# Patient Record
Sex: FEMALE | Race: WHITE | NOT HISPANIC OR LATINO | Employment: OTHER | URBAN - METROPOLITAN AREA
[De-identification: names, ages, dates, MRNs, and addresses within clinical notes are randomized per-mention and may not be internally consistent; named-entity substitution may affect disease eponyms.]

---

## 2017-02-17 RX ORDER — LOSARTAN POTASSIUM 100 MG/1
TABLET ORAL
Qty: 21 TABLET | Refills: 0 | Status: SHIPPED | OUTPATIENT
Start: 2017-02-17 | End: 2017-03-08 | Stop reason: SDUPTHER

## 2017-03-08 RX ORDER — LOSARTAN POTASSIUM 100 MG/1
TABLET ORAL
Qty: 30 TABLET | Refills: 2 | Status: SHIPPED | OUTPATIENT
Start: 2017-03-08 | End: 2017-06-10 | Stop reason: SDUPTHER

## 2017-03-15 RX ORDER — FUROSEMIDE 20 MG/1
TABLET ORAL
Qty: 30 TABLET | Refills: 5 | Status: SHIPPED | OUTPATIENT
Start: 2017-03-15 | End: 2017-09-08 | Stop reason: SDUPTHER

## 2017-04-14 RX ORDER — CARVEDILOL 6.25 MG/1
TABLET ORAL
Qty: 60 TABLET | Refills: 3 | Status: SHIPPED | OUTPATIENT
Start: 2017-04-14 | End: 2017-07-07 | Stop reason: SDUPTHER

## 2017-05-16 RX ORDER — PRAVASTATIN SODIUM 40 MG
TABLET ORAL
Qty: 30 TABLET | Refills: 3 | Status: SHIPPED | OUTPATIENT
Start: 2017-05-16 | End: 2017-09-08 | Stop reason: SDUPTHER

## 2017-06-10 RX ORDER — LOSARTAN POTASSIUM 100 MG/1
TABLET ORAL
Qty: 30 TABLET | Refills: 2 | Status: SHIPPED | OUTPATIENT
Start: 2017-06-10 | End: 2017-07-07 | Stop reason: SDUPTHER

## 2017-06-13 RX ORDER — LOSARTAN POTASSIUM 100 MG/1
TABLET ORAL
Qty: 30 TABLET | Refills: 2 | Status: SHIPPED | OUTPATIENT
Start: 2017-06-13 | End: 2017-07-07 | Stop reason: SDUPTHER

## 2017-07-07 ENCOUNTER — OFFICE VISIT (OUTPATIENT)
Dept: FAMILY MEDICINE CLINIC | Facility: CLINIC | Age: 77
End: 2017-07-07

## 2017-07-07 VITALS
HEART RATE: 70 BPM | HEIGHT: 62 IN | TEMPERATURE: 98.4 F | DIASTOLIC BLOOD PRESSURE: 88 MMHG | OXYGEN SATURATION: 98 % | SYSTOLIC BLOOD PRESSURE: 130 MMHG | BODY MASS INDEX: 37.91 KG/M2 | WEIGHT: 206 LBS | RESPIRATION RATE: 16 BRPM

## 2017-07-07 DIAGNOSIS — I10 ESSENTIAL HYPERTENSION: Primary | ICD-10-CM

## 2017-07-07 DIAGNOSIS — E55.9 VITAMIN D DEFICIENCY: ICD-10-CM

## 2017-07-07 DIAGNOSIS — E78.2 MIXED HYPERLIPIDEMIA: ICD-10-CM

## 2017-07-07 LAB
25(OH)D3 SERPL-MCNC: 41.5 NG/ML (ref 30–100)
ALBUMIN SERPL-MCNC: 4.3 G/DL (ref 3.5–5.2)
ALBUMIN/GLOB SERPL: 1.3 G/DL
ALP SERPL-CCNC: 61 U/L (ref 39–117)
ALT SERPL W P-5'-P-CCNC: 21 U/L (ref 1–33)
ANION GAP SERPL CALCULATED.3IONS-SCNC: 13.6 MMOL/L
AST SERPL-CCNC: 26 U/L (ref 1–32)
BILIRUB SERPL-MCNC: 0.6 MG/DL (ref 0.1–1.2)
BUN BLD-MCNC: 16 MG/DL (ref 8–23)
BUN/CREAT SERPL: 20 (ref 7–25)
CALCIUM SPEC-SCNC: 10.8 MG/DL (ref 8.6–10.5)
CHLORIDE SERPL-SCNC: 100 MMOL/L (ref 98–107)
CHOLEST SERPL-MCNC: 207 MG/DL (ref 0–200)
CO2 SERPL-SCNC: 27.4 MMOL/L (ref 22–29)
CREAT BLD-MCNC: 0.8 MG/DL (ref 0.57–1)
ERYTHROCYTE [DISTWIDTH] IN BLOOD BY AUTOMATED COUNT: 13.6 % (ref 4.5–15)
GFR SERPL CREATININE-BSD FRML MDRD: 70 ML/MIN/1.73
GLOBULIN UR ELPH-MCNC: 3.2 GM/DL
GLUCOSE BLD-MCNC: 92 MG/DL (ref 65–99)
HCT VFR BLD AUTO: 42.3 % (ref 31–42)
HDLC SERPL-MCNC: 67 MG/DL (ref 40–60)
HGB BLD-MCNC: 13.7 G/DL (ref 12–18)
LDLC SERPL CALC-MCNC: 112 MG/DL (ref 0–100)
LDLC/HDLC SERPL: 1.67 {RATIO}
LYMPHOCYTES # BLD AUTO: 1.5 10*3/MM3 (ref 1.2–3.4)
LYMPHOCYTES NFR BLD AUTO: 26.6 % (ref 21–51)
MCH RBC QN AUTO: 28.3 PG (ref 26.1–33.1)
MCHC RBC AUTO-ENTMCNC: 32.3 G/DL (ref 33–37)
MCV RBC AUTO: 87.7 FL (ref 80–99)
MONOCYTES # BLD AUTO: 0.2 10*3/MM3 (ref 0.1–0.6)
MONOCYTES NFR BLD AUTO: 3.5 % (ref 2–9)
NEUTROPHILS # BLD AUTO: 4 10*3/MM3 (ref 1.4–6.5)
NEUTROPHILS NFR BLD AUTO: 69.9 % (ref 42–75)
PLATELET # BLD AUTO: 216 10*3/MM3 (ref 150–450)
PMV BLD AUTO: 7.1 FL (ref 7.1–10.5)
POTASSIUM BLD-SCNC: 4.5 MMOL/L (ref 3.5–5.2)
PROT SERPL-MCNC: 7.5 G/DL (ref 6–8.5)
RBC # BLD AUTO: 4.82 10*6/MM3 (ref 4–6)
SODIUM BLD-SCNC: 141 MMOL/L (ref 136–145)
TRIGL SERPL-MCNC: 142 MG/DL (ref 0–150)
VLDLC SERPL-MCNC: 28.4 MG/DL (ref 5–40)
WBC NRBC COR # BLD: 5.7 10*3/MM3 (ref 4.5–10)

## 2017-07-07 PROCEDURE — 99213 OFFICE O/P EST LOW 20 MIN: CPT | Performed by: INTERNAL MEDICINE

## 2017-07-07 PROCEDURE — 80053 COMPREHEN METABOLIC PANEL: CPT | Performed by: INTERNAL MEDICINE

## 2017-07-07 PROCEDURE — 80061 LIPID PANEL: CPT | Performed by: INTERNAL MEDICINE

## 2017-07-07 PROCEDURE — 90715 TDAP VACCINE 7 YRS/> IM: CPT | Performed by: INTERNAL MEDICINE

## 2017-07-07 PROCEDURE — 82306 VITAMIN D 25 HYDROXY: CPT | Performed by: INTERNAL MEDICINE

## 2017-07-07 PROCEDURE — 85025 COMPLETE CBC W/AUTO DIFF WBC: CPT | Performed by: INTERNAL MEDICINE

## 2017-07-07 PROCEDURE — 90471 IMMUNIZATION ADMIN: CPT | Performed by: INTERNAL MEDICINE

## 2017-07-07 RX ORDER — FEXOFENADINE HYDROCHLORIDE 60 MG/1
60 TABLET, FILM COATED ORAL DAILY
Qty: 30 TABLET | Refills: 3 | Status: SHIPPED | OUTPATIENT
Start: 2017-07-07 | End: 2022-04-07 | Stop reason: CLARIF

## 2017-07-07 RX ORDER — LOSARTAN POTASSIUM 100 MG/1
100 TABLET ORAL DAILY
Qty: 30 TABLET | Refills: 3 | Status: SHIPPED | OUTPATIENT
Start: 2017-07-07 | End: 2018-01-10 | Stop reason: SDUPTHER

## 2017-07-07 RX ORDER — CARVEDILOL 6.25 MG/1
6.25 TABLET ORAL 2 TIMES DAILY WITH MEALS
Qty: 60 TABLET | Refills: 3 | Status: SHIPPED | OUTPATIENT
Start: 2017-07-07 | End: 2017-11-07 | Stop reason: SDUPTHER

## 2017-07-07 RX ORDER — FEXOFENADINE HYDROCHLORIDE 60 MG/1
60 TABLET, FILM COATED ORAL DAILY
COMMUNITY
End: 2017-07-07 | Stop reason: SDUPTHER

## 2017-07-07 NOTE — PROGRESS NOTES
Subjective   Michelle Car is a 76 y.o. female.     History of Present Illness   Patient was seen today for hypertension.  Blood pressure is running 130s over 80s or lipids a been well-controlled with medication.  Her vitamin D level is being  determined by lab work.    Much of this encounter note is an electronic transcription/translation of spoken language to printed text.  The electronic translation of spoken language may permit erroneous, or at times, nonsensical words or phrases to be inadvertently transcribed.  Although I have reviewed the note for such errors, some may still exist.  The following portions of the patient's history were reviewed and updated as appropriate: allergies, current medications, past family history, past medical history, past social history, past surgical history and problem list.    Review of Systems   Constitutional: Negative for fatigue and fever.   HENT: Positive for congestion. Negative for trouble swallowing.    Eyes: Negative for discharge and visual disturbance.   Respiratory: Negative for choking and shortness of breath.    Cardiovascular: Negative for chest pain and palpitations.   Gastrointestinal: Negative for abdominal pain and blood in stool.   Endocrine: Negative.    Genitourinary: Negative for genital sores and hematuria.   Musculoskeletal: Negative for gait problem and joint swelling.   Skin: Negative for color change, pallor, rash and wound.   Allergic/Immunologic: Positive for environmental allergies. Negative for immunocompromised state.   Neurological: Negative for facial asymmetry and speech difficulty.   Psychiatric/Behavioral: Negative for hallucinations and suicidal ideas.       Objective   Physical Exam   Constitutional: She is oriented to person, place, and time. She appears well-developed and well-nourished.   HENT:   Head: Normocephalic.   Eyes: Conjunctivae are normal. Pupils are equal, round, and reactive to light.   Neck: Normal range of motion.  Neck supple.   Cardiovascular: Normal rate, regular rhythm and normal heart sounds.    Pulmonary/Chest: Effort normal and breath sounds normal.   Abdominal: Soft. Bowel sounds are normal.   Musculoskeletal: Normal range of motion.   Neurological: She is alert and oriented to person, place, and time.   Skin: Skin is warm and dry.   Psychiatric: She has a normal mood and affect. Her behavior is normal. Judgment and thought content normal.   Nursing note and vitals reviewed.      Assessment/Plan   Problems Addressed this Visit        Cardiovascular and Mediastinum    Hypertension - Primary    Relevant Medications    losartan (COZAAR) 100 MG tablet    carvedilol (COREG) 6.25 MG tablet    Other Relevant Orders    CBC Auto Differential (Completed)    Hyperlipidemia    Relevant Orders    CBC Auto Differential (Completed)      Other Visit Diagnoses     Vitamin D deficiency         Relevant Orders    Vitamin D 25 Hydroxy

## 2017-09-08 RX ORDER — FUROSEMIDE 20 MG/1
TABLET ORAL
Qty: 30 TABLET | Refills: 5 | Status: SHIPPED | OUTPATIENT
Start: 2017-09-08 | End: 2018-05-10 | Stop reason: SDUPTHER

## 2017-09-08 RX ORDER — PRAVASTATIN SODIUM 40 MG
TABLET ORAL
Qty: 30 TABLET | Refills: 3 | Status: SHIPPED | OUTPATIENT
Start: 2017-09-08 | End: 2018-01-08 | Stop reason: SDUPTHER

## 2017-11-07 RX ORDER — CARVEDILOL 6.25 MG/1
TABLET ORAL
Qty: 60 TABLET | Refills: 3 | Status: SHIPPED | OUTPATIENT
Start: 2017-11-07 | End: 2018-02-09 | Stop reason: SDUPTHER

## 2018-01-08 RX ORDER — PRAVASTATIN SODIUM 40 MG
40 TABLET ORAL NIGHTLY
Qty: 30 TABLET | Refills: 0 | Status: SHIPPED | OUTPATIENT
Start: 2018-01-08 | End: 2018-02-05 | Stop reason: SDUPTHER

## 2018-01-08 RX ORDER — POTASSIUM CHLORIDE 750 MG/1
10 TABLET, EXTENDED RELEASE ORAL DAILY
Qty: 30 TABLET | Refills: 0 | Status: SHIPPED | OUTPATIENT
Start: 2018-01-08 | End: 2018-02-05 | Stop reason: SDUPTHER

## 2018-01-10 RX ORDER — LOSARTAN POTASSIUM 100 MG/1
TABLET ORAL
Qty: 30 TABLET | Refills: 3 | Status: SHIPPED | OUTPATIENT
Start: 2018-01-10 | End: 2018-05-10 | Stop reason: SDUPTHER

## 2018-02-05 RX ORDER — PRAVASTATIN SODIUM 40 MG
TABLET ORAL
Qty: 30 TABLET | Refills: 0 | Status: SHIPPED | OUTPATIENT
Start: 2018-02-05 | End: 2018-03-07 | Stop reason: SDUPTHER

## 2018-02-05 RX ORDER — POTASSIUM CHLORIDE 750 MG/1
TABLET, EXTENDED RELEASE ORAL
Qty: 30 TABLET | Refills: 0 | Status: SHIPPED | OUTPATIENT
Start: 2018-02-05 | End: 2018-03-07 | Stop reason: SDUPTHER

## 2018-02-07 RX ORDER — MELOXICAM 7.5 MG/1
TABLET ORAL
Qty: 60 TABLET | Refills: 3 | Status: SHIPPED | OUTPATIENT
Start: 2018-02-07 | End: 2018-12-27 | Stop reason: SDUPTHER

## 2018-02-09 RX ORDER — CARVEDILOL 6.25 MG/1
TABLET ORAL
Qty: 60 TABLET | Refills: 3 | Status: SHIPPED | OUTPATIENT
Start: 2018-02-09 | End: 2018-06-09 | Stop reason: SDUPTHER

## 2018-03-07 RX ORDER — POTASSIUM CHLORIDE 750 MG/1
TABLET, EXTENDED RELEASE ORAL
Qty: 30 TABLET | Refills: 0 | Status: SHIPPED | OUTPATIENT
Start: 2018-03-07 | End: 2018-04-06 | Stop reason: SDUPTHER

## 2018-03-07 RX ORDER — PRAVASTATIN SODIUM 40 MG
TABLET ORAL
Qty: 30 TABLET | Refills: 0 | Status: SHIPPED | OUTPATIENT
Start: 2018-03-07 | End: 2018-04-06 | Stop reason: SDUPTHER

## 2018-03-15 RX ORDER — FUROSEMIDE 20 MG/1
TABLET ORAL
Qty: 30 TABLET | Refills: 5 | OUTPATIENT
Start: 2018-03-15

## 2018-04-06 RX ORDER — POTASSIUM CHLORIDE 750 MG/1
TABLET, EXTENDED RELEASE ORAL
Qty: 30 TABLET | Refills: 0 | Status: SHIPPED | OUTPATIENT
Start: 2018-04-06 | End: 2018-05-06 | Stop reason: SDUPTHER

## 2018-04-06 RX ORDER — PRAVASTATIN SODIUM 40 MG
TABLET ORAL
Qty: 30 TABLET | Refills: 0 | Status: SHIPPED | OUTPATIENT
Start: 2018-04-06 | End: 2018-05-06 | Stop reason: SDUPTHER

## 2018-04-06 RX ORDER — FUROSEMIDE 20 MG/1
TABLET ORAL
Qty: 30 TABLET | Refills: 5 | OUTPATIENT
Start: 2018-04-06

## 2018-05-07 RX ORDER — POTASSIUM CHLORIDE 750 MG/1
TABLET, EXTENDED RELEASE ORAL
Qty: 30 TABLET | Refills: 0 | Status: SHIPPED | OUTPATIENT
Start: 2018-05-07 | End: 2018-06-09 | Stop reason: SDUPTHER

## 2018-05-07 RX ORDER — FUROSEMIDE 20 MG/1
TABLET ORAL
Qty: 30 TABLET | Refills: 5 | OUTPATIENT
Start: 2018-05-07

## 2018-05-07 RX ORDER — PRAVASTATIN SODIUM 40 MG
TABLET ORAL
Qty: 30 TABLET | Refills: 0 | Status: SHIPPED | OUTPATIENT
Start: 2018-05-07 | End: 2018-06-05 | Stop reason: SDUPTHER

## 2018-05-10 RX ORDER — FUROSEMIDE 20 MG/1
TABLET ORAL
Qty: 30 TABLET | Refills: 5 | Status: SHIPPED | OUTPATIENT
Start: 2018-05-10 | End: 2018-09-25 | Stop reason: SDUPTHER

## 2018-05-10 RX ORDER — LOSARTAN POTASSIUM 100 MG/1
TABLET ORAL
Qty: 30 TABLET | Refills: 3 | Status: SHIPPED | OUTPATIENT
Start: 2018-05-10 | End: 2018-06-08 | Stop reason: SDUPTHER

## 2018-06-05 RX ORDER — PRAVASTATIN SODIUM 40 MG
TABLET ORAL
Qty: 30 TABLET | Refills: 0 | Status: SHIPPED | OUTPATIENT
Start: 2018-06-05 | End: 2018-07-05 | Stop reason: SDUPTHER

## 2018-06-08 RX ORDER — LOSARTAN POTASSIUM 100 MG/1
TABLET ORAL
Qty: 30 TABLET | Refills: 3 | Status: SHIPPED | OUTPATIENT
Start: 2018-06-08 | End: 2018-10-26 | Stop reason: SDUPTHER

## 2018-06-11 RX ORDER — POTASSIUM CHLORIDE 750 MG/1
TABLET, EXTENDED RELEASE ORAL
Qty: 30 TABLET | Refills: 0 | Status: SHIPPED | OUTPATIENT
Start: 2018-06-11 | End: 2018-08-01 | Stop reason: SDUPTHER

## 2018-06-11 RX ORDER — CARVEDILOL 6.25 MG/1
TABLET ORAL
Qty: 60 TABLET | Refills: 3 | Status: SHIPPED | OUTPATIENT
Start: 2018-06-11 | End: 2018-10-26 | Stop reason: SDUPTHER

## 2018-07-05 RX ORDER — PRAVASTATIN SODIUM 40 MG
TABLET ORAL
Qty: 30 TABLET | Refills: 0 | Status: SHIPPED | OUTPATIENT
Start: 2018-07-05 | End: 2018-08-01 | Stop reason: SDUPTHER

## 2018-08-01 RX ORDER — POTASSIUM CHLORIDE 750 MG/1
TABLET, EXTENDED RELEASE ORAL
Qty: 30 TABLET | Refills: 0 | Status: SHIPPED | OUTPATIENT
Start: 2018-08-01 | End: 2018-09-05 | Stop reason: SDUPTHER

## 2018-08-01 RX ORDER — PRAVASTATIN SODIUM 40 MG
TABLET ORAL
Qty: 30 TABLET | Refills: 0 | Status: SHIPPED | OUTPATIENT
Start: 2018-08-01 | End: 2018-09-04 | Stop reason: SDUPTHER

## 2018-09-04 RX ORDER — PRAVASTATIN SODIUM 40 MG
TABLET ORAL
Qty: 14 TABLET | Refills: 0 | Status: SHIPPED | OUTPATIENT
Start: 2018-09-04 | End: 2018-09-25 | Stop reason: SDUPTHER

## 2018-09-06 RX ORDER — POTASSIUM CHLORIDE 750 MG/1
10 TABLET, EXTENDED RELEASE ORAL DAILY
Qty: 14 TABLET | Refills: 0 | Status: SHIPPED | OUTPATIENT
Start: 2018-09-06 | End: 2018-09-25

## 2018-09-25 ENCOUNTER — OFFICE VISIT (OUTPATIENT)
Dept: FAMILY MEDICINE CLINIC | Facility: CLINIC | Age: 78
End: 2018-09-25

## 2018-09-25 VITALS
HEART RATE: 82 BPM | WEIGHT: 202.4 LBS | BODY MASS INDEX: 37.25 KG/M2 | SYSTOLIC BLOOD PRESSURE: 142 MMHG | OXYGEN SATURATION: 98 % | DIASTOLIC BLOOD PRESSURE: 78 MMHG | HEIGHT: 62 IN | TEMPERATURE: 98.8 F

## 2018-09-25 DIAGNOSIS — J06.9 ACUTE URI: ICD-10-CM

## 2018-09-25 DIAGNOSIS — I10 ESSENTIAL HYPERTENSION: Primary | ICD-10-CM

## 2018-09-25 DIAGNOSIS — E78.2 MIXED HYPERLIPIDEMIA: ICD-10-CM

## 2018-09-25 DIAGNOSIS — E55.9 VITAMIN D DEFICIENCY: ICD-10-CM

## 2018-09-25 DIAGNOSIS — R82.90 ABNORMAL FINDING IN URINE: ICD-10-CM

## 2018-09-25 LAB
25(OH)D3 SERPL-MCNC: 58.9 NG/ML (ref 30–100)
ALBUMIN SERPL-MCNC: 4.6 G/DL (ref 3.5–5.2)
ALBUMIN/GLOB SERPL: 1.5 G/DL
ALP SERPL-CCNC: 55 U/L (ref 39–117)
ALT SERPL W P-5'-P-CCNC: 15 U/L (ref 1–33)
ANION GAP SERPL CALCULATED.3IONS-SCNC: 11.3 MMOL/L
AST SERPL-CCNC: 19 U/L (ref 1–32)
BACTERIA UR QL AUTO: ABNORMAL /HPF
BILIRUB SERPL-MCNC: 0.6 MG/DL (ref 0.1–1.2)
BILIRUB UR QL STRIP: NEGATIVE
BUN BLD-MCNC: 14 MG/DL (ref 8–23)
BUN/CREAT SERPL: 14.7 (ref 7–25)
CALCIUM SPEC-SCNC: 9.5 MG/DL (ref 8.6–10.5)
CHLORIDE SERPL-SCNC: 99 MMOL/L (ref 98–107)
CHOLEST SERPL-MCNC: 198 MG/DL (ref 0–200)
CLARITY UR: CLEAR
CO2 SERPL-SCNC: 26.7 MMOL/L (ref 22–29)
COLOR UR: YELLOW
CREAT BLD-MCNC: 0.95 MG/DL (ref 0.57–1)
ERYTHROCYTE [DISTWIDTH] IN BLOOD BY AUTOMATED COUNT: 13.7 % (ref 4.5–15)
GFR SERPL CREATININE-BSD FRML MDRD: 57 ML/MIN/1.73
GLOBULIN UR ELPH-MCNC: 3 GM/DL
GLUCOSE BLD-MCNC: 97 MG/DL (ref 65–99)
GLUCOSE UR STRIP-MCNC: NEGATIVE MG/DL
HCT VFR BLD AUTO: 40.5 % (ref 31–42)
HDLC SERPL-MCNC: 72 MG/DL (ref 40–60)
HGB BLD-MCNC: 13.5 G/DL (ref 12–18)
HGB UR QL STRIP.AUTO: NEGATIVE
KETONES UR QL STRIP: NEGATIVE
LDLC SERPL CALC-MCNC: 108 MG/DL (ref 0–100)
LDLC/HDLC SERPL: 1.5 {RATIO}
LEUKOCYTE ESTERASE UR QL STRIP.AUTO: ABNORMAL
LYMPHOCYTES # BLD AUTO: 1.6 10*3/MM3 (ref 1.2–3.4)
LYMPHOCYTES NFR BLD AUTO: 33.7 % (ref 21–51)
MCH RBC QN AUTO: 30.2 PG (ref 26.1–33.1)
MCHC RBC AUTO-ENTMCNC: 33.3 G/DL (ref 33–37)
MCV RBC AUTO: 90.6 FL (ref 80–99)
MONOCYTES # BLD AUTO: 0.3 10*3/MM3 (ref 0.1–0.6)
MONOCYTES NFR BLD AUTO: 6.9 % (ref 2–9)
NEUTROPHILS # BLD AUTO: 2.7 10*3/MM3 (ref 1.4–6.5)
NEUTROPHILS NFR BLD AUTO: 59.4 % (ref 42–75)
NITRITE UR QL STRIP: NEGATIVE
PH UR STRIP.AUTO: 6 [PH] (ref 4.6–8)
PLATELET # BLD AUTO: 217 10*3/MM3 (ref 150–450)
PMV BLD AUTO: 6.7 FL (ref 7.1–10.5)
POTASSIUM BLD-SCNC: 4.3 MMOL/L (ref 3.5–5.2)
PROT SERPL-MCNC: 7.6 G/DL (ref 6–8.5)
PROT UR QL STRIP: NEGATIVE
RBC # BLD AUTO: 4.47 10*6/MM3 (ref 4–6)
RBC # UR: ABNORMAL /HPF
REF LAB TEST METHOD: ABNORMAL
SODIUM BLD-SCNC: 137 MMOL/L (ref 136–145)
SP GR UR STRIP: 1.01 (ref 1–1.03)
SQUAMOUS #/AREA URNS HPF: ABNORMAL /HPF
TRIGL SERPL-MCNC: 89 MG/DL (ref 0–150)
UROBILINOGEN UR QL STRIP: ABNORMAL
VLDLC SERPL-MCNC: 17.8 MG/DL (ref 5–40)
WBC NRBC COR # BLD: 4.6 10*3/MM3 (ref 4.5–10)
WBC UR QL AUTO: ABNORMAL /HPF

## 2018-09-25 PROCEDURE — 81001 URINALYSIS AUTO W/SCOPE: CPT | Performed by: INTERNAL MEDICINE

## 2018-09-25 PROCEDURE — 87086 URINE CULTURE/COLONY COUNT: CPT | Performed by: INTERNAL MEDICINE

## 2018-09-25 PROCEDURE — 80053 COMPREHEN METABOLIC PANEL: CPT | Performed by: INTERNAL MEDICINE

## 2018-09-25 PROCEDURE — 82306 VITAMIN D 25 HYDROXY: CPT | Performed by: INTERNAL MEDICINE

## 2018-09-25 PROCEDURE — 99214 OFFICE O/P EST MOD 30 MIN: CPT | Performed by: INTERNAL MEDICINE

## 2018-09-25 PROCEDURE — 80061 LIPID PANEL: CPT | Performed by: INTERNAL MEDICINE

## 2018-09-25 PROCEDURE — 36415 COLL VENOUS BLD VENIPUNCTURE: CPT | Performed by: INTERNAL MEDICINE

## 2018-09-25 PROCEDURE — 85025 COMPLETE CBC W/AUTO DIFF WBC: CPT | Performed by: INTERNAL MEDICINE

## 2018-09-25 RX ORDER — SULFAMETHOXAZOLE AND TRIMETHOPRIM 800; 160 MG/1; MG/1
1 TABLET ORAL 2 TIMES DAILY
Qty: 20 TABLET | Refills: 0 | Status: SHIPPED | OUTPATIENT
Start: 2018-09-25 | End: 2018-10-03

## 2018-09-25 RX ORDER — PRAVASTATIN SODIUM 40 MG
40 TABLET ORAL
Qty: 30 TABLET | Refills: 4 | Status: SHIPPED | OUTPATIENT
Start: 2018-09-25 | End: 2019-02-22 | Stop reason: SDUPTHER

## 2018-09-25 RX ORDER — INFLUENZA A VIRUS A/MICHIGAN/45/2015 X-275 (H1N1) ANTIGEN (FORMALDEHYDE INACTIVATED), INFLUENZA A VIRUS A/SINGAPORE/INFIMH-16-0019/2016 IVR-186 (H3N2) ANTIGEN (FORMALDEHYDE INACTIVATED), AND INFLUENZA B VIRUS B/MARYLAND/15/2016 BX-69A (A B/COLORADO/6/2017-LIKE VIRUS) ANTIGEN (FORMALDEHYDE INACTIVATED) 60; 60; 60 UG/.5ML; UG/.5ML; UG/.5ML
INJECTION, SUSPENSION INTRAMUSCULAR
Refills: 0 | COMMUNITY
Start: 2018-09-05 | End: 2020-02-21

## 2018-09-25 RX ORDER — FUROSEMIDE 20 MG/1
20 TABLET ORAL DAILY
Qty: 30 TABLET | Refills: 5
Start: 2018-09-25 | End: 2018-09-25

## 2018-09-25 NOTE — PROGRESS NOTES
Subjective   Michelle Car is a 77 y.o. female.     History of Present Illness   Patient was seen for hypertension.  Her blood pressures been running 140s over 80s.  She will monitor for the next 30 days return to our clinic in 1 month.  Her lipids a been controlled with diet and exercise.  Patient was advised to take her Lasix only if necessary and needed take potassium she took the Lasix.  She did have dysuria and had a UTI on a urinalysis.  She also had symptoms of an upper respiratory tract infection was given Bactrim.  She did have labs today will follow-up in one month.    Dictated utilizing Dragon dictation. If there are questions or for further clarification, please contact me.   The following portions of the patient's history were reviewed and updated as appropriate: allergies, current medications, past family history, past medical history, past social history, past surgical history and problem list.    Review of Systems   Constitutional: Negative for fatigue and fever.   HENT: Positive for congestion and sinus pain. Negative for trouble swallowing.    Eyes: Negative for discharge and visual disturbance.   Respiratory: Negative for choking and shortness of breath.    Cardiovascular: Negative for chest pain and palpitations.   Gastrointestinal: Negative for abdominal pain and blood in stool.   Endocrine: Negative.    Genitourinary: Positive for dysuria. Negative for genital sores and hematuria.   Musculoskeletal: Negative for gait problem and joint swelling.   Skin: Negative for color change, pallor, rash and wound.   Allergic/Immunologic: Positive for environmental allergies. Negative for immunocompromised state.   Neurological: Negative for facial asymmetry and speech difficulty.   Psychiatric/Behavioral: Negative for hallucinations and suicidal ideas.       Objective   Physical Exam   Constitutional: She is oriented to person, place, and time. She appears well-developed and well-nourished.   HENT:    Head: Normocephalic.   Bilateral maxillary tenderness   Eyes: Pupils are equal, round, and reactive to light. Conjunctivae are normal.   Neck: Normal range of motion. Neck supple.   Cardiovascular: Normal rate, regular rhythm and normal heart sounds.    Pulmonary/Chest: Effort normal and breath sounds normal. No respiratory distress. She has no wheezes. She has no rales. She exhibits no tenderness.   Abdominal: Soft. Bowel sounds are normal.   Musculoskeletal: Normal range of motion.   Neurological: She is alert and oriented to person, place, and time.   Skin: Skin is warm and dry.   Psychiatric: She has a normal mood and affect. Her behavior is normal. Judgment and thought content normal.   Nursing note and vitals reviewed.      Assessment/Plan   Problems Addressed this Visit        Cardiovascular and Mediastinum    Hypertension - Primary    Relevant Orders    CBC & Differential (Completed)    Comprehensive Metabolic Panel    Vitamin D 25 Hydroxy    Lipid Panel    Hyperlipidemia    Relevant Medications    pravastatin (PRAVACHOL) 40 MG tablet    Other Relevant Orders    CBC & Differential (Completed)    Comprehensive Metabolic Panel    Vitamin D 25 Hydroxy    Lipid Panel      Other Visit Diagnoses     Acute URI        Relevant Orders    Urinalysis With Microscopic - Urine, Clean Catch (Completed)    Urine Culture - Urine, Urine, Clean Catch    Urinalysis without microscopic (no culture) - Urine, Clean Catch (Completed)    Urinalysis, Microscopic Only - Urine, Clean Catch (Completed)    Abnormal finding in urine         Relevant Orders    Urine Culture - Urine, Urine, Clean Catch    Vitamin D deficiency         Relevant Orders    Vitamin D 25 Hydroxy

## 2018-09-27 LAB — BACTERIA SPEC AEROBE CULT: NO GROWTH

## 2018-10-03 ENCOUNTER — TELEPHONE (OUTPATIENT)
Dept: FAMILY MEDICINE CLINIC | Facility: CLINIC | Age: 78
End: 2018-10-03

## 2018-10-03 RX ORDER — NITROFURANTOIN 25; 75 MG/1; MG/1
100 CAPSULE ORAL 2 TIMES DAILY
Qty: 14 CAPSULE | Refills: 0 | Status: SHIPPED | OUTPATIENT
Start: 2018-10-03 | End: 2018-10-04

## 2018-10-04 ENCOUNTER — DOCUMENTATION (OUTPATIENT)
Dept: FAMILY MEDICINE CLINIC | Facility: CLINIC | Age: 78
End: 2018-10-04

## 2018-10-04 ENCOUNTER — TELEPHONE (OUTPATIENT)
Dept: FAMILY MEDICINE CLINIC | Facility: CLINIC | Age: 78
End: 2018-10-04

## 2018-10-04 RX ORDER — DOXYCYCLINE HYCLATE 100 MG
100 TABLET ORAL 2 TIMES DAILY
Qty: 14 TABLET | Refills: 0 | Status: SHIPPED | OUTPATIENT
Start: 2018-10-04 | End: 2020-02-21

## 2018-10-04 NOTE — PROGRESS NOTES
NP on call 10/4/18, patient saw RLS for UTI, culture no show growth, she was given bactrim, still having symptoms, RLS sent macrobid but no covered and expensive with insurance, quinolone allergy, called in doxycycline 100mg bid for 1 week, f/u in office next week for recheck. Clarified with pharmacy

## 2018-10-26 RX ORDER — CARVEDILOL 6.25 MG/1
TABLET ORAL
Qty: 60 TABLET | Refills: 0 | Status: SHIPPED | OUTPATIENT
Start: 2018-10-26 | End: 2018-11-26 | Stop reason: SDUPTHER

## 2018-10-26 RX ORDER — POTASSIUM CHLORIDE 750 MG/1
TABLET, EXTENDED RELEASE ORAL
Qty: 14 TABLET | Refills: 0 | Status: SHIPPED | OUTPATIENT
Start: 2018-10-26 | End: 2018-11-26 | Stop reason: SDUPTHER

## 2018-10-26 RX ORDER — LOSARTAN POTASSIUM 100 MG/1
TABLET ORAL
Qty: 30 TABLET | Refills: 0 | Status: SHIPPED | OUTPATIENT
Start: 2018-10-26 | End: 2018-11-26 | Stop reason: SDUPTHER

## 2018-11-26 RX ORDER — CARVEDILOL 6.25 MG/1
TABLET ORAL
Qty: 60 TABLET | Refills: 0 | Status: SHIPPED | OUTPATIENT
Start: 2018-11-26 | End: 2018-12-27 | Stop reason: SDUPTHER

## 2018-11-26 RX ORDER — LOSARTAN POTASSIUM 100 MG/1
TABLET ORAL
Qty: 30 TABLET | Refills: 0 | Status: SHIPPED | OUTPATIENT
Start: 2018-11-26 | End: 2018-12-27 | Stop reason: SDUPTHER

## 2018-11-26 RX ORDER — POTASSIUM CHLORIDE 750 MG/1
TABLET, EXTENDED RELEASE ORAL
Qty: 14 TABLET | Refills: 0 | Status: SHIPPED | OUTPATIENT
Start: 2018-11-26 | End: 2018-12-27 | Stop reason: SDUPTHER

## 2018-12-27 RX ORDER — LOSARTAN POTASSIUM 100 MG/1
TABLET ORAL
Qty: 30 TABLET | Refills: 3 | Status: SHIPPED | OUTPATIENT
Start: 2018-12-27 | End: 2019-03-25 | Stop reason: SDUPTHER

## 2018-12-27 RX ORDER — MELOXICAM 7.5 MG/1
TABLET ORAL
Qty: 60 TABLET | Refills: 3 | Status: SHIPPED | OUTPATIENT
Start: 2018-12-27 | End: 2019-03-25 | Stop reason: SDUPTHER

## 2018-12-27 RX ORDER — POTASSIUM CHLORIDE 750 MG/1
TABLET, EXTENDED RELEASE ORAL
Qty: 30 TABLET | Refills: 3 | Status: SHIPPED | OUTPATIENT
Start: 2018-12-27 | End: 2019-03-25 | Stop reason: SDUPTHER

## 2018-12-27 RX ORDER — CARVEDILOL 6.25 MG/1
TABLET ORAL
Qty: 60 TABLET | Refills: 3 | Status: SHIPPED | OUTPATIENT
Start: 2018-12-27 | End: 2019-03-25 | Stop reason: SDUPTHER

## 2018-12-27 RX ORDER — FUROSEMIDE 20 MG/1
TABLET ORAL
Qty: 30 TABLET | Refills: 3 | Status: SHIPPED | OUTPATIENT
Start: 2018-12-27 | End: 2019-03-25 | Stop reason: SDUPTHER

## 2019-02-22 RX ORDER — PRAVASTATIN SODIUM 40 MG
40 TABLET ORAL
Qty: 30 TABLET | Refills: 0 | Status: SHIPPED | OUTPATIENT
Start: 2019-02-22 | End: 2019-03-25 | Stop reason: SDUPTHER

## 2019-03-25 RX ORDER — MELOXICAM 7.5 MG/1
TABLET ORAL
Qty: 180 TABLET | Refills: 3 | Status: SHIPPED | OUTPATIENT
Start: 2019-03-25 | End: 2020-02-21 | Stop reason: SDUPTHER

## 2019-03-25 RX ORDER — LOSARTAN POTASSIUM 100 MG/1
TABLET ORAL
Qty: 90 TABLET | Refills: 3 | Status: SHIPPED | OUTPATIENT
Start: 2019-03-25 | End: 2020-02-21 | Stop reason: SDUPTHER

## 2019-03-25 RX ORDER — FUROSEMIDE 20 MG/1
TABLET ORAL
Qty: 90 TABLET | Refills: 3 | Status: SHIPPED | OUTPATIENT
Start: 2019-03-25 | End: 2020-02-21 | Stop reason: SDUPTHER

## 2019-03-25 RX ORDER — POTASSIUM CHLORIDE 750 MG/1
TABLET, EXTENDED RELEASE ORAL
Qty: 90 TABLET | Refills: 2 | Status: SHIPPED | OUTPATIENT
Start: 2019-03-25 | End: 2020-02-21 | Stop reason: SDUPTHER

## 2019-03-25 RX ORDER — PRAVASTATIN SODIUM 40 MG
40 TABLET ORAL
Qty: 90 TABLET | Refills: 3 | Status: SHIPPED | OUTPATIENT
Start: 2019-03-25 | End: 2020-02-21 | Stop reason: SDUPTHER

## 2019-03-25 RX ORDER — CARVEDILOL 6.25 MG/1
TABLET ORAL
Qty: 180 TABLET | Refills: 3 | Status: SHIPPED | OUTPATIENT
Start: 2019-03-25 | End: 2020-02-21 | Stop reason: SDUPTHER

## 2020-02-21 ENCOUNTER — OFFICE VISIT (OUTPATIENT)
Dept: FAMILY MEDICINE CLINIC | Facility: CLINIC | Age: 80
End: 2020-02-21

## 2020-02-21 VITALS
SYSTOLIC BLOOD PRESSURE: 146 MMHG | RESPIRATION RATE: 16 BRPM | HEART RATE: 69 BPM | OXYGEN SATURATION: 99 % | DIASTOLIC BLOOD PRESSURE: 80 MMHG | WEIGHT: 202 LBS | TEMPERATURE: 98.4 F | HEIGHT: 62 IN | BODY MASS INDEX: 37.17 KG/M2

## 2020-02-21 DIAGNOSIS — Z00.00 MEDICARE ANNUAL WELLNESS VISIT, SUBSEQUENT: Primary | ICD-10-CM

## 2020-02-21 DIAGNOSIS — I10 ESSENTIAL HYPERTENSION: ICD-10-CM

## 2020-02-21 DIAGNOSIS — E78.2 MIXED HYPERLIPIDEMIA: ICD-10-CM

## 2020-02-21 DIAGNOSIS — E55.9 VITAMIN D DEFICIENCY, UNSPECIFIED: ICD-10-CM

## 2020-02-21 PROCEDURE — 99214 OFFICE O/P EST MOD 30 MIN: CPT | Performed by: INTERNAL MEDICINE

## 2020-02-21 PROCEDURE — G0439 PPPS, SUBSEQ VISIT: HCPCS | Performed by: INTERNAL MEDICINE

## 2020-02-21 RX ORDER — AZITHROMYCIN 250 MG/1
TABLET, FILM COATED ORAL
Qty: 6 TABLET | Refills: 0 | Status: SHIPPED | OUTPATIENT
Start: 2020-02-21 | End: 2021-04-06

## 2020-02-21 RX ORDER — LOSARTAN POTASSIUM 100 MG/1
100 TABLET ORAL DAILY
Qty: 90 TABLET | Refills: 3 | Status: SHIPPED | OUTPATIENT
Start: 2020-02-21 | End: 2021-03-09

## 2020-02-21 RX ORDER — CARVEDILOL 6.25 MG/1
6.25 TABLET ORAL 2 TIMES DAILY WITH MEALS
Qty: 180 TABLET | Refills: 3 | Status: SHIPPED | OUTPATIENT
Start: 2020-02-21 | End: 2021-03-09

## 2020-02-21 RX ORDER — FUROSEMIDE 20 MG/1
20 TABLET ORAL DAILY
Qty: 90 TABLET | Refills: 3 | Status: SHIPPED | OUTPATIENT
Start: 2020-02-21 | End: 2021-03-11

## 2020-02-21 RX ORDER — MELOXICAM 7.5 MG/1
TABLET ORAL
Qty: 180 TABLET | Refills: 3 | Status: SHIPPED | OUTPATIENT
Start: 2020-02-21 | End: 2021-04-06 | Stop reason: SDUPTHER

## 2020-02-21 RX ORDER — PRAVASTATIN SODIUM 40 MG
40 TABLET ORAL
Qty: 90 TABLET | Refills: 3 | Status: SHIPPED | OUTPATIENT
Start: 2020-02-21 | End: 2020-12-15

## 2020-02-21 RX ORDER — POTASSIUM CHLORIDE 750 MG/1
10 TABLET, EXTENDED RELEASE ORAL DAILY
Qty: 90 TABLET | Refills: 2 | Status: SHIPPED | OUTPATIENT
Start: 2020-02-21 | End: 2021-03-11

## 2020-02-21 NOTE — PROGRESS NOTES
Subsequent Medicare Wellness Visit   The ABC's of the Annual Wellness Visit    Chief Complaint   Patient presents with   • Hypertension     yearly checkup   • Ear Fullness     left       HPI:  Michelle Car, -1940, is a 79 y.o. female who presents for a Subsequent Medicare Wellness Visit.  Patient was seen for Medicare wellness exam.  Patient blood pressure was running 140s over 80s.  Patient will check her blood pressure daily and return to clinic in 1 month with readings.  Patient's lipids are being treated with diet and exercise with a statin.  Triglycerides 89, HDL 72, .  Patient has low vitamin D level is being supplemented with over-the-counter D3.    Dictated utilizing Dragon dictation. If there are questions or for further clarification, please contact me.  Recent Hospitalizations:  No hospitalization(s) within the last year..    Current Medical Providers:  Patient Care Team:  Olayinka Pimentel MD as PCP - General    Health Habits and Functional and Cognitive Screening and Depression Screening:  Functional & Cognitive Status 2020   Do you have difficulty preparing food and eating? No   Do you have difficulty bathing yourself, getting dressed or grooming yourself? No   Do you have difficulty using the toilet? No   Do you have difficulty moving around from place to place? No   Do you have trouble with steps or getting out of a bed or a chair? No   Current Diet Well Balanced Diet   Dental Exam Up to date   Eye Exam Up to date   Exercise (times per week) 7 times per week   Current Exercise Activities Include Aerobics   Do you need help using the phone?  No   Are you deaf or do you have serious difficulty hearing?  No   Do you need help with transportation? No   Do you need help shopping? No   Do you need help preparing meals?  No   Do you need help with housework?  No   Do you need help with laundry? No   Do you need help taking your medications? No   Do you need help managing money? No    Do you ever drive or ride in a car without wearing a seat belt? No   Have you felt unusual stress, anger or loneliness in the last month? No   Who do you live with? Alone   If you need help, do you have trouble finding someone available to you? No   Have you been bothered in the last four weeks by sexual problems? No   Do you have difficulty concentrating, remembering or making decisions? No       Compared to one year ago, the patient feels her physical health is the same and her mental health is the same.    Depression Screen:  PHQ-2/PHQ-9 Depression Screening 2/21/2020   Little interest or pleasure in doing things 0   Feeling down, depressed, or hopeless 0   Trouble falling or staying asleep, or sleeping too much 0   Feeling tired or having little energy 0   Poor appetite or overeating 0   Feeling bad about yourself - or that you are a failure or have let yourself or your family down 0   Trouble concentrating on things, such as reading the newspaper or watching television 0   Moving or speaking so slowly that other people could have noticed. Or the opposite - being so fidgety or restless that you have been moving around a lot more than usual 0   Thoughts that you would be better off dead, or of hurting yourself in some way 0   Total Score 0   If you checked off any problems, how difficult have these problems made it for you to do your work, take care of things at home, or get along with other people? Not difficult at all         Past Medical/Family/Social History:  The following portions of the patient's history were reviewed and updated as appropriate: allergies, current medications, past family history, past medical history, past social history, past surgical history and problem list.    Allergies   Allergen Reactions   • Factive [Gemifloxacin] Hives         Current Outpatient Medications:   •  aspirin  MG EC tablet, Take 325 mg by mouth daily., Disp: , Rfl:   •  Calcium Carbonate (CALTRATE 600 PO), Take   by mouth., Disp: , Rfl:   •  carvedilol (COREG) 6.25 MG tablet, Take 1 tablet by mouth 2 (Two) Times a Day With Meals., Disp: 180 tablet, Rfl: 3  •  fexofenadine (ALLEGRA) 60 MG tablet, Take 1 tablet by mouth Daily., Disp: 30 tablet, Rfl: 3  •  furosemide (LASIX) 20 MG tablet, Take 1 tablet by mouth Daily., Disp: 90 tablet, Rfl: 3  •  losartan (COZAAR) 100 MG tablet, Take 1 tablet by mouth Daily., Disp: 90 tablet, Rfl: 3  •  meloxicam (MOBIC) 7.5 MG tablet, 1 po bid prn pain, Disp: 180 tablet, Rfl: 3  •  Multiple Vitamins-Minerals (CENTRUM SILVER) tablet, Take  by mouth daily., Disp: , Rfl:   •  potassium chloride (K-DUR,KLOR-CON) 10 MEQ CR tablet, Take 1 tablet by mouth Daily., Disp: 90 tablet, Rfl: 2  •  pravastatin (PRAVACHOL) 40 MG tablet, Take 1 tablet by mouth every night at bedtime., Disp: 90 tablet, Rfl: 3  •  azithromycin (ZITHROMAX) 250 MG tablet, Take 2 tablets the first day, then 1 tablet daily for 4 days., Disp: 6 tablet, Rfl: 0    Aspirin use counseling: Does not need ASA (and currently is not on it)    Current medication list contains no high risk medications.  No harmful drug interactions have been identified.     History reviewed. No pertinent family history.    Social History     Tobacco Use   • Smoking status: Never Smoker   Substance Use Topics   • Alcohol use: No       History reviewed. No pertinent surgical history.    Patient Active Problem List   Diagnosis   • Venous insufficiency   • Hypertension   • Hyperlipidemia   • Abdominal aortic atherosclerosis (CMS/HCC)   • Generalized edema   • Fatigue due to excessive exertion   • Seasonal allergic rhinitis due to pollen       Review of Systems   Constitutional: Negative for fatigue and fever.   HENT: Positive for congestion. Negative for trouble swallowing.    Eyes: Negative for discharge and visual disturbance.   Respiratory: Negative for choking and shortness of breath.    Cardiovascular: Negative for chest pain and palpitations.  "  Gastrointestinal: Negative for abdominal pain and blood in stool.   Endocrine: Negative.    Genitourinary: Negative for genital sores and hematuria.   Musculoskeletal: Negative for gait problem and joint swelling.   Skin: Negative for color change, pallor, rash and wound.   Allergic/Immunologic: Positive for environmental allergies. Negative for immunocompromised state.   Neurological: Negative for facial asymmetry and speech difficulty.   Psychiatric/Behavioral: Negative for hallucinations and suicidal ideas.       Objective     Vitals:    02/21/20 1401   BP: 146/80   BP Location: Left arm   Patient Position: Sitting   Cuff Size: Large Adult   Pulse: 69   Resp: 16   Temp: 98.4 °F (36.9 °C)   TempSrc: Oral   SpO2: 99%   Weight: 91.6 kg (202 lb)   Height: 157.5 cm (62\")   PainSc: 0-No pain       Patient's Body mass index is 36.95 kg/m². BMI is within normal parameters. No follow-up required..      No exam data present    The patient has no evidence of cognitve impairment.     Physical Exam   Constitutional: She is oriented to person, place, and time. She appears well-developed and well-nourished.   HENT:   Head: Normocephalic and atraumatic.   Right Ear: External ear normal.   Left Ear: External ear normal.   Nose: Nose normal.   Mouth/Throat: Oropharynx is clear and moist.   Eyes: Pupils are equal, round, and reactive to light. Conjunctivae and EOM are normal.   Neck: Normal range of motion. Neck supple.   Cardiovascular: Normal rate, regular rhythm, normal heart sounds and intact distal pulses.   Pulmonary/Chest: Effort normal and breath sounds normal.   Abdominal: Soft. Bowel sounds are normal.   Musculoskeletal: Normal range of motion.   Neurological: She is alert and oriented to person, place, and time.   Skin: Skin is warm and dry.   Psychiatric: She has a normal mood and affect. Her behavior is normal. Judgment and thought content normal.       Recent Lab Results:     Lab Results   Component Value Date    " CHOL 198 09/25/2018    TRIG 89 09/25/2018    HDL 72 (H) 09/25/2018    VLDL 17.8 09/25/2018    LDLHDL 1.50 09/25/2018       Assessment/Plan   Age-appropriate Screening Schedule:  Refer to the list below for future screening recommendations based on patient's age, sex and/or medical conditions.      Health Maintenance   Topic Date Due   • ZOSTER VACCINE (2 of 2) 02/08/2017   • MAMMOGRAM  12/14/2018   • LIPID PANEL  09/25/2019   • COLONOSCOPY  12/14/2026   • TDAP/TD VACCINES (2 - Td) 07/07/2027   • INFLUENZA VACCINE  Addressed       Medicare Risks and Personalized Health Plan:  Advance Directive Discussion      CMS-Preventive Services Quick Reference  Medicare Preventive Services Addressed:  NA    Advance Care Planning: #1 monitor blood pressure return to clinic in 1 month #2 continue present treatment for lipids #3 continue vitamin D  ACP discussion was held with the patient during this visit.    Diagnoses and all orders for this visit:    1. Medicare annual wellness visit, subsequent (Primary)    2. Essential hypertension  -     CBC (No Diff); Future  -     Comprehensive Metabolic Panel; Future  -     Lipid Panel; Future  -     Vitamin D 25 Hydroxy; Future    3. Mixed hyperlipidemia  -     CBC (No Diff); Future  -     Comprehensive Metabolic Panel; Future  -     Lipid Panel; Future  -     Vitamin D 25 Hydroxy; Future    4. Vitamin D deficiency, unspecified   -     Vitamin D 25 Hydroxy; Future    Other orders  -     pravastatin (PRAVACHOL) 40 MG tablet; Take 1 tablet by mouth every night at bedtime.  Dispense: 90 tablet; Refill: 3  -     potassium chloride (K-DUR,KLOR-CON) 10 MEQ CR tablet; Take 1 tablet by mouth Daily.  Dispense: 90 tablet; Refill: 2  -     meloxicam (MOBIC) 7.5 MG tablet; 1 po bid prn pain  Dispense: 180 tablet; Refill: 3  -     losartan (COZAAR) 100 MG tablet; Take 1 tablet by mouth Daily.  Dispense: 90 tablet; Refill: 3  -     carvedilol (COREG) 6.25 MG tablet; Take 1 tablet by mouth 2 (Two) Times  a Day With Meals.  Dispense: 180 tablet; Refill: 3  -     azithromycin (ZITHROMAX) 250 MG tablet; Take 2 tablets the first day, then 1 tablet daily for 4 days.  Dispense: 6 tablet; Refill: 0  -     furosemide (LASIX) 20 MG tablet; Take 1 tablet by mouth Daily.  Dispense: 90 tablet; Refill: 3        An After Visit Summary and PPPS with all of these plans were given to the patient.      Follow Up:  Return in about 6 months (around 8/21/2020), or if symptoms worsen or fail to improve, for Recheck.

## 2020-02-21 NOTE — PATIENT INSTRUCTIONS
Medicare Wellness  Personal Prevention Plan of Service     Date of Office Visit:  2020  Encounter Provider:  Olayinka Pimentel MD  Place of Service:  [facility]  Patient Name: Michelle Car  :  1940    As part of the Medicare Wellness portion of your visit today, we are providing you with this personalized preventive plan of services (PPPS). This plan is based upon recommendations of the [place] Preventive Services Task Force (USPSTF) and the Advisory Committee on Immunization Practices (ACIP).    This lists the preventive care services that should be considered, and provides dates of when you are due. Items listed as completed are up-to-date and do not require any further intervention.    Health Maintenance   Topic Date Due   • Pneumococcal Vaccine Once at 65 Years Old  10/06/2006   • ZOSTER VACCINE (2 of 2) 2017   • MAMMOGRAM  2018   • LIPID PANEL  2019   • MEDICARE ANNUAL WELLNESS  2021   • COLONOSCOPY  2026   • TDAP/TD VACCINES (2 - Td) 2027   • INFLUENZA VACCINE  Addressed       Orders Placed This Encounter   Procedures   • CBC (No Diff)     Standing Status:   Future     Standing Expiration Date:   2021   • Comprehensive Metabolic Panel     Standing Status:   Future     Standing Expiration Date:   2021   • Lipid Panel     Standing Status:   Future     Standing Expiration Date:   2021   • Vitamin D 25 Hydroxy     Standing Status:   Future     Standing Expiration Date:   2021       No follow-ups on file.

## 2020-12-15 RX ORDER — PRAVASTATIN SODIUM 40 MG
40 TABLET ORAL
Qty: 30 TABLET | Refills: 1 | Status: SHIPPED | OUTPATIENT
Start: 2020-12-15 | End: 2021-03-09

## 2021-03-09 RX ORDER — LOSARTAN POTASSIUM 100 MG/1
100 TABLET ORAL DAILY
Qty: 30 TABLET | Refills: 0 | Status: SHIPPED | OUTPATIENT
Start: 2021-03-09 | End: 2021-04-06 | Stop reason: SDUPTHER

## 2021-03-09 RX ORDER — PRAVASTATIN SODIUM 40 MG
40 TABLET ORAL
Qty: 30 TABLET | Refills: 0 | Status: SHIPPED | OUTPATIENT
Start: 2021-03-09 | End: 2021-04-06 | Stop reason: SDUPTHER

## 2021-03-09 RX ORDER — CARVEDILOL 6.25 MG/1
TABLET ORAL
Qty: 180 TABLET | Refills: 3 | Status: SHIPPED | OUTPATIENT
Start: 2021-03-09 | End: 2021-04-06 | Stop reason: SDUPTHER

## 2021-03-11 RX ORDER — POTASSIUM CHLORIDE 750 MG/1
10 TABLET, EXTENDED RELEASE ORAL DAILY
Qty: 90 TABLET | Refills: 2 | Status: SHIPPED | OUTPATIENT
Start: 2021-03-11 | End: 2021-04-06 | Stop reason: SDUPTHER

## 2021-03-11 RX ORDER — FUROSEMIDE 20 MG/1
20 TABLET ORAL DAILY
Qty: 90 TABLET | Refills: 3 | Status: SHIPPED | OUTPATIENT
Start: 2021-03-11 | End: 2021-04-06 | Stop reason: SDUPTHER

## 2021-04-06 ENCOUNTER — OFFICE VISIT (OUTPATIENT)
Dept: FAMILY MEDICINE CLINIC | Facility: CLINIC | Age: 81
End: 2021-04-06

## 2021-04-06 VITALS
HEIGHT: 63 IN | TEMPERATURE: 97.3 F | SYSTOLIC BLOOD PRESSURE: 140 MMHG | OXYGEN SATURATION: 97 % | BODY MASS INDEX: 35.72 KG/M2 | WEIGHT: 201.6 LBS | DIASTOLIC BLOOD PRESSURE: 80 MMHG | HEART RATE: 79 BPM

## 2021-04-06 DIAGNOSIS — I10 ESSENTIAL HYPERTENSION: ICD-10-CM

## 2021-04-06 DIAGNOSIS — J30.1 SEASONAL ALLERGIC RHINITIS DUE TO POLLEN: ICD-10-CM

## 2021-04-06 DIAGNOSIS — Z00.00 MEDICARE ANNUAL WELLNESS VISIT, SUBSEQUENT: Primary | ICD-10-CM

## 2021-04-06 DIAGNOSIS — E78.2 MIXED HYPERLIPIDEMIA: ICD-10-CM

## 2021-04-06 DIAGNOSIS — E55.9 VITAMIN D DEFICIENCY, UNSPECIFIED: ICD-10-CM

## 2021-04-06 LAB
25(OH)D3 SERPL-MCNC: 43 NG/ML (ref 30–100)
ALBUMIN SERPL-MCNC: 4.3 G/DL (ref 3.5–5.2)
ALBUMIN/GLOB SERPL: 1.6 G/DL
ALP SERPL-CCNC: 78 U/L (ref 39–117)
ALT SERPL W P-5'-P-CCNC: 18 U/L (ref 1–33)
ANION GAP SERPL CALCULATED.3IONS-SCNC: 11.9 MMOL/L (ref 5–15)
AST SERPL-CCNC: 23 U/L (ref 1–32)
BILIRUB SERPL-MCNC: 0.5 MG/DL (ref 0–1.2)
BUN SERPL-MCNC: 16 MG/DL (ref 8–23)
BUN/CREAT SERPL: 20.5 (ref 7–25)
CALCIUM SPEC-SCNC: 9.6 MG/DL (ref 8.6–10.5)
CHLORIDE SERPL-SCNC: 98 MMOL/L (ref 98–107)
CHOLEST SERPL-MCNC: 184 MG/DL (ref 0–200)
CO2 SERPL-SCNC: 28.1 MMOL/L (ref 22–29)
CREAT SERPL-MCNC: 0.78 MG/DL (ref 0.57–1)
DEPRECATED RDW RBC AUTO: 42.2 FL (ref 37–54)
ERYTHROCYTE [DISTWIDTH] IN BLOOD BY AUTOMATED COUNT: 13.1 % (ref 12.3–15.4)
GFR SERPL CREATININE-BSD FRML MDRD: 71 ML/MIN/1.73
GLOBULIN UR ELPH-MCNC: 2.7 GM/DL
GLUCOSE SERPL-MCNC: 91 MG/DL (ref 65–99)
HCT VFR BLD AUTO: 38.5 % (ref 34–46.6)
HDLC SERPL-MCNC: 68 MG/DL (ref 40–60)
HGB BLD-MCNC: 13.1 G/DL (ref 12–15.9)
LDLC SERPL CALC-MCNC: 101 MG/DL (ref 0–100)
LDLC/HDLC SERPL: 1.46 {RATIO}
MCH RBC QN AUTO: 30 PG (ref 26.6–33)
MCHC RBC AUTO-ENTMCNC: 34 G/DL (ref 31.5–35.7)
MCV RBC AUTO: 88.3 FL (ref 79–97)
PLATELET # BLD AUTO: 240 10*3/MM3 (ref 140–450)
PMV BLD AUTO: 9.6 FL (ref 6–12)
POTASSIUM SERPL-SCNC: 3.9 MMOL/L (ref 3.5–5.2)
PROT SERPL-MCNC: 7 G/DL (ref 6–8.5)
RBC # BLD AUTO: 4.36 10*6/MM3 (ref 3.77–5.28)
SODIUM SERPL-SCNC: 138 MMOL/L (ref 136–145)
TRIGL SERPL-MCNC: 84 MG/DL (ref 0–150)
VLDLC SERPL-MCNC: 15 MG/DL (ref 5–40)
WBC # BLD AUTO: 6.14 10*3/MM3 (ref 3.4–10.8)

## 2021-04-06 PROCEDURE — 85027 COMPLETE CBC AUTOMATED: CPT | Performed by: INTERNAL MEDICINE

## 2021-04-06 PROCEDURE — 82306 VITAMIN D 25 HYDROXY: CPT | Performed by: INTERNAL MEDICINE

## 2021-04-06 PROCEDURE — G0439 PPPS, SUBSEQ VISIT: HCPCS | Performed by: INTERNAL MEDICINE

## 2021-04-06 PROCEDURE — 1170F FXNL STATUS ASSESSED: CPT | Performed by: INTERNAL MEDICINE

## 2021-04-06 PROCEDURE — 80053 COMPREHEN METABOLIC PANEL: CPT | Performed by: INTERNAL MEDICINE

## 2021-04-06 PROCEDURE — 80061 LIPID PANEL: CPT | Performed by: INTERNAL MEDICINE

## 2021-04-06 PROCEDURE — 99214 OFFICE O/P EST MOD 30 MIN: CPT | Performed by: INTERNAL MEDICINE

## 2021-04-06 PROCEDURE — 1160F RVW MEDS BY RX/DR IN RCRD: CPT | Performed by: INTERNAL MEDICINE

## 2021-04-06 RX ORDER — CARVEDILOL 6.25 MG/1
6.25 TABLET ORAL 2 TIMES DAILY WITH MEALS
Qty: 180 TABLET | Refills: 3 | Status: SHIPPED | OUTPATIENT
Start: 2021-04-06 | End: 2022-02-23 | Stop reason: HOSPADM

## 2021-04-06 RX ORDER — PRAVASTATIN SODIUM 40 MG
40 TABLET ORAL
Qty: 90 TABLET | Refills: 1 | Status: SHIPPED | OUTPATIENT
Start: 2021-04-06 | End: 2021-04-07

## 2021-04-06 RX ORDER — MELOXICAM 7.5 MG/1
TABLET ORAL
Qty: 180 TABLET | Refills: 3 | Status: SHIPPED | OUTPATIENT
Start: 2021-04-06 | End: 2022-02-23 | Stop reason: HOSPADM

## 2021-04-06 RX ORDER — LOSARTAN POTASSIUM 100 MG/1
100 TABLET ORAL DAILY
Qty: 30 TABLET | Refills: 0 | Status: SHIPPED | OUTPATIENT
Start: 2021-04-06 | End: 2021-04-07

## 2021-04-06 RX ORDER — CLOTRIMAZOLE AND BETAMETHASONE DIPROPIONATE 10; .64 MG/G; MG/G
CREAM TOPICAL 2 TIMES DAILY
Qty: 15 G | Refills: 1 | Status: SHIPPED | OUTPATIENT
Start: 2021-04-06 | End: 2022-04-07

## 2021-04-06 RX ORDER — MELOXICAM 7.5 MG/1
TABLET ORAL
Qty: 180 TABLET | Refills: 3 | Status: SHIPPED | OUTPATIENT
Start: 2021-04-06 | End: 2021-04-06 | Stop reason: SDUPTHER

## 2021-04-06 RX ORDER — FUROSEMIDE 20 MG/1
20 TABLET ORAL DAILY PRN
Qty: 30 TABLET | Refills: 3 | Status: ON HOLD | OUTPATIENT
Start: 2021-04-06 | End: 2022-02-17 | Stop reason: SDDI

## 2021-04-06 RX ORDER — FUROSEMIDE 20 MG/1
20 TABLET ORAL DAILY
Qty: 90 TABLET | Refills: 3 | Status: SHIPPED | OUTPATIENT
Start: 2021-04-06 | End: 2021-04-06

## 2021-04-06 RX ORDER — POTASSIUM CHLORIDE 750 MG/1
10 TABLET, EXTENDED RELEASE ORAL DAILY
Qty: 90 TABLET | Refills: 2 | Status: SHIPPED | OUTPATIENT
Start: 2021-04-06 | End: 2021-04-06

## 2021-04-06 RX ORDER — POTASSIUM CHLORIDE 750 MG/1
10 TABLET, EXTENDED RELEASE ORAL DAILY PRN
Qty: 90 TABLET | Refills: 2 | Status: ON HOLD | OUTPATIENT
Start: 2021-04-06 | End: 2022-02-17 | Stop reason: SDDI

## 2021-04-06 NOTE — PROGRESS NOTES
Initial Medicare Wellness Visit   The ABC's of the Annual Wellness Visit    Chief Complaint   Patient presents with   • Follow-up   • Med Refill   • red spots on left leg       HPI:  Michelle Car, -1940, is a 80 y.o. female who presents for an Initial Medicare Wellness Visit.  Patient was seen for a Medicare wellness exam.  Patient was seen for hypertension.  Blood pressures been running 140s over 80s.  Patient will continue carvedilol 6.25 mg twice daily, losartan 100 mg daily.  Patient is taking furosemide only as needed for lower extremity swelling.  Patient is using potassium supplements with it.  Patient's lipids is being treated with Pravachol 40 mg daily.  Triglycerides 84, HDL 68, .  Vitamin D3 supplements with over-the-counter D3.  Patient does have seasonal allergies been treated with over-the-counter medication.    Dictated utilizing Dragon dictation. If there are questions or for further clarification, please contact me.  Recent Hospitalizations:  No hospitalization(s) within the last year..    Current Medical Providers:  Patient Care Team:  Olayinka Pimentel MD as PCP - General    Health Habits and Functional and Cognitive Screening and Depression Screening:  Functional & Cognitive Status 2021   Do you have difficulty preparing food and eating? No   Do you have difficulty bathing yourself, getting dressed or grooming yourself? No   Do you have difficulty using the toilet? No   Do you have difficulty moving around from place to place? No   Do you have trouble with steps or getting out of a bed or a chair? No   Current Diet Well Balanced Diet   Dental Exam Up to date   Eye Exam Up to date   Exercise (times per week) 7 times per week   Current Exercises Include Aerobics   Current Exercise Activities Include -   Do you need help using the phone?  No   Are you deaf or do you have serious difficulty hearing?  No   Do you need help with transportation? Yes   Do you need help shopping?  No   Do you need help preparing meals?  No   Do you need help with housework?  No   Do you need help with laundry? No   Do you need help taking your medications? No   Do you need help managing money? No   Do you ever drive or ride in a car without wearing a seat belt? No   Have you felt unusual stress, anger or loneliness in the last month? No   Who do you live with? Alone   If you need help, do you have trouble finding someone available to you? No   Have you been bothered in the last four weeks by sexual problems? No   Do you have difficulty concentrating, remembering or making decisions? No       Compared to one year ago, the patient feels her physical health is better and her mental health is better.    Depression Screen:  PHQ-2/PHQ-9 Depression Screening 4/6/2021   Little interest or pleasure in doing things 0   Feeling down, depressed, or hopeless 0   Trouble falling or staying asleep, or sleeping too much 0   Feeling tired or having little energy 0   Poor appetite or overeating 0   Feeling bad about yourself - or that you are a failure or have let yourself or your family down 0   Trouble concentrating on things, such as reading the newspaper or watching television 0   Moving or speaking so slowly that other people could have noticed. Or the opposite - being so fidgety or restless that you have been moving around a lot more than usual 0   Thoughts that you would be better off dead, or of hurting yourself in some way 0   Total Score 0   If you checked off any problems, how difficult have these problems made it for you to do your work, take care of things at home, or get along with other people? Not difficult at all         Past Medical/Family/Social History:  The following portions of the patient's history were reviewed and updated as appropriate: allergies, current medications, past family history, past medical history, past social history, past surgical history and problem list.    Allergies   Allergen  Reactions   • Factive [Gemifloxacin] Hives         Current Outpatient Medications:   •  aspirin  MG EC tablet, Take 325 mg by mouth daily., Disp: , Rfl:   •  Calcium Carbonate (CALTRATE 600 PO), Take  by mouth., Disp: , Rfl:   •  carvedilol (COREG) 6.25 MG tablet, Take 1 tablet by mouth 2 (Two) Times a Day With Meals., Disp: 180 tablet, Rfl: 3  •  fexofenadine (ALLEGRA) 60 MG tablet, Take 1 tablet by mouth Daily., Disp: 30 tablet, Rfl: 3  •  losartan (COZAAR) 100 MG tablet, Take 1 tablet by mouth Daily., Disp: 30 tablet, Rfl: 0  •  Multiple Vitamins-Minerals (CENTRUM SILVER) tablet, Take  by mouth daily., Disp: , Rfl:   •  pravastatin (PRAVACHOL) 40 MG tablet, Take 1 tablet by mouth every night at bedtime., Disp: 90 tablet, Rfl: 1  •  clotrimazole-betamethasone (Lotrisone) 1-0.05 % cream, Apply  topically to the appropriate area as directed 2 (Two) Times a Day., Disp: 15 g, Rfl: 1  •  furosemide (LASIX) 20 MG tablet, Take 1 tablet by mouth Daily As Needed (swelling)., Disp: 30 tablet, Rfl: 3  •  meloxicam (MOBIC) 7.5 MG tablet, 1 po bid prn pain, Disp: 180 tablet, Rfl: 3  •  potassium chloride (K-DUR,KLOR-CON) 10 MEQ CR tablet, Take 1 tablet by mouth Daily As Needed (only when taking furosemide)., Disp: 90 tablet, Rfl: 2    Aspirin use counseling: Taking ASA appropriately as indicated    Current medication list contains no high risk medications.  No harmful drug interactions have been identified.     History reviewed. No pertinent family history.    Social History     Tobacco Use   • Smoking status: Never Smoker   • Smokeless tobacco: Never Used   Substance Use Topics   • Alcohol use: No       History reviewed. No pertinent surgical history.    Patient Active Problem List   Diagnosis   • Venous insufficiency   • Hypertension   • Hyperlipidemia   • Abdominal aortic atherosclerosis (CMS/HCC)   • Generalized edema   • Fatigue due to excessive exertion   • Seasonal allergic rhinitis due to pollen       Review of  "Systems   Constitutional: Negative for fatigue and fever.   HENT: Positive for congestion. Negative for trouble swallowing.    Eyes: Negative for discharge and visual disturbance.   Respiratory: Negative for choking and shortness of breath.    Cardiovascular: Negative for chest pain and palpitations.   Gastrointestinal: Negative for abdominal pain and blood in stool.   Endocrine: Negative.    Genitourinary: Negative for genital sores and hematuria.   Musculoskeletal: Negative for gait problem and joint swelling.   Skin: Negative for color change, pallor, rash and wound.   Allergic/Immunologic: Positive for environmental allergies. Negative for immunocompromised state.   Neurological: Negative for facial asymmetry and speech difficulty.   Psychiatric/Behavioral: Negative for hallucinations and suicidal ideas.       Objective     Vitals:    04/06/21 1432   BP: 140/80   BP Location: Left arm   Patient Position: Sitting   Cuff Size: Large Adult   Pulse: 79   Temp: 97.3 °F (36.3 °C)   TempSrc: Temporal   SpO2: 97%   Weight: 91.4 kg (201 lb 9.6 oz)   Height: 160 cm (63\")   PainSc: 0-No pain       Patient's Body mass index is 35.71 kg/m². BMI is above normal parameters. Recommendations include: educational material and exercise counseling.      No exam data present    The patient has no evidence of cognitve impairment.     Physical Exam  Vitals and nursing note reviewed.   Constitutional:       Appearance: Normal appearance. She is well-developed.   HENT:      Head: Normocephalic and atraumatic.      Nose: Nose normal.      Mouth/Throat:      Mouth: Mucous membranes are moist.      Pharynx: Oropharynx is clear.   Eyes:      Extraocular Movements: Extraocular movements intact.      Conjunctiva/sclera: Conjunctivae normal.      Pupils: Pupils are equal, round, and reactive to light.   Cardiovascular:      Rate and Rhythm: Normal rate and regular rhythm.      Heart sounds: Normal heart sounds. No murmur heard.   No friction " rub. No gallop.    Pulmonary:      Effort: Pulmonary effort is normal. No respiratory distress.      Breath sounds: Normal breath sounds. No stridor. No wheezing, rhonchi or rales.   Chest:      Chest wall: No tenderness.   Abdominal:      General: Bowel sounds are normal.      Palpations: Abdomen is soft.   Musculoskeletal:         General: Normal range of motion.      Cervical back: Normal range of motion and neck supple.   Skin:     General: Skin is warm and dry.   Neurological:      General: No focal deficit present.      Mental Status: She is alert and oriented to person, place, and time. Mental status is at baseline.   Psychiatric:         Mood and Affect: Mood normal.         Behavior: Behavior normal.         Thought Content: Thought content normal.         Judgment: Judgment normal.         Recent Lab Results:     Lab Results   Component Value Date    CHOL 184 04/06/2021    TRIG 84 04/06/2021    HDL 68 (H) 04/06/2021    VLDL 15 04/06/2021    LDLHDL 1.46 04/06/2021         Assessment/Plan #1 continue present treatment for blood pressure #2 continue present diet and activity levels #3 labs  Age-appropriate Screening Schedule:  Refer to the list below for future screening recommendations based on patient's age, sex and/or medical conditions.      Health Maintenance   Topic Date Due   • DXA SCAN  Never done   • ZOSTER VACCINE (2 of 2) 02/08/2017   • MAMMOGRAM  12/14/2018   • INFLUENZA VACCINE  08/01/2021   • LIPID PANEL  04/06/2022   • COLONOSCOPY  12/14/2026   • TDAP/TD VACCINES (2 - Td) 07/07/2027       Medicare Risks and Personalized Health Plan:  Advance Directive Discussion      CMS-Preventive Services Quick Reference  Medicare Preventive Services Addressed:  NA    Advance Care Planning:  ACP discussion was held with the patient during this visit. Patient has an advance directive in EMR which is still valid.     Diagnoses and all orders for this visit:    1. Medicare annual wellness visit, subsequent  (Primary)    2. Mixed hyperlipidemia  -     CBC (No Diff); Future  -     Comprehensive Metabolic Panel  -     Lipid Panel  -     Vitamin D 25 Hydroxy  -     CBC (No Diff)    3. Essential hypertension  -     CBC (No Diff); Future  -     Comprehensive Metabolic Panel  -     Lipid Panel  -     Vitamin D 25 Hydroxy  -     CBC (No Diff)    4. Seasonal allergic rhinitis due to pollen  -     CBC (No Diff); Future  -     Comprehensive Metabolic Panel  -     Lipid Panel  -     Vitamin D 25 Hydroxy  -     CBC (No Diff)    5. Vitamin D deficiency, unspecified   -     Vitamin D 25 Hydroxy    Other orders  -     carvedilol (COREG) 6.25 MG tablet; Take 1 tablet by mouth 2 (Two) Times a Day With Meals.  Dispense: 180 tablet; Refill: 3  -     losartan (COZAAR) 100 MG tablet; Take 1 tablet by mouth Daily.  Dispense: 30 tablet; Refill: 0  -     pravastatin (PRAVACHOL) 40 MG tablet; Take 1 tablet by mouth every night at bedtime.  Dispense: 90 tablet; Refill: 1  -     Discontinue: meloxicam (MOBIC) 7.5 MG tablet; 1 po bid prn pain  Dispense: 180 tablet; Refill: 3  -     Discontinue: furosemide (LASIX) 20 MG tablet; Take 1 tablet by mouth Daily.  Dispense: 90 tablet; Refill: 3  -     Discontinue: potassium chloride (K-DUR,KLOR-CON) 10 MEQ CR tablet; Take 1 tablet by mouth Daily.  Dispense: 90 tablet; Refill: 2  -     clotrimazole-betamethasone (Lotrisone) 1-0.05 % cream; Apply  topically to the appropriate area as directed 2 (Two) Times a Day.  Dispense: 15 g; Refill: 1  -     furosemide (LASIX) 20 MG tablet; Take 1 tablet by mouth Daily As Needed (swelling).  Dispense: 30 tablet; Refill: 3  -     meloxicam (MOBIC) 7.5 MG tablet; 1 po bid prn pain  Dispense: 180 tablet; Refill: 3  -     potassium chloride (K-DUR,KLOR-CON) 10 MEQ CR tablet; Take 1 tablet by mouth Daily As Needed (only when taking furosemide).  Dispense: 90 tablet; Refill: 2        An After Visit Summary and PPPS with all of these plans were given to the patient.       Follow Up:  Return in about 6 months (around 10/6/2021), or if symptoms worsen or fail to improve, for Recheck.

## 2021-04-07 RX ORDER — LOSARTAN POTASSIUM 100 MG/1
100 TABLET ORAL DAILY
Qty: 30 TABLET | Refills: 0 | Status: SHIPPED | OUTPATIENT
Start: 2021-04-07 | End: 2021-05-06

## 2021-04-07 RX ORDER — PRAVASTATIN SODIUM 40 MG
40 TABLET ORAL
Qty: 90 TABLET | Refills: 1 | Status: SHIPPED | OUTPATIENT
Start: 2021-04-07 | End: 2021-09-10

## 2021-05-06 RX ORDER — LOSARTAN POTASSIUM 100 MG/1
100 TABLET ORAL DAILY
Qty: 90 TABLET | Refills: 1 | Status: SHIPPED | OUTPATIENT
Start: 2021-05-06 | End: 2021-12-13 | Stop reason: SDUPTHER

## 2021-09-10 RX ORDER — PRAVASTATIN SODIUM 40 MG
40 TABLET ORAL
Qty: 90 TABLET | Refills: 1 | Status: SHIPPED | OUTPATIENT
Start: 2021-09-10 | End: 2022-04-18

## 2021-10-07 ENCOUNTER — OFFICE VISIT (OUTPATIENT)
Dept: FAMILY MEDICINE CLINIC | Facility: CLINIC | Age: 81
End: 2021-10-07

## 2021-10-07 VITALS
DIASTOLIC BLOOD PRESSURE: 86 MMHG | HEART RATE: 83 BPM | TEMPERATURE: 97.8 F | WEIGHT: 191 LBS | OXYGEN SATURATION: 98 % | BODY MASS INDEX: 33.84 KG/M2 | HEIGHT: 63 IN | SYSTOLIC BLOOD PRESSURE: 132 MMHG

## 2021-10-07 DIAGNOSIS — R21 RASH: ICD-10-CM

## 2021-10-07 DIAGNOSIS — J30.1 SEASONAL ALLERGIC RHINITIS DUE TO POLLEN: ICD-10-CM

## 2021-10-07 DIAGNOSIS — I10 ESSENTIAL HYPERTENSION: Primary | ICD-10-CM

## 2021-10-07 DIAGNOSIS — E55.9 VITAMIN D DEFICIENCY, UNSPECIFIED: ICD-10-CM

## 2021-10-07 DIAGNOSIS — E78.2 MIXED HYPERLIPIDEMIA: ICD-10-CM

## 2021-10-07 LAB
25(OH)D3 SERPL-MCNC: 55.6 NG/ML
ALBUMIN SERPL-MCNC: 4.2 G/DL (ref 3.5–5.2)
ALBUMIN/GLOB SERPL: 1.3 G/DL
ALP SERPL-CCNC: 80 U/L (ref 39–117)
ALT SERPL W P-5'-P-CCNC: 14 U/L (ref 1–33)
ANION GAP SERPL CALCULATED.3IONS-SCNC: 11.8 MMOL/L (ref 5–15)
AST SERPL-CCNC: 19 U/L (ref 1–32)
BILIRUB SERPL-MCNC: 0.5 MG/DL (ref 0–1.2)
BUN SERPL-MCNC: 13 MG/DL (ref 8–23)
BUN/CREAT SERPL: 17.3 (ref 7–25)
CALCIUM SPEC-SCNC: 9.8 MG/DL (ref 8.6–10.5)
CHLORIDE SERPL-SCNC: 97 MMOL/L (ref 98–107)
CHOLEST SERPL-MCNC: 194 MG/DL (ref 0–200)
CO2 SERPL-SCNC: 27.2 MMOL/L (ref 22–29)
CREAT SERPL-MCNC: 0.75 MG/DL (ref 0.57–1)
DEPRECATED RDW RBC AUTO: 42.7 FL (ref 37–54)
ERYTHROCYTE [DISTWIDTH] IN BLOOD BY AUTOMATED COUNT: 12.9 % (ref 12.3–15.4)
GFR SERPL CREATININE-BSD FRML MDRD: 74 ML/MIN/1.73
GLOBULIN UR ELPH-MCNC: 3.3 GM/DL
GLUCOSE SERPL-MCNC: 85 MG/DL (ref 65–99)
HCT VFR BLD AUTO: 37.7 % (ref 34–46.6)
HDLC SERPL-MCNC: 59 MG/DL (ref 40–60)
HGB BLD-MCNC: 12.7 G/DL (ref 12–15.9)
LDLC SERPL CALC-MCNC: 120 MG/DL (ref 0–100)
LDLC/HDLC SERPL: 2.01 {RATIO}
MCH RBC QN AUTO: 30.4 PG (ref 26.6–33)
MCHC RBC AUTO-ENTMCNC: 33.7 G/DL (ref 31.5–35.7)
MCV RBC AUTO: 90.2 FL (ref 79–97)
PLATELET # BLD AUTO: 251 10*3/MM3 (ref 140–450)
PMV BLD AUTO: 9.8 FL (ref 6–12)
POTASSIUM SERPL-SCNC: 4.3 MMOL/L (ref 3.5–5.2)
PROT SERPL-MCNC: 7.5 G/DL (ref 6–8.5)
RBC # BLD AUTO: 4.18 10*6/MM3 (ref 3.77–5.28)
SODIUM SERPL-SCNC: 136 MMOL/L (ref 136–145)
TRIGL SERPL-MCNC: 81 MG/DL (ref 0–150)
VLDLC SERPL-MCNC: 15 MG/DL (ref 5–40)
WBC # BLD AUTO: 5.27 10*3/MM3 (ref 3.4–10.8)

## 2021-10-07 PROCEDURE — 85027 COMPLETE CBC AUTOMATED: CPT | Performed by: INTERNAL MEDICINE

## 2021-10-07 PROCEDURE — 99214 OFFICE O/P EST MOD 30 MIN: CPT | Performed by: INTERNAL MEDICINE

## 2021-10-07 PROCEDURE — 80061 LIPID PANEL: CPT | Performed by: INTERNAL MEDICINE

## 2021-10-07 PROCEDURE — 82306 VITAMIN D 25 HYDROXY: CPT | Performed by: INTERNAL MEDICINE

## 2021-10-07 PROCEDURE — 36415 COLL VENOUS BLD VENIPUNCTURE: CPT | Performed by: INTERNAL MEDICINE

## 2021-10-07 PROCEDURE — 80053 COMPREHEN METABOLIC PANEL: CPT | Performed by: INTERNAL MEDICINE

## 2021-10-07 RX ORDER — SULFAMETHOXAZOLE AND TRIMETHOPRIM 800; 160 MG/1; MG/1
1 TABLET ORAL 2 TIMES DAILY
Qty: 20 TABLET | Refills: 0 | Status: ON HOLD | OUTPATIENT
Start: 2021-10-07 | End: 2022-02-17

## 2021-10-07 NOTE — PROGRESS NOTES
Subjective   Michelle Car is a 81 y.o. female.     Vitals:    10/07/21 1234   BP: 132/86   Pulse: 83   Temp: 97.8 °F (36.6 °C)   SpO2: 98%      Body mass index is 33.83 kg/m².     History of Present Illness   Patient was seen for hypertension.  Blood pressures been running 130s over 80s.  Patient will continue losartan 100 mg daily, carvedilol 6.25 mg twice daily.  Patient will continue monitoring blood pressure at home.  Patient's lipids treated with diet exercise and Pravachol 40 mg daily.  Triglycerides 84, HDL 68, .  These labs were drawn 6 months ago.  Labs were drawn today is pending at the time of dictation.  Patient will continue present treatment.  Patient has vitamin D3 deficiency has been supplemented with over-the-counter D3.  Patient had erythematous rash on her left ankle.  Lotrisone cream was not effective.  Patient was placed on Bactrim DS and was advised to see a dermatologist.  Patient will consider.    Dictated utilizing Dragon dictation. If there are questions or for further clarification, please contact me.  The following portions of the patient's history were reviewed and updated as appropriate: allergies, current medications, past family history, past medical history, past social history, past surgical history and problem list.    Review of Systems   Constitutional: Negative for fatigue and fever.   HENT: Positive for congestion. Negative for trouble swallowing.    Eyes: Negative for discharge and visual disturbance.   Respiratory: Negative for choking and shortness of breath.    Cardiovascular: Negative for chest pain and palpitations.   Gastrointestinal: Negative for abdominal pain and blood in stool.   Endocrine: Negative.    Genitourinary: Negative for genital sores and hematuria.   Musculoskeletal: Negative for gait problem and joint swelling.   Skin: Positive for rash. Negative for color change, pallor and wound.   Allergic/Immunologic: Positive for environmental allergies.  Negative for immunocompromised state.   Neurological: Negative for facial asymmetry and speech difficulty.   Psychiatric/Behavioral: Negative for hallucinations and suicidal ideas.       Objective   Physical Exam  Vitals and nursing note reviewed.   Constitutional:       Appearance: Normal appearance. She is well-developed.   HENT:      Head: Normocephalic and atraumatic.      Nose: Nose normal.      Mouth/Throat:      Mouth: Mucous membranes are moist.      Pharynx: Oropharynx is clear.   Eyes:      Extraocular Movements: Extraocular movements intact.      Conjunctiva/sclera: Conjunctivae normal.      Pupils: Pupils are equal, round, and reactive to light.   Cardiovascular:      Rate and Rhythm: Normal rate and regular rhythm.      Heart sounds: Normal heart sounds. No murmur heard.   No friction rub. No gallop.    Pulmonary:      Effort: Pulmonary effort is normal. No respiratory distress.      Breath sounds: Normal breath sounds. No stridor. No wheezing, rhonchi or rales.   Chest:      Chest wall: No tenderness.   Abdominal:      General: Bowel sounds are normal.      Palpations: Abdomen is soft.   Musculoskeletal:         General: Normal range of motion.      Cervical back: Normal range of motion and neck supple.   Skin:     General: Skin is warm and dry.      Findings: Rash present.   Neurological:      General: No focal deficit present.      Mental Status: She is alert and oriented to person, place, and time. Mental status is at baseline.   Psychiatric:         Mood and Affect: Mood normal.         Behavior: Behavior normal.         Thought Content: Thought content normal.         Judgment: Judgment normal.         Assessment/Plan #1 continue to monitor blood pressure #2 continue present diet and activity levels #3 labs #4 Bactrim DS 1 p.o. twice daily, recommend referral to dermatology  Problems Addressed this Visit     Allergies and Adverse Reactions            Seasonal allergic rhinitis due to pollen     Relevant Orders    CBC (No Diff)    Comprehensive Metabolic Panel    Lipid Panel    Vitamin D 25 Hydroxy          Cardiac and Vasculature            Hyperlipidemia    Relevant Orders    CBC (No Diff)    Comprehensive Metabolic Panel    Lipid Panel    Vitamin D 25 Hydroxy            Other Visit Diagnoses     Essential hypertension    -  Primary    Relevant Orders    CBC (No Diff)    Comprehensive Metabolic Panel    Lipid Panel    Vitamin D 25 Hydroxy    Vitamin D deficiency, unspecified         Relevant Orders    Vitamin D 25 Hydroxy    Rash          Diagnoses     Diagnosis Codes Comments    Essential hypertension    -  Primary ICD-10-CM: I10  ICD-9-CM: 401.9     Mixed hyperlipidemia     ICD-10-CM: E78.2  ICD-9-CM: 272.2     Seasonal allergic rhinitis due to pollen     ICD-10-CM: J30.1  ICD-9-CM: 477.0     Vitamin D deficiency, unspecified      ICD-10-CM: E55.9  ICD-9-CM: 268.9     Rash     ICD-10-CM: R21  ICD-9-CM: 782.1

## 2021-12-13 RX ORDER — LOSARTAN POTASSIUM 100 MG/1
100 TABLET ORAL DAILY
Qty: 90 TABLET | Refills: 1 | Status: SHIPPED | OUTPATIENT
Start: 2021-12-13 | End: 2022-05-04 | Stop reason: HOSPADM

## 2022-02-17 ENCOUNTER — HOSPITAL ENCOUNTER (INPATIENT)
Facility: HOSPITAL | Age: 82
LOS: 6 days | Discharge: HOME HEALTH | End: 2022-02-23
Attending: EMERGENCY MEDICINE | Admitting: INTERNAL MEDICINE

## 2022-02-17 ENCOUNTER — APPOINTMENT (OUTPATIENT)
Dept: CT IMAGING | Facility: HOSPITAL | Age: 82
End: 2022-02-17

## 2022-02-17 ENCOUNTER — OFFICE VISIT (OUTPATIENT)
Dept: FAMILY MEDICINE CLINIC | Facility: CLINIC | Age: 82
End: 2022-02-17

## 2022-02-17 ENCOUNTER — APPOINTMENT (OUTPATIENT)
Dept: GENERAL RADIOLOGY | Facility: HOSPITAL | Age: 82
End: 2022-02-17

## 2022-02-17 VITALS
TEMPERATURE: 98.2 F | OXYGEN SATURATION: 98 % | HEART RATE: 86 BPM | HEIGHT: 63 IN | WEIGHT: 174 LBS | DIASTOLIC BLOOD PRESSURE: 78 MMHG | BODY MASS INDEX: 30.83 KG/M2 | SYSTOLIC BLOOD PRESSURE: 120 MMHG

## 2022-02-17 DIAGNOSIS — J90 BILATERAL PLEURAL EFFUSION: ICD-10-CM

## 2022-02-17 DIAGNOSIS — R06.00 DYSPNEA, UNSPECIFIED TYPE: ICD-10-CM

## 2022-02-17 DIAGNOSIS — R63.0 DECREASED APPETITE: ICD-10-CM

## 2022-02-17 DIAGNOSIS — I50.9 CONGESTIVE HEART FAILURE, UNSPECIFIED HF CHRONICITY, UNSPECIFIED HEART FAILURE TYPE: Primary | ICD-10-CM

## 2022-02-17 DIAGNOSIS — R93.89 ABNORMAL CXR: ICD-10-CM

## 2022-02-17 DIAGNOSIS — M79.89 LEG SWELLING: ICD-10-CM

## 2022-02-17 DIAGNOSIS — R53.83 FATIGUE, UNSPECIFIED TYPE: Primary | ICD-10-CM

## 2022-02-17 DIAGNOSIS — R06.02 SHORT OF BREATH ON EXERTION: ICD-10-CM

## 2022-02-17 PROBLEM — N39.0 UTI (URINARY TRACT INFECTION): Status: ACTIVE | Noted: 2022-02-17

## 2022-02-17 LAB
ALBUMIN SERPL-MCNC: 3.8 G/DL (ref 3.5–5.2)
ALBUMIN SERPL-MCNC: 3.9 G/DL (ref 3.5–5.2)
ALBUMIN/GLOB SERPL: 1 G/DL
ALBUMIN/GLOB SERPL: 1.2 G/DL
ALP SERPL-CCNC: 102 U/L (ref 39–117)
ALP SERPL-CCNC: 110 U/L (ref 39–117)
ALT SERPL W P-5'-P-CCNC: 11 U/L (ref 1–33)
ALT SERPL W P-5'-P-CCNC: 12 U/L (ref 1–33)
ANION GAP SERPL CALCULATED.3IONS-SCNC: 12.5 MMOL/L (ref 5–15)
ANION GAP SERPL CALCULATED.3IONS-SCNC: 13 MMOL/L (ref 5–15)
AST SERPL-CCNC: 20 U/L (ref 1–32)
AST SERPL-CCNC: 22 U/L (ref 1–32)
BACTERIA UR QL AUTO: ABNORMAL /HPF
BACTERIA UR QL AUTO: ABNORMAL /HPF
BASOPHILS # BLD AUTO: 0.09 10*3/MM3 (ref 0–0.2)
BASOPHILS NFR BLD AUTO: 1.2 % (ref 0–1.5)
BILIRUB SERPL-MCNC: 0.4 MG/DL (ref 0–1.2)
BILIRUB SERPL-MCNC: 0.4 MG/DL (ref 0–1.2)
BILIRUB UR QL STRIP: ABNORMAL
BILIRUB UR QL STRIP: NEGATIVE
BUN SERPL-MCNC: 7 MG/DL (ref 8–23)
BUN SERPL-MCNC: 8 MG/DL (ref 8–23)
BUN/CREAT SERPL: 12.1 (ref 7–25)
BUN/CREAT SERPL: 13.2 (ref 7–25)
CALCIUM SPEC-SCNC: 9.4 MG/DL (ref 8.6–10.5)
CALCIUM SPEC-SCNC: 9.9 MG/DL (ref 8.6–10.5)
CHLORIDE SERPL-SCNC: 90 MMOL/L (ref 98–107)
CHLORIDE SERPL-SCNC: 91 MMOL/L (ref 98–107)
CLARITY UR: CLEAR
CLARITY UR: CLEAR
CO2 SERPL-SCNC: 27 MMOL/L (ref 22–29)
CO2 SERPL-SCNC: 27.5 MMOL/L (ref 22–29)
COLOR UR: ABNORMAL
COLOR UR: YELLOW
CREAT SERPL-MCNC: 0.53 MG/DL (ref 0.57–1)
CREAT SERPL-MCNC: 0.66 MG/DL (ref 0.57–1)
D DIMER PPP FEU-MCNC: 3.23 MCGFEU/ML (ref 0–0.49)
DEPRECATED RDW RBC AUTO: 40.6 FL (ref 37–54)
EOSINOPHIL # BLD AUTO: 0.1 10*3/MM3 (ref 0–0.4)
EOSINOPHIL NFR BLD AUTO: 1.3 % (ref 0.3–6.2)
ERYTHROCYTE [DISTWIDTH] IN BLOOD BY AUTOMATED COUNT: 13.1 % (ref 12.3–15.4)
ERYTHROCYTE [DISTWIDTH] IN BLOOD BY AUTOMATED COUNT: 13.1 % (ref 12.3–15.4)
GFR SERPL CREATININE-BSD FRML MDRD: 111 ML/MIN/1.73
GFR SERPL CREATININE-BSD FRML MDRD: 86 ML/MIN/1.73
GLOBULIN UR ELPH-MCNC: 3.3 GM/DL
GLOBULIN UR ELPH-MCNC: 3.8 GM/DL
GLUCOSE SERPL-MCNC: 110 MG/DL (ref 65–99)
GLUCOSE SERPL-MCNC: 112 MG/DL (ref 65–99)
GLUCOSE UR STRIP-MCNC: NEGATIVE MG/DL
GLUCOSE UR STRIP-MCNC: NEGATIVE MG/DL
HCT VFR BLD AUTO: 36.8 % (ref 34–46.6)
HCT VFR BLD AUTO: 39.6 % (ref 34–46.6)
HETEROPH AB SER QL LA: NEGATIVE
HGB BLD-MCNC: 12.2 G/DL (ref 12–15.9)
HGB BLD-MCNC: 13.3 G/DL (ref 12–15.9)
HGB UR QL STRIP.AUTO: ABNORMAL
HGB UR QL STRIP.AUTO: NEGATIVE
HYALINE CASTS UR QL AUTO: ABNORMAL /LPF
IMM GRANULOCYTES # BLD AUTO: 0.03 10*3/MM3 (ref 0–0.05)
IMM GRANULOCYTES NFR BLD AUTO: 0.4 % (ref 0–0.5)
INR PPP: 1.03 (ref 0.9–1.1)
KETONES UR QL STRIP: ABNORMAL
KETONES UR QL STRIP: ABNORMAL
LEUKOCYTE ESTERASE UR QL STRIP.AUTO: ABNORMAL
LEUKOCYTE ESTERASE UR QL STRIP.AUTO: ABNORMAL
LYMPHOCYTES # BLD AUTO: 1.12 10*3/MM3 (ref 0.7–3.1)
LYMPHOCYTES # BLD AUTO: 1.4 10*3/MM3 (ref 0.7–3.1)
LYMPHOCYTES NFR BLD AUTO: 14.5 % (ref 19.6–45.3)
LYMPHOCYTES NFR BLD AUTO: 18.5 % (ref 19.6–45.3)
MAGNESIUM SERPL-MCNC: 1.9 MG/DL (ref 1.6–2.4)
MCH RBC QN AUTO: 27.9 PG (ref 26.6–33)
MCH RBC QN AUTO: 28.7 PG (ref 26.6–33)
MCHC RBC AUTO-ENTMCNC: 33.2 G/DL (ref 31.5–35.7)
MCHC RBC AUTO-ENTMCNC: 33.6 G/DL (ref 31.5–35.7)
MCV RBC AUTO: 84.2 FL (ref 79–97)
MCV RBC AUTO: 85.5 FL (ref 79–97)
MONOCYTES # BLD AUTO: 0.64 10*3/MM3 (ref 0.1–0.9)
MONOCYTES # BLD AUTO: 0.7 10*3/MM3 (ref 0.1–0.9)
MONOCYTES NFR BLD AUTO: 8.3 % (ref 5–12)
MONOCYTES NFR BLD AUTO: 8.7 % (ref 5–12)
MUCOUS THREADS URNS QL MICRO: ABNORMAL /HPF
NEUTROPHILS NFR BLD AUTO: 5.6 10*3/MM3 (ref 1.7–7)
NEUTROPHILS NFR BLD AUTO: 5.73 10*3/MM3 (ref 1.7–7)
NEUTROPHILS NFR BLD AUTO: 72.8 % (ref 42.7–76)
NEUTROPHILS NFR BLD AUTO: 74.3 % (ref 42.7–76)
NITRITE UR QL STRIP: NEGATIVE
NITRITE UR QL STRIP: NEGATIVE
NRBC BLD AUTO-RTO: 0 /100 WBC (ref 0–0.2)
NT-PROBNP SERPL-MCNC: 867 PG/ML (ref 0–1800)
NT-PROBNP SERPL-MCNC: 888 PG/ML (ref 0–1800)
PH UR STRIP.AUTO: 6 [PH] (ref 4.6–8)
PH UR STRIP.AUTO: 6.5 [PH] (ref 5–8)
PLATELET # BLD AUTO: 422 10*3/MM3 (ref 140–450)
PLATELET # BLD AUTO: 477 10*3/MM3 (ref 140–450)
PMV BLD AUTO: 6 FL (ref 6–12)
PMV BLD AUTO: 8.6 FL (ref 6–12)
POTASSIUM SERPL-SCNC: 4 MMOL/L (ref 3.5–5.2)
POTASSIUM SERPL-SCNC: 4.1 MMOL/L (ref 3.5–5.2)
PROT SERPL-MCNC: 7.2 G/DL (ref 6–8.5)
PROT SERPL-MCNC: 7.6 G/DL (ref 6–8.5)
PROT UR QL STRIP: ABNORMAL
PROT UR QL STRIP: ABNORMAL
PROTHROMBIN TIME: 13.4 SECONDS (ref 11.7–14.2)
QT INTERVAL: 388 MS
RBC # BLD AUTO: 4.37 10*6/MM3 (ref 3.77–5.28)
RBC # BLD AUTO: 4.63 10*6/MM3 (ref 3.77–5.28)
RBC # UR STRIP: ABNORMAL /HPF
RBC # UR STRIP: ABNORMAL /HPF
REF LAB TEST METHOD: ABNORMAL
REF LAB TEST METHOD: ABNORMAL
SARS-COV-2 RNA PNL SPEC NAA+PROBE: NOT DETECTED
SODIUM SERPL-SCNC: 130 MMOL/L (ref 136–145)
SODIUM SERPL-SCNC: 131 MMOL/L (ref 136–145)
SP GR UR STRIP: 1.02 (ref 1–1.03)
SP GR UR STRIP: >=1.03 (ref 1–1.03)
SQUAMOUS #/AREA URNS HPF: ABNORMAL /HPF
SQUAMOUS #/AREA URNS HPF: ABNORMAL /HPF
T4 FREE SERPL-MCNC: 1.59 NG/DL (ref 0.93–1.7)
TROPONIN T SERPL-MCNC: <0.01 NG/ML (ref 0–0.03)
TSH SERPL DL<=0.05 MIU/L-ACNC: 2.12 UIU/ML (ref 0.27–4.2)
UROBILINOGEN UR QL STRIP: ABNORMAL
UROBILINOGEN UR QL STRIP: ABNORMAL
WBC # UR STRIP: ABNORMAL /HPF
WBC # UR STRIP: ABNORMAL /HPF
WBC NRBC COR # BLD: 7.7 10*3/MM3 (ref 3.4–10.8)
WBC NRBC COR # BLD: 7.71 10*3/MM3 (ref 3.4–10.8)

## 2022-02-17 PROCEDURE — 83880 ASSAY OF NATRIURETIC PEPTIDE: CPT | Performed by: NURSE PRACTITIONER

## 2022-02-17 PROCEDURE — 83880 ASSAY OF NATRIURETIC PEPTIDE: CPT | Performed by: EMERGENCY MEDICINE

## 2022-02-17 PROCEDURE — 80053 COMPREHEN METABOLIC PANEL: CPT | Performed by: EMERGENCY MEDICINE

## 2022-02-17 PROCEDURE — 93005 ELECTROCARDIOGRAM TRACING: CPT | Performed by: EMERGENCY MEDICINE

## 2022-02-17 PROCEDURE — 25010000002 CEFTRIAXONE PER 250 MG: Performed by: INTERNAL MEDICINE

## 2022-02-17 PROCEDURE — 84439 ASSAY OF FREE THYROXINE: CPT | Performed by: EMERGENCY MEDICINE

## 2022-02-17 PROCEDURE — 81001 URINALYSIS AUTO W/SCOPE: CPT | Performed by: EMERGENCY MEDICINE

## 2022-02-17 PROCEDURE — 87086 URINE CULTURE/COLONY COUNT: CPT | Performed by: INTERNAL MEDICINE

## 2022-02-17 PROCEDURE — 84484 ASSAY OF TROPONIN QUANT: CPT | Performed by: EMERGENCY MEDICINE

## 2022-02-17 PROCEDURE — 85025 COMPLETE CBC W/AUTO DIFF WBC: CPT | Performed by: NURSE PRACTITIONER

## 2022-02-17 PROCEDURE — 85610 PROTHROMBIN TIME: CPT | Performed by: EMERGENCY MEDICINE

## 2022-02-17 PROCEDURE — 0 IOPAMIDOL PER 1 ML: Performed by: EMERGENCY MEDICINE

## 2022-02-17 PROCEDURE — 93010 ELECTROCARDIOGRAM REPORT: CPT | Performed by: INTERNAL MEDICINE

## 2022-02-17 PROCEDURE — 84443 ASSAY THYROID STIM HORMONE: CPT | Performed by: EMERGENCY MEDICINE

## 2022-02-17 PROCEDURE — 25010000002 FUROSEMIDE PER 20 MG: Performed by: EMERGENCY MEDICINE

## 2022-02-17 PROCEDURE — 80053 COMPREHEN METABOLIC PANEL: CPT | Performed by: NURSE PRACTITIONER

## 2022-02-17 PROCEDURE — 85025 COMPLETE CBC W/AUTO DIFF WBC: CPT | Performed by: EMERGENCY MEDICINE

## 2022-02-17 PROCEDURE — 71046 X-RAY EXAM CHEST 2 VIEWS: CPT | Performed by: NURSE PRACTITIONER

## 2022-02-17 PROCEDURE — 87635 SARS-COV-2 COVID-19 AMP PRB: CPT | Performed by: EMERGENCY MEDICINE

## 2022-02-17 PROCEDURE — 36415 COLL VENOUS BLD VENIPUNCTURE: CPT | Performed by: NURSE PRACTITIONER

## 2022-02-17 PROCEDURE — 71045 X-RAY EXAM CHEST 1 VIEW: CPT

## 2022-02-17 PROCEDURE — 99285 EMERGENCY DEPT VISIT HI MDM: CPT

## 2022-02-17 PROCEDURE — 83735 ASSAY OF MAGNESIUM: CPT | Performed by: EMERGENCY MEDICINE

## 2022-02-17 PROCEDURE — 86308 HETEROPHILE ANTIBODY SCREEN: CPT | Performed by: NURSE PRACTITIONER

## 2022-02-17 PROCEDURE — 81001 URINALYSIS AUTO W/SCOPE: CPT | Performed by: NURSE PRACTITIONER

## 2022-02-17 PROCEDURE — 85379 FIBRIN DEGRADATION QUANT: CPT | Performed by: EMERGENCY MEDICINE

## 2022-02-17 PROCEDURE — 99214 OFFICE O/P EST MOD 30 MIN: CPT | Performed by: NURSE PRACTITIONER

## 2022-02-17 PROCEDURE — 25010000002 ENOXAPARIN PER 10 MG: Performed by: INTERNAL MEDICINE

## 2022-02-17 PROCEDURE — 71275 CT ANGIOGRAPHY CHEST: CPT

## 2022-02-17 RX ORDER — SODIUM CHLORIDE 0.9 % (FLUSH) 0.9 %
10 SYRINGE (ML) INJECTION EVERY 12 HOURS SCHEDULED
Status: DISCONTINUED | OUTPATIENT
Start: 2022-02-17 | End: 2022-02-23 | Stop reason: HOSPADM

## 2022-02-17 RX ORDER — CETIRIZINE HYDROCHLORIDE 10 MG/1
5 TABLET ORAL DAILY
Status: DISCONTINUED | OUTPATIENT
Start: 2022-02-18 | End: 2022-02-23 | Stop reason: HOSPADM

## 2022-02-17 RX ORDER — FUROSEMIDE 10 MG/ML
80 INJECTION INTRAMUSCULAR; INTRAVENOUS ONCE
Status: COMPLETED | OUTPATIENT
Start: 2022-02-17 | End: 2022-02-17

## 2022-02-17 RX ORDER — ACETAMINOPHEN 325 MG/1
650 TABLET ORAL EVERY 4 HOURS PRN
Status: DISCONTINUED | OUTPATIENT
Start: 2022-02-17 | End: 2022-02-23 | Stop reason: HOSPADM

## 2022-02-17 RX ORDER — CALCIUM CARBONATE 500(1250)
500 TABLET ORAL DAILY
Status: DISCONTINUED | OUTPATIENT
Start: 2022-02-18 | End: 2022-02-23 | Stop reason: HOSPADM

## 2022-02-17 RX ORDER — CARVEDILOL 6.25 MG/1
6.25 TABLET ORAL 2 TIMES DAILY WITH MEALS
Status: DISCONTINUED | OUTPATIENT
Start: 2022-02-17 | End: 2022-02-20

## 2022-02-17 RX ORDER — ACETAMINOPHEN 160 MG/5ML
650 SOLUTION ORAL EVERY 4 HOURS PRN
Status: DISCONTINUED | OUTPATIENT
Start: 2022-02-17 | End: 2022-02-23 | Stop reason: HOSPADM

## 2022-02-17 RX ORDER — ASPIRIN 325 MG
325 TABLET, DELAYED RELEASE (ENTERIC COATED) ORAL DAILY
Status: DISCONTINUED | OUTPATIENT
Start: 2022-02-18 | End: 2022-02-19

## 2022-02-17 RX ORDER — ONDANSETRON 2 MG/ML
4 INJECTION INTRAMUSCULAR; INTRAVENOUS EVERY 6 HOURS PRN
Status: DISCONTINUED | OUTPATIENT
Start: 2022-02-17 | End: 2022-02-23 | Stop reason: HOSPADM

## 2022-02-17 RX ORDER — PRAVASTATIN SODIUM 40 MG
40 TABLET ORAL NIGHTLY
Status: DISCONTINUED | OUTPATIENT
Start: 2022-02-17 | End: 2022-02-23 | Stop reason: HOSPADM

## 2022-02-17 RX ORDER — LOSARTAN POTASSIUM 100 MG/1
100 TABLET ORAL DAILY
Status: DISCONTINUED | OUTPATIENT
Start: 2022-02-18 | End: 2022-02-23 | Stop reason: HOSPADM

## 2022-02-17 RX ORDER — SODIUM CHLORIDE 0.9 % (FLUSH) 0.9 %
10 SYRINGE (ML) INJECTION AS NEEDED
Status: DISCONTINUED | OUTPATIENT
Start: 2022-02-17 | End: 2022-02-23 | Stop reason: HOSPADM

## 2022-02-17 RX ORDER — NITROGLYCERIN 0.4 MG/1
0.4 TABLET SUBLINGUAL
Status: DISCONTINUED | OUTPATIENT
Start: 2022-02-17 | End: 2022-02-19

## 2022-02-17 RX ORDER — POTASSIUM CHLORIDE 750 MG/1
10 TABLET, FILM COATED, EXTENDED RELEASE ORAL DAILY PRN
Status: DISCONTINUED | OUTPATIENT
Start: 2022-02-17 | End: 2022-02-23 | Stop reason: HOSPADM

## 2022-02-17 RX ORDER — FUROSEMIDE 10 MG/ML
40 INJECTION INTRAMUSCULAR; INTRAVENOUS
Status: DISCONTINUED | OUTPATIENT
Start: 2022-02-18 | End: 2022-02-20

## 2022-02-17 RX ORDER — MULTIPLE VITAMINS W/ MINERALS TAB 9MG-400MCG
1 TAB ORAL DAILY
Status: DISCONTINUED | OUTPATIENT
Start: 2022-02-18 | End: 2022-02-23 | Stop reason: HOSPADM

## 2022-02-17 RX ORDER — ACETAMINOPHEN 650 MG/1
650 SUPPOSITORY RECTAL EVERY 4 HOURS PRN
Status: DISCONTINUED | OUTPATIENT
Start: 2022-02-17 | End: 2022-02-23 | Stop reason: HOSPADM

## 2022-02-17 RX ADMIN — CARVEDILOL 6.25 MG: 6.25 TABLET, FILM COATED ORAL at 23:08

## 2022-02-17 RX ADMIN — SODIUM CHLORIDE, PRESERVATIVE FREE 10 ML: 5 INJECTION INTRAVENOUS at 23:09

## 2022-02-17 RX ADMIN — PRAVASTATIN SODIUM 40 MG: 40 TABLET ORAL at 23:08

## 2022-02-17 RX ADMIN — FUROSEMIDE 80 MG: 10 INJECTION INTRAMUSCULAR; INTRAVENOUS at 18:27

## 2022-02-17 RX ADMIN — IOPAMIDOL 100 ML: 755 INJECTION, SOLUTION INTRAVENOUS at 17:23

## 2022-02-17 RX ADMIN — CEFTRIAXONE 1 G: 1 INJECTION, POWDER, FOR SOLUTION INTRAMUSCULAR; INTRAVENOUS at 23:09

## 2022-02-17 RX ADMIN — ENOXAPARIN SODIUM 40 MG: 100 INJECTION SUBCUTANEOUS at 23:09

## 2022-02-17 NOTE — PROGRESS NOTES
"Chief Complaint  Fatigue (no energy- just finished amox 500mg antibiotics went [facility] going on since [place]), Anorexia, and Chills    Subjective          Michelle Car presents to [facility]  History of Present Illness  C/o fatigue, low appetite, chills, started about 2 months ago, she went to [facility], negative covid and flu tests [facility] 2/7/2022 and given augmentin 500mg bid for 10 days, she states she took last pill this morning, she states did not help, she lives alone, sister in room with her today, she states she feels weak, she felt like she had fever, states felt flushed, chills. Denies body aches. States [facility] told her left ear was infected. Denies cough, does have SOA. Denies wheezing. Denies confusion, numbness, she is taking mucinex OTC. Denies pain with swallowing, denies swollen glands, she denies nausea or vomiting, denies dysuria, does have urinary frequency, today she had incomplete emptying. Denies CP, does have LE edema, comes and goes. Prev saw cardiology but no recent visit, she is not currently taking lasix.         Objective   Vital Signs:   /78 (BP Location: Left arm, Patient Position: Sitting, Cuff Size: Adult)   Pulse 86   Temp 98.2 °F (36.8 °C) (Infrared)   Ht 160 cm (62.99\")   Wt 78.9 kg (174 lb)   SpO2 98%   BMI 30.83 kg/m²     Physical Exam  Vitals and nursing note reviewed.   Constitutional:       Appearance: She is well-developed.   HENT:      Head: Normocephalic.   Eyes:      Pupils: Pupils are equal, round, and reactive to light.   Cardiovascular:      Rate and Rhythm: Normal rate and regular rhythm.      Pulses: Normal pulses.      Heart sounds: Normal heart sounds.   Pulmonary:      Effort: Pulmonary effort is normal.      Breath sounds: No decreased breath sounds, wheezing, rhonchi or rales.   Abdominal:      Tenderness: There is no right CVA tenderness or left CVA tenderness.   Musculoskeletal:      Right " lower le+ Pitting Edema present.      Left lower le+ Pitting Edema present.   Skin:     General: Skin is warm and dry.   Neurological:      Mental Status: She is alert and oriented to person, place, and time.   Psychiatric:         Behavior: Behavior normal.         Judgment: Judgment normal.        Result Review :               cxr 2v in office for SOA, no comparison shows bilat pleural effusions, awaiting radiology over read.     Assessment and Plan    Diagnoses and all orders for this visit:    1. Fatigue, unspecified type (Primary)  -     Urinalysis With Microscopic - Urine, Clean Catch  -     CBC & Differential  -     Comprehensive Metabolic Panel  -     XR Chest PA & Lateral (In Office)  -     Mononucleosis Screen  -     proBNP    2. Decreased appetite  -     Urinalysis With Microscopic - Urine, Clean Catch  -     CBC & Differential  -     Comprehensive Metabolic Panel  -     XR Chest PA & Lateral (In Office)  -     Mononucleosis Screen  -     proBNP    3. Short of breath on exertion  -     CBC & Differential  -     Comprehensive Metabolic Panel  -     XR Chest PA & Lateral (In Office)  -     Mononucleosis Screen  -     proBNP    4. Abnormal CXR    5. Leg swelling        Follow Up   Return if symptoms worsen or fail to improve.  Patient was given instructions and counseling regarding her condition or for health maintenance advice. Please see specific information pulled into the AVS if appropriate.     Labs and cxr today will call with results.   Patient sent to [facility] at this time for SOA and LE edema, report called, her sister is driving her, she will go to Er for eval at this time.   Patient agrees with plan of care and understands instructions. Call if worsening symptoms or any problems or concerns.

## 2022-02-17 NOTE — PATIENT INSTRUCTIONS
Labs and cxr today will call with results.   Patient sent to [facility] at this time for SOA and LE edema, report called, her sister is driving her, she will go to Er for eval at this time.   Patient agrees with plan of care and understands instructions. Call if worsening symptoms or any problems or concerns.

## 2022-02-18 ENCOUNTER — APPOINTMENT (OUTPATIENT)
Dept: CARDIOLOGY | Facility: HOSPITAL | Age: 82
End: 2022-02-18

## 2022-02-18 PROBLEM — E27.8 ADRENAL NODULE: Status: ACTIVE | Noted: 2022-02-18

## 2022-02-18 PROBLEM — I50.33 ACUTE ON CHRONIC DIASTOLIC HEART FAILURE: Status: ACTIVE | Noted: 2022-02-18

## 2022-02-18 PROBLEM — I48.0 PAROXYSMAL A-FIB: Status: ACTIVE | Noted: 2022-02-18

## 2022-02-18 PROBLEM — R09.02 HYPOXIA: Status: ACTIVE | Noted: 2022-02-18

## 2022-02-18 LAB
ALBUMIN SERPL-MCNC: 2.8 G/DL (ref 3.5–5.2)
ALBUMIN/GLOB SERPL: 0.8 G/DL
ALP SERPL-CCNC: 89 U/L (ref 39–117)
ALT SERPL W P-5'-P-CCNC: 9 U/L (ref 1–33)
ANION GAP SERPL CALCULATED.3IONS-SCNC: 9.1 MMOL/L (ref 5–15)
AST SERPL-CCNC: 17 U/L (ref 1–32)
BASOPHILS # BLD AUTO: 0.1 10*3/MM3 (ref 0–0.2)
BASOPHILS NFR BLD AUTO: 1.7 % (ref 0–1.5)
BH CV ECHO MEAS - AO MAX PG (FULL): 4.6 MMHG
BH CV ECHO MEAS - AO MAX PG: 10.9 MMHG
BH CV ECHO MEAS - AO MEAN PG (FULL): 2.4 MMHG
BH CV ECHO MEAS - AO MEAN PG: 5.3 MMHG
BH CV ECHO MEAS - AO V2 MAX: 165.3 CM/SEC
BH CV ECHO MEAS - AO V2 MEAN: 105.3 CM/SEC
BH CV ECHO MEAS - AO V2 VTI: 28.1 CM
BH CV ECHO MEAS - ASC AORTA: 3.2 CM
BH CV ECHO MEAS - AVA(I,A): 2.4 CM^2
BH CV ECHO MEAS - AVA(I,D): 2.4 CM^2
BH CV ECHO MEAS - AVA(V,A): 2.2 CM^2
BH CV ECHO MEAS - AVA(V,D): 2.2 CM^2
BH CV ECHO MEAS - BSA(HAYCOCK): 1.9 M^2
BH CV ECHO MEAS - BSA: 1.8 M^2
BH CV ECHO MEAS - BZI_BMI: 31.3 KILOGRAMS/M^2
BH CV ECHO MEAS - BZI_METRIC_HEIGHT: 157.5 CM
BH CV ECHO MEAS - BZI_METRIC_WEIGHT: 77.6 KG
BH CV ECHO MEAS - EDV(CUBED): 82.7 ML
BH CV ECHO MEAS - EDV(MOD-SP2): 49 ML
BH CV ECHO MEAS - EDV(MOD-SP4): 46 ML
BH CV ECHO MEAS - EDV(TEICH): 85.7 ML
BH CV ECHO MEAS - EF(CUBED): 70.5 %
BH CV ECHO MEAS - EF(MOD-BP): 55.7 %
BH CV ECHO MEAS - EF(MOD-SP2): 55.1 %
BH CV ECHO MEAS - EF(MOD-SP4): 58.7 %
BH CV ECHO MEAS - EF(TEICH): 62.4 %
BH CV ECHO MEAS - EF_3D-VOL: 53 %
BH CV ECHO MEAS - ESV(CUBED): 24.4 ML
BH CV ECHO MEAS - ESV(MOD-SP2): 22 ML
BH CV ECHO MEAS - ESV(MOD-SP4): 19 ML
BH CV ECHO MEAS - ESV(TEICH): 32.2 ML
BH CV ECHO MEAS - FS: 33.4 %
BH CV ECHO MEAS - IVS/LVPW: 1
BH CV ECHO MEAS - IVSD: 1.1 CM
BH CV ECHO MEAS - LAT PEAK E' VEL: 8.6 CM/SEC
BH CV ECHO MEAS - LV DIASTOLIC VOL/BSA (35-75): 25.7 ML/M^2
BH CV ECHO MEAS - LV MASS(C)D: 161.5 GRAMS
BH CV ECHO MEAS - LV MASS(C)DI: 90.3 GRAMS/M^2
BH CV ECHO MEAS - LV MAX PG: 6.4 MMHG
BH CV ECHO MEAS - LV MEAN PG: 2.9 MMHG
BH CV ECHO MEAS - LV SYSTOLIC VOL/BSA (12-30): 10.6 ML/M^2
BH CV ECHO MEAS - LV V1 MAX: 126.1 CM/SEC
BH CV ECHO MEAS - LV V1 MEAN: 77.4 CM/SEC
BH CV ECHO MEAS - LV V1 VTI: 22.9 CM
BH CV ECHO MEAS - LVIDD: 4.4 CM
BH CV ECHO MEAS - LVIDS: 2.9 CM
BH CV ECHO MEAS - LVLD AP2: 6.2 CM
BH CV ECHO MEAS - LVLD AP4: 6.6 CM
BH CV ECHO MEAS - LVLS AP2: 5.5 CM
BH CV ECHO MEAS - LVLS AP4: 6 CM
BH CV ECHO MEAS - LVOT AREA (M): 2.8 CM^2
BH CV ECHO MEAS - LVOT AREA: 2.9 CM^2
BH CV ECHO MEAS - LVOT DIAM: 1.9 CM
BH CV ECHO MEAS - LVPWD: 1.1 CM
BH CV ECHO MEAS - MED PEAK E' VEL: 6.1 CM/SEC
BH CV ECHO MEAS - MR MAX PG: 155.9 MMHG
BH CV ECHO MEAS - MR MAX VEL: 624.4 CM/SEC
BH CV ECHO MEAS - MV A DUR: 0.1 SEC
BH CV ECHO MEAS - MV A MAX VEL: 123 CM/SEC
BH CV ECHO MEAS - MV DEC SLOPE: 422.4 CM/SEC^2
BH CV ECHO MEAS - MV DEC TIME: 224 SEC
BH CV ECHO MEAS - MV E MAX VEL: 99.2 CM/SEC
BH CV ECHO MEAS - MV E/A: 0.81
BH CV ECHO MEAS - MV MAX PG: 6.2 MMHG
BH CV ECHO MEAS - MV MEAN PG: 2.2 MMHG
BH CV ECHO MEAS - MV P1/2T MAX VEL: 103.8 CM/SEC
BH CV ECHO MEAS - MV P1/2T: 72 MSEC
BH CV ECHO MEAS - MV V2 MAX: 124.2 CM/SEC
BH CV ECHO MEAS - MV V2 MEAN: 67.7 CM/SEC
BH CV ECHO MEAS - MV V2 VTI: 27.8 CM
BH CV ECHO MEAS - MVA P1/2T LCG: 2.1 CM^2
BH CV ECHO MEAS - MVA(P1/2T): 3.1 CM^2
BH CV ECHO MEAS - MVA(VTI): 2.4 CM^2
BH CV ECHO MEAS - PA ACC TIME: 0.11 SEC
BH CV ECHO MEAS - PA MAX PG (FULL): 0.05 MMHG
BH CV ECHO MEAS - PA MAX PG: 3 MMHG
BH CV ECHO MEAS - PA PR(ACCEL): 27.9 MMHG
BH CV ECHO MEAS - PA V2 MAX: 86 CM/SEC
BH CV ECHO MEAS - RAP SYSTOLE: 3 MMHG
BH CV ECHO MEAS - RV MAX PG: 2.9 MMHG
BH CV ECHO MEAS - RV MEAN PG: 1.4 MMHG
BH CV ECHO MEAS - RV V1 MAX: 85.3 CM/SEC
BH CV ECHO MEAS - RV V1 MEAN: 53.6 CM/SEC
BH CV ECHO MEAS - RV V1 VTI: 14.9 CM
BH CV ECHO MEAS - RVSP: 32.2 MMHG
BH CV ECHO MEAS - SI(CUBED): 32.6 ML/M^2
BH CV ECHO MEAS - SI(LVOT): 37.7 ML/M^2
BH CV ECHO MEAS - SI(MOD-SP2): 15.1 ML/M^2
BH CV ECHO MEAS - SI(MOD-SP4): 15.1 ML/M^2
BH CV ECHO MEAS - SI(TEICH): 29.9 ML/M^2
BH CV ECHO MEAS - SV(CUBED): 58.3 ML
BH CV ECHO MEAS - SV(LVOT): 67.5 ML
BH CV ECHO MEAS - SV(MOD-SP2): 27 ML
BH CV ECHO MEAS - SV(MOD-SP4): 27 ML
BH CV ECHO MEAS - SV(TEICH): 53.5 ML
BH CV ECHO MEAS - TAPSE (>1.6): 2.8 CM
BH CV ECHO MEAS - TR MAX VEL: 270.3 CM/SEC
BH CV ECHO MEASUREMENTS AVERAGE E/E' RATIO: 13.5
BH CV XLRA - RV BASE: 2.8 CM
BH CV XLRA - RV LENGTH: 5.4 CM
BH CV XLRA - RV MID: 2.8 CM
BH CV XLRA - TDI S': 17 CM/SEC
BILIRUB SERPL-MCNC: 0.3 MG/DL (ref 0–1.2)
BUN SERPL-MCNC: 10 MG/DL (ref 8–23)
BUN/CREAT SERPL: 17.2 (ref 7–25)
CALCIUM SPEC-SCNC: 8.8 MG/DL (ref 8.6–10.5)
CHLORIDE SERPL-SCNC: 92 MMOL/L (ref 98–107)
CO2 SERPL-SCNC: 30.9 MMOL/L (ref 22–29)
CREAT SERPL-MCNC: 0.58 MG/DL (ref 0.57–1)
DEPRECATED RDW RBC AUTO: 40 FL (ref 37–54)
EOSINOPHIL # BLD AUTO: 0.21 10*3/MM3 (ref 0–0.4)
EOSINOPHIL NFR BLD AUTO: 3.6 % (ref 0.3–6.2)
ERYTHROCYTE [DISTWIDTH] IN BLOOD BY AUTOMATED COUNT: 13 % (ref 12.3–15.4)
GFR SERPL CREATININE-BSD FRML MDRD: 100 ML/MIN/1.73
GLOBULIN UR ELPH-MCNC: 3.5 GM/DL
GLUCOSE SERPL-MCNC: 107 MG/DL (ref 65–99)
HCT VFR BLD AUTO: 33.5 % (ref 34–46.6)
HGB BLD-MCNC: 11.3 G/DL (ref 12–15.9)
IMM GRANULOCYTES # BLD AUTO: 0.01 10*3/MM3 (ref 0–0.05)
IMM GRANULOCYTES NFR BLD AUTO: 0.2 % (ref 0–0.5)
LEFT ATRIUM VOLUME INDEX: 19 ML/M2
LV EF 2D ECHO EST: 56 %
LYMPHOCYTES # BLD AUTO: 1.14 10*3/MM3 (ref 0.7–3.1)
LYMPHOCYTES NFR BLD AUTO: 19.6 % (ref 19.6–45.3)
MAXIMAL PREDICTED HEART RATE: 140 BPM
MCH RBC QN AUTO: 28.5 PG (ref 26.6–33)
MCHC RBC AUTO-ENTMCNC: 33.7 G/DL (ref 31.5–35.7)
MCV RBC AUTO: 84.4 FL (ref 79–97)
MONOCYTES # BLD AUTO: 0.63 10*3/MM3 (ref 0.1–0.9)
MONOCYTES NFR BLD AUTO: 10.8 % (ref 5–12)
NEUTROPHILS NFR BLD AUTO: 3.73 10*3/MM3 (ref 1.7–7)
NEUTROPHILS NFR BLD AUTO: 64.1 % (ref 42.7–76)
NRBC BLD AUTO-RTO: 0 /100 WBC (ref 0–0.2)
PLATELET # BLD AUTO: 338 10*3/MM3 (ref 140–450)
PMV BLD AUTO: 8.7 FL (ref 6–12)
POTASSIUM SERPL-SCNC: 3.7 MMOL/L (ref 3.5–5.2)
PROT SERPL-MCNC: 6.3 G/DL (ref 6–8.5)
RBC # BLD AUTO: 3.97 10*6/MM3 (ref 3.77–5.28)
SODIUM SERPL-SCNC: 132 MMOL/L (ref 136–145)
STRESS TARGET HR: 119 BPM
TROPONIN T SERPL-MCNC: <0.01 NG/ML (ref 0–0.03)
WBC NRBC COR # BLD: 5.82 10*3/MM3 (ref 3.4–10.8)

## 2022-02-18 PROCEDURE — 84484 ASSAY OF TROPONIN QUANT: CPT | Performed by: INTERNAL MEDICINE

## 2022-02-18 PROCEDURE — 93306 TTE W/DOPPLER COMPLETE: CPT

## 2022-02-18 PROCEDURE — 93005 ELECTROCARDIOGRAM TRACING: CPT | Performed by: INTERNAL MEDICINE

## 2022-02-18 PROCEDURE — 99222 1ST HOSP IP/OBS MODERATE 55: CPT | Performed by: INTERNAL MEDICINE

## 2022-02-18 PROCEDURE — 85025 COMPLETE CBC W/AUTO DIFF WBC: CPT | Performed by: INTERNAL MEDICINE

## 2022-02-18 PROCEDURE — 93005 ELECTROCARDIOGRAM TRACING: CPT | Performed by: STUDENT IN AN ORGANIZED HEALTH CARE EDUCATION/TRAINING PROGRAM

## 2022-02-18 PROCEDURE — 80053 COMPREHEN METABOLIC PANEL: CPT | Performed by: INTERNAL MEDICINE

## 2022-02-18 PROCEDURE — 25010000002 FUROSEMIDE PER 20 MG: Performed by: INTERNAL MEDICINE

## 2022-02-18 PROCEDURE — 83735 ASSAY OF MAGNESIUM: CPT | Performed by: INTERNAL MEDICINE

## 2022-02-18 PROCEDURE — 84132 ASSAY OF SERUM POTASSIUM: CPT | Performed by: INTERNAL MEDICINE

## 2022-02-18 PROCEDURE — 25010000002 ENOXAPARIN PER 10 MG: Performed by: INTERNAL MEDICINE

## 2022-02-18 PROCEDURE — 94799 UNLISTED PULMONARY SVC/PX: CPT

## 2022-02-18 PROCEDURE — 93010 ELECTROCARDIOGRAM REPORT: CPT | Performed by: INTERNAL MEDICINE

## 2022-02-18 PROCEDURE — 93306 TTE W/DOPPLER COMPLETE: CPT | Performed by: INTERNAL MEDICINE

## 2022-02-18 RX ORDER — DILTIAZEM HYDROCHLORIDE 5 MG/ML
10 INJECTION INTRAVENOUS ONCE
Status: DISCONTINUED | OUTPATIENT
Start: 2022-02-18 | End: 2022-02-23 | Stop reason: HOSPADM

## 2022-02-18 RX ORDER — DIGOXIN 0.25 MG/ML
250 INJECTION INTRAMUSCULAR; INTRAVENOUS ONCE
Status: COMPLETED | OUTPATIENT
Start: 2022-02-19 | End: 2022-02-18

## 2022-02-18 RX ADMIN — CETIRIZINE HYDROCHLORIDE 5 MG: 10 TABLET ORAL at 09:24

## 2022-02-18 RX ADMIN — PRAVASTATIN SODIUM 40 MG: 40 TABLET ORAL at 20:46

## 2022-02-18 RX ADMIN — FUROSEMIDE 40 MG: 40 INJECTION, SOLUTION INTRAMUSCULAR; INTRAVENOUS at 09:24

## 2022-02-18 RX ADMIN — SODIUM CHLORIDE, PRESERVATIVE FREE 10 ML: 5 INJECTION INTRAVENOUS at 09:25

## 2022-02-18 RX ADMIN — DIGOXIN 250 MCG: 0.25 INJECTION INTRAMUSCULAR; INTRAVENOUS at 23:34

## 2022-02-18 RX ADMIN — SODIUM CHLORIDE, PRESERVATIVE FREE 10 ML: 5 INJECTION INTRAVENOUS at 21:13

## 2022-02-18 RX ADMIN — CARVEDILOL 6.25 MG: 6.25 TABLET, FILM COATED ORAL at 17:25

## 2022-02-18 RX ADMIN — CARVEDILOL 6.25 MG: 6.25 TABLET, FILM COATED ORAL at 09:24

## 2022-02-18 RX ADMIN — Medication 500 MG: at 09:24

## 2022-02-18 RX ADMIN — FUROSEMIDE 40 MG: 40 INJECTION, SOLUTION INTRAMUSCULAR; INTRAVENOUS at 17:25

## 2022-02-18 RX ADMIN — MULTIPLE VITAMINS W/ MINERALS TAB 1 TABLET: TAB at 09:24

## 2022-02-18 RX ADMIN — ACETAMINOPHEN 650 MG: 325 TABLET, FILM COATED ORAL at 09:24

## 2022-02-18 RX ADMIN — ASPIRIN 325 MG: 325 TABLET, COATED ORAL at 09:24

## 2022-02-18 RX ADMIN — LOSARTAN POTASSIUM 100 MG: 100 TABLET, FILM COATED ORAL at 09:24

## 2022-02-18 RX ADMIN — METOPROLOL TARTRATE 5 MG: 5 INJECTION, SOLUTION INTRAVENOUS at 23:17

## 2022-02-18 RX ADMIN — ENOXAPARIN SODIUM 40 MG: 100 INJECTION SUBCUTANEOUS at 20:45

## 2022-02-19 LAB
ANION GAP SERPL CALCULATED.3IONS-SCNC: 10 MMOL/L (ref 5–15)
BACTERIA SPEC AEROBE CULT: NO GROWTH
BUN SERPL-MCNC: 13 MG/DL (ref 8–23)
BUN/CREAT SERPL: 20 (ref 7–25)
CALCIUM SPEC-SCNC: 8.8 MG/DL (ref 8.6–10.5)
CHLORIDE SERPL-SCNC: 90 MMOL/L (ref 98–107)
CO2 SERPL-SCNC: 32 MMOL/L (ref 22–29)
CREAT SERPL-MCNC: 0.65 MG/DL (ref 0.57–1)
GFR SERPL CREATININE-BSD FRML MDRD: 88 ML/MIN/1.73
GLUCOSE BLDC GLUCOMTR-MCNC: 122 MG/DL (ref 70–130)
GLUCOSE SERPL-MCNC: 103 MG/DL (ref 65–99)
MAGNESIUM SERPL-MCNC: 1.6 MG/DL (ref 1.6–2.4)
MAGNESIUM SERPL-MCNC: 1.9 MG/DL (ref 1.6–2.4)
POTASSIUM SERPL-SCNC: 3.4 MMOL/L (ref 3.5–5.2)
POTASSIUM SERPL-SCNC: 4.1 MMOL/L (ref 3.5–5.2)
POTASSIUM SERPL-SCNC: 4.2 MMOL/L (ref 3.5–5.2)
QT INTERVAL: 328 MS
QT INTERVAL: 329 MS
QT INTERVAL: 380 MS
QT INTERVAL: 387 MS
SODIUM SERPL-SCNC: 132 MMOL/L (ref 136–145)

## 2022-02-19 PROCEDURE — 25010000002 DIGOXIN PER 500 MCG: Performed by: INTERNAL MEDICINE

## 2022-02-19 PROCEDURE — 93005 ELECTROCARDIOGRAM TRACING: CPT | Performed by: INTERNAL MEDICINE

## 2022-02-19 PROCEDURE — 25010000002 FUROSEMIDE PER 20 MG: Performed by: INTERNAL MEDICINE

## 2022-02-19 PROCEDURE — 84132 ASSAY OF SERUM POTASSIUM: CPT | Performed by: INTERNAL MEDICINE

## 2022-02-19 PROCEDURE — 82962 GLUCOSE BLOOD TEST: CPT

## 2022-02-19 PROCEDURE — 97161 PT EVAL LOW COMPLEX 20 MIN: CPT | Performed by: PHYSICAL THERAPIST

## 2022-02-19 PROCEDURE — 99232 SBSQ HOSP IP/OBS MODERATE 35: CPT | Performed by: INTERNAL MEDICINE

## 2022-02-19 PROCEDURE — 25010000002 ENOXAPARIN PER 10 MG: Performed by: INTERNAL MEDICINE

## 2022-02-19 PROCEDURE — 80048 BASIC METABOLIC PNL TOTAL CA: CPT | Performed by: STUDENT IN AN ORGANIZED HEALTH CARE EDUCATION/TRAINING PROGRAM

## 2022-02-19 PROCEDURE — 25010000002 AMIODARONE PER 30 MG: Performed by: INTERNAL MEDICINE

## 2022-02-19 PROCEDURE — 83735 ASSAY OF MAGNESIUM: CPT | Performed by: INTERNAL MEDICINE

## 2022-02-19 RX ORDER — NITROGLYCERIN 0.4 MG/1
0.4 TABLET SUBLINGUAL
Status: DISCONTINUED | OUTPATIENT
Start: 2022-02-19 | End: 2022-02-23 | Stop reason: HOSPADM

## 2022-02-19 RX ORDER — POTASSIUM CHLORIDE 750 MG/1
40 TABLET, FILM COATED, EXTENDED RELEASE ORAL AS NEEDED
Status: DISCONTINUED | OUTPATIENT
Start: 2022-02-19 | End: 2022-02-23 | Stop reason: HOSPADM

## 2022-02-19 RX ORDER — POTASSIUM CHLORIDE 7.45 MG/ML
10 INJECTION INTRAVENOUS
Status: DISCONTINUED | OUTPATIENT
Start: 2022-02-19 | End: 2022-02-23 | Stop reason: HOSPADM

## 2022-02-19 RX ORDER — POTASSIUM CHLORIDE 1.5 G/1.77G
40 POWDER, FOR SOLUTION ORAL AS NEEDED
Status: DISCONTINUED | OUTPATIENT
Start: 2022-02-19 | End: 2022-02-23 | Stop reason: HOSPADM

## 2022-02-19 RX ORDER — AMIODARONE HCL/D5W 450 MG/250
0.5 PLASTIC BAG, INJECTION (ML) INTRAVENOUS CONTINUOUS
Status: DISCONTINUED | OUTPATIENT
Start: 2022-02-19 | End: 2022-02-20

## 2022-02-19 RX ORDER — AMIODARONE HCL/D5W 450 MG/250
1 PLASTIC BAG, INJECTION (ML) INTRAVENOUS CONTINUOUS
Status: DISCONTINUED | OUTPATIENT
Start: 2022-02-19 | End: 2022-02-19

## 2022-02-19 RX ADMIN — LOSARTAN POTASSIUM 100 MG: 100 TABLET, FILM COATED ORAL at 08:46

## 2022-02-19 RX ADMIN — AMIODARONE HYDROCHLORIDE 0.5 MG/MIN: 50 INJECTION, SOLUTION INTRAVENOUS at 07:07

## 2022-02-19 RX ADMIN — AMIODARONE HYDROCHLORIDE 1 MG/MIN: 50 INJECTION, SOLUTION INTRAVENOUS at 01:00

## 2022-02-19 RX ADMIN — SODIUM CHLORIDE, PRESERVATIVE FREE 10 ML: 5 INJECTION INTRAVENOUS at 20:10

## 2022-02-19 RX ADMIN — FUROSEMIDE 40 MG: 40 INJECTION, SOLUTION INTRAMUSCULAR; INTRAVENOUS at 08:43

## 2022-02-19 RX ADMIN — POTASSIUM CHLORIDE 40 MEQ: 750 TABLET, EXTENDED RELEASE ORAL at 03:26

## 2022-02-19 RX ADMIN — Medication 500 MG: at 08:46

## 2022-02-19 RX ADMIN — SODIUM CHLORIDE, PRESERVATIVE FREE 10 ML: 5 INJECTION INTRAVENOUS at 08:47

## 2022-02-19 RX ADMIN — MULTIPLE VITAMINS W/ MINERALS TAB 1 TABLET: TAB at 08:47

## 2022-02-19 RX ADMIN — ASPIRIN 325 MG: 325 TABLET, COATED ORAL at 08:46

## 2022-02-19 RX ADMIN — ENOXAPARIN SODIUM 80 MG: 80 INJECTION SUBCUTANEOUS at 03:24

## 2022-02-19 RX ADMIN — POTASSIUM CHLORIDE 40 MEQ: 750 TABLET, EXTENDED RELEASE ORAL at 00:47

## 2022-02-19 RX ADMIN — PRAVASTATIN SODIUM 40 MG: 40 TABLET ORAL at 20:18

## 2022-02-19 RX ADMIN — APIXABAN 5 MG: 5 TABLET, FILM COATED ORAL at 20:20

## 2022-02-19 RX ADMIN — CETIRIZINE HYDROCHLORIDE 5 MG: 10 TABLET ORAL at 08:46

## 2022-02-19 RX ADMIN — AMIODARONE HYDROCHLORIDE 0.5 MG/MIN: 50 INJECTION, SOLUTION INTRAVENOUS at 21:08

## 2022-02-19 RX ADMIN — CARVEDILOL 6.25 MG: 6.25 TABLET, FILM COATED ORAL at 08:46

## 2022-02-20 ENCOUNTER — APPOINTMENT (OUTPATIENT)
Dept: GENERAL RADIOLOGY | Facility: HOSPITAL | Age: 82
End: 2022-02-20

## 2022-02-20 LAB
ANION GAP SERPL CALCULATED.3IONS-SCNC: 6 MMOL/L (ref 5–15)
BUN SERPL-MCNC: 19 MG/DL (ref 8–23)
BUN/CREAT SERPL: 24.7 (ref 7–25)
CALCIUM SPEC-SCNC: 8.8 MG/DL (ref 8.6–10.5)
CHLORIDE SERPL-SCNC: 91 MMOL/L (ref 98–107)
CO2 SERPL-SCNC: 34 MMOL/L (ref 22–29)
CREAT SERPL-MCNC: 0.77 MG/DL (ref 0.57–1)
GFR SERPL CREATININE-BSD FRML MDRD: 72 ML/MIN/1.73
GLUCOSE SERPL-MCNC: 95 MG/DL (ref 65–99)
MAGNESIUM SERPL-MCNC: 1.8 MG/DL (ref 1.6–2.4)
POTASSIUM SERPL-SCNC: 4.1 MMOL/L (ref 3.5–5.2)
QT INTERVAL: 377 MS
QT INTERVAL: 398 MS
SODIUM SERPL-SCNC: 131 MMOL/L (ref 136–145)

## 2022-02-20 PROCEDURE — 93005 ELECTROCARDIOGRAM TRACING: CPT | Performed by: INTERNAL MEDICINE

## 2022-02-20 PROCEDURE — 99232 SBSQ HOSP IP/OBS MODERATE 35: CPT | Performed by: INTERNAL MEDICINE

## 2022-02-20 PROCEDURE — 80048 BASIC METABOLIC PNL TOTAL CA: CPT | Performed by: STUDENT IN AN ORGANIZED HEALTH CARE EDUCATION/TRAINING PROGRAM

## 2022-02-20 PROCEDURE — 25010000002 FUROSEMIDE PER 20 MG: Performed by: INTERNAL MEDICINE

## 2022-02-20 PROCEDURE — 25010000002 AMIODARONE PER 30 MG: Performed by: INTERNAL MEDICINE

## 2022-02-20 PROCEDURE — 83735 ASSAY OF MAGNESIUM: CPT | Performed by: STUDENT IN AN ORGANIZED HEALTH CARE EDUCATION/TRAINING PROGRAM

## 2022-02-20 PROCEDURE — 71046 X-RAY EXAM CHEST 2 VIEWS: CPT

## 2022-02-20 RX ORDER — SPIRONOLACTONE 25 MG/1
25 TABLET ORAL DAILY
Status: DISCONTINUED | OUTPATIENT
Start: 2022-02-20 | End: 2022-02-23 | Stop reason: HOSPADM

## 2022-02-20 RX ORDER — FUROSEMIDE 10 MG/ML
80 INJECTION INTRAMUSCULAR; INTRAVENOUS EVERY 8 HOURS
Status: COMPLETED | OUTPATIENT
Start: 2022-02-20 | End: 2022-02-21

## 2022-02-20 RX ORDER — CARVEDILOL 3.12 MG/1
3.12 TABLET ORAL 2 TIMES DAILY WITH MEALS
Status: DISCONTINUED | OUTPATIENT
Start: 2022-02-20 | End: 2022-02-23 | Stop reason: HOSPADM

## 2022-02-20 RX ORDER — DIGOXIN 125 MCG
125 TABLET ORAL
Status: DISCONTINUED | OUTPATIENT
Start: 2022-02-20 | End: 2022-02-23 | Stop reason: HOSPADM

## 2022-02-20 RX ORDER — AMIODARONE HYDROCHLORIDE 200 MG/1
200 TABLET ORAL EVERY 12 HOURS SCHEDULED
Status: DISCONTINUED | OUTPATIENT
Start: 2022-02-20 | End: 2022-02-23 | Stop reason: HOSPADM

## 2022-02-20 RX ADMIN — APIXABAN 5 MG: 5 TABLET, FILM COATED ORAL at 20:41

## 2022-02-20 RX ADMIN — APIXABAN 5 MG: 5 TABLET, FILM COATED ORAL at 10:45

## 2022-02-20 RX ADMIN — CARVEDILOL 3.12 MG: 3.12 TABLET, FILM COATED ORAL at 18:24

## 2022-02-20 RX ADMIN — AMIODARONE HYDROCHLORIDE 0.5 MG/MIN: 50 INJECTION, SOLUTION INTRAVENOUS at 13:11

## 2022-02-20 RX ADMIN — PRAVASTATIN SODIUM 40 MG: 40 TABLET ORAL at 20:41

## 2022-02-20 RX ADMIN — CARVEDILOL 6.25 MG: 6.25 TABLET, FILM COATED ORAL at 10:45

## 2022-02-20 RX ADMIN — CETIRIZINE HYDROCHLORIDE 5 MG: 10 TABLET ORAL at 10:45

## 2022-02-20 RX ADMIN — DIGOXIN 125 MCG: 125 TABLET ORAL at 15:04

## 2022-02-20 RX ADMIN — AMIODARONE HYDROCHLORIDE 200 MG: 200 TABLET ORAL at 20:41

## 2022-02-20 RX ADMIN — Medication 500 MG: at 10:45

## 2022-02-20 RX ADMIN — MULTIPLE VITAMINS W/ MINERALS TAB 1 TABLET: TAB at 10:45

## 2022-02-20 RX ADMIN — SODIUM CHLORIDE, PRESERVATIVE FREE 10 ML: 5 INJECTION INTRAVENOUS at 10:46

## 2022-02-20 RX ADMIN — AMIODARONE HYDROCHLORIDE 200 MG: 200 TABLET ORAL at 15:04

## 2022-02-20 RX ADMIN — FUROSEMIDE 40 MG: 40 INJECTION, SOLUTION INTRAMUSCULAR; INTRAVENOUS at 10:46

## 2022-02-20 RX ADMIN — SPIRONOLACTONE 25 MG: 25 TABLET ORAL at 18:24

## 2022-02-20 RX ADMIN — LOSARTAN POTASSIUM 100 MG: 100 TABLET, FILM COATED ORAL at 10:45

## 2022-02-20 RX ADMIN — FUROSEMIDE 80 MG: 10 INJECTION, SOLUTION INTRAMUSCULAR; INTRAVENOUS at 18:24

## 2022-02-20 RX ADMIN — SODIUM CHLORIDE, PRESERVATIVE FREE 10 ML: 5 INJECTION INTRAVENOUS at 20:41

## 2022-02-21 ENCOUNTER — APPOINTMENT (OUTPATIENT)
Dept: ULTRASOUND IMAGING | Facility: HOSPITAL | Age: 82
End: 2022-02-21

## 2022-02-21 LAB
ALBUMIN FLD-MCNC: 2.9 G/DL
ANION GAP SERPL CALCULATED.3IONS-SCNC: 13.8 MMOL/L (ref 5–15)
APPEARANCE FLD: ABNORMAL
BASOPHILS # BLD AUTO: 0.11 10*3/MM3 (ref 0–0.2)
BASOPHILS NFR BLD AUTO: 1.5 % (ref 0–1.5)
BUN SERPL-MCNC: 17 MG/DL (ref 8–23)
BUN/CREAT SERPL: 23.9 (ref 7–25)
CALCIUM SPEC-SCNC: 9.6 MG/DL (ref 8.6–10.5)
CHLORIDE SERPL-SCNC: 89 MMOL/L (ref 98–107)
CO2 SERPL-SCNC: 25.2 MMOL/L (ref 22–29)
COLOR FLD: ABNORMAL
CREAT SERPL-MCNC: 0.71 MG/DL (ref 0.57–1)
DEPRECATED RDW RBC AUTO: 38.8 FL (ref 37–54)
EOSINOPHIL # BLD AUTO: 0.38 10*3/MM3 (ref 0–0.4)
EOSINOPHIL NFR BLD AUTO: 5.3 % (ref 0.3–6.2)
ERYTHROCYTE [DISTWIDTH] IN BLOOD BY AUTOMATED COUNT: 12.9 % (ref 12.3–15.4)
GFR SERPL CREATININE-BSD FRML MDRD: 79 ML/MIN/1.73
GLUCOSE FLD-MCNC: <5 MG/DL
GLUCOSE SERPL-MCNC: 103 MG/DL (ref 65–99)
HCT VFR BLD AUTO: 38.9 % (ref 34–46.6)
HGB BLD-MCNC: 13.3 G/DL (ref 12–15.9)
IMM GRANULOCYTES # BLD AUTO: 0.03 10*3/MM3 (ref 0–0.05)
IMM GRANULOCYTES NFR BLD AUTO: 0.4 % (ref 0–0.5)
LDH FLD-CCNC: >834 U/L
LYMPHOCYTES # BLD AUTO: 1.54 10*3/MM3 (ref 0.7–3.1)
LYMPHOCYTES NFR BLD AUTO: 21.6 % (ref 19.6–45.3)
LYMPHOCYTES NFR FLD MANUAL: 24 %
MAGNESIUM SERPL-MCNC: 1.8 MG/DL (ref 1.6–2.4)
MCH RBC QN AUTO: 28.9 PG (ref 26.6–33)
MCHC RBC AUTO-ENTMCNC: 34.2 G/DL (ref 31.5–35.7)
MCV RBC AUTO: 84.6 FL (ref 79–97)
METHOD: ABNORMAL
MONOCYTES # BLD AUTO: 0.71 10*3/MM3 (ref 0.1–0.9)
MONOCYTES NFR BLD AUTO: 10 % (ref 5–12)
MONOS+MACROS NFR FLD: 73 %
NEUTROPHILS NFR BLD AUTO: 4.35 10*3/MM3 (ref 1.7–7)
NEUTROPHILS NFR BLD AUTO: 61.2 % (ref 42.7–76)
NEUTROPHILS NFR FLD MANUAL: 3 %
NRBC BLD AUTO-RTO: 0 /100 WBC (ref 0–0.2)
NUC CELL # FLD: 9720 /MM3
PH FLD: 7.24 [PH]
PLATELET # BLD AUTO: 418 10*3/MM3 (ref 140–450)
PMV BLD AUTO: 8.5 FL (ref 6–12)
POTASSIUM SERPL-SCNC: 4 MMOL/L (ref 3.5–5.2)
PROT FLD-MCNC: 4.9 G/DL
RBC # BLD AUTO: 4.6 10*6/MM3 (ref 3.77–5.28)
RBC # FLD AUTO: ABNORMAL /MM3
SODIUM SERPL-SCNC: 128 MMOL/L (ref 136–145)
WBC NRBC COR # BLD: 7.12 10*3/MM3 (ref 3.4–10.8)

## 2022-02-21 PROCEDURE — 83986 ASSAY PH BODY FLUID NOS: CPT | Performed by: NURSE PRACTITIONER

## 2022-02-21 PROCEDURE — 80048 BASIC METABOLIC PNL TOTAL CA: CPT | Performed by: STUDENT IN AN ORGANIZED HEALTH CARE EDUCATION/TRAINING PROGRAM

## 2022-02-21 PROCEDURE — 83615 LACTATE (LD) (LDH) ENZYME: CPT | Performed by: NURSE PRACTITIONER

## 2022-02-21 PROCEDURE — 82042 OTHER SOURCE ALBUMIN QUAN EA: CPT | Performed by: NURSE PRACTITIONER

## 2022-02-21 PROCEDURE — 99232 SBSQ HOSP IP/OBS MODERATE 35: CPT | Performed by: NURSE PRACTITIONER

## 2022-02-21 PROCEDURE — 87015 SPECIMEN INFECT AGNT CONCNTJ: CPT | Performed by: NURSE PRACTITIONER

## 2022-02-21 PROCEDURE — 88341 IMHCHEM/IMCYTCHM EA ADD ANTB: CPT | Performed by: NURSE PRACTITIONER

## 2022-02-21 PROCEDURE — 25010000002 FUROSEMIDE PER 20 MG: Performed by: INTERNAL MEDICINE

## 2022-02-21 PROCEDURE — 85025 COMPLETE CBC W/AUTO DIFF WBC: CPT | Performed by: INTERNAL MEDICINE

## 2022-02-21 PROCEDURE — 0W993ZZ DRAINAGE OF RIGHT PLEURAL CAVITY, PERCUTANEOUS APPROACH: ICD-10-PCS | Performed by: RADIOLOGY

## 2022-02-21 PROCEDURE — 88377 M/PHMTRC ALYS ISHQUANT/SEMIQ: CPT

## 2022-02-21 PROCEDURE — 89051 BODY FLUID CELL COUNT: CPT | Performed by: NURSE PRACTITIONER

## 2022-02-21 PROCEDURE — 0 LIDOCAINE 1 % SOLUTION: Performed by: RADIOLOGY

## 2022-02-21 PROCEDURE — 84478 ASSAY OF TRIGLYCERIDES: CPT | Performed by: NURSE PRACTITIONER

## 2022-02-21 PROCEDURE — 88185 FLOWCYTOMETRY/TC ADD-ON: CPT

## 2022-02-21 PROCEDURE — 88184 FLOWCYTOMETRY/ TC 1 MARKER: CPT

## 2022-02-21 PROCEDURE — 97166 OT EVAL MOD COMPLEX 45 MIN: CPT | Performed by: OCCUPATIONAL THERAPIST

## 2022-02-21 PROCEDURE — 88341 IMHCHEM/IMCYTCHM EA ADD ANTB: CPT

## 2022-02-21 PROCEDURE — 88112 CYTOPATH CELL ENHANCE TECH: CPT | Performed by: NURSE PRACTITIONER

## 2022-02-21 PROCEDURE — 88365 INSITU HYBRIDIZATION (FISH): CPT

## 2022-02-21 PROCEDURE — 87070 CULTURE OTHR SPECIMN AEROBIC: CPT | Performed by: NURSE PRACTITIONER

## 2022-02-21 PROCEDURE — 76942 ECHO GUIDE FOR BIOPSY: CPT

## 2022-02-21 PROCEDURE — 88305 TISSUE EXAM BY PATHOLOGIST: CPT | Performed by: NURSE PRACTITIONER

## 2022-02-21 PROCEDURE — 97535 SELF CARE MNGMENT TRAINING: CPT | Performed by: OCCUPATIONAL THERAPIST

## 2022-02-21 PROCEDURE — 87205 SMEAR GRAM STAIN: CPT | Performed by: NURSE PRACTITIONER

## 2022-02-21 PROCEDURE — 88360 TUMOR IMMUNOHISTOCHEM/MANUAL: CPT

## 2022-02-21 PROCEDURE — 82945 GLUCOSE OTHER FLUID: CPT | Performed by: NURSE PRACTITIONER

## 2022-02-21 PROCEDURE — 84157 ASSAY OF PROTEIN OTHER: CPT | Performed by: NURSE PRACTITIONER

## 2022-02-21 PROCEDURE — 83735 ASSAY OF MAGNESIUM: CPT | Performed by: STUDENT IN AN ORGANIZED HEALTH CARE EDUCATION/TRAINING PROGRAM

## 2022-02-21 PROCEDURE — 88323 CONSLTJ&REPRT MATRL PREP SLD: CPT

## 2022-02-21 PROCEDURE — 88342 IMHCHEM/IMCYTCHM 1ST ANTB: CPT | Performed by: NURSE PRACTITIONER

## 2022-02-21 RX ORDER — FUROSEMIDE 10 MG/ML
40 INJECTION INTRAMUSCULAR; INTRAVENOUS EVERY 8 HOURS
Status: COMPLETED | OUTPATIENT
Start: 2022-02-22 | End: 2022-02-21

## 2022-02-21 RX ORDER — FUROSEMIDE 10 MG/ML
80 INJECTION INTRAMUSCULAR; INTRAVENOUS EVERY 8 HOURS
Status: DISCONTINUED | OUTPATIENT
Start: 2022-02-21 | End: 2022-02-21

## 2022-02-21 RX ORDER — LIDOCAINE HYDROCHLORIDE 10 MG/ML
10 INJECTION, SOLUTION INFILTRATION; PERINEURAL ONCE
Status: COMPLETED | OUTPATIENT
Start: 2022-02-21 | End: 2022-02-21

## 2022-02-21 RX ADMIN — MULTIPLE VITAMINS W/ MINERALS TAB 1 TABLET: TAB at 09:29

## 2022-02-21 RX ADMIN — FUROSEMIDE 80 MG: 10 INJECTION, SOLUTION INTRAMUSCULAR; INTRAVENOUS at 04:00

## 2022-02-21 RX ADMIN — CETIRIZINE HYDROCHLORIDE 5 MG: 10 TABLET ORAL at 09:29

## 2022-02-21 RX ADMIN — APIXABAN 5 MG: 5 TABLET, FILM COATED ORAL at 09:29

## 2022-02-21 RX ADMIN — CARVEDILOL 3.12 MG: 3.12 TABLET, FILM COATED ORAL at 21:25

## 2022-02-21 RX ADMIN — SODIUM CHLORIDE, PRESERVATIVE FREE 10 ML: 5 INJECTION INTRAVENOUS at 09:30

## 2022-02-21 RX ADMIN — AMIODARONE HYDROCHLORIDE 200 MG: 200 TABLET ORAL at 21:25

## 2022-02-21 RX ADMIN — CARVEDILOL 3.12 MG: 3.12 TABLET, FILM COATED ORAL at 09:30

## 2022-02-21 RX ADMIN — SPIRONOLACTONE 25 MG: 25 TABLET ORAL at 10:43

## 2022-02-21 RX ADMIN — PRAVASTATIN SODIUM 40 MG: 40 TABLET ORAL at 21:26

## 2022-02-21 RX ADMIN — LIDOCAINE HYDROCHLORIDE 9 ML: 10 INJECTION, SOLUTION INFILTRATION; PERINEURAL at 15:04

## 2022-02-21 RX ADMIN — DIGOXIN 125 MCG: 125 TABLET ORAL at 13:55

## 2022-02-21 RX ADMIN — AMIODARONE HYDROCHLORIDE 200 MG: 200 TABLET ORAL at 09:29

## 2022-02-21 RX ADMIN — SODIUM CHLORIDE, PRESERVATIVE FREE 10 ML: 5 INJECTION INTRAVENOUS at 21:26

## 2022-02-21 RX ADMIN — Medication 500 MG: at 09:29

## 2022-02-21 RX ADMIN — FUROSEMIDE 40 MG: 10 INJECTION, SOLUTION INTRAMUSCULAR; INTRAVENOUS at 18:42

## 2022-02-21 RX ADMIN — FUROSEMIDE 80 MG: 10 INJECTION, SOLUTION INTRAMUSCULAR; INTRAVENOUS at 10:42

## 2022-02-22 ENCOUNTER — APPOINTMENT (OUTPATIENT)
Dept: ULTRASOUND IMAGING | Facility: HOSPITAL | Age: 82
End: 2022-02-22

## 2022-02-22 ENCOUNTER — APPOINTMENT (OUTPATIENT)
Dept: GENERAL RADIOLOGY | Facility: HOSPITAL | Age: 82
End: 2022-02-22

## 2022-02-22 LAB
ANION GAP SERPL CALCULATED.3IONS-SCNC: 13.9 MMOL/L (ref 5–15)
BASOPHILS # BLD AUTO: 0.11 10*3/MM3 (ref 0–0.2)
BASOPHILS NFR BLD AUTO: 2.1 % (ref 0–1.5)
BUN SERPL-MCNC: 19 MG/DL (ref 8–23)
BUN/CREAT SERPL: 21.6 (ref 7–25)
CALCIUM SPEC-SCNC: 9.5 MG/DL (ref 8.6–10.5)
CHLORIDE SERPL-SCNC: 88 MMOL/L (ref 98–107)
CO2 SERPL-SCNC: 31.1 MMOL/L (ref 22–29)
CREAT SERPL-MCNC: 0.88 MG/DL (ref 0.57–1)
DEPRECATED RDW RBC AUTO: 39.9 FL (ref 37–54)
EOSINOPHIL # BLD AUTO: 0.28 10*3/MM3 (ref 0–0.4)
EOSINOPHIL NFR BLD AUTO: 5.2 % (ref 0.3–6.2)
ERYTHROCYTE [DISTWIDTH] IN BLOOD BY AUTOMATED COUNT: 13 % (ref 12.3–15.4)
GFR SERPL CREATININE-BSD FRML MDRD: 62 ML/MIN/1.73
GLUCOSE SERPL-MCNC: 103 MG/DL (ref 65–99)
HCT VFR BLD AUTO: 37.6 % (ref 34–46.6)
HGB BLD-MCNC: 12.6 G/DL (ref 12–15.9)
IMM GRANULOCYTES # BLD AUTO: 0.01 10*3/MM3 (ref 0–0.05)
IMM GRANULOCYTES NFR BLD AUTO: 0.2 % (ref 0–0.5)
LYMPHOCYTES # BLD AUTO: 1.46 10*3/MM3 (ref 0.7–3.1)
LYMPHOCYTES NFR BLD AUTO: 27.3 % (ref 19.6–45.3)
MAGNESIUM SERPL-MCNC: 1.9 MG/DL (ref 1.6–2.4)
MCH RBC QN AUTO: 28.6 PG (ref 26.6–33)
MCHC RBC AUTO-ENTMCNC: 33.5 G/DL (ref 31.5–35.7)
MCV RBC AUTO: 85.3 FL (ref 79–97)
MONOCYTES # BLD AUTO: 0.57 10*3/MM3 (ref 0.1–0.9)
MONOCYTES NFR BLD AUTO: 10.7 % (ref 5–12)
NEUTROPHILS NFR BLD AUTO: 2.91 10*3/MM3 (ref 1.7–7)
NEUTROPHILS NFR BLD AUTO: 54.5 % (ref 42.7–76)
NRBC BLD AUTO-RTO: 0 /100 WBC (ref 0–0.2)
PLATELET # BLD AUTO: 356 10*3/MM3 (ref 140–450)
PMV BLD AUTO: 8.6 FL (ref 6–12)
POTASSIUM SERPL-SCNC: 3.9 MMOL/L (ref 3.5–5.2)
RBC # BLD AUTO: 4.41 10*6/MM3 (ref 3.77–5.28)
SARS-COV-2 RNA PNL SPEC NAA+PROBE: NOT DETECTED
SODIUM SERPL-SCNC: 133 MMOL/L (ref 136–145)
WBC NRBC COR # BLD: 5.34 10*3/MM3 (ref 3.4–10.8)

## 2022-02-22 PROCEDURE — 0 LIDOCAINE 1 % SOLUTION: Performed by: RADIOLOGY

## 2022-02-22 PROCEDURE — 85025 COMPLETE CBC W/AUTO DIFF WBC: CPT | Performed by: INTERNAL MEDICINE

## 2022-02-22 PROCEDURE — 25010000002 FUROSEMIDE PER 20 MG: Performed by: NURSE PRACTITIONER

## 2022-02-22 PROCEDURE — 97530 THERAPEUTIC ACTIVITIES: CPT

## 2022-02-22 PROCEDURE — 99232 SBSQ HOSP IP/OBS MODERATE 35: CPT | Performed by: NURSE PRACTITIONER

## 2022-02-22 PROCEDURE — 83735 ASSAY OF MAGNESIUM: CPT | Performed by: STUDENT IN AN ORGANIZED HEALTH CARE EDUCATION/TRAINING PROGRAM

## 2022-02-22 PROCEDURE — 71046 X-RAY EXAM CHEST 2 VIEWS: CPT

## 2022-02-22 PROCEDURE — 76942 ECHO GUIDE FOR BIOPSY: CPT

## 2022-02-22 PROCEDURE — 80048 BASIC METABOLIC PNL TOTAL CA: CPT | Performed by: STUDENT IN AN ORGANIZED HEALTH CARE EDUCATION/TRAINING PROGRAM

## 2022-02-22 PROCEDURE — 87635 SARS-COV-2 COVID-19 AMP PRB: CPT | Performed by: INTERNAL MEDICINE

## 2022-02-22 PROCEDURE — 97164 PT RE-EVAL EST PLAN CARE: CPT

## 2022-02-22 RX ORDER — FUROSEMIDE 10 MG/ML
40 INJECTION INTRAMUSCULAR; INTRAVENOUS
Status: COMPLETED | OUTPATIENT
Start: 2022-02-22 | End: 2022-02-23

## 2022-02-22 RX ORDER — LIDOCAINE HYDROCHLORIDE 10 MG/ML
10 INJECTION, SOLUTION INFILTRATION; PERINEURAL ONCE
Status: COMPLETED | OUTPATIENT
Start: 2022-02-22 | End: 2022-02-22

## 2022-02-22 RX ADMIN — CETIRIZINE HYDROCHLORIDE 5 MG: 10 TABLET ORAL at 09:51

## 2022-02-22 RX ADMIN — Medication 500 MG: at 09:51

## 2022-02-22 RX ADMIN — SODIUM CHLORIDE, PRESERVATIVE FREE 10 ML: 5 INJECTION INTRAVENOUS at 21:17

## 2022-02-22 RX ADMIN — APIXABAN 5 MG: 5 TABLET, FILM COATED ORAL at 21:16

## 2022-02-22 RX ADMIN — DIGOXIN 125 MCG: 125 TABLET ORAL at 12:23

## 2022-02-22 RX ADMIN — AMIODARONE HYDROCHLORIDE 200 MG: 200 TABLET ORAL at 09:51

## 2022-02-22 RX ADMIN — LIDOCAINE HYDROCHLORIDE 5 ML: 10 INJECTION, SOLUTION INFILTRATION; PERINEURAL at 16:25

## 2022-02-22 RX ADMIN — SPIRONOLACTONE 25 MG: 25 TABLET ORAL at 09:52

## 2022-02-22 RX ADMIN — PRAVASTATIN SODIUM 40 MG: 40 TABLET ORAL at 21:17

## 2022-02-22 RX ADMIN — CARVEDILOL 3.12 MG: 3.12 TABLET, FILM COATED ORAL at 09:51

## 2022-02-22 RX ADMIN — FUROSEMIDE 40 MG: 10 INJECTION, SOLUTION INTRAMUSCULAR; INTRAVENOUS at 18:40

## 2022-02-22 RX ADMIN — SODIUM CHLORIDE, PRESERVATIVE FREE 10 ML: 5 INJECTION INTRAVENOUS at 09:52

## 2022-02-22 RX ADMIN — LOSARTAN POTASSIUM 100 MG: 100 TABLET, FILM COATED ORAL at 09:51

## 2022-02-22 RX ADMIN — CARVEDILOL 3.12 MG: 3.12 TABLET, FILM COATED ORAL at 18:40

## 2022-02-22 RX ADMIN — AMIODARONE HYDROCHLORIDE 200 MG: 200 TABLET ORAL at 21:16

## 2022-02-22 RX ADMIN — MULTIPLE VITAMINS W/ MINERALS TAB 1 TABLET: TAB at 09:51

## 2022-02-23 ENCOUNTER — HOME HEALTH ADMISSION (OUTPATIENT)
Dept: HOME HEALTH SERVICES | Facility: HOME HEALTHCARE | Age: 82
End: 2022-02-23

## 2022-02-23 ENCOUNTER — TELEPHONE (OUTPATIENT)
Dept: FAMILY MEDICINE CLINIC | Facility: CLINIC | Age: 82
End: 2022-02-23

## 2022-02-23 ENCOUNTER — READMISSION MANAGEMENT (OUTPATIENT)
Dept: CALL CENTER | Facility: HOSPITAL | Age: 82
End: 2022-02-23

## 2022-02-23 VITALS
RESPIRATION RATE: 18 BRPM | HEART RATE: 74 BPM | BODY MASS INDEX: 29.63 KG/M2 | OXYGEN SATURATION: 95 % | DIASTOLIC BLOOD PRESSURE: 64 MMHG | HEIGHT: 62 IN | TEMPERATURE: 97.4 F | WEIGHT: 161 LBS | SYSTOLIC BLOOD PRESSURE: 111 MMHG

## 2022-02-23 LAB
ANION GAP SERPL CALCULATED.3IONS-SCNC: 10 MMOL/L (ref 5–15)
BASOPHILS # BLD AUTO: 0.07 10*3/MM3 (ref 0–0.2)
BASOPHILS NFR BLD AUTO: 1.2 % (ref 0–1.5)
BUN SERPL-MCNC: 24 MG/DL (ref 8–23)
BUN/CREAT SERPL: 27.3 (ref 7–25)
CALCIUM SPEC-SCNC: 8.9 MG/DL (ref 8.6–10.5)
CHLORIDE SERPL-SCNC: 86 MMOL/L (ref 98–107)
CO2 SERPL-SCNC: 33 MMOL/L (ref 22–29)
CREAT SERPL-MCNC: 0.88 MG/DL (ref 0.57–1)
DEPRECATED RDW RBC AUTO: 38.7 FL (ref 37–54)
EOSINOPHIL # BLD AUTO: 0.22 10*3/MM3 (ref 0–0.4)
EOSINOPHIL NFR BLD AUTO: 3.9 % (ref 0.3–6.2)
ERYTHROCYTE [DISTWIDTH] IN BLOOD BY AUTOMATED COUNT: 12.8 % (ref 12.3–15.4)
GFR SERPL CREATININE-BSD FRML MDRD: 62 ML/MIN/1.73
GLUCOSE SERPL-MCNC: 89 MG/DL (ref 65–99)
HCT VFR BLD AUTO: 35.4 % (ref 34–46.6)
HGB BLD-MCNC: 12 G/DL (ref 12–15.9)
IMM GRANULOCYTES # BLD AUTO: 0.02 10*3/MM3 (ref 0–0.05)
IMM GRANULOCYTES NFR BLD AUTO: 0.4 % (ref 0–0.5)
LYMPHOCYTES # BLD AUTO: 1.16 10*3/MM3 (ref 0.7–3.1)
LYMPHOCYTES NFR BLD AUTO: 20.7 % (ref 19.6–45.3)
MAGNESIUM SERPL-MCNC: 1.7 MG/DL (ref 1.6–2.4)
MCH RBC QN AUTO: 28.2 PG (ref 26.6–33)
MCHC RBC AUTO-ENTMCNC: 33.9 G/DL (ref 31.5–35.7)
MCV RBC AUTO: 83.3 FL (ref 79–97)
MONOCYTES # BLD AUTO: 0.69 10*3/MM3 (ref 0.1–0.9)
MONOCYTES NFR BLD AUTO: 12.3 % (ref 5–12)
NEUTROPHILS NFR BLD AUTO: 3.45 10*3/MM3 (ref 1.7–7)
NEUTROPHILS NFR BLD AUTO: 61.5 % (ref 42.7–76)
NRBC BLD AUTO-RTO: 0 /100 WBC (ref 0–0.2)
PLATELET # BLD AUTO: 311 10*3/MM3 (ref 140–450)
PMV BLD AUTO: 8.9 FL (ref 6–12)
POTASSIUM SERPL-SCNC: 3.6 MMOL/L (ref 3.5–5.2)
RBC # BLD AUTO: 4.25 10*6/MM3 (ref 3.77–5.28)
REF LAB TEST METHOD: NORMAL
SODIUM SERPL-SCNC: 129 MMOL/L (ref 136–145)
WBC NRBC COR # BLD: 5.61 10*3/MM3 (ref 3.4–10.8)

## 2022-02-23 PROCEDURE — G0008 ADMIN INFLUENZA VIRUS VAC: HCPCS | Performed by: INTERNAL MEDICINE

## 2022-02-23 PROCEDURE — 85025 COMPLETE CBC W/AUTO DIFF WBC: CPT | Performed by: INTERNAL MEDICINE

## 2022-02-23 PROCEDURE — 25010000002 FUROSEMIDE PER 20 MG: Performed by: NURSE PRACTITIONER

## 2022-02-23 PROCEDURE — 97110 THERAPEUTIC EXERCISES: CPT | Performed by: OCCUPATIONAL THERAPIST

## 2022-02-23 PROCEDURE — 90686 IIV4 VACC NO PRSV 0.5 ML IM: CPT | Performed by: INTERNAL MEDICINE

## 2022-02-23 PROCEDURE — 25010000002 INFLUENZA VAC SPLIT QUAD 0.5 ML SUSPENSION PREFILLED SYRINGE: Performed by: INTERNAL MEDICINE

## 2022-02-23 PROCEDURE — 80048 BASIC METABOLIC PNL TOTAL CA: CPT | Performed by: STUDENT IN AN ORGANIZED HEALTH CARE EDUCATION/TRAINING PROGRAM

## 2022-02-23 PROCEDURE — 0W9B3ZZ DRAINAGE OF LEFT PLEURAL CAVITY, PERCUTANEOUS APPROACH: ICD-10-PCS | Performed by: RADIOLOGY

## 2022-02-23 PROCEDURE — 97535 SELF CARE MNGMENT TRAINING: CPT | Performed by: OCCUPATIONAL THERAPIST

## 2022-02-23 PROCEDURE — 97530 THERAPEUTIC ACTIVITIES: CPT

## 2022-02-23 PROCEDURE — 83735 ASSAY OF MAGNESIUM: CPT | Performed by: STUDENT IN AN ORGANIZED HEALTH CARE EDUCATION/TRAINING PROGRAM

## 2022-02-23 RX ORDER — AMIODARONE HYDROCHLORIDE 200 MG/1
200 TABLET ORAL 2 TIMES DAILY
Qty: 60 TABLET | Refills: 0 | Status: SHIPPED | OUTPATIENT
Start: 2022-02-23 | End: 2022-03-09

## 2022-02-23 RX ORDER — DIGOXIN 125 MCG
125 TABLET ORAL
Qty: 30 TABLET | Refills: 0 | Status: SHIPPED | OUTPATIENT
Start: 2022-02-23 | End: 2022-03-22 | Stop reason: SDUPTHER

## 2022-02-23 RX ORDER — POTASSIUM CHLORIDE 750 MG/1
10 TABLET, EXTENDED RELEASE ORAL DAILY PRN
Qty: 90 TABLET | Refills: 2 | Status: SHIPPED | OUTPATIENT
Start: 2022-02-23 | End: 2022-03-03

## 2022-02-23 RX ORDER — SPIRONOLACTONE 25 MG/1
25 TABLET ORAL DAILY
Qty: 30 TABLET | Refills: 0 | Status: SHIPPED | OUTPATIENT
Start: 2022-02-24 | End: 2022-03-22 | Stop reason: SDUPTHER

## 2022-02-23 RX ORDER — CARVEDILOL 3.12 MG/1
3.12 TABLET ORAL 2 TIMES DAILY WITH MEALS
Qty: 60 TABLET | Refills: 0 | Status: SHIPPED | OUTPATIENT
Start: 2022-02-23 | End: 2022-03-22 | Stop reason: SDUPTHER

## 2022-02-23 RX ADMIN — DIGOXIN 125 MCG: 125 TABLET ORAL at 11:48

## 2022-02-23 RX ADMIN — CETIRIZINE HYDROCHLORIDE 5 MG: 10 TABLET ORAL at 08:54

## 2022-02-23 RX ADMIN — INFLUENZA VIRUS VACCINE 0.5 ML: 15; 15; 15; 15 SUSPENSION INTRAMUSCULAR at 15:58

## 2022-02-23 RX ADMIN — CARVEDILOL 3.12 MG: 3.12 TABLET, FILM COATED ORAL at 08:54

## 2022-02-23 RX ADMIN — LOSARTAN POTASSIUM 100 MG: 100 TABLET, FILM COATED ORAL at 08:54

## 2022-02-23 RX ADMIN — APIXABAN 5 MG: 5 TABLET, FILM COATED ORAL at 08:54

## 2022-02-23 RX ADMIN — SODIUM CHLORIDE, PRESERVATIVE FREE 10 ML: 5 INJECTION INTRAVENOUS at 08:55

## 2022-02-23 RX ADMIN — AMIODARONE HYDROCHLORIDE 200 MG: 200 TABLET ORAL at 08:54

## 2022-02-23 RX ADMIN — MULTIPLE VITAMINS W/ MINERALS TAB 1 TABLET: TAB at 08:54

## 2022-02-23 RX ADMIN — SPIRONOLACTONE 25 MG: 25 TABLET ORAL at 08:54

## 2022-02-23 RX ADMIN — Medication 500 MG: at 08:54

## 2022-02-23 RX ADMIN — FUROSEMIDE 40 MG: 10 INJECTION, SOLUTION INTRAMUSCULAR; INTRAVENOUS at 08:54

## 2022-02-23 NOTE — OUTREACH NOTE
Prep Survey      Responses   [facility] patient discharged from? [place]   Is LACE score < 7 ? No   Emergency Room discharge w/ pulse ox? No   Eligibility [facility]   Date of Admission 02/17/22   Date of Discharge 02/23/22   Discharge Disposition Home or Self Care   Discharge diagnosis Acute on chronic diastolic heart failure  along with large bilateral pleural effusion with hypoxia,     New onset atrial fibrillation    Does the patient have one of the following disease processes/diagnoses(primary or secondary)? CHF   Does the patient have Home health ordered? Yes   What is the Home health agency?  [facility]   Is there a DME ordered? No   Prep survey completed? Yes          Ashley Hines RN

## 2022-02-24 ENCOUNTER — HOME CARE VISIT (OUTPATIENT)
Dept: HOME HEALTH SERVICES | Facility: HOME HEALTHCARE | Age: 82
End: 2022-02-24

## 2022-02-24 ENCOUNTER — TRANSITIONAL CARE MANAGEMENT TELEPHONE ENCOUNTER (OUTPATIENT)
Dept: CALL CENTER | Facility: HOSPITAL | Age: 82
End: 2022-02-24

## 2022-02-24 LAB
BACTERIA FLD CULT: NORMAL
GRAM STN SPEC: NORMAL
GRAM STN SPEC: NORMAL

## 2022-02-24 PROCEDURE — G0299 HHS/HOSPICE OF RN EA 15 MIN: HCPCS

## 2022-02-24 NOTE — OUTREACH NOTE
Call Center TCM Note      Responses   [facility] patient discharged from? [place]   Does the patient have one of the following disease processes/diagnoses(primary or secondary)? CHF   TCM attempt successful? Yes   Discharge diagnosis Acute on chronic diastolic heart failure  along with large bilateral pleural effusion with hypoxia,     New onset atrial fibrillation    Meds reviewed with patient/caregiver? Yes   Is the patient having any side effects they believe may be caused by any medication additions or changes? No   Does the patient have all medications ordered at discharge? Yes   Is the patient taking all medications as directed (includes completed medication regime)? Yes   Does the patient have a primary care provider?  Yes   Does the patient have an appointment with their PCP within 7 days of discharge? Yes   Comments regarding PCP TCM FWP with PCP ofc is 03/02/2022. Pt will be calling to sched fwp with Cardio MD as well.    Has the patient kept scheduled appointments due by today? N/A   What is the Home health agency?  [facility]   Home health comments [facility] starts today    Pulse Ox monitoring None   Psychosocial issues? No   Did the patient receive a copy of their discharge instructions? Yes   Nursing interventions Reviewed instructions with patient   What is the patient's perception of their health status since discharge? Improving   Nursing interventions Nurse provided patient education   Is the patient weighing daily? No   Does the patient have scales? Yes   Daily weight interventions Education provided on importance of daily weight   Is the patient able to teach back Heart Failure diet management? Yes   Is the patient able to teach back signs and symptoms of worsening condition? (i.e. weight gain, shortness of air, etc.) Yes   If the patient is a current smoker, are they able to teach back resources for cessation? Not a smoker   Is the patient/caregiver able to teach back the hierarchy  of who to call/visit for symptoms/problems? PCP, Specialist, Home health nurse, Urgent Care, ED, 911 Yes   TCM call completed? Yes   Wrap up additional comments Pt is weak, but slept well last night. SOB is better, all new meds in place. Pt son helped get meds organized for today. HH coming at 1pm. TCM FWP with PCP ofc is 03/02/2022. Pt will be calling to sched fwp with Cardio MD as well.            Hanane Hastings MA    2/24/2022, 12:29 EST

## 2022-02-25 ENCOUNTER — HOME CARE VISIT (OUTPATIENT)
Dept: HOME HEALTH SERVICES | Facility: HOME HEALTHCARE | Age: 82
End: 2022-02-25

## 2022-02-25 ENCOUNTER — TELEPHONE (OUTPATIENT)
Dept: FAMILY MEDICINE CLINIC | Facility: CLINIC | Age: 82
End: 2022-02-25

## 2022-02-25 VITALS
SYSTOLIC BLOOD PRESSURE: 124 MMHG | DIASTOLIC BLOOD PRESSURE: 72 MMHG | OXYGEN SATURATION: 96 % | TEMPERATURE: 97.5 F | HEART RATE: 70 BPM

## 2022-02-25 VITALS
WEIGHT: 160 LBS | HEIGHT: 63 IN | DIASTOLIC BLOOD PRESSURE: 72 MMHG | TEMPERATURE: 97.9 F | HEART RATE: 77 BPM | SYSTOLIC BLOOD PRESSURE: 122 MMHG | BODY MASS INDEX: 28.35 KG/M2 | OXYGEN SATURATION: 95 % | RESPIRATION RATE: 18 BRPM

## 2022-02-25 PROCEDURE — G0151 HHCP-SERV OF PT,EA 15 MIN: HCPCS

## 2022-02-25 NOTE — HOME HEALTH
Plan for next visit:  Patient is 80y/o female that is a&ox4. Lives by self with children nearby for assistance. Patient is up and walking without a assistive device. Reports being too advanced for SNF. SN and PT have been ordered.  Patient hospitalized for CHF and AFIB. Please see huddle note for further details.  Bring CHF booklett and begin teaching. Patient does not have a weight scale in home. SN may want to bring own if available.  Needs education on medications.  New and changed medications - New meds - amiodarone, digoxin, eliquis, spironolactone; changed - decrease in coreg; stopped - aspirin and mobic  Has follow up appt at PCP office already scheduled. F/U that she was able to schedule f/u with Dr. Del Cid.

## 2022-02-25 NOTE — HOME HEALTH
REASON FOR REFERRAL:  decreased ability to ambulate in and out of the home following recent hospital stay for acute on chronic diastolic heart failure, hypoxia, bilateral pleural effusion resulting in functional decline and LE weakness.    MEDICAL HISTORY: HTN, CHF, A-fib, Hyperlipidemia, Adrenal nodule.    SKILLED PHYSICAL THERAPY IS MEDICALLY NECESSARY FOR: Instruction/education in lower extremity strengthening HEP, bed mobility/transfers training, gait training, balance training, fall prevention, and activity tolerance training to enable patient to safely exit home for medical appointments.

## 2022-02-28 ENCOUNTER — HOME CARE VISIT (OUTPATIENT)
Dept: HOME HEALTH SERVICES | Facility: HOME HEALTHCARE | Age: 82
End: 2022-02-28

## 2022-02-28 VITALS
HEART RATE: 68 BPM | DIASTOLIC BLOOD PRESSURE: 64 MMHG | SYSTOLIC BLOOD PRESSURE: 110 MMHG | TEMPERATURE: 98.2 F | OXYGEN SATURATION: 97 %

## 2022-02-28 DIAGNOSIS — C85.90 LYMPHOMA, UNSPECIFIED BODY REGION, UNSPECIFIED LYMPHOMA TYPE: Primary | ICD-10-CM

## 2022-02-28 DIAGNOSIS — R89.6 ABNORMAL CYTOLOGY: ICD-10-CM

## 2022-02-28 DIAGNOSIS — J90 PLEURAL EFFUSION: ICD-10-CM

## 2022-02-28 PROCEDURE — G0300 HHS/HOSPICE OF LPN EA 15 MIN: HCPCS

## 2022-02-28 PROCEDURE — G0151 HHCP-SERV OF PT,EA 15 MIN: HCPCS

## 2022-02-28 NOTE — HOME HEALTH
Plan for next visit:  -transfer training  -gait training with rolling walker  -education on HEP with progressions as appropriate  -standing balance exercises

## 2022-02-28 NOTE — PROGRESS NOTES
Her pathology from her recent pleural effusions came back as having positive cytology c/w lymphoma!!!      I called and s/w her -- I put in a referral to hem/onc.  She has an appointment with Ms Witt, Dr Pimentel's APRN in two days.     For my office scheduling -- can you please call her to arrange one week f/u with Dr Del Cid or her APRN?    thx all  JDK

## 2022-03-01 VITALS — RESPIRATION RATE: 18 BRPM | SYSTOLIC BLOOD PRESSURE: 118 MMHG | DIASTOLIC BLOOD PRESSURE: 70 MMHG | TEMPERATURE: 97.1 F

## 2022-03-01 NOTE — HOME HEALTH
The patient said sheis drinking more water and when she does not take in enough calories she will drink a ensure. The patien's lower ext are plus 3 pitting edema, t/i patient to elevated her legs above the heart. During our visit the patient received a phone call from the cardio office about the samples taken from the thoracentesis.  The nurse told the patient that her test was abnormal and that she may have blood cancer, they want to do more testing to confirm the dx.

## 2022-03-02 ENCOUNTER — OFFICE VISIT (OUTPATIENT)
Dept: FAMILY MEDICINE CLINIC | Facility: CLINIC | Age: 82
End: 2022-03-02

## 2022-03-02 ENCOUNTER — READMISSION MANAGEMENT (OUTPATIENT)
Dept: CALL CENTER | Facility: HOSPITAL | Age: 82
End: 2022-03-02

## 2022-03-02 VITALS
BODY MASS INDEX: 30.44 KG/M2 | HEART RATE: 66 BPM | WEIGHT: 165.4 LBS | DIASTOLIC BLOOD PRESSURE: 68 MMHG | TEMPERATURE: 98 F | SYSTOLIC BLOOD PRESSURE: 138 MMHG | OXYGEN SATURATION: 96 % | HEIGHT: 62 IN

## 2022-03-02 DIAGNOSIS — I50.31 ACUTE DIASTOLIC CHF (CONGESTIVE HEART FAILURE): ICD-10-CM

## 2022-03-02 DIAGNOSIS — Z09 HOSPITAL DISCHARGE FOLLOW-UP: Primary | ICD-10-CM

## 2022-03-02 DIAGNOSIS — J98.9 RESPIRATORY DISORDER, UNSPECIFIED: ICD-10-CM

## 2022-03-02 DIAGNOSIS — E78.2 MIXED HYPERLIPIDEMIA: ICD-10-CM

## 2022-03-02 DIAGNOSIS — I10 PRIMARY HYPERTENSION: ICD-10-CM

## 2022-03-02 PROBLEM — R60.0 EDEMA OF LOWER EXTREMITY: Status: ACTIVE | Noted: 2022-03-02

## 2022-03-02 LAB
ANION GAP SERPL CALCULATED.3IONS-SCNC: 13 MMOL/L (ref 5–15)
BUN SERPL-MCNC: 13 MG/DL (ref 8–23)
BUN/CREAT SERPL: 13.4 (ref 7–25)
CALCIUM SPEC-SCNC: 10 MG/DL (ref 8.6–10.5)
CHLORIDE SERPL-SCNC: 87 MMOL/L (ref 98–107)
CO2 SERPL-SCNC: 29 MMOL/L (ref 22–29)
CREAT SERPL-MCNC: 0.97 MG/DL (ref 0.57–1)
EGFRCR SERPLBLD CKD-EPI 2021: 59.2 ML/MIN/1.73
ERYTHROCYTE [DISTWIDTH] IN BLOOD BY AUTOMATED COUNT: 13.7 % (ref 12.3–15.4)
GLUCOSE SERPL-MCNC: 100 MG/DL (ref 65–99)
HCT VFR BLD AUTO: 36.3 % (ref 34–46.6)
HGB BLD-MCNC: 11.9 G/DL (ref 12–15.9)
LYMPHOCYTES # BLD AUTO: 1.2 10*3/MM3 (ref 0.7–3.1)
LYMPHOCYTES NFR BLD AUTO: 17.8 % (ref 19.6–45.3)
MCH RBC QN AUTO: 28 PG (ref 26.6–33)
MCHC RBC AUTO-ENTMCNC: 32.9 G/DL (ref 31.5–35.7)
MCV RBC AUTO: 85.2 FL (ref 79–97)
MONOCYTES # BLD AUTO: 0.5 10*3/MM3 (ref 0.1–0.9)
MONOCYTES NFR BLD AUTO: 7.1 % (ref 5–12)
NEUTROPHILS NFR BLD AUTO: 5.1 10*3/MM3 (ref 1.7–7)
NEUTROPHILS NFR BLD AUTO: 75.1 % (ref 42.7–76)
PLATELET # BLD AUTO: 417 10*3/MM3 (ref 140–450)
PMV BLD AUTO: 7.4 FL (ref 6–12)
POTASSIUM SERPL-SCNC: 5.3 MMOL/L (ref 3.5–5.2)
RBC # BLD AUTO: 4.26 10*6/MM3 (ref 3.77–5.28)
SODIUM SERPL-SCNC: 129 MMOL/L (ref 136–145)
WBC NRBC COR # BLD: 6.8 10*3/MM3 (ref 3.4–10.8)

## 2022-03-02 PROCEDURE — 85025 COMPLETE CBC W/AUTO DIFF WBC: CPT | Performed by: NURSE PRACTITIONER

## 2022-03-02 PROCEDURE — 80048 BASIC METABOLIC PNL TOTAL CA: CPT | Performed by: NURSE PRACTITIONER

## 2022-03-02 PROCEDURE — 36415 COLL VENOUS BLD VENIPUNCTURE: CPT | Performed by: NURSE PRACTITIONER

## 2022-03-02 PROCEDURE — 1111F DSCHRG MED/CURRENT MED MERGE: CPT | Performed by: NURSE PRACTITIONER

## 2022-03-02 PROCEDURE — 99495 TRANSJ CARE MGMT MOD F2F 14D: CPT | Performed by: NURSE PRACTITIONER

## 2022-03-02 NOTE — OUTREACH NOTE
CHF Week 2 Survey      Responses   [facility] patient discharged from? [place]   Does the patient have one of the following disease processes/diagnoses(primary or secondary)? CHF   Week 2 attempt successful? No   Unsuccessful attempts Attempt 1          Cara Bowser RN

## 2022-03-02 NOTE — PROGRESS NOTES
Transitional Care Follow Up Visit  Subjective     Michelle Car is a 81 y.o. female who presents for a transitional care management visit.    Within 48 business hours after discharge our office contacted her via telephone to coordinate her care and needs.      I reviewed and discussed the details of that call along with the discharge summary, hospital problems, inpatient lab results, inpatient diagnostic studies, and consultation reports with Michelle.     Current outpatient and discharge medications have been reconciled for the patient.  Reviewed by: MAGGI Ruiz      Date of TCM Phone Call 2/23/2022   [facility]   Date of Admission 2/17/2022   Date of Discharge 2/23/2022   Discharge Disposition Home or Self Care     Risk for Readmission (LACE) Score: 10 (2/23/2022  6:01 AM)      History of Present Illness   Course During Hospital Stay:  81 yr old female, pt of Dr. Pimentel, new to me, presenting for hospital follow-up visit. She was admitted to [facility] from 2/17/22 to 2/23/22 for Acute on chronic diastolic heart failure, she had stopped taking Lasix greater than 6 months ago.  During hospital course her chest x-ray was positive for large bilateral pleural effusion and underwent thoracentesis, had elevated D-dimer, chest CT was negative for pulmonary emboli.  She was diagnosed with new onset A-fib, discharged home on amiodarone 200 mg, digoxin 125 MCG, Eliquis 5 mg and spironolactone 25 mg.  She was to have follow-up with cardiology for CHF but effusions were related to lymphoma, therefore Dr. Ramsey placed referral for oncology.  With hyperlipidemia, takes pravastatin 40 mg.  With hypertension, takes losartan 100 mg and Coreg 3.125 mg, denies SOA, CP, HA, dizziness or LE edema.  She continues with home health once weekly, patient educated on daily weighing of self along with signs and symptoms of fluid overload, patient verbalizes understanding.         The following  portions of the patient's history were reviewed and updated as appropriate: current medications, past family history, past medical history, past social history, past surgical history and problem list.    Review of Systems    Objective   Physical Exam  HENT:      Head: Normocephalic.      Nose: Nose normal.   Eyes:      Pupils: Pupils are equal, round, and reactive to light.   Cardiovascular:      Rate and Rhythm: Normal rate and regular rhythm.      Pulses: Normal pulses.      Heart sounds: Normal heart sounds.   Pulmonary:      Effort: Pulmonary effort is normal.      Breath sounds: Normal breath sounds.   Abdominal:      General: Bowel sounds are normal.   Skin:     General: Skin is warm.   Neurological:      General: No focal deficit present.      Mental Status: She is alert and oriented to person, place, and time.         Assessment/Plan   Diagnoses and all orders for this visit:    1. Hospital discharge follow-up (Primary)  -     Basic metabolic panel  -     CBC & Differential    2. Acute diastolic CHF (congestive heart failure) (HCC)  -     Basic metabolic panel    3. Respiratory disorder, unspecified   -     Basic metabolic panel    4. Primary hypertension  -     Basic metabolic panel    5. Mixed hyperlipidemia  -     Basic metabolic panel

## 2022-03-03 ENCOUNTER — HOME CARE VISIT (OUTPATIENT)
Dept: HOME HEALTH SERVICES | Facility: HOME HEALTHCARE | Age: 82
End: 2022-03-03

## 2022-03-03 VITALS
HEART RATE: 73 BPM | DIASTOLIC BLOOD PRESSURE: 68 MMHG | TEMPERATURE: 97.4 F | SYSTOLIC BLOOD PRESSURE: 120 MMHG | OXYGEN SATURATION: 98 %

## 2022-03-03 DIAGNOSIS — E87.1 HYPONATREMIA: ICD-10-CM

## 2022-03-03 DIAGNOSIS — E27.8 ADRENAL NODULE: Primary | ICD-10-CM

## 2022-03-03 DIAGNOSIS — E87.5 HYPERKALEMIA: ICD-10-CM

## 2022-03-03 LAB
CYTO UR: NORMAL
DX PRELIMINARY: NORMAL
LAB AP CASE REPORT: NORMAL
LAB AP DIAGNOSIS COMMENT: NORMAL
LAB AP SPECIAL STAINS: NORMAL
Lab: NORMAL
PATH REPORT.ADDENDUM SPEC: NORMAL
PATH REPORT.FINAL DX SPEC: NORMAL
PATH REPORT.GROSS SPEC: NORMAL

## 2022-03-03 PROCEDURE — G0151 HHCP-SERV OF PT,EA 15 MIN: HCPCS

## 2022-03-04 ENCOUNTER — READMISSION MANAGEMENT (OUTPATIENT)
Dept: CALL CENTER | Facility: HOSPITAL | Age: 82
End: 2022-03-04

## 2022-03-04 ENCOUNTER — HOME CARE VISIT (OUTPATIENT)
Dept: HOME HEALTH SERVICES | Facility: HOME HEALTHCARE | Age: 82
End: 2022-03-04

## 2022-03-04 PROCEDURE — G0300 HHS/HOSPICE OF LPN EA 15 MIN: HCPCS

## 2022-03-04 NOTE — OUTREACH NOTE
CHF Week 2 Survey      Responses   [facility] patient discharged from? [place]   Does the patient have one of the following disease processes/diagnoses(primary or secondary)? CHF   Week 2 attempt successful? Yes   Call start time 1216   Unsuccessful attempts Attempt 1   Call end time 1221   Discharge diagnosis Acute on chronic diastolic heart failure  along with large bilateral pleural effusion with hypoxia,     New onset atrial fibrillation    Meds reviewed with patient/caregiver? Yes   Is the patient having any side effects they believe may be caused by any medication additions or changes? No   Does the patient have all medications ordered at discharge? Yes   Is the patient taking all medications as directed (includes completed medication regime)? Yes   Does the patient have a primary care provider?  Yes   Does the patient have an appointment with their PCP within 7 days of discharge? Yes   Has the patient kept scheduled appointments due by today? Yes   What is the Home health agency?  [facility]   Has home health visited the patient within 72 hours of discharge? Yes   Pulse Ox monitoring None   Psychosocial issues? No   Did the patient receive a copy of their discharge instructions? Yes   Nursing interventions Reviewed instructions with patient   What is the patient's perception of their health status since discharge? Improving   Nursing interventions Nurse provided patient education   Is the patient weighing daily? Yes   Does the patient have scales? Yes   Daily weight interventions Education provided on importance of daily weight   Is the patient able to teach back Heart Failure diet management? Yes   Is the patient able to teach back Heart Failure Zones? Yes   Is the patient able to teach back signs and symptoms of worsening condition? (i.e. weight gain, shortness of air, etc.) Yes   If the patient is a current smoker, are they able to teach back resources for cessation? Not a smoker   Is the  patient/caregiver able to teach back the hierarchy of who to call/visit for symptoms/problems? PCP, Specialist, Home health nurse, Urgent Care, ED, 911 Yes   CHF Week 2 call completed? Yes   Wrap up additional comments Patient is tired but denies SOB or edema. No questions or concerns. She is weighing daily and keeping a log.           Mejia Torres RN

## 2022-03-07 ENCOUNTER — HOME CARE VISIT (OUTPATIENT)
Dept: HOME HEALTH SERVICES | Facility: HOME HEALTHCARE | Age: 82
End: 2022-03-07

## 2022-03-07 ENCOUNTER — APPOINTMENT (OUTPATIENT)
Dept: LAB | Facility: HOSPITAL | Age: 82
End: 2022-03-07

## 2022-03-07 ENCOUNTER — CONSULT (OUTPATIENT)
Dept: ONCOLOGY | Facility: CLINIC | Age: 82
End: 2022-03-07

## 2022-03-07 VITALS
TEMPERATURE: 97.1 F | BODY MASS INDEX: 32.12 KG/M2 | OXYGEN SATURATION: 97 % | HEIGHT: 60 IN | RESPIRATION RATE: 16 BRPM | HEART RATE: 68 BPM | WEIGHT: 163.6 LBS | SYSTOLIC BLOOD PRESSURE: 136 MMHG | DIASTOLIC BLOOD PRESSURE: 82 MMHG

## 2022-03-07 VITALS
BODY MASS INDEX: 67.1 KG/M2 | OXYGEN SATURATION: 95 % | DIASTOLIC BLOOD PRESSURE: 72 MMHG | TEMPERATURE: 97.1 F | WEIGHT: 293 LBS | HEART RATE: 73 BPM | SYSTOLIC BLOOD PRESSURE: 120 MMHG | RESPIRATION RATE: 18 BRPM

## 2022-03-07 DIAGNOSIS — E87.5 HYPERKALEMIA: ICD-10-CM

## 2022-03-07 DIAGNOSIS — C83.38 DIFFUSE LARGE B-CELL LYMPHOMA OF LYMPH NODES OF MULTIPLE REGIONS: Primary | ICD-10-CM

## 2022-03-07 DIAGNOSIS — J90 BILATERAL PLEURAL EFFUSION: ICD-10-CM

## 2022-03-07 LAB
ALBUMIN SERPL-MCNC: 4.1 G/DL (ref 3.5–5.2)
ALBUMIN/GLOB SERPL: 1.2 G/DL
ALP SERPL-CCNC: 100 U/L (ref 39–117)
ALT SERPL W P-5'-P-CCNC: 19 U/L (ref 1–33)
ANION GAP SERPL CALCULATED.3IONS-SCNC: 12.4 MMOL/L (ref 5–15)
AST SERPL-CCNC: 22 U/L (ref 1–32)
BASOPHILS # BLD AUTO: 0.06 10*3/MM3 (ref 0–0.2)
BASOPHILS NFR BLD AUTO: 1 % (ref 0–1.5)
BILIRUB SERPL-MCNC: 0.6 MG/DL (ref 0–1.2)
BUN SERPL-MCNC: 10 MG/DL (ref 8–23)
BUN/CREAT SERPL: 11.6 (ref 7–25)
CALCIUM SPEC-SCNC: 10 MG/DL (ref 8.6–10.5)
CHLORIDE SERPL-SCNC: 92 MMOL/L (ref 98–107)
CO2 SERPL-SCNC: 26.6 MMOL/L (ref 22–29)
CREAT SERPL-MCNC: 0.86 MG/DL (ref 0.57–1)
DEPRECATED RDW RBC AUTO: 45.1 FL (ref 37–54)
EGFRCR SERPLBLD CKD-EPI 2021: 68.4 ML/MIN/1.73
EOSINOPHIL # BLD AUTO: 0.03 10*3/MM3 (ref 0–0.4)
EOSINOPHIL NFR BLD AUTO: 0.5 % (ref 0.3–6.2)
ERYTHROCYTE [DISTWIDTH] IN BLOOD BY AUTOMATED COUNT: 14.2 % (ref 12.3–15.4)
GLOBULIN UR ELPH-MCNC: 3.4 GM/DL
GLUCOSE SERPL-MCNC: 106 MG/DL (ref 65–99)
HCT VFR BLD AUTO: 39.6 % (ref 34–46.6)
HGB BLD-MCNC: 12.7 G/DL (ref 12–15.9)
IMM GRANULOCYTES # BLD AUTO: 0.02 10*3/MM3 (ref 0–0.05)
IMM GRANULOCYTES NFR BLD AUTO: 0.3 % (ref 0–0.5)
LDH SERPL-CCNC: 239 U/L (ref 135–214)
LYMPHOCYTES # BLD AUTO: 1.27 10*3/MM3 (ref 0.7–3.1)
LYMPHOCYTES NFR BLD AUTO: 20.4 % (ref 19.6–45.3)
MCH RBC QN AUTO: 28.2 PG (ref 26.6–33)
MCHC RBC AUTO-ENTMCNC: 32.1 G/DL (ref 31.5–35.7)
MCV RBC AUTO: 87.8 FL (ref 79–97)
MONOCYTES # BLD AUTO: 0.47 10*3/MM3 (ref 0.1–0.9)
MONOCYTES NFR BLD AUTO: 7.6 % (ref 5–12)
NEUTROPHILS NFR BLD AUTO: 4.37 10*3/MM3 (ref 1.7–7)
NEUTROPHILS NFR BLD AUTO: 70.2 % (ref 42.7–76)
NRBC BLD AUTO-RTO: 0 /100 WBC (ref 0–0.2)
PLATELET # BLD AUTO: 343 10*3/MM3 (ref 140–450)
PMV BLD AUTO: 8 FL (ref 6–12)
POTASSIUM SERPL-SCNC: 4.3 MMOL/L (ref 3.5–5.2)
PROT SERPL-MCNC: 7.5 G/DL (ref 6–8.5)
RBC # BLD AUTO: 4.51 10*6/MM3 (ref 3.77–5.28)
SODIUM SERPL-SCNC: 131 MMOL/L (ref 136–145)
URATE SERPL-MCNC: 2.9 MG/DL (ref 2.4–5.7)
WBC NRBC COR # BLD: 6.22 10*3/MM3 (ref 3.4–10.8)

## 2022-03-07 PROCEDURE — 88182 CELL MARKER STUDY: CPT

## 2022-03-07 PROCEDURE — 83615 LACTATE (LD) (LDH) ENZYME: CPT | Performed by: INTERNAL MEDICINE

## 2022-03-07 PROCEDURE — 36415 COLL VENOUS BLD VENIPUNCTURE: CPT | Performed by: INTERNAL MEDICINE

## 2022-03-07 PROCEDURE — 88185 FLOWCYTOMETRY/TC ADD-ON: CPT

## 2022-03-07 PROCEDURE — 80053 COMPREHEN METABOLIC PANEL: CPT | Performed by: INTERNAL MEDICINE

## 2022-03-07 PROCEDURE — 84550 ASSAY OF BLOOD/URIC ACID: CPT | Performed by: INTERNAL MEDICINE

## 2022-03-07 PROCEDURE — 99205 OFFICE O/P NEW HI 60 MIN: CPT | Performed by: INTERNAL MEDICINE

## 2022-03-07 PROCEDURE — 85025 COMPLETE CBC W/AUTO DIFF WBC: CPT | Performed by: INTERNAL MEDICINE

## 2022-03-07 PROCEDURE — 88184 FLOWCYTOMETRY/ TC 1 MARKER: CPT

## 2022-03-07 NOTE — PROGRESS NOTES
Subjective     REASON FOR CONSULTATION:  Provide an opinion on any further workup or treatment on:    Newly diagnosed non-Hodgkin's lymphoma                       REQUESTING PHYSICIAN: Dave Ramsey MD    RECORDS OBTAINED: Records of the patients history including those obtained from the referring provider were reviewed and summarized in detail.    HISTORY OF PRESENT ILLNESS:    Michelle Car is a 81 y.o. patient who was referred for evaluation of newly diagnosed Non-Hodgkin's lymphoma. Patient is seen today accompanied by her sister. She has coronary artery disease and she follows with Cardiology. In late December, she started not feeling well. She had feeling of shortness of breath. She felt that her chest became tight and had a feeling of a tight band constricting her chest. She thought that she had COVID. She saw her PCP on 2/17/2022 and was directed to go to the ER. CT angiogram was obtained which revealed bilateral pleural effusions. There was no evidence of a pulmonary embolism.     Patient underwent thoracentesis bilaterally. Cytology and flow cytometry of the right pleural fluid revealed Non-hodgkin's lymphoma representing diffuse large B-cell lymphoma. She was diagnosed with atrial fibrillation and was placed on Eliquis. She reported feeling cold since she was placed on Eliquis. She denies having fever, chills or night sweats.     REVIEW OF SYSTEMS:  Review of Systems   Constitutional: Positive for fatigue. Negative for fever and unexpected weight change.   HENT: Negative for nosebleeds and voice change.    Eyes: Negative for visual disturbance.   Respiratory: Positive for shortness of breath. Negative for cough.    Cardiovascular: Positive for leg swelling. Negative for chest pain.   Gastrointestinal: Positive for constipation. Negative for abdominal pain, blood in stool, diarrhea, nausea and vomiting.   Endocrine: Positive for polyuria.   Genitourinary: Positive for frequency. Negative for  hematuria.   Musculoskeletal: Negative for back pain and joint swelling.   Skin: Negative for rash.   Neurological: Negative for dizziness and headaches.   Hematological: Negative for adenopathy. Does not bruise/bleed easily.   Psychiatric/Behavioral: Negative for dysphoric mood. The patient is not nervous/anxious.      Past Medical History:   Diagnosis Date   • Abdominal aortic atherosclerosis (HCC)    • Heart disease    • Hypercholesterolemia    • Hyperlipidemia    • Hypertension    • Venous insufficiency      Past Surgical History:   Procedure Laterality Date   • CHOLECYSTECTOMY       Social History     Socioeconomic History   • Marital status:    Tobacco Use   • Smoking status: Never Smoker   • Smokeless tobacco: Never Used   Vaping Use   • Vaping Use: Never used   Substance and Sexual Activity   • Alcohol use: No   • Drug use: No   • Sexual activity: Not Currently     History reviewed. No pertinent family history.     MEDICATIONS:    Current Outpatient Medications:   •  amiodarone (PACERONE) 200 MG tablet, Take 1 tablet by mouth 2 (Two) Times a Day for 30 days., Disp: 60 tablet, Rfl: 0  •  apixaban (ELIQUIS) 5 MG tablet tablet, Take 1 tablet by mouth Every 12 (Twelve) Hours for 30 days. Indications: Atrial Fibrillation, Disp: 60 tablet, Rfl: 0  •  Calcium Carbonate (CALTRATE 600 PO), Take 1 tablet by mouth Daily., Disp: , Rfl:   •  carvedilol (COREG) 3.125 MG tablet, Take 1 tablet by mouth 2 (Two) Times a Day With Meals for 30 days., Disp: 60 tablet, Rfl: 0  •  clotrimazole-betamethasone (Lotrisone) 1-0.05 % cream, Apply  topically to the appropriate area as directed 2 (Two) Times a Day., Disp: 15 g, Rfl: 1  •  digoxin (LANOXIN) 125 MCG tablet, Take 1 tablet by mouth Daily for 30 days., Disp: 30 tablet, Rfl: 0  •  fexofenadine (ALLEGRA) 60 MG tablet, Take 1 tablet by mouth Daily., Disp: 30 tablet, Rfl: 3  •  losartan (COZAAR) 100 MG tablet, Take 1 tablet by mouth Daily., Disp: 90 tablet, Rfl: 1  •   "Multiple Vitamins-Minerals (CENTRUM SILVER) tablet, Take 1 tablet by mouth Daily., Disp: , Rfl:   •  pravastatin (PRAVACHOL) 40 MG tablet, TAKE 1 TABLET BY MOUTH EVERY NIGHT AT BEDTIME, Disp: 90 tablet, Rfl: 1  •  spironolactone (ALDACTONE) 25 MG tablet, Take 1 tablet by mouth Daily for 30 days., Disp: 30 tablet, Rfl: 0     ALLERGIES:  Allergies   Allergen Reactions   • Factive [Gemifloxacin] Hives        Objective   VITAL SIGNS:  Vitals:    03/07/22 1401   BP: 136/82   Pulse: 68   Resp: 16   Temp: 97.1 °F (36.2 °C)   TempSrc: Temporal   SpO2: 97%   Weight: 74.2 kg (163 lb 9.6 oz)   Height: 152 cm (59.84\")  Comment: new ht   PainSc: 0-No pain       Wt Readings from Last 3 Encounters:   03/07/22 74.2 kg (163 lb 9.6 oz)   03/04/22 (!) 166 kg (366 lb 13.5 oz)   03/02/22 75 kg (165 lb 6.4 oz)       PHYSICAL EXAMINATION  GENERAL:  The patient appears in good general condition, not in acute distress.  SKIN: No skin rashes. No ecchymosis.  HEAD:  Normocephalic.  EYES:  No Jaundice. No Pallor. Pupils equal. EOMI.  NECK:  Supple with Good ROM. No Thyromegaly. No Masses.  LYMPHATICS:  No cervical or supraclavicular lymphadenopathy.  CHEST: Normal respiratory effort. Lungs clear to auscultation.   CARDIAC:  Normal S1 & S2. No murmur. No edema.  ABDOMEN:  Soft. No tenderness. No Hepatomegaly. No Splenomegaly. No masses.  EXTREMITIES:  No clubbing. No noted deformity. No Calf tenderness.  NEUROLOGICAL:  No Focal neurological deficits.     RESULT REVIEW:   Results from last 7 days   Lab Units 03/07/22  1340 03/02/22  1233   WBC 10*3/mm3 6.22 6.80   NEUTROS ABS 10*3/mm3 4.37 5.10   HEMOGLOBIN g/dL 12.7 11.9*   HEMATOCRIT % 39.6 36.3   PLATELETS 10*3/mm3 343 417     Results from last 7 days   Lab Units 03/07/22  1340 03/02/22  1233   SODIUM mmol/L 131* 129*   POTASSIUM mmol/L 4.3 5.3*   CHLORIDE mmol/L 92* 87*   CO2 mmol/L 26.6 29.0   BUN mg/dL 10 13   CREATININE mg/dL 0.86 0.97   CALCIUM mg/dL 10.0 10.0   ALBUMIN g/dL 4.10  --  "   BILIRUBIN mg/dL 0.6  --    ALK PHOS U/L 100  --    ALT (SGPT) U/L 19  --    AST (SGOT) U/L 22  --      Component      Latest Ref Rng & Units 3/7/2022   LDH      135 - 214 U/L 239 (H)   Uric Acid      2.4 - 5.7 mg/dL 2.9       CT chest on 2/17/2022:  1. The pulmonary arteries are well-opacified and there is no evidence of  pulmonary embolus. The thoracic aorta shows no evidence of aneurysm or  dissection.  2. There are large bilateral pleural effusions layering posteriorly with  adjacent dense compressive atelectasis and possibly a component of  developing pneumonia. The etiology of the effusions is unclear but may  relate to mild chronic CHF.  3. There is no pericardial effusion. There is no mediastinal or hilar or  axillary adenopathy. The CT images through the upper liver, spleen and  right adrenal gland were unremarkable. There is a left adrenal nodule  measuring 2 cm that appears to be new since an exam dating back to  08/23/2011. This most likely represents an adrenal adenoma but follow-up  surveillance imaging may be helpful.    Right ultrasound-guided thoracentesis on 2/21/2022-350 mL parenchymal fluid was drained.  Left ultrasound-guided thoracentesis on 2/22/2022-400 mL rib, the fluid drained.    Cytology exam from 2/21/2022:  INVOLVEMENT BY MALIGNANT LYMPHOMA, MOST CONSISTENT WITH DIFFUSE LARGE B CELL LYMPHOMA.    Flow cytometric analysis and special stains support a non-germinal center phenotype for this large B-cell lymphoma (CD10 negative, MUM1 positive, BCL 6+) findings do not support a double expresser phenotype (BCL-2 positives c-Myc negative).    EBV study is negative.  Ki-67 was positive in 70% of the neoplastic nuclei.    FISH analysis for BCL 1 rearrangement-negative  FISH analysis for BCL-2 rearrangement-negative  FISH analysis for BCL-6 rearrangement-positive  FISH analysis for MYC rearrangement-negative      Assessment/Plan   *Newly diagnosed diffuse large B-cell lymphoma.  Patient was found  to have bilateral pleural effusions.  She underwent right thoracentesis on 2/21/2022 and the left thoracentesis on 2/22/2022.  Analysis of the fluid revealed involvement with diffuse large B-cell lymphoma.  FISH analysis for BCL6 rearrangement was positive.  FISH analysis for BCL 1, BCL-2 and MYC rearrangement was negative.  CT chest on 2/17/2022 revealed no hilar or mediastinal lymphadenopathy.  Spleen was reported to be normal in size. Exam today revealed no lymphadenopathy or splenomegaly. No evidence of lymphocytosis.    I explained the diagnosis of lymphoma to the patient. I explained that it is a malignant disorder that usually involves the lymph nodes but may also involve tissues outside of the lymph nodes (pleura, lungs, etc). I explained that lymphomas are treatable. The treatment depends on the type and extent of involvement. I recommended further evaluation with a PET scan to determine if other areas are involved and I also recommended the PET scan to determine the degree of hypermetabolism on PET scan. I explained that treatment usually consists of immunotherapy for low grade lymphomas vs chemo immunotherapy for high grade lymphomas. The lymphoma was described as diffuse large B cell lymphoma but I recommended evaluation for the degree of hypermetabolism of the pleural surface on PET scan.     *Bilateral pleural effusions. She is s/p right thoracentesis on 2/21/2022 and the effusion was found to be malignant attributed to the lymphoma. Exam today revealed small amount of pleural effusion bilaterally. She is asymptomatic from the effusions at present.     *Hyperkalemia. This was attributed to K replacement which she stopped a few days ago (after stopping Lasix). K level is normal today.     PLAN:    1.  Obtain PET scan for initial staging.  2.  Obtain peripheral blood flow cytometry.  3.  Obtain SPEP MEGHANA FLC today.  4.  I will see her in follow up after the PET scan to review the results.       Discussed  with the sister who accompanied her to today's visit.     I spent 80 minutes caring for Michelle on this date of service. This time includes time spent by me in the following activities: preparing for the visit, reviewing tests, obtaining and/or reviewing a separately obtained history, performing a medically appropriate examination and/or evaluation, counseling and educating the patient/family/caregiver, ordering medications, tests, or procedures, documenting information in the medical record, independently interpreting results and communicating that information with the patient/family/caregiver and care coordination       Renate Galo MD  03/07/22

## 2022-03-08 ENCOUNTER — HOME CARE VISIT (OUTPATIENT)
Dept: HOME HEALTH SERVICES | Facility: HOME HEALTHCARE | Age: 82
End: 2022-03-08

## 2022-03-08 VITALS
SYSTOLIC BLOOD PRESSURE: 120 MMHG | HEART RATE: 63 BPM | OXYGEN SATURATION: 98 % | DIASTOLIC BLOOD PRESSURE: 68 MMHG | TEMPERATURE: 97.7 F

## 2022-03-08 LAB — REF LAB TEST METHOD: NORMAL

## 2022-03-08 PROCEDURE — G0151 HHCP-SERV OF PT,EA 15 MIN: HCPCS

## 2022-03-08 NOTE — HOME HEALTH
The patient will see the cbc group next week. This nurse dicuss with the patient that we has a pastoral care with our agency, if she would like to talk with someone about her new dx.

## 2022-03-09 ENCOUNTER — OFFICE VISIT (OUTPATIENT)
Dept: CARDIOLOGY | Facility: CLINIC | Age: 82
End: 2022-03-09

## 2022-03-09 VITALS
HEIGHT: 60 IN | BODY MASS INDEX: 32.2 KG/M2 | HEART RATE: 67 BPM | SYSTOLIC BLOOD PRESSURE: 122 MMHG | DIASTOLIC BLOOD PRESSURE: 82 MMHG | WEIGHT: 164 LBS

## 2022-03-09 DIAGNOSIS — E78.2 HYPERLIPEMIA, MIXED: ICD-10-CM

## 2022-03-09 DIAGNOSIS — I50.32 CHRONIC DIASTOLIC CONGESTIVE HEART FAILURE: ICD-10-CM

## 2022-03-09 DIAGNOSIS — I10 ESSENTIAL HYPERTENSION: ICD-10-CM

## 2022-03-09 DIAGNOSIS — I48.0 PAROXYSMAL ATRIAL FIBRILLATION: Primary | ICD-10-CM

## 2022-03-09 LAB
ALBUMIN SERPL ELPH-MCNC: 3.5 G/DL (ref 2.9–4.4)
ALBUMIN/GLOB SERPL: 1.1 {RATIO} (ref 0.7–1.7)
ALPHA1 GLOB SERPL ELPH-MCNC: 0.3 G/DL (ref 0–0.4)
ALPHA2 GLOB SERPL ELPH-MCNC: 0.8 G/DL (ref 0.4–1)
B-GLOBULIN SERPL ELPH-MCNC: 1 G/DL (ref 0.7–1.3)
GAMMA GLOB SERPL ELPH-MCNC: 1.4 G/DL (ref 0.4–1.8)
GLOBULIN SER-MCNC: 3.4 G/DL (ref 2.2–3.9)
IGA SERPL-MCNC: 266 MG/DL (ref 64–422)
IGG SERPL-MCNC: 1329 MG/DL (ref 586–1602)
IGM SERPL-MCNC: 107 MG/DL (ref 26–217)
INTERPRETATION SERPL IEP-IMP: ABNORMAL
KAPPA LC FREE SER-MCNC: 28.5 MG/L (ref 3.3–19.4)
KAPPA LC FREE/LAMBDA FREE SER: 0.97 {RATIO} (ref 0.26–1.65)
LABORATORY COMMENT REPORT: ABNORMAL
LAMBDA LC FREE SERPL-MCNC: 29.5 MG/L (ref 5.7–26.3)
M PROTEIN SERPL ELPH-MCNC: ABNORMAL G/DL
PROT SERPL-MCNC: 6.9 G/DL (ref 6–8.5)

## 2022-03-09 PROCEDURE — G0180 MD CERTIFICATION HHA PATIENT: HCPCS | Performed by: INTERNAL MEDICINE

## 2022-03-09 PROCEDURE — 99214 OFFICE O/P EST MOD 30 MIN: CPT | Performed by: NURSE PRACTITIONER

## 2022-03-09 PROCEDURE — 93000 ELECTROCARDIOGRAM COMPLETE: CPT | Performed by: NURSE PRACTITIONER

## 2022-03-09 RX ORDER — AMIODARONE HYDROCHLORIDE 200 MG/1
200 TABLET ORAL DAILY
Qty: 30 TABLET | Refills: 5 | Status: SHIPPED | OUTPATIENT
Start: 2022-03-09 | End: 2022-03-25 | Stop reason: SDUPTHER

## 2022-03-09 NOTE — PROGRESS NOTES
[facility] Cardiology   [address], [address]  [place], [place], [address]    [phone]  [email or web address]           OUTPATIENT CLINIC FOLLOW UP NOTE    Patient Care Team:  Patient Care Team:  Olayinka Pimentel MD as PCP - General  RamseyDave MD as Referring Physician (Cardiology)  Renate Galo MD as Consulting Physician (Hematology and Oncology)    Subjective:      Chief Complaint   Patient presents with   • Atrial Fibrillation       HPI:    Michelle Car is a 81 y.o. female.  Problem list:  1. Paroxysmal atrial fibrillation  a. Initially noted 2/2022.  Was initiated on amiodarone and Eliquis at that time.   b. TTE 2/2022: EF 56%, mild concentric hypertrophy, grade 1 diastolic dysfunction, mild to moderate MR, mild TR, RVSP less than 35 mmHg.  2. Chest pain  a. CT of the chest 2011: Moderate atheromatous abdominal aorta, but no pulmonary emboli.  b. TTE 2011: Normal systolic function with mild diastolic dysfunction and mild valve regurgitation.  c. Nuclear stress test 2011 was negative for ischemia.  d. TTE 2014: Normal LV size and function, grade 1 diastolic dysfunction, aortic valve thickening without stenosis, trivial valvular regurgitation.  3. Hypertension  4. Hyperlipidemia  5. Venous insufficiency  6. Non-Hodgkin's lymphoma  a. Cytology from pleural fluid noted to be remarkable for lymphoma.  Patient is being followed by Dr. Collins with plans for PET scan tomorrow.    The patient presents today for follow-up.  Her primary cardiologist is Dr. Nichole.    Since the patient was discharged from the hospital she reports that she has been doing well.  She denies chest pain, shortness of breath, palpitations, lightheadedness, or syncope.  Her lower extremity edema continues to improve as well as her fatigue.  Was seen by Dr. Collins after her pleural fluid cytology was suggestive of lymphoma.  He is having the patient undergo a PET scan tomorrow to further determine her  treatment plan.  EKG today is sinus rhythm.    Review of Systems:  Positive for lower extremity edema, fatigue  Negative for exertional chest pain, dyspnea with exertion, palpitations, syncope.     PFSH:  Patient Active Problem List   Diagnosis   • Hypertension   • Hyperlipidemia   • Abdominal aortic atherosclerosis (HCC)   • Generalized edema   • Fatigue due to excessive exertion   • Seasonal allergic rhinitis due to pollen   • Dyspnea   • Acute on chronic diastolic heart failure (HCC)   • Hypoxia   • Paroxysmal atrial fibrillation with rapid ventricular response (HCC)   • Adrenal nodule (HCC)   • Non Hodgkin's lymphoma (HCC)   • Edema of lower extremity         Current Outpatient Medications:   •  amiodarone (PACERONE) 200 MG tablet, Take 1 tablet by mouth Daily., Disp: 30 tablet, Rfl: 5  •  apixaban (ELIQUIS) 5 MG tablet tablet, Take 1 tablet by mouth Every 12 (Twelve) Hours for 30 days. Indications: Atrial Fibrillation, Disp: 60 tablet, Rfl: 0  •  Calcium Carbonate (CALTRATE 600 PO), Take 1 tablet by mouth Daily., Disp: , Rfl:   •  carvedilol (COREG) 3.125 MG tablet, Take 1 tablet by mouth 2 (Two) Times a Day With Meals for 30 days., Disp: 60 tablet, Rfl: 0  •  clotrimazole-betamethasone (Lotrisone) 1-0.05 % cream, Apply  topically to the appropriate area as directed 2 (Two) Times a Day., Disp: 15 g, Rfl: 1  •  digoxin (LANOXIN) 125 MCG tablet, Take 1 tablet by mouth Daily for 30 days., Disp: 30 tablet, Rfl: 0  •  fexofenadine (ALLEGRA) 60 MG tablet, Take 1 tablet by mouth Daily., Disp: 30 tablet, Rfl: 3  •  losartan (COZAAR) 100 MG tablet, Take 1 tablet by mouth Daily., Disp: 90 tablet, Rfl: 1  •  Multiple Vitamins-Minerals (CENTRUM SILVER) tablet, Take 1 tablet by mouth Daily., Disp: , Rfl:   •  pravastatin (PRAVACHOL) 40 MG tablet, TAKE 1 TABLET BY MOUTH EVERY NIGHT AT BEDTIME, Disp: 90 tablet, Rfl: 1  •  spironolactone (ALDACTONE) 25 MG tablet, Take 1 tablet by mouth Daily for 30 days., Disp: 30 tablet, Rfl:  "0    Allergies   Allergen Reactions   • Factive [Gemifloxacin] Hives        reports that she has never smoked. She has never used smokeless tobacco.      Objective:   Physical exam:  /82   Pulse 67   Ht 152 cm (59.84\")   Wt 74.4 kg (164 lb)   BMI 32.20 kg/m²   CONSTITUTIONAL: No acute distress  RESPIRATORY: Normal effort. Clear to auscultation bilaterally without wheezing or rales  CARDIOVASCULAR: Carotids with normal upstrokes without bruits.  Regular rate and rhythm with normal S1 and S2. Without murmur, gallop or rub. Normal radial pulse. There is mild lower extremity edema bilaterally.    Labs:    BUN   Date Value Ref Range Status   03/07/2022 10 8 - 23 mg/dL Final     Creatinine   Date Value Ref Range Status   03/07/2022 0.86 0.57 - 1.00 mg/dL Final     Potassium   Date Value Ref Range Status   03/07/2022 4.3 3.5 - 5.2 mmol/L Final     ALT (SGPT)   Date Value Ref Range Status   03/07/2022 19 1 - 33 U/L Final     AST (SGOT)   Date Value Ref Range Status   03/07/2022 22 1 - 32 U/L Final       Lab Results   Component Value Date    CHOL 194 10/07/2021     Lab Results   Component Value Date    TRIG 81 10/07/2021     Lab Results   Component Value Date    HDL 59 10/07/2021     Lab Results   Component Value Date     (H) 10/07/2021     No components found for: LDLDIRECTC    Diagnostic Data:      ECG 12 Lead    Date/Time: 3/9/2022 3:35 PM  Performed by: Shruthi Noel APRN  Authorized by: Shruthi Noel APRN   Comparison: compared with previous ECG from 2/20/2022  Similar to previous ECG  Rhythm: sinus rhythm  Rate: normal  BPM: 67  Comments:  ms,  ms            Results for orders placed during the hospital encounter of 02/17/22    Adult Transthoracic Echo Complete W/ Cont if Necessary Per Protocol    Interpretation Summary  · Calculated left ventricular 3D EF = 53% Estimated left ventricular EF = 56% Estimated left ventricular EF was in agreement with the calculated left ventricular " EF. Left ventricular systolic function is normal.  · Left ventricular wall thickness is consistent with mild concentric hypertrophy.  · Left ventricular diastolic function is consistent with (grade I) impaired relaxation.  · Mild to moderate mitral valve regurgitation is present  · Mild tricuspid valve regurgitation is present.  · Estimated right ventricular systolic pressure from tricuspid regurgitation is normal (<35 mmHg).      Assessment and Plan:   Diagnoses and all orders for this visit:    Paroxysmal atrial fibrillation (HCC) (Primary)  -Maintaining sinus rhythm today.  -We will decrease amiodarone to 200 mg p.o. daily.  -Continue Eliquis, digoxin, and carvedilol.    Chronic diastolic congestive heart failure (HCC)  -Appears euvolemic today.  -Continue beta-blocker, losartan, spironolactone    Essential hypertension  -At goal, continue carvedilol and losartan    Hyperlipemia, mixed  -LDL was 120 in 10/2021.  -Continue pravastatin.  -Management per PCP.    Cardio oncology  -Patient with new diagnosis of non-Hodgkin's lymphoma.  -Patient being followed by Dr. Collins.  He is planning for her to undergo a PET scan tomorrow to further determine treatment options.      - Return in about 3 months (around 6/9/2022) for Next scheduled follow up with Dr. Nichole.    Electronically signed by MAGGI Woods, 03/09/22, 3:30 PM [place]

## 2022-03-10 ENCOUNTER — HOSPITAL ENCOUNTER (OUTPATIENT)
Dept: PET IMAGING | Facility: HOSPITAL | Age: 82
Discharge: HOME | End: 2022-03-10

## 2022-03-10 DIAGNOSIS — C83.38 DIFFUSE LARGE B-CELL LYMPHOMA OF LYMPH NODES OF MULTIPLE REGIONS: ICD-10-CM

## 2022-03-10 PROBLEM — E87.1 HYPONATREMIA: Status: ACTIVE | Noted: 2022-02-17

## 2022-03-10 PROBLEM — C83.30 DIFFUSE LARGE B CELL LYMPHOMA: Status: ACTIVE | Noted: 2022-02-01

## 2022-03-10 PROBLEM — N18.2 CRI (CHRONIC RENAL INSUFFICIENCY), STAGE 2 (MILD): Status: ACTIVE | Noted: 2021-01-01

## 2022-03-10 LAB — GLUCOSE BLDC GLUCOMTR-MCNC: 100 MG/DL (ref 70–130)

## 2022-03-10 PROCEDURE — A9552 F18 FDG: HCPCS | Performed by: INTERNAL MEDICINE

## 2022-03-10 PROCEDURE — 0 FLUDEOXYGLUCOSE F18 SOLUTION: Performed by: INTERNAL MEDICINE

## 2022-03-10 PROCEDURE — 82962 GLUCOSE BLOOD TEST: CPT

## 2022-03-10 PROCEDURE — 78815 PET IMAGE W/CT SKULL-THIGH: CPT

## 2022-03-10 RX ADMIN — FLUDEOXYGLUCOSE F18 1 DOSE: 300 INJECTION INTRAVENOUS at 10:13

## 2022-03-11 ENCOUNTER — HOME CARE VISIT (OUTPATIENT)
Dept: HOME HEALTH SERVICES | Facility: HOME HEALTHCARE | Age: 82
End: 2022-03-11

## 2022-03-11 PROCEDURE — G0300 HHS/HOSPICE OF LPN EA 15 MIN: HCPCS

## 2022-03-14 ENCOUNTER — LAB (OUTPATIENT)
Dept: LAB | Facility: HOSPITAL | Age: 82
End: 2022-03-14

## 2022-03-14 ENCOUNTER — OFFICE VISIT (OUTPATIENT)
Dept: ONCOLOGY | Facility: CLINIC | Age: 82
End: 2022-03-14

## 2022-03-14 VITALS
RESPIRATION RATE: 16 BRPM | TEMPERATURE: 97.5 F | WEIGHT: 162.2 LBS | HEART RATE: 64 BPM | SYSTOLIC BLOOD PRESSURE: 136 MMHG | OXYGEN SATURATION: 98 % | BODY MASS INDEX: 31.84 KG/M2 | HEIGHT: 60 IN | DIASTOLIC BLOOD PRESSURE: 79 MMHG

## 2022-03-14 VITALS
SYSTOLIC BLOOD PRESSURE: 126 MMHG | OXYGEN SATURATION: 94 % | RESPIRATION RATE: 18 BRPM | BODY MASS INDEX: 70.82 KG/M2 | WEIGHT: 293 LBS | HEART RATE: 78 BPM | TEMPERATURE: 96.9 F | DIASTOLIC BLOOD PRESSURE: 70 MMHG

## 2022-03-14 DIAGNOSIS — C83.38 DIFFUSE LARGE B-CELL LYMPHOMA OF LYMPH NODES OF MULTIPLE REGIONS: ICD-10-CM

## 2022-03-14 DIAGNOSIS — Z11.59 NEED FOR HEPATITIS C SCREENING TEST: ICD-10-CM

## 2022-03-14 DIAGNOSIS — Z11.59 NEED FOR HEPATITIS B SCREENING TEST: ICD-10-CM

## 2022-03-14 DIAGNOSIS — J90 BILATERAL PLEURAL EFFUSION: ICD-10-CM

## 2022-03-14 DIAGNOSIS — Z79.899 HIGH RISK MEDICATION USE: ICD-10-CM

## 2022-03-14 DIAGNOSIS — Z45.2 ENCOUNTER FOR INFUSAPORT CENTRAL VENOUS CATHETER INSERTION: ICD-10-CM

## 2022-03-14 DIAGNOSIS — C83.38 DIFFUSE LARGE B-CELL LYMPHOMA OF LYMPH NODES OF MULTIPLE REGIONS: Primary | ICD-10-CM

## 2022-03-14 PROBLEM — C85.90 NON HODGKIN'S LYMPHOMA (HCC): Status: RESOLVED | Noted: 2022-02-28 | Resolved: 2022-03-14

## 2022-03-14 LAB
BASOPHILS # BLD AUTO: 0.06 10*3/MM3 (ref 0–0.2)
BASOPHILS NFR BLD AUTO: 0.9 % (ref 0–1.5)
DEPRECATED RDW RBC AUTO: 46.4 FL (ref 37–54)
EOSINOPHIL # BLD AUTO: 0.03 10*3/MM3 (ref 0–0.4)
EOSINOPHIL NFR BLD AUTO: 0.4 % (ref 0.3–6.2)
ERYTHROCYTE [DISTWIDTH] IN BLOOD BY AUTOMATED COUNT: 15.1 % (ref 12.3–15.4)
HBV SURFACE AB SER RIA-ACNC: NORMAL
HBV SURFACE AG SERPL QL IA: NORMAL
HCT VFR BLD AUTO: 37.4 % (ref 34–46.6)
HCV AB SER DONR QL: NORMAL
HGB BLD-MCNC: 12.7 G/DL (ref 12–15.9)
IMM GRANULOCYTES # BLD AUTO: 0.06 10*3/MM3 (ref 0–0.05)
IMM GRANULOCYTES NFR BLD AUTO: 0.9 % (ref 0–0.5)
LYMPHOCYTES # BLD AUTO: 1.16 10*3/MM3 (ref 0.7–3.1)
LYMPHOCYTES NFR BLD AUTO: 17.2 % (ref 19.6–45.3)
MCH RBC QN AUTO: 29.1 PG (ref 26.6–33)
MCHC RBC AUTO-ENTMCNC: 34 G/DL (ref 31.5–35.7)
MCV RBC AUTO: 85.6 FL (ref 79–97)
MONOCYTES # BLD AUTO: 0.42 10*3/MM3 (ref 0.1–0.9)
MONOCYTES NFR BLD AUTO: 6.2 % (ref 5–12)
NEUTROPHILS NFR BLD AUTO: 5 10*3/MM3 (ref 1.7–7)
NEUTROPHILS NFR BLD AUTO: 74.4 % (ref 42.7–76)
NRBC BLD AUTO-RTO: 0 /100 WBC (ref 0–0.2)
PLATELET # BLD AUTO: 271 10*3/MM3 (ref 140–450)
PMV BLD AUTO: 8.6 FL (ref 6–12)
RBC # BLD AUTO: 4.37 10*6/MM3 (ref 3.77–5.28)
WBC NRBC COR # BLD: 6.73 10*3/MM3 (ref 3.4–10.8)

## 2022-03-14 PROCEDURE — 36415 COLL VENOUS BLD VENIPUNCTURE: CPT

## 2022-03-14 PROCEDURE — 99215 OFFICE O/P EST HI 40 MIN: CPT | Performed by: INTERNAL MEDICINE

## 2022-03-14 PROCEDURE — 86803 HEPATITIS C AB TEST: CPT | Performed by: INTERNAL MEDICINE

## 2022-03-14 PROCEDURE — G2212 PROLONG OUTPT/OFFICE VIS: HCPCS | Performed by: INTERNAL MEDICINE

## 2022-03-14 PROCEDURE — 86706 HEP B SURFACE ANTIBODY: CPT | Performed by: INTERNAL MEDICINE

## 2022-03-14 PROCEDURE — 87340 HEPATITIS B SURFACE AG IA: CPT | Performed by: INTERNAL MEDICINE

## 2022-03-14 PROCEDURE — 85025 COMPLETE CBC W/AUTO DIFF WBC: CPT

## 2022-03-14 NOTE — PROGRESS NOTES
Subjective     CHIEF COMPLAINT:      Chief Complaint   Patient presents with   • Follow-up     Discuss PET Scan       HISTORY OF PRESENT ILLNESS:     Michelle Car is a 81 y.o. female patient who returns today for follow up on her lymphoma. She returns today for follow up accompanied by her son. She continues to feel tired. No chest pain or rib pain. Shortness of breath is stable. No recent worsening. No back pain. No fever or chills.     ROS:  Pertinent ROS is in the HPI.     Past medical, surgical, social and family history were reviewed.     MEDICATIONS:    Current Outpatient Medications:   •  amiodarone (PACERONE) 200 MG tablet, Take 1 tablet by mouth Daily., Disp: 30 tablet, Rfl: 5  •  apixaban (ELIQUIS) 5 MG tablet tablet, Take 1 tablet by mouth Every 12 (Twelve) Hours for 30 days. Indications: Atrial Fibrillation, Disp: 60 tablet, Rfl: 0  •  Calcium Carbonate (CALTRATE 600 PO), Take 1 tablet by mouth Daily., Disp: , Rfl:   •  carvedilol (COREG) 3.125 MG tablet, Take 1 tablet by mouth 2 (Two) Times a Day With Meals for 30 days., Disp: 60 tablet, Rfl: 0  •  clotrimazole-betamethasone (Lotrisone) 1-0.05 % cream, Apply  topically to the appropriate area as directed 2 (Two) Times a Day., Disp: 15 g, Rfl: 1  •  digoxin (LANOXIN) 125 MCG tablet, Take 1 tablet by mouth Daily for 30 days., Disp: 30 tablet, Rfl: 0  •  fexofenadine (ALLEGRA) 60 MG tablet, Take 1 tablet by mouth Daily., Disp: 30 tablet, Rfl: 3  •  losartan (COZAAR) 100 MG tablet, Take 1 tablet by mouth Daily., Disp: 90 tablet, Rfl: 1  •  Multiple Vitamins-Minerals (CENTRUM SILVER) tablet, Take 1 tablet by mouth Daily., Disp: , Rfl:   •  pravastatin (PRAVACHOL) 40 MG tablet, TAKE 1 TABLET BY MOUTH EVERY NIGHT AT BEDTIME, Disp: 90 tablet, Rfl: 1  •  spironolactone (ALDACTONE) 25 MG tablet, Take 1 tablet by mouth Daily for 30 days., Disp: 30 tablet, Rfl: 0    Objective   VITAL SIGNS:     Vitals:    03/14/22 1034   BP: 136/79   Pulse: 64   Resp: 16  "  Temp: 97.5 °F (36.4 °C)   TempSrc: Temporal   SpO2: 98%   Weight: 73.6 kg (162 lb 3.2 oz)   Height: 152 cm (59.84\")   PainSc: 0-No pain     Body mass index is 31.84 kg/m².     Wt Readings from Last 5 Encounters:   03/14/22 73.6 kg (162 lb 3.2 oz)   03/09/22 74.4 kg (164 lb)   03/07/22 74.2 kg (163 lb 9.6 oz)   03/04/22 (!) 166 kg (366 lb 13.5 oz)   03/02/22 75 kg (165 lb 6.4 oz)       PHYSICAL EXAMINATION:   GENERAL: The patient appears in fair general condition, not in acute distress.   SKIN: No ecchymosis.  EYES: No jaundice. No pallor.  LYMPHATICS: No cervical lymphadenopathy.   CHEST: Normal respiratory effort. Decreased breath sounds at the right lung base. No added sounds.   CVS: Normal S1 and S2. No murmurs.  ABDOMEN: Soft. No tenderness. No Hepatomegaly. No Splenomegaly. No masses.  EXTREMITIES: No edema.    DIAGNOSTIC DATA:     Results from last 7 days   Lab Units 03/14/22  1020 03/07/22  1340   WBC 10*3/mm3 6.73 6.22   NEUTROS ABS 10*3/mm3 5.00 4.37   HEMOGLOBIN g/dL 12.7 12.7   HEMATOCRIT % 37.4 39.6   PLATELETS 10*3/mm3 271 343     Results from last 7 days   Lab Units 03/07/22  1340   SODIUM mmol/L 131*   POTASSIUM mmol/L 4.3   CHLORIDE mmol/L 92*   CO2 mmol/L 26.6   BUN mg/dL 10   CREATININE mg/dL 0.86   CALCIUM mg/dL 10.0   ALBUMIN g/dL 4.10  3.5   BILIRUBIN mg/dL 0.6   ALK PHOS U/L 100   ALT (SGPT) U/L 19   AST (SGOT) U/L 22   GLUCOSE mg/dL 106*     PET scan on 3/10/2022:  There is significant pleural involvement, markedly more  prominent on the left. Extensive left adrenal involvement and to a  lesser degree right adrenal. There is also likely left perinephric  involvement with lymphoma. The low-level activity at shotty AP window  nodes is indeterminate but may represent lymphoma involvement as well.    Assessment/Plan   *Diffuse large B-cell lymphoma.     · Patient was found to have bilateral pleural effusions.    · She underwent right thoracentesis on 2/21/2022 and the left thoracentesis on " 2/22/2022.    · Analysis of the fluid revealed involvement with diffuse large B-cell lymphoma.    · FISH analysis for BCL6 rearrangement was positive.    · FISH analysis for BCL 1, BCL-2 and MYC rearrangement was negative.    · CT chest on 2/17/2022 revealed no hilar or mediastinal lymphadenopathy.    · Spleen was reported to be normal in size.  · Peripheral blood flow cytometry on 3/7/2022 showed no evidence of lymphoma in the peripheral blood.  · PET scan on 3/10/2022 revealed significant hypermetabolism in the left adrenal gland and the surrounding soft tissue with SUV up to 34.5. Hypermetabolism in the left pleural thickening with SUV of 7.1. there was less hypermetabolism in the right adrenal gland and in the right pleura.  · Based on the PET scan, she is considered to have stage III non-bulky disease.  · The degree of hypermetabolism is consistent with high grade lymphoma. This concurs with the pathology exam of the pleural fluid cytology that revealed diffuse large B-cell lymphoma.    I reviewed the PET scan with the patient and her son. I explained the diagnosis. She has R-IPI score of 3 associated with poor risk. This is associated with overall survival of 55%.     I recommended systemic chemotherapy with R-CHOP with Neulasta support. I explained the treatment and the potential side effects including hair loss, cardiotoxicity, nausea, vomiting, altered taste, myelosuppression with increased risk for infections, fatigue and decreased kidney function.    The patient used to be active prior to the diagnosis of the lymphoma. She was independent. She is now needing some help from her family members.     *Bilateral pleural effusions. She is s/p right thoracentesis on 2/21/2022 and the effusion was found to be malignant attributed to the lymphoma. There is evidence of pleural effusion, right more than left on today's exam. It does not need a repeat thoracentesis at this point but will need close monitoring.        PLAN:    1.  I will refer to General Surgery for placement of a port.  2.  I will refer to Cardio-Oncology to determine if she can receive Adriamycin as part of her chemotherapy regimen.  3.  We will schedule her to have chemotherapy education with the NP.   4.  Hepatitis B and C serology will be obtained today.  5.  I will see her in follow up in 2 weeks. We will schedule her to start chemotherapy on that day.   6.  Plan to repeat CT scans after cycle# 2 and #4. We will obtain a PET scan after cycle #6.     Discussed with her son who accompanied her to today's visit.     I spent 80 minutes caring for Michelle on this date of service. This time includes time spent by me in the following activities: preparing for the visit, reviewing tests, obtaining and/or reviewing a separately obtained history, performing a medically appropriate examination and/or evaluation, counseling and educating the patient/family/caregiver, ordering medications, tests, or procedures, referring and communicating with other health care professionals, documenting information in the medical record, independently interpreting results and communicating that information with the patient/family/caregiver and care coordination       Renate Galo MD  03/14/22

## 2022-03-14 NOTE — HOME HEALTH
the patient had a pet scan this week, Next week she will meet with the doctor to discuss the pet scan.   The patient has a strong support system.   A&Ox4, ambulates without marvel assitve devices, compliant with medication schedule, no adverse reactioin report, no c/o pain or soa.

## 2022-03-15 ENCOUNTER — READMISSION MANAGEMENT (OUTPATIENT)
Dept: CALL CENTER | Facility: HOSPITAL | Age: 82
End: 2022-03-15

## 2022-03-15 LAB — HBV CORE AB SERPL QL IA: NEGATIVE

## 2022-03-15 NOTE — OUTREACH NOTE
CHF Week 3 Survey    Flowsheet Row Responses   [facility] patient discharged from? [facility]   Does the patient have one of the following disease processes/diagnoses(primary or secondary)? CHF   Week 3 attempt successful? Yes   Call start time 1151   Discharge diagnosis Acute on chronic diastolic heart failure  along with large bilateral pleural effusion with hypoxia,     New onset atrial fibrillation    Is patient permission given to speak with other caregiver? Yes   List who call center can speak with son or dtr   Meds reviewed with patient/caregiver? Yes   Is the patient having any side effects they believe may be caused by any medication additions or changes? No   Is the patient taking all medications as directed (includes completed medication regime)? Yes   Comments regarding appointments Had Oncology appt 3-14, told her she needed chemo. Will be having port placed, seeing MD for that 3-21-22.   Has the patient kept scheduled appointments due by today? Yes   Has home health visited the patient within 72 hours of discharge? Yes   Home health comments skilled nursing coming to home but PT has stopped.   Pulse Ox monitoring None   Psychosocial issues? No   Comments Pt reports SOA/weakness improved.    What is the patient's perception of their health status since discharge? New symptoms unrelated to diagnosis   Is the patient weighing daily? Yes   Is the patient able to teach back Heart Failure Zones? Yes   Is the patient able to teach back signs and symptoms of worsening condition? (i.e. weight gain, shortness of air, etc.) Yes   CHF Week 3 call completed? Yes   Revoked No further contact(revokes)-requires comment   Is the patient interested in additional calls from an ambulatory ?  NOTE:  applies to high risk patients requiring additional follow-up. No   Graduated/Revoked comments merced [facility] - Registered Nurse

## 2022-03-16 ENCOUNTER — TELEPHONE (OUTPATIENT)
Dept: ONCOLOGY | Facility: CLINIC | Age: 82
End: 2022-03-16

## 2022-03-16 NOTE — TELEPHONE ENCOUNTER
Caller: Michelle Car    Relationship: Self    Best call back number: [phone]      Who are you requesting to speak with (clinical staff, provider,  specific staff member): JOE    What was the call regarding: PATIENT CALLED TO CHECK THE STATUS ON THE PORT. PATIENT WANTED TO KNOW IF WE HAD HEARD FROM THE CARDIO DR     Do you require a callback: YES

## 2022-03-17 ENCOUNTER — HOME CARE VISIT (OUTPATIENT)
Dept: HOME HEALTH SERVICES | Facility: HOME HEALTHCARE | Age: 82
End: 2022-03-17

## 2022-03-17 DIAGNOSIS — Z91.89 AT RISK FOR INJURY FROM CHEMOTHERAPY: Primary | ICD-10-CM

## 2022-03-17 DIAGNOSIS — I34.0 NONRHEUMATIC MITRAL VALVE REGURGITATION: ICD-10-CM

## 2022-03-17 PROCEDURE — G0300 HHS/HOSPICE OF LPN EA 15 MIN: HCPCS

## 2022-03-18 ENCOUNTER — TELEPHONE (OUTPATIENT)
Dept: CARDIOLOGY | Facility: CLINIC | Age: 82
End: 2022-03-18

## 2022-03-18 ENCOUNTER — OFFICE VISIT (OUTPATIENT)
Dept: ONCOLOGY | Facility: CLINIC | Age: 82
End: 2022-03-18

## 2022-03-18 ENCOUNTER — APPOINTMENT (OUTPATIENT)
Dept: LAB | Facility: HOSPITAL | Age: 82
End: 2022-03-18

## 2022-03-18 VITALS — WEIGHT: 160.8 LBS | BODY MASS INDEX: 31.57 KG/M2

## 2022-03-18 VITALS
OXYGEN SATURATION: 94 % | DIASTOLIC BLOOD PRESSURE: 80 MMHG | RESPIRATION RATE: 20 BRPM | SYSTOLIC BLOOD PRESSURE: 130 MMHG | HEART RATE: 76 BPM | WEIGHT: 293 LBS | TEMPERATURE: 97.1 F | BODY MASS INDEX: 69.77 KG/M2

## 2022-03-18 DIAGNOSIS — C83.38 DIFFUSE LARGE B-CELL LYMPHOMA OF LYMPH NODES OF MULTIPLE REGIONS: Primary | ICD-10-CM

## 2022-03-18 PROCEDURE — 99215 OFFICE O/P EST HI 40 MIN: CPT | Performed by: NURSE PRACTITIONER

## 2022-03-18 RX ORDER — PREDNISONE 50 MG/1
TABLET ORAL
Qty: 10 TABLET | Refills: 5 | Status: SHIPPED | OUTPATIENT
Start: 2022-03-18 | End: 2022-04-07

## 2022-03-18 RX ORDER — ONDANSETRON HYDROCHLORIDE 8 MG/1
8 TABLET, FILM COATED ORAL EVERY 8 HOURS PRN
Qty: 30 TABLET | Refills: 5 | Status: SHIPPED | OUTPATIENT
Start: 2022-03-18 | End: 2023-03-14

## 2022-03-18 NOTE — TELEPHONE ENCOUNTER
----- Message from Nasrin Nichole MD sent at 3/17/2022  7:39 PM EDT -----  I reviewed the images and they are not ideal for doing LV strain for chemotherapy.  Also needs better assessment of mitral valve regurgitation.  Please have the patient come in for a limited follow-up study for both.  I am placing an order.  Please also have her set up a follow-up with me in 4 weeks in office.  Though we will review chemo concerns and cardiotoxicity after this repeat echo has been done by phone as long as she is not having any more shortness of breath chest pain palpitations.  Please verify.  If none present have her set up a follow-up with me in 4 weeks but do the echo Monday or Tuesday.

## 2022-03-18 NOTE — PROGRESS NOTES
____________________PATIENT EDUCATION____________________    PATIENT EDUCATION:  Today I met with the patient to discuss the chemotherapy regimen recommended for treatment of his/her Diffuse large B-cell lymphoma of lymph nodes of multiple regions (HCC)  - predniSONE (DELTASONE) 50 MG tablet  - ondansetron (ZOFRAN) 8 MG tablet  .  The patient was given explanation of treatment premed side effects including office policy that prohibits patients to drive if sedating medications are administered, MD explanation given regarding benefits, side effects, toxicities and goals of treatment.  The patient received a Chemotherapy/Biotherapy Plan Summary including diagnosis and explanation of specific treatment plan.    SIDE EFFECTS:  Common side effects were discussed with the patient and/or significant other.  Discussion included where applicable hair loss/discoloration, anemia/fatigue, infection/chills/fever, appetite, bleeding risk/precautions, constipation, diarrhea, mouth sores, taste alteration, loss of appetite, nausea/vomiting, peripheral neuropathy, skin/nail changes, rash, muscle aches/weakness, photosensitivity, weight gain/loss, hearing loss, dizziness, menopausal symptoms, menstrual irregularity, sterility, high blood pressure, heart damage, liver damage, lung damage, kidney damage, DVT/PE risk, fluid retention, pleural/pericardial effusion, somnolence, electrolyte/LFT imbalance, vein exercises and/or the possible need for vascular access/port placement.  The patient was advised that although uncommon, leakage of an infused medication from the vein or venous access device may lead to skin breakdown and/or other tissue damage.  The patient was advised that he/she may have pain, bleeding, and/or bruising from the insertion of a needle in their vein or venous access device (port).  The patient was further advised that, in spite of proper technique, infection with redness and irritation may rarely occur at the site  where the needle was inserted.  The patient was advised that if complications occur, additional medical treatment is available.  Finally, where applicable we have reviewed rare but potential immune mediated side effects including shortness of breath, cough, chest pain (pneumonitis), abdominal pain, diarrhea (colitis), thyroiditis (hypothyroid or hyperthyroid), hepatitis and liver dysfunction, nephritis and renal dysfunction.    Discussion also included side effects specific to drugs in the treatment plan, specifically cyclophosphamide, doxorubicin, vincristine, prednisone, Rituxan, Neulasta    I spent 85 minutes caring for Michelle on this date of service. This time includes time spent by me in the following activities: preparing for the visit, reviewing tests, performing a medically appropriate examination and/or evaluation, counseling and educating the patient/family/caregiver, ordering medications, tests, or procedures, referring and communicating with other health care professionals, documenting information in the medical record, independently interpreting results and communicating that information with the patient/family/caregiver and care coordination.

## 2022-03-18 NOTE — PROGRESS NOTES
[facility] Hematology/Oncology Treatment Plan Summary    Name: Michelle Car  Acct# [id number]  MD: Dr. Galo    Diagnosis:     ICD-10-CM ICD-9-CM   1. Diffuse large B-cell lymphoma of lymph nodes of multiple regions (HCC)  C83.38 202.88       Goal of chemotherapy: curative intent    Treatment Medication(s):   1. Cyclophosphamide  2. Doxorubicin  3. Vincristine  4. Prednisone  5. Rituxan  6. Neulasta    Frequency: every 3 weeks    Number of cycles: 6    Starting on: 3/28/2022    Repeat after 2 cycles: CT Scan    Items for home use: Thermometer    Rx written for: [x] Nausea    [] Pre-Chemo   ondansetron 8 mg by mouth every 8 hours as needed for nausea and prednisone 50 mg 2 tabs by mouth on days 2-6    Notes:     Next Steps: PORT and MUGA/ECHO     Completing Provider: MAGGI Moreira           Date/time: 03/18/2022      Please note: You will be seen by a provider frequently with your treatment plan. This plan may change depending on many factors, if so, this will be discussed with you by your physician.  Last update 12/2020.

## 2022-03-18 NOTE — HOME HEALTH
the patient had a visited with the ocologist and booklet was given to her about non-hodgkin lymphoma. The pt will have. seven treatments and then pet scan and go from there.    pt will see NP tomorrow about treatments       on Monday Dr. Nevarez will put a port iin        pt has been drinking ensure high protein    3/28/22 first treatmt

## 2022-03-21 ENCOUNTER — LAB (OUTPATIENT)
Dept: LAB | Facility: HOSPITAL | Age: 82
End: 2022-03-21

## 2022-03-21 ENCOUNTER — OFFICE VISIT (OUTPATIENT)
Dept: SURGERY | Facility: CLINIC | Age: 82
End: 2022-03-21

## 2022-03-21 VITALS — BODY MASS INDEX: 31.61 KG/M2 | WEIGHT: 161 LBS | HEIGHT: 60 IN

## 2022-03-21 DIAGNOSIS — C83.38 DIFFUSE LARGE B-CELL LYMPHOMA OF LYMPH NODES OF MULTIPLE REGIONS: Primary | ICD-10-CM

## 2022-03-21 DIAGNOSIS — C83.38 DIFFUSE LARGE B-CELL LYMPHOMA OF LYMPH NODES OF MULTIPLE REGIONS: ICD-10-CM

## 2022-03-21 LAB
ANION GAP SERPL CALCULATED.3IONS-SCNC: 10 MMOL/L (ref 5–15)
BUN SERPL-MCNC: 11 MG/DL (ref 8–23)
BUN/CREAT SERPL: 12.9 (ref 7–25)
CALCIUM SPEC-SCNC: 9.6 MG/DL (ref 8.6–10.5)
CHLORIDE SERPL-SCNC: 94 MMOL/L (ref 98–107)
CO2 SERPL-SCNC: 29 MMOL/L (ref 22–29)
CREAT SERPL-MCNC: 0.85 MG/DL (ref 0.57–1)
EGFRCR SERPLBLD CKD-EPI 2021: 69.4 ML/MIN/1.73
GLUCOSE SERPL-MCNC: 97 MG/DL (ref 65–99)
POTASSIUM SERPL-SCNC: 4.4 MMOL/L (ref 3.5–5.2)
SARS-COV-2 ORF1AB RESP QL NAA+PROBE: NOT DETECTED
SODIUM SERPL-SCNC: 133 MMOL/L (ref 136–145)

## 2022-03-21 PROCEDURE — U0004 COV-19 TEST NON-CDC HGH THRU: HCPCS

## 2022-03-21 PROCEDURE — 36415 COLL VENOUS BLD VENIPUNCTURE: CPT

## 2022-03-21 PROCEDURE — 99203 OFFICE O/P NEW LOW 30 MIN: CPT | Performed by: SURGERY

## 2022-03-21 PROCEDURE — 80048 BASIC METABOLIC PNL TOTAL CA: CPT

## 2022-03-21 PROCEDURE — C9803 HOPD COVID-19 SPEC COLLECT: HCPCS

## 2022-03-21 RX ORDER — CEFAZOLIN SODIUM 2 G/100ML
2 INJECTION, SOLUTION INTRAVENOUS ONCE
Status: CANCELLED | OUTPATIENT
Start: 2022-03-23 | End: 2022-03-21

## 2022-03-21 NOTE — TELEPHONE ENCOUNTER
Pt was returning a call but she also stated that she is having a port placed on Wednesday and to start Chemo (possible Adriamycin) on Monday.  Do you or Shruthi want to see her sooner?  Can you clear her to proceed with port placement?

## 2022-03-21 NOTE — TELEPHONE ENCOUNTER
LMM re: call to discuss.  Ok for port but will need ECHO per Dr Nichole.      Dr Nichole: I placed a clearance note on your desk to sign tomorrow.      Kamila: Please reach out to patient also to schedule ECHO on this Cardio-Onco patient.

## 2022-03-21 NOTE — H&P (VIEW-ONLY)
Cc: B-cell lymphoma    History of presenting illness:   This is a nice 81-year-old female with B-cell lymphoma referred for port placement.  She had cytology on pleural fluid consistent with malignant lymphoma.  Patient has been admitted with shortness of breath.  Her work-up also identified atrial fibrillation for which she has been placed on Eliquis.  She is awaiting hearing from cardiology whether or not she is appropriate for chemotherapy.  She wishes to be sure that she would be a candidate before having the port placed, quite reasonably.    Past Medical History: B-cell lymphoma, congestive heart failure, atrial fibrillation, chronic renal insufficiency hypercholesterolemia, hypertension    Past Surgical History: Remote cholecystectomy, colonoscopy around 2000    Medications: Noted to include Eliquis, carvedilol, digoxin, Cozaar, pravastatin, spironolactone, prednisone    Allergies: Gemifloxacin    Social History: Non-smoker, lives with some independence    Family History: Geta for known colon or rectal cancer    Review of Systems:  Constitutional: Positive for weakness, creased appetite, mild weight loss  Neck: no swollen glands or dysphagia or odynophagia  Respiratory: negative for SOB, cough, hemoptysis or wheezing  Cardiovascular: negative for chest pain, palpitations or peripheral edema  Gastrointestinal: Negative for nausea, vomiting, abdominal pain      Physical Exam:  BMI: 31.4  General: alert and oriented, appropriate, no acute distress  Eyes: No scleral icterus, extraocular movements are intact  Neck: Supple without lymphadenopathy or thyromegaly, trachea is in the midline  Respiratory: There is good bilateral chest expansion, no use of accessory muscles is noted  Cardiovascular: No jugular venous distention or peripheral edema is seen  Gastrointestinal: Soft and benign, no mass, no hernia    Laboratory data: Pleural fluid consistent with malignant lymphoma    Imaging data: PET scan demonstrates  significant left-sided pleural involvement there is also left adrenal involvement.      Assessment and plan:   -Malignant B-cell lymphoma  -History of atrial fibrillation and congestive heart failure, on Eliquis  -I discussed the risks and benefits of port placement with the patient including, but not limited to, bleeding, infection, pneumothorax, deep venous thrombosis and device failure.  The patient is agreeable to proceed, so long as she is cleared by cardiology to proceed with her chemotherapy.  She will need to hold her Eliquis 48 hours preoperatively.      Alin Delgado MD, FACS  General, Minimally Invasive and Endoscopic Surgery  [facility]    [address], [address]  [place], [place], [address]  P: [phone]  F: [phone]

## 2022-03-22 ENCOUNTER — TELEPHONE (OUTPATIENT)
Dept: CARDIOLOGY | Facility: CLINIC | Age: 82
End: 2022-03-22

## 2022-03-22 NOTE — TELEPHONE ENCOUNTER
Next appointment with Dr Nichole: 4/14/2022  Last appointment with Shruthi: 3/9/2022    Last labs: 3/22/2022 ([facility])    Dr Nichole: When do you want this pt to have a Digoxin level checked? She said she will be over Friday if you want her to complete then.  All these are flagging with warnings and I want you to review.

## 2022-03-23 ENCOUNTER — HOSPITAL ENCOUNTER (OUTPATIENT)
Facility: HOSPITAL | Age: 82
Setting detail: HOSPITAL OUTPATIENT SURGERY
Discharge: HOME | End: 2022-03-23
Attending: SURGERY | Admitting: SURGERY

## 2022-03-23 ENCOUNTER — LAB (OUTPATIENT)
Dept: LAB | Facility: HOSPITAL | Age: 82
End: 2022-03-23

## 2022-03-23 ENCOUNTER — APPOINTMENT (OUTPATIENT)
Dept: GENERAL RADIOLOGY | Facility: HOSPITAL | Age: 82
End: 2022-03-23

## 2022-03-23 ENCOUNTER — ANESTHESIA (OUTPATIENT)
Dept: PERIOP | Facility: HOSPITAL | Age: 82
End: 2022-03-23

## 2022-03-23 ENCOUNTER — ANESTHESIA EVENT (OUTPATIENT)
Dept: PERIOP | Facility: HOSPITAL | Age: 82
End: 2022-03-23

## 2022-03-23 VITALS
OXYGEN SATURATION: 98 % | BODY MASS INDEX: 31.42 KG/M2 | DIASTOLIC BLOOD PRESSURE: 73 MMHG | WEIGHT: 160.05 LBS | HEART RATE: 63 BPM | RESPIRATION RATE: 18 BRPM | SYSTOLIC BLOOD PRESSURE: 125 MMHG | HEIGHT: 60 IN | TEMPERATURE: 97.5 F

## 2022-03-23 DIAGNOSIS — I48.0 PAROXYSMAL ATRIAL FIBRILLATION: Primary | ICD-10-CM

## 2022-03-23 DIAGNOSIS — C83.38 DIFFUSE LARGE B-CELL LYMPHOMA OF LYMPH NODES OF MULTIPLE REGIONS: ICD-10-CM

## 2022-03-23 DIAGNOSIS — I48.0 PAROXYSMAL ATRIAL FIBRILLATION: ICD-10-CM

## 2022-03-23 LAB
DIGOXIN SERPL-MCNC: 1.1 NG/ML (ref 0.6–1.2)
MAGNESIUM SERPL-MCNC: 1.9 MG/DL (ref 1.6–2.4)

## 2022-03-23 PROCEDURE — 36561 INSERT TUNNELED CV CATH: CPT | Performed by: SURGERY

## 2022-03-23 PROCEDURE — 77001 FLUOROGUIDE FOR VEIN DEVICE: CPT | Performed by: SURGERY

## 2022-03-23 PROCEDURE — 80162 ASSAY OF DIGOXIN TOTAL: CPT

## 2022-03-23 PROCEDURE — C1788 PORT, INDWELLING, IMP: HCPCS | Performed by: SURGERY

## 2022-03-23 PROCEDURE — 25010000002 CEFAZOLIN IN DEXTROSE 2-4 GM/100ML-% SOLUTION: Performed by: SURGERY

## 2022-03-23 PROCEDURE — 76000 FLUOROSCOPY <1 HR PHYS/QHP: CPT | Performed by: SURGERY

## 2022-03-23 PROCEDURE — 25010000002 PROPOFOL 10 MG/ML EMULSION: Performed by: NURSE ANESTHETIST, CERTIFIED REGISTERED

## 2022-03-23 PROCEDURE — 36415 COLL VENOUS BLD VENIPUNCTURE: CPT

## 2022-03-23 PROCEDURE — 83735 ASSAY OF MAGNESIUM: CPT

## 2022-03-23 PROCEDURE — 25010000002 HEPARIN (PORCINE) PER 1000 UNITS: Performed by: SURGERY

## 2022-03-23 DEVICE — IMPLANTABLE DEVICE: Type: IMPLANTABLE DEVICE | Site: CHEST | Status: FUNCTIONAL

## 2022-03-23 RX ORDER — CEFAZOLIN SODIUM 2 G/100ML
2 INJECTION, SOLUTION INTRAVENOUS ONCE
Status: COMPLETED | OUTPATIENT
Start: 2022-03-23 | End: 2022-03-23

## 2022-03-23 RX ORDER — HYDROCODONE BITARTRATE AND ACETAMINOPHEN 5; 325 MG/1; MG/1
1 TABLET ORAL ONCE AS NEEDED
Status: DISCONTINUED | OUTPATIENT
Start: 2022-03-23 | End: 2022-03-23 | Stop reason: HOSPADM

## 2022-03-23 RX ORDER — SODIUM CHLORIDE 0.9 % (FLUSH) 0.9 %
10 SYRINGE (ML) INJECTION AS NEEDED
Status: DISCONTINUED | OUTPATIENT
Start: 2022-03-23 | End: 2022-03-23 | Stop reason: HOSPADM

## 2022-03-23 RX ORDER — EPHEDRINE SULFATE 50 MG/ML
5 INJECTION, SOLUTION INTRAVENOUS ONCE AS NEEDED
Status: DISCONTINUED | OUTPATIENT
Start: 2022-03-23 | End: 2022-03-23 | Stop reason: HOSPADM

## 2022-03-23 RX ORDER — HYDROMORPHONE HYDROCHLORIDE 1 MG/ML
0.5 INJECTION, SOLUTION INTRAMUSCULAR; INTRAVENOUS; SUBCUTANEOUS
Status: DISCONTINUED | OUTPATIENT
Start: 2022-03-23 | End: 2022-03-23 | Stop reason: HOSPADM

## 2022-03-23 RX ORDER — FLUMAZENIL 0.1 MG/ML
0.2 INJECTION INTRAVENOUS AS NEEDED
Status: DISCONTINUED | OUTPATIENT
Start: 2022-03-23 | End: 2022-03-23 | Stop reason: HOSPADM

## 2022-03-23 RX ORDER — SODIUM CHLORIDE 0.9 % (FLUSH) 0.9 %
10 SYRINGE (ML) INJECTION EVERY 12 HOURS SCHEDULED
Status: DISCONTINUED | OUTPATIENT
Start: 2022-03-23 | End: 2022-03-23 | Stop reason: HOSPADM

## 2022-03-23 RX ORDER — BUPIVACAINE HYDROCHLORIDE 2.5 MG/ML
INJECTION, SOLUTION EPIDURAL; INFILTRATION; INTRACAUDAL AS NEEDED
Status: DISCONTINUED | OUTPATIENT
Start: 2022-03-23 | End: 2022-03-23 | Stop reason: HOSPADM

## 2022-03-23 RX ORDER — TRAMADOL HYDROCHLORIDE 50 MG/1
50 TABLET ORAL EVERY 6 HOURS PRN
Qty: 12 TABLET | Refills: 0 | Status: SHIPPED | OUTPATIENT
Start: 2022-03-23 | End: 2022-04-07

## 2022-03-23 RX ORDER — HYDROCODONE BITARTRATE AND ACETAMINOPHEN 7.5; 325 MG/1; MG/1
1 TABLET ORAL EVERY 4 HOURS PRN
Status: DISCONTINUED | OUTPATIENT
Start: 2022-03-23 | End: 2022-03-23 | Stop reason: HOSPADM

## 2022-03-23 RX ORDER — ONDANSETRON 2 MG/ML
4 INJECTION INTRAMUSCULAR; INTRAVENOUS ONCE AS NEEDED
Status: DISCONTINUED | OUTPATIENT
Start: 2022-03-23 | End: 2022-03-23 | Stop reason: HOSPADM

## 2022-03-23 RX ORDER — PROPOFOL 10 MG/ML
VIAL (ML) INTRAVENOUS AS NEEDED
Status: DISCONTINUED | OUTPATIENT
Start: 2022-03-23 | End: 2022-03-23 | Stop reason: SURG

## 2022-03-23 RX ORDER — MIDAZOLAM HYDROCHLORIDE 1 MG/ML
0.5 INJECTION INTRAMUSCULAR; INTRAVENOUS
Status: DISCONTINUED | OUTPATIENT
Start: 2022-03-23 | End: 2022-03-23 | Stop reason: HOSPADM

## 2022-03-23 RX ORDER — PROPOFOL 10 MG/ML
VIAL (ML) INTRAVENOUS CONTINUOUS PRN
Status: DISCONTINUED | OUTPATIENT
Start: 2022-03-23 | End: 2022-03-23 | Stop reason: SURG

## 2022-03-23 RX ORDER — LIDOCAINE HYDROCHLORIDE 10 MG/ML
0.5 INJECTION, SOLUTION EPIDURAL; INFILTRATION; INTRACAUDAL; PERINEURAL ONCE AS NEEDED
Status: DISCONTINUED | OUTPATIENT
Start: 2022-03-23 | End: 2022-03-23 | Stop reason: HOSPADM

## 2022-03-23 RX ORDER — SODIUM CHLORIDE 9 MG/ML
9 INJECTION, SOLUTION INTRAVENOUS CONTINUOUS PRN
Status: DISCONTINUED | OUTPATIENT
Start: 2022-03-23 | End: 2022-03-23 | Stop reason: HOSPADM

## 2022-03-23 RX ORDER — FENTANYL CITRATE 50 UG/ML
50 INJECTION, SOLUTION INTRAMUSCULAR; INTRAVENOUS
Status: DISCONTINUED | OUTPATIENT
Start: 2022-03-23 | End: 2022-03-23 | Stop reason: HOSPADM

## 2022-03-23 RX ADMIN — Medication 50 MG: at 14:41

## 2022-03-23 RX ADMIN — PROPOFOL 100 MCG/KG/MIN: 10 INJECTION, EMULSION INTRAVENOUS at 14:42

## 2022-03-23 RX ADMIN — SODIUM CHLORIDE 9 ML/HR: 9 INJECTION, SOLUTION INTRAVENOUS at 14:01

## 2022-03-23 RX ADMIN — CEFAZOLIN SODIUM 2 G: 2 INJECTION, SOLUTION INTRAVENOUS at 14:03

## 2022-03-23 NOTE — ANESTHESIA POSTPROCEDURE EVALUATION
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 (HCC) [C83.38])    Surgeons: Alin Delgado MD Provider: Jr Oliva MD    Anesthesia Type: MAC ASA Status: 3          Anesthesia Type: MAC    Vitals  Vitals Value Taken Time   /73 03/23/22 1600   Temp 36.4 °C (97.5 °F) 03/23/22 1527   Pulse 63 03/23/22 1600   Resp 18 03/23/22 1600   SpO2 98 % 03/23/22 1600           Post Anesthesia Care and Evaluation    Patient location during evaluation: bedside  Patient participation: complete - patient participated  Level of consciousness: awake  Pain management: adequate  Airway patency: patent  Anesthetic complications: No anesthetic complications    Cardiovascular status: acceptable  Respiratory status: acceptable  Hydration status: acceptable    Comments: */73 (BP Location: Left arm, Patient Position: Lying)   Pulse 63   Temp 36.4 °C (97.5 °F) (Oral)   Resp 18   Ht 152.4 cm (60\")   Wt 72.6 kg (160 lb 0.9 oz)   SpO2 98%   BMI 31.26 kg/m²         "

## 2022-03-23 NOTE — OP NOTE
Operative Note :   Alin Delgado MD    Michelle ESTRADA Josep  1940    Procedure Date: 03/23/22    Pre-op Diagnosis:  Diffuse large B-cell lymphoma of lymph nodes of multiple regions (HCC) [C83.38]    Post-Op Diagnosis:  Same    Procedure:   · Left subclavian vein approach Myglri-j-Odff placement    Surgeon: Alin Delgado MD    Assistant: None    Anesthesia:  General (general endotracheal tube)    EBL:   minimal    Specimens:   None    Indications:  · 81-year-old female with B-cell lymphoma presenting for port placement for chemotherapy    Findings:   · None    Recommendations:   · Routine port care    Description of procedure:    After obtaining informed consent, the patient was brought to the operating room and placed under monitored anesthesia care.  Chest and neck were bilaterally and sterilely prepped and draped.  The area beneath the left clavicle was anesthetized with a mixture of lidocaine and Marcaine.  The patient was positioned in Trendelenburg.  The left subclavian vein was easily cannulated and a wire was advanced through this under fluoroscopic guidance and directed into the superior vena cava.  The needle was withdrawn.  The wire was clamped into position.  A site for a pocket was marked below this and incision was made and subcutaneous space was then dissected with electrocautery.  Pocket was created bluntly and hemostasis was gained.  Stick site was enlarged with a #11 blade.  The vein dilator and sheath were passed over the wire and directed into the superior vena cava under fluoroscopic guidance.  The vein dilator and wire were then withdrawn and replaced with the 8 Bengali catheter which was advanced to the atriocaval junction.  The peel-away sheath was then removed and the catheter was tunneled down into the pocket.  The catheter was cut to the appropriate length, attached to the AngioDynamics vortex port and then the port was secured to the chest wall with 3-0 Prolene.  I was able to easily  withdraw blood and then flushed the port.  The catheter was unlocked with the concentrated heparin solution.  Subcutaneous tissues were reapproximated with 3-0 Vicryl.  The skin was closed with glue.    Alin Delgado MD  General and Endoscopic Surgery  [facility]    [address], [address]  [place], [place], [address]  P: [phone]  F: [phone]

## 2022-03-23 NOTE — ANESTHESIA PREPROCEDURE EVALUATION
Anesthesia Evaluation     NPO Solid Status: > 8 hours             Airway   Mallampati: II  TM distance: >3 FB  Neck ROM: full  no difficulty expected  Dental - normal exam     Pulmonary - normal exam   Cardiovascular - normal exam    (+) hypertension, CAD, dysrhythmias Atrial Fib,       Neuro/Psych  GI/Hepatic/Renal/Endo    (+)   renal disease CRI,     Musculoskeletal     Abdominal  - normal exam   Substance History      OB/GYN          Other      history of cancer    ROS/Med Hx Other: lymphoma                  Anesthesia Plan    ASA 3     MAC   (---------------------------               03/23/22                      1230         ---------------------------   BP:          134/75         Pulse:         68           Resp:          20           Temp:   36.6 °C (97.9 °F)   SpO2:          95%         ---------------------------)  intravenous induction     Anesthetic plan, all risks, benefits, and alternatives have been provided, discussed and informed consent has been obtained with: patient.        CODE STATUS:

## 2022-03-24 ENCOUNTER — TELEPHONE (OUTPATIENT)
Dept: CARDIOLOGY | Facility: CLINIC | Age: 82
End: 2022-03-24

## 2022-03-24 ENCOUNTER — TELEPHONE (OUTPATIENT)
Dept: FAMILY MEDICINE CLINIC | Facility: CLINIC | Age: 82
End: 2022-03-24

## 2022-03-24 ENCOUNTER — HOME CARE VISIT (OUTPATIENT)
Dept: HOME HEALTH SERVICES | Facility: HOME HEALTHCARE | Age: 82
End: 2022-03-24

## 2022-03-24 VITALS
TEMPERATURE: 97.1 F | HEART RATE: 72 BPM | RESPIRATION RATE: 20 BRPM | SYSTOLIC BLOOD PRESSURE: 106 MMHG | OXYGEN SATURATION: 99 % | DIASTOLIC BLOOD PRESSURE: 60 MMHG

## 2022-03-24 PROCEDURE — G0299 HHS/HOSPICE OF RN EA 15 MIN: HCPCS

## 2022-03-24 RX ORDER — SPIRONOLACTONE 25 MG/1
25 TABLET ORAL DAILY
Qty: 90 TABLET | Refills: 1 | Status: SHIPPED | OUTPATIENT
Start: 2022-03-24 | End: 2022-03-25 | Stop reason: SDUPTHER

## 2022-03-24 RX ORDER — CARVEDILOL 3.12 MG/1
3.12 TABLET ORAL 2 TIMES DAILY WITH MEALS
Qty: 180 TABLET | Refills: 1 | Status: SHIPPED | OUTPATIENT
Start: 2022-03-24 | End: 2022-03-25 | Stop reason: SDUPTHER

## 2022-03-24 RX ORDER — DIGOXIN 125 MCG
125 TABLET ORAL
Qty: 90 TABLET | Refills: 1 | Status: SHIPPED | OUTPATIENT
Start: 2022-03-24 | End: 2022-03-25 | Stop reason: SDUPTHER

## 2022-03-24 NOTE — TELEPHONE ENCOUNTER
----- Message from MAGGI Woods sent at 3/24/2022 12:57 PM EDT -----  Can you let the patient know that her magnesium and digoxin levels were within normal limits.

## 2022-03-24 NOTE — TELEPHONE ENCOUNTER
Reviewed results with Michelle Car and she verbalized understanding.    Patient is requesting refills on the following prescriptions as she is almost completely out:    Amiodarone 200mg, daily  Digoxin 125mcg, daily  Spironolactone 25mg, daily  Carvedilol 3.125mg, BID  Apixaban 5mg, BID    If possible, patient would like a 90 day supply of these medications.    Pharmacy: [id number], [place] ([id number] [place] [address] [address])    Please let me know if there is anything else you would like me to do for this patient.    Thank you,  Mallorie Rivera RN  [place]riage Nurse [facility]

## 2022-03-24 NOTE — TELEPHONE ENCOUNTER
Please let et patient know magnesium level is okay and dig level is okay but I do not want it to go any higher.  Please have recheck this in 6 months

## 2022-03-24 NOTE — TELEPHONE ENCOUNTER
ROSALINDA WITH [facility] CALLED TO REQUEST 3 MORE WEEKS OF SKILLED NURSING    VERBAL ORDER CAN BE PROVIDED BY CALLING ROSALINDA [phone]

## 2022-03-25 ENCOUNTER — HOSPITAL ENCOUNTER (OUTPATIENT)
Dept: CARDIOLOGY | Facility: HOSPITAL | Age: 82
Discharge: HOME | End: 2022-03-25
Admitting: INTERNAL MEDICINE

## 2022-03-25 VITALS
OXYGEN SATURATION: 98 % | WEIGHT: 160 LBS | BODY MASS INDEX: 31.41 KG/M2 | SYSTOLIC BLOOD PRESSURE: 120 MMHG | HEART RATE: 70 BPM | DIASTOLIC BLOOD PRESSURE: 80 MMHG | HEIGHT: 60 IN

## 2022-03-25 DIAGNOSIS — I34.0 NONRHEUMATIC MITRAL VALVE REGURGITATION: ICD-10-CM

## 2022-03-25 DIAGNOSIS — Z91.89 AT RISK FOR INJURY FROM CHEMOTHERAPY: ICD-10-CM

## 2022-03-25 PROCEDURE — 93325 DOPPLER ECHO COLOR FLOW MAPG: CPT | Performed by: INTERNAL MEDICINE

## 2022-03-25 PROCEDURE — 93321 DOPPLER ECHO F-UP/LMTD STD: CPT | Performed by: INTERNAL MEDICINE

## 2022-03-25 PROCEDURE — 93356 MYOCRD STRAIN IMG SPCKL TRCK: CPT | Performed by: INTERNAL MEDICINE

## 2022-03-25 PROCEDURE — 93308 TTE F-UP OR LMTD: CPT

## 2022-03-25 PROCEDURE — 93321 DOPPLER ECHO F-UP/LMTD STD: CPT

## 2022-03-25 PROCEDURE — 93356 MYOCRD STRAIN IMG SPCKL TRCK: CPT

## 2022-03-25 PROCEDURE — 93308 TTE F-UP OR LMTD: CPT | Performed by: INTERNAL MEDICINE

## 2022-03-25 PROCEDURE — 93325 DOPPLER ECHO COLOR FLOW MAPG: CPT

## 2022-03-25 PROCEDURE — 25010000002 PERFLUTREN (DEFINITY) 8.476 MG IN SODIUM CHLORIDE (PF) 0.9 % 10 ML INJECTION: Performed by: INTERNAL MEDICINE

## 2022-03-25 RX ORDER — DIGOXIN 125 MCG
125 TABLET ORAL
Qty: 90 TABLET | Refills: 1 | Status: SHIPPED | OUTPATIENT
Start: 2022-03-25 | End: 2022-05-04 | Stop reason: HOSPADM

## 2022-03-25 RX ORDER — AMIODARONE HYDROCHLORIDE 200 MG/1
200 TABLET ORAL DAILY
Qty: 90 TABLET | Refills: 1 | Status: SHIPPED | OUTPATIENT
Start: 2022-03-25 | End: 2022-04-16

## 2022-03-25 RX ORDER — SPIRONOLACTONE 25 MG/1
25 TABLET ORAL DAILY
Qty: 90 TABLET | Refills: 1 | Status: SHIPPED | OUTPATIENT
Start: 2022-03-25 | End: 2022-05-04 | Stop reason: HOSPADM

## 2022-03-25 RX ORDER — CARVEDILOL 3.12 MG/1
3.12 TABLET ORAL 2 TIMES DAILY WITH MEALS
Qty: 180 TABLET | Refills: 1 | Status: SHIPPED | OUTPATIENT
Start: 2022-03-25 | End: 2022-12-19 | Stop reason: SDUPTHER

## 2022-03-25 RX ADMIN — PERFLUTREN 1.5 ML: 6.52 INJECTION, SUSPENSION INTRAVENOUS at 13:07

## 2022-03-25 NOTE — HOME HEALTH
PT SEEN BY HH POST HOSPITAL STAY FOR  AFIB, CHF AND PLEURAL EFFUSION REQUIRING THORACENTESIS   PT DX WITH LYMPHOMA NEW L S/C PORT PLACED 3/23   PT REQUESTING HH FOLLOW HER FOR FEW MORE WEEKS SINCE STARTING TREATMENT FOR LYMPHOMA AND NEW PORT RN CALLED PCP FOR FURTHER ORDERS.

## 2022-03-25 NOTE — TELEPHONE ENCOUNTER
Notified Michelle Car that prescriptions have been sent to her pharmacy as requested.     Thank you,  Mallorie Rivera RN  Triage Nurse [facility]

## 2022-03-26 LAB
BH CV ECHO LEFT VENTRICLE GLOBAL LONGITUDINAL STRAIN: -25.4 %
BH CV ECHO MEAS - AO MAX PG: 12.5 MMHG
BH CV ECHO MEAS - AO V2 MAX: 176.5 CM/SEC
BH CV ECHO MEAS - EDV(CUBED): 90.2 ML
BH CV ECHO MEAS - EDV(MOD-SP2): 64 ML
BH CV ECHO MEAS - EDV(MOD-SP4): 54 ML
BH CV ECHO MEAS - EF(MOD-BP): 72.5 %
BH CV ECHO MEAS - EF(MOD-SP2): 71.9 %
BH CV ECHO MEAS - EF(MOD-SP4): 72.2 %
BH CV ECHO MEAS - ESV(CUBED): 26.3 ML
BH CV ECHO MEAS - ESV(MOD-SP2): 18 ML
BH CV ECHO MEAS - ESV(MOD-SP4): 15 ML
BH CV ECHO MEAS - FS: 33.7 %
BH CV ECHO MEAS - IVS/LVPW: 0.95 CM
BH CV ECHO MEAS - IVSD: 1.17 CM
BH CV ECHO MEAS - LAT PEAK E' VEL: 7.7 CM/SEC
BH CV ECHO MEAS - LV DIASTOLIC VOL/BSA (35-75): 31.7 CM2
BH CV ECHO MEAS - LV MASS(C)D: 198.6 GRAMS
BH CV ECHO MEAS - LV SYSTOLIC VOL/BSA (12-30): 8.8 CM2
BH CV ECHO MEAS - LVIDD: 4.5 CM
BH CV ECHO MEAS - LVIDS: 3 CM
BH CV ECHO MEAS - LVPWD: 1.24 CM
BH CV ECHO MEAS - MED PEAK E' VEL: 6.3 CM/SEC
BH CV ECHO MEAS - MR MAX PG: 111.4 MMHG
BH CV ECHO MEAS - MR MAX VEL: 527.7 CM/SEC
BH CV ECHO MEAS - MV A DUR: 0.1 SEC
BH CV ECHO MEAS - MV A MAX VEL: 118.1 CM/SEC
BH CV ECHO MEAS - MV DEC SLOPE: 379 CM/SEC2
BH CV ECHO MEAS - MV DEC TIME: 0.27 MSEC
BH CV ECHO MEAS - MV E MAX VEL: 94.1 CM/SEC
BH CV ECHO MEAS - MV E/A: 0.8
BH CV ECHO MEAS - MV MAX PG: 6.5 MMHG
BH CV ECHO MEAS - MV MEAN PG: 3.1 MMHG
BH CV ECHO MEAS - MV V2 VTI: 43.6 CM
BH CV ECHO MEAS - SI(MOD-SP2): 27 ML/M2
BH CV ECHO MEAS - SI(MOD-SP4): 22.9 ML/M2
BH CV ECHO MEAS - SV(MOD-SP2): 46 ML
BH CV ECHO MEAS - SV(MOD-SP4): 39 ML
BH CV ECHO MEAS - TR MAX PG: 21.8 MMHG
BH CV ECHO MEAS - TR MAX VEL: 233.5 CM/SEC
BH CV ECHO MEASUREMENTS AVERAGE E/E' RATIO: 13.44
BH CV XLRA - TDI S': 11.7 CM/SEC
LEFT ATRIUM VOLUME INDEX: 41.4 ML/M2
LV EF 2D ECHO EST: 73 %
MAXIMAL PREDICTED HEART RATE: 140 BPM
STRESS TARGET HR: 119 BPM

## 2022-03-28 ENCOUNTER — TELEPHONE (OUTPATIENT)
Dept: ENDOCRINOLOGY | Age: 82
End: 2022-03-28

## 2022-03-28 ENCOUNTER — INFUSION (OUTPATIENT)
Dept: ONCOLOGY | Facility: HOSPITAL | Age: 82
End: 2022-03-28

## 2022-03-28 ENCOUNTER — CLINICAL SUPPORT (OUTPATIENT)
Dept: OTHER | Facility: HOSPITAL | Age: 82
End: 2022-03-28

## 2022-03-28 ENCOUNTER — OFFICE VISIT (OUTPATIENT)
Dept: ONCOLOGY | Facility: CLINIC | Age: 82
End: 2022-03-28

## 2022-03-28 VITALS
TEMPERATURE: 98 F | OXYGEN SATURATION: 97 % | BODY MASS INDEX: 31.63 KG/M2 | DIASTOLIC BLOOD PRESSURE: 69 MMHG | SYSTOLIC BLOOD PRESSURE: 112 MMHG | HEIGHT: 60 IN | HEART RATE: 63 BPM | WEIGHT: 161.1 LBS | RESPIRATION RATE: 16 BRPM

## 2022-03-28 VITALS — BODY MASS INDEX: 31.61 KG/M2 | HEIGHT: 60 IN | WEIGHT: 161 LBS

## 2022-03-28 VITALS — SYSTOLIC BLOOD PRESSURE: 90 MMHG | DIASTOLIC BLOOD PRESSURE: 50 MMHG | HEART RATE: 67 BPM

## 2022-03-28 DIAGNOSIS — D64.9 NORMOCHROMIC NORMOCYTIC ANEMIA: ICD-10-CM

## 2022-03-28 DIAGNOSIS — C83.38 DIFFUSE LARGE B-CELL LYMPHOMA OF LYMPH NODES OF MULTIPLE REGIONS: Primary | ICD-10-CM

## 2022-03-28 DIAGNOSIS — J90 BILATERAL PLEURAL EFFUSION: ICD-10-CM

## 2022-03-28 DIAGNOSIS — I48.0 PAROXYSMAL ATRIAL FIBRILLATION WITH RAPID VENTRICULAR RESPONSE: ICD-10-CM

## 2022-03-28 DIAGNOSIS — C83.38 DIFFUSE LARGE B-CELL LYMPHOMA OF LYMPH NODES OF MULTIPLE REGIONS: ICD-10-CM

## 2022-03-28 LAB
ALBUMIN SERPL-MCNC: 3.7 G/DL (ref 3.5–5.2)
ALBUMIN/GLOB SERPL: 1.3 G/DL (ref 1.1–2.4)
ALP SERPL-CCNC: 70 U/L (ref 38–116)
ALT SERPL W P-5'-P-CCNC: 12 U/L (ref 0–33)
ANION GAP SERPL CALCULATED.3IONS-SCNC: 8.5 MMOL/L (ref 5–15)
AST SERPL-CCNC: 19 U/L (ref 0–32)
BASOPHILS # BLD AUTO: 0.06 10*3/MM3 (ref 0–0.2)
BASOPHILS NFR BLD AUTO: 1.3 % (ref 0–1.5)
BILIRUB SERPL-MCNC: 0.4 MG/DL (ref 0.2–1.2)
BUN SERPL-MCNC: 13 MG/DL (ref 6–20)
BUN/CREAT SERPL: 14.4 (ref 7.3–30)
CALCIUM SPEC-SCNC: 8.9 MG/DL (ref 8.5–10.2)
CHLORIDE SERPL-SCNC: 98 MMOL/L (ref 98–107)
CO2 SERPL-SCNC: 27.5 MMOL/L (ref 22–29)
CREAT SERPL-MCNC: 0.9 MG/DL (ref 0.6–1.1)
DEPRECATED RDW RBC AUTO: 52.1 FL (ref 37–54)
EGFRCR SERPLBLD CKD-EPI 2021: 64.8 ML/MIN/1.73
EOSINOPHIL # BLD AUTO: 0.07 10*3/MM3 (ref 0–0.4)
EOSINOPHIL NFR BLD AUTO: 1.5 % (ref 0.3–6.2)
ERYTHROCYTE [DISTWIDTH] IN BLOOD BY AUTOMATED COUNT: 16 % (ref 12.3–15.4)
GLOBULIN UR ELPH-MCNC: 2.9 GM/DL (ref 1.8–3.5)
GLUCOSE SERPL-MCNC: 101 MG/DL (ref 74–124)
HCT VFR BLD AUTO: 34.3 % (ref 34–46.6)
HGB BLD-MCNC: 11.1 G/DL (ref 12–15.9)
IMM GRANULOCYTES # BLD AUTO: 0.01 10*3/MM3 (ref 0–0.05)
IMM GRANULOCYTES NFR BLD AUTO: 0.2 % (ref 0–0.5)
LDH SERPL-CCNC: 244 U/L (ref 99–259)
LYMPHOCYTES # BLD AUTO: 0.91 10*3/MM3 (ref 0.7–3.1)
LYMPHOCYTES NFR BLD AUTO: 20 % (ref 19.6–45.3)
MCH RBC QN AUTO: 28.9 PG (ref 26.6–33)
MCHC RBC AUTO-ENTMCNC: 32.4 G/DL (ref 31.5–35.7)
MCV RBC AUTO: 89.3 FL (ref 79–97)
MONOCYTES # BLD AUTO: 0.45 10*3/MM3 (ref 0.1–0.9)
MONOCYTES NFR BLD AUTO: 9.9 % (ref 5–12)
NEUTROPHILS NFR BLD AUTO: 3.06 10*3/MM3 (ref 1.7–7)
NEUTROPHILS NFR BLD AUTO: 67.1 % (ref 42.7–76)
NRBC BLD AUTO-RTO: 0 /100 WBC (ref 0–0.2)
PLATELET # BLD AUTO: 225 10*3/MM3 (ref 140–450)
PMV BLD AUTO: 8.5 FL (ref 6–12)
POTASSIUM SERPL-SCNC: 4.4 MMOL/L (ref 3.5–4.7)
PROT SERPL-MCNC: 6.6 G/DL (ref 6.3–8)
RBC # BLD AUTO: 3.84 10*6/MM3 (ref 3.77–5.28)
SODIUM SERPL-SCNC: 134 MMOL/L (ref 134–145)
URATE SERPL-MCNC: 4 MG/DL (ref 2.8–7.4)
WBC NRBC COR # BLD: 4.56 10*3/MM3 (ref 3.4–10.8)

## 2022-03-28 PROCEDURE — 25010000002 VINCRISTINE PER 1 MG: Performed by: INTERNAL MEDICINE

## 2022-03-28 PROCEDURE — 96367 TX/PROPH/DG ADDL SEQ IV INF: CPT

## 2022-03-28 PROCEDURE — 25010000002 DOXORUBICIN PER 10 MG: Performed by: INTERNAL MEDICINE

## 2022-03-28 PROCEDURE — 84550 ASSAY OF BLOOD/URIC ACID: CPT

## 2022-03-28 PROCEDURE — 96375 TX/PRO/DX INJ NEW DRUG ADDON: CPT

## 2022-03-28 PROCEDURE — 96413 CHEMO IV INFUSION 1 HR: CPT

## 2022-03-28 PROCEDURE — 25010000002 PEGFILGRASTIM 6 MG/0.6ML PREFILLED SYRINGE KIT: Performed by: INTERNAL MEDICINE

## 2022-03-28 PROCEDURE — 85025 COMPLETE CBC W/AUTO DIFF WBC: CPT

## 2022-03-28 PROCEDURE — 25010000002 FOSAPREPITANT PER 1 MG: Performed by: INTERNAL MEDICINE

## 2022-03-28 PROCEDURE — 96411 CHEMO IV PUSH ADDL DRUG: CPT

## 2022-03-28 PROCEDURE — 96377 APPLICATON ON-BODY INJECTOR: CPT

## 2022-03-28 PROCEDURE — 25010000002 DEXAMETHASONE SODIUM PHOSPHATE 100 MG/10ML SOLUTION: Performed by: INTERNAL MEDICINE

## 2022-03-28 PROCEDURE — 25010000002 DIPHENHYDRAMINE PER 50 MG: Performed by: INTERNAL MEDICINE

## 2022-03-28 PROCEDURE — 83615 LACTATE (LD) (LDH) ENZYME: CPT

## 2022-03-28 PROCEDURE — 25010000002 CYCLOPHOSPHAMIDE 1 GM/5ML SOLUTION 5 ML VIAL: Performed by: INTERNAL MEDICINE

## 2022-03-28 PROCEDURE — 99215 OFFICE O/P EST HI 40 MIN: CPT | Performed by: INTERNAL MEDICINE

## 2022-03-28 PROCEDURE — 96415 CHEMO IV INFUSION ADDL HR: CPT

## 2022-03-28 PROCEDURE — 96417 CHEMO IV INFUS EACH ADDL SEQ: CPT

## 2022-03-28 PROCEDURE — 25010000002 RITUXIMAB 10 MG/ML SOLUTION 50 ML VIAL: Performed by: INTERNAL MEDICINE

## 2022-03-28 PROCEDURE — 25010000002 PALONOSETRON PER 25 MCG: Performed by: INTERNAL MEDICINE

## 2022-03-28 PROCEDURE — 25010000002 RITUXIMAB 10 MG/ML SOLUTION 10 ML VIAL: Performed by: INTERNAL MEDICINE

## 2022-03-28 PROCEDURE — 80053 COMPREHEN METABOLIC PANEL: CPT

## 2022-03-28 RX ORDER — FAMOTIDINE 10 MG/ML
20 INJECTION, SOLUTION INTRAVENOUS ONCE
Status: COMPLETED | OUTPATIENT
Start: 2022-03-28 | End: 2022-03-28

## 2022-03-28 RX ORDER — DOXORUBICIN HYDROCHLORIDE 2 MG/ML
50 INJECTION, SOLUTION INTRAVENOUS ONCE
Status: COMPLETED | OUTPATIENT
Start: 2022-03-28 | End: 2022-03-28

## 2022-03-28 RX ORDER — ACETAMINOPHEN 500 MG
500 TABLET ORAL EVERY 6 HOURS PRN
COMMUNITY

## 2022-03-28 RX ORDER — PALONOSETRON 0.05 MG/ML
0.25 INJECTION, SOLUTION INTRAVENOUS ONCE
Status: CANCELLED | OUTPATIENT
Start: 2022-03-28

## 2022-03-28 RX ORDER — DIPHENHYDRAMINE HYDROCHLORIDE 50 MG/ML
50 INJECTION INTRAMUSCULAR; INTRAVENOUS AS NEEDED
Status: CANCELLED | OUTPATIENT
Start: 2022-03-28

## 2022-03-28 RX ORDER — MEPERIDINE HYDROCHLORIDE 25 MG/ML
25 INJECTION INTRAMUSCULAR; INTRAVENOUS; SUBCUTANEOUS
Status: CANCELLED | OUTPATIENT
Start: 2022-03-28

## 2022-03-28 RX ORDER — ACETAMINOPHEN 325 MG/1
650 TABLET ORAL ONCE
Status: COMPLETED | OUTPATIENT
Start: 2022-03-28 | End: 2022-03-28

## 2022-03-28 RX ORDER — FAMOTIDINE 10 MG/ML
20 INJECTION, SOLUTION INTRAVENOUS AS NEEDED
Status: CANCELLED | OUTPATIENT
Start: 2022-03-28

## 2022-03-28 RX ORDER — SODIUM CHLORIDE 9 MG/ML
250 INJECTION, SOLUTION INTRAVENOUS ONCE
Status: CANCELLED | OUTPATIENT
Start: 2022-03-28

## 2022-03-28 RX ORDER — ACETAMINOPHEN 325 MG/1
650 TABLET ORAL ONCE
Status: CANCELLED | OUTPATIENT
Start: 2022-03-28

## 2022-03-28 RX ORDER — OLANZAPINE 5 MG/1
5 TABLET ORAL ONCE
Status: CANCELLED | OUTPATIENT
Start: 2022-03-28

## 2022-03-28 RX ORDER — PALONOSETRON 0.05 MG/ML
0.25 INJECTION, SOLUTION INTRAVENOUS ONCE
Status: COMPLETED | OUTPATIENT
Start: 2022-03-28 | End: 2022-03-28

## 2022-03-28 RX ORDER — SODIUM CHLORIDE 9 MG/ML
250 INJECTION, SOLUTION INTRAVENOUS ONCE
Status: COMPLETED | OUTPATIENT
Start: 2022-03-28 | End: 2022-03-28

## 2022-03-28 RX ORDER — DOXORUBICIN HYDROCHLORIDE 2 MG/ML
50 INJECTION, SOLUTION INTRAVENOUS ONCE
Status: CANCELLED | OUTPATIENT
Start: 2022-03-28

## 2022-03-28 RX ADMIN — SODIUM CHLORIDE 250 ML: 9 INJECTION, SOLUTION INTRAVENOUS at 08:49

## 2022-03-28 RX ADMIN — CYCLOPHOSPHAMIDE 1280 MG: 200 INJECTION, SOLUTION INTRAVENOUS at 15:18

## 2022-03-28 RX ADMIN — ACETAMINOPHEN 650 MG: 325 TABLET ORAL at 08:49

## 2022-03-28 RX ADMIN — DIPHENHYDRAMINE HYDROCHLORIDE 50 MG: 50 INJECTION INTRAMUSCULAR; INTRAVENOUS at 08:49

## 2022-03-28 RX ADMIN — DOXORUBICIN HYDROCHLORIDE 86 MG: 2 INJECTION, SOLUTION INTRAVENOUS at 14:48

## 2022-03-28 RX ADMIN — FAMOTIDINE 20 MG: 10 INJECTION INTRAVENOUS at 09:11

## 2022-03-28 RX ADMIN — VINCRISTINE SULFATE 2 MG: 1 INJECTION, SOLUTION INTRAVENOUS at 15:03

## 2022-03-28 RX ADMIN — RITUXIMAB 640 MG: 10 INJECTION, SOLUTION INTRAVENOUS at 09:42

## 2022-03-28 RX ADMIN — PALONOSETRON HYDROCHLORIDE 0.25 MG: 0.25 INJECTION, SOLUTION INTRAVENOUS at 13:26

## 2022-03-28 RX ADMIN — DEXAMETHASONE SODIUM PHOSPHATE 12 MG: 10 INJECTION, SOLUTION INTRAMUSCULAR; INTRAVENOUS at 13:26

## 2022-03-28 RX ADMIN — PEGFILGRASTIM 6 MG: KIT SUBCUTANEOUS at 15:41

## 2022-03-28 RX ADMIN — SODIUM CHLORIDE 150 ML: 900 INJECTION, SOLUTION INTRAVENOUS at 13:44

## 2022-03-28 NOTE — PROGRESS NOTES
Subjective     CHIEF COMPLAINT:      Chief Complaint   Patient presents with   • Follow-up     No concerns       HISTORY OF PRESENT ILLNESS:     Michelle Car is a 81 y.o. female patient who returns today for follow up on her lymphoma.  She returns today for follow-up accompanied by her family.  She tolerated the port placement.  No chest pain or shortness of breath today.  No pain at the port sites.    Patient denies having pain in the left flank area.      ROS:  Pertinent ROS is in the HPI.     Past medical, surgical, social and family history were reviewed.     MEDICATIONS:    Current Outpatient Medications:   •  acetaminophen (TYLENOL) 500 MG tablet, Take 500 mg by mouth Every 6 (Six) Hours As Needed for Mild Pain ., Disp: , Rfl:   •  amiodarone (PACERONE) 200 MG tablet, Take 1 tablet by mouth Daily., Disp: 90 tablet, Rfl: 1  •  apixaban (ELIQUIS) 5 MG tablet tablet, Take 1 tablet by mouth Every 12 (Twelve) Hours. Indications: Atrial Fibrillation, Disp: 180 tablet, Rfl: 1  •  Calcium Carbonate (CALTRATE 600 PO), Take 1 tablet by mouth Daily., Disp: , Rfl:   •  carvedilol (COREG) 3.125 MG tablet, Take 1 tablet by mouth 2 (Two) Times a Day With Meals., Disp: 180 tablet, Rfl: 1  •  clotrimazole-betamethasone (Lotrisone) 1-0.05 % cream, Apply  topically to the appropriate area as directed 2 (Two) Times a Day., Disp: 15 g, Rfl: 1  •  digoxin (LANOXIN) 125 MCG tablet, Take 1 tablet by mouth Daily., Disp: 90 tablet, Rfl: 1  •  fexofenadine (ALLEGRA) 60 MG tablet, Take 1 tablet by mouth Daily., Disp: 30 tablet, Rfl: 3  •  losartan (COZAAR) 100 MG tablet, Take 1 tablet by mouth Daily., Disp: 90 tablet, Rfl: 1  •  Multiple Vitamins-Minerals (CENTRUM SILVER) tablet, Take 1 tablet by mouth Daily., Disp: , Rfl:   •  ondansetron (ZOFRAN) 8 MG tablet, Take 1 tablet by mouth Every 8 (Eight) Hours As Needed for Nausea or Vomiting., Disp: 30 tablet, Rfl: 5  •  pravastatin (PRAVACHOL) 40 MG tablet, TAKE 1 TABLET BY MOUTH  "EVERY NIGHT AT BEDTIME, Disp: 90 tablet, Rfl: 1  •  predniSONE (DELTASONE) 50 MG tablet, Take 2 tablets in morning with food on days 2-6 after each cycle., Disp: 10 tablet, Rfl: 5  •  spironolactone (ALDACTONE) 25 MG tablet, Take 1 tablet by mouth Daily., Disp: 90 tablet, Rfl: 1  •  traMADol (ULTRAM) 50 MG tablet, Take 1 tablet by mouth Every 6 (Six) Hours As Needed for Moderate Pain ., Disp: 12 tablet, Rfl: 0    Objective   VITAL SIGNS:     Vitals:    03/28/22 0808   BP: 112/69   Pulse: 63   Resp: 16   Temp: 98 °F (36.7 °C)   TempSrc: Temporal   SpO2: 97%   Weight: 73.1 kg (161 lb 1.6 oz)   Height: 152 cm (59.84\")   PainSc: 0-No pain     Body mass index is 31.63 kg/m².     Wt Readings from Last 5 Encounters:   03/28/22 73.1 kg (161 lb 1.6 oz)   03/25/22 72.6 kg (160 lb)   03/23/22 72.6 kg (160 lb 0.9 oz)   03/21/22 73 kg (161 lb)   03/18/22 72.9 kg (160 lb 12.8 oz)     PHYSICAL EXAMINATION:   GENERAL: The patient appears in good general condition, not in acute distress.   SKIN: No skin rashes.  EYES: No jaundice. No pallor.  LYMPHATICS: No cervical lymphadenopathy.  CHEST: Normal respiratory effort.  Decreased breath sounds at the lung bases with dullness to percussion.  Port in the left upper chest with minimal swelling and ecchymosis.  No tenderness.  CVS: Normal S1-S2.  No murmurs.  ABDOMEN: Soft. No tenderness. No Hepatomegaly. No Splenomegaly. No masses.  EXTREMITIES: No edema or calf tenderness.     DIAGNOSTIC DATA:     Results from last 7 days   Lab Units 03/28/22  0750   WBC 10*3/mm3 4.56   NEUTROS ABS 10*3/mm3 3.06   HEMOGLOBIN g/dL 11.1*   HEMATOCRIT % 34.3   PLATELETS 10*3/mm3 225     Results from last 7 days   Lab Units 03/28/22  0750 03/23/22  1649   SODIUM mmol/L 134  --    POTASSIUM mmol/L 4.4  --    CHLORIDE mmol/L 98  --    CO2 mmol/L 27.5  --    BUN mg/dL 13  --    CREATININE mg/dL 0.90  --    CALCIUM mg/dL 8.9  --    ALBUMIN g/dL 3.70  --    BILIRUBIN mg/dL 0.4  --    ALK PHOS U/L 70  --    ALT " (SGPT) U/L 12  --    AST (SGOT) U/L 19  --    GLUCOSE mg/dL 101  --    MAGNESIUM mg/dL  --  1.9     Component      Latest Ref Rng & Units 3/28/2022   Uric Acid      2.8 - 7.4 mg/dL 4.0   LDH      99 - 259 U/L 244     Echocardiogram on 3/26/2022:  · Calculated left ventricular EF = 72.5% Estimated left ventricular EF = 73% Estimated left ventricular EF was in agreement with the calculated left ventricular EF. Left ventricular systolic function is hyperdynamic (EF > 70%). Normal global longitudinal LV strain (GLS) = -25.4%. Left ventricle strain data was reviewed by the physician. Normal left ventricular cavity size noted. Left ventricular wall thickness is consistent with mild concentric hypertrophy. All left ventricular wall segments contract normally. Left ventricular diastolic function is consistent with (grade II w/high LAP) pseudonormalization.  · Left atrial volume is severely increased. The right atrial cavity is moderately dilated  · No aortic valve regurgitation or stenosis is present. The aortic valve is abnormal in structure. There is moderate calcification of the aortic valve.  · Mild mitral annular calcification is present. Moderate mitral valve regurgitation is present. No significant mitral valve stenosis is present.    Chest x-ray on 3/23/2022:  Subclavian chest port with tip in the lower SVC.  No evidence of pneumothorax.    Assessment/Plan   *Diffuse large B-cell lymphoma.     · Patient was found to have bilateral pleural effusions.    · She underwent right thoracentesis on 2/21/2022 and the left thoracentesis on 2/22/2022.    · Analysis of the fluid revealed involvement with diffuse large B-cell lymphoma.    · FISH analysis for BCL6 rearrangement was positive.    · FISH analysis for BCL 1, BCL-2 and MYC rearrangement was negative.    · CT chest on 2/17/2022 revealed no hilar or mediastinal lymphadenopathy.    · Spleen was reported to be normal in size.  · Peripheral blood flow cytometry on 3/7/2022  was negative.  · PET scan on 3/10/2022 revealed significant hypermetabolism in the left adrenal gland and the surrounding soft tissue with SUV up to 34.5. Hypermetabolism in the left pleural thickening with SUV of 7.1. there was less hypermetabolism in the right adrenal gland and in the right pleura.  · She is considered to have stage III non-bulky disease.  · The degree of hypermetabolism is consistent with high grade lymphoma. This concurs with the pathology exam of the pleural fluid cytology that revealed diffuse large B-cell lymphoma.  · R-IPI score of 3 associated with poor risk. This is associated with overall survival of 55%.   · Due to the patient previously having good performance status, I recommended systemic chemotherapy with R-CHOP with Neulasta support.  I explained that the goal is cure.  With her being 81 years old, she will need close monitoring for any symptoms or signs of toxicity.  · Patient had port placed.  She had chemotherapy education.  · Echocardiogram revealed normal ejection fraction.  · She is ready to proceed with chemotherapy today.    *Bilateral pleural effusions.   · She is s/p right thoracentesis on 2/21/2022 and the effusion was found to be malignant attributed to the lymphoma.   · No evidence of worsening effusions on today's evaluation.    *Normochromic normocytic anemia.  · Hemoglobin is at 11.1 today.  · We will obtain iron studies today to determine if she has become iron deficient.    *Paroxysmal atrial fibrillation.  · She is on Eliquis 5 mg twice daily.  · She is on amiodarone.  · There is a possible drug interaction between Adriamycin and amiodarone.  · I discussed the case with her cardiologist, Dr. Nichole.  · Dr. Nichole indicated that some pharmacy literature reports interaction between amiodarone and Adriamycin resulting in increase in the Adriamycin level.  However, there is no consensus regarding the best approach (dose reduction of Adriamycin versus changing treatment from  amiodarone to a different antiarrhythmic with the latter being complicated by prolonged half-life of amiodarone).  · We will therefore proceed with Adriamycin at full dose and monitor for hematologic side effects.  · Patient will not be given Zyprexa due to the possible drug drug interaction.    PLAN:    1.  Start chemotherapy treatment today with R-CHOP.   2.  Patient will have on body Neulasta.   3.  Patient will take prednisone daily x5 on days 2-6.    4.  Return in 1 week for CBC and RN review.  She will see the nurse practitioner in 2 weeks for toxicity assessment.  I will see her in follow-up in 3 weeks.  She will be scheduled for cycle #2.   5.  Plan to repeat CT scans after cycles #2 and #4.  I recommended obtaining a follow-up PET scan after cycle #6.      I spent 50 minutes caring for Michelle on this date of service. This time includes time spent by me in the following activities: preparing for the visit, reviewing tests, obtaining and/or reviewing a separately obtained history, performing a medically appropriate examination and/or evaluation, counseling and educating the patient/family/caregiver, ordering medications, tests, or procedures, referring and communicating with other health care professionals, documenting information in the medical record, independently interpreting results and communicating that information with the patient/family/caregiver and care coordination       Renate Galo MD  03/28/22

## 2022-03-28 NOTE — PROGRESS NOTES
"Outpatient Oncology Nutrition  Assessment/PES    Patient Name:  Michelle Car  YOB: 1940  MRN: [id number]    Assessment Date:  3/28/2022    Comments:  Patient here for R-CHOP treatment. The patient was sleeping and did not disturb. Family member present but on the phone. Left nutrition booklet and contact info. Will follow as needed and be available for any questions.      General Info     Row Name 03/28/22 1617       Today's Session    Person(s) attending today's session Family       General Information    Oncology patient? yes  diffuse large B cell lymphoma       Oncology Specific Assessment    Type of treatment Chemotherapy    Frequency of treatment R-CHOP                 Anthropometrics     Row Name 03/28/22 1618          Anthropometrics    Height 152 cm (59.84\")     Weight 73 kg (161 lb)     Weight for Calculation 73 kg (161 lb)     Additional Documentation Ideal Body Weight (IBW) (Group)            Ideal Body Weight (IBW)    Retired Ideal Body Weight (IBW) (kg) 45.5     Retired % Ideal Body Weight 160.5            Usual Body Weight (UBW)    Usual Body Weight 72.6 kg (160 lb)     Retired % Usual Body Weight 100.63            Retired Body Mass Index (BMI)    Retired BMI (kg/m2) 31.67                    Estimated/Assessed Needs - Anthropometrics     Row Name 03/28/22 1618          Anthropometrics    Height 152 cm (59.84\")     Weight 73 kg (161 lb)     Weight for Calculation 73 kg (161 lb)     Additional Documentation Ideal Body Weight (IBW) (Group)            Usual Body Weight (UBW)    Usual Body Weight 72.6 kg (160 lb)            Estimated/Assessed Needs    Additional Documentation KCAL/KG (Group);Protein Requirements (Group);Fluid Requirements (Group)            KCAL/KG    KCAL/KG 25 Kcal/Kg (kcal)     25 Kcal/Kg (kcal) 1825.725            Protein Requirements    Weight Used For Protein Calculations 73 kg (161 lb)     Est Protein Requirement Amount (gms/kg) 1.2 gm protein     Estimated " Protein Requirements (gms/day) 87.63            Fluid Requirements    Fluid Requirements (mL/day) 1800     Estimated Fluid Requirement Method RDA Method     RDA Method (mL) 1800                  Labs/Tests/Procedures/Meds     Row Name 03/28/22 1619          Labs/Procedures/Meds    Lab Results Reviewed reviewed            Diagnostic Tests/Procedures    Diagnostic Test/Procedure Reviewed reviewed            Medications    Pertinent Medications Reviewed reviewed     Pertinent Medications Comments eliquis, caltrate, cozaar, centrum, zofran, deltasone                   Problem/Interventions:   Problem 1     Row Name 03/28/22 1620          Nutrition Diagnoses Problem 1    Problem 1 Nutrition Appropriate for Condition at this Time     Etiology (related to) Medical Diagnosis     Oncology Lymphoma     Signs/Symptoms (evidenced by) Potential Information Deficit                        Intervention Goal     Row Name 03/28/22 1620          Intervention Goal    General Provide information regarding MNT for treatment/condition     PO Tolerate PO;Meet estimated needs     Weight No significant weight loss                    Education/Evaluation     Row Name 03/28/22 1620          Education    Education Provided education regarding;Education topics     Education Topics --  nutrition during cancer treatment                 Electronically signed by:  Felicita Cruz RD, LD  03/28/22 16:21 EDT

## 2022-03-29 LAB
FERRITIN SERPL-MCNC: 204.4 NG/ML (ref 13–150)
IRON 24H UR-MRATE: 67 MCG/DL (ref 37–145)
IRON SATN MFR SERPL: 22 % (ref 14–48)
TIBC SERPL-MCNC: 305 MCG/DL (ref 249–505)
TRANSFERRIN SERPL-MCNC: 218 MG/DL (ref 200–360)

## 2022-03-29 PROCEDURE — 84466 ASSAY OF TRANSFERRIN: CPT | Performed by: INTERNAL MEDICINE

## 2022-03-29 PROCEDURE — 36415 COLL VENOUS BLD VENIPUNCTURE: CPT | Performed by: INTERNAL MEDICINE

## 2022-03-29 PROCEDURE — 83540 ASSAY OF IRON: CPT | Performed by: INTERNAL MEDICINE

## 2022-03-29 PROCEDURE — 82728 ASSAY OF FERRITIN: CPT | Performed by: INTERNAL MEDICINE

## 2022-04-01 ENCOUNTER — TELEPHONE (OUTPATIENT)
Dept: ONCOLOGY | Facility: CLINIC | Age: 82
End: 2022-04-01

## 2022-04-01 ENCOUNTER — TELEPHONE (OUTPATIENT)
Dept: CARDIOLOGY | Facility: CLINIC | Age: 82
End: 2022-04-01

## 2022-04-01 ENCOUNTER — HOME CARE VISIT (OUTPATIENT)
Dept: HOME HEALTH SERVICES | Facility: HOME HEALTHCARE | Age: 82
End: 2022-04-01

## 2022-04-01 PROCEDURE — G0493 RN CARE EA 15 MIN HH/HOSPICE: HCPCS

## 2022-04-01 NOTE — TELEPHONE ENCOUNTER
"----- Message from Renate Galo MD sent at 4/1/2022  4:13 PM EDT -----  Regarding: FW: ORAL THRUSH?  I recommend the combination mouth wash containing Nystatin - Q 6 hours PRN.     Thank you    ----- Message -----  From: Victorino Scott RN  Sent: 4/1/2022   3:30 PM EDT  To: Renate Galo MD  Subject: ORAL THRUSH?                                     PT W/ C/O \"SORE MOUTH\" TONGUE W/ WHITE COATING    THX    KARLA SCOTT RN    "

## 2022-04-01 NOTE — TELEPHONE ENCOUNTER
Attempted to contact patient to review echo results patient's  line was busy.  Will attempt to contact patient again later.

## 2022-04-01 NOTE — TELEPHONE ENCOUNTER
Patient is not interested in buying the mouthwash at this time d/t the price, she is not hurting anymore at this time. She would like to have someone look in her mouth on Monday. Patient was instructed to call our office if she notices the development of any sores or has any type of pain. She v/u.

## 2022-04-02 VITALS
TEMPERATURE: 97.7 F | WEIGHT: 165.13 LBS | RESPIRATION RATE: 16 BRPM | DIASTOLIC BLOOD PRESSURE: 70 MMHG | OXYGEN SATURATION: 98 % | HEART RATE: 69 BPM | SYSTOLIC BLOOD PRESSURE: 120 MMHG | BODY MASS INDEX: 32.42 KG/M2

## 2022-04-04 ENCOUNTER — LAB (OUTPATIENT)
Dept: LAB | Facility: HOSPITAL | Age: 82
End: 2022-04-04

## 2022-04-04 ENCOUNTER — CLINICAL SUPPORT (OUTPATIENT)
Dept: ONCOLOGY | Facility: HOSPITAL | Age: 82
End: 2022-04-04

## 2022-04-04 DIAGNOSIS — C83.38 DIFFUSE LARGE B-CELL LYMPHOMA OF LYMPH NODES OF MULTIPLE REGIONS: ICD-10-CM

## 2022-04-04 LAB
BASOPHILS # BLD AUTO: 0.01 10*3/MM3 (ref 0–0.2)
BASOPHILS NFR BLD AUTO: 2.6 % (ref 0–1.5)
DEPRECATED RDW RBC AUTO: 48 FL (ref 37–54)
EOSINOPHIL # BLD AUTO: 0.02 10*3/MM3 (ref 0–0.4)
EOSINOPHIL NFR BLD AUTO: 5.3 % (ref 0.3–6.2)
ERYTHROCYTE [DISTWIDTH] IN BLOOD BY AUTOMATED COUNT: 15.8 % (ref 12.3–15.4)
HCT VFR BLD AUTO: 32.8 % (ref 34–46.6)
HGB BLD-MCNC: 11.4 G/DL (ref 12–15.9)
IMM GRANULOCYTES # BLD AUTO: 0.01 10*3/MM3 (ref 0–0.05)
IMM GRANULOCYTES NFR BLD AUTO: 2.6 % (ref 0–0.5)
LYMPHOCYTES # BLD AUTO: 0.26 10*3/MM3 (ref 0.7–3.1)
LYMPHOCYTES NFR BLD AUTO: 68.4 % (ref 19.6–45.3)
MCH RBC QN AUTO: 29.5 PG (ref 26.6–33)
MCHC RBC AUTO-ENTMCNC: 34.8 G/DL (ref 31.5–35.7)
MCV RBC AUTO: 84.8 FL (ref 79–97)
MONOCYTES # BLD AUTO: 0.04 10*3/MM3 (ref 0.1–0.9)
MONOCYTES NFR BLD AUTO: 10.5 % (ref 5–12)
NEUTROPHILS NFR BLD AUTO: 0.04 10*3/MM3 (ref 1.7–7)
NEUTROPHILS NFR BLD AUTO: 10.6 % (ref 42.7–76)
NRBC BLD AUTO-RTO: 0 /100 WBC (ref 0–0.2)
PLATELET # BLD AUTO: 110 10*3/MM3 (ref 140–450)
PMV BLD AUTO: 9.3 FL (ref 6–12)
RBC # BLD AUTO: 3.87 10*6/MM3 (ref 3.77–5.28)
WBC NRBC COR # BLD: 0.38 10*3/MM3 (ref 3.4–10.8)

## 2022-04-04 PROCEDURE — 36415 COLL VENOUS BLD VENIPUNCTURE: CPT

## 2022-04-04 PROCEDURE — 85025 COMPLETE CBC W/AUTO DIFF WBC: CPT

## 2022-04-04 PROCEDURE — G0463 HOSPITAL OUTPT CLINIC VISIT: HCPCS

## 2022-04-04 RX ORDER — AMOXICILLIN AND CLAVULANATE POTASSIUM 875; 125 MG/1; MG/1
1 TABLET, FILM COATED ORAL 2 TIMES DAILY
Qty: 14 TABLET | Refills: 0 | Status: SHIPPED | OUTPATIENT
Start: 2022-04-04 | End: 2022-04-07

## 2022-04-04 RX ORDER — CLOTRIMAZOLE 10 MG/1
10 LOZENGE ORAL; TOPICAL
Qty: 35 TABLET | Refills: 1 | Status: SHIPPED | OUTPATIENT
Start: 2022-04-04 | End: 2022-04-14

## 2022-04-04 NOTE — NURSING NOTE
Pt here for CBC and review. Pt's daughter sat in on appointment. Pt had R-CHOP on 3/28. WBC dropped significantly to 0.38 and ANC is 0.04. Pt reports feeling weak, tired, wanting to sleep all day, chills without fever, and sore throat. Pt does have some white patches on tongue and redness at the back of the throat, pt reported nystatin being too expensive and has just been doing salt water rinses. Consulted with Alisson Foster NP and ordered the patient Augmentin x7 days and Mycelex x7 days. These were sent into her pharmacy. Copy of labs provided and encouraged pt to call office if having persistent fevers or any worsening symptoms. Also education on neutropenic precautions. Pt and daughter both voiced understanding.    Lab Results   Component Value Date    WBC 0.38 (C) 04/04/2022    HGB 11.4 (L) 04/04/2022    HCT 32.8 (L) 04/04/2022    MCV 84.8 04/04/2022     (L) 04/04/2022

## 2022-04-05 NOTE — TELEPHONE ENCOUNTER
Reviewed echo results with patient.  All questions were answered and patient verbalized understanding.

## 2022-04-07 ENCOUNTER — OFFICE VISIT (OUTPATIENT)
Dept: FAMILY MEDICINE CLINIC | Facility: CLINIC | Age: 82
End: 2022-04-07

## 2022-04-07 VITALS
BODY MASS INDEX: 31.29 KG/M2 | DIASTOLIC BLOOD PRESSURE: 98 MMHG | OXYGEN SATURATION: 99 % | WEIGHT: 159.4 LBS | TEMPERATURE: 97.8 F | HEIGHT: 60 IN | HEART RATE: 78 BPM | SYSTOLIC BLOOD PRESSURE: 148 MMHG

## 2022-04-07 DIAGNOSIS — C83.38 DIFFUSE LARGE B-CELL LYMPHOMA OF LYMPH NODES OF MULTIPLE REGIONS: Primary | ICD-10-CM

## 2022-04-07 DIAGNOSIS — J90 PLEURAL EFFUSION: ICD-10-CM

## 2022-04-07 DIAGNOSIS — I48.0 PAROXYSMAL ATRIAL FIBRILLATION: ICD-10-CM

## 2022-04-07 DIAGNOSIS — I10 PRIMARY HYPERTENSION: ICD-10-CM

## 2022-04-07 PROCEDURE — 71046 X-RAY EXAM CHEST 2 VIEWS: CPT | Performed by: INTERNAL MEDICINE

## 2022-04-07 PROCEDURE — 99214 OFFICE O/P EST MOD 30 MIN: CPT | Performed by: INTERNAL MEDICINE

## 2022-04-07 RX ORDER — LORATADINE 10 MG/1
10 TABLET ORAL DAILY
COMMUNITY
End: 2022-06-02 | Stop reason: ALTCHOICE

## 2022-04-07 NOTE — PROGRESS NOTES
Subjective   Michelle Car is a 81 y.o. female.     Vitals:    04/07/22 1310   BP: 148/98   Pulse: 78   Temp: 97.8 °F (36.6 °C)   SpO2: 99%      Body mass index is 31.66 kg/m².     History of Present Illness   Patient was seen for lymphoma.  Patient is undergoing chemotherapy.  Patient had a large pleural effusion and when the fluid was drawn of a found lymphoma.  Patient is stable but she occasionally gets short of breath.  Patient has gone through a round of chemotherapy.  Patient is getting a chest x-ray.  Patient's blood pressures been running 140s over 80s.  Patient will monitor blood pressure and follow-up.  Patient does have a history of atrial fibrillation but appears to be in sinus rhythm at the present time.  Patient does take amiodarone and thyroid levels are drawn to be drawn.  Patient does take apixaban 5 mg twice daily for clot control and beta-blocker 3.125 mg twice daily for rate control of blood pressure.  Patient is also using digoxin 0.125 g daily.  Previous dig level was 1.1.  He is using this for rate control.    X-Ray  Interpretation report in house X-rays that I personally viewed    Relevant Clinical Issues/Diagnoses/Indications: Pleural effusion chest x-ray        Clinical Findings: Slight pleural effusion at the lung bases but not significant.          Comparative Data: No previous x-ray          Date of Previous X-ray:    Change on current X-ray:        Dictated utilizing Dragon dictation. If there are questions or for further clarification, please contact me.  The following portions of the patient's history were reviewed and updated as appropriate: allergies, current medications, past family history, past medical history, past social history, past surgical history and problem list.    Review of Systems   Constitutional: Positive for fatigue. Negative for fever.   HENT: Positive for congestion. Negative for trouble swallowing.    Eyes: Negative for discharge and visual disturbance.    Respiratory: Negative for choking and shortness of breath.    Cardiovascular: Negative for chest pain and palpitations.   Gastrointestinal: Negative for abdominal pain and blood in stool.   Endocrine: Negative.    Genitourinary: Negative for genital sores and hematuria.   Musculoskeletal: Negative for gait problem and joint swelling.   Skin: Negative for color change, pallor, rash and wound.   Allergic/Immunologic: Positive for environmental allergies. Negative for immunocompromised state.   Neurological: Positive for weakness. Negative for facial asymmetry and speech difficulty.   Psychiatric/Behavioral: Negative for hallucinations and suicidal ideas.       Objective   Physical Exam  Vitals and nursing note reviewed.   Constitutional:       Appearance: Normal appearance. She is well-developed.   HENT:      Head: Normocephalic and atraumatic.      Nose: Nose normal.      Mouth/Throat:      Mouth: Mucous membranes are moist.      Pharynx: Oropharynx is clear.   Eyes:      Extraocular Movements: Extraocular movements intact.      Conjunctiva/sclera: Conjunctivae normal.      Pupils: Pupils are equal, round, and reactive to light.   Cardiovascular:      Rate and Rhythm: Normal rate and regular rhythm.      Heart sounds: Normal heart sounds. No murmur heard.    No friction rub. No gallop.   Pulmonary:      Effort: Pulmonary effort is normal. No respiratory distress.      Breath sounds: Normal breath sounds. No stridor. No wheezing, rhonchi or rales.   Chest:      Chest wall: No tenderness.   Abdominal:      General: Bowel sounds are normal.      Palpations: Abdomen is soft.   Musculoskeletal:         General: Normal range of motion.      Cervical back: Normal range of motion and neck supple.   Skin:     General: Skin is warm and dry.   Neurological:      General: No focal deficit present.      Mental Status: She is alert and oriented to person, place, and time. Mental status is at baseline.      Motor: Weakness  present.      Coordination: Coordination abnormal.      Gait: Gait abnormal.   Psychiatric:         Mood and Affect: Mood normal.         Behavior: Behavior normal.         Thought Content: Thought content normal.         Judgment: Judgment normal.         Assessment/Plan #1 chest x-ray #2 thyroid level #3 monitor weight and blood pressure #4 follow-up in 3 months  Problems Addressed this Visit        Cardiac and Vasculature    Hypertension       Hematology and Neoplasia    Diffuse large B-cell lymphoma of lymph nodes of multiple regions (HCC) - Primary    Relevant Orders    XR Chest 2 View (Completed)      Other Visit Diagnoses     Paroxysmal atrial fibrillation (HCC)        Relevant Orders    Thyroid Panel With TSH    Pleural effusion        Relevant Medications    loratadine (Claritin) 10 MG tablet    Other Relevant Orders    XR Chest 2 View (Completed)      Diagnoses     Diagnosis Codes Comments    Diffuse large B-cell lymphoma of lymph nodes of multiple regions (HCC)    -  Primary ICD-10-CM: C83.38  ICD-9-CM: 202.88     Primary hypertension     ICD-10-CM: I10  ICD-9-CM: 401.9     Paroxysmal atrial fibrillation (HCC)     ICD-10-CM: I48.0  ICD-9-CM: 427.31     Pleural effusion     ICD-10-CM: J90  ICD-9-CM: 511.9

## 2022-04-08 ENCOUNTER — HOME CARE VISIT (OUTPATIENT)
Dept: HOME HEALTH SERVICES | Facility: HOME HEALTHCARE | Age: 82
End: 2022-04-08

## 2022-04-08 PROCEDURE — G0495 RN CARE TRAIN/EDU IN HH: HCPCS

## 2022-04-08 PROCEDURE — G0493 RN CARE EA 15 MIN HH/HOSPICE: HCPCS

## 2022-04-08 NOTE — HOME HEALTH
D/C NEXT SNV- R/T CHF MANAGEMENT    F/U CHANGE ORDERS FROM DR LANGLEY APPT 4/14  PT SCEDULED FOR 2D DOSE CHEMO 4/18

## 2022-04-09 VITALS
OXYGEN SATURATION: 98 % | HEART RATE: 71 BPM | RESPIRATION RATE: 16 BRPM | TEMPERATURE: 98.1 F | SYSTOLIC BLOOD PRESSURE: 124 MMHG | DIASTOLIC BLOOD PRESSURE: 70 MMHG

## 2022-04-11 ENCOUNTER — LAB (OUTPATIENT)
Dept: LAB | Facility: HOSPITAL | Age: 82
End: 2022-04-11

## 2022-04-11 ENCOUNTER — OFFICE VISIT (OUTPATIENT)
Dept: ONCOLOGY | Facility: CLINIC | Age: 82
End: 2022-04-11

## 2022-04-11 VITALS
RESPIRATION RATE: 16 BRPM | WEIGHT: 159.3 LBS | OXYGEN SATURATION: 98 % | DIASTOLIC BLOOD PRESSURE: 76 MMHG | HEIGHT: 60 IN | BODY MASS INDEX: 31.28 KG/M2 | TEMPERATURE: 97.1 F | SYSTOLIC BLOOD PRESSURE: 117 MMHG | HEART RATE: 66 BPM

## 2022-04-11 DIAGNOSIS — C83.38 DIFFUSE LARGE B-CELL LYMPHOMA OF LYMPH NODES OF MULTIPLE REGIONS: ICD-10-CM

## 2022-04-11 DIAGNOSIS — C83.38 DIFFUSE LARGE B-CELL LYMPHOMA OF LYMPH NODES OF MULTIPLE REGIONS: Primary | ICD-10-CM

## 2022-04-11 DIAGNOSIS — R53.83 FATIGUE, UNSPECIFIED TYPE: ICD-10-CM

## 2022-04-11 LAB
BASOPHILS # BLD AUTO: 0.09 10*3/MM3 (ref 0–0.2)
BASOPHILS NFR BLD AUTO: 0.9 % (ref 0–1.5)
DEPRECATED RDW RBC AUTO: 44.3 FL (ref 37–54)
EOSINOPHIL # BLD AUTO: 0 10*3/MM3 (ref 0–0.4)
EOSINOPHIL NFR BLD AUTO: 0 % (ref 0.3–6.2)
ERYTHROCYTE [DISTWIDTH] IN BLOOD BY AUTOMATED COUNT: 15.3 % (ref 12.3–15.4)
HCT VFR BLD AUTO: 28.3 % (ref 34–46.6)
HGB BLD-MCNC: 10.4 G/DL (ref 12–15.9)
IMM GRANULOCYTES # BLD AUTO: 0.54 10*3/MM3 (ref 0–0.05)
IMM GRANULOCYTES NFR BLD AUTO: 5.6 % (ref 0–0.5)
LYMPHOCYTES # BLD AUTO: 0.61 10*3/MM3 (ref 0.7–3.1)
LYMPHOCYTES NFR BLD AUTO: 6.3 % (ref 19.6–45.3)
MCH RBC QN AUTO: 29.5 PG (ref 26.6–33)
MCHC RBC AUTO-ENTMCNC: 36.7 G/DL (ref 31.5–35.7)
MCV RBC AUTO: 80.2 FL (ref 79–97)
MONOCYTES # BLD AUTO: 0.84 10*3/MM3 (ref 0.1–0.9)
MONOCYTES NFR BLD AUTO: 8.7 % (ref 5–12)
NEUTROPHILS NFR BLD AUTO: 7.55 10*3/MM3 (ref 1.7–7)
NEUTROPHILS NFR BLD AUTO: 78.5 % (ref 42.7–76)
NRBC BLD AUTO-RTO: 0 /100 WBC (ref 0–0.2)
PLATELET # BLD AUTO: 266 10*3/MM3 (ref 140–450)
PMV BLD AUTO: 9.1 FL (ref 6–12)
RBC # BLD AUTO: 3.53 10*6/MM3 (ref 3.77–5.28)
WBC NRBC COR # BLD: 9.63 10*3/MM3 (ref 3.4–10.8)

## 2022-04-11 PROCEDURE — 36415 COLL VENOUS BLD VENIPUNCTURE: CPT

## 2022-04-11 PROCEDURE — 99214 OFFICE O/P EST MOD 30 MIN: CPT

## 2022-04-11 PROCEDURE — 85025 COMPLETE CBC W/AUTO DIFF WBC: CPT

## 2022-04-11 NOTE — PROGRESS NOTES
Subjective     CHIEF COMPLAINT:      Chief Complaint   Patient presents with   • Follow-up     Very weak, some chills       HISTORY OF PRESENT ILLNESS:   Michelle Car is a 81 y.o. female with the above mentioned history returning to the office today for toxicity check following cycle 1 CHOP-R, two weeks ago. She was seen last week for labs with RN review and reported pain and white patches to her tongue. Her ANC was noted to be extremely low at 0.04. She was started on Augmentin and Mycelex. Thankfully, her symptoms have improved, as well as her blood counts. She reports she does feel very fatigued and has some weakness. She also reports a decrease in her appetite. Although, she does reports drinking 1-2 ensure a day. Her weight today remains stable. Additionally, she reports some constipation, nausea, and fullnes. She saw her family doctor last week and was started on Miralax. She reports having little results with Miralax. It has been several days since she had a bowel movement. We discussed beginning senna-s for constipation. We also discussed that her nausea could also be related to her constipation.  However, if nausea persist, we discussed the option of additional nausea medication.     ROS:  Pertinent ROS is in the HPI.     Past medical, surgical, social and family history were reviewed.     MEDICATIONS:    Current Outpatient Medications:   •  acetaminophen (TYLENOL) 500 MG tablet, Take 500 mg by mouth Every 6 (Six) Hours As Needed for Mild Pain ., Disp: , Rfl:   •  amiodarone (PACERONE) 200 MG tablet, Take 1 tablet by mouth Daily., Disp: 90 tablet, Rfl: 1  •  apixaban (ELIQUIS) 5 MG tablet tablet, Take 1 tablet by mouth Every 12 (Twelve) Hours. Indications: Atrial Fibrillation, Disp: 180 tablet, Rfl: 1  •  Calcium Carbonate (CALTRATE 600 PO), Take 1 tablet by mouth Daily., Disp: , Rfl:   •  carvedilol (COREG) 3.125 MG tablet, Take 1 tablet by mouth 2 (Two) Times a Day With Meals., Disp: 180 tablet,  "Rfl: 1  •  clotrimazole (MYCELEX) 10 MG tayo, Take 1 tablet by mouth 5 (Five) Times a Day., Disp: 35 tablet, Rfl: 1  •  clotrimazole (MYCELEX) 10 MG tayo, Take 10 mg by mouth 4 (Four) Times a Day., Disp: , Rfl:   •  digoxin (LANOXIN) 125 MCG tablet, Take 1 tablet by mouth Daily., Disp: 90 tablet, Rfl: 1  •  loratadine (CLARITIN) 10 MG tablet, Take 10 mg by mouth Daily., Disp: , Rfl:   •  losartan (COZAAR) 100 MG tablet, Take 1 tablet by mouth Daily., Disp: 90 tablet, Rfl: 1  •  Multiple Vitamins-Minerals (CENTRUM SILVER) tablet, Take 1 tablet by mouth Daily., Disp: , Rfl:   •  ondansetron (ZOFRAN) 8 MG tablet, Take 1 tablet by mouth Every 8 (Eight) Hours As Needed for Nausea or Vomiting., Disp: 30 tablet, Rfl: 5  •  polyethylene glycol (MIRALAX) 17 GM/SCOOP powder, Take 17 g by mouth Daily., Disp: , Rfl:   •  pravastatin (PRAVACHOL) 40 MG tablet, TAKE 1 TABLET BY MOUTH EVERY NIGHT AT BEDTIME, Disp: 90 tablet, Rfl: 1  •  spironolactone (ALDACTONE) 25 MG tablet, Take 1 tablet by mouth Daily., Disp: 90 tablet, Rfl: 1    Objective   VITAL SIGNS:     Vitals:    04/11/22 1315   BP: 117/76   Pulse: 66   Resp: 16   Temp: 97.1 °F (36.2 °C)   TempSrc: Infrared   SpO2: 98%   Weight: 72.3 kg (159 lb 4.8 oz)   Height: 152 cm (59.84\")   PainSc: 0-No pain     Body mass index is 31.27 kg/m².     Wt Readings from Last 5 Encounters:   04/11/22 72.3 kg (159 lb 4.8 oz)   04/07/22 72.3 kg (159 lb 6.4 oz)   04/01/22 74.9 kg (165 lb 2 oz)   03/28/22 73 kg (161 lb)   03/28/22 73.1 kg (161 lb 1.6 oz)     PHYSICAL EXAMINATION:   GENERAL: The patient appears in good general condition, not in acute distress.   SKIN: No skin rashes.  EYES: No jaundice. No pallor.  LYMPHATICS: No cervical lymphadenopathy.  CHEST: Normal respiratory effort.  Decreased breath sounds at the lung bases with dullness to percussion.  Port in the left upper chest with minimal swelling and ecchymosis.  No tenderness.  CVS: Normal S1-S2.  No murmurs.  ABDOMEN: Soft. " No tenderness. No Hepatomegaly. No Splenomegaly. No masses. No Distention.   EXTREMITIES: No edema or calf tenderness.     DIAGNOSTIC DATA:     Results from last 7 days   Lab Units 04/11/22  1309   WBC 10*3/mm3 9.63   NEUTROS ABS 10*3/mm3 7.55*   HEMOGLOBIN g/dL 10.4*   HEMATOCRIT % 28.3*   PLATELETS 10*3/mm3 266         Component      Latest Ref Rng & Units 3/28/2022   Uric Acid      2.8 - 7.4 mg/dL 4.0   LDH      99 - 259 U/L 244     Echocardiogram on 3/26/2022:  · Calculated left ventricular EF = 72.5% Estimated left ventricular EF = 73% Estimated left ventricular EF was in agreement with the calculated left ventricular EF. Left ventricular systolic function is hyperdynamic (EF > 70%). Normal global longitudinal LV strain (GLS) = -25.4%. Left ventricle strain data was reviewed by the physician. Normal left ventricular cavity size noted. Left ventricular wall thickness is consistent with mild concentric hypertrophy. All left ventricular wall segments contract normally. Left ventricular diastolic function is consistent with (grade II w/high LAP) pseudonormalization.  · Left atrial volume is severely increased. The right atrial cavity is moderately dilated  · No aortic valve regurgitation or stenosis is present. The aortic valve is abnormal in structure. There is moderate calcification of the aortic valve.  · Mild mitral annular calcification is present. Moderate mitral valve regurgitation is present. No significant mitral valve stenosis is present.    Chest x-ray on 3/23/2022:  Subclavian chest port with tip in the lower SVC.  No evidence of pneumothorax.    Assessment/Plan   *Diffuse large B-cell lymphoma.     · Patient was found to have bilateral pleural effusions.    · She underwent right thoracentesis on 2/21/2022 and the left thoracentesis on 2/22/2022.    · Analysis of the fluid revealed involvement with diffuse large B-cell lymphoma.    · FISH analysis for BCL6 rearrangement was positive.    · FISH analysis  for BCL 1, BCL-2 and MYC rearrangement was negative.    · CT chest on 2/17/2022 revealed no hilar or mediastinal lymphadenopathy.    · Spleen was reported to be normal in size.  · Peripheral blood flow cytometry on 3/7/2022 was negative.  · PET scan on 3/10/2022 revealed significant hypermetabolism in the left adrenal gland and the surrounding soft tissue with SUV up to 34.5. Hypermetabolism in the left pleural thickening with SUV of 7.1. there was less hypermetabolism in the right adrenal gland and in the right pleura.  · She is considered to have stage III non-bulky disease.  · The degree of hypermetabolism is consistent with high grade lymphoma. This concurs with the pathology exam of the pleural fluid cytology that revealed diffuse large B-cell lymphoma.  · R-IPI score of 3 associated with poor risk. This is associated with overall survival of 55%.   · Due to the patient previously having good performance status, I recommended systemic chemotherapy with R-CHOP with Neulasta support.  I explained that the goal is cure.  With her being 81 years old, she will need close monitoring for any symptoms or signs of toxicity.  · Patient had port placed.  She had chemotherapy education.  · Echocardiogram revealed normal ejection fraction.  · She is ready to proceed with chemotherapy today.  · ANC, one week after cycle 1 CHOP-R, 0.04. She was started on Augmentin x7 days.  · 4/11/2022, ANC improved to 7.55.      *Bilateral pleural effusions.   · She is s/p right thoracentesis on 2/21/2022 and the effusion was found to be malignant attributed to the lymphoma.   · No evidence of worsening effusions on today's evaluation.    *Normochromic normocytic anemia.  · Hemoglobin is at 10.4 today.  · 3/29/2022, Ferritin 204, iron saturation 22, iron 67    *Paroxysmal atrial fibrillation.  · She is on Eliquis 5 mg twice daily.  · She is on amiodarone.  · There is a possible drug interaction between Adriamycin and amiodarone.  · I  discussed the case with her cardiologist, Dr. Nichole.  · Dr. Nichole indicated that some pharmacy literature reports interaction between amiodarone and Adriamycin resulting in increase in the Adriamycin level.  However, there is no consensus regarding the best approach (dose reduction of Adriamycin versus changing treatment from amiodarone to a different antiarrhythmic with the latter being complicated by prolonged half-life of amiodarone).  · We will therefore proceed with Adriamycin at full dose and monitor for hematologic side effects.  · Patient will not be given Zyprexa due to the possible drug drug interaction.    PLAN:    1. Begin taking Senna-S for constipation  2. Continue to increase caloric intake  3. Follow up in one week for cycle 2, R-Chop with Neulasta support, and Dr. Galo  4.  Patient will take prednisone daily x5 on days 2-6.    5.  Plan to repeat CT scans after cycles #2 and #4.  I recommended obtaining a follow-up PET scan after cycle #6.          I spent 35 minutes caring for Michelle on this date of service. This time includes time spent by me in the following activities: preparing for the visit, reviewing tests, obtaining and/or reviewing a separately obtained history, performing a medically appropriate examination and/or evaluation, counseling and educating the patient/family/caregiver and documenting information in the medical record      MAGGI Goyal  04/11/22

## 2022-04-14 ENCOUNTER — OFFICE VISIT (OUTPATIENT)
Dept: CARDIOLOGY | Facility: CLINIC | Age: 82
End: 2022-04-14

## 2022-04-14 ENCOUNTER — TELEPHONE (OUTPATIENT)
Dept: FAMILY MEDICINE CLINIC | Facility: CLINIC | Age: 82
End: 2022-04-14

## 2022-04-14 VITALS
SYSTOLIC BLOOD PRESSURE: 120 MMHG | BODY MASS INDEX: 30.04 KG/M2 | HEART RATE: 70 BPM | WEIGHT: 153 LBS | DIASTOLIC BLOOD PRESSURE: 70 MMHG | HEIGHT: 60 IN

## 2022-04-14 DIAGNOSIS — I48.0 PAROXYSMAL ATRIAL FIBRILLATION: Primary | ICD-10-CM

## 2022-04-14 DIAGNOSIS — Z51.81 ENCOUNTER FOR MONITORING CARDIOTOXIC DRUG THERAPY: ICD-10-CM

## 2022-04-14 DIAGNOSIS — Z79.899 ENCOUNTER FOR MONITORING CARDIOTOXIC DRUG THERAPY: ICD-10-CM

## 2022-04-14 PROCEDURE — 99214 OFFICE O/P EST MOD 30 MIN: CPT | Performed by: INTERNAL MEDICINE

## 2022-04-14 PROCEDURE — 93000 ELECTROCARDIOGRAM COMPLETE: CPT | Performed by: INTERNAL MEDICINE

## 2022-04-14 NOTE — PROGRESS NOTES
Date of Office Visit: 2022  Encounter Provider: Nasrin Nichole MD  Place of Service: [facility] CARDIOLOGY  Patient Name: Michelle Car  :1940    Chief complaint  Follow-up of edema, hypertension with hypertensive heart disease, diastolic dysfunction and evaluation of shortness of breath and exertional fatigue    History of Present Illness  Patient is a 75-year-old female with hypertension, hyperlipidemia, venous insufficiency who in  was seen for chest pain and possible strokelike symptoms.  A CT angiogram of her chest revealed moderate atheromatous disease of the abdominal aorta but no pulmonary emboli.  Echocardiogram revealed normal systolic function with mild diastolic dysfunction and mild valve regurgitation.  A stress perfusion study was negative for ischemia.  A carotid Doppler study was negative.  In 2022 she was noted to have atrial fibrillation and treated with Eliquis and amiodarone.  Echo at that time showed an ejection fraction 56% with mild left ventricular hypertrophy mild to moderate mitral vegetation, mild tricuspid regurgitation normal right-sided pressures.  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  R-CHOP therapy with Neulasta support was recommended.  However there was pharmacy literature self-reported interaction between amiodarone and Adriamycin with possible increased Adriamycin levels.  There is also potential interaction to carvedilol.  Patient was started on Adriamycin which is started on 3/28/2021.  Digoxin level was 1.1    Patient is active and denies any palpitation shortness of breath dizziness or chest pressure.  She occasionally has edema in ankles.  She states that she has had decreased appetite and at times feels quite weak.  She has unfortunately not been checking her blood pressure or heart rate at home though has a device to do so.  Home health has  been seeing her once a week has not mentioned anything abnormal on there just doctor visits tomorrow.    Past Medical History:   Diagnosis Date   • Abdominal aortic atherosclerosis (HCC)    • Atrial fibrillation (HCC)    • CAD (coronary artery disease)    • Colon polyps    • CRI (chronic renal insufficiency), stage 2 (mild) 2021   • Diffuse large B cell lymphoma (HCC) 02/2022   • Hearing loss    • Hypercalcemia 2016    resolved as of 2019   • Hypercholesterolemia    • Hypertension    • Hyponatremia 02/17/2022   • Venous insufficiency      Past Surgical History:   Procedure Laterality Date   • CHOLECYSTECTOMY N/A    • COLONOSCOPY N/A 2000    1 or 2 polyps, otherwise normal per patient   • VENOUS ACCESS DEVICE (PORT) INSERTION Left 3/23/2022    Procedure: INSERTION VENOUS ACCESS DEVICE;  Surgeon: Alin Delgado MD;  Location: [facility] [place] [facility] [place];  Service: General;  Laterality: Left;     Outpatient Medications Prior to Visit   Medication Sig Dispense Refill   • acetaminophen (TYLENOL) 500 MG tablet Take 500 mg by mouth Every 6 (Six) Hours As Needed for Mild Pain .     • apixaban (ELIQUIS) 5 MG tablet tablet Take 1 tablet by mouth Every 12 (Twelve) Hours. Indications: Atrial Fibrillation 180 tablet 1   • Calcium Carbonate (CALTRATE 600 PO) Take 1 tablet by mouth Daily.     • carvedilol (COREG) 3.125 MG tablet Take 1 tablet by mouth 2 (Two) Times a Day With Meals. 180 tablet 1   • digoxin (LANOXIN) 125 MCG tablet Take 1 tablet by mouth Daily. 90 tablet 1   • loratadine (CLARITIN) 10 MG tablet Take 10 mg by mouth Daily.     • losartan (COZAAR) 100 MG tablet Take 1 tablet by mouth Daily. 90 tablet 1   • Multiple Vitamins-Minerals (CENTRUM SILVER) tablet Take 1 tablet by mouth Daily.     • ondansetron (ZOFRAN) 8 MG tablet Take 1 tablet by mouth Every 8 (Eight) Hours As Needed for Nausea or Vomiting. 30 tablet 5   • polyethylene glycol (MIRALAX) 17 GM/SCOOP powder Take 17 g by mouth As Needed.     • pravastatin (PRAVACHOL) 40  "MG tablet TAKE 1 TABLET BY MOUTH EVERY NIGHT AT BEDTIME 90 tablet 1   • spironolactone (ALDACTONE) 25 MG tablet Take 1 tablet by mouth Daily. 90 tablet 1   • amiodarone (PACERONE) 200 MG tablet Take 1 tablet by mouth Daily. 90 tablet 1   • clotrimazole (MYCELEX) 10 MG tayo Take 1 tablet by mouth 5 (Five) Times a Day. 35 tablet 1   • clotrimazole (MYCELEX) 10 MG tayo Take 10 mg by mouth 4 (Four) Times a Day.       No facility-administered medications prior to visit.       Allergies as of 04/14/2022 - Reviewed 04/14/2022   Allergen Reaction Noted   • Factive [gemifloxacin] Rash 03/30/2016     Social History     Socioeconomic History   • Marital status:    Tobacco Use   • Smoking status: Never Smoker   • Smokeless tobacco: Never Used   Vaping Use   • Vaping Use: Never used   Substance and Sexual Activity   • Alcohol use: No   • Drug use: No   • Sexual activity: Not Currently     Family History   Problem Relation Age of Onset   • Malig Hyperthermia Neg Hx      Review of Systems   Constitutional: Negative for chills, fever, weight gain and weight loss.   Cardiovascular: Negative for leg swelling.   Respiratory: Negative for cough, snoring and wheezing.    Hematologic/Lymphatic: Negative for bleeding problem. Does not bruise/bleed easily.   Skin: Negative for color change.   Musculoskeletal: Negative for falls, joint pain and myalgias.   Gastrointestinal: Negative for melena.   Genitourinary: Negative for hematuria.   Neurological: Negative for excessive daytime sleepiness.   Psychiatric/Behavioral: Negative for depression. The patient is not nervous/anxious.         Objective:     Vitals:    04/14/22 1329   BP: 120/70   Pulse: 70   Weight: 69.4 kg (153 lb)   Height: 152 cm (59.84\")     Body mass index is 30.04 kg/m².    Vitals reviewed.   Constitutional:       Appearance: Well-developed and overweight.   Eyes:      General: No scleral icterus.        Right eye: No discharge.      Conjunctiva/sclera: " Conjunctivae normal.      Pupils: Pupils are equal, round, and reactive to light.   HENT:      Head: Normocephalic.      Nose: Nose normal.   Neck:      Thyroid: No thyromegaly.      Vascular: No JVD.   Pulmonary:      Effort: Pulmonary effort is normal. No respiratory distress.      Breath sounds: Normal breath sounds. No wheezing. No rales.   Cardiovascular:      Normal rate. Regular rhythm. Normal S1. Normal S2.      Murmurs: There is no murmur.      No gallop.   Pulses:     Intact distal pulses.   Edema:     Peripheral edema absent.   Abdominal:      General: Bowel sounds are normal. There is no distension.      Palpations: Abdomen is soft.      Tenderness: There is no abdominal tenderness. There is no rebound.   Musculoskeletal: Normal range of motion.         General: No tenderness.      Cervical back: Normal range of motion and neck supple. Skin:     General: Skin is warm and dry.      Findings: No erythema or rash.   Neurological:      Mental Status: Alert and oriented to person, place, and time.   Psychiatric:         Behavior: Behavior normal.         Thought Content: Thought content normal.         Judgment: Judgment normal.       Lab Review:     ECG 12 Lead    Date/Time: 4/14/2022 11:00 AM  Performed by: Nasrin Nichole MD  Authorized by: Nasrin Nichole MD   Comparison: compared with previous ECG   Comparison to previous ECG: Specific ST-T wave changes slightly more pronounced  Rhythm: sinus rhythm  QRS axis: left  Other findings: non-specific ST-T wave changes    Clinical impression: abnormal EKG          Assessment:    No diagnosis found.  Plan:       1.  Large B-cell lymphoma, receiving R-CHOP therapy with 51 m/m2of doxorubicin exposure at this point.  There is a pharmacy warning of interaction with doxorubicin with amiodarone and carvedilol.  Unfortunately there are limited recommendations for modification of drugs to address this.  After quite a long discussion, we have decided to discontinue amiodarone.   Patient to monitor blood pressure and heart rate over the next week especially when she feels weak and will call with an update next week.  At that time we will consider switching carvedilol to bisoprolol.  She is to continue with Eliquis.  I have strongly recommended that she discuss with family that one of them help her with monitoring the blood pressure and heart rate. May also have to decrease losartan.   We will also see if home health can stay longer and come more frequently.  2.  Cardio oncology care, track 1  3.  Paroxysmal atrial fibrillation.  As above.  She will continue with digoxin and Eliquis.  Digoxin level was upper end of normal but with discontinuing amiodarone this should improve.  It is also unclear if that digoxin level was drawn after she took her pill several hours earlier.  4.  Hypertension.  As above.  If her blood pressure is indeed low, can decrease losartan  5.  Hyperlipidemia  6.  Aortic atheromatous disease    Addendum: Reviewed prior EKGs and ST-T wave changes have progressed slightly over the past several months.  I recommend she proceed with a Lexiscan cardiac stress test.     Time Spent: I spent 35 minutes caring for Michelle on this date of service. This time includes time spent by me in the following activities: preparing for the visit, reviewing tests, obtaining and/or reviewing a separately obtained history, performing a medically appropriate examination and/or evaluation, counseling and educating the patient/family/caregiver, ordering medications, tests, or procedures, documenting information in the medical record and independently interpreting results and communicating that information with the patient/family/caregiver.   I spent 1 minutes on the separately reported service of ECG. This time is not included in the time used to support the E/M service also reported today.        Your medication list          Accurate as of April 14, 2022 11:59 PM. If you have any questions, ask  your nurse or doctor.            CONTINUE taking these medications      Instructions Last Dose Given Next Dose Due   acetaminophen 500 MG tablet  Commonly known as: TYLENOL      Take 500 mg by mouth Every 6 (Six) Hours As Needed for Mild Pain .       apixaban 5 MG tablet tablet  Commonly known as: ELIQUIS      Take 1 tablet by mouth Every 12 (Twelve) Hours. Indications: Atrial Fibrillation       CALTRATE 600 PO      Take 1 tablet by mouth Daily.       carvedilol 3.125 MG tablet  Commonly known as: COREG      Take 1 tablet by mouth 2 (Two) Times a Day With Meals.       Centrum Silver tablet tablet  Generic drug: multivitamin with minerals      Take 1 tablet by mouth Daily.       digoxin 125 MCG tablet  Commonly known as: LANOXIN      Take 1 tablet by mouth Daily.       loratadine 10 MG tablet  Commonly known as: CLARITIN      Take 10 mg by mouth Daily.       losartan 100 MG tablet  Commonly known as: COZAAR      Take 1 tablet by mouth Daily.       ondansetron 8 MG tablet  Commonly known as: ZOFRAN      Take 1 tablet by mouth Every 8 (Eight) Hours As Needed for Nausea or Vomiting.       polyethylene glycol 17 GM/SCOOP powder  Commonly known as: MIRALAX      Take 17 g by mouth As Needed.       pravastatin 40 MG tablet  Commonly known as: PRAVACHOL      TAKE 1 TABLET BY MOUTH EVERY NIGHT AT BEDTIME       spironolactone 25 MG tablet  Commonly known as: ALDACTONE      Take 1 tablet by mouth Daily.          STOP taking these medications    amiodarone 200 MG tablet  Commonly known as: PACERONE  Stopped by: Nasrin Nichole MD        clotrimazole 10 MG tayo  Commonly known as: MYCELEX  Stopped by: Nasrin Nichole MD               Patient is no longer taking -.  I corrected the med list to reflect this.  I did not stop these medications.      Dictated utilizing Dragon dictation

## 2022-04-15 ENCOUNTER — TELEPHONE (OUTPATIENT)
Dept: FAMILY MEDICINE CLINIC | Facility: CLINIC | Age: 82
End: 2022-04-15

## 2022-04-15 ENCOUNTER — HOME CARE VISIT (OUTPATIENT)
Dept: HOME HEALTH SERVICES | Facility: HOME HEALTHCARE | Age: 82
End: 2022-04-15

## 2022-04-15 ENCOUNTER — TELEPHONE (OUTPATIENT)
Dept: CARDIOLOGY | Facility: CLINIC | Age: 82
End: 2022-04-15

## 2022-04-15 DIAGNOSIS — R53.1 WEAKNESS: ICD-10-CM

## 2022-04-15 DIAGNOSIS — C83.38 DIFFUSE LARGE B-CELL LYMPHOMA OF LYMPH NODES OF MULTIPLE REGIONS: Primary | ICD-10-CM

## 2022-04-15 PROCEDURE — G0162 HHC RN E&M PLAN SVS, 15 MIN: HCPCS

## 2022-04-15 NOTE — CASE COMMUNICATION
to Anisa Mena Michael (CC: Rachel Orlando Rhonda): Please see copy of orders below. Patient recently saw Oncologist (Dr. Cyr) on 4/11, and  Dr. Nichole yesterday. The MD office notes can be viewed in EPIC. Dr. Nichole contacted Dr. Pimentel about her concerns and that she needed home health to continue to see patient and so Dr. Pimentel entered the Ambulatory Referral- see specifics for nursing and for PT to re-eval.   Dr. Nichole (& rasta) indicate  patient SN visits need to be increased, needs her BP & HR monitored, CP assessments. Dr. Nichole is concerned patient's BP may decreased due to some of the meds since patient is still on chemo and patient has been c/o increased fatigue. (Patient is receiving Doxorubicin as her chemo drug per Dr. cyr' notes. ) Dr. Nichole indicates that patient has a BP monitor at home and she has not been checking her BP & HR as she is supposed to and so MD in dicated that home RN should work w/ patient and family on this.   If patient does not have a telehealth monitor, do you think that would be of benefit right now since MD's concern about possibility of BP dropping? Let me know if you need me to call MD for any additional orders.    Looks like Dr. Cyr & Dr. Pimentel have made recent med changes- added Miralax and senna s for contipation; was started on Prednisone 4/11, Amiodarone was D/C'd,  and it looked like Dr. Nichole may decrease the Lorsartan if BP is low so please let the MDs know if any abnormal s/s occur or increase, any problems w/ BP or HR.     **I see that patient has insurance [employer] so please make sure that the visits are approved if appropriate before going.    Thanks

## 2022-04-15 NOTE — TELEPHONE ENCOUNTER
I cant find out who home health is but you need to put in order for home health just in case and put nursing-b/p check due to chemo, strength building etc. Anything that can cont. Or get HH. Also suggested by cardio. That she be monitored.

## 2022-04-15 NOTE — TELEPHONE ENCOUNTER
I talked with Dr. Pimentel and he will contact home health to arrange for more frequent visits as we go through adjustments and titrations of cardiac meds and oncology meds.  Appreciate his assistance significantly    Please let patient and family know this.

## 2022-04-15 NOTE — CASE COMMUNICATION
"Rajesh- Please see copy of orders below.         \"AMBULATORY REFERRAL TO HOME HEALTH RECEIVED PER DR. PARSONS.    DIAGNOSIS: DIFFUSE LARGE B-CELL LYMPHOMA OF LYMPH NODES OF MULTIPLE REGIONS (C83.38), WEAKNESS (R53.1).    DESCRIBE MOBILITY LIMITATIONS THAT MAKE LEAVING HOME DIFFICULT: PATIENT IS GETTING CHEMOTHERAPY FOR LYMPHOMA. PATIENT ALSO HAS ATRIAL FIB BUT IS TAKING AMIODARONE AND DIGOXIN AND IS GETTING DOXORUBICIN.  PATIENT WILL NEED H ELP WITH MONITORING BLOOD PRESSURE AND HEART RATE.  SHE WILL NEED THERAPY FOR HER AMBULATING.    NURSING/THERAPEUTIC SERVICES REQUESTED: SKILLED NURSING.  NURSING/THERAPEUTIC SERVICES REQUESTED: PHYSICAL THERAPY.    SKILLED NURSING ORDERS: MEDICATION EDUCATION.  SKILLED NURSING ORDERS: CARDIOPULMONARY ASSESSMENTS.    PT ORDERS: THERAPEUTIC EXERCISE.  PT ORDERS: GAIT TRAINING.  PT ORDERS: STRENGTHENING.  WEIGHT BEARING STATUS: PARTIAL WE IGHT BEARING.  FREQUENCY: 1 WEEK 1"

## 2022-04-16 ENCOUNTER — TELEPHONE (OUTPATIENT)
Dept: CARDIOLOGY | Facility: CLINIC | Age: 82
End: 2022-04-16

## 2022-04-16 VITALS
WEIGHT: 155.38 LBS | RESPIRATION RATE: 16 BRPM | TEMPERATURE: 97.5 F | HEART RATE: 69 BPM | OXYGEN SATURATION: 99 % | DIASTOLIC BLOOD PRESSURE: 62 MMHG | SYSTOLIC BLOOD PRESSURE: 110 MMHG | BODY MASS INDEX: 30.51 KG/M2

## 2022-04-16 NOTE — TELEPHONE ENCOUNTER
Please contact the patient on Monday morning and find out how is her blood pressure and heart rate over the weekend.  Also let her know her EKG had shown slight progression in comparison to prior EKGs I reviewed after she left.  Let her know I think it would be a good idea for her to get a medication stress test and see if she can come do this.      I have placed an order.  Please remind her no caffeine for 24 hours prior to this and nothing to eat or drink after midnight except losartan, Coreg, digoxin and Eliquis.  Can take this with a sip of water.    Kamila, please arrange for this to be done in the next week.

## 2022-04-16 NOTE — HOME HEALTH
snv for cv assessment- AMIDOARONE D/C 4/15    ASSESS NEW PORT SITE LT SUBCLAVIAN  ASSESS/INSTRUCT NUTRITION- POOR APPETITE R/T CHEMO  PT GETS 2D CHEMO INFUSION 4/18

## 2022-04-18 ENCOUNTER — INFUSION (OUTPATIENT)
Dept: ONCOLOGY | Facility: HOSPITAL | Age: 82
End: 2022-04-18

## 2022-04-18 ENCOUNTER — OFFICE VISIT (OUTPATIENT)
Dept: ONCOLOGY | Facility: CLINIC | Age: 82
End: 2022-04-18

## 2022-04-18 VITALS
RESPIRATION RATE: 16 BRPM | OXYGEN SATURATION: 99 % | HEART RATE: 69 BPM | HEIGHT: 60 IN | SYSTOLIC BLOOD PRESSURE: 118 MMHG | WEIGHT: 153.6 LBS | TEMPERATURE: 97.1 F | BODY MASS INDEX: 30.15 KG/M2 | DIASTOLIC BLOOD PRESSURE: 62 MMHG

## 2022-04-18 VITALS — SYSTOLIC BLOOD PRESSURE: 93 MMHG | DIASTOLIC BLOOD PRESSURE: 56 MMHG | HEART RATE: 80 BPM

## 2022-04-18 DIAGNOSIS — J90 BILATERAL PLEURAL EFFUSION: ICD-10-CM

## 2022-04-18 DIAGNOSIS — T45.1X5A CHEMOTHERAPY INDUCED NEUTROPENIA: ICD-10-CM

## 2022-04-18 DIAGNOSIS — C83.38 DIFFUSE LARGE B-CELL LYMPHOMA OF LYMPH NODES OF MULTIPLE REGIONS: ICD-10-CM

## 2022-04-18 DIAGNOSIS — T45.1X5A ANEMIA DUE TO CHEMOTHERAPY: ICD-10-CM

## 2022-04-18 DIAGNOSIS — D69.6 THROMBOCYTOPENIA: ICD-10-CM

## 2022-04-18 DIAGNOSIS — C83.38 DIFFUSE LARGE B-CELL LYMPHOMA OF LYMPH NODES OF MULTIPLE REGIONS: Primary | ICD-10-CM

## 2022-04-18 DIAGNOSIS — D63.0 ANEMIA IN NEOPLASTIC DISEASE: ICD-10-CM

## 2022-04-18 DIAGNOSIS — D64.81 ANEMIA DUE TO CHEMOTHERAPY: ICD-10-CM

## 2022-04-18 DIAGNOSIS — K59.03 DRUG INDUCED CONSTIPATION: ICD-10-CM

## 2022-04-18 DIAGNOSIS — D70.1 CHEMOTHERAPY INDUCED NEUTROPENIA: ICD-10-CM

## 2022-04-18 LAB
ALBUMIN SERPL-MCNC: 3.7 G/DL (ref 3.5–5.2)
ALBUMIN/GLOB SERPL: 1.4 G/DL (ref 1.1–2.4)
ALP SERPL-CCNC: 80 U/L (ref 38–116)
ALT SERPL W P-5'-P-CCNC: 18 U/L (ref 0–33)
ANION GAP SERPL CALCULATED.3IONS-SCNC: 8.2 MMOL/L (ref 5–15)
AST SERPL-CCNC: 17 U/L (ref 0–32)
BASOPHILS # BLD AUTO: 0.15 10*3/MM3 (ref 0–0.2)
BASOPHILS NFR BLD AUTO: 1.8 % (ref 0–1.5)
BILIRUB SERPL-MCNC: 0.3 MG/DL (ref 0.2–1.2)
BUN SERPL-MCNC: 13 MG/DL (ref 6–20)
BUN/CREAT SERPL: 16.7 (ref 7.3–30)
CALCIUM SPEC-SCNC: 9.3 MG/DL (ref 8.5–10.2)
CHLORIDE SERPL-SCNC: 96 MMOL/L (ref 98–107)
CO2 SERPL-SCNC: 26.8 MMOL/L (ref 22–29)
CREAT SERPL-MCNC: 0.78 MG/DL (ref 0.6–1.1)
DEPRECATED RDW RBC AUTO: 50.7 FL (ref 37–54)
EGFRCR SERPLBLD CKD-EPI 2021: 76.9 ML/MIN/1.73
EOSINOPHIL # BLD AUTO: 0.01 10*3/MM3 (ref 0–0.4)
EOSINOPHIL NFR BLD AUTO: 0.1 % (ref 0.3–6.2)
ERYTHROCYTE [DISTWIDTH] IN BLOOD BY AUTOMATED COUNT: 16.5 % (ref 12.3–15.4)
FOLATE SERPL-MCNC: >20 NG/ML (ref 4.78–24.2)
GLOBULIN UR ELPH-MCNC: 2.7 GM/DL (ref 1.8–3.5)
GLUCOSE SERPL-MCNC: 123 MG/DL (ref 74–124)
HCT VFR BLD AUTO: 30.2 % (ref 34–46.6)
HGB BLD-MCNC: 10.1 G/DL (ref 12–15.9)
IMM GRANULOCYTES # BLD AUTO: 0.15 10*3/MM3 (ref 0–0.05)
IMM GRANULOCYTES NFR BLD AUTO: 1.8 % (ref 0–0.5)
LYMPHOCYTES # BLD AUTO: 0.51 10*3/MM3 (ref 0.7–3.1)
LYMPHOCYTES NFR BLD AUTO: 6.1 % (ref 19.6–45.3)
MCH RBC QN AUTO: 29.3 PG (ref 26.6–33)
MCHC RBC AUTO-ENTMCNC: 33.4 G/DL (ref 31.5–35.7)
MCV RBC AUTO: 87.5 FL (ref 79–97)
MONOCYTES # BLD AUTO: 0.74 10*3/MM3 (ref 0.1–0.9)
MONOCYTES NFR BLD AUTO: 8.8 % (ref 5–12)
NEUTROPHILS NFR BLD AUTO: 6.83 10*3/MM3 (ref 1.7–7)
NEUTROPHILS NFR BLD AUTO: 81.4 % (ref 42.7–76)
NRBC BLD AUTO-RTO: 0 /100 WBC (ref 0–0.2)
PLATELET # BLD AUTO: 420 10*3/MM3 (ref 140–450)
PMV BLD AUTO: 8.4 FL (ref 6–12)
POTASSIUM SERPL-SCNC: 4.5 MMOL/L (ref 3.5–4.7)
PROT SERPL-MCNC: 6.4 G/DL (ref 6.3–8)
RBC # BLD AUTO: 3.45 10*6/MM3 (ref 3.77–5.28)
SODIUM SERPL-SCNC: 131 MMOL/L (ref 134–145)
VIT B12 BLD-MCNC: 1614 PG/ML (ref 211–946)
WBC NRBC COR # BLD: 8.39 10*3/MM3 (ref 3.4–10.8)

## 2022-04-18 PROCEDURE — 25010000002 DEXAMETHASONE SODIUM PHOSPHATE 100 MG/10ML SOLUTION: Performed by: INTERNAL MEDICINE

## 2022-04-18 PROCEDURE — 25010000002 CYCLOPHOSPHAMIDE 1 GM/5ML SOLUTION 5 ML VIAL: Performed by: INTERNAL MEDICINE

## 2022-04-18 PROCEDURE — 82746 ASSAY OF FOLIC ACID SERUM: CPT | Performed by: INTERNAL MEDICINE

## 2022-04-18 PROCEDURE — 85025 COMPLETE CBC W/AUTO DIFF WBC: CPT

## 2022-04-18 PROCEDURE — 96411 CHEMO IV PUSH ADDL DRUG: CPT

## 2022-04-18 PROCEDURE — 25010000002 DOXORUBICIN PER 10 MG: Performed by: INTERNAL MEDICINE

## 2022-04-18 PROCEDURE — 96377 APPLICATON ON-BODY INJECTOR: CPT

## 2022-04-18 PROCEDURE — 82607 VITAMIN B-12: CPT | Performed by: INTERNAL MEDICINE

## 2022-04-18 PROCEDURE — 96417 CHEMO IV INFUS EACH ADDL SEQ: CPT

## 2022-04-18 PROCEDURE — 25010000002 PALONOSETRON PER 25 MCG: Performed by: INTERNAL MEDICINE

## 2022-04-18 PROCEDURE — 96375 TX/PRO/DX INJ NEW DRUG ADDON: CPT

## 2022-04-18 PROCEDURE — 25010000002 DIPHENHYDRAMINE PER 50 MG: Performed by: INTERNAL MEDICINE

## 2022-04-18 PROCEDURE — 25010000002 PEGFILGRASTIM 6 MG/0.6ML PREFILLED SYRINGE KIT: Performed by: INTERNAL MEDICINE

## 2022-04-18 PROCEDURE — 99214 OFFICE O/P EST MOD 30 MIN: CPT | Performed by: INTERNAL MEDICINE

## 2022-04-18 PROCEDURE — 96413 CHEMO IV INFUSION 1 HR: CPT

## 2022-04-18 PROCEDURE — 25010000002 RITUXIMAB 10 MG/ML SOLUTION 50 ML VIAL: Performed by: INTERNAL MEDICINE

## 2022-04-18 PROCEDURE — 36415 COLL VENOUS BLD VENIPUNCTURE: CPT | Performed by: INTERNAL MEDICINE

## 2022-04-18 PROCEDURE — 80053 COMPREHEN METABOLIC PANEL: CPT

## 2022-04-18 PROCEDURE — 96367 TX/PROPH/DG ADDL SEQ IV INF: CPT

## 2022-04-18 PROCEDURE — 96415 CHEMO IV INFUSION ADDL HR: CPT

## 2022-04-18 PROCEDURE — 25010000002 RITUXIMAB 10 MG/ML SOLUTION 10 ML VIAL: Performed by: INTERNAL MEDICINE

## 2022-04-18 PROCEDURE — 25010000002 VINCRISTINE PER 1 MG: Performed by: INTERNAL MEDICINE

## 2022-04-18 PROCEDURE — 25010000002 FOSAPREPITANT PER 1 MG: Performed by: INTERNAL MEDICINE

## 2022-04-18 RX ORDER — FAMOTIDINE 10 MG/ML
20 INJECTION, SOLUTION INTRAVENOUS ONCE
Status: CANCELLED | OUTPATIENT
Start: 2022-04-18

## 2022-04-18 RX ORDER — ACETAMINOPHEN 325 MG/1
650 TABLET ORAL ONCE
Status: COMPLETED | OUTPATIENT
Start: 2022-04-18 | End: 2022-04-18

## 2022-04-18 RX ORDER — PALONOSETRON 0.05 MG/ML
0.25 INJECTION, SOLUTION INTRAVENOUS ONCE
Status: CANCELLED | OUTPATIENT
Start: 2022-04-18

## 2022-04-18 RX ORDER — DOXORUBICIN HYDROCHLORIDE 2 MG/ML
50 INJECTION, SOLUTION INTRAVENOUS ONCE
Status: CANCELLED | OUTPATIENT
Start: 2022-04-18

## 2022-04-18 RX ORDER — FAMOTIDINE 10 MG/ML
20 INJECTION, SOLUTION INTRAVENOUS AS NEEDED
Status: CANCELLED | OUTPATIENT
Start: 2022-04-18

## 2022-04-18 RX ORDER — SODIUM CHLORIDE 9 MG/ML
250 INJECTION, SOLUTION INTRAVENOUS ONCE
Status: COMPLETED | OUTPATIENT
Start: 2022-04-18 | End: 2022-04-18

## 2022-04-18 RX ORDER — DIPHENHYDRAMINE HYDROCHLORIDE 50 MG/ML
50 INJECTION INTRAMUSCULAR; INTRAVENOUS AS NEEDED
Status: CANCELLED | OUTPATIENT
Start: 2022-04-18

## 2022-04-18 RX ORDER — PRAVASTATIN SODIUM 40 MG
40 TABLET ORAL
Qty: 90 TABLET | Refills: 1 | Status: SHIPPED | OUTPATIENT
Start: 2022-04-18 | End: 2022-09-06

## 2022-04-18 RX ORDER — ACETAMINOPHEN 325 MG/1
650 TABLET ORAL ONCE
Status: CANCELLED | OUTPATIENT
Start: 2022-04-18

## 2022-04-18 RX ORDER — AMOXICILLIN AND CLAVULANATE POTASSIUM 875; 125 MG/1; MG/1
1 TABLET, FILM COATED ORAL 2 TIMES DAILY
Qty: 14 TABLET | Refills: 4 | Status: SHIPPED | OUTPATIENT
Start: 2022-04-18 | End: 2022-05-04 | Stop reason: HOSPADM

## 2022-04-18 RX ORDER — PALONOSETRON 0.05 MG/ML
0.25 INJECTION, SOLUTION INTRAVENOUS ONCE
Status: COMPLETED | OUTPATIENT
Start: 2022-04-18 | End: 2022-04-18

## 2022-04-18 RX ORDER — MEPERIDINE HYDROCHLORIDE 25 MG/ML
25 INJECTION INTRAMUSCULAR; INTRAVENOUS; SUBCUTANEOUS
Status: CANCELLED | OUTPATIENT
Start: 2022-04-18

## 2022-04-18 RX ORDER — SODIUM CHLORIDE 9 MG/ML
250 INJECTION, SOLUTION INTRAVENOUS ONCE
Status: CANCELLED | OUTPATIENT
Start: 2022-04-18

## 2022-04-18 RX ORDER — FAMOTIDINE 10 MG/ML
20 INJECTION, SOLUTION INTRAVENOUS ONCE
Status: COMPLETED | OUTPATIENT
Start: 2022-04-18 | End: 2022-04-18

## 2022-04-18 RX ORDER — DOXORUBICIN HYDROCHLORIDE 2 MG/ML
50 INJECTION, SOLUTION INTRAVENOUS ONCE
Status: COMPLETED | OUTPATIENT
Start: 2022-04-18 | End: 2022-04-18

## 2022-04-18 RX ADMIN — VINCRISTINE SULFATE 1 MG: 1 INJECTION, SOLUTION INTRAVENOUS at 13:47

## 2022-04-18 RX ADMIN — FAMOTIDINE 20 MG: 10 INJECTION INTRAVENOUS at 08:56

## 2022-04-18 RX ADMIN — DOXORUBICIN HYDROCHLORIDE 84 MG: 2 INJECTION, SOLUTION INTRAVENOUS at 13:30

## 2022-04-18 RX ADMIN — ACETAMINOPHEN 650 MG: 325 TABLET ORAL at 09:00

## 2022-04-18 RX ADMIN — PEGFILGRASTIM 6 MG: KIT SUBCUTANEOUS at 14:05

## 2022-04-18 RX ADMIN — DEXAMETHASONE SODIUM PHOSPHATE 12 MG: 10 INJECTION, SOLUTION INTRAMUSCULAR; INTRAVENOUS at 12:58

## 2022-04-18 RX ADMIN — DIPHENHYDRAMINE HYDROCHLORIDE 25 MG: 50 INJECTION, SOLUTION INTRAMUSCULAR; INTRAVENOUS at 09:04

## 2022-04-18 RX ADMIN — SODIUM CHLORIDE 150 ML: 900 INJECTION, SOLUTION INTRAVENOUS at 09:29

## 2022-04-18 RX ADMIN — RITUXIMAB 630 MG: 10 INJECTION, SOLUTION INTRAVENOUS at 10:08

## 2022-04-18 RX ADMIN — PALONOSETRON HYDROCHLORIDE 0.25 MG: 0.25 INJECTION INTRAVENOUS at 12:57

## 2022-04-18 RX ADMIN — CYCLOPHOSPHAMIDE 1250 MG: 200 INJECTION, SOLUTION INTRAVENOUS at 14:03

## 2022-04-18 RX ADMIN — SODIUM CHLORIDE 250 ML: 9 INJECTION, SOLUTION INTRAVENOUS at 08:56

## 2022-04-18 NOTE — NURSING NOTE
Patient sucked on popsickle during administration of Adraimycin. Carroll given IV push through side arm of free flowing IVF of NS. Blood return monitored q 3-5 cc with excellent blood return maintained throughout. Printed information given on Neulasta Onbody. Instructed to call for any fever of 100.4 or any other problems. Verbalized understanding.

## 2022-04-18 NOTE — PROGRESS NOTES
Subjective     CHIEF COMPLAINT:      Chief Complaint   Patient presents with   • Follow-up     No concerns       HISTORY OF PRESENT ILLNESS:     Michelle Car is a 81 y.o. female patient who returns today for follow up on her lymphoma.  She returns today for follow-up and reports that she developed fatigue after she started the chemotherapy treatment.  That lasted for about 2 weeks.  She developed neutropenia but did not have an infection.    Patient developed worsening of her pre-existing constipation.  She had to take Senokot-S but without immediate relief.  She later started having episodes of diarrhea.    Patient denies having numbness in her fingers or toes.    ROS:  Pertinent ROS is in the HPI.     Past medical, surgical, social and family history were reviewed.     MEDICATIONS:    Current Outpatient Medications:   •  acetaminophen (TYLENOL) 500 MG tablet, Take 500 mg by mouth Every 6 (Six) Hours As Needed for Mild Pain ., Disp: , Rfl:   •  apixaban (ELIQUIS) 5 MG tablet tablet, Take 1 tablet by mouth Every 12 (Twelve) Hours. Indications: Atrial Fibrillation, Disp: 180 tablet, Rfl: 1  •  Calcium Carbonate (CALTRATE 600 PO), Take 1 tablet by mouth Daily., Disp: , Rfl:   •  carvedilol (COREG) 3.125 MG tablet, Take 1 tablet by mouth 2 (Two) Times a Day With Meals., Disp: 180 tablet, Rfl: 1  •  digoxin (LANOXIN) 125 MCG tablet, Take 1 tablet by mouth Daily., Disp: 90 tablet, Rfl: 1  •  loratadine (CLARITIN) 10 MG tablet, Take 10 mg by mouth Daily., Disp: , Rfl:   •  losartan (COZAAR) 100 MG tablet, Take 1 tablet by mouth Daily., Disp: 90 tablet, Rfl: 1  •  Multiple Vitamins-Minerals (CENTRUM SILVER) tablet, Take 1 tablet by mouth Daily., Disp: , Rfl:   •  ondansetron (ZOFRAN) 8 MG tablet, Take 1 tablet by mouth Every 8 (Eight) Hours As Needed for Nausea or Vomiting., Disp: 30 tablet, Rfl: 5  •  polyethylene glycol (MIRALAX) 17 GM/SCOOP powder, Take 17 g by mouth As Needed., Disp: , Rfl:   •  pravastatin  "(PRAVACHOL) 40 MG tablet, TAKE 1 TABLET BY MOUTH EVERY NIGHT AT BEDTIME, Disp: 90 tablet, Rfl: 1  •  spironolactone (ALDACTONE) 25 MG tablet, Take 1 tablet by mouth Daily., Disp: 90 tablet, Rfl: 1    Objective   VITAL SIGNS:     Vitals:    04/18/22 0814   BP: 118/62   Pulse: 69   Resp: 16   Temp: 97.1 °F (36.2 °C)   TempSrc: Temporal   SpO2: 99%   Weight: 69.7 kg (153 lb 9.6 oz)   Height: 152 cm (59.84\")   PainSc: 0-No pain     Body mass index is 30.16 kg/m².     Wt Readings from Last 5 Encounters:   04/18/22 69.7 kg (153 lb 9.6 oz)   04/15/22 70.5 kg (155 lb 6 oz)   04/14/22 69.4 kg (153 lb)   04/11/22 72.3 kg (159 lb 4.8 oz)   04/07/22 72.3 kg (159 lb 6.4 oz)     PHYSICAL EXAMINATION:   GENERAL: The patient appears in fair general condition, not in acute distress.   SKIN: No ecchymosis.  EYES: No jaundice. No pallor.  LYMPHATICS: No cervical or supraclavicular lymphadenopathy.  CHEST: Normal respiratory effort.  Lungs clear bilaterally.  No added sounds.  CVS: Normal S1-S2.  No murmurs.  ABDOMEN: Soft. No tenderness. No Hepatomegaly. No Splenomegaly. No masses.  EXTREMITIES: Swelling at the ankles.  No calf tenderness.    DIAGNOSTIC DATA:     Results from last 7 days   Lab Units 04/18/22  0801 04/11/22  1309   WBC 10*3/mm3 8.39 9.63   NEUTROS ABS 10*3/mm3 6.83 7.55*   HEMOGLOBIN g/dL 10.1* 10.4*   HEMATOCRIT % 30.2* 28.3*   PLATELETS 10*3/mm3 420 266     Results from last 7 days   Lab Units 04/18/22  0801   SODIUM mmol/L 131*   POTASSIUM mmol/L 4.5   CHLORIDE mmol/L 96*   CO2 mmol/L 26.8   BUN mg/dL 13   CREATININE mg/dL 0.78   CALCIUM mg/dL 9.3   ALBUMIN g/dL 3.70   BILIRUBIN mg/dL 0.3   ALK PHOS U/L 80   ALT (SGPT) U/L 18   AST (SGOT) U/L 17   GLUCOSE mg/dL 123       Assessment/Plan   *Diffuse large B-cell lymphoma.     · Patient was found to have bilateral pleural effusions.    · She underwent right thoracentesis on 2/21/2022 and the left thoracentesis on 2/22/2022.    · Analysis of the fluid revealed " involvement with diffuse large B-cell lymphoma.    · FISH analysis for BCL6 rearrangement was positive.    · FISH analysis for BCL 1, BCL-2 and MYC rearrangement was negative.    · CT chest on 2/17/2022 revealed no hilar or mediastinal lymphadenopathy.    · Spleen was reported to be normal in size.  · Peripheral blood flow cytometry on 3/7/2022 was negative.  · PET scan on 3/10/2022 revealed significant hypermetabolism in the left adrenal gland and the surrounding soft tissue with SUV up to 34.5. Hypermetabolism in the left pleural thickening with SUV of 7.1. there was less hypermetabolism in the right adrenal gland and in the right pleura.  · She is considered to have stage III non-bulky disease.  · The degree of hypermetabolism is consistent with high grade lymphoma. This concurs with the pathology exam of the pleural fluid cytology that revealed diffuse large B-cell lymphoma.  · R-IPI score of 3 associated with poor risk. This is associated with overall survival of 55%.   · Due to the patient previously having good performance status, I recommended systemic chemotherapy with R-CHOP with Neulasta support.  I explained that the goal is cure.  With her being 81 years old, she will need close monitoring for any symptoms or signs of toxicity.  · Patient was started on R-CHOP with Neulasta support on 3/28/2022.  · Patient tolerated chemotherapy except for neutropenia and thrombocytopenia.  She developed constipation related to vincristine.  · Due to the expected recurrence of the constipation with subsequent treatment, recommended reducing the dose of vincristine to 1 mg.    *Neutropenia secondary to chemotherapy.  · Neutrophil count decreased to 0.04 on 4/4/2022.  · She was placed on prophylactic antibiotic with Augmentin.  · She did not develop neutropenic infection.  · I explained that the neutropenia is likely to recur with subsequent chemotherapy treatment.  · I recommended starting the prophylactic antibiotic on  day #6.    *Thrombocytopenia secondary to chemotherapy.  · Platelet count decreased to 110,000 on day #8.  · Platelet count improved subsequently and it is to be at 420,000.    *Bilateral pleural effusions.   · She is s/p right thoracentesis on 2/21/2022 and the effusion was found to be malignant attributed to the lymphoma.   · Exam today reveals no significant pleural effusions.    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  · Patient had anemia secondary to her lymphoma.  · Hemoglobin was 11.1 on 3/28/2022.  · Anemia work-up showed no evidence of iron deficiency.  · Hemoglobin is 10.1 today.  · Vitamin B12 and folate levels will be obtained to evaluate for other possible contributing factors.    *Paroxysmal atrial fibrillation.  · She is on Eliquis 5 mg twice daily.  · Patient was previously on amiodarone.  It was discontinued by cardiology on 4/14/2022.  · Patient will not be given Zyprexa due to the possible drug drug interaction.     *Constipation.  · Patient has chronic constipation that worsened after the start of chemotherapy.  · Patient was placed on Senokot-S.  · Due to the likelihood of recurrence of the constipation with subsequent chemotherapy, vincristine dose will be reduced as above.    PLAN:    1.  Proceed with cycle #2 of R-CHOP today.  2.  Reduce vincristine to 1 mg total dose.   3.  Patient will have on body Neulasta.   4.  Patient will take prednisone 100 mg daily on days 2-6.   5.  We will use prophylactic antibiotic coverage with Augmentin.  I started patient to start Augmentin on day #6.  6.  Patient will have a CBC with RN review in 1 and in 2 weeks.  7.  We will obtain follow-up CT scan of the chest abdomen pelvis in 2 weeks.  8.  I will see her in follow-up in 3 weeks.  She will be tentatively scheduled for cycle #3.        Renate Galo MD  04/18/22

## 2022-04-18 NOTE — TELEPHONE ENCOUNTER
Called Michelle Car, however, there was no answer and no voicemail that picked up.     Will attempt to call patient again tomorrow.     Thank you,  Mallorie Rivera RN  Triage Nurse [facility] [place]

## 2022-04-18 NOTE — TELEPHONE ENCOUNTER
Called Michelle Car, however, there was no answer and no voicemail that picked up.    Will attempt to call patient again later.    Thank you,  Mallorie Rivera RN  Triage Nurse [facility] [place]

## 2022-04-19 ENCOUNTER — HOME CARE VISIT (OUTPATIENT)
Dept: HOME HEALTH SERVICES | Facility: HOME HEALTHCARE | Age: 82
End: 2022-04-19

## 2022-04-19 VITALS
HEART RATE: 64 BPM | OXYGEN SATURATION: 99 % | DIASTOLIC BLOOD PRESSURE: 64 MMHG | SYSTOLIC BLOOD PRESSURE: 106 MMHG | RESPIRATION RATE: 18 BRPM | TEMPERATURE: 97.4 F

## 2022-04-19 PROCEDURE — G0151 HHCP-SERV OF PT,EA 15 MIN: HCPCS

## 2022-04-19 NOTE — TELEPHONE ENCOUNTER
Spoke with Helder, patient's son.  He provided the following BP readings:        Helder stated they would like to see how patient responds to the chemo treatment she received Monday (4/18).  Patient has appointment with oncology next Monday.  Requested patient call office with decision regarding stress test after appointment.     Please let me know if there is anything else you would like me to do for this patient.    Thank you,  Mallorie Rivera RN  Triage Nurse [facility] [place]

## 2022-04-19 NOTE — HOME HEALTH
REASON FOR REFERRAL: MD referral to assess patient home safety, gait, balance, transfers due to recently starting chemotherapy as well as history of acute on chronic diastolic heart failure.  MEDICAL HISTORY: HTN, CHF, A-fib, Hyperlipidemia, Adrenal nodule.   SKILLED PHYSICAL THERAPY: PT eval only for instruction/education in lower extremity strengthening HEP, bed mobility/transfers training, gait training, balance training, fall prevention, and activity tolerance training to enable patient to safely exit home for medical appointments.    Patient was a PT eval only due to patient is at her baseline level of function.  She is able to perform bed mobility with mod I, transfers with mod I, ambulation for 150+ feet with mod I and no AD, and is independent with HEP.

## 2022-04-19 NOTE — TELEPHONE ENCOUNTER
Called Michelle NATALIE Car, however, there was no answer and no voicemail that picked up.    Called patient's son, Levon, however, there was no answer and no voicemail that .    Called patient's daughter, Liza and was able to leave voicemail requesting a call back.    Thank you,  Mallorie Rivera RN  Triage Nurse [facility] [place]
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
## 2022-04-20 ENCOUNTER — HOME CARE VISIT (OUTPATIENT)
Dept: HOME HEALTH SERVICES | Facility: HOME HEALTHCARE | Age: 82
End: 2022-04-20

## 2022-04-20 NOTE — TELEPHONE ENCOUNTER
Reviewed medication change with Helder Michelle Car' son.  He verbalized understanding with repeat back of instructions.    Requested patient keep BP/HR log over next week and provide to office via phone.  Helder stated he will.    Helder stated the [facility] is coming tomorrow and is going to talk more in depth with patient about stress test.  Helder stated he call back with update.    Thank you,  Mallorie Rivera RN  Triage Nurse [facility] [place]

## 2022-04-22 ENCOUNTER — HOME CARE VISIT (OUTPATIENT)
Dept: HOME HEALTH SERVICES | Facility: HOME HEALTHCARE | Age: 82
End: 2022-04-22

## 2022-04-22 PROCEDURE — G0162 HHC RN E&M PLAN SVS, 15 MIN: HCPCS

## 2022-04-24 VITALS
TEMPERATURE: 98.1 F | DIASTOLIC BLOOD PRESSURE: 70 MMHG | WEIGHT: 155.38 LBS | SYSTOLIC BLOOD PRESSURE: 110 MMHG | BODY MASS INDEX: 30.5 KG/M2 | HEART RATE: 81 BPM | RESPIRATION RATE: 16 BRPM | OXYGEN SATURATION: 99 %

## 2022-04-24 NOTE — HOME HEALTH
60 Day Summary    Home Health need continues for: CONTINUE CHF POC R/T RECENT MED CHANGES/ ADDITONAL COMPLICATION - CHEMO    Primary diagnoses/co-morbidities/recent procedures in past 60 days that impact current episode: CHF, LYMPHOMA    Current level of functional ability: NEED MIN ASSISTANCE    Homebound status and living arrangements: LIVES ALONE, 2 SONS LIVES CLOSE    Goals accomplished and/or measurable progress toward unmet goals in past 60 days: PT COMPLAINT W/ MEDS, WEIGHT MONITORING, COMPLAINT W/ MED ICAL APPTS, ACTIVELY RECIEVING CHEMO FOR LYMPHOMA    Focus of care for next 60 days for each discipline ordered: FOCUS ON IMPACT OF CHEMO ON HER HER/CHF MANAGEMENT    Skin integrity/wound status: INTACT    Code status: FULL    Most recent fall risk: AT RISK    Estimated date when home care services will end 5/31/22    Plan of Care confirmed with  on DR PARSONS 4/122/22

## 2022-04-24 NOTE — HOME HEALTH
CHF ASSESSMENT- AMIODARONE D/C 4/14    ASSESS RESPONSE TO NEW MEDS - PREDNISONE DAYS 2-6, AUGMENTIN START DAY 6      ASSESS NEW PORT SITE LT SUBCLAVIAN  ASSESS/INSTRUCT NUTRITION- POOR APPETITE R/T CHEMO    PT GOT 2D CHEMO INFUSION 4/18- PT FEELING FATIGUED AFTER CHEMO INFUSION,

## 2022-04-25 ENCOUNTER — LAB (OUTPATIENT)
Dept: LAB | Facility: HOSPITAL | Age: 82
End: 2022-04-25

## 2022-04-25 ENCOUNTER — OFFICE VISIT (OUTPATIENT)
Dept: ONCOLOGY | Facility: CLINIC | Age: 82
End: 2022-04-25

## 2022-04-25 ENCOUNTER — HOSPITAL ENCOUNTER (OUTPATIENT)
Dept: INFUSION THERAPY | Facility: HOSPITAL | Age: 82
Discharge: HOME | End: 2022-04-25
Admitting: INTERNAL MEDICINE

## 2022-04-25 ENCOUNTER — CLINICAL SUPPORT (OUTPATIENT)
Dept: ONCOLOGY | Facility: HOSPITAL | Age: 82
End: 2022-04-25

## 2022-04-25 VITALS — HEART RATE: 83 BPM | DIASTOLIC BLOOD PRESSURE: 53 MMHG | OXYGEN SATURATION: 97 % | SYSTOLIC BLOOD PRESSURE: 86 MMHG

## 2022-04-25 VITALS
SYSTOLIC BLOOD PRESSURE: 91 MMHG | HEART RATE: 80 BPM | TEMPERATURE: 97.7 F | DIASTOLIC BLOOD PRESSURE: 52 MMHG | RESPIRATION RATE: 18 BRPM | OXYGEN SATURATION: 100 %

## 2022-04-25 DIAGNOSIS — D70.1 CHEMOTHERAPY INDUCED NEUTROPENIA: ICD-10-CM

## 2022-04-25 DIAGNOSIS — C83.38 DIFFUSE LARGE B-CELL LYMPHOMA OF LYMPH NODES OF MULTIPLE REGIONS: Primary | ICD-10-CM

## 2022-04-25 DIAGNOSIS — D69.6 THROMBOCYTOPENIA: ICD-10-CM

## 2022-04-25 DIAGNOSIS — Z45.2: Primary | ICD-10-CM

## 2022-04-25 DIAGNOSIS — K59.03 DRUG INDUCED CONSTIPATION: ICD-10-CM

## 2022-04-25 DIAGNOSIS — T45.1X5A CHEMOTHERAPY INDUCED NEUTROPENIA: ICD-10-CM

## 2022-04-25 DIAGNOSIS — C83.38 DIFFUSE LARGE B-CELL LYMPHOMA OF LYMPH NODES OF MULTIPLE REGIONS: ICD-10-CM

## 2022-04-25 DIAGNOSIS — D64.81 ANEMIA DUE TO CHEMOTHERAPY: ICD-10-CM

## 2022-04-25 DIAGNOSIS — J90 BILATERAL PLEURAL EFFUSION: ICD-10-CM

## 2022-04-25 DIAGNOSIS — T45.1X5A ANEMIA DUE TO CHEMOTHERAPY: ICD-10-CM

## 2022-04-25 LAB
ABO GROUP BLD: NORMAL
ALBUMIN SERPL-MCNC: 3.3 G/DL (ref 3.5–5.2)
ALBUMIN/GLOB SERPL: 1.6 G/DL
ALP SERPL-CCNC: 79 U/L (ref 39–117)
ALT SERPL W P-5'-P-CCNC: 34 U/L (ref 1–33)
ANION GAP SERPL CALCULATED.3IONS-SCNC: 10 MMOL/L (ref 5–15)
AST SERPL-CCNC: 16 U/L (ref 1–32)
BASOPHILS # BLD AUTO: 0 10*3/MM3 (ref 0–0.2)
BASOPHILS NFR BLD AUTO: 0 % (ref 0–1.5)
BILIRUB SERPL-MCNC: 0.7 MG/DL (ref 0–1.2)
BLD GP AB SCN SERPL QL: NEGATIVE
BUN SERPL-MCNC: 25 MG/DL (ref 8–23)
BUN/CREAT SERPL: 37.3 (ref 7–25)
CALCIUM SPEC-SCNC: 8.9 MG/DL (ref 8.6–10.5)
CHLORIDE SERPL-SCNC: 91 MMOL/L (ref 98–107)
CO2 SERPL-SCNC: 27 MMOL/L (ref 22–29)
CREAT SERPL-MCNC: 0.67 MG/DL (ref 0.57–1)
DEPRECATED RDW RBC AUTO: 50.5 FL (ref 37–54)
EGFRCR SERPLBLD CKD-EPI 2021: 88.5 ML/MIN/1.73
EOSINOPHIL # BLD AUTO: 0 10*3/MM3 (ref 0–0.4)
EOSINOPHIL NFR BLD AUTO: 0 % (ref 0.3–6.2)
ERYTHROCYTE [DISTWIDTH] IN BLOOD BY AUTOMATED COUNT: 16.4 % (ref 12.3–15.4)
GLOBULIN UR ELPH-MCNC: 2.1 GM/DL
GLUCOSE SERPL-MCNC: 105 MG/DL (ref 65–99)
HCT VFR BLD AUTO: 22.2 % (ref 34–46.6)
HGB BLD-MCNC: 7.9 G/DL (ref 12–15.9)
IMM GRANULOCYTES # BLD AUTO: 0.03 10*3/MM3 (ref 0–0.05)
IMM GRANULOCYTES NFR BLD AUTO: 10 % (ref 0–0.5)
LYMPHOCYTES # BLD AUTO: 0.19 10*3/MM3 (ref 0.7–3.1)
LYMPHOCYTES NFR BLD AUTO: 63.3 % (ref 19.6–45.3)
MCH RBC QN AUTO: 30 PG (ref 26.6–33)
MCHC RBC AUTO-ENTMCNC: 35.6 G/DL (ref 31.5–35.7)
MCV RBC AUTO: 84.4 FL (ref 79–97)
MONOCYTES # BLD AUTO: 0.03 10*3/MM3 (ref 0.1–0.9)
MONOCYTES NFR BLD AUTO: 10 % (ref 5–12)
NEUTROPHILS NFR BLD AUTO: 0.05 10*3/MM3 (ref 1.7–7)
NEUTROPHILS NFR BLD AUTO: 16.7 % (ref 42.7–76)
NRBC BLD AUTO-RTO: 0 /100 WBC (ref 0–0.2)
PLATELET # BLD AUTO: 148 10*3/MM3 (ref 140–450)
PMV BLD AUTO: 9 FL (ref 6–12)
POTASSIUM SERPL-SCNC: 4.1 MMOL/L (ref 3.5–5.2)
PROT SERPL-MCNC: 5.4 G/DL (ref 6–8.5)
RBC # BLD AUTO: 2.63 10*6/MM3 (ref 3.77–5.28)
RH BLD: NEGATIVE
SODIUM SERPL-SCNC: 128 MMOL/L (ref 136–145)
T&S EXPIRATION DATE: NORMAL
WBC NRBC COR # BLD: 0.3 10*3/MM3 (ref 3.4–10.8)

## 2022-04-25 PROCEDURE — G0463 HOSPITAL OUTPT CLINIC VISIT: HCPCS

## 2022-04-25 PROCEDURE — 86850 RBC ANTIBODY SCREEN: CPT

## 2022-04-25 PROCEDURE — 36415 COLL VENOUS BLD VENIPUNCTURE: CPT

## 2022-04-25 PROCEDURE — 96365 THER/PROPH/DIAG IV INF INIT: CPT

## 2022-04-25 PROCEDURE — 96360 HYDRATION IV INFUSION INIT: CPT

## 2022-04-25 PROCEDURE — 86901 BLOOD TYPING SEROLOGIC RH(D): CPT

## 2022-04-25 PROCEDURE — 99214 OFFICE O/P EST MOD 30 MIN: CPT

## 2022-04-25 PROCEDURE — 86923 COMPATIBILITY TEST ELECTRIC: CPT

## 2022-04-25 PROCEDURE — 80053 COMPREHEN METABOLIC PANEL: CPT

## 2022-04-25 PROCEDURE — 25010000002 HEPARIN LOCK FLUSH PER 10 UNITS: Performed by: INTERNAL MEDICINE

## 2022-04-25 PROCEDURE — 86900 BLOOD TYPING SEROLOGIC ABO: CPT

## 2022-04-25 PROCEDURE — 85025 COMPLETE CBC W/AUTO DIFF WBC: CPT

## 2022-04-25 RX ORDER — HEPARIN SODIUM (PORCINE) LOCK FLUSH IV SOLN 100 UNIT/ML 100 UNIT/ML
500 SOLUTION INTRAVENOUS AS NEEDED
Status: CANCELLED | OUTPATIENT
Start: 2022-04-25

## 2022-04-25 RX ORDER — SODIUM CHLORIDE 9 MG/ML
250 INJECTION, SOLUTION INTRAVENOUS AS NEEDED
Status: CANCELLED | OUTPATIENT
Start: 2022-04-25

## 2022-04-25 RX ORDER — SODIUM CHLORIDE 0.9 % (FLUSH) 0.9 %
10 SYRINGE (ML) INJECTION AS NEEDED
Status: CANCELLED | OUTPATIENT
Start: 2022-04-25

## 2022-04-25 RX ORDER — SODIUM CHLORIDE 9 MG/ML
1000 INJECTION, SOLUTION INTRAVENOUS ONCE
Status: COMPLETED | OUTPATIENT
Start: 2022-04-25 | End: 2022-04-25

## 2022-04-25 RX ORDER — SODIUM CHLORIDE 0.9 % (FLUSH) 0.9 %
10 SYRINGE (ML) INJECTION AS NEEDED
Status: DISCONTINUED | OUTPATIENT
Start: 2022-04-25 | End: 2022-04-27 | Stop reason: HOSPADM

## 2022-04-25 RX ORDER — HEPARIN SODIUM (PORCINE) LOCK FLUSH IV SOLN 100 UNIT/ML 100 UNIT/ML
500 SOLUTION INTRAVENOUS AS NEEDED
Status: DISCONTINUED | OUTPATIENT
Start: 2022-04-25 | End: 2022-04-27 | Stop reason: HOSPADM

## 2022-04-25 RX ORDER — SODIUM CHLORIDE 9 MG/ML
1000 INJECTION, SOLUTION INTRAVENOUS ONCE
Status: CANCELLED | OUTPATIENT
Start: 2022-04-25

## 2022-04-25 RX ADMIN — Medication 500 UNITS: at 16:05

## 2022-04-25 RX ADMIN — SODIUM CHLORIDE 1000 ML: 9 INJECTION, SOLUTION INTRAVENOUS at 14:57

## 2022-04-25 NOTE — PROGRESS NOTES
Tolerated fluids well. Got cold at the end of the infusion. Warm blankets given and chair heater turned on. Port flushed with 10 ML NS followed by 500 units heparin. Pigtail of blanton needle secured with paper tape, pt to return tomorrow. D/C'd per wheelchair.

## 2022-04-25 NOTE — PROGRESS NOTES
Pt present for RN review. Pt c/o weakness and no energy. Pts hgb, WBC, and ANC have declined since last visit. Pt completed cycle 2 of R CHOP last week and finished prednisone dose yesterday. Pt started augmentin yesterday. Daughter with pt and reports pt started feeling bad yesterday and had a BP of 79/50. Pt is hypotensive today. D/w Dr. Galo. V/o to add pt on for 1 L NS and have pt see NP today. S/w Iza TAY who will see the pt. Infusion is unable to take pt for fluids at this time. Notified Iza TAY. Called scheduling about adding pt on for fluids at [facility].     Lab Results   Component Value Date    WBC 0.30 (C) 04/25/2022    HGB 7.9 (C) 04/25/2022    HCT 22.2 (L) 04/25/2022    MCV 84.4 04/25/2022     04/25/2022

## 2022-04-25 NOTE — PROGRESS NOTES
Subjective     CHIEF COMPLAINT:      No chief complaint on file.      HISTORY OF PRESENT ILLNESS:    Michelle Car is a 81 y.o. female with lymphoma returning to the office today for labs with RN review. However, due to patient's blood counts and blood pressure, she was seen and evaluated by NP. Patient reports an increase in her fatigue and weakness. She reports a decrease in appetite and minimal oral intake. She states that she felt bad enough yesterday that she considered calling an ambulance. However, she decided to wait to be seen in our office today. She started taking Augmentin on Saturday. She reports occasional chills but denies fevers. She denies other signs of infection. She states her biggest concern is her overall fatigue. She denies nausea or vomiting. She denies diarrhea.     ROS:  Pertinent ROS is in the HPI.     Past medical, surgical, social and family history were reviewed.     MEDICATIONS:    Current Outpatient Medications:   •  acetaminophen (TYLENOL) 500 MG tablet, Take 500 mg by mouth Every 6 (Six) Hours As Needed for Mild Pain ., Disp: , Rfl:   •  amoxicillin-clavulanate (AUGMENTIN) 875-125 MG per tablet, Take 1 tablet by mouth 2 (Two) Times a Day. For 7 days. Start on day #6 of chemotherapy., Disp: 14 tablet, Rfl: 4  •  apixaban (ELIQUIS) 5 MG tablet tablet, Take 1 tablet by mouth Every 12 (Twelve) Hours. Indications: Atrial Fibrillation, Disp: 180 tablet, Rfl: 1  •  Calcium Carbonate (CALTRATE 600 PO), Take 1 tablet by mouth Daily., Disp: , Rfl:   •  carvedilol (COREG) 3.125 MG tablet, Take 1 tablet by mouth 2 (Two) Times a Day With Meals., Disp: 180 tablet, Rfl: 1  •  digoxin (LANOXIN) 125 MCG tablet, Take 1 tablet by mouth Daily., Disp: 90 tablet, Rfl: 1  •  loratadine (CLARITIN) 10 MG tablet, Take 10 mg by mouth Daily., Disp: , Rfl:   •  losartan (COZAAR) 100 MG tablet, Take 1 tablet by mouth Daily. (Patient taking differently: Take 50 mg by mouth Daily.), Disp: 90 tablet, Rfl: 1  •   Multiple Vitamins-Minerals (CENTRUM SILVER) tablet, Take 1 tablet by mouth Daily., Disp: , Rfl:   •  ondansetron (ZOFRAN) 8 MG tablet, Take 1 tablet by mouth Every 8 (Eight) Hours As Needed for Nausea or Vomiting., Disp: 30 tablet, Rfl: 5  •  polyethylene glycol (MIRALAX) 17 GM/SCOOP powder, Take 17 g by mouth As Needed., Disp: , Rfl:   •  pravastatin (PRAVACHOL) 40 MG tablet, TAKE 1 TABLET BY MOUTH EVERY NIGHT AT BEDTIME, Disp: 90 tablet, Rfl: 1  •  [START ON 4/26/2022] PREDNISONE PO, Take 100 mg by mouth Daily. TAKE POST CHEMO DAYS 2 THRU 6, Disp: , Rfl:   •  spironolactone (ALDACTONE) 25 MG tablet, Take 1 tablet by mouth Daily., Disp: 90 tablet, Rfl: 1  No current facility-administered medications for this visit.    Facility-Administered Medications Ordered in Other Visits:   •  heparin injection 500 Units, 500 Units, Intravenous, PRN, Renate Galo MD, 500 Units at 04/25/22 1605  •  sodium chloride 0.9 % flush 10 mL, 10 mL, Intravenous, PRN, Renate Galo MD    Objective   VITAL SIGNS:     There were no vitals filed for this visit.  There is no height or weight on file to calculate BMI.     Wt Readings from Last 5 Encounters:   04/22/22 70.5 kg (155 lb 6 oz)   04/18/22 69.7 kg (153 lb 9.6 oz)   04/15/22 70.5 kg (155 lb 6 oz)   04/14/22 69.4 kg (153 lb)   04/11/22 72.3 kg (159 lb 4.8 oz)     PHYSICAL EXAMINATION:   GENERAL: The patient appears in fair general condition, not in acute distress.   SKIN: No ecchymosis.  EYES: No jaundice. No pallor.  LYMPHATICS: No cervical or supraclavicular lymphadenopathy.  CHEST: Normal respiratory effort.  Lungs clear bilaterally.  No added sounds.  CVS: Normal S1-S2.  No murmurs.  ABDOMEN: Soft. No tenderness. No Hepatomegaly. No Splenomegaly. No masses.  EXTREMITIES: Bilateral swelling at the ankles.  No calf tenderness.    DIAGNOSTIC DATA:     Results from last 7 days   Lab Units 04/25/22  1252   WBC 10*3/mm3 0.30*   NEUTROS ABS 10*3/mm3 0.05*   HEMOGLOBIN g/dL  7.9*   HEMATOCRIT % 22.2*   PLATELETS 10*3/mm3 148     Results from last 7 days   Lab Units 04/25/22  1502   SODIUM mmol/L 128*   POTASSIUM mmol/L 4.1   CHLORIDE mmol/L 91*   CO2 mmol/L 27.0   BUN mg/dL 25*   CREATININE mg/dL 0.67   CALCIUM mg/dL 8.9   ALBUMIN g/dL 3.30*   BILIRUBIN mg/dL 0.7   ALK PHOS U/L 79   ALT (SGPT) U/L 34*   AST (SGOT) U/L 16   GLUCOSE mg/dL 105*       Assessment/Plan   *Diffuse large B-cell lymphoma.     · Patient was found to have bilateral pleural effusions.    · She underwent right thoracentesis on 2/21/2022 and the left thoracentesis on 2/22/2022.    · Analysis of the fluid revealed involvement with diffuse large B-cell lymphoma.    · FISH analysis for BCL6 rearrangement was positive.    · FISH analysis for BCL 1, BCL-2 and MYC rearrangement was negative.   · CT chest on 2/17/2022 revealed no hilar or mediastinal lymphadenopathy.    · Spleen was reported to be normal in size.  · Peripheral blood flow cytometry on 3/7/2022 was negative.  · PET scan on 3/10/2022 revealed significant hypermetabolism in the left adrenal gland and the surrounding soft tissue with SUV up to 34.5. Hypermetabolism in the left pleural thickening with SUV of 7.1. there was less hypermetabolism in the right adrenal gland and in the right pleura.  · She is considered to have stage III non-bulky disease.  · The degree of hypermetabolism is consistent with high grade lymphoma. This concurs with the pathology exam of the pleural fluid cytology that revealed diffuse large B-cell lymphoma.  · R-IPI score of 3 associated with poor risk. This is associated with overall survival of 55%.   · Due to the patient previously having good performance status, I recommended systemic chemotherapy with R-CHOP with Neulasta support.  I explained that the goal is cure.  With her being 81 years old, she will need close monitoring for any symptoms or signs of toxicity.  · Patient was started on R-CHOP with Neulasta support on  3/28/2022.  · Patient tolerated chemotherapy except for neutropenia and thrombocytopenia.  She developed constipation related to vincristine.  · Due to the expected recurrence of the constipation with subsequent treatment, recommended reducing the dose of vincristine to 1 mg.  · 4/25/2022, patient return to the office today for RN review.  Blood pressure 86/53. Patient with increased fatigue, weakness, and decreased appetite. ANC 0.05, Hgb 7.9. Plans today for 1L NS and 1 unit PRBC tomorrow. Continue prophylactic Augmentin.     *Neutropenia secondary to chemotherapy.  · Neutrophil count decreased to 0.04 on 4/4/2022.  · She was placed on prophylactic antibiotic with Augmentin.  · She did not develop neutropenic infection.  · I explained that the neutropenia is likely to recur with subsequent chemotherapy treatment.  · I recommended starting the prophylactic antibiotic on day #6.  · ANC 0.05, continue Augmentin    *Thrombocytopenia secondary to chemotherapy.  · Platelet count decreased to 110,000 on day #8.  · Platelet count today 148, 000    *Bilateral pleural effusions.   · She is s/p right thoracentesis on 2/21/2022 and the effusion was found to be malignant attributed to the lymphoma.   · Exam today reveals no significant pleural effusions.    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  · Patient had anemia secondary to her lymphoma.  · Hemoglobin was 11.1 on 3/28/2022.  · Anemia work-up showed no evidence of iron deficiency.  · Vitamin B12 and folate levels will be obtained to evaluate for other possible contributing factors.  · Hemoglobin 7.9, patient with increased fatigue and weakness today.  We will plan for 1 unit of PRBC tomorrow    *Paroxysmal atrial fibrillation.  · She is on Eliquis 5 mg twice daily.  · Patient was previously on amiodarone.  It was discontinued by cardiology on 4/14/2022.  · Patient will not be given Zyprexa due to the possible drug drug interaction.      *Constipation.  · Patient has chronic constipation that worsened after the start of chemotherapy.  · Patient was placed on Senokot-S.  · Due to the likelihood of recurrence of the constipation with subsequent chemotherapy, vincristine dose will be reduced as above.    PLAN:     1.  Proceed with 1L NS today at [facility]   2. Plan for 1 unit PRBC for symptomatic anemia, Hgb 7.9  3. Continue on prophylactic Augmentin, she started this 2 days ago  4.  CBC with RN review in 1 week  5. Encouraged increase oral intake including use of ensure  6. We will obtain follow-up CT scan of the chest abdomen pelvis in 1 week.  7.  MD follow-up in 2 weeks.  She will be tentatively scheduled for cycle #3.  8. Patient encouraged to call our office if symptoms do not improve or if she develops new symptoms      MAGGI Goyal  04/25/22

## 2022-04-26 ENCOUNTER — HOSPITAL ENCOUNTER (OUTPATIENT)
Dept: INFUSION THERAPY | Facility: HOSPITAL | Age: 82
Setting detail: INFUSION SERIES
Discharge: HOME | End: 2022-04-26

## 2022-04-26 ENCOUNTER — APPOINTMENT (OUTPATIENT)
Dept: GENERAL RADIOLOGY | Facility: HOSPITAL | Age: 82
End: 2022-04-26

## 2022-04-26 ENCOUNTER — TELEPHONE (OUTPATIENT)
Dept: ONCOLOGY | Facility: CLINIC | Age: 82
End: 2022-04-26

## 2022-04-26 ENCOUNTER — HOSPITAL ENCOUNTER (INPATIENT)
Facility: HOSPITAL | Age: 82
LOS: 8 days | Discharge: HOME | End: 2022-05-04
Attending: EMERGENCY MEDICINE | Admitting: HOSPITALIST

## 2022-04-26 VITALS
OXYGEN SATURATION: 100 % | RESPIRATION RATE: 16 BRPM | TEMPERATURE: 103.1 F | SYSTOLIC BLOOD PRESSURE: 104 MMHG | HEART RATE: 105 BPM | DIASTOLIC BLOOD PRESSURE: 46 MMHG

## 2022-04-26 DIAGNOSIS — I50.32 CHRONIC DIASTOLIC CHF (CONGESTIVE HEART FAILURE): ICD-10-CM

## 2022-04-26 DIAGNOSIS — D70.9 NEUTROPENIC FEVER: Primary | ICD-10-CM

## 2022-04-26 DIAGNOSIS — C83.38 DIFFUSE LARGE B-CELL LYMPHOMA OF LYMPH NODES OF MULTIPLE REGIONS: Primary | ICD-10-CM

## 2022-04-26 DIAGNOSIS — D61.818 PANCYTOPENIA: ICD-10-CM

## 2022-04-26 DIAGNOSIS — R50.81 NEUTROPENIC FEVER: Primary | ICD-10-CM

## 2022-04-26 DIAGNOSIS — C83.30 DIFFUSE LARGE B-CELL LYMPHOMA, UNSPECIFIED BODY REGION: ICD-10-CM

## 2022-04-26 DIAGNOSIS — Z45.2: ICD-10-CM

## 2022-04-26 DIAGNOSIS — R78.81 BACTEREMIA DUE TO ESCHERICHIA COLI: ICD-10-CM

## 2022-04-26 DIAGNOSIS — B96.20 BACTEREMIA DUE TO ESCHERICHIA COLI: ICD-10-CM

## 2022-04-26 LAB
ABO GROUP BLD: NORMAL
ALBUMIN SERPL-MCNC: 3.1 G/DL (ref 3.5–5.2)
ALBUMIN/GLOB SERPL: 1.2 G/DL
ALP SERPL-CCNC: 77 U/L (ref 39–117)
ALT SERPL W P-5'-P-CCNC: 26 U/L (ref 1–33)
ANION GAP SERPL CALCULATED.3IONS-SCNC: 10 MMOL/L (ref 5–15)
AST SERPL-CCNC: 18 U/L (ref 1–32)
B PARAPERT DNA SPEC QL NAA+PROBE: NOT DETECTED
B PERT DNA SPEC QL NAA+PROBE: NOT DETECTED
BILIRUB SERPL-MCNC: 0.5 MG/DL (ref 0–1.2)
BILIRUB UR QL STRIP: NEGATIVE
BUN SERPL-MCNC: 26 MG/DL (ref 8–23)
BUN/CREAT SERPL: 36.6 (ref 7–25)
C PNEUM DNA NPH QL NAA+NON-PROBE: NOT DETECTED
CALCIUM SPEC-SCNC: 8.8 MG/DL (ref 8.6–10.5)
CHLORIDE SERPL-SCNC: 93 MMOL/L (ref 98–107)
CLARITY UR: CLEAR
CO2 SERPL-SCNC: 24 MMOL/L (ref 22–29)
COLOR UR: YELLOW
CREAT SERPL-MCNC: 0.71 MG/DL (ref 0.57–1)
D-LACTATE SERPL-SCNC: 1.3 MMOL/L (ref 0.5–2)
DEPRECATED RDW RBC AUTO: 49.2 FL (ref 37–54)
DIGOXIN SERPL-MCNC: 1.1 NG/ML (ref 0.6–1.2)
EGFRCR SERPLBLD CKD-EPI 2021: 86.1 ML/MIN/1.73
ERYTHROCYTE [DISTWIDTH] IN BLOOD BY AUTOMATED COUNT: 16.4 % (ref 12.3–15.4)
FLUAV SUBTYP SPEC NAA+PROBE: NOT DETECTED
FLUBV RNA ISLT QL NAA+PROBE: NOT DETECTED
GLOBULIN UR ELPH-MCNC: 2.5 GM/DL
GLUCOSE SERPL-MCNC: 107 MG/DL (ref 65–99)
GLUCOSE UR STRIP-MCNC: NEGATIVE MG/DL
HADV DNA SPEC NAA+PROBE: NOT DETECTED
HCOV 229E RNA SPEC QL NAA+PROBE: NOT DETECTED
HCOV HKU1 RNA SPEC QL NAA+PROBE: NOT DETECTED
HCOV NL63 RNA SPEC QL NAA+PROBE: NOT DETECTED
HCOV OC43 RNA SPEC QL NAA+PROBE: NOT DETECTED
HCT VFR BLD AUTO: 20.1 % (ref 34–46.6)
HGB BLD-MCNC: 7 G/DL (ref 12–15.9)
HGB UR QL STRIP.AUTO: NEGATIVE
HMPV RNA NPH QL NAA+NON-PROBE: NOT DETECTED
HPIV1 RNA ISLT QL NAA+PROBE: NOT DETECTED
HPIV2 RNA SPEC QL NAA+PROBE: NOT DETECTED
HPIV3 RNA NPH QL NAA+PROBE: NOT DETECTED
HPIV4 P GENE NPH QL NAA+PROBE: NOT DETECTED
KETONES UR QL STRIP: ABNORMAL
LEUKOCYTE ESTERASE UR QL STRIP.AUTO: NEGATIVE
M PNEUMO IGG SER IA-ACNC: NOT DETECTED
MCH RBC QN AUTO: 29 PG (ref 26.6–33)
MCHC RBC AUTO-ENTMCNC: 34.8 G/DL (ref 31.5–35.7)
MCV RBC AUTO: 83.4 FL (ref 79–97)
NITRITE UR QL STRIP: NEGATIVE
PH UR STRIP.AUTO: 5.5 [PH] (ref 5–8)
PLATELET # BLD AUTO: 77 10*3/MM3 (ref 140–450)
PMV BLD AUTO: 9.5 FL (ref 6–12)
POTASSIUM SERPL-SCNC: 3.9 MMOL/L (ref 3.5–5.2)
PROCALCITONIN SERPL-MCNC: 16 NG/ML (ref 0–0.25)
PROT SERPL-MCNC: 5.6 G/DL (ref 6–8.5)
PROT UR QL STRIP: NEGATIVE
RBC # BLD AUTO: 2.41 10*6/MM3 (ref 3.77–5.28)
RH BLD: NEGATIVE
RHINOVIRUS RNA SPEC NAA+PROBE: NOT DETECTED
RSV RNA NPH QL NAA+NON-PROBE: NOT DETECTED
SARS-COV-2 RNA NPH QL NAA+NON-PROBE: NOT DETECTED
SODIUM SERPL-SCNC: 127 MMOL/L (ref 136–145)
SP GR UR STRIP: 1.01 (ref 1–1.03)
UROBILINOGEN UR QL STRIP: ABNORMAL
WBC NRBC COR # BLD: 0.05 10*3/MM3 (ref 3.4–10.8)
WHOLE BLOOD HOLD SPECIMEN: NORMAL

## 2022-04-26 PROCEDURE — 71045 X-RAY EXAM CHEST 1 VIEW: CPT

## 2022-04-26 PROCEDURE — 96374 THER/PROPH/DIAG INJ IV PUSH: CPT

## 2022-04-26 PROCEDURE — 87040 BLOOD CULTURE FOR BACTERIA: CPT | Performed by: EMERGENCY MEDICINE

## 2022-04-26 PROCEDURE — 80162 ASSAY OF DIGOXIN TOTAL: CPT | Performed by: EMERGENCY MEDICINE

## 2022-04-26 PROCEDURE — P9016 RBC LEUKOCYTES REDUCED: HCPCS

## 2022-04-26 PROCEDURE — 86900 BLOOD TYPING SEROLOGIC ABO: CPT

## 2022-04-26 PROCEDURE — 36430 TRANSFUSION BLD/BLD COMPNT: CPT

## 2022-04-26 PROCEDURE — 87186 SC STD MICRODIL/AGAR DIL: CPT | Performed by: EMERGENCY MEDICINE

## 2022-04-26 PROCEDURE — 0202U NFCT DS 22 TRGT SARS-COV-2: CPT | Performed by: EMERGENCY MEDICINE

## 2022-04-26 PROCEDURE — 83605 ASSAY OF LACTIC ACID: CPT | Performed by: EMERGENCY MEDICINE

## 2022-04-26 PROCEDURE — P9612 CATHETERIZE FOR URINE SPEC: HCPCS

## 2022-04-26 PROCEDURE — 81003 URINALYSIS AUTO W/O SCOPE: CPT | Performed by: EMERGENCY MEDICINE

## 2022-04-26 PROCEDURE — 84145 PROCALCITONIN (PCT): CPT | Performed by: EMERGENCY MEDICINE

## 2022-04-26 PROCEDURE — 25010000002 DIPHENHYDRAMINE PER 50 MG: Performed by: NURSE PRACTITIONER

## 2022-04-26 PROCEDURE — 25010000002 CEFEPIME PER 500 MG: Performed by: EMERGENCY MEDICINE

## 2022-04-26 PROCEDURE — 99284 EMERGENCY DEPT VISIT MOD MDM: CPT

## 2022-04-26 PROCEDURE — 87205 SMEAR GRAM STAIN: CPT | Performed by: INTERNAL MEDICINE

## 2022-04-26 PROCEDURE — 80053 COMPREHEN METABOLIC PANEL: CPT | Performed by: EMERGENCY MEDICINE

## 2022-04-26 PROCEDURE — 87147 CULTURE TYPE IMMUNOLOGIC: CPT | Performed by: INTERNAL MEDICINE

## 2022-04-26 PROCEDURE — 87070 CULTURE OTHR SPECIMN AEROBIC: CPT | Performed by: INTERNAL MEDICINE

## 2022-04-26 PROCEDURE — 36591 DRAW BLOOD OFF VENOUS DEVICE: CPT

## 2022-04-26 PROCEDURE — 25010000002 VANCOMYCIN 10 G RECONSTITUTED SOLUTION: Performed by: HOSPITALIST

## 2022-04-26 PROCEDURE — 86901 BLOOD TYPING SEROLOGIC RH(D): CPT

## 2022-04-26 PROCEDURE — 87077 CULTURE AEROBIC IDENTIFY: CPT | Performed by: EMERGENCY MEDICINE

## 2022-04-26 PROCEDURE — 87086 URINE CULTURE/COLONY COUNT: CPT | Performed by: EMERGENCY MEDICINE

## 2022-04-26 PROCEDURE — 25010000002 HEPARIN LOCK FLUSH PER 10 UNITS: Performed by: INTERNAL MEDICINE

## 2022-04-26 PROCEDURE — 63710000001 DIPHENHYDRAMINE PER 50 MG: Performed by: INTERNAL MEDICINE

## 2022-04-26 PROCEDURE — 87150 DNA/RNA AMPLIFIED PROBE: CPT | Performed by: EMERGENCY MEDICINE

## 2022-04-26 PROCEDURE — 85025 COMPLETE CBC W/AUTO DIFF WBC: CPT | Performed by: EMERGENCY MEDICINE

## 2022-04-26 RX ORDER — ACETAMINOPHEN 650 MG/1
650 SUPPOSITORY RECTAL EVERY 4 HOURS PRN
Status: DISCONTINUED | OUTPATIENT
Start: 2022-04-26 | End: 2022-05-04 | Stop reason: HOSPADM

## 2022-04-26 RX ORDER — CARVEDILOL 3.12 MG/1
3.12 TABLET ORAL 2 TIMES DAILY WITH MEALS
Status: DISCONTINUED | OUTPATIENT
Start: 2022-04-26 | End: 2022-05-04 | Stop reason: HOSPADM

## 2022-04-26 RX ORDER — NITROGLYCERIN 0.4 MG/1
0.4 TABLET SUBLINGUAL
Status: DISCONTINUED | OUTPATIENT
Start: 2022-04-26 | End: 2022-05-04 | Stop reason: HOSPADM

## 2022-04-26 RX ORDER — SODIUM CHLORIDE 0.9 % (FLUSH) 0.9 %
10 SYRINGE (ML) INJECTION EVERY 12 HOURS SCHEDULED
Status: DISCONTINUED | OUTPATIENT
Start: 2022-04-26 | End: 2022-05-04 | Stop reason: HOSPADM

## 2022-04-26 RX ORDER — ONDANSETRON 2 MG/ML
4 INJECTION INTRAMUSCULAR; INTRAVENOUS EVERY 6 HOURS PRN
Status: DISCONTINUED | OUTPATIENT
Start: 2022-04-26 | End: 2022-05-04 | Stop reason: HOSPADM

## 2022-04-26 RX ORDER — SODIUM CHLORIDE 9 MG/ML
250 INJECTION, SOLUTION INTRAVENOUS AS NEEDED
Status: DISCONTINUED | OUTPATIENT
Start: 2022-04-26 | End: 2022-04-28 | Stop reason: HOSPADM

## 2022-04-26 RX ORDER — SODIUM CHLORIDE 0.9 % (FLUSH) 0.9 %
10 SYRINGE (ML) INJECTION AS NEEDED
Status: CANCELLED | OUTPATIENT
Start: 2022-04-26

## 2022-04-26 RX ORDER — HEPARIN SODIUM (PORCINE) LOCK FLUSH IV SOLN 100 UNIT/ML 100 UNIT/ML
500 SOLUTION INTRAVENOUS AS NEEDED
Status: DISCONTINUED | OUTPATIENT
Start: 2022-04-26 | End: 2022-04-28 | Stop reason: HOSPADM

## 2022-04-26 RX ORDER — SODIUM CHLORIDE 0.9 % (FLUSH) 0.9 %
10 SYRINGE (ML) INJECTION AS NEEDED
Status: DISCONTINUED | OUTPATIENT
Start: 2022-04-26 | End: 2022-05-04 | Stop reason: HOSPADM

## 2022-04-26 RX ORDER — ACETAMINOPHEN 325 MG/1
650 TABLET ORAL ONCE
Status: COMPLETED | OUTPATIENT
Start: 2022-04-26 | End: 2022-04-26

## 2022-04-26 RX ORDER — ACETAMINOPHEN 325 MG/1
650 TABLET ORAL EVERY 4 HOURS PRN
Status: DISCONTINUED | OUTPATIENT
Start: 2022-04-26 | End: 2022-05-04 | Stop reason: HOSPADM

## 2022-04-26 RX ORDER — ACETAMINOPHEN 500 MG
1000 TABLET ORAL ONCE
Status: COMPLETED | OUTPATIENT
Start: 2022-04-26 | End: 2022-04-26

## 2022-04-26 RX ORDER — DIGOXIN 125 MCG
125 TABLET ORAL
Status: DISCONTINUED | OUTPATIENT
Start: 2022-04-27 | End: 2022-04-30

## 2022-04-26 RX ORDER — PRAVASTATIN SODIUM 40 MG
40 TABLET ORAL DAILY
Status: DISCONTINUED | OUTPATIENT
Start: 2022-04-27 | End: 2022-05-04 | Stop reason: HOSPADM

## 2022-04-26 RX ORDER — ACETAMINOPHEN 160 MG/5ML
650 SOLUTION ORAL EVERY 4 HOURS PRN
Status: DISCONTINUED | OUTPATIENT
Start: 2022-04-26 | End: 2022-05-04 | Stop reason: HOSPADM

## 2022-04-26 RX ORDER — DIPHENHYDRAMINE HCL 25 MG
25 CAPSULE ORAL ONCE
Status: COMPLETED | OUTPATIENT
Start: 2022-04-26 | End: 2022-04-26

## 2022-04-26 RX ORDER — HEPARIN SODIUM (PORCINE) LOCK FLUSH IV SOLN 100 UNIT/ML 100 UNIT/ML
500 SOLUTION INTRAVENOUS AS NEEDED
Status: CANCELLED | OUTPATIENT
Start: 2022-04-26

## 2022-04-26 RX ORDER — SODIUM CHLORIDE, SODIUM LACTATE, POTASSIUM CHLORIDE, CALCIUM CHLORIDE 600; 310; 30; 20 MG/100ML; MG/100ML; MG/100ML; MG/100ML
50 INJECTION, SOLUTION INTRAVENOUS CONTINUOUS
Status: DISCONTINUED | OUTPATIENT
Start: 2022-04-26 | End: 2022-04-27

## 2022-04-26 RX ORDER — DIPHENHYDRAMINE HYDROCHLORIDE 50 MG/ML
25 INJECTION INTRAMUSCULAR; INTRAVENOUS ONCE
Status: COMPLETED | OUTPATIENT
Start: 2022-04-26 | End: 2022-04-26

## 2022-04-26 RX ORDER — ONDANSETRON 4 MG/1
4 TABLET, FILM COATED ORAL EVERY 6 HOURS PRN
Status: DISCONTINUED | OUTPATIENT
Start: 2022-04-26 | End: 2022-05-04 | Stop reason: HOSPADM

## 2022-04-26 RX ADMIN — Medication 500 UNITS: at 17:00

## 2022-04-26 RX ADMIN — ACETAMINOPHEN 650 MG: 325 TABLET ORAL at 13:24

## 2022-04-26 RX ADMIN — SODIUM CHLORIDE 1000 ML: 9 INJECTION, SOLUTION INTRAVENOUS at 18:53

## 2022-04-26 RX ADMIN — CEFEPIME HYDROCHLORIDE 2 G: 2 INJECTION, POWDER, FOR SOLUTION INTRAVENOUS at 18:52

## 2022-04-26 RX ADMIN — VANCOMYCIN HYDROCHLORIDE 1500 MG: 10 INJECTION, POWDER, LYOPHILIZED, FOR SOLUTION INTRAVENOUS at 21:20

## 2022-04-26 RX ADMIN — DIPHENHYDRAMINE HYDROCHLORIDE 25 MG: 25 CAPSULE ORAL at 13:24

## 2022-04-26 RX ADMIN — ACETAMINOPHEN 1000 MG: 500 TABLET ORAL at 18:53

## 2022-04-26 RX ADMIN — SODIUM CHLORIDE, POTASSIUM CHLORIDE, SODIUM LACTATE AND CALCIUM CHLORIDE 100 ML/HR: 600; 310; 30; 20 INJECTION, SOLUTION INTRAVENOUS at 21:37

## 2022-04-26 RX ADMIN — Medication 10 ML: at 21:33

## 2022-04-26 RX ADMIN — DIPHENHYDRAMINE HYDROCHLORIDE 25 MG: 50 INJECTION, SOLUTION INTRAMUSCULAR; INTRAVENOUS at 15:01

## 2022-04-26 RX ADMIN — APIXABAN 5 MG: 5 TABLET, FILM COATED ORAL at 21:33

## 2022-04-26 NOTE — PROGRESS NOTES
At 1637 temp up to 103.1 orally. HR elevated 105 Dr. Galo's notified and requested patient to be taken to the ER for evaluation. Patient immediately to the ER per wheel chair. Son, Ba with the patient.

## 2022-04-26 NOTE — TELEPHONE ENCOUNTER
Informed Jess from [facility] and due to the patient being neutropenic and with her abnormal vitals, Dr. Galo recommends the patient go to the ER. Jess v/u and will take the pt to the ER.

## 2022-04-26 NOTE — TELEPHONE ENCOUNTER
Spoke with Jess from [facility], she states that patient had a reaction to the PRBC transfusion today. She has talked with MAGGI Pond from our office regarding the patients reaction and vitals. The patients temperature did spike and her she became hypotensive after getting off the phone with Dejah. Jess stated Dejah told her to send the patient home if her vitals were stable. The patients most recent temp was 100 and her blood pressure came back up to 109/47 with a heart rate of 101. Jess is wanting to know from Dr. Galo if the patient is safe to go home or should she be sent to the ER. RN will call Jess back after letting Dr. Galo know.

## 2022-04-26 NOTE — PROGRESS NOTES
Patient chilling temp 98.8 orally. B/P elevated compared to pre transfusion. HR elevated compared to pre transfusion. Blood stopped. Normal saline hung with new tubing at O. Call placed to MD. Dejah Rivera returned call and orders noted. Patient treated with 25 mg of Benadryl IV. Blood bank notified of reaction. Appointment made for patient to follow up in [facility] office on Friday April 29th as requested by MAGGI Rivero.

## 2022-04-26 NOTE — TELEPHONE ENCOUNTER
----- Message from Edith Bates sent at 4/26/2022  4:10 PM EDT -----  Please call Jess [facility] [phone] about above patient. Thank you , Obey

## 2022-04-26 NOTE — PROGRESS NOTES
Temp 102 orally. Blood bank notified and blood along with all normal saline and tubing returned to blood bank.

## 2022-04-27 PROBLEM — R78.81 BACTEREMIA DUE TO ESCHERICHIA COLI: Status: ACTIVE | Noted: 2022-04-27

## 2022-04-27 PROBLEM — A41.51 SEPSIS DUE TO ESCHERICHIA COLI (E. COLI): Status: ACTIVE | Noted: 2022-04-27

## 2022-04-27 PROBLEM — B96.20 BACTEREMIA DUE TO ESCHERICHIA COLI: Status: ACTIVE | Noted: 2022-04-27

## 2022-04-27 PROBLEM — D61.810 PANCYTOPENIA DUE TO CHEMOTHERAPY: Status: ACTIVE | Noted: 2022-04-27

## 2022-04-27 LAB
ABO GROUP BLD: NORMAL
ANION GAP SERPL CALCULATED.3IONS-SCNC: 8.3 MMOL/L (ref 5–15)
BACTERIA BLD CULT: ABNORMAL
BACTERIA SPEC AEROBE CULT: NO GROWTH
BH BB BLOOD EXPIRATION DATE: NORMAL
BH BB BLOOD TYPE BARCODE: 600
BH BB DISPENSE STATUS: NORMAL
BH BB PRODUCT CODE: NORMAL
BH BB UNIT NUMBER: NORMAL
BLD GP AB SCN SERPL QL: NEGATIVE
BOTTLE TYPE: ABNORMAL
BUN SERPL-MCNC: 17 MG/DL (ref 8–23)
BUN/CREAT SERPL: 36.2 (ref 7–25)
CALCIUM SPEC-SCNC: 8 MG/DL (ref 8.6–10.5)
CHLORIDE SERPL-SCNC: 100 MMOL/L (ref 98–107)
CO2 SERPL-SCNC: 22.7 MMOL/L (ref 22–29)
CREAT SERPL-MCNC: 0.47 MG/DL (ref 0.57–1)
CROSSMATCH INTERPRETATION: NORMAL
DEPRECATED RDW RBC AUTO: 51.7 FL (ref 37–54)
EGFRCR SERPLBLD CKD-EPI 2021: 96.4 ML/MIN/1.73
ERYTHROCYTE [DISTWIDTH] IN BLOOD BY AUTOMATED COUNT: 16.6 % (ref 12.3–15.4)
GLUCOSE SERPL-MCNC: 90 MG/DL (ref 65–99)
HCT VFR BLD AUTO: 18.4 % (ref 34–46.6)
HGB BLD-MCNC: 6.3 G/DL (ref 12–15.9)
MAGNESIUM SERPL-MCNC: 1.8 MG/DL (ref 1.6–2.4)
MCH RBC QN AUTO: 29.3 PG (ref 26.6–33)
MCHC RBC AUTO-ENTMCNC: 34.2 G/DL (ref 31.5–35.7)
MCV RBC AUTO: 85.6 FL (ref 79–97)
PHOSPHATE SERPL-MCNC: 2.8 MG/DL (ref 2.5–4.5)
PLATELET # BLD AUTO: 60 10*3/MM3 (ref 140–450)
PMV BLD AUTO: 9.9 FL (ref 6–12)
POTASSIUM SERPL-SCNC: 3.6 MMOL/L (ref 3.5–5.2)
RBC # BLD AUTO: 2.15 10*6/MM3 (ref 3.77–5.28)
RH BLD: NEGATIVE
SODIUM SERPL-SCNC: 131 MMOL/L (ref 136–145)
T&S EXPIRATION DATE: NORMAL
UNIT  ABO: NORMAL
UNIT  RH: NORMAL
WBC NRBC COR # BLD: 0.19 10*3/MM3 (ref 3.4–10.8)

## 2022-04-27 PROCEDURE — 86901 BLOOD TYPING SEROLOGIC RH(D): CPT | Performed by: INTERNAL MEDICINE

## 2022-04-27 PROCEDURE — 84100 ASSAY OF PHOSPHORUS: CPT | Performed by: HOSPITALIST

## 2022-04-27 PROCEDURE — 86900 BLOOD TYPING SEROLOGIC ABO: CPT | Performed by: INTERNAL MEDICINE

## 2022-04-27 PROCEDURE — 36430 TRANSFUSION BLD/BLD COMPNT: CPT

## 2022-04-27 PROCEDURE — 25010000002 CEFTRIAXONE PER 250 MG: Performed by: INTERNAL MEDICINE

## 2022-04-27 PROCEDURE — 86850 RBC ANTIBODY SCREEN: CPT | Performed by: INTERNAL MEDICINE

## 2022-04-27 PROCEDURE — 80048 BASIC METABOLIC PNL TOTAL CA: CPT | Performed by: HOSPITALIST

## 2022-04-27 PROCEDURE — 25010000002 CEFEPIME PER 500 MG: Performed by: HOSPITALIST

## 2022-04-27 PROCEDURE — 86900 BLOOD TYPING SEROLOGIC ABO: CPT

## 2022-04-27 PROCEDURE — 99223 1ST HOSP IP/OBS HIGH 75: CPT | Performed by: INTERNAL MEDICINE

## 2022-04-27 PROCEDURE — 83735 ASSAY OF MAGNESIUM: CPT | Performed by: HOSPITALIST

## 2022-04-27 PROCEDURE — 86923 COMPATIBILITY TEST ELECTRIC: CPT

## 2022-04-27 PROCEDURE — 85027 COMPLETE CBC AUTOMATED: CPT | Performed by: HOSPITALIST

## 2022-04-27 PROCEDURE — 87040 BLOOD CULTURE FOR BACTERIA: CPT | Performed by: INTERNAL MEDICINE

## 2022-04-27 PROCEDURE — P9016 RBC LEUKOCYTES REDUCED: HCPCS

## 2022-04-27 RX ORDER — ACETAMINOPHEN 325 MG/1
325 TABLET ORAL ONCE
Status: COMPLETED | OUTPATIENT
Start: 2022-04-27 | End: 2022-04-27

## 2022-04-27 RX ORDER — VANCOMYCIN HYDROCHLORIDE 1 G/200ML
1000 INJECTION, SOLUTION INTRAVENOUS EVERY 12 HOURS
Status: DISCONTINUED | OUTPATIENT
Start: 2022-04-27 | End: 2022-04-27

## 2022-04-27 RX ADMIN — NYSTATIN 500000 UNITS: 100000 SUSPENSION ORAL at 21:29

## 2022-04-27 RX ADMIN — CEFTRIAXONE 2 G: 2 INJECTION, POWDER, FOR SOLUTION INTRAMUSCULAR; INTRAVENOUS at 11:53

## 2022-04-27 RX ADMIN — CARVEDILOL 3.12 MG: 3.12 TABLET, FILM COATED ORAL at 18:04

## 2022-04-27 RX ADMIN — DIGOXIN 125 MCG: 125 TABLET ORAL at 14:11

## 2022-04-27 RX ADMIN — CEFEPIME HYDROCHLORIDE 2 G: 2 INJECTION, POWDER, FOR SOLUTION INTRAVENOUS at 03:11

## 2022-04-27 RX ADMIN — Medication 10 ML: at 21:30

## 2022-04-27 RX ADMIN — APIXABAN 5 MG: 5 TABLET, FILM COATED ORAL at 09:46

## 2022-04-27 RX ADMIN — ACETAMINOPHEN 325 MG: 325 TABLET, FILM COATED ORAL at 15:20

## 2022-04-27 RX ADMIN — PRAVASTATIN SODIUM 40 MG: 40 TABLET ORAL at 21:29

## 2022-04-27 RX ADMIN — Medication 10 ML: at 10:16

## 2022-04-27 RX ADMIN — NYSTATIN 500000 UNITS: 100000 SUSPENSION ORAL at 18:04

## 2022-04-27 RX ADMIN — NYSTATIN 500000 UNITS: 100000 SUSPENSION ORAL at 14:11

## 2022-04-28 ENCOUNTER — HOME CARE VISIT (OUTPATIENT)
Dept: HOME HEALTH SERVICES | Facility: HOME HEALTHCARE | Age: 82
End: 2022-04-28

## 2022-04-28 LAB
ALBUMIN SERPL-MCNC: 2.3 G/DL (ref 3.5–5.2)
ALBUMIN/GLOB SERPL: 0.9 G/DL
ALP SERPL-CCNC: 66 U/L (ref 39–117)
ALT SERPL W P-5'-P-CCNC: 16 U/L (ref 1–33)
ANION GAP SERPL CALCULATED.3IONS-SCNC: 8.9 MMOL/L (ref 5–15)
ANISOCYTOSIS BLD QL: ABNORMAL
AST SERPL-CCNC: 9 U/L (ref 1–32)
BASOPHILS # BLD MANUAL: 0.02 10*3/MM3 (ref 0–0.2)
BASOPHILS NFR BLD MANUAL: 1.1 % (ref 0–1.5)
BH BB BLOOD EXPIRATION DATE: NORMAL
BH BB BLOOD EXPIRATION DATE: NORMAL
BH BB BLOOD TYPE BARCODE: 600
BH BB BLOOD TYPE BARCODE: 600
BH BB DISPENSE STATUS: NORMAL
BH BB DISPENSE STATUS: NORMAL
BH BB PRODUCT CODE: NORMAL
BH BB PRODUCT CODE: NORMAL
BH BB UNIT NUMBER: NORMAL
BH BB UNIT NUMBER: NORMAL
BILIRUB SERPL-MCNC: 0.2 MG/DL (ref 0–1.2)
BUN SERPL-MCNC: 11 MG/DL (ref 8–23)
BUN/CREAT SERPL: 20.4 (ref 7–25)
CALCIUM SPEC-SCNC: 8.2 MG/DL (ref 8.6–10.5)
CHLORIDE SERPL-SCNC: 98 MMOL/L (ref 98–107)
CO2 SERPL-SCNC: 26.1 MMOL/L (ref 22–29)
CREAT SERPL-MCNC: 0.54 MG/DL (ref 0.57–1)
CROSSMATCH INTERPRETATION: NORMAL
CROSSMATCH INTERPRETATION: NORMAL
DEPRECATED RDW RBC AUTO: 49.9 FL (ref 37–54)
EGFRCR SERPLBLD CKD-EPI 2021: 93.2 ML/MIN/1.73
ERYTHROCYTE [DISTWIDTH] IN BLOOD BY AUTOMATED COUNT: 16 % (ref 12.3–15.4)
GLOBULIN UR ELPH-MCNC: 2.6 GM/DL
GLUCOSE SERPL-MCNC: 87 MG/DL (ref 65–99)
HCT VFR BLD AUTO: 26 % (ref 34–46.6)
HEMOCCULT STL QL: NEGATIVE
HGB BLD-MCNC: 8.9 G/DL (ref 12–15.9)
LYMPHOCYTES # BLD MANUAL: 0.07 10*3/MM3 (ref 0.7–3.1)
LYMPHOCYTES NFR BLD MANUAL: 14.7 % (ref 5–12)
MCH RBC QN AUTO: 29.3 PG (ref 26.6–33)
MCHC RBC AUTO-ENTMCNC: 34.2 G/DL (ref 31.5–35.7)
MCV RBC AUTO: 85.5 FL (ref 79–97)
MONOCYTES # BLD: 0.21 10*3/MM3 (ref 0.1–0.9)
NEUTROPHILS # BLD AUTO: 1.1 10*3/MM3 (ref 1.7–7)
NEUTROPHILS NFR BLD MANUAL: 78.9 % (ref 42.7–76)
PLAT MORPH BLD: NORMAL
PLATELET # BLD AUTO: 57 10*3/MM3 (ref 140–450)
PMV BLD AUTO: 10.7 FL (ref 6–12)
POTASSIUM SERPL-SCNC: 3.7 MMOL/L (ref 3.5–5.2)
PROT SERPL-MCNC: 4.9 G/DL (ref 6–8.5)
RBC # BLD AUTO: 3.04 10*6/MM3 (ref 3.77–5.28)
SODIUM SERPL-SCNC: 133 MMOL/L (ref 136–145)
UNIT  ABO: NORMAL
UNIT  ABO: NORMAL
UNIT  RH: NORMAL
UNIT  RH: NORMAL
VARIANT LYMPHS NFR BLD MANUAL: 5.3 % (ref 19.6–45.3)
WBC MORPH BLD: NORMAL
WBC NRBC COR # BLD: 1.4 10*3/MM3 (ref 3.4–10.8)

## 2022-04-28 PROCEDURE — 99232 SBSQ HOSP IP/OBS MODERATE 35: CPT | Performed by: INTERNAL MEDICINE

## 2022-04-28 PROCEDURE — 82272 OCCULT BLD FECES 1-3 TESTS: CPT | Performed by: INTERNAL MEDICINE

## 2022-04-28 PROCEDURE — 80053 COMPREHEN METABOLIC PANEL: CPT | Performed by: INTERNAL MEDICINE

## 2022-04-28 PROCEDURE — 25010000002 CEFTRIAXONE PER 250 MG: Performed by: INTERNAL MEDICINE

## 2022-04-28 PROCEDURE — 87040 BLOOD CULTURE FOR BACTERIA: CPT | Performed by: INTERNAL MEDICINE

## 2022-04-28 PROCEDURE — 85025 COMPLETE CBC W/AUTO DIFF WBC: CPT | Performed by: INTERNAL MEDICINE

## 2022-04-28 PROCEDURE — 85007 BL SMEAR W/DIFF WBC COUNT: CPT | Performed by: INTERNAL MEDICINE

## 2022-04-28 RX ADMIN — CEFTRIAXONE 2 G: 2 INJECTION, POWDER, FOR SOLUTION INTRAMUSCULAR; INTRAVENOUS at 12:06

## 2022-04-28 RX ADMIN — Medication 10 ML: at 08:09

## 2022-04-28 RX ADMIN — NYSTATIN 500000 UNITS: 100000 SUSPENSION ORAL at 08:09

## 2022-04-28 RX ADMIN — NYSTATIN 500000 UNITS: 100000 SUSPENSION ORAL at 13:29

## 2022-04-28 RX ADMIN — PRAVASTATIN SODIUM 40 MG: 40 TABLET ORAL at 20:18

## 2022-04-28 RX ADMIN — NYSTATIN 500000 UNITS: 100000 SUSPENSION ORAL at 17:53

## 2022-04-28 RX ADMIN — Medication 10 ML: at 20:18

## 2022-04-28 RX ADMIN — CARVEDILOL 3.12 MG: 3.12 TABLET, FILM COATED ORAL at 17:53

## 2022-04-28 RX ADMIN — DIGOXIN 125 MCG: 125 TABLET ORAL at 13:29

## 2022-04-28 RX ADMIN — NYSTATIN 500000 UNITS: 100000 SUSPENSION ORAL at 20:18

## 2022-04-28 RX ADMIN — CARVEDILOL 3.12 MG: 3.12 TABLET, FILM COATED ORAL at 08:09

## 2022-04-28 NOTE — CASE COMMUNICATION
Patient missed a SN visit from [facility] on 4/28/22.     Reason: PT ADMITTED TO [facility] 4/26/22.       For your records only.   As per home health protocol, MD must be notified of missed/cancelled visits; therefore the prescribed frequency was not met.

## 2022-04-29 ENCOUNTER — HOME CARE VISIT (OUTPATIENT)
Dept: HOME HEALTH SERVICES | Facility: HOME HEALTHCARE | Age: 82
End: 2022-04-29

## 2022-04-29 ENCOUNTER — APPOINTMENT (OUTPATIENT)
Dept: ONCOLOGY | Facility: HOSPITAL | Age: 82
End: 2022-04-29

## 2022-04-29 ENCOUNTER — APPOINTMENT (OUTPATIENT)
Dept: LAB | Facility: HOSPITAL | Age: 82
End: 2022-04-29

## 2022-04-29 LAB
ALBUMIN SERPL-MCNC: 2.6 G/DL (ref 3.5–5.2)
ALBUMIN/GLOB SERPL: 1.1 G/DL
ALP SERPL-CCNC: 71 U/L (ref 39–117)
ALT SERPL W P-5'-P-CCNC: 15 U/L (ref 1–33)
ANION GAP SERPL CALCULATED.3IONS-SCNC: 10 MMOL/L (ref 5–15)
ANISOCYTOSIS BLD QL: ABNORMAL
AST SERPL-CCNC: 10 U/L (ref 1–32)
BACTERIA SPEC AEROBE CULT: ABNORMAL
BACTERIA SPEC AEROBE CULT: ABNORMAL
BILIRUB SERPL-MCNC: 0.2 MG/DL (ref 0–1.2)
BUN SERPL-MCNC: 12 MG/DL (ref 8–23)
BUN/CREAT SERPL: 23.1 (ref 7–25)
CALCIUM SPEC-SCNC: 8.2 MG/DL (ref 8.6–10.5)
CHLORIDE SERPL-SCNC: 97 MMOL/L (ref 98–107)
CO2 SERPL-SCNC: 25 MMOL/L (ref 22–29)
CREAT SERPL-MCNC: 0.52 MG/DL (ref 0.57–1)
DEPRECATED RDW RBC AUTO: 50.5 FL (ref 37–54)
EGFRCR SERPLBLD CKD-EPI 2021: 94.1 ML/MIN/1.73
ERYTHROCYTE [DISTWIDTH] IN BLOOD BY AUTOMATED COUNT: 16.3 % (ref 12.3–15.4)
GLOBULIN UR ELPH-MCNC: 2.3 GM/DL
GLUCOSE SERPL-MCNC: 108 MG/DL (ref 65–99)
GRAM STN SPEC: ABNORMAL
HCT VFR BLD AUTO: 27.3 % (ref 34–46.6)
HGB BLD-MCNC: 9.4 G/DL (ref 12–15.9)
ISOLATED FROM: ABNORMAL
LYMPHOCYTES # BLD MANUAL: 0.21 10*3/MM3 (ref 0.7–3.1)
LYMPHOCYTES NFR BLD MANUAL: 4 % (ref 5–12)
MCH RBC QN AUTO: 29.7 PG (ref 26.6–33)
MCHC RBC AUTO-ENTMCNC: 34.4 G/DL (ref 31.5–35.7)
MCV RBC AUTO: 86.1 FL (ref 79–97)
MICROCYTES BLD QL: ABNORMAL
MONOCYTES # BLD: 0.14 10*3/MM3 (ref 0.1–0.9)
NEUTROPHILS # BLD AUTO: 3.15 10*3/MM3 (ref 1.7–7)
NEUTROPHILS NFR BLD MANUAL: 89.9 % (ref 42.7–76)
NRBC SPEC MANUAL: 1 /100 WBC (ref 0–0.2)
PLAT MORPH BLD: NORMAL
PLATELET # BLD AUTO: 64 10*3/MM3 (ref 140–450)
PMV BLD AUTO: 10.1 FL (ref 6–12)
POTASSIUM SERPL-SCNC: 3.2 MMOL/L (ref 3.5–5.2)
PROT SERPL-MCNC: 4.9 G/DL (ref 6–8.5)
QT INTERVAL: 367 MS
RBC # BLD AUTO: 3.17 10*6/MM3 (ref 3.77–5.28)
SODIUM SERPL-SCNC: 132 MMOL/L (ref 136–145)
VARIANT LYMPHS NFR BLD MANUAL: 6.1 % (ref 19.6–45.3)
WBC MORPH BLD: NORMAL
WBC NRBC COR # BLD: 3.5 10*3/MM3 (ref 3.4–10.8)

## 2022-04-29 PROCEDURE — 99222 1ST HOSP IP/OBS MODERATE 55: CPT

## 2022-04-29 PROCEDURE — 93005 ELECTROCARDIOGRAM TRACING: CPT | Performed by: HOSPITALIST

## 2022-04-29 PROCEDURE — 85007 BL SMEAR W/DIFF WBC COUNT: CPT | Performed by: INTERNAL MEDICINE

## 2022-04-29 PROCEDURE — 93010 ELECTROCARDIOGRAM REPORT: CPT | Performed by: INTERNAL MEDICINE

## 2022-04-29 PROCEDURE — 85025 COMPLETE CBC W/AUTO DIFF WBC: CPT | Performed by: INTERNAL MEDICINE

## 2022-04-29 PROCEDURE — 80053 COMPREHEN METABOLIC PANEL: CPT | Performed by: INTERNAL MEDICINE

## 2022-04-29 PROCEDURE — 25010000002 CEFTRIAXONE PER 250 MG: Performed by: INTERNAL MEDICINE

## 2022-04-29 PROCEDURE — 99232 SBSQ HOSP IP/OBS MODERATE 35: CPT | Performed by: INTERNAL MEDICINE

## 2022-04-29 RX ORDER — MAGNESIUM SULFATE HEPTAHYDRATE 40 MG/ML
2 INJECTION, SOLUTION INTRAVENOUS AS NEEDED
Status: DISCONTINUED | OUTPATIENT
Start: 2022-04-29 | End: 2022-05-04 | Stop reason: HOSPADM

## 2022-04-29 RX ORDER — DIPHENHYDRAMINE HYDROCHLORIDE AND LIDOCAINE HYDROCHLORIDE AND ALUMINUM HYDROXIDE AND MAGNESIUM HYDRO
5 KIT EVERY 6 HOURS
Status: DISCONTINUED | OUTPATIENT
Start: 2022-04-29 | End: 2022-04-29

## 2022-04-29 RX ORDER — POTASSIUM CHLORIDE 7.45 MG/ML
10 INJECTION INTRAVENOUS
Status: DISCONTINUED | OUTPATIENT
Start: 2022-04-29 | End: 2022-05-04 | Stop reason: HOSPADM

## 2022-04-29 RX ORDER — DIPHENHYDRAMINE HYDROCHLORIDE AND LIDOCAINE HYDROCHLORIDE AND ALUMINUM HYDROXIDE AND MAGNESIUM HYDRO
5 KIT EVERY 6 HOURS PRN
Status: DISCONTINUED | OUTPATIENT
Start: 2022-04-29 | End: 2022-05-04 | Stop reason: HOSPADM

## 2022-04-29 RX ORDER — POTASSIUM CHLORIDE 1.5 G/1.77G
40 POWDER, FOR SOLUTION ORAL AS NEEDED
Status: DISCONTINUED | OUTPATIENT
Start: 2022-04-29 | End: 2022-05-04 | Stop reason: HOSPADM

## 2022-04-29 RX ORDER — POTASSIUM CHLORIDE 750 MG/1
40 TABLET, FILM COATED, EXTENDED RELEASE ORAL AS NEEDED
Status: DISCONTINUED | OUTPATIENT
Start: 2022-04-29 | End: 2022-05-04 | Stop reason: HOSPADM

## 2022-04-29 RX ORDER — MAGNESIUM SULFATE 1 G/100ML
1 INJECTION INTRAVENOUS AS NEEDED
Status: DISCONTINUED | OUTPATIENT
Start: 2022-04-29 | End: 2022-05-04 | Stop reason: HOSPADM

## 2022-04-29 RX ADMIN — NYSTATIN 500000 UNITS: 100000 SUSPENSION ORAL at 12:28

## 2022-04-29 RX ADMIN — PRAVASTATIN SODIUM 40 MG: 40 TABLET ORAL at 20:52

## 2022-04-29 RX ADMIN — NYSTATIN 500000 UNITS: 100000 SUSPENSION ORAL at 08:37

## 2022-04-29 RX ADMIN — CEFTRIAXONE 2 G: 2 INJECTION, POWDER, FOR SOLUTION INTRAMUSCULAR; INTRAVENOUS at 11:54

## 2022-04-29 RX ADMIN — APIXABAN 2.5 MG: 2.5 TABLET, FILM COATED ORAL at 20:52

## 2022-04-29 RX ADMIN — NYSTATIN 500000 UNITS: 100000 SUSPENSION ORAL at 20:52

## 2022-04-29 RX ADMIN — Medication 10 ML: at 08:38

## 2022-04-29 RX ADMIN — POTASSIUM CHLORIDE 40 MEQ: 10 TABLET, EXTENDED RELEASE ORAL at 22:37

## 2022-04-29 RX ADMIN — DIGOXIN 125 MCG: 125 TABLET ORAL at 12:28

## 2022-04-29 RX ADMIN — NYSTATIN 500000 UNITS: 100000 SUSPENSION ORAL at 18:25

## 2022-04-29 RX ADMIN — POTASSIUM CHLORIDE 40 MEQ: 10 TABLET, EXTENDED RELEASE ORAL at 18:25

## 2022-04-29 RX ADMIN — CARVEDILOL 3.12 MG: 3.12 TABLET, FILM COATED ORAL at 18:25

## 2022-04-29 RX ADMIN — Medication 10 ML: at 20:52

## 2022-04-29 RX ADMIN — CARVEDILOL 3.12 MG: 3.12 TABLET, FILM COATED ORAL at 08:37

## 2022-04-29 NOTE — TELEPHONE ENCOUNTER
Michelle ESTRADA Josep is currently hospitalized at [facility].      Please let me know how you would like to proceed.    Thank you,  Mallorie Rivera, RN  Triage Nurse [facility] [place]

## 2022-04-29 NOTE — TELEPHONE ENCOUNTER
Please find out how has her blood pressure, heart rate and she decided about a stress test are not

## 2022-04-30 LAB
ALBUMIN SERPL-MCNC: 2.8 G/DL (ref 3.5–5.2)
ALBUMIN/GLOB SERPL: 1.3 G/DL
ALP SERPL-CCNC: 70 U/L (ref 39–117)
ALT SERPL W P-5'-P-CCNC: 13 U/L (ref 1–33)
ANION GAP SERPL CALCULATED.3IONS-SCNC: 12 MMOL/L (ref 5–15)
ANISOCYTOSIS BLD QL: ABNORMAL
AST SERPL-CCNC: 9 U/L (ref 1–32)
BILIRUB SERPL-MCNC: 0.2 MG/DL (ref 0–1.2)
BUN SERPL-MCNC: 12 MG/DL (ref 8–23)
BUN/CREAT SERPL: 22.6 (ref 7–25)
CALCIUM SPEC-SCNC: 8 MG/DL (ref 8.6–10.5)
CHLORIDE SERPL-SCNC: 96 MMOL/L (ref 98–107)
CO2 SERPL-SCNC: 26 MMOL/L (ref 22–29)
CREAT SERPL-MCNC: 0.53 MG/DL (ref 0.57–1)
DEPRECATED RDW RBC AUTO: 50 FL (ref 37–54)
EGFRCR SERPLBLD CKD-EPI 2021: 93.6 ML/MIN/1.73
ERYTHROCYTE [DISTWIDTH] IN BLOOD BY AUTOMATED COUNT: 16.3 % (ref 12.3–15.4)
GLOBULIN UR ELPH-MCNC: 2.2 GM/DL
GLUCOSE SERPL-MCNC: 93 MG/DL (ref 65–99)
HCT VFR BLD AUTO: 25.1 % (ref 34–46.6)
HGB BLD-MCNC: 8.8 G/DL (ref 12–15.9)
LYMPHOCYTES # BLD MANUAL: 0.11 10*3/MM3 (ref 0.7–3.1)
LYMPHOCYTES NFR BLD MANUAL: 2 % (ref 5–12)
MAGNESIUM SERPL-MCNC: 1.9 MG/DL (ref 1.6–2.4)
MCH RBC QN AUTO: 29.6 PG (ref 26.6–33)
MCHC RBC AUTO-ENTMCNC: 35.1 G/DL (ref 31.5–35.7)
MCV RBC AUTO: 84.5 FL (ref 79–97)
MICROCYTES BLD QL: ABNORMAL
MONOCYTES # BLD: 0.11 10*3/MM3 (ref 0.1–0.9)
NEUTROPHILS # BLD AUTO: 5.06 10*3/MM3 (ref 1.7–7)
NEUTROPHILS NFR BLD MANUAL: 96 % (ref 42.7–76)
PLAT MORPH BLD: NORMAL
PLATELET # BLD AUTO: 85 10*3/MM3 (ref 140–450)
PMV BLD AUTO: 9.9 FL (ref 6–12)
POLYCHROMASIA BLD QL SMEAR: ABNORMAL
POTASSIUM SERPL-SCNC: 4 MMOL/L (ref 3.5–5.2)
PROT SERPL-MCNC: 5 G/DL (ref 6–8.5)
RBC # BLD AUTO: 2.97 10*6/MM3 (ref 3.77–5.28)
SODIUM SERPL-SCNC: 134 MMOL/L (ref 136–145)
VARIANT LYMPHS NFR BLD MANUAL: 2 % (ref 19.6–45.3)
WBC MORPH BLD: NORMAL
WBC NRBC COR # BLD: 5.27 10*3/MM3 (ref 3.4–10.8)

## 2022-04-30 PROCEDURE — 97162 PT EVAL MOD COMPLEX 30 MIN: CPT | Performed by: PHYSICAL THERAPIST

## 2022-04-30 PROCEDURE — 85025 COMPLETE CBC W/AUTO DIFF WBC: CPT | Performed by: INTERNAL MEDICINE

## 2022-04-30 PROCEDURE — 93010 ELECTROCARDIOGRAM REPORT: CPT | Performed by: INTERNAL MEDICINE

## 2022-04-30 PROCEDURE — 25010000002 AMIODARONE IN DEXTROSE 5% 150-4.21 MG/100ML-% SOLUTION: Performed by: INTERNAL MEDICINE

## 2022-04-30 PROCEDURE — 93005 ELECTROCARDIOGRAM TRACING: CPT

## 2022-04-30 PROCEDURE — 99232 SBSQ HOSP IP/OBS MODERATE 35: CPT | Performed by: INTERNAL MEDICINE

## 2022-04-30 PROCEDURE — 25010000002 AMIODARONE IN DEXTROSE 5% 360-4.14 MG/200ML-% SOLUTION: Performed by: INTERNAL MEDICINE

## 2022-04-30 PROCEDURE — 25010000002 CEFTRIAXONE PER 250 MG: Performed by: INTERNAL MEDICINE

## 2022-04-30 PROCEDURE — 97530 THERAPEUTIC ACTIVITIES: CPT | Performed by: PHYSICAL THERAPIST

## 2022-04-30 PROCEDURE — 85007 BL SMEAR W/DIFF WBC COUNT: CPT | Performed by: INTERNAL MEDICINE

## 2022-04-30 PROCEDURE — 80053 COMPREHEN METABOLIC PANEL: CPT | Performed by: INTERNAL MEDICINE

## 2022-04-30 PROCEDURE — 83735 ASSAY OF MAGNESIUM: CPT

## 2022-04-30 RX ADMIN — NYSTATIN 500000 UNITS: 100000 SUSPENSION ORAL at 17:35

## 2022-04-30 RX ADMIN — ACETAMINOPHEN 650 MG: 325 TABLET ORAL at 14:39

## 2022-04-30 RX ADMIN — APIXABAN 5 MG: 5 TABLET, FILM COATED ORAL at 21:41

## 2022-04-30 RX ADMIN — APIXABAN 2.5 MG: 2.5 TABLET, FILM COATED ORAL at 08:53

## 2022-04-30 RX ADMIN — PRAVASTATIN SODIUM 40 MG: 40 TABLET ORAL at 21:41

## 2022-04-30 RX ADMIN — NYSTATIN 500000 UNITS: 100000 SUSPENSION ORAL at 08:53

## 2022-04-30 RX ADMIN — NYSTATIN 500000 UNITS: 100000 SUSPENSION ORAL at 21:41

## 2022-04-30 RX ADMIN — AMIODARONE HYDROCHLORIDE 150 MG: 1.5 INJECTION, SOLUTION INTRAVENOUS at 17:35

## 2022-04-30 RX ADMIN — DIGOXIN 125 MCG: 125 TABLET ORAL at 11:14

## 2022-04-30 RX ADMIN — CEFTRIAXONE 2 G: 2 INJECTION, POWDER, FOR SOLUTION INTRAMUSCULAR; INTRAVENOUS at 11:13

## 2022-04-30 RX ADMIN — CARVEDILOL 3.12 MG: 3.12 TABLET, FILM COATED ORAL at 17:36

## 2022-04-30 RX ADMIN — CARVEDILOL 3.12 MG: 3.12 TABLET, FILM COATED ORAL at 08:53

## 2022-04-30 RX ADMIN — Medication 10 ML: at 08:58

## 2022-04-30 RX ADMIN — AMIODARONE HYDROCHLORIDE 1 MG/MIN: 1.8 INJECTION, SOLUTION INTRAVENOUS at 23:21

## 2022-04-30 RX ADMIN — NYSTATIN 500000 UNITS: 100000 SUSPENSION ORAL at 11:14

## 2022-04-30 RX ADMIN — Medication 10 ML: at 21:41

## 2022-04-30 RX ADMIN — AMIODARONE HYDROCHLORIDE 1 MG/MIN: 1.8 INJECTION, SOLUTION INTRAVENOUS at 17:50

## 2022-05-01 ENCOUNTER — APPOINTMENT (OUTPATIENT)
Dept: CT IMAGING | Facility: HOSPITAL | Age: 82
End: 2022-05-01

## 2022-05-01 LAB
ALBUMIN SERPL-MCNC: 2.3 G/DL (ref 3.5–5.2)
ALBUMIN/GLOB SERPL: 0.8 G/DL
ALP SERPL-CCNC: 76 U/L (ref 39–117)
ALT SERPL W P-5'-P-CCNC: 10 U/L (ref 1–33)
ANION GAP SERPL CALCULATED.3IONS-SCNC: 10 MMOL/L (ref 5–15)
ANISOCYTOSIS BLD QL: ABNORMAL
AST SERPL-CCNC: 9 U/L (ref 1–32)
BACTERIA SPEC AEROBE CULT: NORMAL
BILIRUB SERPL-MCNC: 0.2 MG/DL (ref 0–1.2)
BUN SERPL-MCNC: 12 MG/DL (ref 8–23)
BUN/CREAT SERPL: 22.2 (ref 7–25)
CALCIUM SPEC-SCNC: 8.1 MG/DL (ref 8.6–10.5)
CHLORIDE SERPL-SCNC: 92 MMOL/L (ref 98–107)
CO2 SERPL-SCNC: 24 MMOL/L (ref 22–29)
CREAT SERPL-MCNC: 0.54 MG/DL (ref 0.57–1)
DEPRECATED RDW RBC AUTO: 49.8 FL (ref 37–54)
EGFRCR SERPLBLD CKD-EPI 2021: 93.2 ML/MIN/1.73
ERYTHROCYTE [DISTWIDTH] IN BLOOD BY AUTOMATED COUNT: 16.3 % (ref 12.3–15.4)
GLOBULIN UR ELPH-MCNC: 2.9 GM/DL
GLUCOSE SERPL-MCNC: 131 MG/DL (ref 65–99)
HCT VFR BLD AUTO: 26.7 % (ref 34–46.6)
HGB BLD-MCNC: 9.2 G/DL (ref 12–15.9)
LYMPHOCYTES # BLD MANUAL: 0 10*3/MM3 (ref 0.7–3.1)
MCH RBC QN AUTO: 29.2 PG (ref 26.6–33)
MCHC RBC AUTO-ENTMCNC: 34.5 G/DL (ref 31.5–35.7)
MCV RBC AUTO: 84.8 FL (ref 79–97)
MICROCYTES BLD QL: ABNORMAL
NEUTROPHILS # BLD AUTO: 8.21 10*3/MM3 (ref 1.7–7)
NEUTROPHILS NFR BLD MANUAL: 100 % (ref 42.7–76)
OSMOLALITY SERPL: 269 MOSM/KG (ref 280–301)
OSMOLALITY UR: 421 MOSM/KG
PLAT MORPH BLD: NORMAL
PLATELET # BLD AUTO: 164 10*3/MM3 (ref 140–450)
PMV BLD AUTO: 9.6 FL (ref 6–12)
POTASSIUM SERPL-SCNC: 3.5 MMOL/L (ref 3.5–5.2)
PROT SERPL-MCNC: 5.2 G/DL (ref 6–8.5)
RBC # BLD AUTO: 3.15 10*6/MM3 (ref 3.77–5.28)
SODIUM SERPL-SCNC: 126 MMOL/L (ref 136–145)
TRANSFUSION REACTION INTERPRETATION 1: NORMAL
VARIANT LYMPHS NFR BLD MANUAL: 0 % (ref 19.6–45.3)
WBC MORPH BLD: NORMAL
WBC NRBC COR # BLD: 8.21 10*3/MM3 (ref 3.4–10.8)

## 2022-05-01 PROCEDURE — 86900 BLOOD TYPING SEROLOGIC ABO: CPT

## 2022-05-01 PROCEDURE — 86880 COOMBS TEST DIRECT: CPT

## 2022-05-01 PROCEDURE — 80053 COMPREHEN METABOLIC PANEL: CPT | Performed by: INTERNAL MEDICINE

## 2022-05-01 PROCEDURE — 25010000002 CEFTRIAXONE PER 250 MG: Performed by: INTERNAL MEDICINE

## 2022-05-01 PROCEDURE — 74177 CT ABD & PELVIS W/CONTRAST: CPT

## 2022-05-01 PROCEDURE — 71260 CT THORAX DX C+: CPT

## 2022-05-01 PROCEDURE — 86901 BLOOD TYPING SEROLOGIC RH(D): CPT

## 2022-05-01 PROCEDURE — 25010000002 IOPAMIDOL 61 % SOLUTION: Performed by: STUDENT IN AN ORGANIZED HEALTH CARE EDUCATION/TRAINING PROGRAM

## 2022-05-01 PROCEDURE — 25010000002 AMIODARONE IN DEXTROSE 5% 360-4.14 MG/200ML-% SOLUTION: Performed by: INTERNAL MEDICINE

## 2022-05-01 PROCEDURE — 83930 ASSAY OF BLOOD OSMOLALITY: CPT | Performed by: INTERNAL MEDICINE

## 2022-05-01 PROCEDURE — 85025 COMPLETE CBC W/AUTO DIFF WBC: CPT | Performed by: INTERNAL MEDICINE

## 2022-05-01 PROCEDURE — 83935 ASSAY OF URINE OSMOLALITY: CPT | Performed by: INTERNAL MEDICINE

## 2022-05-01 PROCEDURE — 0 DIATRIZOATE MEGLUMINE & SODIUM PER 1 ML: Performed by: STUDENT IN AN ORGANIZED HEALTH CARE EDUCATION/TRAINING PROGRAM

## 2022-05-01 PROCEDURE — 85007 BL SMEAR W/DIFF WBC COUNT: CPT | Performed by: INTERNAL MEDICINE

## 2022-05-01 PROCEDURE — 99232 SBSQ HOSP IP/OBS MODERATE 35: CPT | Performed by: INTERNAL MEDICINE

## 2022-05-01 RX ORDER — AMIODARONE HYDROCHLORIDE 200 MG/1
200 TABLET ORAL
Status: DISCONTINUED | OUTPATIENT
Start: 2022-05-02 | End: 2022-05-04 | Stop reason: HOSPADM

## 2022-05-01 RX ADMIN — CEFTRIAXONE 2 G: 2 INJECTION, POWDER, FOR SOLUTION INTRAMUSCULAR; INTRAVENOUS at 10:56

## 2022-05-01 RX ADMIN — APIXABAN 5 MG: 5 TABLET, FILM COATED ORAL at 20:48

## 2022-05-01 RX ADMIN — AMIODARONE HYDROCHLORIDE 1 MG/MIN: 1.8 INJECTION, SOLUTION INTRAVENOUS at 10:56

## 2022-05-01 RX ADMIN — Medication 10 ML: at 20:49

## 2022-05-01 RX ADMIN — AMIODARONE HYDROCHLORIDE 1 MG/MIN: 1.8 INJECTION, SOLUTION INTRAVENOUS at 17:41

## 2022-05-01 RX ADMIN — PRAVASTATIN SODIUM 40 MG: 40 TABLET ORAL at 20:48

## 2022-05-01 RX ADMIN — APIXABAN 5 MG: 5 TABLET, FILM COATED ORAL at 08:31

## 2022-05-01 RX ADMIN — NYSTATIN 500000 UNITS: 100000 SUSPENSION ORAL at 17:41

## 2022-05-01 RX ADMIN — ACETAMINOPHEN 650 MG: 325 TABLET ORAL at 05:49

## 2022-05-01 RX ADMIN — Medication 10 ML: at 08:31

## 2022-05-01 RX ADMIN — CARVEDILOL 3.12 MG: 3.12 TABLET, FILM COATED ORAL at 17:41

## 2022-05-01 RX ADMIN — IOPAMIDOL 85 ML: 612 INJECTION, SOLUTION INTRAVENOUS at 13:30

## 2022-05-01 RX ADMIN — NYSTATIN 500000 UNITS: 100000 SUSPENSION ORAL at 20:48

## 2022-05-01 RX ADMIN — ACETAMINOPHEN 650 MG: 325 TABLET ORAL at 14:51

## 2022-05-01 RX ADMIN — NYSTATIN 500000 UNITS: 100000 SUSPENSION ORAL at 08:30

## 2022-05-01 RX ADMIN — DIATRIZOATE MEGLUMINE AND DIATRIZOATE SODIUM 30 ML: 600; 100 SOLUTION ORAL; RECTAL at 08:31

## 2022-05-01 RX ADMIN — NYSTATIN 500000 UNITS: 100000 SUSPENSION ORAL at 14:52

## 2022-05-01 RX ADMIN — CARVEDILOL 3.12 MG: 3.12 TABLET, FILM COATED ORAL at 08:31

## 2022-05-01 RX ADMIN — AMIODARONE HYDROCHLORIDE 1 MG/MIN: 1.8 INJECTION, SOLUTION INTRAVENOUS at 05:23

## 2022-05-01 NOTE — PROGRESS NOTES
"CC: Atrial fibrillation with rapid ventricular response    Interval History: No new acute events overnight      Vital Signs  Temp:  [97.2 °F (36.2 °C)-99.5 °F (37.5 °C)] 98 °F (36.7 °C)  Heart Rate:  [68-98] 68  Resp:  [16-20] 16  BP: ()/(54-75) 106/65    Intake/Output Summary (Last 24 hours) at 5/1/2022 1834  Last data filed at 5/1/2022 1247  Gross per 24 hour   Intake 622 ml   Output --   Net 622 ml     Flowsheet Rows    Flowsheet Row First Filed Value   Admission Height 152 cm (59.84\") Documented at 04/26/2022 2209   Admission Weight 70.3 kg (155 lb) Documented at 04/26/2022 2048          PHYSICAL EXAM:  General: No acute distress  Resp:NL Rate, symmetric chest expansion,unlabored, clear  CV:NL rate and rhythm, NL PMI, NL S1 and S2, no Murmur, no gallop, no rub, No JVD.   ABD:Nl sounds, no masses or tenderness, nondistended, no guarding or rebound  Neuro: alert,cooperative and oriented  Extr:Normal pedal pulses, No edema or cyanosis, moves all extremities      Results Review:    Results from last 7 days   Lab Units 05/01/22  0937   SODIUM mmol/L 126*   POTASSIUM mmol/L 3.5   CHLORIDE mmol/L 92*   CO2 mmol/L 24.0   BUN mg/dL 12   CREATININE mg/dL 0.54*   GLUCOSE mg/dL 131*   CALCIUM mg/dL 8.1*         Results from last 7 days   Lab Units 05/01/22  0937   WBC 10*3/mm3 8.21   HEMOGLOBIN g/dL 9.2*   HEMATOCRIT % 26.7*   PLATELETS 10*3/mm3 164             Results from last 7 days   Lab Units 04/30/22  0242   MAGNESIUM mg/dL 1.9         I reviewed the patient's new clinical results.  I personally viewed and interpreted the patient's EKG/Telemetry data        Medication Review:   Meds reviewed    amiodarone, 1 mg/min, Last Rate: 1 mg/min (05/01/22 6881)        Assessment/Plan    Pancytopenia due to chemotherapy  Sepsis due to E. coli  Bacteremia due to E. coli  Neutropenic fever  Chronic diastolic CHF  Paroxysmal atrial fibrillation with RVR  Hyponatremia  Diffuse large B-cell " lymphoma  Hypertension  Hyperlipidemia  Moderate MR   Anemia and thrombocytopenia- Plt todday 85 from 64    Patient converted back to sinus rhythm with amiodarone drip.  I will switch her to oral amiodarone.  Continue Eliquis 5 mg p.o. daily, carvedilol 3.125 mg p.o. twice daily    Cardiology will sign off but call with any questions.    Thank you for consulting with cardiology.      Indio Shoemaker MD  05/01/22  18:34 EDT

## 2022-05-01 NOTE — PROGRESS NOTES
[facility] INPATIENT PROGRESS NOTE    Length of Stay:  5 days    CHIEF COMPLAINT: Diffuse large B cell non-Hodgkin's lymphoma, febrile neutropenia, anemia, thrombocytopenia secondary to chemotherapy      SUBJECTIVE: Patient with no new complaints today.  She was fortunately able to complete drinking her oral contrast for CT scans and will be going down for scan fairly soon.  She notes that fatigue is gradually improving.  She has no new complaints today.      ROS:  Review of Systems   A comprehensive review of systems was obtained with pertinent positive findings as noted in the interval history above.  All other systems negative.      OBJECTIVE:  Vitals:    05/01/22 0451 05/01/22 0540 05/01/22 0625 05/01/22 0743   BP: 106/75   110/58   BP Location: Right arm   Right arm   Patient Position: Lying   Lying   Pulse: 86   98   Resp:    16   Temp: 97.2 °F (36.2 °C) 99.4 °F (37.4 °C) 98.8 °F (37.1 °C) 99.2 °F (37.3 °C)   TempSrc: Oral Oral Oral Oral   SpO2:    91%   Weight:       Height:             PHYSICAL EXAMINATION:  General: Alert and orient x3 no distress  HEENT: Thrush resolved  Chest/Lungs: Clear to auscultation bilaterally  Heart: Regular rate and rhythm with 2/6 murmur  Abdomen/GI: Soft nontender nondistended bowel sounds present  Extremities: No edema, minimal tenderness in the bilateral ankles.    Patient was examined today, unchanged from above    DIAGNOSTIC DATA:  Results Review:     I reviewed the patient's new clinical results.    Results from last 7 days   Lab Units 04/30/22 0242 04/29/22 0604 04/28/22  0719   WBC 10*3/mm3 5.27 3.50 1.40*   HEMOGLOBIN g/dL 8.8* 9.4* 8.9*   HEMATOCRIT % 25.1* 27.3* 26.0*   PLATELETS 10*3/mm3 85* 64* 57*      Results from last 7 days   Lab Units 04/30/22 0242 04/29/22  0604 04/28/22  0719   SODIUM mmol/L 134* 132* 133*   POTASSIUM mmol/L 4.0 3.2* 3.7   CHLORIDE mmol/L 96* 97* 98   CO2 mmol/L 26.0 25.0 26.1   BUN mg/dL 12 12 11   CREATININE mg/dL 0.53*  0.52* 0.54*   CALCIUM mg/dL 8.0* 8.2* 8.2*   BILIRUBIN mg/dL 0.2 0.2 0.2   ALK PHOS U/L 70 71 66   ALT (SGPT) U/L 13 15 16   AST (SGOT) U/L 9 10 9   GLUCOSE mg/dL 93 108* 87      Lab Results   Component Value Date    NEUTROABS 5.06 04/30/2022         Results from last 7 days   Lab Units 04/30/22  0242   MAGNESIUM mg/dL 1.9       Assessment/Plan   ASSESSMENT/PLAN:  This is a 81 y.o. female with:     *Diffuse large B-cell lymphoma.     · Patient was found to have bilateral pleural effusions.    · She underwent right thoracentesis on 2/21/2022 and the left thoracentesis on 2/22/2022.    · Analysis of the fluid revealed involvement with diffuse large B-cell lymphoma.    · FISH analysis for BCL6 rearrangement was positive.    · FISH analysis for BCL 1, BCL-2 and MYC rearrangement was negative.   · CT chest on 2/17/2022 revealed no hilar or mediastinal lymphadenopathy.    · Spleen was reported to be normal in size.  · Peripheral blood flow cytometry on 3/7/2022 was negative.  · PET scan on 3/10/2022 revealed significant hypermetabolism in the left adrenal gland and the surrounding soft tissue with SUV up to 34.5. Hypermetabolism in the left pleural thickening with SUV of 7.1. there was less hypermetabolism in the right adrenal gland and in the right pleura.  · She is considered to have stage III non-bulky disease.  · The degree of hypermetabolism is consistent with high grade lymphoma. This concurs with the pathology exam of the pleural fluid cytology that revealed diffuse large B-cell lymphoma.  · R-IPI score of 3 associated with poor risk. This is associated with overall survival of 55%.   · Due to the patient previously having good performance status, I recommended systemic chemotherapy with R-CHOP with Neulasta support.  I explained that the goal is cure.  With her being 81 years old, she will need close monitoring for any symptoms or signs of toxicity.  · Patient was started on R-CHOP with Neulasta support on  3/28/2022.  · Patient tolerated chemotherapy except for neutropenia and thrombocytopenia.  She developed constipation related to vincristine.  · Due to the expected recurrence of the constipation with subsequent treatment, recommended reducing the dose of vincristine to 1 mg.  · Patient received cycle 2 R-CHOP with Neulasta support on 4/18/2022.  · Patient has requested that we perform upcoming CT scans (scheduled as outpatient on 5/5/2022) during hospitalization.  We will go ahead and order CT chest abdomen and pelvis while hospitalized.  · Given patient's significant side effects from chemotherapy, will need to discuss whether to transition to mini R-CHOP in the future.     *Febrile neutropenia  · Patient receiving Neulasta support on day 2 following chemotherapy  · On 4/25/2022, patient was neutropenic with WBC 0.3 with ANC 0.05.  Patient was placed on prophylactic Augmentin  · Blood culture on 4/26/2022 with staph epidermidis  · On 4/26/2022, WBC was 0.05.  Patient developed fever while receiving transfusion.  She was subsequently admitted.  Procalcitonin 16, lactate 1.3.  · Patient with no localizing signs or symptoms  · Chest x-ray 4/26/2022 negative  · Blood culture 4/26/2022 with E. coli.  Urine culture negative.  Respiratory viral panel (including COVID-19) negative.  · Patient was seen by infectious disease  · Patient was initially covered with cefepime and vancomycin.  Subsequent transition to Rocephin on 4/27/2022 with blood culture result growing E. Coli.  · Patient with Mediport in place  · Repeat blood culture 4/27 and 4/28/2022 negative  · With recovery of WBC, infectious disease has recommended that she complete a 7-day course of ceftriaxone which will be finished on 5/2/2022.  Arrangements being made for potential home antibiotics if she is ready for discharge before that time.  · WBC currently remains normal at 5.27 with ANC 5.06.     *Thrombocytopenia secondary to chemotherapy.  · Patient  developed thrombocytopenia secondary to recent chemotherapy.  Potential contribution from consumption related to infection/bacteremia as well.  · Platelet count today has normalized at 164,000     *Bilateral pleural effusions.   · She is s/p right thoracentesis on 2/21/2022 and the effusion was found to be malignant attributed to the lymphoma.   · Chest x-ray 4/26/2022 with no evidence of pleural effusion.  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  · Patient had anemia secondary to her lymphoma, exacerbated by chemotherapy.  · Hemoglobin was 11.1 on 3/28/2022.  · Anemia evaluation 3/29/2022 with iron 67, ferritin 204.4, iron saturation 22%, TIBC 305.    · Additional labs on 4/18/2022 with folate greater than 20, B12 1614  · Hemoglobin on 4/25/2022 was 7.9.  Attempted transfusion on 4/26/2022 with hemoglobin of 7.0 however patient developed fever and transfusion was stopped.  Ultimately it appears fever was related to infection with febrile neutropenia and bacteremia and not related to transfusion.  · Patient received transfusion 2 unit PRBC on 4/27/2022 with hemoglobin down to 6.3.  · Stool for occult blood negative on 4/28/2022  · Today, hemoglobin has improved at 9.2     *Paroxysmal atrial fibrillation.  · She is on Eliquis 5 mg twice daily.  · Patient was previously on amiodarone.  It was discontinued by cardiology on 4/14/2022.  · Patient will not be given Zyprexa due to the possible drug drug interaction.   · With platelet count down to 60,000 Eliquis was held on 4/27/2022 with plans to resume once platelet count improves above 60,000 range.  · Patient did experience increase in heart rate on 4/29/2022, was seen by cardiology.  At that point heart rate had improved and she converted back to sinus rhythm.  · With recovery of platelet count, resumed Eliquis, continuing on full dose Eliquis 5 mg twice daily     *Constipation.  · Patient has  chronic constipation that worsened after the start of chemotherapy.  · Patient was placed on Senokot-S.  · Due to the likelihood of recurrence of the constipation with subsequent chemotherapy, vincristine dose was reduced as above with cycle 2 chemotherapy.     *Thrush  · Continue nystatin 5 cc swish and swallow every 4 hours  · Patient with mild oral pain, has Magic mouthwash to use as needed   · thrush appears to have resolved     *Hyponatremia  · Sodium was 127 on admission 4/26/2022.  Patient with recent mild hyponatremia in the low 130 range  · Today, sodium has declined further to 126.  We will check serum and urine osmolality     *CODE STATUS  · Patient is noted to be DNR        PLAN:   1. Follow-up CT scan chest abdomen pelvis is being performed today (patient requested that we move up scans that were scheduled as outpatient 5/5/2022).  2. Patient currently continuing on Rocephin per infectious disease, is to complete a 7-day course on 5/3/2022  3. Continue Eliquis 5 mg twice daily   4. Continue nystatin 5 cc swish and swallow every 4 hours  5. Continue Magic mouthwash 5 cc every 6 hours as needed for oral pain  6. Check serum and urine osmolality with worsening hyponatremia.  Consider need for nephrology consultation if this worsens further.  7. Daily CBC, CMP     Discussed with patient at bedside.             Philip Chin MD

## 2022-05-01 NOTE — PLAN OF CARE
Goal Outcome Evaluation:  Plan of Care Reviewed With: patient        Progress: improving  Outcome Evaluation: Afebrile this shift, Amiodarone gtt continues. Afib with HR controlled, BP low side, cardiology notified. Up to BSC. Will continue to monitor.   Pt has chills and temp: 99.4, Tylenol given at 05:49

## 2022-05-01 NOTE — PROGRESS NOTES
Name: Michelle Car ADMIT: 2022   : 1940  PCP: Olayinka Pimentel MD    MRN: [id number] LOS: 5 days   AGE/SEX: 81 y.o. female  ROOM: [facility]     Subjective   Subjective   CC: fever, generalized weakness  No acute events. Resting in bed, complained of chills this AM and had temp of 99.4. Patient struggling to drink oral contrast for CTs. No CP/dyspnea/f/n/v/d/constipation/dysuria.    Objective   Objective   Vital Signs  Temp:  [97.2 °F (36.2 °C)-101.8 °F (38.8 °C)] 98.1 °F (36.7 °C)  Heart Rate:  [84-98] 98  Resp:  [16-20] 16  BP: ()/(54-75) 110/58  SpO2:  [91 %-95 %] 91 %  on   ;   Device (Oxygen Therapy): room air  Body mass index is 30.17 kg/m².  Physical Exam  Vitals and nursing note reviewed.   Constitutional:       General: She is not in acute distress.     Appearance: She is ill-appearing. She is not diaphoretic.   HENT:      Head: Normocephalic and atraumatic.      Nose: Nose normal.      Mouth/Throat:      Mouth: Mucous membranes are moist.      Pharynx: Oropharynx is clear.   Eyes:      Extraocular Movements: Extraocular movements intact.      Conjunctiva/sclera: Conjunctivae normal.      Pupils: Pupils are equal, round, and reactive to light.   Cardiovascular:      Rate and Rhythm: Normal rate and regular rhythm.      Pulses: Normal pulses.   Pulmonary:      Effort: Pulmonary effort is normal.      Breath sounds: Examination of the right-lower field reveals decreased breath sounds. Examination of the left-lower field reveals decreased breath sounds. Decreased breath sounds present.   Abdominal:      General: Bowel sounds are normal.      Palpations: Abdomen is soft.   Musculoskeletal:         General: Swelling (trace BLE) present. No tenderness.      Cervical back: Normal range of motion and neck supple.   Skin:     General: Skin is warm and dry.      Capillary Refill: Capillary refill takes less than 2 seconds.   Neurological:      General: No focal deficit present.      Mental  Status: She is alert and oriented to person, place, and time.   Psychiatric:         Mood and Affect: Mood normal.         Behavior: Behavior normal.       Results Review  I reviewed the patient's new clinical results.  I reviewed the patient's telemetry.  Results from last 7 days   Lab Units 05/01/22  0937 04/30/22 0242 04/29/22  0604 04/28/22  0719   WBC 10*3/mm3 8.21 5.27 3.50 1.40*   HEMOGLOBIN g/dL 9.2* 8.8* 9.4* 8.9*   PLATELETS 10*3/mm3 164 85* 64* 57*     Results from last 7 days   Lab Units 04/30/22  0242 04/29/22  0604 04/28/22  0719 04/27/22  0616   SODIUM mmol/L 134* 132* 133* 131*   POTASSIUM mmol/L 4.0 3.2* 3.7 3.6   CHLORIDE mmol/L 96* 97* 98 100   CO2 mmol/L 26.0 25.0 26.1 22.7   BUN mg/dL 12 12 11 17   CREATININE mg/dL 0.53* 0.52* 0.54* 0.47*   GLUCOSE mg/dL 93 108* 87 90   EGFR mL/min/1.73 93.6 94.1 93.2 96.4     Results from last 7 days   Lab Units 04/30/22 0242 04/29/22  0604 04/28/22  0719 04/26/22  1757   ALBUMIN g/dL 2.80* 2.60* 2.30* 3.10*   BILIRUBIN mg/dL 0.2 0.2 0.2 0.5   ALK PHOS U/L 70 71 66 77   AST (SGOT) U/L 9 10 9 18   ALT (SGPT) U/L 13 15 16 26     Results from last 7 days   Lab Units 04/30/22  0242 04/29/22  0604 04/28/22  0719 04/27/22  0616 04/26/22  1757   CALCIUM mg/dL 8.0* 8.2* 8.2* 8.0* 8.8   ALBUMIN g/dL 2.80* 2.60* 2.30*  --  3.10*   MAGNESIUM mg/dL 1.9  --   --  1.8  --    PHOSPHORUS mg/dL  --   --   --  2.8  --      Results from last 7 days   Lab Units 04/26/22  1757   PROCALCITONIN ng/mL 16.00*   LACTATE mmol/L 1.3     No results found for: HGBA1C, POCGLU    No radiology results for the last day  Scheduled Medications  apixaban, 5 mg, Oral, Q12H  carvedilol, 3.125 mg, Oral, BID With Meals  cefTRIAXone, 2 g, Intravenous, Q24H  nystatin, 5 mL, Oral, 4x Daily  pravastatin, 40 mg, Oral, Daily  sodium chloride, 10 mL, Intravenous, Q12H    Infusions  amiodarone, 1 mg/min, Last Rate: 1 mg/min (05/01/22 1056)    Diet  Diet Regular       Assessment/Plan     Active Hospital  Problems    Diagnosis  POA   • Pancytopenia due to chemotherapy (HCC) [D61.810]  Yes   • Sepsis due to Escherichia coli (E. coli) (HCC) [A41.51]  Yes   • Bacteremia due to Escherichia coli [R78.81, B96.20]  Yes   • Neutropenic fever (HCC) [D70.9, R50.81]  Yes   • Chronic diastolic CHF (congestive heart failure) (HCC) [I50.32]  Yes   • Paroxysmal atrial fibrillation(HCC) [I48.0]  Yes   • Hyponatremia [E87.1]  Yes   • Diffuse large B-cell lymphoma of lymph nodes of multiple regions (HCC) [C83.38]  Yes   • Hypertension [I10]  Yes   • Hyperlipidemia [E78.5]  Yes      Resolved Hospital Problems   No resolved problems to display.     Neutropenic Fever/E.Coli Bacteremia/Sepsis  - received cycle 2 of R-CHOP with Neulasta 4/18/22 for diffuse large B-cell lymphoma  - no localized signs of infection, mechanism likely translocation of gut bacteria  - repeat blood cultures NGTD  - continue on ceftriaxone per ID, appreciate recs  - no need for granix per heme/onc, appreciate recs    Diffuse large B cell lymphoma  - heme-onc following  - received cycle 2 of R-CHOP with Neulasta 4/18/22 for diffuse large B-cell lymphoma  - they have ordered staging CTs which were originally planned for o/p- to be done today    Pancytopenia  - Eliquis resumed per h/o  - s/p 2 units of PRBCs 4/2  - appreciate hematology/oncology recs    PAF  - continue coreg and digoxin-I with hold parameters on the former  - eliqius resumed  - cards consulted yesterday per h/o; started on amiodarone gtt, in SR    Chronic Diastolic CHF  - Resuscitated per sepsis protocol, IVF now stopped  - monitor volume status closely    SCDs for DVT prophylaxis.  Full code.  Discussed with patient and nursing staff.  Anticipate discharge home with home health timing yet to be determined.      Ivon Kim MD  [facility]  05/01/22  11:06 EDT

## 2022-05-01 NOTE — PLAN OF CARE
Goal Outcome Evaluation: patient received PRN tylenol one time this shift. Afebrile.  Port needle changed by IV team today.  Continues on IV ABT for E. Coli. Call light in reach and all needs met. WCTM.

## 2022-05-01 NOTE — SIGNIFICANT NOTE
05/01/22 1318   OTHER   Discipline physical therapist   Rehab Time/Intention   Session Not Performed patient unavailable for treatment  (Off the floor to CT. Will folllow up tomorrow)   Recommendation   PT - Next Appointment 05/02/22

## 2022-05-02 LAB
ALBUMIN SERPL-MCNC: 2.3 G/DL (ref 3.5–5.2)
ALBUMIN/GLOB SERPL: 0.9 G/DL
ALP SERPL-CCNC: 77 U/L (ref 39–117)
ALT SERPL W P-5'-P-CCNC: 15 U/L (ref 1–33)
ANION GAP SERPL CALCULATED.3IONS-SCNC: 10.1 MMOL/L (ref 5–15)
ANISOCYTOSIS BLD QL: ABNORMAL
AST SERPL-CCNC: 14 U/L (ref 1–32)
BACTERIA SPEC AEROBE CULT: NORMAL
BILIRUB SERPL-MCNC: 0.2 MG/DL (ref 0–1.2)
BUN SERPL-MCNC: 11 MG/DL (ref 8–23)
BUN/CREAT SERPL: 26.2 (ref 7–25)
BURR CELLS BLD QL SMEAR: ABNORMAL
CALCIUM SPEC-SCNC: 7.8 MG/DL (ref 8.6–10.5)
CHLORIDE SERPL-SCNC: 91 MMOL/L (ref 98–107)
CO2 SERPL-SCNC: 26.9 MMOL/L (ref 22–29)
CREAT SERPL-MCNC: 0.42 MG/DL (ref 0.57–1)
DEPRECATED RDW RBC AUTO: 49.1 FL (ref 37–54)
EGFRCR SERPLBLD CKD-EPI 2021: 99 ML/MIN/1.73
ERYTHROCYTE [DISTWIDTH] IN BLOOD BY AUTOMATED COUNT: 16.5 % (ref 12.3–15.4)
GLOBULIN UR ELPH-MCNC: 2.7 GM/DL
GLUCOSE SERPL-MCNC: 99 MG/DL (ref 65–99)
HCT VFR BLD AUTO: 23.5 % (ref 34–46.6)
HGB BLD-MCNC: 8.2 G/DL (ref 12–15.9)
LAB AP CASE REPORT: NORMAL
LAB AP CLINICAL INFORMATION: NORMAL
LAB AP INTRADEPARTMENTAL CONSULT: NORMAL
LYMPHOCYTES # BLD MANUAL: 0.13 10*3/MM3 (ref 0.7–3.1)
LYMPHOCYTES NFR BLD MANUAL: 4 % (ref 5–12)
MCH RBC QN AUTO: 29.2 PG (ref 26.6–33)
MCHC RBC AUTO-ENTMCNC: 34.9 G/DL (ref 31.5–35.7)
MCV RBC AUTO: 83.6 FL (ref 79–97)
MICROCYTES BLD QL: ABNORMAL
MONOCYTES # BLD: 0.25 10*3/MM3 (ref 0.1–0.9)
NEUTROPHILS # BLD AUTO: 5.97 10*3/MM3 (ref 1.7–7)
NEUTROPHILS NFR BLD MANUAL: 94 % (ref 42.7–76)
OSMOLALITY SERPL: 271 MOSM/KG (ref 280–301)
OVALOCYTES BLD QL SMEAR: ABNORMAL
PATH REPORT.FINAL DX SPEC: NORMAL
PLAT MORPH BLD: NORMAL
PLATELET # BLD AUTO: 218 10*3/MM3 (ref 140–450)
PMV BLD AUTO: 10 FL (ref 6–12)
POIKILOCYTOSIS BLD QL SMEAR: ABNORMAL
POTASSIUM SERPL-SCNC: 3.3 MMOL/L (ref 3.5–5.2)
POTASSIUM SERPL-SCNC: 3.9 MMOL/L (ref 3.5–5.2)
PROT SERPL-MCNC: 5 G/DL (ref 6–8.5)
QT INTERVAL: 358 MS
RBC # BLD AUTO: 2.81 10*6/MM3 (ref 3.77–5.28)
SODIUM SERPL-SCNC: 128 MMOL/L (ref 136–145)
VARIANT LYMPHS NFR BLD MANUAL: 2 % (ref 19.6–45.3)
WBC MORPH BLD: NORMAL
WBC NRBC COR # BLD: 6.35 10*3/MM3 (ref 3.4–10.8)

## 2022-05-02 PROCEDURE — 85025 COMPLETE CBC W/AUTO DIFF WBC: CPT | Performed by: INTERNAL MEDICINE

## 2022-05-02 PROCEDURE — 83930 ASSAY OF BLOOD OSMOLALITY: CPT | Performed by: STUDENT IN AN ORGANIZED HEALTH CARE EDUCATION/TRAINING PROGRAM

## 2022-05-02 PROCEDURE — 99233 SBSQ HOSP IP/OBS HIGH 50: CPT | Performed by: INTERNAL MEDICINE

## 2022-05-02 PROCEDURE — 25010000002 CEFTRIAXONE PER 250 MG: Performed by: INTERNAL MEDICINE

## 2022-05-02 PROCEDURE — 85007 BL SMEAR W/DIFF WBC COUNT: CPT | Performed by: INTERNAL MEDICINE

## 2022-05-02 PROCEDURE — 84132 ASSAY OF SERUM POTASSIUM: CPT | Performed by: STUDENT IN AN ORGANIZED HEALTH CARE EDUCATION/TRAINING PROGRAM

## 2022-05-02 PROCEDURE — 99232 SBSQ HOSP IP/OBS MODERATE 35: CPT | Performed by: INTERNAL MEDICINE

## 2022-05-02 PROCEDURE — 80053 COMPREHEN METABOLIC PANEL: CPT | Performed by: INTERNAL MEDICINE

## 2022-05-02 RX ADMIN — AMIODARONE HYDROCHLORIDE 200 MG: 200 TABLET ORAL at 08:53

## 2022-05-02 RX ADMIN — APIXABAN 5 MG: 5 TABLET, FILM COATED ORAL at 21:12

## 2022-05-02 RX ADMIN — Medication 10 ML: at 21:12

## 2022-05-02 RX ADMIN — APIXABAN 5 MG: 5 TABLET, FILM COATED ORAL at 08:53

## 2022-05-02 RX ADMIN — NYSTATIN 500000 UNITS: 100000 SUSPENSION ORAL at 21:12

## 2022-05-02 RX ADMIN — PRAVASTATIN SODIUM 40 MG: 40 TABLET ORAL at 21:12

## 2022-05-02 RX ADMIN — Medication 10 ML: at 08:53

## 2022-05-02 RX ADMIN — POTASSIUM CHLORIDE 40 MEQ: 1.5 POWDER, FOR SOLUTION ORAL at 12:16

## 2022-05-02 RX ADMIN — CARVEDILOL 3.12 MG: 3.12 TABLET, FILM COATED ORAL at 08:53

## 2022-05-02 RX ADMIN — POTASSIUM CHLORIDE 40 MEQ: 10 TABLET, EXTENDED RELEASE ORAL at 18:13

## 2022-05-02 RX ADMIN — CARVEDILOL 3.12 MG: 3.12 TABLET, FILM COATED ORAL at 18:13

## 2022-05-02 RX ADMIN — NYSTATIN 500000 UNITS: 100000 SUSPENSION ORAL at 18:13

## 2022-05-02 RX ADMIN — NYSTATIN 500000 UNITS: 100000 SUSPENSION ORAL at 08:53

## 2022-05-02 RX ADMIN — NYSTATIN 500000 UNITS: 100000 SUSPENSION ORAL at 12:17

## 2022-05-02 RX ADMIN — CEFTRIAXONE 2 G: 2 INJECTION, POWDER, FOR SOLUTION INTRAMUSCULAR; INTRAVENOUS at 12:17

## 2022-05-02 NOTE — PLAN OF CARE
Goal Outcome Evaluation:           Progress: improving  Outcome Evaluation: A&Ox4. RA. IV antibiotics. Up to BSC. Multiple loose BM's this shift. Potassium protocol initated. Weight shifting encouraged. Afebrile this shift. VSS, will continue to monitor.

## 2022-05-02 NOTE — PROGRESS NOTES
LOS: 6 days   Patient Care Team:  Olayinka Pimentel MD as PCP - General  [facility] as Consulting Physician (Hematology and Oncology)  Nasrin Nichole MD as Consulting Physician (Cardiology)  Alin Delgado MD as Consulting Physician (General Surgery)  Dave Ramsey MD as Referring Physician (Cardiology)    Chief Complaint: Follow-up for paroxysmal atrial fibrillation with RVR, chronic diastolic CHF.    Interval History: Feels better in general.  She is very tired.  No chest pain.  She is in sinus rhythm with PACs.    Vital Signs:  Temp:  [97.6 °F (36.4 °C)-99.5 °F (37.5 °C)] 97.9 °F (36.6 °C)  Heart Rate:  [59-79] 79  Resp:  [16-17] 16  BP: ()/(46-69) 95/53    Intake/Output Summary (Last 24 hours) at 5/2/2022 1346  Last data filed at 5/2/2022 0909  Gross per 24 hour   Intake 295 ml   Output --   Net 295 ml       Physical Exam:   General Appearance:    No acute distress, alert and oriented x4   Lungs:     Clear to auscultation bilaterally     Heart:    Regular rhythm with ectopy and normal rate.  No murmurs, gallops, or       rubs.   Abdomen:     Soft, nontender, nondistended.    Extremities:   Moves all extremities well.  No clubbing, cyanosis, or edema.     Results Review:    Results from last 7 days   Lab Units 05/02/22  0642   SODIUM mmol/L 128*   POTASSIUM mmol/L 3.3*   CHLORIDE mmol/L 91*   CO2 mmol/L 26.9   BUN mg/dL 11   CREATININE mg/dL 0.42*   GLUCOSE mg/dL 99   CALCIUM mg/dL 7.8*         Results from last 7 days   Lab Units 05/02/22  0642   WBC 10*3/mm3 6.35   HEMOGLOBIN g/dL 8.2*   HEMATOCRIT % 23.5*   PLATELETS 10*3/mm3 218             Results from last 7 days   Lab Units 04/30/22  0242   MAGNESIUM mg/dL 1.9           I reviewed the patient's new clinical results.        Assessment:  1.  Diffuse large B-cell lymphoma  2.  Neutropenic fever with E. coli bacteremia  3.  Pancytopenia secondary to chemotherapy (improved)  4.  History of malignant pleural effusion, status post right  thoracentesis 2/21/2022  5.  Paroxysmal atrial fibrillation with RVR  6.  Chronic diastolic CHF    Plan:  -Platelet count is better.  Status post 2 units of packed red blood cells earlier this admission.  Eliquis resumed.    -Converted to sinus rhythm on an IV amiodarone drip.  Now with normal sinus rhythm with PACs.  Continue oral amiodarone 200 mg/day.    -Continue carvedilol 3.125 mg twice a day as blood pressure tolerates.  Digoxin being held currently.    -Seems to be stable from a cardiac perspective.  Cardiology will sign off.  Please call back if needed.    Liam Gallegos MD  05/02/22  13:46 EDT

## 2022-05-02 NOTE — SIGNIFICANT NOTE
Pt declined PT due to fatigue. Pt encouraged to amb with nsg staff today as well as [facility] for meals as able. PT will check on pt tomorrow.

## 2022-05-02 NOTE — PROGRESS NOTES
[facility] GROUP INPATIENT PROGRESS NOTE    Length of Stay:  6 days    CHIEF COMPLAINT: Diffuse large B cell non-Hodgkin's lymphoma, febrile neutropenia, anemia, thrombocytopenia secondary to chemotherapy      SUBJECTIVE:   5/1/22: Patient with no new complaints today.  She was fortunately able to complete drinking her oral contrast for CT scans and will be going down for scan fairly soon.  She notes that fatigue is gradually improving.  She has no new complaints today.    5/2/22: Pt resting comfortably in bed. No acute issues overnight. Denies any cough or SOB.     ROS:  Review of Systems   A comprehensive review of systems was obtained with pertinent positive findings as noted in the interval history above.  All other systems negative.      OBJECTIVE:  Vitals:    05/01/22 1940 05/01/22 2325 05/02/22 0500 05/02/22 0749   BP: 92/46 110/62  116/69   BP Location: Right arm Right arm  Right arm   Patient Position: Lying Lying  Lying   Pulse: 59 61  68   Resp: 16 17  16   Temp: 97.6 °F (36.4 °C) 97.8 °F (36.6 °C)  98 °F (36.7 °C)   TempSrc: Oral Oral  Oral   SpO2: 93% 95%  92%   Weight:   70.4 kg (155 lb 1.6 oz)    Height:             PHYSICAL EXAMINATION:  General: Alert and orient x3 no distress  HEENT: Thrush resolved  Chest/Lungs: Clear to auscultation bilaterally  Heart: Regular rate and rhythm with 2/6 murmur  Abdomen/GI: Soft nontender nondistended bowel sounds present  Extremities: No edema, minimal tenderness in the bilateral ankles.    Patient was examined today, unchanged from above    DIAGNOSTIC DATA:  Results Review:     I reviewed the patient's new clinical results.    Results from last 7 days   Lab Units 05/02/22  0642 05/01/22  0937 04/30/22  0242   WBC 10*3/mm3 6.35 8.21 5.27   HEMOGLOBIN g/dL 8.2* 9.2* 8.8*   HEMATOCRIT % 23.5* 26.7* 25.1*   PLATELETS 10*3/mm3 218 164 85*      Results from last 7 days   Lab Units 05/02/22  0642 05/01/22  0937 04/30/22  0242   SODIUM mmol/L 128* 126* 134*    POTASSIUM mmol/L 3.3* 3.5 4.0   CHLORIDE mmol/L 91* 92* 96*   CO2 mmol/L 26.9 24.0 26.0   BUN mg/dL 11 12 12   CREATININE mg/dL 0.42* 0.54* 0.53*   CALCIUM mg/dL 7.8* 8.1* 8.0*   BILIRUBIN mg/dL 0.2 0.2 0.2   ALK PHOS U/L 77 76 70   ALT (SGPT) U/L 15 10 13   AST (SGOT) U/L 14 9 9   GLUCOSE mg/dL 99 131* 93      Lab Results   Component Value Date    NEUTROABS 5.97 05/02/2022         Results from last 7 days   Lab Units 04/30/22  0242   MAGNESIUM mg/dL 1.9       Assessment/Plan   ASSESSMENT/PLAN:  This is a 81 y.o. female with:     *Diffuse large B-cell lymphoma.     · Patient was found to have bilateral pleural effusions.    · She underwent right thoracentesis on 2/21/2022 and the left thoracentesis on 2/22/2022.    · Analysis of the fluid revealed involvement with diffuse large B-cell lymphoma.    · FISH analysis for BCL6 rearrangement was positive.    · FISH analysis for BCL 1, BCL-2 and MYC rearrangement was negative.   · CT chest on 2/17/2022 revealed no hilar or mediastinal lymphadenopathy.   · Spleen was reported to be normal in size.  · Peripheral blood flow cytometry on 3/7/2022 was negative.  · PET scan on 3/10/2022 revealed significant hypermetabolism in the left adrenal gland and the surrounding soft tissue with SUV up to 34.5. Hypermetabolism in the left pleural thickening with SUV of 7.1. there was less hypermetabolism in the right adrenal gland and in the right pleura.  · She is considered to have stage III non-bulky disease.  · The degree of hypermetabolism is consistent with high grade lymphoma. This concurs with the pathology exam of the pleural fluid cytology that revealed diffuse large B-cell lymphoma.  · R-IPI score of 3 associated with poor risk. This is associated with overall survival of 55%.   · Due to the patient previously having good performance status, I recommended systemic chemotherapy with R-CHOP with Neulasta support.  I explained that the goal is cure.  With her being 81 years old,  she will need close monitoring for any symptoms or signs of toxicity.  · Patient was started on R-CHOP with Neulasta support on 3/28/2022.  · Patient tolerated chemotherapy except for neutropenia and thrombocytopenia.  She developed constipation related to vincristine.  · Due to the expected recurrence of the constipation with subsequent treatment, recommended reducing the dose of vincristine to 1 mg.  · Patient received cycle 2 R-CHOP with Neulasta support on 4/18/2022.  · Patient has requested that we perform upcoming CT scans (scheduled as outpatient on 5/5/2022) during hospitalization.  The CT c/a/p performed 5/1/22 shows improved right pleural effusion, while increasing left effusion. The left adrenal & perinephric lesions are not commendted upon. Overall mixed response to treatment.   · Given patient's significant side effects from chemotherapy, will need to discuss whether to transition to mini R-CHOP in the future.     *Febrile neutropenia  · Patient receiving Neulasta support on day 2 following chemotherapy  · On 4/25/2022, patient was neutropenic with WBC 0.3 with ANC 0.05.  Patient was placed on prophylactic Augmentin  · Blood culture on 4/26/2022 with staph epidermidis  · On 4/26/2022, WBC was 0.05.  Patient developed fever while receiving transfusion.  She was subsequently admitted.  Procalcitonin 16, lactate 1.3.  · Patient with no localizing signs or symptoms  · Chest x-ray 4/26/2022 negative  · Blood culture 4/26/2022 with E. coli.  Urine culture negative.  Respiratory viral panel (including COVID-19) negative.  · Patient was seen by infectious disease  · Patient was initially covered with cefepime and vancomycin.  Subsequent transition to Rocephin on 4/27/2022 with blood culture result growing E. Coli.  · Patient with Mediport in place  · Repeat blood culture 4/27 and 4/28/2022 negative  · With recovery of WBC, infectious disease has recommended that she complete a 7-day course of ceftriaxone which  will be finished on 5/2/2022.  Arrangements being made for potential home antibiotics if she is ready for discharge before that time.  · WBC currently remains normal.     *Thrombocytopenia secondary to chemotherapy.  · Patient developed thrombocytopenia secondary to recent chemotherapy.  Potential contribution from consumption related to infection/bacteremia as well.  · Platelet count today has normalized     *Bilateral pleural effusions.   · She is s/p right thoracentesis on 2/21/2022 and the effusion was found to be malignant attributed to the lymphoma. S/p left pleural thoracentesis on 2/22/22.   · Chest x-ray 4/26/2022 with no evidence of pleural effusion.  · CT c/ap from 5/1/22 shows re-accumulation of fluids on the left.      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  · Patient had anemia secondary to her lymphoma, exacerbated by chemotherapy.  · Hemoglobin was 11.1 on 3/28/2022.  · Anemia evaluation 3/29/2022 with iron 67, ferritin 204.4, iron saturation 22%, TIBC 305.    · Additional labs on 4/18/2022 with folate greater than 20, B12 1614  · Hemoglobin on 4/25/2022 was 7.9.  Attempted transfusion on 4/26/2022 with hemoglobin of 7.0 however patient developed fever and transfusion was stopped.  Ultimately it appears fever was related to infection with febrile neutropenia and bacteremia and not related to transfusion.  · Patient received transfusion 2 unit PRBC on 4/27/2022 with hemoglobin down to 6.3.  · Stool for occult blood negative on 4/28/2022  · Today, hemoglobin is 8.2     *Paroxysmal atrial fibrillation.  · She is on Eliquis 5 mg twice daily.  · Patient was previously on amiodarone.  It was discontinued by cardiology on 4/14/2022.  · Patient will not be given Zyprexa due to the possible drug drug interaction.   · With platelet count down to 60,000 Eliquis was held on 4/27/2022 with plans to resume once platelet count improves above 60,000 range.  · Patient did experience  increase in heart rate on 4/29/2022, was seen by cardiology.  At that point heart rate had improved and she converted back to sinus rhythm.  · With recovery of platelet count, resumed Eliquis, continuing on full dose Eliquis 5 mg twice daily     *Constipation.  · Patient has chronic constipation that worsened after the start of chemotherapy.  · Patient was placed on Senokot-S.  · Due to the likelihood of recurrence of the constipation with subsequent chemotherapy, vincristine dose was reduced as above with cycle 2 chemotherapy.     *Thrush  · Continue nystatin 5 cc swish and swallow every 4 hours  · Patient with mild oral pain, has Magic mouthwash to use as needed   · thrush appears to have resolved     *Hyponatremia  · Sodium was 127 on admission 4/26/2022.  Patient with recent mild hyponatremia in the low 130 range     *CODE STATUS  · Patient is noted to be DNR        PLAN:   1. Follow-up CT scan chest abdomen pelvis shows mixed response to treatment with the left-sided pleural effusion increased in size, however the right-sided pleural effusion has diminished.   2. Will discuss further treatment regimen/dose modification as outpatient  3. Patient currently continuing on Rocephin per infectious disease, is to complete a 7-day course on 5/3/2022.   4. Continue Eliquis 5 mg twice daily   5. Continue nystatin 5 cc swish and swallow every 4 hours  6. Continue Magic mouthwash 5 cc every 6 hours as needed for oral pain  7. Check serum and urine osmolality with worsening hyponatremia.  Consider need for nephrology consultation if this worsens further.  8. Daily CBC, CMP     Discussed with patient at bedside.  All issues new to me    Asa Nixon MD

## 2022-05-02 NOTE — CASE MANAGEMENT/SOCIAL WORK
Continued Stay Note  [facility]     Patient Name: Michelle Car  MRN: [id number]  Today's Date: 5/2/2022    Admit Date: 4/26/2022     Discharge Plan    Home with home health to follow. [facility] accepted and selected.                 Discharge Codes    No documentation.               Expected Discharge Date and Time     Expected Discharge Date Expected Discharge Time    May 2, 2022             Alisson JEAN BAPTISTE RN  Cooperative Care Coordinator

## 2022-05-02 NOTE — PROGRESS NOTES
Name: Michelle Car ADMIT: 2022   : 1940  PCP: Olayinka Pimentel MD    MRN: [id number] LOS: 6 days   AGE/SEX: 81 y.o. female  ROOM: [facility]     Subjective   Subjective   CC: fever, generalized weakness  No acute events. Patient looks like she feels worse today. Per RN had some loose stool this morning. No CP/dyspnea/f/n/v/constipation/dysuria.    Objective   Objective   Vital Signs  Temp:  [97.6 °F (36.4 °C)-99.5 °F (37.5 °C)] 97.9 °F (36.6 °C)  Heart Rate:  [59-79] 79  Resp:  [16-17] 16  BP: ()/(46-69) 95/53  SpO2:  [92 %-99 %] 93 %  on   ;   Device (Oxygen Therapy): room air  Body mass index is 30.29 kg/m².  Physical Exam  Vitals and nursing note reviewed.   Constitutional:       General: She is not in acute distress.     Appearance: She is ill-appearing. She is not diaphoretic.   HENT:      Head: Normocephalic and atraumatic.      Nose: Nose normal.      Mouth/Throat:      Mouth: Mucous membranes are moist.      Pharynx: Oropharynx is clear.   Eyes:      Extraocular Movements: Extraocular movements intact.      Conjunctiva/sclera: Conjunctivae normal.      Pupils: Pupils are equal, round, and reactive to light.   Cardiovascular:      Rate and Rhythm: Normal rate and regular rhythm.      Pulses: Normal pulses.   Pulmonary:      Effort: Pulmonary effort is normal.      Breath sounds: Examination of the right-lower field reveals decreased breath sounds. Examination of the left-lower field reveals decreased breath sounds. Decreased breath sounds present.   Abdominal:      General: Bowel sounds are normal.      Palpations: Abdomen is soft.   Musculoskeletal:         General: Swelling (trace BLE) present. No tenderness.      Cervical back: Normal range of motion and neck supple.   Skin:     General: Skin is warm and dry.      Capillary Refill: Capillary refill takes less than 2 seconds.   Neurological:      General: No focal deficit present.      Mental Status: She is alert and oriented to  person, place, and time.   Psychiatric:         Mood and Affect: Mood normal.         Behavior: Behavior normal.       Results Review  I reviewed the patient's new clinical results.  I reviewed the patient's telemetry.  Results from last 7 days   Lab Units 05/02/22  0642 05/01/22 0937 04/30/22 0242 04/29/22  0604   WBC 10*3/mm3 6.35 8.21 5.27 3.50   HEMOGLOBIN g/dL 8.2* 9.2* 8.8* 9.4*   PLATELETS 10*3/mm3 218 164 85* 64*     Results from last 7 days   Lab Units 05/02/22  0642 05/01/22  0937 04/30/22  0242 04/29/22  0604   SODIUM mmol/L 128* 126* 134* 132*   POTASSIUM mmol/L 3.3* 3.5 4.0 3.2*   CHLORIDE mmol/L 91* 92* 96* 97*   CO2 mmol/L 26.9 24.0 26.0 25.0   BUN mg/dL 11 12 12 12   CREATININE mg/dL 0.42* 0.54* 0.53* 0.52*   GLUCOSE mg/dL 99 131* 93 108*   EGFR mL/min/1.73 99.0 93.2 93.6 94.1     Results from last 7 days   Lab Units 05/02/22  0642 05/01/22 0937 04/30/22 0242 04/29/22  0604   ALBUMIN g/dL 2.30* 2.30* 2.80* 2.60*   BILIRUBIN mg/dL 0.2 0.2 0.2 0.2   ALK PHOS U/L 77 76 70 71   AST (SGOT) U/L 14 9 9 10   ALT (SGPT) U/L 15 10 13 15     Results from last 7 days   Lab Units 05/02/22  0642 05/01/22  0937 04/30/22  0242 04/29/22  0604 04/28/22  0719 04/27/22  0616   CALCIUM mg/dL 7.8* 8.1* 8.0* 8.2*   < > 8.0*   ALBUMIN g/dL 2.30* 2.30* 2.80* 2.60*   < >  --    MAGNESIUM mg/dL  --   --  1.9  --   --  1.8   PHOSPHORUS mg/dL  --   --   --   --   --  2.8    < > = values in this interval not displayed.     Results from last 7 days   Lab Units 04/26/22  1757   PROCALCITONIN ng/mL 16.00*   LACTATE mmol/L 1.3     No results found for: HGBA1C, POCGLU    No radiology results for the last day  Scheduled Medications  amiodarone, 200 mg, Oral, Q24H  apixaban, 5 mg, Oral, Q12H  carvedilol, 3.125 mg, Oral, BID With Meals  cefTRIAXone, 2 g, Intravenous, Q24H  nystatin, 5 mL, Oral, 4x Daily  pravastatin, 40 mg, Oral, Daily  sodium chloride, 10 mL, Intravenous, Q12H    Infusions   Diet  Diet Regular       Assessment/Plan      Active Hospital Problems    Diagnosis  POA   • Pancytopenia due to chemotherapy (HCC) [D61.810]  Yes   • Sepsis due to Escherichia coli (E. coli) (HCC) [A41.51]  Yes   • Bacteremia due to Escherichia coli [R78.81, B96.20]  Yes   • Neutropenic fever (HCC) [D70.9, R50.81]  Yes   • Chronic diastolic CHF (congestive heart failure) (HCC) [I50.32]  Yes   • Paroxysmal atrial fibrillation(HCC) [I48.0]  Yes   • Hyponatremia [E87.1]  Yes   • Diffuse large B-cell lymphoma of lymph nodes of multiple regions (HCC) [C83.38]  Yes   • Hypertension [I10]  Yes   • Hyperlipidemia [E78.5]  Yes      Resolved Hospital Problems   No resolved problems to display.     Neutropenic Fever/E.Coli Bacteremia/Sepsis  - received cycle 2 of R-CHOP with Neulasta 4/18/22 for diffuse large B-cell lymphoma  - no localized signs of infection, mechanism likely translocation of gut bacteria  - repeat blood cultures NGTD  - continue on ceftriaxone per ID, appreciate recs  - no need for granix per heme/onc, appreciate recs    Diffuse large B cell lymphoma  - heme-onc following  - received cycle 2 of R-CHOP with Neulasta 4/18/22 for diffuse large B-cell lymphoma  - they have ordered staging CTs which were originally planned for o/p- to be done today    Hyponatremia  - Sodium ranging between 128-134, will get urine studies  - Suspect from limited PO intake, patient reportedly not eating much  - Consider renal consult if continues to fall    Pancytopenia  - Eliquis resumed per h/o  - s/p 2 units of PRBCs 4/2  - appreciate hematology/oncology recs    PAF  - continue coreg and digoxin-I with hold parameters on the former  - eliqius resumed  - cards consulted, got amio bolus, now on oral amiodarone and in SR    Chronic Diastolic CHF  - Resuscitated per sepsis protocol, IVF now stopped  - monitor volume status closely    SCDs for DVT prophylaxis.  Full code.  Discussed with patient and nursing staff.  Anticipate discharge home with home health timing yet to be  determined.      Ivon Kim MD  [facility]  05/02/22  14:05 EDT

## 2022-05-03 LAB
ALBUMIN SERPL-MCNC: 2.3 G/DL (ref 3.5–5.2)
ALBUMIN/GLOB SERPL: 0.9 G/DL
ALP SERPL-CCNC: 78 U/L (ref 39–117)
ALT SERPL W P-5'-P-CCNC: 15 U/L (ref 1–33)
ANION GAP SERPL CALCULATED.3IONS-SCNC: 8 MMOL/L (ref 5–15)
AST SERPL-CCNC: 14 U/L (ref 1–32)
BACTERIA SPEC AEROBE CULT: NORMAL
BASOPHILS # BLD AUTO: 0.04 10*3/MM3 (ref 0–0.2)
BASOPHILS NFR BLD AUTO: 0.7 % (ref 0–1.5)
BILIRUB SERPL-MCNC: 0.2 MG/DL (ref 0–1.2)
BUN SERPL-MCNC: 7 MG/DL (ref 8–23)
BUN/CREAT SERPL: 15.2 (ref 7–25)
CALCIUM SPEC-SCNC: 8.3 MG/DL (ref 8.6–10.5)
CHLORIDE SERPL-SCNC: 100 MMOL/L (ref 98–107)
CO2 SERPL-SCNC: 26 MMOL/L (ref 22–29)
CREAT SERPL-MCNC: 0.46 MG/DL (ref 0.57–1)
DEPRECATED RDW RBC AUTO: 53.1 FL (ref 37–54)
EGFRCR SERPLBLD CKD-EPI 2021: 96.9 ML/MIN/1.73
EOSINOPHIL # BLD AUTO: 0 10*3/MM3 (ref 0–0.4)
EOSINOPHIL NFR BLD AUTO: 0 % (ref 0.3–6.2)
ERYTHROCYTE [DISTWIDTH] IN BLOOD BY AUTOMATED COUNT: 16.3 % (ref 12.3–15.4)
GLOBULIN UR ELPH-MCNC: 2.7 GM/DL
GLUCOSE SERPL-MCNC: 92 MG/DL (ref 65–99)
HCT VFR BLD AUTO: 30.7 % (ref 34–46.6)
HGB BLD-MCNC: 10 G/DL (ref 12–15.9)
HIV1+2 AB SER QL: NORMAL
IMM GRANULOCYTES # BLD AUTO: 0.29 10*3/MM3 (ref 0–0.05)
IMM GRANULOCYTES NFR BLD AUTO: 4.9 % (ref 0–0.5)
LYMPHOCYTES # BLD AUTO: 0.24 10*3/MM3 (ref 0.7–3.1)
LYMPHOCYTES NFR BLD AUTO: 4 % (ref 19.6–45.3)
MCH RBC QN AUTO: 29.2 PG (ref 26.6–33)
MCHC RBC AUTO-ENTMCNC: 32.6 G/DL (ref 31.5–35.7)
MCV RBC AUTO: 89.8 FL (ref 79–97)
MONOCYTES # BLD AUTO: 0.3 10*3/MM3 (ref 0.1–0.9)
MONOCYTES NFR BLD AUTO: 5 % (ref 5–12)
NEUTROPHILS NFR BLD AUTO: 5.1 10*3/MM3 (ref 1.7–7)
NEUTROPHILS NFR BLD AUTO: 85.4 % (ref 42.7–76)
NRBC BLD AUTO-RTO: 0 /100 WBC (ref 0–0.2)
OSMOLALITY UR: 226 MOSM/KG
PLATELET # BLD AUTO: 326 10*3/MM3 (ref 140–450)
PMV BLD AUTO: 9.6 FL (ref 6–12)
POTASSIUM SERPL-SCNC: 4.2 MMOL/L (ref 3.5–5.2)
PROT SERPL-MCNC: 5 G/DL (ref 6–8.5)
RBC # BLD AUTO: 3.42 10*6/MM3 (ref 3.77–5.28)
SODIUM SERPL-SCNC: 134 MMOL/L (ref 136–145)
SODIUM UR-SCNC: 61 MMOL/L
WBC NRBC COR # BLD: 5.97 10*3/MM3 (ref 3.4–10.8)

## 2022-05-03 PROCEDURE — 97116 GAIT TRAINING THERAPY: CPT

## 2022-05-03 PROCEDURE — 84300 ASSAY OF URINE SODIUM: CPT | Performed by: STUDENT IN AN ORGANIZED HEALTH CARE EDUCATION/TRAINING PROGRAM

## 2022-05-03 PROCEDURE — 97530 THERAPEUTIC ACTIVITIES: CPT

## 2022-05-03 PROCEDURE — G0432 EIA HIV-1/HIV-2 SCREEN: HCPCS | Performed by: INTERNAL MEDICINE

## 2022-05-03 PROCEDURE — 80053 COMPREHEN METABOLIC PANEL: CPT | Performed by: INTERNAL MEDICINE

## 2022-05-03 PROCEDURE — 83935 ASSAY OF URINE OSMOLALITY: CPT | Performed by: STUDENT IN AN ORGANIZED HEALTH CARE EDUCATION/TRAINING PROGRAM

## 2022-05-03 PROCEDURE — 99233 SBSQ HOSP IP/OBS HIGH 50: CPT | Performed by: INTERNAL MEDICINE

## 2022-05-03 PROCEDURE — 25010000002 CEFTRIAXONE PER 250 MG: Performed by: INTERNAL MEDICINE

## 2022-05-03 PROCEDURE — 85025 COMPLETE CBC W/AUTO DIFF WBC: CPT | Performed by: INTERNAL MEDICINE

## 2022-05-03 RX ADMIN — CARVEDILOL 3.12 MG: 3.12 TABLET, FILM COATED ORAL at 10:17

## 2022-05-03 RX ADMIN — NYSTATIN 500000 UNITS: 100000 SUSPENSION ORAL at 18:23

## 2022-05-03 RX ADMIN — CARVEDILOL 3.12 MG: 3.12 TABLET, FILM COATED ORAL at 18:23

## 2022-05-03 RX ADMIN — Medication 10 ML: at 21:43

## 2022-05-03 RX ADMIN — NYSTATIN 500000 UNITS: 100000 SUSPENSION ORAL at 12:51

## 2022-05-03 RX ADMIN — NYSTATIN 500000 UNITS: 100000 SUSPENSION ORAL at 10:17

## 2022-05-03 RX ADMIN — APIXABAN 5 MG: 5 TABLET, FILM COATED ORAL at 21:43

## 2022-05-03 RX ADMIN — AMIODARONE HYDROCHLORIDE 200 MG: 200 TABLET ORAL at 10:17

## 2022-05-03 RX ADMIN — PRAVASTATIN SODIUM 40 MG: 40 TABLET ORAL at 21:43

## 2022-05-03 RX ADMIN — APIXABAN 5 MG: 5 TABLET, FILM COATED ORAL at 10:17

## 2022-05-03 RX ADMIN — NYSTATIN 500000 UNITS: 100000 SUSPENSION ORAL at 21:43

## 2022-05-03 RX ADMIN — Medication 10 ML: at 10:17

## 2022-05-03 RX ADMIN — CEFTRIAXONE 2 G: 2 INJECTION, POWDER, FOR SOLUTION INTRAMUSCULAR; INTRAVENOUS at 12:50

## 2022-05-03 NOTE — PLAN OF CARE
Goal Outcome Evaluation:  Plan of Care Reviewed With: patient  Progress: improving  Outcome Evaluation: All needs met. Rested well. Up w. assist x 1. Purewick placed for night incontinence. Urine studies sent. VSS. Mental status at baseline. RA. NSR. No complaints. WCTM.

## 2022-05-03 NOTE — PROGRESS NOTES
[facility] GROUP INPATIENT PROGRESS NOTE    Length of Stay:  7 days    CHIEF COMPLAINT: Diffuse large B cell non-Hodgkin's lymphoma, febrile neutropenia, anemia, thrombocytopenia secondary to chemotherapy      SUBJECTIVE:   5/1/22: Patient with no new complaints today.  She was fortunately able to complete drinking her oral contrast for CT scans and will be going down for scan fairly soon.  She notes that fatigue is gradually improving.  She has no new complaints today.    5/2/22: Pt resting comfortably in bed. No acute issues overnight. Denies any cough or SOB.   5/3/22: No acute issues overnight. Says breathing is better. Oral thrush improved. Is trying to eat better. Is constipated.     ROS:  Review of Systems   A comprehensive review of systems was obtained with pertinent positive findings as noted in the interval history above.  All other systems negative.      OBJECTIVE:  Vitals:    05/02/22 2300 05/03/22 0615 05/03/22 0710 05/03/22 1017   BP: 93/72  105/67 125/75   BP Location: Left arm  Left arm    Patient Position: Lying  Lying    Pulse: 90  80 76   Resp: 16  16    Temp: 98.5 °F (36.9 °C)  98 °F (36.7 °C)    TempSrc: Oral  Oral    SpO2: 95%  93%    Weight:  69.8 kg (153 lb 12.8 oz)     Height:             PHYSICAL EXAMINATION:  General: Alert and orient x3 no distress  HEENT: Thrush resolved  Chest/Lungs: Clear to auscultation bilaterally  Heart: Regular rate and rhythm with 2/6 murmur  Abdomen/GI: Soft nontender nondistended bowel sounds present  Extremities: No edema, minimal tenderness in the bilateral ankles.    Patient was examined today, unchanged from above    DIAGNOSTIC DATA:  Results Review:     I reviewed the patient's new clinical results.    Results from last 7 days   Lab Units 05/03/22  0623 05/02/22  0642 05/01/22  0937   WBC 10*3/mm3 5.97 6.35 8.21   HEMOGLOBIN g/dL 10.0* 8.2* 9.2*   HEMATOCRIT % 30.7* 23.5* 26.7*   PLATELETS 10*3/mm3 326 218 164      Results from last 7 days   Lab  Units 05/03/22  0623 05/02/22  2310 05/02/22  0642 05/01/22  0937   SODIUM mmol/L 134*  --  128* 126*   POTASSIUM mmol/L 4.2 3.9 3.3* 3.5   CHLORIDE mmol/L 100  --  91* 92*   CO2 mmol/L 26.0  --  26.9 24.0   BUN mg/dL 7*  --  11 12   CREATININE mg/dL 0.46*  --  0.42* 0.54*   CALCIUM mg/dL 8.3*  --  7.8* 8.1*   BILIRUBIN mg/dL 0.2  --  0.2 0.2   ALK PHOS U/L 78  --  77 76   ALT (SGPT) U/L 15  --  15 10   AST (SGOT) U/L 14  --  14 9   GLUCOSE mg/dL 92  --  99 131*      Lab Results   Component Value Date    NEUTROABS 5.10 05/03/2022         Results from last 7 days   Lab Units 04/30/22  0242   MAGNESIUM mg/dL 1.9       Assessment/Plan   ASSESSMENT/PLAN:  This is a 81 y.o. female with:     *Diffuse large B-cell lymphoma.     · Patient was found to have bilateral pleural effusions.    · She underwent right thoracentesis on 2/21/2022 and the left thoracentesis on 2/22/2022.    · Analysis of the fluid revealed involvement with diffuse large B-cell lymphoma.    · FISH analysis for BCL6 rearrangement was positive.    · FISH analysis for BCL 1, BCL-2 and MYC rearrangement was negative.   · CT chest on 2/17/2022 revealed no hilar or mediastinal lymphadenopathy.   · Spleen was reported to be normal in size.  · Peripheral blood flow cytometry on 3/7/2022 was negative.  · PET scan on 3/10/2022 revealed significant hypermetabolism in the left adrenal gland and the surrounding soft tissue with SUV up to 34.5. Hypermetabolism in the left pleural thickening with SUV of 7.1. there was less hypermetabolism in the right adrenal gland and in the right pleura.  · She is considered to have stage III non-bulky disease.  · The degree of hypermetabolism is consistent with high grade lymphoma. This concurs with the pathology exam of the pleural fluid cytology that revealed diffuse large B-cell lymphoma.  · R-IPI score of 3 associated with poor risk. This is associated with overall survival of 55%.   · Due to the patient previously having good  performance status, I recommended systemic chemotherapy with R-CHOP with Neulasta support.  I explained that the goal is cure.  With her being 81 years old, she will need close monitoring for any symptoms or signs of toxicity.  · Patient was started on R-CHOP with Neulasta support on 3/28/2022.  · Patient tolerated chemotherapy except for neutropenia and thrombocytopenia.  She developed constipation related to vincristine.  · Due to the expected recurrence of the constipation with subsequent treatment, recommended reducing the dose of vincristine to 1 mg.  · Patient received cycle 2 R-CHOP with Neulasta support on 4/18/2022.  · Patient has requested that we perform upcoming CT scans (scheduled as outpatient on 5/5/2022) during hospitalization.  The CT c/a/p performed 5/1/22 shows improved right pleural effusion, while increasing left effusion. The left adrenal & perinephric lesions are not commendted upon. Overall mixed response to treatment.   · Given patient's significant side effects from chemotherapy, will need to discuss whether to transition to mini R-CHOP in the future.     *Febrile neutropenia with e-coli bacteremia  · Patient receiving Neulasta support on day 2 following chemotherapy  · On 4/25/2022, patient was neutropenic with WBC 0.3 with ANC 0.05.  Patient was placed on prophylactic Augmentin  · Blood culture on 4/26/2022 with staph epidermidis  · On 4/26/2022, WBC was 0.05.  Patient developed fever while receiving transfusion.  She was subsequently admitted.  Procalcitonin 16, lactate 1.3.  · Patient with no localizing signs or symptoms  · Chest x-ray 4/26/2022 negative  · Blood culture 4/26/2022 with E. coli.  Urine culture negative.  Respiratory viral panel (including COVID-19) negative.  · Patient was seen by infectious disease  · Patient was initially covered with cefepime and vancomycin.  Subsequent transition to Rocephin on 4/27/2022 with blood culture result growing E. Coli.  · Patient with  Mediport in place  · Repeat blood culture 4/27 and 4/28/2022 negative  · With recovery of WBC, infectious disease has recommended that she complete a 7-day course of ceftriaxone which will be finished on 5/3/2022.  Arrangements being made for potential home antibiotics if she is ready for discharge before that time.  · WBC currently remains normal.        *Bilateral pleural effusions.   · She is s/p right thoracentesis on 2/21/2022 and the effusion was found to be malignant attributed to the lymphoma. S/p left pleural thoracentesis on 2/22/22.   · Chest x-ray 4/26/2022 with no evidence of pleural effusion.  · CT c/ap from 5/1/22 shows re-accumulation of fluids on the left.      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  · Patient had anemia secondary to her lymphoma, exacerbated by chemotherapy.  · Hemoglobin was 11.1 on 3/28/2022.  · Anemia evaluation 3/29/2022 with iron 67, ferritin 204.4, iron saturation 22%, TIBC 305.    · Additional labs on 4/18/2022 with folate greater than 20, B12 1614  · Hemoglobin on 4/25/2022 was 7.9.  Attempted transfusion on 4/26/2022 with hemoglobin of 7.0 however patient developed fever and transfusion was stopped.  Ultimately it appears fever was related to infection with febrile neutropenia and bacteremia and not related to transfusion.  · Patient received transfusion 2 unit PRBC on 4/27/2022 with hemoglobin down to 6.3.  · Stool for occult blood negative on 4/28/2022  · Hemoglobin improving     *Paroxysmal atrial fibrillation.  · She is on Eliquis 5 mg twice daily.  · Resumed on amiodarone & coreg.   · With recovery of platelet count, resumed Eliquis, continuing on full dose Eliquis 5 mg twice daily     *Constipation.  · Patient has chronic constipation that worsened after the start of chemotherapy.  · Patient was placed on Senokot-S.  · Due to the likelihood of recurrence of the constipation with subsequent chemotherapy, vincristine dose was reduced as  above with cycle 2 chemotherapy.   · Encouraged taking laxatives     *Thrush  · Continue nystatin 5 cc swish and swallow every 4 hours  · Patient with mild oral pain, has Magic mouthwash to use as needed   · thrush appears to have resolved     *Hyponatremia  · Sodium was 127 on admission 4/26/2022.  Patient with recent mild hyponatremia in the low 130 range  · Sodium 134 on 5/3.      *CODE STATUS  · Patient is noted to be DNR        PLAN:   1. Follow-up CT scan chest abdomen pelvis shows mixed response to treatment with the left-sided pleural effusion increased in size, however the right-sided pleural effusion has diminished.   2. Will discuss further treatment regimen/dose modification as outpatient  3. Patient currently continuing on Rocephin per infectious disease, is to complete a 7-day course on 5/3/2022.   4. Continue Eliquis 5 mg twice daily   5. Continue nystatin 5 cc swish and swallow every 4 hours  6. Continue Magic mouthwash 5 cc every 6 hours as needed for oral pain  7. Daily CBC, CMP     Discussed with patient at bedside.      Asa Nixon MD

## 2022-05-03 NOTE — CASE MANAGEMENT/SOCIAL WORK
Continued Stay Note  [facility]     Patient Name: Michelle Car  MRN: [id number]  Today's Date: 5/3/2022    Admit Date: 4/26/2022     Discharge Plan     Row Name 05/03/22 1507       Plan    Plan home with [facility]    Patient/Family in Agreement with Plan yes    Plan Comments IV abx completed today so patient will not need infusion services at [facility].  Plan remains home with [facility].  Margaret Springer RN               Discharge Codes    No documentation.               Expected Discharge Date and Time     Expected Discharge Date Expected Discharge Time    May 4, 2022             Margaret Springer RN

## 2022-05-03 NOTE — THERAPY TREATMENT NOTE
Patient Name: Michelle Car  : 1940    MRN: [id number]                              Today's Date: 5/3/2022       Admit Date: 2022    Visit Dx:     ICD-10-CM ICD-9-CM   1. Neutropenic fever (HCC)  D70.9 288.00    R50.81 780.61   2. Diffuse large B-cell lymphoma, unspecified body region (HCC)  C83.30 202.80   3. Pancytopenia (HCC)  D61.818 284.19     Patient Active Problem List   Diagnosis   • Hypertension   • Hyperlipidemia   • Abdominal aortic atherosclerosis (HCC)   • Generalized edema   • Fatigue due to excessive exertion   • Seasonal allergic rhinitis due to pollen   • Dyspnea   • Acute on chronic diastolic heart failure (HCC)   • Hypoxia   • Paroxysmal atrial fibrillation(HCC)   • Adrenal nodule (HCC)   • Edema of lower extremity   • Hyponatremia   • CRI (chronic renal insufficiency), stage 2 (mild)   • Diffuse large B-cell lymphoma of lymph nodes of multiple regions (HCC)   • Vascular catheter fitting or adjustment   • Neutropenic fever (HCC)   • Chronic diastolic CHF (congestive heart failure) (HCC)   • Pancytopenia due to chemotherapy (HCC)   • Sepsis due to Escherichia coli (E. coli) (HCC)   • Bacteremia due to Escherichia coli     Past Medical History:   Diagnosis Date   • Abdominal aortic atherosclerosis (HCC)    • Atrial fibrillation (HCC)    • CAD (coronary artery disease)    • Colon polyps    • CRI (chronic renal insufficiency), stage 2 (mild)    • Diffuse large B cell lymphoma (HCC) 2022   • Hearing loss    • Hypercalcemia 2016    resolved as of    • Hypercholesterolemia    • Hypertension    • Hyponatremia 2022   • Venous insufficiency      Past Surgical History:   Procedure Laterality Date   • CHOLECYSTECTOMY N/A    • COLONOSCOPY N/A     1 or 2 polyps, otherwise normal per patient   • VENOUS ACCESS DEVICE (PORT) INSERTION Left 3/23/2022    Procedure: INSERTION VENOUS ACCESS DEVICE;  Surgeon: Alin Delgado MD;  Location: [facility] [place];  Service: General;   Laterality: Left;      General Information     Row Name 05/03/22 1638          Physical Therapy Time and Intention    Document Type therapy note (daily note)  -     Mode of Treatment individual therapy;physical therapy  -     Row Name 05/03/22 1638          General Information    Existing Precautions/Restrictions fall  -     Row Name 05/03/22 1638          Cognition    Orientation Status (Cognition) oriented x 4  -     Row Name 05/03/22 1638          Safety Issues, Functional Mobility    Impairments Affecting Function (Mobility) strength;balance;endurance/activity tolerance  -           User Key  (r) = Recorded By, (t) = Taken By, (c) = Cosigned By    Initials Name Provider Type     Cammie Mahmood PTA Physical Therapist Assistant               Mobility     Row Name 05/03/22 1638          Bed Mobility    Supine-Sit Raymond (Bed Mobility) standby assist  -     Sit-Supine Raymond (Bed Mobility) not tested  -     Assistive Device (Bed Mobility) bed rails;head of bed elevated  -     Row Name 05/03/22 1638          Sit-Stand Transfer    Sit-Stand Raymond (Transfers) contact guard;minimum assist (75% patient effort)  -     Assistive Device (Sit-Stand Transfers) walker, front-wheeled  -EB     Comment, (Sit-Stand Transfer) pt unsteady with initial stand without use of walker. pt did improve with second stand with walker.  -     Row Name 05/03/22 1638          Gait/Stairs (Locomotion)    Raymond Level (Gait) contact guard  -     Assistive Device (Gait) walker, front-wheeled  -EB     Distance in Feet (Gait) 60ft  -EB     Deviations/Abnormal Patterns (Gait) edgard decreased;gait speed decreased  -     Bilateral Gait Deviations forward flexed posture  -     Comment, (Gait/Stairs) cues for upright posture. slow pace but fairly steady. pt would benefit from walker for home use.  -           User Key  (r) = Recorded By, (t) = Taken By, (c) = Cosigned By    Initials Name Provider  Type    Cammie Aleman PTA Physical Therapist Assistant               Obj/Interventions    No documentation.                Goals/Plan    No documentation.                Clinical Impression     Row Name 05/03/22 1639          Plan of Care Review    Plan of Care Reviewed With patient  -EB     Progress improving  -EB     Outcome Evaluation Pt tolerated treatment with c/o weakness. Pt required SBA with bed mobility. Pt required CGA with transfers but was unsteady with first stand without use walker. Pt improved with second by using walker. Pt ambulated 60ft with rwx, CGA. Pt fairly steady but slow pace. cues for upright posture. Pt would benefit from front wheeled walker for home use. Will continue to progress pt as able.  -     Row Name 05/03/22 1639          Therapy Assessment/Plan (PT)    Therapy Frequency (PT) 6 times/wk  -     Row Name 05/03/22 1639          Positioning and Restraints    Pre-Treatment Position in bed  -EB     Post Treatment Position chair  -EB     In Chair reclined;call light within reach;encouraged to call for assist;exit alarm on  -EB           User Key  (r) = Recorded By, (t) = Taken By, (c) = Cosigned By    Initials Name Provider Type    Cammie Aleman PTA Physical Therapist Assistant               Outcome Measures     Row Name 05/03/22 1642          How much help from another person do you currently need...    Turning from your back to your side while in flat bed without using bedrails? 4  -EB     Moving from lying on back to sitting on the side of a flat bed without bedrails? 4  -EB     Moving to and from a bed to a chair (including a wheelchair)? 3  -EB     Standing up from a chair using your arms (e.g., wheelchair, bedside chair)? 3  -EB     Climbing 3-5 steps with a railing? 2  -EB     To walk in hospital room? 3  -EB     AM-PAC 6 Clicks Score (PT) 19  -EB     Highest level of mobility 6 --> Walked 10 steps or more  -EB           User Key  (r) = Recorded By, (t) = Taken By, (c)  = Cosigned By    Initials Name Provider Type     Cammie Mahmood PTA Physical Therapist Assistant                             Physical Therapy Education                 Title: PT OT SLP Therapies (In Progress)     Topic: Physical Therapy (Done)     Point: Mobility training (Done)     Learning Progress Summary           Patient Acceptance, E, VU by  at 5/3/2022 1642    Acceptance, E, VU,NR by  at 4/30/2022 1654                   Point: Home exercise program (Done)     Learning Progress Summary           Patient Acceptance, E, VU by EB at 5/3/2022 1642    Acceptance, E, VU,NR by  at 4/30/2022 1654                   Point: Body mechanics (Done)     Learning Progress Summary           Patient Acceptance, E, VU by EB at 5/3/2022 1642    Acceptance, E, VU,NR by  at 4/30/2022 1654                   Point: Precautions (Done)     Learning Progress Summary           Patient Acceptance, E, VU by  at 5/3/2022 1642    Acceptance, E, VU,NR by  at 4/30/2022 1654                               User Key     Initials Effective Dates Name Provider Type Discipline     06/16/21 -  Darcy Luis, PT Physical Therapist PT     06/16/21 -  Cammie Mahmood PTA Physical Therapist Assistant PT              PT Recommendation and Plan     Plan of Care Reviewed With: patient  Progress: improving  Outcome Evaluation: Pt tolerated treatment with c/o weakness. Pt required SBA with bed mobility. Pt required CGA with transfers but was unsteady with first stand without use walker. Pt improved with second by using walker. Pt ambulated 60ft with rwx, CGA. Pt fairly steady but slow pace. cues for upright posture. Pt would benefit from front wheeled walker for home use. Will continue to progress pt as able.     Time Calculation:    PT Charges     Row Name 05/03/22 3851             Time Calculation    Start Time 1518  -EB      Stop Time 1541  -EB      Time Calculation (min) 23 min  -EB      PT Received On 05/03/22  -      PT -  Next Appointment 05/04/22  -EB              Time Calculation- PT    Total Timed Code Minutes- PT 23 minute(s)  -EB            User Key  (r) = Recorded By, (t) = Taken By, (c) = Cosigned By    Initials Name Provider Type    Cammie Aleman PTA Physical Therapist Assistant              Therapy Charges for Today     Code Description Service Date Service Provider Modifiers Qty    21177885133 HC GAIT TRAINING EA 15 MIN 5/3/2022 Cammie Mahmood PTA GP 1    [id number]  PT THERAPEUTIC ACT EA 15 MIN 5/3/2022 Cammie Mahmood PTA GP 1          PT G-Codes  Outcome Measure Options: AM-PAC 6 Clicks Basic Mobility (PT)  AM-PAC 6 Clicks Score (PT): 19    Cammie Mahmood PTA  5/3/2022

## 2022-05-03 NOTE — PROGRESS NOTES
Name: Michelle Car ADMIT: 2022   : 1940  PCP: Olayinka Pimentel MD    MRN: [id number] LOS: 7 days   AGE/SEX: 81 y.o. female  ROOM: [facility]     Subjective   Subjective    No new complaints. She thinks overall she is feeling better. Not much of an appetite but she states that it is not new.    Review of Systems   Constitutional: Positive for appetite change. Negative for fever.   Respiratory: Negative for cough and shortness of breath.    Cardiovascular: Negative for chest pain.   Gastrointestinal: Negative for abdominal pain, diarrhea, nausea and vomiting.   Genitourinary: Negative for dysuria.   Neurological: Negative for headaches.     Objective   Objective   Vital Signs  Temp:  [98 °F (36.7 °C)-98.6 °F (37 °C)] 98.1 °F (36.7 °C)  Heart Rate:  [76-90] 81  Resp:  [16-18] 16  BP: ()/(50-92) 116/92  SpO2:  [92 %-95 %] 93 %  on   ;   Device (Oxygen Therapy): room air  Body mass index is 30.04 kg/m².     Physical Exam  Vitals and nursing note reviewed.   Constitutional:       General: She is not in acute distress.     Appearance: She is ill-appearing. She is not diaphoretic.   HENT:      Head: Normocephalic and atraumatic.      Nose: Nose normal.      Mouth/Throat:      Mouth: Mucous membranes are moist.      Pharynx: Oropharynx is clear.   Eyes:      Extraocular Movements: Extraocular movements intact.      Conjunctiva/sclera: Conjunctivae normal.      Pupils: Pupils are equal, round, and reactive to light.   Cardiovascular:      Rate and Rhythm: Normal rate. Rhythm irregular.      Pulses: Normal pulses.   Pulmonary:      Effort: Pulmonary effort is normal.      Breath sounds: Examination of the right-lower field reveals decreased breath sounds. Examination of the left-lower field reveals decreased breath sounds. Decreased breath sounds present.   Abdominal:      General: Bowel sounds are normal.      Palpations: Abdomen is soft.   Musculoskeletal:         General: No tenderness.       Cervical back: Normal range of motion and neck supple.   Skin:     General: Skin is warm and dry.   Neurological:      General: No focal deficit present.      Mental Status: She is alert and oriented to person, place, and time.   Psychiatric:         Mood and Affect: Mood normal.         Behavior: Behavior normal.       Results Review  I reviewed the patient's new clinical results.  I reviewed the patient's telemetry.  Results from last 7 days   Lab Units 05/03/22 0623 05/02/22 0642 05/01/22 0937 04/30/22  0242   WBC 10*3/mm3 5.97 6.35 8.21 5.27   HEMOGLOBIN g/dL 10.0* 8.2* 9.2* 8.8*   PLATELETS 10*3/mm3 326 218 164 85*     Results from last 7 days   Lab Units 05/03/22 0623 05/02/22 2310 05/02/22 0642 05/01/22 0937 04/30/22  0242   SODIUM mmol/L 134*  --  128* 126* 134*   POTASSIUM mmol/L 4.2 3.9 3.3* 3.5 4.0   CHLORIDE mmol/L 100  --  91* 92* 96*   CO2 mmol/L 26.0  --  26.9 24.0 26.0   BUN mg/dL 7*  --  11 12 12   CREATININE mg/dL 0.46*  --  0.42* 0.54* 0.53*   GLUCOSE mg/dL 92  --  99 131* 93   EGFR mL/min/1.73 96.9  --  99.0 93.2 93.6     Results from last 7 days   Lab Units 05/03/22 0623 05/02/22 0642 05/01/22 0937 04/30/22  0242   ALBUMIN g/dL 2.30* 2.30* 2.30* 2.80*   BILIRUBIN mg/dL 0.2 0.2 0.2 0.2   ALK PHOS U/L 78 77 76 70   AST (SGOT) U/L 14 14 9 9   ALT (SGPT) U/L 15 15 10 13     Results from last 7 days   Lab Units 05/03/22 0623 05/02/22 0642 05/01/22 0937 04/30/22  0242 04/28/22  0719 04/27/22  0616   CALCIUM mg/dL 8.3* 7.8* 8.1* 8.0*   < > 8.0*   ALBUMIN g/dL 2.30* 2.30* 2.30* 2.80*   < >  --    MAGNESIUM mg/dL  --   --   --  1.9  --  1.8   PHOSPHORUS mg/dL  --   --   --   --   --  2.8    < > = values in this interval not displayed.     Results from last 7 days   Lab Units 04/26/22  1757   PROCALCITONIN ng/mL 16.00*   LACTATE mmol/L 1.3     No results found for: HGBA1C, POCGLU    Scheduled Meds  amiodarone, 200 mg, Oral, Q24H  apixaban, 5 mg, Oral, Q12H  carvedilol, 3.125 mg, Oral, BID  With Meals  nystatin, 5 mL, Oral, 4x Daily  pravastatin, 40 mg, Oral, Daily  sodium chloride, 10 mL, Intravenous, Q12H    Continuous Infusions   PRN Meds  •  acetaminophen **OR** acetaminophen **OR** acetaminophen  •  First Mouthwash (Magic Mouthwash)  •  magnesium sulfate **OR** magnesium sulfate in D5W 1g/100mL (PREMIX)  •  nitroglycerin  •  ondansetron **OR** ondansetron  •  potassium chloride **OR** potassium chloride **OR** potassium chloride  •  [COMPLETED] Insert peripheral IV **AND** sodium chloride  •  sodium chloride     Diet  Diet Regular       Assessment/Plan     Active Hospital Problems    Diagnosis  POA   • Pancytopenia due to chemotherapy (HCC) [D61.810]  Yes   • Sepsis due to Escherichia coli (E. coli) (HCC) [A41.51]  Yes   • Bacteremia due to Escherichia coli [R78.81, B96.20]  Yes   • Neutropenic fever (HCC) [D70.9, R50.81]  Yes   • Chronic diastolic CHF (congestive heart failure) (HCC) [I50.32]  Yes   • Paroxysmal atrial fibrillation(HCC) [I48.0]  Yes   • Hyponatremia [E87.1]  Yes   • Diffuse large B-cell lymphoma of lymph nodes of multiple regions (HCC) [C83.38]  Yes   • Hypertension [I10]  Yes   • Hyperlipidemia [E78.5]  Yes      Resolved Hospital Problems   No resolved problems to display.     Neutropenic Fever/E.Coli Bacteremia/Sepsis: received cycle 2 of R-CHOP with Neulasta 4/18/22 for diffuse large B-cell lymphoma. no localized signs of infection, mechanism likely translocation of gut bacteria. repeat blood cultures NGTD. Ceftriaxone per ID stop date 5/3/2022. no need for granix per heme/onc    Diffuse large B cell lymphoma: received cycle 2 of R-CHOP with Neulasta 4/18/22. Follow-up CTs noted.    Hyponatremia: Improved today. Continue to monitor.    Pancytopenia: Per hematology. Received 2 units PRBC 4/27/2022    PAF: Eliquis, amiodarone. Cardiology signed off.    Chronic Diastolic CHF: Resuscitated per sepsis protocol, IVF now stopped.  monitor volume status closely. Cardiology signed  off    SCDs for DVT prophylaxis.  Full code.  Discussed with patient, nursing staff, CCP and care team on multidisciplinary rounds.  Anticipate discharge home with home health in 2-3 days.    Sam Jarvis MD  [facility]  05/03/22  14:14 EDT

## 2022-05-03 NOTE — PLAN OF CARE
Goal Outcome Evaluation:  Plan of Care Reviewed With: patient        Progress: improving  Outcome Evaluation: Pt tolerated treatment with c/o weakness. Pt required SBA with bed mobility. Pt required CGA with transfers but was unsteady with first stand without use walker. Pt improved with second by using walker. Pt ambulated 60ft with rwx, CGA. Pt fairly steady but slow pace. cues for upright posture. Pt would benefit from front wheeled walker for home use. Will continue to progress pt as able.

## 2022-05-04 ENCOUNTER — HOME HEALTH ADMISSION (OUTPATIENT)
Dept: HOME HEALTH SERVICES | Facility: HOME HEALTHCARE | Age: 82
End: 2022-05-04

## 2022-05-04 ENCOUNTER — READMISSION MANAGEMENT (OUTPATIENT)
Dept: CALL CENTER | Facility: HOSPITAL | Age: 82
End: 2022-05-04

## 2022-05-04 ENCOUNTER — TRANSCRIBE ORDERS (OUTPATIENT)
Dept: HOME HEALTH SERVICES | Facility: HOME HEALTHCARE | Age: 82
End: 2022-05-04

## 2022-05-04 VITALS
HEART RATE: 92 BPM | OXYGEN SATURATION: 100 % | TEMPERATURE: 98.7 F | BODY MASS INDEX: 29.67 KG/M2 | SYSTOLIC BLOOD PRESSURE: 113 MMHG | RESPIRATION RATE: 18 BRPM | HEIGHT: 60 IN | DIASTOLIC BLOOD PRESSURE: 61 MMHG | WEIGHT: 151.1 LBS

## 2022-05-04 DIAGNOSIS — C83.30 DIFFUSE LARGE B-CELL LYMPHOMA, UNSPECIFIED BODY REGION: Primary | ICD-10-CM

## 2022-05-04 DIAGNOSIS — I48.91 ATRIAL FIBRILLATION WITH RVR: ICD-10-CM

## 2022-05-04 DIAGNOSIS — B37.0 THRUSH: ICD-10-CM

## 2022-05-04 LAB
ALBUMIN SERPL-MCNC: 2.5 G/DL (ref 3.5–5.2)
ALBUMIN/GLOB SERPL: 0.9 G/DL
ALP SERPL-CCNC: 81 U/L (ref 39–117)
ALT SERPL W P-5'-P-CCNC: 14 U/L (ref 1–33)
ANION GAP SERPL CALCULATED.3IONS-SCNC: 8.9 MMOL/L (ref 5–15)
AST SERPL-CCNC: 13 U/L (ref 1–32)
BASOPHILS # BLD AUTO: 0.02 10*3/MM3 (ref 0–0.2)
BASOPHILS NFR BLD AUTO: 0.3 % (ref 0–1.5)
BILIRUB SERPL-MCNC: 0.3 MG/DL (ref 0–1.2)
BUN SERPL-MCNC: 10 MG/DL (ref 8–23)
BUN/CREAT SERPL: 23.8 (ref 7–25)
CALCIUM SPEC-SCNC: 8.2 MG/DL (ref 8.6–10.5)
CHLORIDE SERPL-SCNC: 97 MMOL/L (ref 98–107)
CO2 SERPL-SCNC: 26.1 MMOL/L (ref 22–29)
CREAT SERPL-MCNC: 0.42 MG/DL (ref 0.57–1)
DEPRECATED RDW RBC AUTO: 49.3 FL (ref 37–54)
EGFRCR SERPLBLD CKD-EPI 2021: 99 ML/MIN/1.73
EOSINOPHIL # BLD AUTO: 0 10*3/MM3 (ref 0–0.4)
EOSINOPHIL NFR BLD AUTO: 0 % (ref 0.3–6.2)
ERYTHROCYTE [DISTWIDTH] IN BLOOD BY AUTOMATED COUNT: 16 % (ref 12.3–15.4)
GLOBULIN UR ELPH-MCNC: 2.8 GM/DL
GLUCOSE BLDC GLUCOMTR-MCNC: 94 MG/DL (ref 70–130)
GLUCOSE SERPL-MCNC: 90 MG/DL (ref 65–99)
HCT VFR BLD AUTO: 27.5 % (ref 34–46.6)
HGB BLD-MCNC: 9.3 G/DL (ref 12–15.9)
IMM GRANULOCYTES # BLD AUTO: 0.22 10*3/MM3 (ref 0–0.05)
IMM GRANULOCYTES NFR BLD AUTO: 3.7 % (ref 0–0.5)
LYMPHOCYTES # BLD AUTO: 0.17 10*3/MM3 (ref 0.7–3.1)
LYMPHOCYTES NFR BLD AUTO: 2.8 % (ref 19.6–45.3)
MCH RBC QN AUTO: 28.7 PG (ref 26.6–33)
MCHC RBC AUTO-ENTMCNC: 33.8 G/DL (ref 31.5–35.7)
MCV RBC AUTO: 84.9 FL (ref 79–97)
MONOCYTES # BLD AUTO: 0.32 10*3/MM3 (ref 0.1–0.9)
MONOCYTES NFR BLD AUTO: 5.4 % (ref 5–12)
NEUTROPHILS NFR BLD AUTO: 5.24 10*3/MM3 (ref 1.7–7)
NEUTROPHILS NFR BLD AUTO: 87.8 % (ref 42.7–76)
NRBC BLD AUTO-RTO: 0 /100 WBC (ref 0–0.2)
PLATELET # BLD AUTO: 385 10*3/MM3 (ref 140–450)
PMV BLD AUTO: 8.9 FL (ref 6–12)
POTASSIUM SERPL-SCNC: 3.7 MMOL/L (ref 3.5–5.2)
PROT SERPL-MCNC: 5.3 G/DL (ref 6–8.5)
RBC # BLD AUTO: 3.24 10*6/MM3 (ref 3.77–5.28)
SODIUM SERPL-SCNC: 132 MMOL/L (ref 136–145)
WBC NRBC COR # BLD: 5.97 10*3/MM3 (ref 3.4–10.8)

## 2022-05-04 PROCEDURE — 85025 COMPLETE CBC W/AUTO DIFF WBC: CPT | Performed by: INTERNAL MEDICINE

## 2022-05-04 PROCEDURE — 82962 GLUCOSE BLOOD TEST: CPT

## 2022-05-04 PROCEDURE — G0179 MD RECERTIFICATION HHA PT: HCPCS | Performed by: INTERNAL MEDICINE

## 2022-05-04 PROCEDURE — 97116 GAIT TRAINING THERAPY: CPT

## 2022-05-04 PROCEDURE — 80053 COMPREHEN METABOLIC PANEL: CPT | Performed by: INTERNAL MEDICINE

## 2022-05-04 PROCEDURE — 99232 SBSQ HOSP IP/OBS MODERATE 35: CPT | Performed by: INTERNAL MEDICINE

## 2022-05-04 RX ORDER — DIPHENHYDRAMINE HYDROCHLORIDE AND LIDOCAINE HYDROCHLORIDE AND ALUMINUM HYDROXIDE AND MAGNESIUM HYDRO
5 KIT EVERY 6 HOURS PRN
Qty: 237 ML | Refills: 0 | Status: SHIPPED | OUTPATIENT
Start: 2022-05-04 | End: 2022-11-10

## 2022-05-04 RX ORDER — AMIODARONE HYDROCHLORIDE 200 MG/1
200 TABLET ORAL
Qty: 30 TABLET | Refills: 0 | Status: SHIPPED | OUTPATIENT
Start: 2022-05-05 | End: 2023-03-17 | Stop reason: SDUPTHER

## 2022-05-04 RX ADMIN — AMIODARONE HYDROCHLORIDE 200 MG: 200 TABLET ORAL at 08:30

## 2022-05-04 RX ADMIN — CARVEDILOL 3.12 MG: 3.12 TABLET, FILM COATED ORAL at 08:30

## 2022-05-04 RX ADMIN — APIXABAN 5 MG: 5 TABLET, FILM COATED ORAL at 08:30

## 2022-05-04 RX ADMIN — NYSTATIN 500000 UNITS: 100000 SUSPENSION ORAL at 08:30

## 2022-05-04 NOTE — PLAN OF CARE
Goal Outcome Evaluation:  Plan of Care Reviewed With: patient        Progress: improving  Outcome Evaluation: Pt tolerated treatment with no complaints. Pt is modif. independent with bed mobility and SB with transfers. Pt ambulated 80ft with rwx, CGA/SBA. pt with slow pace but fairly steady. Pt normally does not use and AD but would benefit from a front wheeled walker for home. Pt also will benefit from HHPT.    ..Patient was wearing a face mask during this therapy encounter. Therapist used appropriate personal protective equipment including eye protection, mask, and gloves.  Mask used was standard procedure mask. Appropriate PPE was worn during the entire therapy session. Hand hygiene was completed before and after therapy session. Patient is not in enhanced droplet precautions.

## 2022-05-04 NOTE — PROGRESS NOTES
[facility] GROUP INPATIENT PROGRESS NOTE    Length of Stay:  8 days    CHIEF COMPLAINT: Diffuse large B cell non-Hodgkin's lymphoma, febrile neutropenia, anemia, thrombocytopenia secondary to chemotherapy      SUBJECTIVE:   5/1/22: Patient with no new complaints today.  She was fortunately able to complete drinking her oral contrast for CT scans and will be going down for scan fairly soon.  She notes that fatigue is gradually improving.  She has no new complaints today.    5/2/22: Pt resting comfortably in bed. No acute issues overnight. Denies any cough or SOB.   5/3/22: No acute issues overnight. Says breathing is better. Oral thrush improved. Is trying to eat better. Is constipated.   5/4/22: No acute issues overnight. Clinically stable. Being planned for d/c home today.     ROS:  Review of Systems   A comprehensive review of systems was obtained with pertinent positive findings as noted in the interval history above.  All other systems negative.      OBJECTIVE:  Vitals:    05/03/22 1900 05/03/22 2300 05/04/22 0616 05/04/22 0735   BP: 114/69 118/70  117/74   BP Location: Left arm Left arm  Left arm   Patient Position: Lying Lying  Lying   Pulse: 76 79  75   Resp: 16 18  18   Temp: 97.5 °F (36.4 °C) 97.6 °F (36.4 °C)  98.7 °F (37.1 °C)   TempSrc: Oral Oral  Oral   SpO2: 94% 94%  95%   Weight:   68.5 kg (151 lb 1.6 oz)    Height:             PHYSICAL EXAMINATION:  General: Alert and orient x3 no distress  HEENT: Thrush resolved  Chest/Lungs: Clear to auscultation bilaterally  Heart: Regular rate and rhythm with 2/6 murmur  Abdomen/GI: Soft nontender nondistended bowel sounds present  Extremities: No edema, minimal tenderness in the bilateral ankles.    Patient was examined today, unchanged from above    DIAGNOSTIC DATA:  Results Review:     I reviewed the patient's new clinical results.    Results from last 7 days   Lab Units 05/04/22  0715 05/03/22  0623 05/02/22  0642   WBC 10*3/mm3 5.97 5.97 6.35    HEMOGLOBIN g/dL 9.3* 10.0* 8.2*   HEMATOCRIT % 27.5* 30.7* 23.5*   PLATELETS 10*3/mm3 385 326 218      Results from last 7 days   Lab Units 05/04/22  0715 05/03/22  0623 05/02/22  2310 05/02/22  0642   SODIUM mmol/L 132* 134*  --  128*   POTASSIUM mmol/L 3.7 4.2 3.9 3.3*   CHLORIDE mmol/L 97* 100  --  91*   CO2 mmol/L 26.1 26.0  --  26.9   BUN mg/dL 10 7*  --  11   CREATININE mg/dL 0.42* 0.46*  --  0.42*   CALCIUM mg/dL 8.2* 8.3*  --  7.8*   BILIRUBIN mg/dL 0.3 0.2  --  0.2   ALK PHOS U/L 81 78  --  77   ALT (SGPT) U/L 14 15  --  15   AST (SGOT) U/L 13 14  --  14   GLUCOSE mg/dL 90 92  --  99      Lab Results   Component Value Date    NEUTROABS 5.24 05/04/2022         Results from last 7 days   Lab Units 04/30/22  0242   MAGNESIUM mg/dL 1.9       Assessment/Plan   ASSESSMENT/PLAN:  This is a 81 y.o. female with:     *Diffuse large B-cell lymphoma.     · Patient was found to have bilateral pleural effusions.    · She underwent right thoracentesis on 2/21/2022 and the left thoracentesis on 2/22/2022.    · Analysis of the fluid revealed involvement with diffuse large B-cell lymphoma.    · FISH analysis for BCL6 rearrangement was positive.    · FISH analysis for BCL 1, BCL-2 and MYC rearrangement was negative.   · CT chest on 2/17/2022 revealed no hilar or mediastinal lymphadenopathy.   · Spleen was reported to be normal in size.  · Peripheral blood flow cytometry on 3/7/2022 was negative.  · PET scan on 3/10/2022 revealed significant hypermetabolism in the left adrenal gland and the surrounding soft tissue with SUV up to 34.5. Hypermetabolism in the left pleural thickening with SUV of 7.1. there was less hypermetabolism in the right adrenal gland and in the right pleura.  · She is considered to have stage III non-bulky disease.  · The degree of hypermetabolism is consistent with high grade lymphoma. This concurs with the pathology exam of the pleural fluid cytology that revealed diffuse large B-cell  lymphoma.  · R-IPI score of 3 associated with poor risk. This is associated with overall survival of 55%.   · Due to the patient previously having good performance status, I recommended systemic chemotherapy with R-CHOP with Neulasta support.  I explained that the goal is cure.  With her being 81 years old, she will need close monitoring for any symptoms or signs of toxicity.  · Patient was started on R-CHOP with Neulasta support on 3/28/2022.  · Patient tolerated chemotherapy except for neutropenia and thrombocytopenia.  She developed constipation related to vincristine.  · Due to the expected recurrence of the constipation with subsequent treatment, recommended reducing the dose of vincristine to 1 mg.  · Patient received cycle 2 R-CHOP with Neulasta support on 4/18/2022.  · Patient has requested that we perform upcoming CT scans (scheduled as outpatient on 5/5/2022) during hospitalization.  The CT c/a/p performed 5/1/22 shows improved right pleural effusion, while increasing left effusion. The left adrenal & perinephric lesions are not commendted upon. Overall mixed response to treatment.   · Given patient's significant side effects from chemotherapy, will need to discuss whether to transition to mini R-CHOP in the future.     *Febrile neutropenia with e-coli bacteremia  · Patient receiving Neulasta support on day 2 following chemotherapy  · On 4/25/2022, patient was neutropenic with WBC 0.3 with ANC 0.05.  Patient was placed on prophylactic Augmentin  · Blood culture on 4/26/2022 with staph epidermidis  · On 4/26/2022, WBC was 0.05.  Patient developed fever while receiving transfusion.  She was subsequently admitted.  Procalcitonin 16, lactate 1.3.  · Patient with no localizing signs or symptoms  · Chest x-ray 4/26/2022 negative  · Blood culture 4/26/2022 with E. coli.  Urine culture negative.  Respiratory viral panel (including COVID-19) negative.  · Patient was seen by infectious disease  · Patient was  initially covered with cefepime and vancomycin.  Subsequent transition to Rocephin on 4/27/2022 with blood culture result growing E. Coli.  · Patient with Mediport in place  · Repeat blood culture 4/27 and 4/28/2022 negative  · With recovery of WBC, infectious disease has recommended that she complete a 7-day course of ceftriaxone which will be finished on 5/3/2022.  Arrangements being made for potential home antibiotics if she is ready for discharge before that time.  · WBC currently remains normal.        *Bilateral pleural effusions.   · She is s/p right thoracentesis on 2/21/2022 and the effusion was found to be malignant attributed to the lymphoma. S/p left pleural thoracentesis on 2/22/22.   · Chest x-ray 4/26/2022 with no evidence of pleural effusion.  · CT c/ap from 5/1/22 shows re-accumulation of fluids on the left.      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  · Patient had anemia secondary to her lymphoma, exacerbated by chemotherapy.  · Hemoglobin was 11.1 on 3/28/2022.  · Anemia evaluation 3/29/2022 with iron 67, ferritin 204.4, iron saturation 22%, TIBC 305.    · Additional labs on 4/18/2022 with folate greater than 20, B12 1614  · Hemoglobin on 4/25/2022 was 7.9.  Attempted transfusion on 4/26/2022 with hemoglobin of 7.0 however patient developed fever and transfusion was stopped.  Ultimately it appears fever was related to infection with febrile neutropenia and bacteremia and not related to transfusion.  · Patient received transfusion 2 unit PRBC on 4/27/2022 with hemoglobin down to 6.3.  · Stool for occult blood negative on 4/28/2022  · Hemoglobin improving     *Paroxysmal atrial fibrillation.  · She is on Eliquis 5 mg twice daily.  · Resumed on amiodarone & coreg.   · With recovery of platelet count, resumed Eliquis, continuing on full dose Eliquis 5 mg twice daily     *Constipation.  · Patient has chronic constipation that worsened after the start of  chemotherapy.  · Patient was placed on Senokot-S.  · Due to the likelihood of recurrence of the constipation with subsequent chemotherapy, vincristine dose was reduced as above with cycle 2 chemotherapy.   · Encouraged taking laxatives     *Thrush  · Continue nystatin 5 cc swish and swallow every 4 hours  · Patient with mild oral pain, has Magic mouthwash to use as needed   · thrush appears to have resolved     *Hyponatremia  · Sodium was 127 on admission 4/26/2022.  Patient with recent mild hyponatremia in the low 130 range  · Sodium 134 on 5/3.      *CODE STATUS  · Patient is noted to be DNR        PLAN:   1. Follow-up CT scan chest abdomen pelvis shows mixed response to treatment with the left-sided pleural effusion increased in size, however the right-sided pleural effusion has diminished.   2. Will discuss further treatment regimen/dose modification as outpatient  3. Completed 7-day course of Rocephin per infectious disease  4. Continue Eliquis 5 mg twice daily   5. Continue nystatin 5 cc swish and swallow every 4 hours  6. Pt is being discharged today. Pt is currently schedule to see  on 5/9/22. Advised to keep that appointment.     Will sign off . Please call us with any questions.       Asa Nixon MD

## 2022-05-04 NOTE — OUTREACH NOTE
Prep Survey    Flowsheet Row Responses   [facility] patient discharged from? [place]   Is LACE score < 7 ? No   Emergency Room discharge w/ pulse ox? No   Eligibility [facility]   Date of Admission 04/26/22   Date of Discharge 05/04/22   Discharge Disposition Home-Health Care Sv   Discharge diagnosis Pancytopenia due to chemotherapy   Sepsis d/t E Coli   Does the patient have one of the following disease processes/diagnoses(primary or secondary)? Sepsis   Does the patient have Home health ordered? Yes   What is the Home health agency?  [facility]   Is there a DME ordered? No   Prep survey completed? Yes          ESTELLA OROSCO - Registered Nurse

## 2022-05-04 NOTE — THERAPY TREATMENT NOTE
Patient Name: Michelle Car  : 1940    MRN: [id number]                              Today's Date: 2022       Admit Date: 2022    Visit Dx:     ICD-10-CM ICD-9-CM   1. Neutropenic fever (HCC)  D70.9 288.00    R50.81 780.61   2. Diffuse large B-cell lymphoma, unspecified body region (HCC)  C83.30 202.80   3. Pancytopenia (HCC)  D61.818 284.19   4. Bacteremia due to Escherichia coli  R78.81 790.7    B96.20 041.49   5. Chronic diastolic CHF (congestive heart failure) (HCC)  I50.32 428.32     428.0     Patient Active Problem List   Diagnosis   • Hypertension   • Hyperlipidemia   • Abdominal aortic atherosclerosis (HCC)   • Generalized edema   • Fatigue due to excessive exertion   • Seasonal allergic rhinitis due to pollen   • Dyspnea   • Acute on chronic diastolic heart failure (HCC)   • Hypoxia   • Paroxysmal atrial fibrillation(HCC)   • Adrenal nodule (HCC)   • Edema of lower extremity   • Hyponatremia   • CRI (chronic renal insufficiency), stage 2 (mild)   • Diffuse large B-cell lymphoma of lymph nodes of multiple regions (HCC)   • Vascular catheter fitting or adjustment   • Neutropenic fever (HCC)   • Chronic diastolic CHF (congestive heart failure) (HCC)   • Pancytopenia due to chemotherapy (HCC)   • Sepsis due to Escherichia coli (E. coli) (HCC)   • Bacteremia due to Escherichia coli     Past Medical History:   Diagnosis Date   • Abdominal aortic atherosclerosis (HCC)    • Atrial fibrillation (HCC)    • CAD (coronary artery disease)    • Colon polyps    • CRI (chronic renal insufficiency), stage 2 (mild)    • Diffuse large B cell lymphoma (HCC) 2022   • Hearing loss    • Hypercalcemia 2016    resolved as of    • Hypercholesterolemia    • Hypertension    • Hyponatremia 2022   • Venous insufficiency      Past Surgical History:   Procedure Laterality Date   • CHOLECYSTECTOMY N/A    • COLONOSCOPY N/A 2000    1 or 2 polyps, otherwise normal per patient   • VENOUS ACCESS DEVICE  (PORT) INSERTION Left 3/23/2022    Procedure: INSERTION VENOUS ACCESS DEVICE;  Surgeon: Alin Delgado MD;  Location: [facility];  Service: General;  Laterality: Left;      General Information     Row Name 05/04/22 1043          Physical Therapy Time and Intention    Document Type therapy note (daily note)  -EB     Mode of Treatment individual therapy;physical therapy  -EB     Row Name 05/04/22 1043          General Information    Existing Precautions/Restrictions fall  -EB     Row Name 05/04/22 1043          Cognition    Orientation Status (Cognition) oriented x 4  -EB     Row Name 05/04/22 1043          Safety Issues, Functional Mobility    Impairments Affecting Function (Mobility) endurance/activity tolerance;strength  -EB           User Key  (r) = Recorded By, (t) = Taken By, (c) = Cosigned By    Initials Name Provider Type    EB Cammie Mahmood PTA Physical Therapist Assistant               Mobility     Row Name 05/04/22 1043          Bed Mobility    Supine-Sit Donley (Bed Mobility) modified independence  -EB     Sit-Supine Donley (Bed Mobility) modified independence  -EB     Assistive Device (Bed Mobility) bed rails;head of bed elevated  -EB     Row Name 05/04/22 1043          Sit-Stand Transfer    Sit-Stand Donley (Transfers) standby assist  -EB     Assistive Device (Sit-Stand Transfers) walker, front-wheeled  -EB     Row Name 05/04/22 1043          Gait/Stairs (Locomotion)    Donley Level (Gait) contact guard;standby assist  -EB     Assistive Device (Gait) walker, front-wheeled  -EB     Distance in Feet (Gait) 80ft  -EB     Deviations/Abnormal Patterns (Gait) edgard decreased;gait speed decreased  -EB     Bilateral Gait Deviations forward flexed posture  -EB     Comment, (Gait/Stairs) slow pace but fairly steady.  -EB           User Key  (r) = Recorded By, (t) = Taken By, (c) = Cosigned By    Initials Name Provider Type    Cammie Aleman PTA Physical Therapist Assistant                Obj/Interventions    No documentation.                Goals/Plan    No documentation.                Clinical Impression     Row Name 05/04/22 1142          Plan of Care Review    Plan of Care Reviewed With patient  -EB     Progress improving  -EB     Outcome Evaluation Pt tolerated treatment with no complaints. Pt is modif. independent with bed mobility and SB with transfers. Pt ambulated 80ft with rwx, CGA/SBA. pt with slow pace but fairly steady. Pt normally does not use and AD but would benefit from a front wheeled walker for home. Pt also will benefit from HHPT.  -EB     Row Name 05/04/22 1142          Therapy Assessment/Plan (PT)    Therapy Frequency (PT) 6 times/wk  -EB     Row Name 05/04/22 1142          Positioning and Restraints    Pre-Treatment Position in bed  -EB     Post Treatment Position bed  -EB     In Bed fowlers;call light within reach;encouraged to call for assist;exit alarm on  -EB           User Key  (r) = Recorded By, (t) = Taken By, (c) = Cosigned By    Initials Name Provider Type    Cammie Aleman PTA Physical Therapist Assistant               Outcome Measures     Row Name 05/04/22 1144          How much help from another person do you currently need...    Turning from your back to your side while in flat bed without using bedrails? 4  -EB     Moving from lying on back to sitting on the side of a flat bed without bedrails? 4  -EB     Moving to and from a bed to a chair (including a wheelchair)? 3  -EB     Standing up from a chair using your arms (e.g., wheelchair, bedside chair)? 4  -EB     Climbing 3-5 steps with a railing? 3  -EB     To walk in hospital room? 3  -EB     AM-PAC 6 Clicks Score (PT) 21  -EB     Highest level of mobility 6 --> Walked 10 steps or more  -EB           User Key  (r) = Recorded By, (t) = Taken By, (c) = Cosigned By    Initials Name Provider Type    Cammie Aleman PTA Physical Therapist Assistant                             Physical Therapy  Education                 Title: PT OT SLP Therapies (In Progress)     Topic: Physical Therapy (Done)     Point: Mobility training (Done)     Learning Progress Summary           Patient Acceptance, E, VU by EB at 5/4/2022 1145    Acceptance, E, VU by EB at 5/3/2022 1642    Acceptance, E, VU,NR by KH at 4/30/2022 1654                   Point: Home exercise program (Done)     Learning Progress Summary           Patient Acceptance, E, VU by EB at 5/4/2022 1145    Acceptance, E, VU by EB at 5/3/2022 1642    Acceptance, E, VU,NR by KH at 4/30/2022 1654                   Point: Body mechanics (Done)     Learning Progress Summary           Patient Acceptance, E, VU by EB at 5/4/2022 1145    Acceptance, E, VU by EB at 5/3/2022 1642    Acceptance, E, VU,NR by  at 4/30/2022 1654                   Point: Precautions (Done)     Learning Progress Summary           Patient Acceptance, E, VU by EB at 5/4/2022 1145    Acceptance, E, VU by EB at 5/3/2022 1642    Acceptance, E, VU,NR by  at 4/30/2022 1654                               User Key     Initials Effective Dates Name Provider Type Discipline     06/16/21 -  Darcy Luis, PT Physical Therapist PT     06/16/21 -  Cammie Mahmood PTA Physical Therapist Assistant PT              PT Recommendation and Plan     Plan of Care Reviewed With: patient  Progress: improving  Outcome Evaluation: Pt tolerated treatment with no complaints. Pt is modif. independent with bed mobility and SB with transfers. Pt ambulated 80ft with rwx, CGA/SBA. pt with slow pace but fairly steady. Pt normally does not use and AD but would benefit from a front wheeled walker for home. Pt also will benefit from HHPT.     Time Calculation:    PT Charges     Row Name 05/04/22 1042             Time Calculation    Start Time 1022  -EB      Stop Time 1036  -EB      Time Calculation (min) 14 min  -EB      PT Received On 05/04/22  -EB      PT - Next Appointment 05/05/22  -EB              Time  Calculation- PT    Total Timed Code Minutes- PT 14 minute(s)  -RAKESH            User Key  (r) = Recorded By, (t) = Taken By, (c) = Cosigned By    Initials Name Provider Type    Cammie Aleman PTA Physical Therapist Assistant              Therapy Charges for Today     Code Description Service Date Service Provider Modifiers Qty    35895959017 HC GAIT TRAINING EA 15 MIN 5/3/2022 Cammie Mahmood, SHELLY GP 1    83273610123 HC PT THERAPEUTIC ACT EA 15 MIN 5/3/2022 Cammie Mahmood, SHELLY GP 1    19099525613 HC GAIT TRAINING EA 15 MIN 5/4/2022 Cammie Mahmood, SHELLY GP 1          PT G-Codes  Outcome Measure Options: AM-PAC 6 Clicks Basic Mobility (PT)  AM-PAC 6 Clicks Score (PT): 21    Cammie Mahmood PTA  5/4/2022

## 2022-05-04 NOTE — PLAN OF CARE
Goal Outcome Evaluation:  Plan of Care Reviewed With: patient  Progress: improving  Outcome Evaluation: All needs met. Rested well. Assist x 1. Purewick in place and care provided.  Regular diet. VSS. Oriented x 4. RA. NSR/poly. No complaints. WCTM.

## 2022-05-04 NOTE — DISCHARGE SUMMARY
[facility]    Date of Discharge:  5/4/2022    PCP: Olayinka Pimentel MD    Discharge Diagnosis:   Active Hospital Problems    Diagnosis  POA   • Pancytopenia due to chemotherapy (HCC) [D61.810]  Yes   • Sepsis due to Escherichia coli (E. coli) (HCC) [A41.51]  Yes   • Bacteremia due to Escherichia coli [R78.81, B96.20]  Yes   • Neutropenic fever (HCC) [D70.9, R50.81]  Yes   • Chronic diastolic CHF (congestive heart failure) (HCC) [I50.32]  Yes   • Paroxysmal atrial fibrillation(HCC) [I48.0]  Yes   • Hyponatremia [E87.1]  Yes   • Diffuse large B-cell lymphoma of lymph nodes of multiple regions (HCC) [C83.38]  Yes   • Hypertension [I10]  Yes   • Hyperlipidemia [E78.5]  Yes      Resolved Hospital Problems   No resolved problems to display.          Consults     Date and Time Order Name Status Description    4/29/2022  3:29 PM Inpatient Cardiology Consult Completed     4/26/2022  9:11 PM Hematology & Oncology Inpatient Consult Completed     4/26/2022  9:11 PM Inpatient Infectious Diseases Consult Completed     4/26/2022  6:45 PM Hematology and Oncology (on-call MD unless specified)      4/26/2022  6:45 PM L[facility] (on-call MD unless specified) Details          Hospital Course  81 y.o. female with a history of diffuse large B-cell lymphoma on chemotherapy complicated by neutropenia. She was seen in the ACU for a transfusion and developed a fever and was sent to the emergency department and found to be neutropenic.     Blood cultures were positive for E. coli. Infectious diseases managed antibiotics and she completed a course of ceftriaxone 5/3/2022. She was also treated for thrush.    She also had atrial fibrillation with RVR and cardiology started her on amiodarone. Digoxin was discontinued.    Overall she is feeling improved today except for some weakness due to hospitalization. She would like to go home. She declines SNF. She was able to ambulate 60 feet with a  rolling walker and PT recommends a front wheeled walker for home use. Patient is agreeable to home health.    Oncology saw her while here also. Follow-up CT scan showed mixed response to treatment with lisinopril effusion increase in right-sided pleural effusion diminished. They will discuss further treatment with the patient as an outpatient.    I discussed the patient's findings and my recommendations with patient and nursing staff.    Condition on Discharge: Improved.     Temp:  [97.5 °F (36.4 °C)-98.7 °F (37.1 °C)] 98.7 °F (37.1 °C)  Heart Rate:  [75-82] 75  Resp:  [16-18] 18  BP: (102-125)/(69-92) 117/74  Body mass index is 29.51 kg/m².    Physical Exam  Constitutional:       General: She is not in acute distress.     Appearance: Normal appearance. She is not toxic-appearing.   HENT:      Head: Normocephalic and atraumatic.   Cardiovascular:      Rate and Rhythm: Normal rate and regular rhythm.   Pulmonary:      Effort: Pulmonary effort is normal. No respiratory distress.      Breath sounds: Normal breath sounds.   Abdominal:      General: Bowel sounds are normal.      Palpations: Abdomen is soft.      Tenderness: There is no abdominal tenderness.   Musculoskeletal:         General: No swelling.   Skin:     General: Skin is warm and dry.   Neurological:      General: No focal deficit present.      Mental Status: She is alert and oriented to person, place, and time.   Psychiatric:         Mood and Affect: Mood normal.         Behavior: Behavior normal.         Thought Content: Thought content normal.       Disposition: Home or Self Care       Discharge Medications      New Medications      Instructions Start Date   amiodarone 200 MG tablet  Commonly known as: PACERONE   200 mg, Oral, Every 24 Hours Scheduled   Start Date: May 5, 2022     First Mouthwash (Magic Mouthwash) suspension   5 mL, Swish & Spit, Every 6 Hours PRN      nystatin 100,000 unit/mL suspension  Commonly known as: MYCOSTATIN   500,000 Units,  Oral, 4 Times Daily         Continue These Medications      Instructions Start Date   acetaminophen 500 MG tablet  Commonly known as: TYLENOL   500 mg, Oral, Every 6 Hours PRN      apixaban 5 MG tablet tablet  Commonly known as: ELIQUIS   5 mg, Oral, Every 12 Hours Scheduled      CALTRATE 600 PO   1 tablet, Oral, Daily      carvedilol 3.125 MG tablet  Commonly known as: COREG   3.125 mg, Oral, 2 Times Daily With Meals      Centrum Silver tablet tablet  Generic drug: multivitamin with minerals   1 tablet, Oral, Daily      loratadine 10 MG tablet  Commonly known as: CLARITIN   10 mg, Oral, Daily      ondansetron 8 MG tablet  Commonly known as: ZOFRAN   8 mg, Oral, Every 8 Hours PRN      polyethylene glycol 17 GM/SCOOP powder  Commonly known as: MIRALAX   17 g, Oral, As Needed      pravastatin 40 MG tablet  Commonly known as: PRAVACHOL   40 mg, Oral, Every Night at Bedtime         Stop These Medications    amoxicillin-clavulanate 875-125 MG per tablet  Commonly known as: AUGMENTIN     digoxin 125 MCG tablet  Commonly known as: LANOXIN     losartan 100 MG tablet  Commonly known as: COZAAR     PREDNISONE PO     spironolactone 25 MG tablet  Commonly known as: ALDACTONE           Diet Instructions     Diet: Regular      Discharge Diet: Regular         Activity Instructions     Activity as Tolerated           Additional Instructions for the Follow-ups that You Need to Schedule     Ambulatory Referral to Home Health (Hospital)   As directed      Face to Face Visit Date: 5/4/2022    Follow-up provider for Plan of Care?: I treated the patient in an acute care facility and will not continue treatment after discharge.    Follow-up provider: LON PARSONS [[id number]]    Reason/Clinical Findings: weakness    Describe mobility limitations that make leaving home difficult: weakness    Nursing/Therapeutic Services Requested: Physical Therapy    PT orders: Strengthening    Frequency: 1 Week 1         Call MD for problems / concerns.    As directed         Contact information for follow-up providers     Olayinka Pimentel MD .    Specialty: Internal Medicine  Contact information:  [address]  [facility] [address]  [phone]             Liam Gallegos MD Follow up.    Specialty: Cardiology  Contact information:  [address]  [facility] [address]  [phone]                   Contact information for after-discharge care     Home Medical Care     [facility] .    Service: Home Health Services  Contact information:  [address]  [facility] [address] [phone]                               [facility] Corby Jarvis MD  05/04/22  10:13 EDT    Discharge time spent greater than 30 minutes.

## 2022-05-04 NOTE — CASE MANAGEMENT/SOCIAL WORK
Continued Stay Note  [facility]     Patient Name: Michelle Car  MRN: [id number]  Today's Date: 5/4/2022    Admit Date: 4/26/2022     Discharge Plan     Row Name 05/04/22 1515       Plan    Plan Return home with [facility] following    Patient/Family in Agreement with Plan yes    Plan Comments Left message for [facility] that patient has DC orders.  Spoke with [facility] and they have provided walker.  Spoke with patient at bedside.  She confirms she will return home.  BHumeniuk RN                Expected Discharge Date and Time     Expected Discharge Date Expected Discharge Time    May 4, 2022             Becky S. Humeniuk, RN

## 2022-05-04 NOTE — PROGRESS NOTES
[facility] will follow post hospital. Patient agreeable to service. Contact information confirmed.

## 2022-05-05 ENCOUNTER — TELEPHONE (OUTPATIENT)
Dept: FAMILY MEDICINE CLINIC | Facility: CLINIC | Age: 82
End: 2022-05-05

## 2022-05-05 ENCOUNTER — TRANSITIONAL CARE MANAGEMENT TELEPHONE ENCOUNTER (OUTPATIENT)
Dept: CALL CENTER | Facility: HOSPITAL | Age: 82
End: 2022-05-05

## 2022-05-05 ENCOUNTER — HOSPITAL ENCOUNTER (OUTPATIENT)
Dept: CT IMAGING | Facility: HOSPITAL | Age: 82
End: 2022-05-05

## 2022-05-05 ENCOUNTER — HOME CARE VISIT (OUTPATIENT)
Dept: HOME HEALTH SERVICES | Facility: HOME HEALTHCARE | Age: 82
End: 2022-05-05

## 2022-05-05 PROCEDURE — G0299 HHS/HOSPICE OF RN EA 15 MIN: HCPCS

## 2022-05-05 NOTE — CASE MANAGEMENT/SOCIAL WORK
Case Management Discharge Note      Final Note: DC'd home 5/4 with [facility] following             Durable Medical Equipment Coordination complete.    Service Provider Selected Services Address Phone Fax Patient Preferred    [facility]  Durable Medical Equipment [address], [facility] [id number] [phone] --                     Home Medical Care Coordination complete.    Service Provider Selected Services Address Phone Fax Patient Preferred    [facility]  Home Health Services [address] [address] [phone] --                Selected Continued Care - Prior Encounters Includes selections from prior encounters from 1/26/2022 to 5/4/2022    Discharged on 2/23/2022 Admission date: 2/17/2022 - Discharge disposition: Home-Health Care [facility]    Home Medical Care     Service Provider Selected Services Address Phone Fax Patient Preferred    [facility]  Home Health Services [address] [address] [phone] --                    Transportation Services  Private: Car    Final Discharge Disposition Code: 06 - home with home health care

## 2022-05-05 NOTE — TELEPHONE ENCOUNTER
TCM CALL COMPLETED. This very nice lady sounds pretty frail, & states she feels quite weak from hospital stay. She is on chemo for Lge B Cell. Admit was due to fever while getting transfusion in ACU. Cultres did test positive for E-coli. She lives alone, but son brings lunch daily, & other two children see her most evenings. [facility] starting this afternoon. New walker and all new medications in place. PCP Dr Pimentel has no appts within TCM time frame. I am asking ofc in routing msg to please review sched and call pt if provider wants TCM APPT scheduled. This would need to be completed by 05/18/2022.      Please advise if she needs to be seen in office for f/u or can it be telephone visit

## 2022-05-05 NOTE — OUTREACH NOTE
Call Center TCM Note    Flowsheet Row Responses   [facility] patient discharged from? [place]   Does the patient have one of the following disease processes/diagnoses(primary or secondary)? Sepsis   TCM attempt successful? Yes   Discharge diagnosis Pancytopenia due to chemotherapy   Sepsis d/t E Coli   Meds reviewed with patient/caregiver? Yes   Is the patient having any side effects they believe may be caused by any medication additions or changes? No   Does the patient have all medications related to this admission filled (includes all antibiotics, inhalers, nebulizers,steroids,etc.) Yes   Is the patient taking all medications as directed (includes completed medication regime)? Yes   Does the patient have a primary care provider?  Yes   Comments regarding PCP PCP Dr Pimentel has no appts within TCM time frame. I am asking ofc in routing msg to please review sched and call pt if provider wants TCM APPT scheduled. This would need to be completed by 05/18/2022.   Does the patient have an appointment with their PCP within 7 days of discharge? No   Has the patient kept scheduled appointments due by today? N/A   What is the Home health agency?  [facility]   Has home health visited the patient within 72 hours of discharge? Yes   Home health comments [facility] sched to see pt today.    What DME was ordered? Front wheeled walker   Has all DME been delivered? Yes   Psychosocial issues? No   Did the patient receive a copy of their discharge instructions? Yes   Nursing interventions Reviewed instructions with patient   What is the patient's perception of their health status since discharge? Improving   Nursing interventions Nurse provided patient education   Is the patient/caregiver able to teach back Sepsis? S - Shivering,fever or very cold, E - Extreme pain or generalized discomfort (worst ever,especially abdomen), P - Pale or discolored skin, S - Sleepy, difficult to arouse,confused, I -   I feel like I might die-a feeling of  hopelessness, S - Short of breath   Nursing interventions Nurse provided reassurance to patient   Is patient/caregiver able to teach back steps to recovery at home? Set small, achievable goals for return to baseline health, Record milestones and struggles in a journal, Exercise as tolerated, Make a list of questions for PCP appoinment, Learn about sepsis(sepsis.org), Rest and regain strength, Talk about feelings with family/friends, Eat a balanced diet   Is the patient/caregiver able to teach back signs and symptoms of worsening condition: Fever, Altered mental status(confusion/coma), Edema, Hyperthermia, High blood glucose without diabetes, Shortness of breath/rapid respiratory rate, Rapid heart rate (>90)   If the patient is a current smoker, are they able to teach back resources for cessation? Not a smoker   Is the patient/caregiver able to teach back the hierarchy of who to call/visit for symptoms/problems? PCP, Specialist, Home health nurse, Urgent Care, ED, 911 Yes   TCM call completed? Yes   Wrap up additional comments This very nice lady sounds pretty frail, & states she feels quite weak from hospital stay. Son brings lunch daily, & other two children see her most evenings. [facility] starting this afternoon. New walker and all new medications in place. PCP Dr Pimentel has no appts within TCM time frame. I am asking ofc in routing msg to please review sched and call pt if provider wants TCM APPT scheduled. This would need to be completed by 05/18/2022.          Hanane Hastings MA    5/5/2022, 11:37 EDT

## 2022-05-09 ENCOUNTER — OFFICE VISIT (OUTPATIENT)
Dept: ONCOLOGY | Facility: CLINIC | Age: 82
End: 2022-05-09

## 2022-05-09 ENCOUNTER — INFUSION (OUTPATIENT)
Dept: ONCOLOGY | Facility: HOSPITAL | Age: 82
End: 2022-05-09

## 2022-05-09 VITALS
OXYGEN SATURATION: 98 % | DIASTOLIC BLOOD PRESSURE: 81 MMHG | HEART RATE: 84 BPM | TEMPERATURE: 97.7 F | RESPIRATION RATE: 16 BRPM | SYSTOLIC BLOOD PRESSURE: 129 MMHG | WEIGHT: 156.4 LBS | BODY MASS INDEX: 30.7 KG/M2 | HEIGHT: 60 IN

## 2022-05-09 VITALS
DIASTOLIC BLOOD PRESSURE: 88 MMHG | HEART RATE: 80 BPM | RESPIRATION RATE: 18 BRPM | OXYGEN SATURATION: 98 % | TEMPERATURE: 97.9 F | SYSTOLIC BLOOD PRESSURE: 130 MMHG

## 2022-05-09 DIAGNOSIS — D69.6 THROMBOCYTOPENIA: ICD-10-CM

## 2022-05-09 DIAGNOSIS — D63.0 ANEMIA IN NEOPLASTIC DISEASE: ICD-10-CM

## 2022-05-09 DIAGNOSIS — R63.4 ABNORMAL WEIGHT LOSS: ICD-10-CM

## 2022-05-09 DIAGNOSIS — D70.1 CHEMOTHERAPY INDUCED NEUTROPENIA: ICD-10-CM

## 2022-05-09 DIAGNOSIS — J90 BILATERAL PLEURAL EFFUSION: ICD-10-CM

## 2022-05-09 DIAGNOSIS — T45.1X5A CHEMOTHERAPY INDUCED NEUTROPENIA: ICD-10-CM

## 2022-05-09 DIAGNOSIS — Z45.2: Primary | ICD-10-CM

## 2022-05-09 DIAGNOSIS — Z79.01 CHRONIC ANTICOAGULATION: ICD-10-CM

## 2022-05-09 DIAGNOSIS — T45.1X5A ANEMIA DUE TO CHEMOTHERAPY: ICD-10-CM

## 2022-05-09 DIAGNOSIS — C83.38 DIFFUSE LARGE B-CELL LYMPHOMA OF LYMPH NODES OF MULTIPLE REGIONS: ICD-10-CM

## 2022-05-09 DIAGNOSIS — R50.81 NEUTROPENIC FEVER: ICD-10-CM

## 2022-05-09 DIAGNOSIS — K59.03 DRUG INDUCED CONSTIPATION: ICD-10-CM

## 2022-05-09 DIAGNOSIS — C83.38 DIFFUSE LARGE B-CELL LYMPHOMA OF LYMPH NODES OF MULTIPLE REGIONS: Primary | ICD-10-CM

## 2022-05-09 DIAGNOSIS — D64.81 ANEMIA DUE TO CHEMOTHERAPY: ICD-10-CM

## 2022-05-09 DIAGNOSIS — D70.9 NEUTROPENIC FEVER: ICD-10-CM

## 2022-05-09 LAB
ALBUMIN SERPL-MCNC: 3.3 G/DL (ref 3.5–5.2)
ALBUMIN/GLOB SERPL: 1.3 G/DL (ref 1.1–2.4)
ALP SERPL-CCNC: 82 U/L (ref 38–116)
ALT SERPL W P-5'-P-CCNC: 10 U/L (ref 0–33)
ANION GAP SERPL CALCULATED.3IONS-SCNC: 10.1 MMOL/L (ref 5–15)
AST SERPL-CCNC: 16 U/L (ref 0–32)
BASOPHILS # BLD AUTO: 0.11 10*3/MM3 (ref 0–0.2)
BASOPHILS NFR BLD AUTO: 1.3 % (ref 0–1.5)
BILIRUB SERPL-MCNC: 0.3 MG/DL (ref 0.2–1.2)
BUN SERPL-MCNC: 13 MG/DL (ref 6–20)
BUN/CREAT SERPL: 19.7 (ref 7.3–30)
CALCIUM SPEC-SCNC: 9.3 MG/DL (ref 8.5–10.2)
CHLORIDE SERPL-SCNC: 95 MMOL/L (ref 98–107)
CO2 SERPL-SCNC: 28.9 MMOL/L (ref 22–29)
CREAT SERPL-MCNC: 0.66 MG/DL (ref 0.6–1.1)
DEPRECATED RDW RBC AUTO: 53.2 FL (ref 37–54)
EGFRCR SERPLBLD CKD-EPI 2021: 88.8 ML/MIN/1.73
EOSINOPHIL # BLD AUTO: 0 10*3/MM3 (ref 0–0.4)
EOSINOPHIL NFR BLD AUTO: 0 % (ref 0.3–6.2)
ERYTHROCYTE [DISTWIDTH] IN BLOOD BY AUTOMATED COUNT: 16.4 % (ref 12.3–15.4)
GLOBULIN UR ELPH-MCNC: 2.6 GM/DL (ref 1.8–3.5)
GLUCOSE SERPL-MCNC: 111 MG/DL (ref 74–124)
HCT VFR BLD AUTO: 26 % (ref 34–46.6)
HGB BLD-MCNC: 8.6 G/DL (ref 12–15.9)
IMM GRANULOCYTES # BLD AUTO: 0.14 10*3/MM3 (ref 0–0.05)
IMM GRANULOCYTES NFR BLD AUTO: 1.7 % (ref 0–0.5)
LYMPHOCYTES # BLD AUTO: 0.35 10*3/MM3 (ref 0.7–3.1)
LYMPHOCYTES NFR BLD AUTO: 4.3 % (ref 19.6–45.3)
MCH RBC QN AUTO: 29.6 PG (ref 26.6–33)
MCHC RBC AUTO-ENTMCNC: 33.1 G/DL (ref 31.5–35.7)
MCV RBC AUTO: 89.3 FL (ref 79–97)
MONOCYTES # BLD AUTO: 0.91 10*3/MM3 (ref 0.1–0.9)
MONOCYTES NFR BLD AUTO: 11.1 % (ref 5–12)
NEUTROPHILS NFR BLD AUTO: 6.68 10*3/MM3 (ref 1.7–7)
NEUTROPHILS NFR BLD AUTO: 81.6 % (ref 42.7–76)
NRBC BLD AUTO-RTO: 0 /100 WBC (ref 0–0.2)
PLATELET # BLD AUTO: 510 10*3/MM3 (ref 140–450)
PMV BLD AUTO: 8.3 FL (ref 6–12)
POTASSIUM SERPL-SCNC: 3.9 MMOL/L (ref 3.5–4.7)
PROT SERPL-MCNC: 5.9 G/DL (ref 6.3–8)
RBC # BLD AUTO: 2.91 10*6/MM3 (ref 3.77–5.28)
SODIUM SERPL-SCNC: 134 MMOL/L (ref 134–145)
WBC NRBC COR # BLD: 8.19 10*3/MM3 (ref 3.4–10.8)

## 2022-05-09 PROCEDURE — 85025 COMPLETE CBC W/AUTO DIFF WBC: CPT

## 2022-05-09 PROCEDURE — 36591 DRAW BLOOD OFF VENOUS DEVICE: CPT

## 2022-05-09 PROCEDURE — 99215 OFFICE O/P EST HI 40 MIN: CPT | Performed by: INTERNAL MEDICINE

## 2022-05-09 PROCEDURE — 80053 COMPREHEN METABOLIC PANEL: CPT

## 2022-05-09 PROCEDURE — 25010000002 HEPARIN LOCK FLUSH PER 10 UNITS: Performed by: INTERNAL MEDICINE

## 2022-05-09 RX ORDER — HEPARIN SODIUM (PORCINE) LOCK FLUSH IV SOLN 100 UNIT/ML 100 UNIT/ML
500 SOLUTION INTRAVENOUS AS NEEDED
Status: DISCONTINUED | OUTPATIENT
Start: 2022-05-09 | End: 2022-05-09 | Stop reason: HOSPADM

## 2022-05-09 RX ORDER — HEPARIN SODIUM (PORCINE) LOCK FLUSH IV SOLN 100 UNIT/ML 100 UNIT/ML
500 SOLUTION INTRAVENOUS AS NEEDED
Status: CANCELLED | OUTPATIENT
Start: 2022-05-09

## 2022-05-09 RX ORDER — SODIUM CHLORIDE 0.9 % (FLUSH) 0.9 %
10 SYRINGE (ML) INJECTION AS NEEDED
Status: CANCELLED | OUTPATIENT
Start: 2022-05-09

## 2022-05-09 RX ORDER — SODIUM CHLORIDE 0.9 % (FLUSH) 0.9 %
10 SYRINGE (ML) INJECTION AS NEEDED
Status: DISCONTINUED | OUTPATIENT
Start: 2022-05-09 | End: 2022-05-09 | Stop reason: HOSPADM

## 2022-05-09 RX ADMIN — SODIUM CHLORIDE, PRESERVATIVE FREE 500 UNITS: 5 INJECTION INTRAVENOUS at 09:48

## 2022-05-09 RX ADMIN — Medication 10 ML: at 09:48

## 2022-05-10 ENCOUNTER — HOME CARE VISIT (OUTPATIENT)
Dept: HOME HEALTH SERVICES | Facility: HOME HEALTHCARE | Age: 82
End: 2022-05-10

## 2022-05-10 VITALS
OXYGEN SATURATION: 95 % | SYSTOLIC BLOOD PRESSURE: 116 MMHG | DIASTOLIC BLOOD PRESSURE: 78 MMHG | TEMPERATURE: 97.6 F | HEART RATE: 81 BPM

## 2022-05-10 PROCEDURE — G0493 RN CARE EA 15 MIN HH/HOSPICE: HCPCS

## 2022-05-10 PROCEDURE — G0151 HHCP-SERV OF PT,EA 15 MIN: HCPCS

## 2022-05-10 NOTE — CASE COMMUNICATION
SOC Note: 81 y.o. female with a history of diffuse large B-cell lymphoma and is doing chemo treatments. Patient was receiving a transfusion and spiked a fever and was sent to ER where she was admitted and was found to have E coli. Hospitalist wanted patient to go to hopsital but she wanted to go home so they agreed to DC her home wit home health and a walker for ambulation because she is weak.Pt has chemo treatment on monday and stated  that she will be very weak most of next week due to treatment. Patient needs SN, and PT    plan for next visit: Medication education, safety    Home Health ordered for: disciplines: SN, PT    Reason for Hosp/Primary Dx/Co-morbidities: LYMPHOMA, ECOLI    Focus of Care: CANCE-lYMPHOMA, WEAKNESS,     Current Functional status/mobility/DME: WALKER    HB status/Living Arrangements: PATIENT LIVES ALONE    Skin Integrity/wound status: NO WOUND S    Code Status: FULL CODE    Fall Risk: HIGH RISK    POC confirmed with DR SHER on 5/5/22

## 2022-05-10 NOTE — HOME HEALTH
81 y.o. female with a history of diffuse large B-cell lymphoma and is doing chemo treatments. Patient was receiving a transfusion and spiked a fever and was sent to ER where she was admitted and was found to have E coli. Hospitalist wanted patient to go to hopsital but she wanted to go home so they agreed to DC her home wit home health and a walker for ambulation because she is weak.Pt has chemo treatment on monday and stated that she will be very weak most of next week due to treatment. Patient needs SN, and PT    plan for next visit: Medication education, safety

## 2022-05-10 NOTE — HOME HEALTH
REASON FOR REFERRAL:  decreased ability to ambulate in and out of the home following recent hospital stay for pancytopenia due to chemotherapy, Large B-cell lymphoma, sepsis due to Escherichia coli, neutropenic fever resulting in functional decline and LE weakness.    MEDICAL HISTORY: Chronic diastolic CHF, Paroxysmal atrial fibrillation, Hyponatremia, Diffuse large B-cell lymphoma of lymph nodes of multiple regions, Hypertension, Hyperlipidemia    SKILLED PHYSICAL THERAPY IS MEDICALLY NECESSARY FOR: Instruction/education in lower extremity strengthening HEP, bed mobility/transfers training, gait training, balance training, fall prevention, and activity tolerance training to enable patient to safely exit home for medical appointments.

## 2022-05-11 VITALS
HEART RATE: 71 BPM | OXYGEN SATURATION: 98 % | RESPIRATION RATE: 16 BRPM | TEMPERATURE: 97.8 F | DIASTOLIC BLOOD PRESSURE: 66 MMHG | SYSTOLIC BLOOD PRESSURE: 128 MMHG

## 2022-05-12 ENCOUNTER — HOME CARE VISIT (OUTPATIENT)
Dept: HOME HEALTH SERVICES | Facility: HOME HEALTHCARE | Age: 82
End: 2022-05-12

## 2022-05-12 VITALS
SYSTOLIC BLOOD PRESSURE: 124 MMHG | HEART RATE: 84 BPM | TEMPERATURE: 97.3 F | OXYGEN SATURATION: 94 % | DIASTOLIC BLOOD PRESSURE: 82 MMHG

## 2022-05-12 PROCEDURE — G0151 HHCP-SERV OF PT,EA 15 MIN: HCPCS

## 2022-05-12 NOTE — HOME HEALTH
Patient reports no falls or changes in medication since last visit.    Plan for next visit:  -transfer training  -gait training with rolling walker  -education on HEP with progressions as appropriate  -stair mobility training  -standing balance exercises

## 2022-05-13 ENCOUNTER — HOME CARE VISIT (OUTPATIENT)
Dept: HOME HEALTH SERVICES | Facility: HOME HEALTHCARE | Age: 82
End: 2022-05-13

## 2022-05-13 VITALS
RESPIRATION RATE: 16 BRPM | DIASTOLIC BLOOD PRESSURE: 76 MMHG | HEART RATE: 77 BPM | SYSTOLIC BLOOD PRESSURE: 112 MMHG | OXYGEN SATURATION: 98 % | TEMPERATURE: 99 F

## 2022-05-13 PROCEDURE — G0493 RN CARE EA 15 MIN HH/HOSPICE: HCPCS

## 2022-05-16 ENCOUNTER — INFUSION (OUTPATIENT)
Dept: ONCOLOGY | Facility: HOSPITAL | Age: 82
End: 2022-05-16

## 2022-05-16 ENCOUNTER — HOME CARE VISIT (OUTPATIENT)
Dept: HOME HEALTH SERVICES | Facility: HOME HEALTHCARE | Age: 82
End: 2022-05-16

## 2022-05-16 ENCOUNTER — OFFICE VISIT (OUTPATIENT)
Dept: ONCOLOGY | Facility: CLINIC | Age: 82
End: 2022-05-16

## 2022-05-16 VITALS
TEMPERATURE: 97.3 F | SYSTOLIC BLOOD PRESSURE: 110 MMHG | DIASTOLIC BLOOD PRESSURE: 65 MMHG | WEIGHT: 151.9 LBS | RESPIRATION RATE: 16 BRPM | HEART RATE: 83 BPM | OXYGEN SATURATION: 97 % | HEIGHT: 60 IN | BODY MASS INDEX: 29.82 KG/M2

## 2022-05-16 VITALS — DIASTOLIC BLOOD PRESSURE: 67 MMHG | SYSTOLIC BLOOD PRESSURE: 99 MMHG | HEART RATE: 80 BPM

## 2022-05-16 DIAGNOSIS — D69.6 THROMBOCYTOPENIA: ICD-10-CM

## 2022-05-16 DIAGNOSIS — D70.1 CHEMOTHERAPY INDUCED NEUTROPENIA: ICD-10-CM

## 2022-05-16 DIAGNOSIS — C83.38 DIFFUSE LARGE B-CELL LYMPHOMA OF LYMPH NODES OF MULTIPLE REGIONS: ICD-10-CM

## 2022-05-16 DIAGNOSIS — R63.4 ABNORMAL WEIGHT LOSS: ICD-10-CM

## 2022-05-16 DIAGNOSIS — T45.1X5A ANEMIA DUE TO CHEMOTHERAPY: ICD-10-CM

## 2022-05-16 DIAGNOSIS — D64.81 ANEMIA DUE TO CHEMOTHERAPY: ICD-10-CM

## 2022-05-16 DIAGNOSIS — Z79.01 CHRONIC ANTICOAGULATION: ICD-10-CM

## 2022-05-16 DIAGNOSIS — C83.38 DIFFUSE LARGE B-CELL LYMPHOMA OF LYMPH NODES OF MULTIPLE REGIONS: Primary | ICD-10-CM

## 2022-05-16 DIAGNOSIS — K59.03 DRUG INDUCED CONSTIPATION: ICD-10-CM

## 2022-05-16 DIAGNOSIS — J90 BILATERAL PLEURAL EFFUSION: ICD-10-CM

## 2022-05-16 DIAGNOSIS — T45.1X5A CHEMOTHERAPY INDUCED NEUTROPENIA: ICD-10-CM

## 2022-05-16 LAB
ALBUMIN SERPL-MCNC: 3.3 G/DL (ref 3.5–5.2)
ALBUMIN/GLOB SERPL: 1.2 G/DL (ref 1.1–2.4)
ALP SERPL-CCNC: 78 U/L (ref 38–116)
ALT SERPL W P-5'-P-CCNC: 8 U/L (ref 0–33)
ANION GAP SERPL CALCULATED.3IONS-SCNC: 9.4 MMOL/L (ref 5–15)
AST SERPL-CCNC: 16 U/L (ref 0–32)
BASOPHILS # BLD AUTO: 0.08 10*3/MM3 (ref 0–0.2)
BASOPHILS NFR BLD AUTO: 1.1 % (ref 0–1.5)
BILIRUB SERPL-MCNC: 0.4 MG/DL (ref 0.2–1.2)
BUN SERPL-MCNC: 12 MG/DL (ref 6–20)
BUN/CREAT SERPL: 16.9 (ref 7.3–30)
CALCIUM SPEC-SCNC: 9.4 MG/DL (ref 8.5–10.2)
CHLORIDE SERPL-SCNC: 98 MMOL/L (ref 98–107)
CO2 SERPL-SCNC: 28.6 MMOL/L (ref 22–29)
CREAT SERPL-MCNC: 0.71 MG/DL (ref 0.6–1.1)
DEPRECATED RDW RBC AUTO: 57.2 FL (ref 37–54)
EGFRCR SERPLBLD CKD-EPI 2021: 86.1 ML/MIN/1.73
EOSINOPHIL # BLD AUTO: 0.03 10*3/MM3 (ref 0–0.4)
EOSINOPHIL NFR BLD AUTO: 0.4 % (ref 0.3–6.2)
ERYTHROCYTE [DISTWIDTH] IN BLOOD BY AUTOMATED COUNT: 17.4 % (ref 12.3–15.4)
GLOBULIN UR ELPH-MCNC: 2.8 GM/DL (ref 1.8–3.5)
GLUCOSE SERPL-MCNC: 112 MG/DL (ref 74–124)
HCT VFR BLD AUTO: 28.4 % (ref 34–46.6)
HGB BLD-MCNC: 8.8 G/DL (ref 12–15.9)
IMM GRANULOCYTES # BLD AUTO: 0.06 10*3/MM3 (ref 0–0.05)
IMM GRANULOCYTES NFR BLD AUTO: 0.8 % (ref 0–0.5)
LYMPHOCYTES # BLD AUTO: 0.3 10*3/MM3 (ref 0.7–3.1)
LYMPHOCYTES NFR BLD AUTO: 4.1 % (ref 19.6–45.3)
MCH RBC QN AUTO: 28.5 PG (ref 26.6–33)
MCHC RBC AUTO-ENTMCNC: 31 G/DL (ref 31.5–35.7)
MCV RBC AUTO: 91.9 FL (ref 79–97)
MONOCYTES # BLD AUTO: 0.8 10*3/MM3 (ref 0.1–0.9)
MONOCYTES NFR BLD AUTO: 11 % (ref 5–12)
NEUTROPHILS NFR BLD AUTO: 6.03 10*3/MM3 (ref 1.7–7)
NEUTROPHILS NFR BLD AUTO: 82.6 % (ref 42.7–76)
NRBC BLD AUTO-RTO: 0 /100 WBC (ref 0–0.2)
PLATELET # BLD AUTO: 365 10*3/MM3 (ref 140–450)
PMV BLD AUTO: 8.3 FL (ref 6–12)
POTASSIUM SERPL-SCNC: 4 MMOL/L (ref 3.5–4.7)
PROT SERPL-MCNC: 6.1 G/DL (ref 6.3–8)
RBC # BLD AUTO: 3.09 10*6/MM3 (ref 3.77–5.28)
SODIUM SERPL-SCNC: 136 MMOL/L (ref 134–145)
WBC NRBC COR # BLD: 7.3 10*3/MM3 (ref 3.4–10.8)

## 2022-05-16 PROCEDURE — 25010000002 RITUXIMAB 10 MG/ML SOLUTION 10 ML VIAL: Performed by: INTERNAL MEDICINE

## 2022-05-16 PROCEDURE — 96413 CHEMO IV INFUSION 1 HR: CPT

## 2022-05-16 PROCEDURE — 25010000002 FOSAPREPITANT PER 1 MG: Performed by: INTERNAL MEDICINE

## 2022-05-16 PROCEDURE — 25010000002 RITUXIMAB 10 MG/ML SOLUTION 50 ML VIAL: Performed by: INTERNAL MEDICINE

## 2022-05-16 PROCEDURE — 80053 COMPREHEN METABOLIC PANEL: CPT

## 2022-05-16 PROCEDURE — 25010000002 DEXAMETHASONE SODIUM PHOSPHATE 100 MG/10ML SOLUTION: Performed by: INTERNAL MEDICINE

## 2022-05-16 PROCEDURE — 96377 APPLICATON ON-BODY INJECTOR: CPT

## 2022-05-16 PROCEDURE — 96415 CHEMO IV INFUSION ADDL HR: CPT

## 2022-05-16 PROCEDURE — 25010000002 DIPHENHYDRAMINE PER 50 MG: Performed by: INTERNAL MEDICINE

## 2022-05-16 PROCEDURE — 96411 CHEMO IV PUSH ADDL DRUG: CPT

## 2022-05-16 PROCEDURE — 25010000002 PEGFILGRASTIM 6 MG/0.6ML PREFILLED SYRINGE KIT: Performed by: INTERNAL MEDICINE

## 2022-05-16 PROCEDURE — 25010000002 CYCLOPHOSPHAMIDE 1 GM/5ML SOLUTION 5 ML VIAL: Performed by: INTERNAL MEDICINE

## 2022-05-16 PROCEDURE — 25010000002 PALONOSETRON PER 25 MCG: Performed by: INTERNAL MEDICINE

## 2022-05-16 PROCEDURE — 85025 COMPLETE CBC W/AUTO DIFF WBC: CPT

## 2022-05-16 PROCEDURE — 25010000002 DOXORUBICIN PER 10 MG: Performed by: INTERNAL MEDICINE

## 2022-05-16 PROCEDURE — 99214 OFFICE O/P EST MOD 30 MIN: CPT | Performed by: INTERNAL MEDICINE

## 2022-05-16 PROCEDURE — 25010000002 VINCRISTINE PER 1 MG: Performed by: INTERNAL MEDICINE

## 2022-05-16 PROCEDURE — 96375 TX/PRO/DX INJ NEW DRUG ADDON: CPT

## 2022-05-16 PROCEDURE — 96367 TX/PROPH/DG ADDL SEQ IV INF: CPT

## 2022-05-16 PROCEDURE — 96417 CHEMO IV INFUS EACH ADDL SEQ: CPT

## 2022-05-16 RX ORDER — DIPHENHYDRAMINE HYDROCHLORIDE 50 MG/ML
50 INJECTION INTRAMUSCULAR; INTRAVENOUS AS NEEDED
Status: CANCELLED | OUTPATIENT
Start: 2022-05-16

## 2022-05-16 RX ORDER — FAMOTIDINE 10 MG/ML
20 INJECTION, SOLUTION INTRAVENOUS ONCE
Status: CANCELLED | OUTPATIENT
Start: 2022-05-16

## 2022-05-16 RX ORDER — ACETAMINOPHEN 325 MG/1
650 TABLET ORAL ONCE
Status: CANCELLED | OUTPATIENT
Start: 2022-05-16

## 2022-05-16 RX ORDER — DOXORUBICIN HYDROCHLORIDE 2 MG/ML
25 INJECTION, SOLUTION INTRAVENOUS ONCE
Status: COMPLETED | OUTPATIENT
Start: 2022-05-16 | End: 2022-05-16

## 2022-05-16 RX ORDER — SODIUM CHLORIDE 9 MG/ML
250 INJECTION, SOLUTION INTRAVENOUS ONCE
Status: COMPLETED | OUTPATIENT
Start: 2022-05-16 | End: 2022-05-16

## 2022-05-16 RX ORDER — PALONOSETRON 0.05 MG/ML
0.25 INJECTION, SOLUTION INTRAVENOUS ONCE
Status: CANCELLED | OUTPATIENT
Start: 2022-05-16

## 2022-05-16 RX ORDER — MEPERIDINE HYDROCHLORIDE 25 MG/ML
25 INJECTION INTRAMUSCULAR; INTRAVENOUS; SUBCUTANEOUS
Status: CANCELLED | OUTPATIENT
Start: 2022-05-16

## 2022-05-16 RX ORDER — FAMOTIDINE 10 MG/ML
20 INJECTION, SOLUTION INTRAVENOUS ONCE
Status: COMPLETED | OUTPATIENT
Start: 2022-05-16 | End: 2022-05-16

## 2022-05-16 RX ORDER — FAMOTIDINE 10 MG/ML
20 INJECTION, SOLUTION INTRAVENOUS AS NEEDED
Status: CANCELLED | OUTPATIENT
Start: 2022-05-16

## 2022-05-16 RX ORDER — ACETAMINOPHEN 325 MG/1
650 TABLET ORAL ONCE
Status: COMPLETED | OUTPATIENT
Start: 2022-05-16 | End: 2022-05-16

## 2022-05-16 RX ORDER — SODIUM CHLORIDE 9 MG/ML
250 INJECTION, SOLUTION INTRAVENOUS ONCE
Status: CANCELLED | OUTPATIENT
Start: 2022-05-16

## 2022-05-16 RX ORDER — DOXORUBICIN HYDROCHLORIDE 2 MG/ML
25 INJECTION, SOLUTION INTRAVENOUS ONCE
Status: CANCELLED | OUTPATIENT
Start: 2022-05-16

## 2022-05-16 RX ORDER — PALONOSETRON 0.05 MG/ML
0.25 INJECTION, SOLUTION INTRAVENOUS ONCE
Status: COMPLETED | OUTPATIENT
Start: 2022-05-16 | End: 2022-05-16

## 2022-05-16 RX ADMIN — VINCRISTINE SULFATE 1 MG: 1 INJECTION, SOLUTION INTRAVENOUS at 12:37

## 2022-05-16 RX ADMIN — RITUXIMAB 630 MG: 10 INJECTION, SOLUTION INTRAVENOUS at 09:32

## 2022-05-16 RX ADMIN — DEXAMETHASONE SODIUM PHOSPHATE 12 MG: 10 INJECTION, SOLUTION INTRAMUSCULAR; INTRAVENOUS at 12:11

## 2022-05-16 RX ADMIN — DIPHENHYDRAMINE HYDROCHLORIDE 25 MG: 50 INJECTION, SOLUTION INTRAMUSCULAR; INTRAVENOUS at 08:41

## 2022-05-16 RX ADMIN — SODIUM CHLORIDE 150 ML: 900 INJECTION, SOLUTION INTRAVENOUS at 08:59

## 2022-05-16 RX ADMIN — ACETAMINOPHEN 650 MG: 325 TABLET ORAL at 08:41

## 2022-05-16 RX ADMIN — PALONOSETRON 0.25 MG: 0.05 INJECTION, SOLUTION INTRAVENOUS at 12:11

## 2022-05-16 RX ADMIN — FAMOTIDINE 20 MG: 10 INJECTION, SOLUTION INTRAVENOUS at 08:41

## 2022-05-16 RX ADMIN — PEGFILGRASTIM 6 MG: KIT SUBCUTANEOUS at 12:56

## 2022-05-16 RX ADMIN — SODIUM CHLORIDE 250 ML: 9 INJECTION, SOLUTION INTRAVENOUS at 08:36

## 2022-05-16 RX ADMIN — DOXORUBICIN HYDROCHLORIDE 42 MG: 2 INJECTION, SOLUTION INTRAVENOUS at 12:31

## 2022-05-16 RX ADMIN — CYCLOPHOSPHAMIDE 670 MG: 200 INJECTION, SOLUTION INTRAVENOUS at 12:52

## 2022-05-16 NOTE — CASE COMMUNICATION
Patient missed a SN visit from [facility] on 5/16/22     Reason:PT HAS CHEMOTHERPAY SAME DATE.       For your records only.   As per home health protocol, MD must be notified of missed/cancelled visits; therefore the prescribed frequency was not met.

## 2022-05-16 NOTE — PROGRESS NOTES
Subjective     CHIEF COMPLAINT:      Chief Complaint   Patient presents with   • Follow-up     No concerns       HISTORY OF PRESENT ILLNESS:     Michelle Car is a 81 y.o. female patient who returns today for follow up on her lymphoma. She returns today for follow up reporting that she felt better since her last visit last week since her chemotherapy was held. She is not having shortness of breath or chest pain. She is not having problem with bleeding or bruising. No dysuria or increased urinary frequency.     ROS:  Pertinent ROS is in the HPI.     Past medical, surgical, social and family history were reviewed.     MEDICATIONS:    Current Outpatient Medications:   •  acetaminophen (TYLENOL) 500 MG tablet, Take 500 mg by mouth Every 6 (Six) Hours As Needed for Mild Pain ., Disp: , Rfl:   •  amiodarone (PACERONE) 200 MG tablet, Take 1 tablet by mouth Daily., Disp: 30 tablet, Rfl: 0  •  apixaban (ELIQUIS) 5 MG tablet tablet, Take 1 tablet by mouth Every 12 (Twelve) Hours. Indications: Atrial Fibrillation, Disp: 180 tablet, Rfl: 1  •  Calcium Carbonate (CALTRATE 600 PO), Take 1 tablet by mouth Daily., Disp: , Rfl:   •  carvedilol (COREG) 3.125 MG tablet, Take 1 tablet by mouth 2 (Two) Times a Day With Meals., Disp: 180 tablet, Rfl: 1  •  digoxin (LANOXIN) 125 MCG tablet, Take 125 mcg by mouth Daily., Disp: , Rfl:   •  DPH-Lido-AlHydr-MgHydr-Simeth (First Mouthwash, Magic Mouthwash,) suspension, Swish and spit 5 mL Every 6 (Six) Hours As Needed (Oral pain)., Disp: 237 mL, Rfl: 0  •  loratadine (CLARITIN) 10 MG tablet, Take 10 mg by mouth Daily., Disp: , Rfl:   •  losartan (COZAAR) 100 MG tablet, Take 100 mg by mouth Daily., Disp: , Rfl:   •  Multiple Vitamins-Minerals (CENTRUM SILVER) tablet, Take 1 tablet by mouth Daily., Disp: , Rfl:   •  ondansetron (ZOFRAN) 8 MG tablet, Take 1 tablet by mouth Every 8 (Eight) Hours As Needed for Nausea or Vomiting., Disp: 30 tablet, Rfl: 5  •  polyethylene glycol (MIRALAX) 17  "GM/SCOOP powder, Take 17 g by mouth As Needed., Disp: , Rfl:   •  pravastatin (PRAVACHOL) 40 MG tablet, TAKE 1 TABLET BY MOUTH EVERY NIGHT AT BEDTIME, Disp: 90 tablet, Rfl: 1  •  spironolactone (ALDACTONE) 25 MG tablet, Take 25 mg by mouth Daily., Disp: , Rfl:   Objective     VITAL SIGNS:     Vitals:    05/16/22 0804   BP: 110/65   Pulse: 83   Resp: 16   Temp: 97.3 °F (36.3 °C)   TempSrc: Temporal   SpO2: 97%   Weight: 68.9 kg (151 lb 14.4 oz)   Height: 152 cm (59.84\")   PainSc: 0-No pain     Body mass index is 29.82 kg/m².     Wt Readings from Last 5 Encounters:   05/16/22 68.9 kg (151 lb 14.4 oz)   05/09/22 70.9 kg (156 lb 6.4 oz)   05/04/22 68.5 kg (151 lb 1.6 oz)   04/22/22 70.5 kg (155 lb 6 oz)   04/18/22 69.7 kg (153 lb 9.6 oz)     PHYSICAL EXAMINATION:  GENERAL: The patient appears in fair general condition, not in acute distress.   SKIN: No ecchymosis.  EYES: No jaundice. No pallor.  LYMPHATICS: No cervical lymphadenopathy.  CHEST: Normal respiratory effort. Decreased breath sounds at the bases with dullness to percussion.  CVS: Normal S1 and S2. Grade II/VI systolic murmur.  ABDOMEN: Soft. No tenderness. No Hepatomegaly. No Splenomegaly. No masses.  EXTREMITIES: Trace edema at the legs. Chronic edema at the ankles..     DIAGNOSTIC DATA:     Results from last 7 days   Lab Units 05/16/22  0752 05/09/22  0855   WBC 10*3/mm3 7.30 8.19   NEUTROS ABS 10*3/mm3 6.03 6.68   HEMOGLOBIN g/dL 8.8* 8.6*   HEMATOCRIT % 28.4* 26.0*   PLATELETS 10*3/mm3 365 510*     Results from last 7 days   Lab Units 05/09/22  0855   SODIUM mmol/L 134   POTASSIUM mmol/L 3.9   CHLORIDE mmol/L 95*   CO2 mmol/L 28.9   BUN mg/dL 13   CREATININE mg/dL 0.66   CALCIUM mg/dL 9.3   ALBUMIN g/dL 3.30*   BILIRUBIN mg/dL 0.3   ALK PHOS U/L 82   ALT (SGPT) U/L 10   AST (SGOT) U/L 16   GLUCOSE mg/dL 111     Assessment   *Diffuse large B-cell lymphoma.     · Patient was found to have bilateral pleural effusions.    · She underwent right thoracentesis " on 2/21/2022 and the left thoracentesis on 2/22/2022.    · Analysis of the fluid revealed involvement with diffuse large B-cell lymphoma.    · FISH analysis for BCL6 rearrangement was positive.    · FISH analysis for BCL 1, BCL-2 and MYC rearrangement was negative.    · CT chest on 2/17/2022 revealed no hilar or mediastinal lymphadenopathy.    · Spleen was reported to be normal in size.  · Peripheral blood flow cytometry on 3/7/2022 was negative.  · PET scan on 3/10/2022 revealed significant hypermetabolism in the left adrenal gland and the surrounding soft tissue with SUV up to 34.5. Hypermetabolism in the left pleural thickening with SUV of 7.1. there was less hypermetabolism in the right adrenal gland and in the right pleura.  · She is considered to have stage III non-bulky disease.  · The degree of hypermetabolism is consistent with high grade lymphoma. This concurs with the pathology exam of the pleural fluid cytology that revealed diffuse large B-cell lymphoma.  · R-IPI score of 3 associated with poor risk. This is associated with overall survival of 55%.   · R-CHOP with Neulasta support on 3/28/2022.  · Patient developed neutropenia and thrombocytopenia after cycle#1.  · Vincristine dose was reduced to 1 mg due to constipation.   · Patient developed neutropenia and neutropenic fever after cycle #2.  · Her performance status decreased as a result. Although she felt better after discharge, she continues to be weak.   · CT scan on 5/1/2022 revealed a decrease in the right pleural effusion. The left suprarenal density was stable. The left pleural effusion was slightly larger. However, it had a density of serous fluid likely representing increase in the fluid due to cardiac or fluid overload state.  · Overall, her disease appears to be stable to improved. I recommended continuing the chemotherapy treatment.  1 week delay was recommended due to the patient's neutropenic fever that required admission and resulted in  the patient becoming weaker.  · Recommendation was also to change her regimen to mini-RCHOP with reduction in the doses of Adriamycin to 25 mg/m2 and Cytoxan to 400 mg/m2 and continuing Vincristine at 1 mg total dose.   · Patient is feeling better today. Performance status improved over the past week since she did not receive chemotherapy.      *Neutropenia secondary to chemotherapy.  · Neutrophil count decreased to 0.04 on 4/4/2022.  · She was placed on prophylactic antibiotic with Augmentin.  · After cycle #2, she developed neutropenia with neutropenic fever despite Neulasta support and Augmentin which was started on day #6.  · Patient was hospitalized between 4/26/2022 and 5/4/2022.  · Neutrophil count subsequently recovered.  · Neutrophil count was 6680 on 5/9/2022.  · Neutrophil count is normal today at 6,030.    *Thrombocytopenia secondary to chemotherapy.  · Platelet count decreased to 110,000 on day #8 of her first cycle of chemotherapy.  · Platelet count improved afterwards.  · Following cycle #2, Platelet count decreased to 57,000 on 4/28/2022.  · Platelet count recovered afterwards.  It was 510,000 on 5/9/2022.  · Platelet count is 365,000 today.    *Bilateral pleural effusions.   · She is s/p right thoracentesis on 2/21/2022 and the effusion was found to be malignant attributed to the lymphoma.   · CT scan on 5/1/2022 revealed decrease in the right pleural effusion and an increase in the left effusion.   · The increase in the left effusion was suspected of being due to cardiac and fluid overload states (fluid appeared to be serous on CT scan).  · Exam today revealed small bilateral effusions.   · She is not having problem with shortness of breath at present.     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  · Patient had anemia secondary to her lymphoma.  · Hemoglobin was 11.1 on 3/28/2022.  · Anemia work-up showed no evidence of iron deficiency.  · Hgb decreased to 7.9 on  4/25/2022 and she was transfused.   · Hemoglobin decreased to 6.3 on 4/27/2022. She was transfused.  · Hemoglobin is 8.8 today.    *Paroxysmal atrial fibrillation.  · She is on Eliquis 5 mg twice daily.  · Eliquis was briefly held on 4/27/2022 due to thrombocytopenia.   · I was resumed after platelets improved.     *Constipation.  · Patient has chronic constipation that worsened after the start of chemotherapy.  · Patient was placed on Senokot-S.  · Vincristine dose was reduced to 1 mg with cycle #2.   · She is not having problem with constipation at present.    *Weight loss.   · She is drinking Ensure with protein 2 daily.  · I recommended that she makes sure she has adequate calorie intake with each meal.     PLAN:    1.  Proceed with cycle #3 of R-CHOP today.   2.  The doses of Adriamycin and Cytoxan reduced.  Vincristine will be kept at a reduced dose of 1 mg.  3.  We will continue to monitor the pleural effusions.  She does not need a therapeutic thoracentesis at this point.  4.  Continue Eliquis 5 mg twice daily.  5.  Return in 1 in 2 weeks for CBC with RN review.  I will see her in follow-up in 3 weeks.  She will be due for cycle #4.  Plan to repeat scans after cycle #4.        Renate Galo MD  05/16/22

## 2022-05-17 ENCOUNTER — HOME CARE VISIT (OUTPATIENT)
Dept: HOME HEALTH SERVICES | Facility: HOME HEALTHCARE | Age: 82
End: 2022-05-17

## 2022-05-17 VITALS
DIASTOLIC BLOOD PRESSURE: 72 MMHG | OXYGEN SATURATION: 97 % | TEMPERATURE: 97.8 F | SYSTOLIC BLOOD PRESSURE: 116 MMHG | HEART RATE: 76 BPM

## 2022-05-17 PROCEDURE — G0151 HHCP-SERV OF PT,EA 15 MIN: HCPCS

## 2022-05-19 ENCOUNTER — HOME CARE VISIT (OUTPATIENT)
Dept: HOME HEALTH SERVICES | Facility: HOME HEALTHCARE | Age: 82
End: 2022-05-19

## 2022-05-19 VITALS
RESPIRATION RATE: 18 BRPM | DIASTOLIC BLOOD PRESSURE: 60 MMHG | WEIGHT: 159 LBS | BODY MASS INDEX: 31.22 KG/M2 | OXYGEN SATURATION: 97 % | SYSTOLIC BLOOD PRESSURE: 118 MMHG | HEART RATE: 76 BPM | TEMPERATURE: 98 F

## 2022-05-19 VITALS
HEART RATE: 78 BPM | TEMPERATURE: 98 F | DIASTOLIC BLOOD PRESSURE: 70 MMHG | SYSTOLIC BLOOD PRESSURE: 122 MMHG | OXYGEN SATURATION: 95 %

## 2022-05-19 PROCEDURE — G0493 RN CARE EA 15 MIN HH/HOSPICE: HCPCS

## 2022-05-19 PROCEDURE — G0151 HHCP-SERV OF PT,EA 15 MIN: HCPCS

## 2022-05-19 NOTE — HOME HEALTH
assess rersponse to increased diuretic    assess/insturct nutrition    PT LAST CHEMO WAS 5/16/22- appears to be tolerating this 3d round of chempo w/o adverse effect except fatigue

## 2022-05-19 NOTE — HOME HEALTH
Patient reports no falls or changes in medication since last visit.    Plan for next visit:  -transfer training  -gait training with cane as tolerated  -education on HEP with progressions as appropriate  -stair mobility training  -standing balance exercises

## 2022-05-23 ENCOUNTER — LAB (OUTPATIENT)
Dept: LAB | Facility: HOSPITAL | Age: 82
End: 2022-05-23

## 2022-05-23 ENCOUNTER — READMISSION MANAGEMENT (OUTPATIENT)
Dept: CALL CENTER | Facility: HOSPITAL | Age: 82
End: 2022-05-23

## 2022-05-23 ENCOUNTER — HOME CARE VISIT (OUTPATIENT)
Dept: HOME HEALTH SERVICES | Facility: HOME HEALTHCARE | Age: 82
End: 2022-05-23

## 2022-05-23 ENCOUNTER — CLINICAL SUPPORT (OUTPATIENT)
Dept: ONCOLOGY | Facility: HOSPITAL | Age: 82
End: 2022-05-23

## 2022-05-23 ENCOUNTER — TELEPHONE (OUTPATIENT)
Dept: ONCOLOGY | Facility: HOSPITAL | Age: 82
End: 2022-05-23

## 2022-05-23 VITALS — WEIGHT: 152 LBS | BODY MASS INDEX: 29.84 KG/M2

## 2022-05-23 VITALS
HEART RATE: 82 BPM | DIASTOLIC BLOOD PRESSURE: 60 MMHG | WEIGHT: 150 LBS | BODY MASS INDEX: 29.45 KG/M2 | RESPIRATION RATE: 16 BRPM | SYSTOLIC BLOOD PRESSURE: 118 MMHG | TEMPERATURE: 98.4 F | OXYGEN SATURATION: 98 %

## 2022-05-23 DIAGNOSIS — E78.00 PURE HYPERCHOLESTEROLEMIA: Primary | ICD-10-CM

## 2022-05-23 LAB
BASOPHILS # BLD AUTO: 0.07 10*3/MM3 (ref 0–0.2)
BASOPHILS NFR BLD AUTO: 0.9 % (ref 0–1.5)
DEPRECATED RDW RBC AUTO: 53 FL (ref 37–54)
EOSINOPHIL # BLD AUTO: 0.09 10*3/MM3 (ref 0–0.4)
EOSINOPHIL NFR BLD AUTO: 1.1 % (ref 0.3–6.2)
ERYTHROCYTE [DISTWIDTH] IN BLOOD BY AUTOMATED COUNT: 16.5 % (ref 12.3–15.4)
HCT VFR BLD AUTO: 26.2 % (ref 34–46.6)
HGB BLD-MCNC: 8.8 G/DL (ref 12–15.9)
IMM GRANULOCYTES # BLD AUTO: 0.23 10*3/MM3 (ref 0–0.05)
IMM GRANULOCYTES NFR BLD AUTO: 2.8 % (ref 0–0.5)
LYMPHOCYTES # BLD AUTO: 0.32 10*3/MM3 (ref 0.7–3.1)
LYMPHOCYTES NFR BLD AUTO: 3.9 % (ref 19.6–45.3)
MCH RBC QN AUTO: 29.5 PG (ref 26.6–33)
MCHC RBC AUTO-ENTMCNC: 33.6 G/DL (ref 31.5–35.7)
MCV RBC AUTO: 87.9 FL (ref 79–97)
MONOCYTES # BLD AUTO: 0.19 10*3/MM3 (ref 0.1–0.9)
MONOCYTES NFR BLD AUTO: 2.3 % (ref 5–12)
NEUTROPHILS NFR BLD AUTO: 7.33 10*3/MM3 (ref 1.7–7)
NEUTROPHILS NFR BLD AUTO: 89 % (ref 42.7–76)
NRBC BLD AUTO-RTO: 0 /100 WBC (ref 0–0.2)
PLATELET # BLD AUTO: 206 10*3/MM3 (ref 140–450)
PMV BLD AUTO: 10.2 FL (ref 6–12)
RBC # BLD AUTO: 2.98 10*6/MM3 (ref 3.77–5.28)
WBC NRBC COR # BLD: 8.23 10*3/MM3 (ref 3.4–10.8)

## 2022-05-23 PROCEDURE — 36415 COLL VENOUS BLD VENIPUNCTURE: CPT

## 2022-05-23 PROCEDURE — G0463 HOSPITAL OUTPT CLINIC VISIT: HCPCS

## 2022-05-23 PROCEDURE — 85025 COMPLETE CBC W/AUTO DIFF WBC: CPT

## 2022-05-23 PROCEDURE — G0493 RN CARE EA 15 MIN HH/HOSPICE: HCPCS

## 2022-05-23 NOTE — TELEPHONE ENCOUNTER
Called and s/w pts sister about MD response. She v/u.    ----- Message from Renate Galo MD sent at 5/23/2022  3:14 PM EDT -----  I recommend taking it once a day.  It appears that her weight decreased from 159 pounds last week to 151 pounds today. Please ask her to inform us if she has recurrence of the weight gain.     Thank you    ----- Message -----  From: Marcelina Saha RN  Sent: 5/23/2022  12:33 PM EDT  To: Renate Galo MD    The pt reports last Wednesday she had a decrease in urine output and weight had increased by 10 lbs. Daughter states that when the pt was in the hospital 2 weeks ago she was told to stop taking spironolactone. Prior to this the pt took 25 mg once a day. Her daughter called after hours and said she was told for the pt to take spironolactone twice a day. She has been taking it twice a day and weight is back down to 152 lbs. The daughter wants to know if the pt should continue taking this twice a day. Please advise.    Thanks

## 2022-05-23 NOTE — OUTREACH NOTE
Sepsis Week 3 Survey    Flowsheet Row Responses   [facility] facility patient discharged from? [place]   Does the patient have one of the following disease processes/diagnoses(primary or secondary)? Sepsis   Week 3 attempt successful? No  [All numbers attempted]   Unsuccessful attempts Attempt 1   Discharge diagnosis Pancytopenia due to chemotherapy   Sepsis d/t E Coli          ENEDINA T - Registered Nurse

## 2022-05-23 NOTE — HOME HEALTH
CHF ASSESSMENT/INSTRUCTION-SPIRONOLACTONE DECREASED TO 1X DAY 5/23/22    PT IS ACTIVE ONCOLOGY PT- LAST CHEMO 5/16/22- PT APPEARS TO BE TOLERATING W/ MUCH LESS FATIGUE THAN 2 PREVIOUS EPISODES

## 2022-05-23 NOTE — PROGRESS NOTES
Pt present with daughter for visit. Pt reports last Wednesday she had a decrease in urine output. Daughter states the pts weight was also up by 10 lbs. The pts daughter called after hours and was told to have the pt take spironolactone twice a day. Prior to this the pt had stopped taking spironolactone while in the hospital. The daughter reports the pt was told to stop this when she had been taking it once a day. Daughter wanted to know if the pt should continue taking this BID. Message sent to Dr. Galo. No significant changes in counts. Pt denies fever. Copy of labs provided.    Lab Results   Component Value Date    WBC 8.23 05/23/2022    HGB 8.8 (L) 05/23/2022    HCT 26.2 (L) 05/23/2022    MCV 87.9 05/23/2022     05/23/2022

## 2022-05-24 ENCOUNTER — HOME CARE VISIT (OUTPATIENT)
Dept: HOME HEALTH SERVICES | Facility: HOME HEALTHCARE | Age: 82
End: 2022-05-24

## 2022-05-24 VITALS
TEMPERATURE: 97 F | HEART RATE: 83 BPM | DIASTOLIC BLOOD PRESSURE: 74 MMHG | OXYGEN SATURATION: 99 % | SYSTOLIC BLOOD PRESSURE: 128 MMHG

## 2022-05-24 PROCEDURE — G0151 HHCP-SERV OF PT,EA 15 MIN: HCPCS

## 2022-05-26 ENCOUNTER — HOME CARE VISIT (OUTPATIENT)
Dept: HOME HEALTH SERVICES | Facility: HOME HEALTHCARE | Age: 82
End: 2022-05-26

## 2022-05-26 VITALS
WEIGHT: 146 LBS | BODY MASS INDEX: 28.66 KG/M2 | HEART RATE: 78 BPM | SYSTOLIC BLOOD PRESSURE: 110 MMHG | RESPIRATION RATE: 16 BRPM | TEMPERATURE: 98.7 F | DIASTOLIC BLOOD PRESSURE: 64 MMHG | OXYGEN SATURATION: 98 %

## 2022-05-26 VITALS
TEMPERATURE: 96.8 F | OXYGEN SATURATION: 98 % | SYSTOLIC BLOOD PRESSURE: 118 MMHG | DIASTOLIC BLOOD PRESSURE: 70 MMHG | HEART RATE: 76 BPM

## 2022-05-26 PROCEDURE — G0493 RN CARE EA 15 MIN HH/HOSPICE: HCPCS

## 2022-05-26 PROCEDURE — G0151 HHCP-SERV OF PT,EA 15 MIN: HCPCS

## 2022-05-26 NOTE — HOME HEALTH
Patient reports no falls or changes in medication since last visit.    Plan for next visit:  -transfer training  -gait training with rolling walker/cane  -education on HEP with progressions as appropriate  -standing balance exercises

## 2022-05-28 NOTE — HOME HEALTH
CHF ASSESSMENT/INSTRUCTION-SPIRONOLACTONE DECREASED TO 1X DAY 5/23/22- weight 5/26 146 lbs = down 4 lbs    PT IS ACTIVE ONCOLOGY PT- LAST CHEMO 5/16/22- PT APPEARS TO BE TOLERATING W/ MUCH LESS FATIGUE THAN 2 PREVIOUS EPISODES

## 2022-05-31 ENCOUNTER — CLINICAL SUPPORT (OUTPATIENT)
Dept: ONCOLOGY | Facility: HOSPITAL | Age: 82
End: 2022-05-31

## 2022-05-31 ENCOUNTER — LAB (OUTPATIENT)
Dept: LAB | Facility: HOSPITAL | Age: 82
End: 2022-05-31

## 2022-05-31 DIAGNOSIS — D70.1 CHEMOTHERAPY INDUCED NEUTROPENIA: ICD-10-CM

## 2022-05-31 DIAGNOSIS — C83.38 DIFFUSE LARGE B-CELL LYMPHOMA OF LYMPH NODES OF MULTIPLE REGIONS: ICD-10-CM

## 2022-05-31 DIAGNOSIS — K59.03 DRUG INDUCED CONSTIPATION: ICD-10-CM

## 2022-05-31 DIAGNOSIS — D64.81 ANEMIA DUE TO CHEMOTHERAPY: ICD-10-CM

## 2022-05-31 DIAGNOSIS — T45.1X5A ANEMIA DUE TO CHEMOTHERAPY: ICD-10-CM

## 2022-05-31 DIAGNOSIS — J90 BILATERAL PLEURAL EFFUSION: ICD-10-CM

## 2022-05-31 DIAGNOSIS — T45.1X5A CHEMOTHERAPY INDUCED NEUTROPENIA: ICD-10-CM

## 2022-05-31 DIAGNOSIS — D69.6 THROMBOCYTOPENIA: ICD-10-CM

## 2022-05-31 LAB
ALBUMIN SERPL-MCNC: 4.1 G/DL (ref 3.5–5.2)
ALBUMIN/GLOB SERPL: 1.6 G/DL (ref 1.1–2.4)
ALP SERPL-CCNC: 103 U/L (ref 38–116)
ALT SERPL W P-5'-P-CCNC: 15 U/L (ref 0–33)
ANION GAP SERPL CALCULATED.3IONS-SCNC: 11.8 MMOL/L (ref 5–15)
AST SERPL-CCNC: 19 U/L (ref 0–32)
BASOPHILS # BLD AUTO: 0.09 10*3/MM3 (ref 0–0.2)
BASOPHILS NFR BLD AUTO: 0.8 % (ref 0–1.5)
BILIRUB SERPL-MCNC: 0.5 MG/DL (ref 0.2–1.2)
BUN SERPL-MCNC: 15 MG/DL (ref 6–20)
BUN/CREAT SERPL: 19 (ref 7.3–30)
CALCIUM SPEC-SCNC: 9.7 MG/DL (ref 8.5–10.2)
CHLORIDE SERPL-SCNC: 93 MMOL/L (ref 98–107)
CO2 SERPL-SCNC: 28.2 MMOL/L (ref 22–29)
CREAT SERPL-MCNC: 0.79 MG/DL (ref 0.6–1.1)
DEPRECATED RDW RBC AUTO: 65.1 FL (ref 37–54)
EGFRCR SERPLBLD CKD-EPI 2021: 75.7 ML/MIN/1.73
EOSINOPHIL # BLD AUTO: 0.01 10*3/MM3 (ref 0–0.4)
EOSINOPHIL NFR BLD AUTO: 0.1 % (ref 0.3–6.2)
ERYTHROCYTE [DISTWIDTH] IN BLOOD BY AUTOMATED COUNT: 18.9 % (ref 12.3–15.4)
GLOBULIN UR ELPH-MCNC: 2.6 GM/DL (ref 1.8–3.5)
GLUCOSE SERPL-MCNC: 109 MG/DL (ref 74–124)
HCT VFR BLD AUTO: 30.3 % (ref 34–46.6)
HGB BLD-MCNC: 9.7 G/DL (ref 12–15.9)
IMM GRANULOCYTES # BLD AUTO: 0.15 10*3/MM3 (ref 0–0.05)
IMM GRANULOCYTES NFR BLD AUTO: 1.4 % (ref 0–0.5)
LYMPHOCYTES # BLD AUTO: 0.53 10*3/MM3 (ref 0.7–3.1)
LYMPHOCYTES NFR BLD AUTO: 4.9 % (ref 19.6–45.3)
MCH RBC QN AUTO: 30.2 PG (ref 26.6–33)
MCHC RBC AUTO-ENTMCNC: 32 G/DL (ref 31.5–35.7)
MCV RBC AUTO: 94.4 FL (ref 79–97)
MONOCYTES # BLD AUTO: 0.57 10*3/MM3 (ref 0.1–0.9)
MONOCYTES NFR BLD AUTO: 5.3 % (ref 5–12)
NEUTROPHILS NFR BLD AUTO: 87.5 % (ref 42.7–76)
NEUTROPHILS NFR BLD AUTO: 9.4 10*3/MM3 (ref 1.7–7)
NRBC BLD AUTO-RTO: 0 /100 WBC (ref 0–0.2)
PLATELET # BLD AUTO: 184 10*3/MM3 (ref 140–450)
PMV BLD AUTO: 9.1 FL (ref 6–12)
POTASSIUM SERPL-SCNC: 4.2 MMOL/L (ref 3.5–4.7)
PROT SERPL-MCNC: 6.7 G/DL (ref 6.3–8)
RBC # BLD AUTO: 3.21 10*6/MM3 (ref 3.77–5.28)
SODIUM SERPL-SCNC: 133 MMOL/L (ref 134–145)
WBC NRBC COR # BLD: 10.75 10*3/MM3 (ref 3.4–10.8)

## 2022-05-31 PROCEDURE — G0463 HOSPITAL OUTPT CLINIC VISIT: HCPCS

## 2022-05-31 PROCEDURE — 36415 COLL VENOUS BLD VENIPUNCTURE: CPT

## 2022-05-31 PROCEDURE — 80053 COMPREHEN METABOLIC PANEL: CPT

## 2022-05-31 PROCEDURE — G0180 MD CERTIFICATION HHA PATIENT: HCPCS | Performed by: INTERNAL MEDICINE

## 2022-05-31 PROCEDURE — 85025 COMPLETE CBC W/AUTO DIFF WBC: CPT

## 2022-05-31 NOTE — PROGRESS NOTES
Pt present with brother for visit. Pt reports she is doing well this week and has had adequate urine output. The pt confirms taking spironolactone once daily. Denies weight gain. No significant changes in counts. Chemistries pending. Provided copy of labs and schedule. Pt v/u.    Lab Results   Component Value Date    WBC 10.75 05/31/2022    HGB 9.7 (L) 05/31/2022    HCT 30.3 (L) 05/31/2022    MCV 94.4 05/31/2022     05/31/2022     Lab Results   Component Value Date    GLUCOSE 109 05/31/2022    BUN 15 05/31/2022    CREATININE 0.79 05/31/2022    EGFRIFNONA 62 02/23/2022    BCR 19.0 05/31/2022    K 4.2 05/31/2022    CO2 28.2 05/31/2022    CALCIUM 9.7 05/31/2022    PROTENTOTREF 6.9 03/07/2022    ALBUMIN 4.10 05/31/2022    LABIL2 1.1 03/07/2022    AST 19 05/31/2022    ALT 15 05/31/2022

## 2022-06-01 ENCOUNTER — HOME CARE VISIT (OUTPATIENT)
Dept: HOME HEALTH SERVICES | Facility: HOME HEALTHCARE | Age: 82
End: 2022-06-01

## 2022-06-01 VITALS
DIASTOLIC BLOOD PRESSURE: 70 MMHG | SYSTOLIC BLOOD PRESSURE: 116 MMHG | HEART RATE: 87 BPM | TEMPERATURE: 96.7 F | OXYGEN SATURATION: 98 %

## 2022-06-01 PROCEDURE — G0157 HHC PT ASSISTANT EA 15: HCPCS

## 2022-06-01 NOTE — HOME HEALTH
Subjective: I am doing ok    Falls- none  Medication changes- none    Assessment: Patient demonstrated safety manuevering around home with walker and no LOB with multiple turns and trialed cane with short distance. Patient able to review her HEP in seated and standing HEP with cues to improve. She is able to perform transfers with proper hand placement.    Plan for next visit/Communication  gait training  balance assessment  review HEP
Present (15 x15 bpm)

## 2022-06-02 ENCOUNTER — TELEPHONE (OUTPATIENT)
Dept: CARDIOLOGY | Facility: CLINIC | Age: 82
End: 2022-06-02

## 2022-06-02 ENCOUNTER — OFFICE VISIT (OUTPATIENT)
Dept: CARDIOLOGY | Facility: CLINIC | Age: 82
End: 2022-06-02

## 2022-06-02 VITALS
HEIGHT: 60 IN | HEART RATE: 75 BPM | SYSTOLIC BLOOD PRESSURE: 100 MMHG | WEIGHT: 142 LBS | BODY MASS INDEX: 27.88 KG/M2 | DIASTOLIC BLOOD PRESSURE: 60 MMHG

## 2022-06-02 DIAGNOSIS — Z51.81 ENCOUNTER FOR MONITORING CARDIOTOXIC DRUG THERAPY: Primary | ICD-10-CM

## 2022-06-02 DIAGNOSIS — Z79.899 ENCOUNTER FOR MONITORING CARDIOTOXIC DRUG THERAPY: Primary | ICD-10-CM

## 2022-06-02 DIAGNOSIS — N18.2 CRI (CHRONIC RENAL INSUFFICIENCY), STAGE 2 (MILD): ICD-10-CM

## 2022-06-02 DIAGNOSIS — C83.38 DIFFUSE LARGE B-CELL LYMPHOMA OF LYMPH NODES OF MULTIPLE REGIONS: ICD-10-CM

## 2022-06-02 DIAGNOSIS — I50.33 ACUTE ON CHRONIC DIASTOLIC HEART FAILURE: ICD-10-CM

## 2022-06-02 DIAGNOSIS — I70.0 ABDOMINAL AORTIC ATHEROSCLEROSIS: ICD-10-CM

## 2022-06-02 PROCEDURE — 99214 OFFICE O/P EST MOD 30 MIN: CPT | Performed by: INTERNAL MEDICINE

## 2022-06-02 PROCEDURE — 93000 ELECTROCARDIOGRAM COMPLETE: CPT | Performed by: INTERNAL MEDICINE

## 2022-06-02 RX ORDER — FEXOFENADINE HCL 180 MG/1
180 TABLET ORAL DAILY
COMMUNITY
End: 2022-09-01

## 2022-06-02 NOTE — PROGRESS NOTES
Date of Office Visit: 2022  Encounter Provider: Nasrin Nichole MD  Place of Service: [facility] CARDIOLOGY  Patient Name: Michelle Car  :1940    Chief complaint  Paroxysmal atrial fibrillation, cardia oncology care.    History of Present Illness  Patient is a 75-year-old female with hypertension, hyperlipidemia, venous insufficiency who in  was seen for chest pain and possible strokelike symptoms.  A CT angiogram of her chest revealed moderate atheromatous disease of the abdominal aorta but no pulmonary emboli.  Echocardiogram revealed normal systolic function with mild diastolic dysfunction and mild valve regurgitation.  A stress perfusion study was negative for ischemia.  A carotid Doppler study was negative.  In 2022 she was noted to have atrial fibrillation and treated with Eliquis and amiodarone.  Echo at that time showed an ejection fraction 56% with mild left ventricular hypertrophy mild to moderate mitral vegetation, mild tricuspid regurgitation normal right-sided pressures.    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  R-CHOP therapy with Neulasta support was recommended.  However there was pharmacy literature self-reported interaction between amiodarone and Adriamycin with possible increased Adriamycin levels.  There is also potential interaction to carvedilol.  Patient was started on Adriamycin which is started on 3/28/2021.  Digoxin level was 1.1 on 2022 after much discussion regarding interaction of Adriamycin and amiodarone we decided to discontinue amiodarone and continue at that time with possible transition to bisoprolol given potential cross reaction to amiodarone with limited literature available regarding this.  However patient was admitted on 2022 with neutropenia, fever, E. coli bacteremia and atrial fibrillation with rapid rates.  Amiodarone was reinstituted  digoxin losartan and spironolactone discontinued.    Since then she has had no chest pain palpitations syncope near syncope shortness of breath she has mild dependent edema.  She is walking in her home with a walker to a limited degree and slowly regaining her strength.  Her appetite has improved in the past few days.  It is slightly unclear exactly what her medical regimen as.  She brought a list with her but not the bottles.    Past Medical History:   Diagnosis Date   • Abdominal aortic atherosclerosis (HCC)    • Atrial fibrillation (HCC)    • CAD (coronary artery disease)    • Colon polyps    • CRI (chronic renal insufficiency), stage 2 (mild) 2021   • Diffuse large B cell lymphoma (HCC) 02/2022   • Hearing loss    • Hypercalcemia 2016    resolved as of 2019   • Hypercholesterolemia    • Hypertension    • Hyponatremia 02/17/2022   • Venous insufficiency      Past Surgical History:   Procedure Laterality Date   • CHOLECYSTECTOMY N/A    • COLONOSCOPY N/A 2000    1 or 2 polyps, otherwise normal per patient   • VENOUS ACCESS DEVICE (PORT) INSERTION Left 3/23/2022    Procedure: INSERTION VENOUS ACCESS DEVICE;  Surgeon: Alin Delgado MD;  Location[facility] [place];  Service: General;  Laterality: Left;     Outpatient Medications Prior to Visit   Medication Sig Dispense Refill   • acetaminophen (TYLENOL) 500 MG tablet Take 500 mg by mouth Every 6 (Six) Hours As Needed for Mild Pain .     • amiodarone (PACERONE) 200 MG tablet Take 1 tablet by mouth Daily. 30 tablet 0   • apixaban (ELIQUIS) 5 MG tablet tablet Take 1 tablet by mouth Every 12 (Twelve) Hours. Indications: Atrial Fibrillation 180 tablet 1   • Calcium Carbonate (CALTRATE 600 PO) Take 1 tablet by mouth Daily.     • carvedilol (COREG) 3.125 MG tablet Take 1 tablet by mouth 2 (Two) Times a Day With Meals. 180 tablet 1   • digoxin (LANOXIN) 125 MCG tablet Take 125 mcg by mouth Daily.     • DPH-Lido-AlHydr-MgHydr-Simeth (First Mouthwash, Magic Mouthwash,)  suspension Swish and spit 5 mL Every 6 (Six) Hours As Needed (Oral pain). 237 mL 0   • fexofenadine (ALLEGRA) 180 MG tablet Take 180 mg by mouth Daily.     • Multiple Vitamins-Minerals (CENTRUM SILVER) tablet Take 1 tablet by mouth Daily.     • ondansetron (ZOFRAN) 8 MG tablet Take 1 tablet by mouth Every 8 (Eight) Hours As Needed for Nausea or Vomiting. 30 tablet 5   • polyethylene glycol (MIRALAX) 17 GM/SCOOP powder Take 17 g by mouth As Needed.     • pravastatin (PRAVACHOL) 40 MG tablet TAKE 1 TABLET BY MOUTH EVERY NIGHT AT BEDTIME 90 tablet 1   • spironolactone (ALDACTONE) 25 MG tablet Take 25 mg by mouth Daily.     • losartan (COZAAR) 100 MG tablet Take 100 mg by mouth Daily.     • loratadine (CLARITIN) 10 MG tablet Take 10 mg by mouth Daily.       No facility-administered medications prior to visit.       Allergies as of 06/02/2022 - Reviewed 06/02/2022   Allergen Reaction Noted   • Factive [gemifloxacin] Rash 03/30/2016     Social History     Socioeconomic History   • Marital status:    Tobacco Use   • Smoking status: Never Smoker   • Smokeless tobacco: Never Used   Vaping Use   • Vaping Use: Never used   Substance and Sexual Activity   • Alcohol use: No   • Drug use: No   • Sexual activity: Not Currently     Family History   Problem Relation Age of Onset   • Malig Hyperthermia Neg Hx      Review of Systems   Constitutional: Positive for chills, malaise/fatigue and weight loss. Negative for fever and weight gain.   Cardiovascular: Negative for leg swelling.   Respiratory: Negative for cough, snoring and wheezing.    Hematologic/Lymphatic: Negative for bleeding problem. Does not bruise/bleed easily.   Skin: Negative for color change.   Musculoskeletal: Positive for joint pain. Negative for falls and myalgias.   Gastrointestinal: Negative for melena.   Genitourinary: Negative for hematuria.   Neurological: Negative for excessive daytime sleepiness.   Psychiatric/Behavioral: Negative for depression. The  "patient is not nervous/anxious.         Objective:     Vitals:    06/02/22 1438   BP: 100/60   Pulse: 75   Weight: 64.4 kg (142 lb)   Height: 152 cm (59.84\")     Body mass index is 27.88 kg/m².    Vitals reviewed.   Constitutional:       Appearance: Well-developed.   Eyes:      General: No scleral icterus.        Right eye: No discharge.      Conjunctiva/sclera: Conjunctivae normal.      Pupils: Pupils are equal, round, and reactive to light.   HENT:      Head: Normocephalic.      Nose: Nose normal.   Neck:      Thyroid: No thyromegaly.      Vascular: No JVD.   Pulmonary:      Effort: Pulmonary effort is normal. No respiratory distress.      Breath sounds: Normal breath sounds. No wheezing. No rales.   Cardiovascular:      Normal rate. Regular rhythm. Normal S1. Normal S2.      Murmurs: There is a grade 2 to 3/6 high frequency blowing holosystolic murmur at the apex.      No gallop.   Pulses:     Intact distal pulses.   Edema:     Peripheral edema absent.   Abdominal:      General: Bowel sounds are normal. There is no distension.      Palpations: Abdomen is soft.      Tenderness: There is no abdominal tenderness. There is no rebound.   Musculoskeletal: Normal range of motion.         General: No tenderness.      Cervical back: Normal range of motion and neck supple. Skin:     General: Skin is warm and dry.      Findings: No erythema or rash.   Neurological:      Mental Status: Alert and oriented to person, place, and time.   Psychiatric:         Behavior: Behavior normal.         Thought Content: Thought content normal.         Judgment: Judgment normal.       Lab Review:     ECG 12 Lead    Date/Time: 6/2/2022 2:39 PM  Performed by: Nasrin Nichole MD  Authorized by: Nasrin Nichole MD   Comparison: compared with previous ECG   Similar to previous ECG  Rhythm: sinus rhythm  Other findings: non-specific ST-T wave changes    Clinical impression: abnormal EKG          Assessment:       Diagnosis Plan   1. Encounter for " monitoring cardiotoxic drug therapy  ECG 12 Lead    Adult Transthoracic Echo Limited W/ Cont if Necessary Per Protocol   2. Acute on chronic diastolic heart failure (HCC)     3. Abdominal aortic atherosclerosis (HCC)     4. CRI (chronic renal insufficiency), stage 2 (mild)     5. Diffuse large B-cell lymphoma of lymph nodes of multiple regions (HCC)       Plan:       1.  Large B-cell lymphoma, receiving R-CHOP therapy with 126m/m2of doxorubicin exposure at this point.  There is a pharmacy warning of interaction with doxorubicin with amiodarone and carvedilol.  Unfortunately there are limited recommendations for modification of drugs to address this.  After quite a long discussion, we have decided to discontinue amiodarone however she subsequently had recurrent atrial fibrillation requiring resumption of amiodarone.  She is now on this plus carvedilol off digoxin.  As clinically she is doing well we will continue with this for now and follow-up in 2 months at the end of therapy with repeat echo.  2.  Cardio oncology care, track 1 as above  3.  Paroxysmal atrial fibrillation.  As above.  Continue with Coreg, amiodarone and Eliquis.  4.  Hypertension.  As above.  If her blood pressure is indeed low, can decrease losartan  5.  Hyperlipidemia  6.  Aortic atheromatous disease  7.  Frail state.    Time Spent: I spent 25 minutes caring for Michelle on this date of service. This time includes time spent by me in the following activities: preparing for the visit, reviewing tests, obtaining and/or reviewing a separately obtained history, performing a medically appropriate examination and/or evaluation, counseling and educating the patient/family/caregiver, documenting information in the medical record and independently interpreting results and communicating that information with the patient/family/caregiver.   I spent 1 minutes on the separately reported service of ECG. This time is not included in the time used to support the  E/M service also reported today.        Your medication list          Accurate as of June 2, 2022 11:59 PM. If you have any questions, ask your nurse or doctor.            CONTINUE taking these medications      Instructions Last Dose Given Next Dose Due   acetaminophen 500 MG tablet  Commonly known as: TYLENOL      Take 500 mg by mouth Every 6 (Six) Hours As Needed for Mild Pain .       amiodarone 200 MG tablet  Commonly known as: PACERONE      Take 1 tablet by mouth Daily.       apixaban 5 MG tablet tablet  Commonly known as: ELIQUIS      Take 1 tablet by mouth Every 12 (Twelve) Hours. Indications: Atrial Fibrillation       CALTRATE 600 PO      Take 1 tablet by mouth Daily.       carvedilol 3.125 MG tablet  Commonly known as: COREG      Take 1 tablet by mouth 2 (Two) Times a Day With Meals.       Centrum Silver tablet tablet  Generic drug: multivitamin with minerals      Take 1 tablet by mouth Daily.       digoxin 125 MCG tablet  Commonly known as: LANOXIN      Take 125 mcg by mouth Daily.       fexofenadine 180 MG tablet  Commonly known as: ALLEGRA      Take 180 mg by mouth Daily.       First Mouthwash (Magic Mouthwash) suspension      Swish and spit 5 mL Every 6 (Six) Hours As Needed (Oral pain).       losartan 100 MG tablet  Commonly known as: COZAAR      Take 100 mg by mouth Daily.       ondansetron 8 MG tablet  Commonly known as: ZOFRAN      Take 1 tablet by mouth Every 8 (Eight) Hours As Needed for Nausea or Vomiting.       polyethylene glycol 17 GM/SCOOP powder  Commonly known as: MIRALAX      Take 17 g by mouth As Needed.       pravastatin 40 MG tablet  Commonly known as: PRAVACHOL      TAKE 1 TABLET BY MOUTH EVERY NIGHT AT BEDTIME       spironolactone 25 MG tablet  Commonly known as: ALDACTONE      Take 25 mg by mouth Daily.              Patient is no longer taking -.  I corrected the med list to reflect this.  I did not stop these medications.      Dictated utilizing Dragon dictation

## 2022-06-03 ENCOUNTER — HOME CARE VISIT (OUTPATIENT)
Dept: HOME HEALTH SERVICES | Facility: HOME HEALTHCARE | Age: 82
End: 2022-06-03

## 2022-06-03 VITALS
SYSTOLIC BLOOD PRESSURE: 106 MMHG | TEMPERATURE: 97.5 F | DIASTOLIC BLOOD PRESSURE: 64 MMHG | OXYGEN SATURATION: 99 % | HEART RATE: 74 BPM

## 2022-06-03 PROCEDURE — G0493 RN CARE EA 15 MIN HH/HOSPICE: HCPCS

## 2022-06-03 PROCEDURE — G0151 HHCP-SERV OF PT,EA 15 MIN: HCPCS

## 2022-06-04 VITALS
SYSTOLIC BLOOD PRESSURE: 110 MMHG | RESPIRATION RATE: 16 BRPM | TEMPERATURE: 98.2 F | DIASTOLIC BLOOD PRESSURE: 66 MMHG | OXYGEN SATURATION: 95 % | HEART RATE: 75 BPM

## 2022-06-04 NOTE — HOME HEALTH
6/3 BP CALLED TO CARDIOLOGY TRIAG NURSE TO REPORT PT HOME BP CUFF CORRELEATES WELL W/ SN CUFF, MEDS REVIEWED WITH CARDIOLOGY TRIAG NURSE AND PT,. MED LIST UPDATED    PT HAS 4TH ROUND CHEMO 6/6/22- FIRST 2 ROUNDS WERE DIFFICULT FOR PT, 3D ROUND (REDUCED DOSE)  SHE HAS TOLERATED W/O MUCH ADVERSE EFFECT EXCEPT HAIR LOSS

## 2022-06-06 ENCOUNTER — OFFICE VISIT (OUTPATIENT)
Dept: ONCOLOGY | Facility: CLINIC | Age: 82
End: 2022-06-06

## 2022-06-06 ENCOUNTER — INFUSION (OUTPATIENT)
Dept: ONCOLOGY | Facility: HOSPITAL | Age: 82
End: 2022-06-06

## 2022-06-06 VITALS — DIASTOLIC BLOOD PRESSURE: 76 MMHG | HEART RATE: 70 BPM | SYSTOLIC BLOOD PRESSURE: 121 MMHG

## 2022-06-06 VITALS
HEART RATE: 76 BPM | TEMPERATURE: 96.8 F | DIASTOLIC BLOOD PRESSURE: 57 MMHG | WEIGHT: 143.9 LBS | BODY MASS INDEX: 28.25 KG/M2 | OXYGEN SATURATION: 100 % | SYSTOLIC BLOOD PRESSURE: 94 MMHG | HEIGHT: 60 IN | RESPIRATION RATE: 18 BRPM

## 2022-06-06 DIAGNOSIS — D69.6 THROMBOCYTOPENIA: ICD-10-CM

## 2022-06-06 DIAGNOSIS — R63.4 ABNORMAL WEIGHT LOSS: ICD-10-CM

## 2022-06-06 DIAGNOSIS — C83.38 DIFFUSE LARGE B-CELL LYMPHOMA OF LYMPH NODES OF MULTIPLE REGIONS: ICD-10-CM

## 2022-06-06 DIAGNOSIS — D64.81 ANEMIA DUE TO CHEMOTHERAPY: ICD-10-CM

## 2022-06-06 DIAGNOSIS — Z79.01 CHRONIC ANTICOAGULATION: ICD-10-CM

## 2022-06-06 DIAGNOSIS — T45.1X5A ANEMIA DUE TO CHEMOTHERAPY: ICD-10-CM

## 2022-06-06 DIAGNOSIS — T45.1X5A CHEMOTHERAPY INDUCED NEUTROPENIA: ICD-10-CM

## 2022-06-06 DIAGNOSIS — D70.1 CHEMOTHERAPY INDUCED NEUTROPENIA: ICD-10-CM

## 2022-06-06 DIAGNOSIS — K59.03 DRUG INDUCED CONSTIPATION: ICD-10-CM

## 2022-06-06 DIAGNOSIS — C83.38 DIFFUSE LARGE B-CELL LYMPHOMA OF LYMPH NODES OF MULTIPLE REGIONS: Primary | ICD-10-CM

## 2022-06-06 DIAGNOSIS — J90 BILATERAL PLEURAL EFFUSION: ICD-10-CM

## 2022-06-06 DIAGNOSIS — Z45.2: ICD-10-CM

## 2022-06-06 LAB
ALBUMIN SERPL-MCNC: 3.9 G/DL (ref 3.5–5.2)
ALBUMIN/GLOB SERPL: 1.8 G/DL (ref 1.1–2.4)
ALP SERPL-CCNC: 75 U/L (ref 38–116)
ALT SERPL W P-5'-P-CCNC: 16 U/L (ref 0–33)
ANION GAP SERPL CALCULATED.3IONS-SCNC: 10.9 MMOL/L (ref 5–15)
AST SERPL-CCNC: 21 U/L (ref 0–32)
BASOPHILS # BLD AUTO: 0.05 10*3/MM3 (ref 0–0.2)
BASOPHILS NFR BLD AUTO: 0.9 % (ref 0–1.5)
BILIRUB SERPL-MCNC: 0.3 MG/DL (ref 0.2–1.2)
BUN SERPL-MCNC: 15 MG/DL (ref 6–20)
BUN/CREAT SERPL: 20.3 (ref 7.3–30)
CALCIUM SPEC-SCNC: 9.3 MG/DL (ref 8.5–10.2)
CHLORIDE SERPL-SCNC: 100 MMOL/L (ref 98–107)
CO2 SERPL-SCNC: 25.1 MMOL/L (ref 22–29)
CREAT SERPL-MCNC: 0.74 MG/DL (ref 0.6–1.1)
DEPRECATED RDW RBC AUTO: 67 FL (ref 37–54)
EGFRCR SERPLBLD CKD-EPI 2021: 81.9 ML/MIN/1.73
EOSINOPHIL # BLD AUTO: 0.01 10*3/MM3 (ref 0–0.4)
EOSINOPHIL NFR BLD AUTO: 0.2 % (ref 0.3–6.2)
ERYTHROCYTE [DISTWIDTH] IN BLOOD BY AUTOMATED COUNT: 19.3 % (ref 12.3–15.4)
GLOBULIN UR ELPH-MCNC: 2.2 GM/DL (ref 1.8–3.5)
GLUCOSE SERPL-MCNC: 102 MG/DL (ref 74–124)
HCT VFR BLD AUTO: 28.6 % (ref 34–46.6)
HGB BLD-MCNC: 9.1 G/DL (ref 12–15.9)
IMM GRANULOCYTES # BLD AUTO: 0.04 10*3/MM3 (ref 0–0.05)
IMM GRANULOCYTES NFR BLD AUTO: 0.7 % (ref 0–0.5)
LYMPHOCYTES # BLD AUTO: 0.52 10*3/MM3 (ref 0.7–3.1)
LYMPHOCYTES NFR BLD AUTO: 9.4 % (ref 19.6–45.3)
MCH RBC QN AUTO: 30.3 PG (ref 26.6–33)
MCHC RBC AUTO-ENTMCNC: 31.8 G/DL (ref 31.5–35.7)
MCV RBC AUTO: 95.3 FL (ref 79–97)
MONOCYTES # BLD AUTO: 0.48 10*3/MM3 (ref 0.1–0.9)
MONOCYTES NFR BLD AUTO: 8.6 % (ref 5–12)
NEUTROPHILS NFR BLD AUTO: 4.45 10*3/MM3 (ref 1.7–7)
NEUTROPHILS NFR BLD AUTO: 80.2 % (ref 42.7–76)
NRBC BLD AUTO-RTO: 0 /100 WBC (ref 0–0.2)
PLATELET # BLD AUTO: 219 10*3/MM3 (ref 140–450)
PMV BLD AUTO: 8.9 FL (ref 6–12)
POTASSIUM SERPL-SCNC: 4.4 MMOL/L (ref 3.5–4.7)
PROT SERPL-MCNC: 6.1 G/DL (ref 6.3–8)
RBC # BLD AUTO: 3 10*6/MM3 (ref 3.77–5.28)
SODIUM SERPL-SCNC: 136 MMOL/L (ref 134–145)
WBC NRBC COR # BLD: 5.55 10*3/MM3 (ref 3.4–10.8)

## 2022-06-06 PROCEDURE — 25010000002 DIPHENHYDRAMINE PER 50 MG: Performed by: INTERNAL MEDICINE

## 2022-06-06 PROCEDURE — 25010000002 CYCLOPHOSPHAMIDE 1 GM/5ML SOLUTION 5 ML VIAL: Performed by: INTERNAL MEDICINE

## 2022-06-06 PROCEDURE — 25010000002 HEPARIN LOCK FLUSH PER 10 UNITS: Performed by: INTERNAL MEDICINE

## 2022-06-06 PROCEDURE — 85025 COMPLETE CBC W/AUTO DIFF WBC: CPT

## 2022-06-06 PROCEDURE — 25010000002 RITUXIMAB 10 MG/ML SOLUTION 10 ML VIAL: Performed by: INTERNAL MEDICINE

## 2022-06-06 PROCEDURE — 25010000002 PALONOSETRON PER 25 MCG: Performed by: INTERNAL MEDICINE

## 2022-06-06 PROCEDURE — 96417 CHEMO IV INFUS EACH ADDL SEQ: CPT

## 2022-06-06 PROCEDURE — 25010000002 PEGFILGRASTIM 6 MG/0.6ML PREFILLED SYRINGE KIT: Performed by: INTERNAL MEDICINE

## 2022-06-06 PROCEDURE — 96367 TX/PROPH/DG ADDL SEQ IV INF: CPT

## 2022-06-06 PROCEDURE — 80053 COMPREHEN METABOLIC PANEL: CPT

## 2022-06-06 PROCEDURE — 25010000002 DOXORUBICIN PER 10 MG: Performed by: INTERNAL MEDICINE

## 2022-06-06 PROCEDURE — 96375 TX/PRO/DX INJ NEW DRUG ADDON: CPT

## 2022-06-06 PROCEDURE — 96411 CHEMO IV PUSH ADDL DRUG: CPT

## 2022-06-06 PROCEDURE — 96415 CHEMO IV INFUSION ADDL HR: CPT

## 2022-06-06 PROCEDURE — 99214 OFFICE O/P EST MOD 30 MIN: CPT | Performed by: INTERNAL MEDICINE

## 2022-06-06 PROCEDURE — 25010000002 VINCRISTINE PER 1 MG: Performed by: INTERNAL MEDICINE

## 2022-06-06 PROCEDURE — 25010000002 RITUXIMAB 10 MG/ML SOLUTION 50 ML VIAL: Performed by: INTERNAL MEDICINE

## 2022-06-06 PROCEDURE — 25010000002 DEXAMETHASONE PER 1 MG: Performed by: INTERNAL MEDICINE

## 2022-06-06 PROCEDURE — 25010000002 FOSAPREPITANT PER 1 MG: Performed by: INTERNAL MEDICINE

## 2022-06-06 PROCEDURE — 96413 CHEMO IV INFUSION 1 HR: CPT

## 2022-06-06 PROCEDURE — 96377 APPLICATON ON-BODY INJECTOR: CPT

## 2022-06-06 RX ORDER — PALONOSETRON 0.05 MG/ML
0.25 INJECTION, SOLUTION INTRAVENOUS ONCE
Status: COMPLETED | OUTPATIENT
Start: 2022-06-06 | End: 2022-06-06

## 2022-06-06 RX ORDER — FAMOTIDINE 20 MG/1
20 TABLET, FILM COATED ORAL ONCE
Status: COMPLETED | OUTPATIENT
Start: 2022-06-06 | End: 2022-06-06

## 2022-06-06 RX ORDER — HEPARIN SODIUM (PORCINE) LOCK FLUSH IV SOLN 100 UNIT/ML 100 UNIT/ML
500 SOLUTION INTRAVENOUS AS NEEDED
Status: CANCELLED | OUTPATIENT
Start: 2022-06-06

## 2022-06-06 RX ORDER — MEPERIDINE HYDROCHLORIDE 25 MG/ML
25 INJECTION INTRAMUSCULAR; INTRAVENOUS; SUBCUTANEOUS
Status: CANCELLED | OUTPATIENT
Start: 2022-06-06

## 2022-06-06 RX ORDER — DOXORUBICIN HYDROCHLORIDE 2 MG/ML
25 INJECTION, SOLUTION INTRAVENOUS ONCE
Status: CANCELLED | OUTPATIENT
Start: 2022-06-06

## 2022-06-06 RX ORDER — SODIUM CHLORIDE 9 MG/ML
250 INJECTION, SOLUTION INTRAVENOUS ONCE
Status: CANCELLED | OUTPATIENT
Start: 2022-06-06

## 2022-06-06 RX ORDER — ACETAMINOPHEN 325 MG/1
650 TABLET ORAL ONCE
Status: CANCELLED | OUTPATIENT
Start: 2022-06-06

## 2022-06-06 RX ORDER — HEPARIN SODIUM (PORCINE) LOCK FLUSH IV SOLN 100 UNIT/ML 100 UNIT/ML
500 SOLUTION INTRAVENOUS AS NEEDED
Status: DISCONTINUED | OUTPATIENT
Start: 2022-06-06 | End: 2022-06-06 | Stop reason: HOSPADM

## 2022-06-06 RX ORDER — SODIUM CHLORIDE 0.9 % (FLUSH) 0.9 %
10 SYRINGE (ML) INJECTION AS NEEDED
Status: CANCELLED | OUTPATIENT
Start: 2022-06-06

## 2022-06-06 RX ORDER — SODIUM CHLORIDE 0.9 % (FLUSH) 0.9 %
10 SYRINGE (ML) INJECTION AS NEEDED
Status: DISCONTINUED | OUTPATIENT
Start: 2022-06-06 | End: 2022-06-06 | Stop reason: HOSPADM

## 2022-06-06 RX ORDER — DOXORUBICIN HYDROCHLORIDE 2 MG/ML
25 INJECTION, SOLUTION INTRAVENOUS ONCE
Status: COMPLETED | OUTPATIENT
Start: 2022-06-06 | End: 2022-06-06

## 2022-06-06 RX ORDER — ACETAMINOPHEN 325 MG/1
650 TABLET ORAL ONCE
Status: COMPLETED | OUTPATIENT
Start: 2022-06-06 | End: 2022-06-06

## 2022-06-06 RX ORDER — PALONOSETRON 0.05 MG/ML
0.25 INJECTION, SOLUTION INTRAVENOUS ONCE
Status: CANCELLED | OUTPATIENT
Start: 2022-06-06

## 2022-06-06 RX ORDER — SODIUM CHLORIDE 9 MG/ML
250 INJECTION, SOLUTION INTRAVENOUS ONCE
Status: COMPLETED | OUTPATIENT
Start: 2022-06-06 | End: 2022-06-06

## 2022-06-06 RX ORDER — FAMOTIDINE 10 MG/ML
20 INJECTION, SOLUTION INTRAVENOUS AS NEEDED
Status: CANCELLED | OUTPATIENT
Start: 2022-06-06

## 2022-06-06 RX ORDER — FAMOTIDINE 20 MG/1
20 TABLET, FILM COATED ORAL ONCE
Status: CANCELLED
Start: 2022-06-06 | End: 2022-06-06

## 2022-06-06 RX ORDER — DIPHENHYDRAMINE HYDROCHLORIDE 50 MG/ML
50 INJECTION INTRAMUSCULAR; INTRAVENOUS AS NEEDED
Status: CANCELLED | OUTPATIENT
Start: 2022-06-06

## 2022-06-06 RX ORDER — PREDNISONE 50 MG/1
TABLET ORAL
COMMUNITY
Start: 2022-06-01 | End: 2022-11-10

## 2022-06-06 RX ADMIN — Medication 10 ML: at 14:30

## 2022-06-06 RX ADMIN — VINCRISTINE SULFATE 1 MG: 1 INJECTION, SOLUTION INTRAVENOUS at 13:18

## 2022-06-06 RX ADMIN — Medication 500 UNITS: at 14:30

## 2022-06-06 RX ADMIN — FAMOTIDINE 20 MG: 20 TABLET ORAL at 08:31

## 2022-06-06 RX ADMIN — PALONOSETRON 0.25 MG: 0.05 INJECTION, SOLUTION INTRAVENOUS at 12:12

## 2022-06-06 RX ADMIN — SODIUM CHLORIDE 100 ML: 9 INJECTION, SOLUTION INTRAVENOUS at 12:14

## 2022-06-06 RX ADMIN — RITUXIMAB 600 MG: 10 INJECTION, SOLUTION INTRAVENOUS at 09:26

## 2022-06-06 RX ADMIN — ACETAMINOPHEN 650 MG: 325 TABLET ORAL at 08:31

## 2022-06-06 RX ADMIN — SODIUM CHLORIDE 250 ML: 9 INJECTION, SOLUTION INTRAVENOUS at 08:31

## 2022-06-06 RX ADMIN — DIPHENHYDRAMINE HYDROCHLORIDE 25 MG: 50 INJECTION, SOLUTION INTRAMUSCULAR; INTRAVENOUS at 08:34

## 2022-06-06 RX ADMIN — CYCLOPHOSPHAMIDE 650 MG: 200 INJECTION, SOLUTION INTRAVENOUS at 13:48

## 2022-06-06 RX ADMIN — DEXAMETHASONE SODIUM PHOSPHATE 12 MG: 10 INJECTION INTRAMUSCULAR; INTRAVENOUS at 12:45

## 2022-06-06 RX ADMIN — DOXORUBICIN HYDROCHLORIDE 40 MG: 2 INJECTION, SOLUTION INTRAVENOUS at 13:07

## 2022-06-06 RX ADMIN — PEGFILGRASTIM 6 MG: KIT SUBCUTANEOUS at 14:00

## 2022-06-06 NOTE — PROGRESS NOTES
Subjective     CHIEF COMPLAINT:      Chief Complaint   Patient presents with   • Follow-up     Concern about wt loss(appetite good)       HISTORY OF PRESENT ILLNESS:     Michelle Car is a 81 y.o. female patient who returns today for follow up on her lymphoma.  She returns today for follow-up accompanied by her sister.  She reports that she Tolerated the chemotherapy treatment better than the previous cycles due to the recent dose reduction.  There was less alteration of the taste.  She was able to eat more.  Despite this, she lost weight since the last visit.  She was wondering if the diuretic may have contributed.  She continues to take Lasix as instructed by her cardiologist.    Patient is not having shortness of breath or chest pain.    ROS:  Pertinent ROS is in the HPI.     Past medical, surgical, social and family history were reviewed.     MEDICATIONS:    Current Outpatient Medications:   •  acetaminophen (TYLENOL) 500 MG tablet, Take 500 mg by mouth Every 6 (Six) Hours As Needed for Mild Pain ., Disp: , Rfl:   •  amiodarone (PACERONE) 200 MG tablet, Take 1 tablet by mouth Daily., Disp: 30 tablet, Rfl: 0  •  apixaban (ELIQUIS) 5 MG tablet tablet, Take 1 tablet by mouth Every 12 (Twelve) Hours. Indications: Atrial Fibrillation, Disp: 180 tablet, Rfl: 1  •  Calcium Carbonate (CALTRATE 600 PO), Take 1 tablet by mouth Daily., Disp: , Rfl:   •  carvedilol (COREG) 3.125 MG tablet, Take 1 tablet by mouth 2 (Two) Times a Day With Meals., Disp: 180 tablet, Rfl: 1  •  digoxin (LANOXIN) 125 MCG tablet, Take 125 mcg by mouth Daily., Disp: , Rfl:   •  DPH-Lido-AlHydr-MgHydr-Simeth (First Mouthwash, Magic Mouthwash,) suspension, Swish and spit 5 mL Every 6 (Six) Hours As Needed (Oral pain)., Disp: 237 mL, Rfl: 0  •  fexofenadine (ALLEGRA) 180 MG tablet, Take 180 mg by mouth Daily., Disp: , Rfl:   •  losartan (COZAAR) 100 MG tablet, Take 100 mg by mouth Daily., Disp: , Rfl:   •  Multiple Vitamins-Minerals (CENTRUM  "SILVER) tablet, Take 1 tablet by mouth Daily., Disp: , Rfl:   •  ondansetron (ZOFRAN) 8 MG tablet, Take 1 tablet by mouth Every 8 (Eight) Hours As Needed for Nausea or Vomiting., Disp: 30 tablet, Rfl: 5  •  polyethylene glycol (MIRALAX) 17 GM/SCOOP powder, Take 17 g by mouth As Needed., Disp: , Rfl:   •  pravastatin (PRAVACHOL) 40 MG tablet, TAKE 1 TABLET BY MOUTH EVERY NIGHT AT BEDTIME, Disp: 90 tablet, Rfl: 1  •  spironolactone (ALDACTONE) 25 MG tablet, Take 25 mg by mouth Daily., Disp: , Rfl:   •  predniSONE (DELTASONE) 50 MG tablet, TAKE 2 TABLETS BY MOUTH IN THE MORNING WITH FOOD ON DAYS 2-6 AFTER EACH CYCLE, Disp: , Rfl:   Objective     VITAL SIGNS:     Vitals:    06/06/22 0753   BP: 94/57   Pulse: 76   Resp: 18   Temp: 96.8 °F (36 °C)   TempSrc: Temporal   SpO2: 100%   Weight: 65.3 kg (143 lb 14.4 oz)   Height: 152 cm (59.84\")   PainSc: 0-No pain     Body mass index is 28.25 kg/m².     Wt Readings from Last 5 Encounters:   06/06/22 65.3 kg (143 lb 14.4 oz)   06/02/22 64.4 kg (142 lb)   05/26/22 66.2 kg (146 lb)   05/23/22 68 kg (150 lb)   05/23/22 68.9 kg (152 lb)     PHYSICAL EXAMINATION:   GENERAL: The patient appears in fair general condition, not in acute distress.   SKIN: No ecchymosis.  HEAD: Alopecia secondary to chemotherapy.  EYES: No jaundice. No pallor.  LYMPHATICS: No cervical lymphadenopathy.  CHEST: Normal respiratory effort.  Lungs clear bilaterally.  No added sounds.  Some decrease in the breath sounds at the bases with dullness to percussion.  Port in the left upper chest appears benign.  CVS: Normal rhythm.  No murmurs.  ABDOMEN: Soft. No tenderness. No Hepatomegaly. No Splenomegaly. No masses.  EXTREMITIES: No leg edema.  No calf tenderness.  There is chronic edema/lymphedema at the ankles bilaterally.    DIAGNOSTIC DATA:     Results from last 7 days   Lab Units 06/06/22  0734 05/31/22  1059   WBC 10*3/mm3 5.55 10.75   NEUTROS ABS 10*3/mm3 4.45 9.40*   HEMOGLOBIN g/dL 9.1* 9.7*   HEMATOCRIT " % 28.6* 30.3*   PLATELETS 10*3/mm3 219 184     Results from last 7 days   Lab Units 05/31/22  1059   SODIUM mmol/L 133*   POTASSIUM mmol/L 4.2   CHLORIDE mmol/L 93*   CO2 mmol/L 28.2   BUN mg/dL 15   CREATININE mg/dL 0.79   CALCIUM mg/dL 9.7   ALBUMIN g/dL 4.10   BILIRUBIN mg/dL 0.5   ALK PHOS U/L 103   ALT (SGPT) U/L 15   AST (SGOT) U/L 19   GLUCOSE mg/dL 109     Assessment & Plan    *Diffuse large B-cell lymphoma.     · Patient was found to have bilateral pleural effusions.    · She underwent right thoracentesis on 2/21/2022 and the left thoracentesis on 2/22/2022.    · Analysis of the fluid revealed involvement with diffuse large B-cell lymphoma.    · FISH analysis for BCL6 rearrangement was positive.    · FISH analysis for BCL 1, BCL-2 and MYC rearrangement was negative.    · CT chest on 2/17/2022 revealed no hilar or mediastinal lymphadenopathy.    · Spleen was reported to be normal in size.  · Peripheral blood flow cytometry on 3/7/2022 was negative.  · PET scan on 3/10/2022 revealed significant hypermetabolism in the left adrenal gland and the surrounding soft tissue with SUV up to 34.5. Hypermetabolism in the left pleural thickening with SUV of 7.1. there was less hypermetabolism in the right adrenal gland and in the right pleura.  · She is considered to have stage III non-bulky disease.  · The degree of hypermetabolism is consistent with high grade lymphoma. This concurs with the pathology exam of the pleural fluid cytology that revealed diffuse large B-cell lymphoma.  · R-IPI score of 3 associated with poor risk. This is associated with overall survival of 55%.   · R-CHOP with Neulasta support on 3/28/2022.  · Patient developed neutropenia and thrombocytopenia after cycle#1.  · Vincristine dose was reduced to 1 mg due to constipation.   · Patient developed neutropenia and neutropenic fever after cycle #2.  · Her performance status decreased as a result. Although she felt better after discharge, she  continues to be weak.   · CT scan on 5/1/2022 revealed a decrease in the right pleural effusion. The left suprarenal density was stable. The left pleural effusion was slightly larger. However, it had a density of serous fluid likely representing increase in the fluid due to cardiac or fluid overload state.  · Her disease remains stable to improved.  · Regimen was changed to mini-RCHOP starting cycle #3 which she received on 5/16/2022.  · Patient tolerating the treatment better after the dose reduction.  · She is ready to proceed with cycle #4.    *Neutropenia secondary to chemotherapy.  · Neutrophil count decreased to 0.04 on 4/4/2022.  · She was placed on prophylactic antibiotic with Augmentin.  · After cycle #2, she developed neutropenia with neutropenic fever despite Neulasta support and Augmentin which was started on day #6.  · Patient was hospitalized between 4/26/2022 and 5/4/2022.  · Neutrophil count subsequently recovered.  · Neutrophil count did not decline after cycle #3.    *Thrombocytopenia secondary to chemotherapy.  · Platelet count decreased to 110,000 on day #8 of her first cycle of chemotherapy.  · Platelet count improved afterwards.  · Following cycle #2, Platelet count decreased to 57,000 on 4/28/2022.  · Platelet count recovered afterwards.  It was 510,000 on 5/9/2022.  · Platelet count was 365,000 on 5/16/2022.  · She did not develop thrombocytopenia following cycle #3.    *Bilateral pleural effusions.   · She is s/p right thoracentesis on 2/21/2022 and the effusion was found to be malignant attributed to the lymphoma.   · CT scan on 5/1/2022 revealed decrease in the right pleural effusion and an increase in the left effusion.   · The increase in the left effusion was suspected of being due to cardiac and fluid overload states (fluid appeared to be serous on CT scan).  · Exam today revealed stable effusions.    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  · Patient had  anemia secondary to her lymphoma.  · Hemoglobin was 11.1 on 3/28/2022.  · Anemia work-up showed no evidence of iron deficiency.  · Hgb decreased to 7.9 on 4/25/2022 and she was transfused.   · Hemoglobin decreased to 6.3 on 4/27/2022. She was transfused.  · Hemoglobin improved to 8.8 on 5/16/2022.  · Hemoglobin is 9.1 today.    *Paroxysmal atrial fibrillation.  · She is on Eliquis 5 mg twice daily.  · Eliquis was briefly held on 4/27/2022 due to thrombocytopenia.   · It was resumed after platelets improved.     *Constipation.  · Patient has chronic constipation that worsened after the start of chemotherapy.  · Patient was placed on Senokot-S.  · Vincristine dose was reduced to 1 mg with cycle #2.   · Patient reports that her constipation is under good control.    *Weight loss.   · She is drinking Ensure with protein 2 daily.  · We also recommended increasing caloric intake.  · Patient continues to lose weight.  · Recent weight loss is likely secondary to the diuretic effect.    PLAN:    1.  Proceed with cycle #4 of mini R-CHOP today using the same doses as cycle #3.   2.  Return in 1 to 2 weeks with CBC and RN review.   3.  Continue Eliquis 5 mg twice daily.   4.  We will obtain follow-up CT scan of the chest abdomen pelvis in 2 weeks.   5.  Follow-up in 3 weeks.  She will be scheduled for cycle #5.         Renate Galo MD  06/06/22

## 2022-06-07 RX ORDER — LOSARTAN POTASSIUM 100 MG/1
TABLET ORAL
Qty: 90 TABLET | Refills: 1 | Status: SHIPPED | OUTPATIENT
Start: 2022-06-07 | End: 2022-08-02 | Stop reason: ALTCHOICE

## 2022-06-08 ENCOUNTER — HOME CARE VISIT (OUTPATIENT)
Dept: HOME HEALTH SERVICES | Facility: HOME HEALTHCARE | Age: 82
End: 2022-06-08

## 2022-06-08 PROCEDURE — G0493 RN CARE EA 15 MIN HH/HOSPICE: HCPCS

## 2022-06-08 NOTE — HOME HEALTH
CHF ASSESSMENT     PT HAD 4TH ROUND CHEMO 6/6/22- FIRST 2 ROUNDS WERE DIFFICULT FOR PT, 3D ROUND (REDUCED DOSE)  SHE HAS TOLERATED W/O MUCH ADVERSE EFFECT EXCEPT HAIR LOSS

## 2022-06-09 VITALS
HEART RATE: 72 BPM | OXYGEN SATURATION: 98 % | DIASTOLIC BLOOD PRESSURE: 60 MMHG | RESPIRATION RATE: 16 BRPM | TEMPERATURE: 99.8 F | SYSTOLIC BLOOD PRESSURE: 106 MMHG

## 2022-06-13 ENCOUNTER — CLINICAL SUPPORT (OUTPATIENT)
Dept: ONCOLOGY | Facility: HOSPITAL | Age: 82
End: 2022-06-13

## 2022-06-13 ENCOUNTER — LAB (OUTPATIENT)
Dept: LAB | Facility: HOSPITAL | Age: 82
End: 2022-06-13

## 2022-06-13 DIAGNOSIS — C83.38 DIFFUSE LARGE B-CELL LYMPHOMA OF LYMPH NODES OF MULTIPLE REGIONS: Primary | ICD-10-CM

## 2022-06-13 LAB
BASOPHILS # BLD AUTO: 0.03 10*3/MM3 (ref 0–0.2)
BASOPHILS NFR BLD AUTO: 0.6 % (ref 0–1.5)
DEPRECATED RDW RBC AUTO: 60.7 FL (ref 37–54)
EOSINOPHIL # BLD AUTO: 0.03 10*3/MM3 (ref 0–0.4)
EOSINOPHIL NFR BLD AUTO: 0.6 % (ref 0.3–6.2)
ERYTHROCYTE [DISTWIDTH] IN BLOOD BY AUTOMATED COUNT: 17.4 % (ref 12.3–15.4)
HCT VFR BLD AUTO: 26.6 % (ref 34–46.6)
HGB BLD-MCNC: 9 G/DL (ref 12–15.9)
IMM GRANULOCYTES # BLD AUTO: 0.11 10*3/MM3 (ref 0–0.05)
IMM GRANULOCYTES NFR BLD AUTO: 2.2 % (ref 0–0.5)
LYMPHOCYTES # BLD AUTO: 0.24 10*3/MM3 (ref 0.7–3.1)
LYMPHOCYTES NFR BLD AUTO: 4.9 % (ref 19.6–45.3)
MCH RBC QN AUTO: 31.7 PG (ref 26.6–33)
MCHC RBC AUTO-ENTMCNC: 33.8 G/DL (ref 31.5–35.7)
MCV RBC AUTO: 93.7 FL (ref 79–97)
MONOCYTES # BLD AUTO: 0.15 10*3/MM3 (ref 0.1–0.9)
MONOCYTES NFR BLD AUTO: 3.1 % (ref 5–12)
NEUTROPHILS NFR BLD AUTO: 4.35 10*3/MM3 (ref 1.7–7)
NEUTROPHILS NFR BLD AUTO: 88.6 % (ref 42.7–76)
NRBC BLD AUTO-RTO: 0 /100 WBC (ref 0–0.2)
PLATELET # BLD AUTO: 166 10*3/MM3 (ref 140–450)
PMV BLD AUTO: 9.9 FL (ref 6–12)
RBC # BLD AUTO: 2.84 10*6/MM3 (ref 3.77–5.28)
WBC NRBC COR # BLD: 4.91 10*3/MM3 (ref 3.4–10.8)

## 2022-06-13 PROCEDURE — 85025 COMPLETE CBC W/AUTO DIFF WBC: CPT

## 2022-06-13 PROCEDURE — 36415 COLL VENOUS BLD VENIPUNCTURE: CPT

## 2022-06-13 PROCEDURE — G0463 HOSPITAL OUTPT CLINIC VISIT: HCPCS

## 2022-06-13 NOTE — NURSING NOTE
Lab Results   Component Value Date    WBC 4.91 06/13/2022    HGB 9.0 (L) 06/13/2022    HCT 26.6 (L) 06/13/2022    MCV 93.7 06/13/2022     06/13/2022   ANC 4.35  Pt is here for lab with RN review. Patient received mini RCHOP on 6/6/22.  CBC reviewed with pt, counts are stable for this pt at this time. Pt has no complaints. She reports feeling good, stating that she is eating and drinking ok. Denies fever or signs of infection. Her main concern is that her voice is gravely and that she feels like she needs to clear her throat. She states that it may be related to allergies. Copy of labs given to pt and f/u appt reviewed. Pt is instructed to call the office with any concerns or new symptoms prior to next visit. Pt vu and discharge in stable condition.

## 2022-06-17 ENCOUNTER — HOME CARE VISIT (OUTPATIENT)
Dept: HOME HEALTH SERVICES | Facility: HOME HEALTHCARE | Age: 82
End: 2022-06-17

## 2022-06-17 VITALS
DIASTOLIC BLOOD PRESSURE: 66 MMHG | HEART RATE: 72 BPM | SYSTOLIC BLOOD PRESSURE: 110 MMHG | OXYGEN SATURATION: 99 % | TEMPERATURE: 97.3 F | WEIGHT: 143.38 LBS | RESPIRATION RATE: 16 BRPM | BODY MASS INDEX: 28.15 KG/M2

## 2022-06-17 PROCEDURE — G0493 RN CARE EA 15 MIN HH/HOSPICE: HCPCS

## 2022-06-17 NOTE — HOME HEALTH
CHF ASSESSMENT- last snv weight 143.6 lbs     PT HAD 4TH ROUND CHEMO 6/6/22- FIRST 2 ROUNDS WERE DIFFICULT FOR PT, 3D ROUND (REDUCED DOSE)  SHE HAS TOLERATED W/O MUCH ADVERSE EFFECT EXCEPT HAIR LOSS

## 2022-06-20 ENCOUNTER — LAB (OUTPATIENT)
Dept: LAB | Facility: HOSPITAL | Age: 82
End: 2022-06-20

## 2022-06-20 ENCOUNTER — CLINICAL SUPPORT (OUTPATIENT)
Dept: ONCOLOGY | Facility: HOSPITAL | Age: 82
End: 2022-06-20

## 2022-06-20 DIAGNOSIS — T45.1X5A ANEMIA DUE TO CHEMOTHERAPY: ICD-10-CM

## 2022-06-20 DIAGNOSIS — D64.81 ANEMIA DUE TO CHEMOTHERAPY: ICD-10-CM

## 2022-06-20 DIAGNOSIS — C83.38 DIFFUSE LARGE B-CELL LYMPHOMA OF LYMPH NODES OF MULTIPLE REGIONS: Primary | ICD-10-CM

## 2022-06-20 LAB
BASOPHILS # BLD AUTO: 0.07 10*3/MM3 (ref 0–0.2)
BASOPHILS NFR BLD AUTO: 0.9 % (ref 0–1.5)
DEPRECATED RDW RBC AUTO: 59.8 FL (ref 37–54)
EOSINOPHIL # BLD AUTO: 0.02 10*3/MM3 (ref 0–0.4)
EOSINOPHIL NFR BLD AUTO: 0.3 % (ref 0.3–6.2)
ERYTHROCYTE [DISTWIDTH] IN BLOOD BY AUTOMATED COUNT: 17.3 % (ref 12.3–15.4)
HCT VFR BLD AUTO: 27.6 % (ref 34–46.6)
HGB BLD-MCNC: 9.2 G/DL (ref 12–15.9)
IMM GRANULOCYTES # BLD AUTO: 0.17 10*3/MM3 (ref 0–0.05)
IMM GRANULOCYTES NFR BLD AUTO: 2.3 % (ref 0–0.5)
LYMPHOCYTES # BLD AUTO: 0.85 10*3/MM3 (ref 0.7–3.1)
LYMPHOCYTES NFR BLD AUTO: 11.3 % (ref 19.6–45.3)
MCH RBC QN AUTO: 31.6 PG (ref 26.6–33)
MCHC RBC AUTO-ENTMCNC: 33.3 G/DL (ref 31.5–35.7)
MCV RBC AUTO: 94.8 FL (ref 79–97)
MONOCYTES # BLD AUTO: 0.53 10*3/MM3 (ref 0.1–0.9)
MONOCYTES NFR BLD AUTO: 7 % (ref 5–12)
NEUTROPHILS NFR BLD AUTO: 5.89 10*3/MM3 (ref 1.7–7)
NEUTROPHILS NFR BLD AUTO: 78.2 % (ref 42.7–76)
NRBC BLD AUTO-RTO: 0 /100 WBC (ref 0–0.2)
PLATELET # BLD AUTO: 229 10*3/MM3 (ref 140–450)
PMV BLD AUTO: 9.2 FL (ref 6–12)
RBC # BLD AUTO: 2.91 10*6/MM3 (ref 3.77–5.28)
WBC NRBC COR # BLD: 7.53 10*3/MM3 (ref 3.4–10.8)

## 2022-06-20 PROCEDURE — 36415 COLL VENOUS BLD VENIPUNCTURE: CPT

## 2022-06-20 PROCEDURE — 85025 COMPLETE CBC W/AUTO DIFF WBC: CPT

## 2022-06-20 PROCEDURE — G0463 HOSPITAL OUTPT CLINIC VISIT: HCPCS

## 2022-06-20 NOTE — PROGRESS NOTES
Pt present for visit with family member. No significant changes in counts. Pt voices no new concerns. Copy of labs and schedule provided. Pt v/u.    Lab Results   Component Value Date    WBC 7.53 06/20/2022    HGB 9.2 (L) 06/20/2022    HCT 27.6 (L) 06/20/2022    MCV 94.8 06/20/2022     06/20/2022

## 2022-06-21 RX ORDER — CARVEDILOL 6.25 MG/1
TABLET ORAL
Qty: 180 TABLET | Refills: 3 | Status: SHIPPED | OUTPATIENT
Start: 2022-06-21 | End: 2022-08-02 | Stop reason: DRUGHIGH

## 2022-06-22 ENCOUNTER — HOME CARE VISIT (OUTPATIENT)
Dept: HOME HEALTH SERVICES | Facility: HOME HEALTHCARE | Age: 82
End: 2022-06-22

## 2022-06-22 PROCEDURE — G0493 RN CARE EA 15 MIN HH/HOSPICE: HCPCS

## 2022-06-22 NOTE — HOME HEALTH
D/C NEXT SNV    CHF ASSESSMENT- last snv weight 143.6 lb, 6/222= 145.2 lbs     PT HAD 4TH ROUND CHEMO 6/6/22- FIRST 2 ROUNDS WERE DIFFICULT FOR PT, 3D AND 4TH  ROUND (REDUCED DOSE)  SHE HAS TOLERATED W/O MUCH ADVERSE EFFECT EXCEPT HAIR LOSS

## 2022-06-23 ENCOUNTER — HOSPITAL ENCOUNTER (OUTPATIENT)
Dept: CT IMAGING | Facility: HOSPITAL | Age: 82
Discharge: HOME | End: 2022-06-23
Admitting: INTERNAL MEDICINE

## 2022-06-23 ENCOUNTER — INFUSION (OUTPATIENT)
Dept: ONCOLOGY | Facility: HOSPITAL | Age: 82
End: 2022-06-23

## 2022-06-23 VITALS
OXYGEN SATURATION: 98 % | WEIGHT: 145.13 LBS | HEART RATE: 76 BPM | TEMPERATURE: 99.3 F | BODY MASS INDEX: 28.49 KG/M2 | DIASTOLIC BLOOD PRESSURE: 60 MMHG | SYSTOLIC BLOOD PRESSURE: 108 MMHG

## 2022-06-23 DIAGNOSIS — C83.38 DIFFUSE LARGE B-CELL LYMPHOMA OF LYMPH NODES OF MULTIPLE REGIONS: ICD-10-CM

## 2022-06-23 PROCEDURE — 71260 CT THORAX DX C+: CPT

## 2022-06-23 PROCEDURE — 74177 CT ABD & PELVIS W/CONTRAST: CPT

## 2022-06-23 PROCEDURE — 25010000002 IOPAMIDOL 61 % SOLUTION: Performed by: INTERNAL MEDICINE

## 2022-06-23 PROCEDURE — 0 DIATRIZOATE MEGLUMINE & SODIUM PER 1 ML: Performed by: INTERNAL MEDICINE

## 2022-06-23 RX ADMIN — DIATRIZOATE MEGLUMINE AND DIATRIZOATE SODIUM 30 ML: 660; 100 LIQUID ORAL; RECTAL at 14:30

## 2022-06-23 RX ADMIN — IOPAMIDOL 90 ML: 612 INJECTION, SOLUTION INTRAVENOUS at 15:51

## 2022-06-27 ENCOUNTER — INFUSION (OUTPATIENT)
Dept: ONCOLOGY | Facility: HOSPITAL | Age: 82
End: 2022-06-27

## 2022-06-27 ENCOUNTER — OFFICE VISIT (OUTPATIENT)
Dept: ONCOLOGY | Facility: CLINIC | Age: 82
End: 2022-06-27

## 2022-06-27 VITALS
SYSTOLIC BLOOD PRESSURE: 104 MMHG | OXYGEN SATURATION: 97 % | RESPIRATION RATE: 16 BRPM | HEART RATE: 74 BPM | WEIGHT: 146.7 LBS | BODY MASS INDEX: 28.8 KG/M2 | DIASTOLIC BLOOD PRESSURE: 65 MMHG | TEMPERATURE: 97.1 F | HEIGHT: 60 IN

## 2022-06-27 VITALS — SYSTOLIC BLOOD PRESSURE: 109 MMHG | HEART RATE: 68 BPM | DIASTOLIC BLOOD PRESSURE: 64 MMHG

## 2022-06-27 DIAGNOSIS — Z45.2: ICD-10-CM

## 2022-06-27 DIAGNOSIS — C83.38 DIFFUSE LARGE B-CELL LYMPHOMA OF LYMPH NODES OF MULTIPLE REGIONS: Primary | ICD-10-CM

## 2022-06-27 DIAGNOSIS — C83.38 DIFFUSE LARGE B-CELL LYMPHOMA OF LYMPH NODES OF MULTIPLE REGIONS: ICD-10-CM

## 2022-06-27 DIAGNOSIS — T45.1X5A CHEMOTHERAPY INDUCED NEUTROPENIA: ICD-10-CM

## 2022-06-27 DIAGNOSIS — Z79.01 CHRONIC ANTICOAGULATION: ICD-10-CM

## 2022-06-27 DIAGNOSIS — D64.81 ANEMIA DUE TO CHEMOTHERAPY: ICD-10-CM

## 2022-06-27 DIAGNOSIS — K59.03 DRUG INDUCED CONSTIPATION: ICD-10-CM

## 2022-06-27 DIAGNOSIS — R63.4 ABNORMAL WEIGHT LOSS: ICD-10-CM

## 2022-06-27 DIAGNOSIS — D70.1 CHEMOTHERAPY INDUCED NEUTROPENIA: ICD-10-CM

## 2022-06-27 DIAGNOSIS — T45.1X5A ANEMIA DUE TO CHEMOTHERAPY: ICD-10-CM

## 2022-06-27 DIAGNOSIS — J90 BILATERAL PLEURAL EFFUSION: ICD-10-CM

## 2022-06-27 DIAGNOSIS — D69.6 THROMBOCYTOPENIA: ICD-10-CM

## 2022-06-27 LAB
ALBUMIN SERPL-MCNC: 3.8 G/DL (ref 3.5–5.2)
ALBUMIN/GLOB SERPL: 1.5 G/DL (ref 1.1–2.4)
ALP SERPL-CCNC: 81 U/L (ref 38–116)
ALT SERPL W P-5'-P-CCNC: 20 U/L (ref 0–33)
ANION GAP SERPL CALCULATED.3IONS-SCNC: 11.6 MMOL/L (ref 5–15)
AST SERPL-CCNC: 22 U/L (ref 0–32)
BASOPHILS # BLD AUTO: 0.05 10*3/MM3 (ref 0–0.2)
BASOPHILS NFR BLD AUTO: 0.9 % (ref 0–1.5)
BILIRUB SERPL-MCNC: 0.3 MG/DL (ref 0.2–1.2)
BUN SERPL-MCNC: 21 MG/DL (ref 6–20)
BUN/CREAT SERPL: 28 (ref 7.3–30)
CALCIUM SPEC-SCNC: 9.4 MG/DL (ref 8.5–10.2)
CHLORIDE SERPL-SCNC: 99 MMOL/L (ref 98–107)
CO2 SERPL-SCNC: 24.4 MMOL/L (ref 22–29)
CREAT SERPL-MCNC: 0.75 MG/DL (ref 0.6–1.1)
DEPRECATED RDW RBC AUTO: 62.3 FL (ref 37–54)
EGFRCR SERPLBLD CKD-EPI 2021: 80.6 ML/MIN/1.73
EOSINOPHIL # BLD AUTO: 0.02 10*3/MM3 (ref 0–0.4)
EOSINOPHIL NFR BLD AUTO: 0.4 % (ref 0.3–6.2)
ERYTHROCYTE [DISTWIDTH] IN BLOOD BY AUTOMATED COUNT: 17.2 % (ref 12.3–15.4)
GLOBULIN UR ELPH-MCNC: 2.5 GM/DL (ref 1.8–3.5)
GLUCOSE SERPL-MCNC: 100 MG/DL (ref 74–124)
HCT VFR BLD AUTO: 28.3 % (ref 34–46.6)
HGB BLD-MCNC: 9.2 G/DL (ref 12–15.9)
IMM GRANULOCYTES # BLD AUTO: 0.05 10*3/MM3 (ref 0–0.05)
IMM GRANULOCYTES NFR BLD AUTO: 0.9 % (ref 0–0.5)
LYMPHOCYTES # BLD AUTO: 0.68 10*3/MM3 (ref 0.7–3.1)
LYMPHOCYTES NFR BLD AUTO: 12.8 % (ref 19.6–45.3)
MCH RBC QN AUTO: 31.9 PG (ref 26.6–33)
MCHC RBC AUTO-ENTMCNC: 32.5 G/DL (ref 31.5–35.7)
MCV RBC AUTO: 98.3 FL (ref 79–97)
MONOCYTES # BLD AUTO: 0.51 10*3/MM3 (ref 0.1–0.9)
MONOCYTES NFR BLD AUTO: 9.6 % (ref 5–12)
NEUTROPHILS NFR BLD AUTO: 4.02 10*3/MM3 (ref 1.7–7)
NEUTROPHILS NFR BLD AUTO: 75.4 % (ref 42.7–76)
NRBC BLD AUTO-RTO: 0 /100 WBC (ref 0–0.2)
PLATELET # BLD AUTO: 207 10*3/MM3 (ref 140–450)
PMV BLD AUTO: 8.7 FL (ref 6–12)
POTASSIUM SERPL-SCNC: 4.4 MMOL/L (ref 3.5–4.7)
PROT SERPL-MCNC: 6.3 G/DL (ref 6.3–8)
RBC # BLD AUTO: 2.88 10*6/MM3 (ref 3.77–5.28)
SODIUM SERPL-SCNC: 135 MMOL/L (ref 134–145)
WBC NRBC COR # BLD: 5.33 10*3/MM3 (ref 3.4–10.8)

## 2022-06-27 PROCEDURE — 96417 CHEMO IV INFUS EACH ADDL SEQ: CPT

## 2022-06-27 PROCEDURE — 96375 TX/PRO/DX INJ NEW DRUG ADDON: CPT

## 2022-06-27 PROCEDURE — 99215 OFFICE O/P EST HI 40 MIN: CPT | Performed by: INTERNAL MEDICINE

## 2022-06-27 PROCEDURE — 25010000002 HEPARIN LOCK FLUSH PER 10 UNITS: Performed by: INTERNAL MEDICINE

## 2022-06-27 PROCEDURE — 85025 COMPLETE CBC W/AUTO DIFF WBC: CPT

## 2022-06-27 PROCEDURE — 25010000002 PALONOSETRON PER 25 MCG: Performed by: INTERNAL MEDICINE

## 2022-06-27 PROCEDURE — 25010000002 VINCRISTINE PER 1 MG: Performed by: INTERNAL MEDICINE

## 2022-06-27 PROCEDURE — 96367 TX/PROPH/DG ADDL SEQ IV INF: CPT

## 2022-06-27 PROCEDURE — 96415 CHEMO IV INFUSION ADDL HR: CPT

## 2022-06-27 PROCEDURE — 25010000002 FOSAPREPITANT PER 1 MG: Performed by: INTERNAL MEDICINE

## 2022-06-27 PROCEDURE — 25010000002 RITUXIMAB 10 MG/ML SOLUTION 50 ML VIAL: Performed by: INTERNAL MEDICINE

## 2022-06-27 PROCEDURE — 96411 CHEMO IV PUSH ADDL DRUG: CPT

## 2022-06-27 PROCEDURE — 25010000002 CYCLOPHOSPHAMIDE 1 GM/5ML SOLUTION 5 ML VIAL: Performed by: INTERNAL MEDICINE

## 2022-06-27 PROCEDURE — 80053 COMPREHEN METABOLIC PANEL: CPT

## 2022-06-27 PROCEDURE — 25010000002 RITUXIMAB 10 MG/ML SOLUTION 10 ML VIAL: Performed by: INTERNAL MEDICINE

## 2022-06-27 PROCEDURE — 96377 APPLICATON ON-BODY INJECTOR: CPT

## 2022-06-27 PROCEDURE — 25010000002 DEXAMETHASONE SODIUM PHOSPHATE 100 MG/10ML SOLUTION: Performed by: INTERNAL MEDICINE

## 2022-06-27 PROCEDURE — 96413 CHEMO IV INFUSION 1 HR: CPT

## 2022-06-27 PROCEDURE — 25010000002 PEGFILGRASTIM 6 MG/0.6ML PREFILLED SYRINGE KIT: Performed by: INTERNAL MEDICINE

## 2022-06-27 PROCEDURE — 36591 DRAW BLOOD OFF VENOUS DEVICE: CPT

## 2022-06-27 PROCEDURE — 25010000002 DIPHENHYDRAMINE PER 50 MG: Performed by: INTERNAL MEDICINE

## 2022-06-27 PROCEDURE — 25010000002 DOXORUBICIN PER 10 MG: Performed by: INTERNAL MEDICINE

## 2022-06-27 RX ORDER — SODIUM CHLORIDE 0.9 % (FLUSH) 0.9 %
10 SYRINGE (ML) INJECTION AS NEEDED
Status: CANCELLED | OUTPATIENT
Start: 2022-06-27

## 2022-06-27 RX ORDER — ACETAMINOPHEN 325 MG/1
650 TABLET ORAL ONCE
Status: CANCELLED | OUTPATIENT
Start: 2022-06-27

## 2022-06-27 RX ORDER — SODIUM CHLORIDE 0.9 % (FLUSH) 0.9 %
10 SYRINGE (ML) INJECTION AS NEEDED
Status: DISCONTINUED | OUTPATIENT
Start: 2022-06-27 | End: 2022-06-27 | Stop reason: HOSPADM

## 2022-06-27 RX ORDER — MEPERIDINE HYDROCHLORIDE 25 MG/ML
25 INJECTION INTRAMUSCULAR; INTRAVENOUS; SUBCUTANEOUS
Status: CANCELLED | OUTPATIENT
Start: 2022-06-27

## 2022-06-27 RX ORDER — DOXORUBICIN HYDROCHLORIDE 2 MG/ML
25 INJECTION, SOLUTION INTRAVENOUS ONCE
Status: COMPLETED | OUTPATIENT
Start: 2022-06-27 | End: 2022-06-27

## 2022-06-27 RX ORDER — FAMOTIDINE 20 MG/1
20 TABLET, FILM COATED ORAL ONCE
Status: COMPLETED | OUTPATIENT
Start: 2022-06-27 | End: 2022-06-27

## 2022-06-27 RX ORDER — HEPARIN SODIUM (PORCINE) LOCK FLUSH IV SOLN 100 UNIT/ML 100 UNIT/ML
500 SOLUTION INTRAVENOUS AS NEEDED
Status: CANCELLED | OUTPATIENT
Start: 2022-06-27

## 2022-06-27 RX ORDER — PALONOSETRON 0.05 MG/ML
0.25 INJECTION, SOLUTION INTRAVENOUS ONCE
Status: COMPLETED | OUTPATIENT
Start: 2022-06-27 | End: 2022-06-27

## 2022-06-27 RX ORDER — SODIUM CHLORIDE 9 MG/ML
250 INJECTION, SOLUTION INTRAVENOUS ONCE
Status: CANCELLED | OUTPATIENT
Start: 2022-06-27

## 2022-06-27 RX ORDER — DIPHENHYDRAMINE HYDROCHLORIDE 50 MG/ML
50 INJECTION INTRAMUSCULAR; INTRAVENOUS AS NEEDED
Status: CANCELLED | OUTPATIENT
Start: 2022-06-27

## 2022-06-27 RX ORDER — PALONOSETRON 0.05 MG/ML
0.25 INJECTION, SOLUTION INTRAVENOUS ONCE
Status: CANCELLED | OUTPATIENT
Start: 2022-06-27

## 2022-06-27 RX ORDER — ACETAMINOPHEN 325 MG/1
650 TABLET ORAL ONCE
Status: COMPLETED | OUTPATIENT
Start: 2022-06-27 | End: 2022-06-27

## 2022-06-27 RX ORDER — SODIUM CHLORIDE 9 MG/ML
250 INJECTION, SOLUTION INTRAVENOUS ONCE
Status: COMPLETED | OUTPATIENT
Start: 2022-06-27 | End: 2022-06-27

## 2022-06-27 RX ORDER — DOXORUBICIN HYDROCHLORIDE 2 MG/ML
25 INJECTION, SOLUTION INTRAVENOUS ONCE
Status: CANCELLED | OUTPATIENT
Start: 2022-06-27

## 2022-06-27 RX ORDER — HEPARIN SODIUM (PORCINE) LOCK FLUSH IV SOLN 100 UNIT/ML 100 UNIT/ML
500 SOLUTION INTRAVENOUS AS NEEDED
Status: DISCONTINUED | OUTPATIENT
Start: 2022-06-27 | End: 2022-06-27 | Stop reason: HOSPADM

## 2022-06-27 RX ORDER — FAMOTIDINE 10 MG/ML
20 INJECTION, SOLUTION INTRAVENOUS AS NEEDED
Status: CANCELLED | OUTPATIENT
Start: 2022-06-27

## 2022-06-27 RX ORDER — FAMOTIDINE 20 MG/1
20 TABLET, FILM COATED ORAL ONCE
Status: CANCELLED
Start: 2022-06-27 | End: 2022-06-27

## 2022-06-27 RX ADMIN — ACETAMINOPHEN 650 MG: 325 TABLET ORAL at 09:10

## 2022-06-27 RX ADMIN — SODIUM CHLORIDE 250 ML: 9 INJECTION, SOLUTION INTRAVENOUS at 09:09

## 2022-06-27 RX ADMIN — PALONOSETRON 0.25 MG: 0.05 INJECTION, SOLUTION INTRAVENOUS at 12:51

## 2022-06-27 RX ADMIN — SODIUM CHLORIDE 100 ML: 9 INJECTION, SOLUTION INTRAVENOUS at 13:11

## 2022-06-27 RX ADMIN — VINCRISTINE SULFATE 1 MG: 1 INJECTION, SOLUTION INTRAVENOUS at 13:58

## 2022-06-27 RX ADMIN — RITUXIMAB 600 MG: 10 INJECTION, SOLUTION INTRAVENOUS at 10:07

## 2022-06-27 RX ADMIN — DEXAMETHASONE SODIUM PHOSPHATE 12 MG: 10 INJECTION, SOLUTION INTRAMUSCULAR; INTRAVENOUS at 12:53

## 2022-06-27 RX ADMIN — PEGFILGRASTIM 6 MG: KIT SUBCUTANEOUS at 14:21

## 2022-06-27 RX ADMIN — DIPHENHYDRAMINE HYDROCHLORIDE 25 MG: 50 INJECTION, SOLUTION INTRAMUSCULAR; INTRAVENOUS at 09:12

## 2022-06-27 RX ADMIN — FAMOTIDINE 20 MG: 20 TABLET ORAL at 09:10

## 2022-06-27 RX ADMIN — Medication 500 UNITS: at 14:56

## 2022-06-27 RX ADMIN — DOXORUBICIN HYDROCHLORIDE 40 MG: 2 INJECTION, SOLUTION INTRAVENOUS at 13:52

## 2022-06-27 RX ADMIN — Medication 10 ML: at 14:56

## 2022-06-27 RX ADMIN — CYCLOPHOSPHAMIDE 650 MG: 200 INJECTION, SOLUTION INTRAVENOUS at 14:18

## 2022-06-27 NOTE — NURSING NOTE
PT'S PORT MAINTAINED EXCELLENT BLD RTN BEFORE, DURING AND AFTER IVP OF MERRY. PT ATE A POPSICLE DURING ADMINISTRATION.

## 2022-06-27 NOTE — PROGRESS NOTES
Subjective     CHIEF COMPLAINT:      Chief Complaint   Patient presents with   • Follow-up       HISTORY OF PRESENT ILLNESS:     Michelle Car is a 81 y.o. female patient who returns today for follow up on her lymphoma.  She returns today for follow-up reporting that she is feeling better.  Breathing has improved.  She is not having chest or back pain.  She denies having numbness in the fingers or toes.  She previously had problem with constipation but she is no longer having this problem.    Patient reports improvement in her appetite.  She is eating better and gaining weight.    Patient had a cholecystectomy in the past.  Since that time, she tries to avoid fatty/greasy food.  She denies having abdominal pain after eating greasy food.  No jaundice.    ROS:  Pertinent ROS is in the HPI.     Past medical, surgical, social and family history were reviewed.     MEDICATIONS:    Current Outpatient Medications:   •  acetaminophen (TYLENOL) 500 MG tablet, Take 500 mg by mouth Every 6 (Six) Hours As Needed for Mild Pain ., Disp: , Rfl:   •  amiodarone (PACERONE) 200 MG tablet, Take 1 tablet by mouth Daily., Disp: 30 tablet, Rfl: 0  •  apixaban (ELIQUIS) 5 MG tablet tablet, Take 1 tablet by mouth Every 12 (Twelve) Hours. Indications: Atrial Fibrillation, Disp: 180 tablet, Rfl: 1  •  Calcium Carbonate (CALTRATE 600 PO), Take 1 tablet by mouth Daily., Disp: , Rfl:   •  carvedilol (COREG) 3.125 MG tablet, Take 1 tablet by mouth 2 (Two) Times a Day With Meals., Disp: 180 tablet, Rfl: 1  •  digoxin (LANOXIN) 125 MCG tablet, Take 125 mcg by mouth Daily., Disp: , Rfl:   •  DPH-Lido-AlHydr-MgHydr-Simeth (First Mouthwash, Magic Mouthwash,) suspension, Swish and spit 5 mL Every 6 (Six) Hours As Needed (Oral pain)., Disp: 237 mL, Rfl: 0  •  fexofenadine (ALLEGRA) 180 MG tablet, Take 180 mg by mouth Daily., Disp: , Rfl:   •  losartan (COZAAR) 100 MG tablet, TAKE 1 TABLET BY MOUTH DAILY, Disp: 90 tablet, Rfl: 1  •  Multiple  "Vitamins-Minerals (CENTRUM SILVER) tablet, Take 1 tablet by mouth Daily., Disp: , Rfl:   •  polyethylene glycol (MIRALAX) 17 GM/SCOOP powder, Take 17 g by mouth As Needed., Disp: , Rfl:   •  pravastatin (PRAVACHOL) 40 MG tablet, TAKE 1 TABLET BY MOUTH EVERY NIGHT AT BEDTIME, Disp: 90 tablet, Rfl: 1  •  predniSONE (DELTASONE) 50 MG tablet, TAKE 2 TABLETS BY MOUTH IN THE MORNING WITH FOOD ON DAYS 2-6 AFTER EACH CYCLE, Disp: , Rfl:   •  spironolactone (ALDACTONE) 25 MG tablet, Take 25 mg by mouth Daily., Disp: , Rfl:   •  carvedilol (COREG) 6.25 MG tablet, TAKE 1 TABLET BY MOUTH TWICE DAILY WITH MEALS, Disp: 180 tablet, Rfl: 3  •  ondansetron (ZOFRAN) 8 MG tablet, Take 1 tablet by mouth Every 8 (Eight) Hours As Needed for Nausea or Vomiting., Disp: 30 tablet, Rfl: 5  Objective     VITAL SIGNS:     Vitals:    06/27/22 0810   BP: 104/65   Pulse: 74   Resp: 16   Temp: 97.1 °F (36.2 °C)   TempSrc: Temporal   SpO2: 97%   Weight: 66.5 kg (146 lb 11.2 oz)   Height: 152 cm (59.84\")   PainSc: 0-No pain     Body mass index is 28.8 kg/m².     Wt Readings from Last 5 Encounters:   06/27/22 66.5 kg (146 lb 11.2 oz)   06/22/22 65.8 kg (145 lb 2 oz)   06/17/22 65 kg (143 lb 6 oz)   06/06/22 65.3 kg (143 lb 14.4 oz)   06/02/22 64.4 kg (142 lb)     PHYSICAL EXAMINATION:   GENERAL: The patient appears in fair general condition, not in acute distress.   SKIN: No ecchymosis.  EYES: No jaundice. Pallor.  LYMPHATICS: No cervical or supraclavicular lymphadenopathy.  CHEST: Normal respiratory effort.  Lungs clear bilaterally.  No added sounds.  CVS: Normal S1-S2.  No murmurs..  ABDOMEN: Soft. No tenderness on careful examination of the right upper quadrant. No Hepatomegaly. No Splenomegaly. No masses.  EXTREMITIES: Chronic edema at the ankles.  No leg edema.  No calf tenderness.    DIAGNOSTIC DATA:     Results from last 7 days   Lab Units 06/27/22  0726 06/20/22  1120   WBC 10*3/mm3 5.33 7.53   NEUTROS ABS 10*3/mm3 4.02 5.89   HEMOGLOBIN g/dL " 9.2* 9.2*   HEMATOCRIT % 28.3* 27.6*   PLATELETS 10*3/mm3 207 229     Results from last 7 days   Lab Units 06/27/22  0726   SODIUM mmol/L 135   POTASSIUM mmol/L 4.4   CHLORIDE mmol/L 99   CO2 mmol/L 24.4   BUN mg/dL 21*   CREATININE mg/dL 0.75   CALCIUM mg/dL 9.4   ALBUMIN g/dL 3.80   BILIRUBIN mg/dL 0.3   ALK PHOS U/L 81   ALT (SGPT) U/L 20   AST (SGOT) U/L 22   GLUCOSE mg/dL 100     CT chest abdomen pelvis on 6/23/2022:  1. There is no lymphadenopathy and splenic size remains normal.  2. There has been essential resolution of the pericardial effusion and  near complete resolution of the pleural effusions. Significantly  improved aeration at the lower lobes. There is no new abnormality within  the chest.  3. There is significant choledocholithiasis with increase in the intra  and extrahepatic biliary dilatation.    Assessment & Plan    *Diffuse large B-cell lymphoma.     · Patient was found to have bilateral pleural effusions.    · She underwent right thoracentesis on 2/21/2022 and the left thoracentesis on 2/22/2022.    · Analysis of the fluid revealed involvement with diffuse large B-cell lymphoma.    · FISH analysis for BCL6 rearrangement was positive.    · FISH analysis for BCL 1, BCL-2 and MYC rearrangement was negative.    · CT chest on 2/17/2022 revealed no hilar or mediastinal lymphadenopathy.    · Spleen was reported to be normal in size.  · Peripheral blood flow cytometry on 3/7/2022 was negative.  · PET scan on 3/10/2022 revealed significant hypermetabolism in the left adrenal gland and the surrounding soft tissue with SUV up to 34.5. Hypermetabolism in the left pleural thickening with SUV of 7.1. there was less hypermetabolism in the right adrenal gland and in the right pleura.  · She is considered to have stage III non-bulky disease.  · The degree of hypermetabolism is consistent with high grade lymphoma. This concurs with the pathology exam of the pleural fluid cytology that revealed diffuse large  B-cell lymphoma.  · R-IPI score of 3 associated with poor risk. This is associated with overall survival of 55%.   · R-CHOP with Neulasta support on 3/28/2022.  · Patient developed neutropenia and thrombocytopenia after cycle#1.  · Vincristine dose was reduced to 1 mg due to constipation.   · Patient developed neutropenia and neutropenic fever that required hospitalization after cycle #2.  · Her performance status decreased as a result. Although she felt better after discharge, she continues to be weak.   · CT scan on 5/1/2022 revealed a decrease in the right pleural effusion. The left suprarenal density was stable. The left pleural effusion was slightly larger. However, it had a density of serous fluid likely representing increase in the fluid due to cardiac or fluid overload state.  · Regimen was changed to mini-RCHOP starting cycle #3 which she received on 5/16/2022.  · She tolerated the treatment better after the dose reduction.   · CT scans on 6/23/2022 showed evidence of response.  There was resolution of the pericardial effusion and near complete resolution of the pleural effusions.  No new abnormality within the chest.  · Patient is due for cycle #5 today.   · I recommended continuing treatment for a total of 6 cycles followed by a PET scan.    *Neutropenia secondary to chemotherapy.  · Neutrophil count decreased to 0.04 on 4/4/2022.  · She was placed on prophylactic antibiotic with Augmentin.  · After cycle #2, she developed neutropenia with neutropenic fever despite Neulasta support and Augmentin which was started on day #6.  · Patient was hospitalized between 4/26/2022 and 5/4/2022.  · Neutrophil count subsequently recovered.  · Neutrophil count is today at 4020.    *Thrombocytopenia secondary to chemotherapy.  · Platelet count decreased to 110,000 on day #8 of her first cycle of chemotherapy.  · Platelet count improved afterwards.  · Following cycle #2, Platelet count decreased to 57,000 on  4/28/2022.  · Platelet count recovered afterwards.  It was 510,000 on 5/9/2022.  · Platelet count was 365,000 on 5/16/2022.  · She did not develop thrombocytopenia following cycle #3.  · Platelet count is 207,000 today.  · She is not having problems with bleeding.    *Bilateral pleural effusions.   · She is s/p right thoracentesis on 2/21/2022 and the effusion was found to be malignant attributed to the lymphoma.   · CT scan on 5/1/2022 revealed decrease in the right pleural effusion and an increase in the left effusion.   · The increase in the left effusion was suspected of being due to cardiac and fluid overload states (fluid appeared to be serous on CT scan).  · CT on 6/23/2020 revealed near resolution of the pleural effusions.    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  · Patient had anemia secondary to her lymphoma.  · Hemoglobin was 11.1 on 3/28/2022.  · Anemia work-up showed no evidence of iron deficiency.  · Hgb decreased to 7.9 on 4/25/2022 and she was transfused.   · Hemoglobin decreased to 6.3 on 4/27/2022. She was transfused.  · Hemoglobin improved to 8.8 on 5/16/2022.  · Hemoglobin is 9.2 today.    *Paroxysmal atrial fibrillation.  · She is on Eliquis 5 mg twice daily.  · Eliquis was briefly held on 4/27/2022 due to thrombocytopenia.   · Eliquis is currently not being interrupted as her platelet count has remained adequate.    *Constipation.  · Patient has chronic constipation that worsened after the start of chemotherapy.  · Patient was placed on Senokot-S.  · Vincristine dose was reduced to 1 mg with cycle #2.   · Since the vincristine dose reduction, she is not having problem with constipation.    *Weight loss.   · She is drinking Ensure with protein 2 daily.  · We also recommended increasing caloric intake.  · Patient continues to lose weight.  · Recent weight loss is likely secondary to the diuretic effect.  · Weight improved since the last visit    *Choledocholithiasis  with increase in the intra and extrahepatic biliary dilatation seen on CT on 6/23/2022.  · Liver enzymes including bilirubin are normal today.   · She is not having abdominal pain.  No redness no tenderness on exam of the right upper quadrant.  · I recommended avoiding fatty/greasy food.  · I recommended seeking medical attention if she develops symptoms related to the common bile duct stones (abdominal pain or jaundice)    PLAN:    1.  Proceed with cycle #5 of mini R-CHOP today using the same doses.    2.  Patient received on body Neulasta.  3.  Return in 1 in 2 weeks for CBC with RN review.  4.  Follow-up with a nurse practitioner in 3 weeks.  She will be scheduled for cycle #6.   5.  We will obtain a PET scan in 7 weeks.  I will see her in follow-up in 8 weeks to review the scan.  CBC CMP will be obtained.     I spent 45 minutes caring for Michelle on this date of service. This time includes time spent by me in the following activities: preparing for the visit, reviewing tests, obtaining and/or reviewing a separately obtained history, performing a medically appropriate examination and/or evaluation, counseling and educating the patient/family/caregiver, ordering medications, tests, or procedures, documenting information in the medical record, independently interpreting results and communicating that information with the patient/family/caregiver and care coordination       Renate Galo MD  06/27/22

## 2022-06-30 ENCOUNTER — HOME CARE VISIT (OUTPATIENT)
Dept: HOME HEALTH SERVICES | Facility: HOME HEALTHCARE | Age: 82
End: 2022-06-30

## 2022-06-30 VITALS
RESPIRATION RATE: 16 BRPM | SYSTOLIC BLOOD PRESSURE: 102 MMHG | HEART RATE: 74 BPM | DIASTOLIC BLOOD PRESSURE: 70 MMHG | OXYGEN SATURATION: 98 % | TEMPERATURE: 97.2 F

## 2022-06-30 PROCEDURE — G0162 HHC RN E&M PLAN SVS, 15 MIN: HCPCS

## 2022-07-05 ENCOUNTER — TELEPHONE (OUTPATIENT)
Dept: ONCOLOGY | Facility: HOSPITAL | Age: 82
End: 2022-07-05

## 2022-07-05 ENCOUNTER — CLINICAL SUPPORT (OUTPATIENT)
Dept: ONCOLOGY | Facility: HOSPITAL | Age: 82
End: 2022-07-05

## 2022-07-05 ENCOUNTER — LAB (OUTPATIENT)
Dept: LAB | Facility: HOSPITAL | Age: 82
End: 2022-07-05

## 2022-07-05 ENCOUNTER — TELEPHONE (OUTPATIENT)
Dept: ONCOLOGY | Facility: CLINIC | Age: 82
End: 2022-07-05

## 2022-07-05 DIAGNOSIS — T45.1X5A ANEMIA DUE TO CHEMOTHERAPY: ICD-10-CM

## 2022-07-05 DIAGNOSIS — T45.1X5A CHEMOTHERAPY INDUCED NEUTROPENIA: ICD-10-CM

## 2022-07-05 DIAGNOSIS — C83.38 DIFFUSE LARGE B-CELL LYMPHOMA OF LYMPH NODES OF MULTIPLE REGIONS: ICD-10-CM

## 2022-07-05 DIAGNOSIS — D64.81 ANEMIA DUE TO CHEMOTHERAPY: ICD-10-CM

## 2022-07-05 DIAGNOSIS — D70.1 CHEMOTHERAPY INDUCED NEUTROPENIA: ICD-10-CM

## 2022-07-05 DIAGNOSIS — D69.6 THROMBOCYTOPENIA: ICD-10-CM

## 2022-07-05 LAB
BASOPHILS # BLD AUTO: 0.1 10*3/MM3 (ref 0–0.2)
BASOPHILS NFR BLD AUTO: 2.3 % (ref 0–1.5)
DEPRECATED RDW RBC AUTO: 54.9 FL (ref 37–54)
EOSINOPHIL # BLD AUTO: 0.04 10*3/MM3 (ref 0–0.4)
EOSINOPHIL NFR BLD AUTO: 0.9 % (ref 0.3–6.2)
ERYTHROCYTE [DISTWIDTH] IN BLOOD BY AUTOMATED COUNT: 15.9 % (ref 12.3–15.4)
HCT VFR BLD AUTO: 27.5 % (ref 34–46.6)
HGB BLD-MCNC: 9.5 G/DL (ref 12–15.9)
IMM GRANULOCYTES # BLD AUTO: 0.22 10*3/MM3 (ref 0–0.05)
IMM GRANULOCYTES NFR BLD AUTO: 5.2 % (ref 0–0.5)
LYMPHOCYTES # BLD AUTO: 0.48 10*3/MM3 (ref 0.7–3.1)
LYMPHOCYTES NFR BLD AUTO: 11.2 % (ref 19.6–45.3)
MCH RBC QN AUTO: 32.6 PG (ref 26.6–33)
MCHC RBC AUTO-ENTMCNC: 34.5 G/DL (ref 31.5–35.7)
MCV RBC AUTO: 94.5 FL (ref 79–97)
MONOCYTES # BLD AUTO: 0.47 10*3/MM3 (ref 0.1–0.9)
MONOCYTES NFR BLD AUTO: 11 % (ref 5–12)
NEUTROPHILS NFR BLD AUTO: 2.96 10*3/MM3 (ref 1.7–7)
NEUTROPHILS NFR BLD AUTO: 69.4 % (ref 42.7–76)
NRBC BLD AUTO-RTO: 0 /100 WBC (ref 0–0.2)
PLATELET # BLD AUTO: 186 10*3/MM3 (ref 140–450)
PMV BLD AUTO: 9.6 FL (ref 6–12)
RBC # BLD AUTO: 2.91 10*6/MM3 (ref 3.77–5.28)
WBC NRBC COR # BLD: 4.27 10*3/MM3 (ref 3.4–10.8)

## 2022-07-05 PROCEDURE — G0463 HOSPITAL OUTPT CLINIC VISIT: HCPCS

## 2022-07-05 PROCEDURE — 36415 COLL VENOUS BLD VENIPUNCTURE: CPT

## 2022-07-05 PROCEDURE — 85025 COMPLETE CBC W/AUTO DIFF WBC: CPT

## 2022-07-05 NOTE — PROGRESS NOTES
Pt present for visit. Voices no new concerns. No significant changes in counts. Pt wondering if her next tx could be pushed out a week so that one of her family members could attend with her. Message sent to Dr. Galo. Provided pt with copy of labs. Pt v/u.    Lab Results   Component Value Date    WBC 4.27 07/05/2022    HGB 9.5 (L) 07/05/2022    HCT 27.5 (L) 07/05/2022    MCV 94.5 07/05/2022     07/05/2022

## 2022-07-05 NOTE — TELEPHONE ENCOUNTER
----- Message from Marcelina Saha RN sent at 7/5/2022  3:18 PM EDT -----  Regarding: FW:  Please reschedule pts appt on 7/18 to 7/25. She will need to see a provider on this day prior to her treatment. She would also like a call about the new appt time.    Thanks  ----- Message -----  From: Renate Galo MD  Sent: 7/5/2022   2:26 PM EDT  To: Marcelina Saha RN, Ellen Johnston RN  Subject: RE:                                              Ok to delay the chemo but keep PET as scheduled.     Thank you  ----- Message -----  From: Marcelina Saha RN  Sent: 7/5/2022  12:33 PM EDT  To: Renate Galo MD    The pt was wondering if she could move her next tx date on 7/18 to the following week? She would like a family member to come with her to her last tx and her family member isn't able to come until the week of 7/25. It looks like she has a PET scheduled on 8/15. I wasn't sure if this tx got moved if the PET would also need to be moved. Please advise.    Thanks

## 2022-07-05 NOTE — TELEPHONE ENCOUNTER
Called pt to let her know MD response. Pt v/u. Sent scheduling a message.    ----- Message from Renate Galo MD sent at 7/5/2022  2:26 PM EDT -----  Regarding: RE:  Ok to delay the chemo but keep PET as scheduled.     Thank you  ----- Message -----  From: Marcelina Shaa RN  Sent: 7/5/2022  12:33 PM EDT  To: Renate Galo MD    The pt was wondering if she could move her next tx date on 7/18 to the following week? She would like a family member to come with her to her last tx and her family member isn't able to come until the week of 7/25. It looks like she has a PET scheduled on 8/15. I wasn't sure if this tx got moved if the PET would also need to be moved. Please advise.    Thanks

## 2022-07-11 ENCOUNTER — LAB (OUTPATIENT)
Dept: LAB | Facility: HOSPITAL | Age: 82
End: 2022-07-11

## 2022-07-11 ENCOUNTER — CLINICAL SUPPORT (OUTPATIENT)
Dept: ONCOLOGY | Facility: HOSPITAL | Age: 82
End: 2022-07-11

## 2022-07-11 DIAGNOSIS — T45.1X5A CHEMOTHERAPY INDUCED NEUTROPENIA: ICD-10-CM

## 2022-07-11 DIAGNOSIS — D69.6 THROMBOCYTOPENIA: ICD-10-CM

## 2022-07-11 DIAGNOSIS — D70.1 CHEMOTHERAPY INDUCED NEUTROPENIA: ICD-10-CM

## 2022-07-11 DIAGNOSIS — D64.81 ANEMIA DUE TO CHEMOTHERAPY: ICD-10-CM

## 2022-07-11 DIAGNOSIS — T45.1X5A ANEMIA DUE TO CHEMOTHERAPY: ICD-10-CM

## 2022-07-11 DIAGNOSIS — C83.38 DIFFUSE LARGE B-CELL LYMPHOMA OF LYMPH NODES OF MULTIPLE REGIONS: ICD-10-CM

## 2022-07-11 LAB
BASOPHILS # BLD AUTO: 0.09 10*3/MM3 (ref 0–0.2)
BASOPHILS NFR BLD AUTO: 1.1 % (ref 0–1.5)
DEPRECATED RDW RBC AUTO: 55.8 FL (ref 37–54)
EOSINOPHIL # BLD AUTO: 0.02 10*3/MM3 (ref 0–0.4)
EOSINOPHIL NFR BLD AUTO: 0.2 % (ref 0.3–6.2)
ERYTHROCYTE [DISTWIDTH] IN BLOOD BY AUTOMATED COUNT: 16.1 % (ref 12.3–15.4)
HCT VFR BLD AUTO: 29.5 % (ref 34–46.6)
HGB BLD-MCNC: 10.2 G/DL (ref 12–15.9)
IMM GRANULOCYTES # BLD AUTO: 0.42 10*3/MM3 (ref 0–0.05)
IMM GRANULOCYTES NFR BLD AUTO: 5.1 % (ref 0–0.5)
LYMPHOCYTES # BLD AUTO: 0.75 10*3/MM3 (ref 0.7–3.1)
LYMPHOCYTES NFR BLD AUTO: 9.1 % (ref 19.6–45.3)
MCH RBC QN AUTO: 32.9 PG (ref 26.6–33)
MCHC RBC AUTO-ENTMCNC: 34.6 G/DL (ref 31.5–35.7)
MCV RBC AUTO: 95.2 FL (ref 79–97)
MONOCYTES # BLD AUTO: 0.64 10*3/MM3 (ref 0.1–0.9)
MONOCYTES NFR BLD AUTO: 7.8 % (ref 5–12)
NEUTROPHILS NFR BLD AUTO: 6.3 10*3/MM3 (ref 1.7–7)
NEUTROPHILS NFR BLD AUTO: 76.7 % (ref 42.7–76)
NRBC BLD AUTO-RTO: 0 /100 WBC (ref 0–0.2)
PLATELET # BLD AUTO: 176 10*3/MM3 (ref 140–450)
PMV BLD AUTO: 10.1 FL (ref 6–12)
RBC # BLD AUTO: 3.1 10*6/MM3 (ref 3.77–5.28)
WBC NRBC COR # BLD: 8.22 10*3/MM3 (ref 3.4–10.8)

## 2022-07-11 PROCEDURE — G0463 HOSPITAL OUTPT CLINIC VISIT: HCPCS

## 2022-07-11 PROCEDURE — 85025 COMPLETE CBC W/AUTO DIFF WBC: CPT

## 2022-07-11 PROCEDURE — 36415 COLL VENOUS BLD VENIPUNCTURE: CPT

## 2022-07-11 NOTE — NURSING NOTE
Pt is here for lab with RN review.  CBC reviewed with pt, counts are stable for this pt at this time. Pt has no complaints.  Copy of labs given to pt and f/u appt reviewed. Pt is instructed to call the office with any concerns or new symptoms prior to next visit. Pt vu and discharge in stable condition.    Lab Results   Component Value Date    WBC 8.22 07/11/2022    HGB 10.2 (L) 07/11/2022    HCT 29.5 (L) 07/11/2022    MCV 95.2 07/11/2022     07/11/2022

## 2022-07-18 ENCOUNTER — APPOINTMENT (OUTPATIENT)
Dept: ONCOLOGY | Facility: HOSPITAL | Age: 82
End: 2022-07-18

## 2022-07-25 ENCOUNTER — APPOINTMENT (OUTPATIENT)
Dept: ONCOLOGY | Facility: HOSPITAL | Age: 82
End: 2022-07-25

## 2022-07-25 ENCOUNTER — INFUSION (OUTPATIENT)
Dept: ONCOLOGY | Facility: HOSPITAL | Age: 82
End: 2022-07-25

## 2022-07-25 ENCOUNTER — APPOINTMENT (OUTPATIENT)
Dept: LAB | Facility: HOSPITAL | Age: 82
End: 2022-07-25

## 2022-07-25 ENCOUNTER — OFFICE VISIT (OUTPATIENT)
Dept: ONCOLOGY | Facility: CLINIC | Age: 82
End: 2022-07-25

## 2022-07-25 VITALS — DIASTOLIC BLOOD PRESSURE: 61 MMHG | SYSTOLIC BLOOD PRESSURE: 95 MMHG | HEART RATE: 66 BPM

## 2022-07-25 VITALS
HEART RATE: 67 BPM | TEMPERATURE: 97.1 F | OXYGEN SATURATION: 99 % | SYSTOLIC BLOOD PRESSURE: 115 MMHG | RESPIRATION RATE: 16 BRPM | DIASTOLIC BLOOD PRESSURE: 65 MMHG | BODY MASS INDEX: 28.27 KG/M2 | WEIGHT: 144 LBS | HEIGHT: 60 IN

## 2022-07-25 DIAGNOSIS — Z45.2: ICD-10-CM

## 2022-07-25 DIAGNOSIS — C83.38 DIFFUSE LARGE B-CELL LYMPHOMA OF LYMPH NODES OF MULTIPLE REGIONS: Primary | ICD-10-CM

## 2022-07-25 DIAGNOSIS — C83.38 DIFFUSE LARGE B-CELL LYMPHOMA OF LYMPH NODES OF MULTIPLE REGIONS: ICD-10-CM

## 2022-07-25 LAB
ALBUMIN SERPL-MCNC: 4 G/DL (ref 3.5–5.2)
ALBUMIN/GLOB SERPL: 1.7 G/DL (ref 1.1–2.4)
ALP SERPL-CCNC: 75 U/L (ref 38–116)
ALT SERPL W P-5'-P-CCNC: 20 U/L (ref 0–33)
ANION GAP SERPL CALCULATED.3IONS-SCNC: 11.3 MMOL/L (ref 5–15)
AST SERPL-CCNC: 25 U/L (ref 0–32)
BASOPHILS # BLD AUTO: 0.05 10*3/MM3 (ref 0–0.2)
BASOPHILS NFR BLD AUTO: 1.6 % (ref 0–1.5)
BILIRUB SERPL-MCNC: 0.3 MG/DL (ref 0.2–1.2)
BUN SERPL-MCNC: 22 MG/DL (ref 6–20)
BUN/CREAT SERPL: 30.6 (ref 7.3–30)
CALCIUM SPEC-SCNC: 9.3 MG/DL (ref 8.5–10.2)
CHLORIDE SERPL-SCNC: 93 MMOL/L (ref 98–107)
CO2 SERPL-SCNC: 24.7 MMOL/L (ref 22–29)
CREAT SERPL-MCNC: 0.72 MG/DL (ref 0.6–1.1)
DEPRECATED RDW RBC AUTO: 51.8 FL (ref 37–54)
EGFRCR SERPLBLD CKD-EPI 2021: 84.6 ML/MIN/1.73
EOSINOPHIL # BLD AUTO: 0.03 10*3/MM3 (ref 0–0.4)
EOSINOPHIL NFR BLD AUTO: 1 % (ref 0.3–6.2)
ERYTHROCYTE [DISTWIDTH] IN BLOOD BY AUTOMATED COUNT: 14.8 % (ref 12.3–15.4)
GLOBULIN UR ELPH-MCNC: 2.4 GM/DL (ref 1.8–3.5)
GLUCOSE SERPL-MCNC: 98 MG/DL (ref 74–124)
HCT VFR BLD AUTO: 29.8 % (ref 34–46.6)
HGB BLD-MCNC: 10.2 G/DL (ref 12–15.9)
IMM GRANULOCYTES # BLD AUTO: 0.05 10*3/MM3 (ref 0–0.05)
IMM GRANULOCYTES NFR BLD AUTO: 1.6 % (ref 0–0.5)
LYMPHOCYTES # BLD AUTO: 0.7 10*3/MM3 (ref 0.7–3.1)
LYMPHOCYTES NFR BLD AUTO: 22.2 % (ref 19.6–45.3)
MCH RBC QN AUTO: 32.2 PG (ref 26.6–33)
MCHC RBC AUTO-ENTMCNC: 34.2 G/DL (ref 31.5–35.7)
MCV RBC AUTO: 94 FL (ref 79–97)
MONOCYTES # BLD AUTO: 0.44 10*3/MM3 (ref 0.1–0.9)
MONOCYTES NFR BLD AUTO: 14 % (ref 5–12)
NEUTROPHILS NFR BLD AUTO: 1.88 10*3/MM3 (ref 1.7–7)
NEUTROPHILS NFR BLD AUTO: 59.6 % (ref 42.7–76)
NRBC BLD AUTO-RTO: 0 /100 WBC (ref 0–0.2)
PLATELET # BLD AUTO: 173 10*3/MM3 (ref 140–450)
PMV BLD AUTO: 8.2 FL (ref 6–12)
POTASSIUM SERPL-SCNC: 4.5 MMOL/L (ref 3.5–4.7)
PROT SERPL-MCNC: 6.4 G/DL (ref 6.3–8)
RBC # BLD AUTO: 3.17 10*6/MM3 (ref 3.77–5.28)
SODIUM SERPL-SCNC: 129 MMOL/L (ref 134–145)
WBC NRBC COR # BLD: 3.15 10*3/MM3 (ref 3.4–10.8)

## 2022-07-25 PROCEDURE — 25010000002 RITUXIMAB 10 MG/ML SOLUTION 50 ML VIAL

## 2022-07-25 PROCEDURE — 99214 OFFICE O/P EST MOD 30 MIN: CPT

## 2022-07-25 PROCEDURE — 96417 CHEMO IV INFUS EACH ADDL SEQ: CPT

## 2022-07-25 PROCEDURE — 80053 COMPREHEN METABOLIC PANEL: CPT

## 2022-07-25 PROCEDURE — 96413 CHEMO IV INFUSION 1 HR: CPT

## 2022-07-25 PROCEDURE — 85025 COMPLETE CBC W/AUTO DIFF WBC: CPT

## 2022-07-25 PROCEDURE — 25010000002 DEXAMETHASONE SODIUM PHOSPHATE 100 MG/10ML SOLUTION

## 2022-07-25 PROCEDURE — 96411 CHEMO IV PUSH ADDL DRUG: CPT

## 2022-07-25 PROCEDURE — 25010000002 HEPARIN LOCK FLUSH PER 10 UNITS: Performed by: INTERNAL MEDICINE

## 2022-07-25 PROCEDURE — 25010000002 DOXORUBICIN PER 10 MG

## 2022-07-25 PROCEDURE — 25010000002 CYCLOPHOSPHAMIDE 1 GM/5ML SOLUTION 5 ML VIAL

## 2022-07-25 PROCEDURE — 25010000002 PALONOSETRON PER 25 MCG

## 2022-07-25 PROCEDURE — 25010000002 PEGFILGRASTIM 6 MG/0.6ML PREFILLED SYRINGE KIT

## 2022-07-25 PROCEDURE — 25010000002 RITUXIMAB 10 MG/ML SOLUTION 10 ML VIAL

## 2022-07-25 PROCEDURE — 96415 CHEMO IV INFUSION ADDL HR: CPT

## 2022-07-25 PROCEDURE — 25010000002 DIPHENHYDRAMINE PER 50 MG

## 2022-07-25 PROCEDURE — 25010000002 VINCRISTINE PER 1 MG

## 2022-07-25 PROCEDURE — 96377 APPLICATON ON-BODY INJECTOR: CPT

## 2022-07-25 PROCEDURE — 96367 TX/PROPH/DG ADDL SEQ IV INF: CPT

## 2022-07-25 PROCEDURE — 96375 TX/PRO/DX INJ NEW DRUG ADDON: CPT

## 2022-07-25 PROCEDURE — 25010000002 FOSAPREPITANT PER 1 MG

## 2022-07-25 RX ORDER — FAMOTIDINE 10 MG/ML
20 INJECTION, SOLUTION INTRAVENOUS AS NEEDED
Status: CANCELLED | OUTPATIENT
Start: 2022-07-25

## 2022-07-25 RX ORDER — FAMOTIDINE 20 MG/1
20 TABLET, FILM COATED ORAL ONCE
Status: CANCELLED
Start: 2022-07-25 | End: 2022-07-25

## 2022-07-25 RX ORDER — HEPARIN SODIUM (PORCINE) LOCK FLUSH IV SOLN 100 UNIT/ML 100 UNIT/ML
500 SOLUTION INTRAVENOUS AS NEEDED
Status: DISCONTINUED | OUTPATIENT
Start: 2022-07-25 | End: 2022-07-25 | Stop reason: HOSPADM

## 2022-07-25 RX ORDER — SODIUM CHLORIDE 9 MG/ML
250 INJECTION, SOLUTION INTRAVENOUS ONCE
Status: COMPLETED | OUTPATIENT
Start: 2022-07-25 | End: 2022-07-25

## 2022-07-25 RX ORDER — PALONOSETRON 0.05 MG/ML
0.25 INJECTION, SOLUTION INTRAVENOUS ONCE
Status: CANCELLED | OUTPATIENT
Start: 2022-07-25

## 2022-07-25 RX ORDER — SODIUM CHLORIDE 9 MG/ML
250 INJECTION, SOLUTION INTRAVENOUS ONCE
Status: CANCELLED | OUTPATIENT
Start: 2022-07-25

## 2022-07-25 RX ORDER — ACETAMINOPHEN 325 MG/1
650 TABLET ORAL ONCE
Status: CANCELLED | OUTPATIENT
Start: 2022-07-25

## 2022-07-25 RX ORDER — PALONOSETRON 0.05 MG/ML
0.25 INJECTION, SOLUTION INTRAVENOUS ONCE
Status: COMPLETED | OUTPATIENT
Start: 2022-07-25 | End: 2022-07-25

## 2022-07-25 RX ORDER — HEPARIN SODIUM (PORCINE) LOCK FLUSH IV SOLN 100 UNIT/ML 100 UNIT/ML
500 SOLUTION INTRAVENOUS AS NEEDED
Status: CANCELLED | OUTPATIENT
Start: 2022-07-25

## 2022-07-25 RX ORDER — DIPHENHYDRAMINE HYDROCHLORIDE 50 MG/ML
50 INJECTION INTRAMUSCULAR; INTRAVENOUS AS NEEDED
Status: CANCELLED | OUTPATIENT
Start: 2022-07-25

## 2022-07-25 RX ORDER — FAMOTIDINE 20 MG/1
20 TABLET, FILM COATED ORAL ONCE
Status: COMPLETED | OUTPATIENT
Start: 2022-07-25 | End: 2022-07-25

## 2022-07-25 RX ORDER — SODIUM CHLORIDE 0.9 % (FLUSH) 0.9 %
10 SYRINGE (ML) INJECTION AS NEEDED
Status: DISCONTINUED | OUTPATIENT
Start: 2022-07-25 | End: 2022-07-25 | Stop reason: HOSPADM

## 2022-07-25 RX ORDER — SODIUM CHLORIDE 0.9 % (FLUSH) 0.9 %
10 SYRINGE (ML) INJECTION AS NEEDED
Status: CANCELLED | OUTPATIENT
Start: 2022-07-25

## 2022-07-25 RX ORDER — DOXORUBICIN HYDROCHLORIDE 2 MG/ML
25 INJECTION, SOLUTION INTRAVENOUS ONCE
Status: CANCELLED | OUTPATIENT
Start: 2022-07-25

## 2022-07-25 RX ORDER — DOXORUBICIN HYDROCHLORIDE 2 MG/ML
25 INJECTION, SOLUTION INTRAVENOUS ONCE
Status: COMPLETED | OUTPATIENT
Start: 2022-07-25 | End: 2022-07-25

## 2022-07-25 RX ORDER — ACETAMINOPHEN 325 MG/1
650 TABLET ORAL ONCE
Status: COMPLETED | OUTPATIENT
Start: 2022-07-25 | End: 2022-07-25

## 2022-07-25 RX ADMIN — DIPHENHYDRAMINE HYDROCHLORIDE 25 MG: 50 INJECTION, SOLUTION INTRAMUSCULAR; INTRAVENOUS at 09:49

## 2022-07-25 RX ADMIN — DOXORUBICIN HYDROCHLORIDE 40 MG: 2 INJECTION, SOLUTION INTRAVENOUS at 13:41

## 2022-07-25 RX ADMIN — VINCRISTINE SULFATE 1 MG: 1 INJECTION, SOLUTION INTRAVENOUS at 13:51

## 2022-07-25 RX ADMIN — DEXAMETHASONE SODIUM PHOSPHATE 12 MG: 10 INJECTION, SOLUTION INTRAMUSCULAR; INTRAVENOUS at 13:19

## 2022-07-25 RX ADMIN — PEGFILGRASTIM 6 MG: KIT SUBCUTANEOUS at 14:38

## 2022-07-25 RX ADMIN — SODIUM CHLORIDE 100 ML: 9 INJECTION, SOLUTION INTRAVENOUS at 10:07

## 2022-07-25 RX ADMIN — Medication 10 ML: at 14:46

## 2022-07-25 RX ADMIN — RITUXIMAB 600 MG: 10 INJECTION, SOLUTION INTRAVENOUS at 10:39

## 2022-07-25 RX ADMIN — FAMOTIDINE 20 MG: 20 TABLET ORAL at 09:49

## 2022-07-25 RX ADMIN — PALONOSETRON 0.25 MG: 0.05 INJECTION, SOLUTION INTRAVENOUS at 13:17

## 2022-07-25 RX ADMIN — SODIUM CHLORIDE 250 ML: 9 INJECTION, SOLUTION INTRAVENOUS at 09:48

## 2022-07-25 RX ADMIN — ACETAMINOPHEN 650 MG: 325 TABLET ORAL at 09:48

## 2022-07-25 RX ADMIN — Medication 500 UNITS: at 14:46

## 2022-07-25 RX ADMIN — CYCLOPHOSPHAMIDE 650 MG: 200 INJECTION, SOLUTION INTRAVENOUS at 14:07

## 2022-07-25 NOTE — PROGRESS NOTES
Subjective     CHIEF COMPLAINT:      Chief Complaint   Patient presents with   • Follow-up     No concerns       HISTORY OF PRESENT ILLNESS:    Michelle Car is an 81-year-old female returning to the office today for follow-up on her lymphoma.  She is due today for cycle 6 mini R-CHOP.  She continues to tolerate treatment extremely well.  She is eating and drinking adequately.  She denies numbness or tingling's in her hands or feet.  She reports her bowels are moving adequately.  Her appetite is remained stable.  Her energy level remains stable.  She has no new concerns today.      ROS:  Pertinent ROS is in the HPI.     Past medical, surgical, social and family history were reviewed.     MEDICATIONS:    Current Outpatient Medications:   •  amiodarone (PACERONE) 200 MG tablet, Take 1 tablet by mouth Daily., Disp: 30 tablet, Rfl: 0  •  apixaban (ELIQUIS) 5 MG tablet tablet, Take 1 tablet by mouth Every 12 (Twelve) Hours. Indications: Atrial Fibrillation, Disp: 180 tablet, Rfl: 1  •  Calcium Carbonate (CALTRATE 600 PO), Take 1 tablet by mouth Daily., Disp: , Rfl:   •  carvedilol (COREG) 3.125 MG tablet, Take 1 tablet by mouth 2 (Two) Times a Day With Meals., Disp: 180 tablet, Rfl: 1  •  digoxin (LANOXIN) 125 MCG tablet, Take 125 mcg by mouth Daily., Disp: , Rfl:   •  DPH-Lido-AlHydr-MgHydr-Simeth (First Mouthwash, Magic Mouthwash,) suspension, Swish and spit 5 mL Every 6 (Six) Hours As Needed (Oral pain)., Disp: 237 mL, Rfl: 0  •  fexofenadine (ALLEGRA) 180 MG tablet, Take 180 mg by mouth Daily., Disp: , Rfl:   •  losartan (COZAAR) 100 MG tablet, TAKE 1 TABLET BY MOUTH DAILY, Disp: 90 tablet, Rfl: 1  •  Multiple Vitamins-Minerals (CENTRUM SILVER) tablet, Take 1 tablet by mouth Daily., Disp: , Rfl:   •  ondansetron (ZOFRAN) 8 MG tablet, Take 1 tablet by mouth Every 8 (Eight) Hours As Needed for Nausea or Vomiting., Disp: 30 tablet, Rfl: 5  •  polyethylene glycol (MIRALAX) 17 GM/SCOOP powder, Take 17 g by mouth As  "Needed., Disp: , Rfl:   •  pravastatin (PRAVACHOL) 40 MG tablet, TAKE 1 TABLET BY MOUTH EVERY NIGHT AT BEDTIME, Disp: 90 tablet, Rfl: 1  •  predniSONE (DELTASONE) 50 MG tablet, TAKE 2 TABLETS BY MOUTH IN THE MORNING WITH FOOD ON DAYS 2-6 AFTER EACH CYCLE, Disp: , Rfl:   •  spironolactone (ALDACTONE) 25 MG tablet, Take 25 mg by mouth Daily., Disp: , Rfl:   •  acetaminophen (TYLENOL) 500 MG tablet, Take 500 mg by mouth Every 6 (Six) Hours As Needed for Mild Pain ., Disp: , Rfl:   •  carvedilol (COREG) 6.25 MG tablet, TAKE 1 TABLET BY MOUTH TWICE DAILY WITH MEALS, Disp: 180 tablet, Rfl: 3  Objective     VITAL SIGNS:     Vitals:    07/25/22 0857   BP: 115/65   Pulse: 67   Resp: 16   Temp: 97.1 °F (36.2 °C)   TempSrc: Temporal   SpO2: 99%   Weight: 65.3 kg (144 lb)   Height: 152 cm (59.84\")   PainSc: 0-No pain     Body mass index is 28.27 kg/m².     Wt Readings from Last 5 Encounters:   07/25/22 65.3 kg (144 lb)   06/27/22 66.5 kg (146 lb 11.2 oz)   06/22/22 65.8 kg (145 lb 2 oz)   06/17/22 65 kg (143 lb 6 oz)   06/06/22 65.3 kg (143 lb 14.4 oz)     PHYSICAL EXAMINATION:   GENERAL: The patient appears in fair general condition, not in acute distress.  Ambulating with a walker.  SKIN: No ecchymosis.  EYES: No jaundice. Pallor.  LYMPHATICS: No cervical or supraclavicular lymphadenopathy.  CHEST: Normal respiratory effort.  Lungs clear bilaterally.  No added sounds.  CVS: Normal S1-S2.  No murmurs..  ABDOMEN: Soft. No tenderness on careful examination of the right upper quadrant. No Hepatomegaly. No Splenomegaly. No masses.  EXTREMITIES: Chronic edema at the ankles.  No leg edema.  No calf tenderness.    DIAGNOSTIC DATA:     Results from last 7 days   Lab Units 07/25/22  0841   WBC 10*3/mm3 3.15*   NEUTROS ABS 10*3/mm3 1.88   HEMOGLOBIN g/dL 10.2*   HEMATOCRIT % 29.8*   PLATELETS 10*3/mm3 173         CT chest abdomen pelvis on 6/23/2022:  1. There is no lymphadenopathy and splenic size remains normal.  2. There has been " essential resolution of the pericardial effusion and  near complete resolution of the pleural effusions. Significantly  improved aeration at the lower lobes. There is no new abnormality within  the chest.  3. There is significant choledocholithiasis with increase in the intra  and extrahepatic biliary dilatation.    Assessment & Plan    *Diffuse large B-cell lymphoma.     · Patient was found to have bilateral pleural effusions.    · She underwent right thoracentesis on 2/21/2022 and the left thoracentesis on 2/22/2022.    · Analysis of the fluid revealed involvement with diffuse large B-cell lymphoma.    · FISH analysis for BCL6 rearrangement was positive.    · FISH analysis for BCL 1, BCL-2 and MYC rearrangement was negative.    · CT chest on 2/17/2022 revealed no hilar or mediastinal lymphadenopathy.    · Spleen was reported to be normal in size.  · Peripheral blood flow cytometry on 3/7/2022 was negative.  · PET scan on 3/10/2022 revealed significant hypermetabolism in the left adrenal gland and the surrounding soft tissue with SUV up to 34.5. Hypermetabolism in the left pleural thickening with SUV of 7.1. there was less hypermetabolism in the right adrenal gland and in the right pleura.  · She is considered to have stage III non-bulky disease.  · The degree of hypermetabolism is consistent with high grade lymphoma. This concurs with the pathology exam of the pleural fluid cytology that revealed diffuse large B-cell lymphoma.  · R-IPI score of 3 associated with poor risk. This is associated with overall survival of 55%.   · R-CHOP with Neulasta support on 3/28/2022.  · Patient developed neutropenia and thrombocytopenia after cycle#1.  · Vincristine dose was reduced to 1 mg due to constipation.   · Patient developed neutropenia and neutropenic fever that required hospitalization after cycle #2.  · Her performance status decreased as a result. Although she felt better after discharge, she continues to be weak.    · CT scan on 5/1/2022 revealed a decrease in the right pleural effusion. The left suprarenal density was stable. The left pleural effusion was slightly larger. However, it had a density of serous fluid likely representing increase in the fluid due to cardiac or fluid overload state.  · Regimen was changed to mini-RCHOP starting cycle #3 which she received on 5/16/2022.  · She tolerated the treatment better after the dose reduction.   · CT scans on 6/23/2022 showed evidence of response.  There was resolution of the pericardial effusion and near complete resolution of the pleural effusions.  No new abnormality within the chest.  · Patient is due for cycle #6 today.   · PET scan following today's treatment    *Neutropenia secondary to chemotherapy.  · Neutrophil count decreased to 0.04 on 4/4/2022.  · She was placed on prophylactic antibiotic with Augmentin.  · After cycle #2, she developed neutropenia with neutropenic fever despite Neulasta support and Augmentin which was started on day #6.  · Patient was hospitalized between 4/26/2022 and 5/4/2022.  · Neutrophil count subsequently recovered.  · Neutrophil count is today at 1880    *Thrombocytopenia secondary to chemotherapy.  · Platelet count decreased to 110,000 on day #8 of her first cycle of chemotherapy.  · Platelet count improved afterwards.  · Following cycle #2, Platelet count decreased to 57,000 on 4/28/2022.  · Platelet count recovered afterwards.  It was 510,000 on 5/9/2022.  · Platelet count was 365,000 on 5/16/2022.  · She did not develop thrombocytopenia following cycle #3.  · Platelet count is 173,000 today.      *Bilateral pleural effusions.   · She is s/p right thoracentesis on 2/21/2022 and the effusion was found to be malignant attributed to the lymphoma.   · CT scan on 5/1/2022 revealed decrease in the right pleural effusion and an increase in the left effusion.   · The increase in the left effusion was suspected of being due to cardiac and fluid  overload states (fluid appeared to be serous on CT scan).  · CT on 6/23/2020 revealed near resolution of the pleural effusions.    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  · Patient had anemia secondary to her lymphoma.  · Hemoglobin was 11.1 on 3/28/2022.  · Anemia work-up showed no evidence of iron deficiency.  · Hgb decreased to 7.9 on 4/25/2022 and she was transfused.   · Hemoglobin decreased to 6.3 on 4/27/2022. She was transfused.  · Hemoglobin improved to 8.8 on 5/16/2022.  · Hemoglobin is 10.2 today.    *Paroxysmal atrial fibrillation.  · She is on Eliquis 5 mg twice daily.  · Eliquis was briefly held on 4/27/2022 due to thrombocytopenia.   · Eliquis is currently not being interrupted as her platelet count has remained adequate.    *Constipation.  · Patient has chronic constipation that worsened after the start of chemotherapy.  · Patient was placed on Senokot-S.  · Vincristine dose was reduced to 1 mg with cycle #2.   · Since the vincristine dose reduction, she is not having problem with constipation.    *Weight loss.   · She is drinking Ensure with protein 2 daily.  · We also recommended increasing caloric intake.  · Patient continues to lose weight.  · Recent weight loss is likely secondary to the diuretic effect.  · Weight stable    *Choledocholithiasis with increase in the intra and extrahepatic biliary dilatation seen on CT on 6/23/2022.  · Liver enzymes including bilirubin are normal today.   · She is not having abdominal pain.  No redness no tenderness on exam of the right upper quadrant.  · I recommended avoiding fatty/greasy food.  · I recommended seeking medical attention if she develops symptoms related to the common bile duct stones (abdominal pain or jaundice)    PLAN:    1.  Proceed with cycle #6 of mini R-CHOP today using the same doses.    2.  Patient will receive on body Neulasta.  3.  Return in 1 week for CBC with RN review.  4.  We will obtain a PET scan in 3  weeks.  MD follow-up  in 4 weeks to review the scan.  CBC CMP will be obtained.         MAGGI Goyal  07/25/22

## 2022-08-01 ENCOUNTER — LAB (OUTPATIENT)
Dept: LAB | Facility: HOSPITAL | Age: 82
End: 2022-08-01

## 2022-08-01 ENCOUNTER — CLINICAL SUPPORT (OUTPATIENT)
Dept: ONCOLOGY | Facility: HOSPITAL | Age: 82
End: 2022-08-01

## 2022-08-01 DIAGNOSIS — D70.1 CHEMOTHERAPY INDUCED NEUTROPENIA: Primary | ICD-10-CM

## 2022-08-01 DIAGNOSIS — D69.6 THROMBOCYTOPENIA: Primary | ICD-10-CM

## 2022-08-01 DIAGNOSIS — T45.1X5A CHEMOTHERAPY INDUCED NEUTROPENIA: Primary | ICD-10-CM

## 2022-08-01 LAB
BASOPHILS # BLD AUTO: 0.02 10*3/MM3 (ref 0–0.2)
BASOPHILS NFR BLD AUTO: 1.6 % (ref 0–1.5)
DEPRECATED RDW RBC AUTO: 47.2 FL (ref 37–54)
EOSINOPHIL # BLD AUTO: 0.02 10*3/MM3 (ref 0–0.4)
EOSINOPHIL NFR BLD AUTO: 1.6 % (ref 0.3–6.2)
ERYTHROCYTE [DISTWIDTH] IN BLOOD BY AUTOMATED COUNT: 14.2 % (ref 12.3–15.4)
HCT VFR BLD AUTO: 31.8 % (ref 34–46.6)
HGB BLD-MCNC: 11.2 G/DL (ref 12–15.9)
IMM GRANULOCYTES # BLD AUTO: 0.07 10*3/MM3 (ref 0–0.05)
IMM GRANULOCYTES NFR BLD AUTO: 5.5 % (ref 0–0.5)
LYMPHOCYTES # BLD AUTO: 0.52 10*3/MM3 (ref 0.7–3.1)
LYMPHOCYTES NFR BLD AUTO: 40.9 % (ref 19.6–45.3)
MCH RBC QN AUTO: 31.8 PG (ref 26.6–33)
MCHC RBC AUTO-ENTMCNC: 35.2 G/DL (ref 31.5–35.7)
MCV RBC AUTO: 90.3 FL (ref 79–97)
MONOCYTES # BLD AUTO: 0.35 10*3/MM3 (ref 0.1–0.9)
MONOCYTES NFR BLD AUTO: 27.6 % (ref 5–12)
NEUTROPHILS NFR BLD AUTO: 0.29 10*3/MM3 (ref 1.7–7)
NEUTROPHILS NFR BLD AUTO: 22.8 % (ref 42.7–76)
NRBC BLD AUTO-RTO: 0 /100 WBC (ref 0–0.2)
PLATELET # BLD AUTO: 137 10*3/MM3 (ref 140–450)
PMV BLD AUTO: 10 FL (ref 6–12)
RBC # BLD AUTO: 3.52 10*6/MM3 (ref 3.77–5.28)
WBC NRBC COR # BLD: 1.27 10*3/MM3 (ref 3.4–10.8)

## 2022-08-01 PROCEDURE — 85025 COMPLETE CBC W/AUTO DIFF WBC: CPT

## 2022-08-01 PROCEDURE — 36415 COLL VENOUS BLD VENIPUNCTURE: CPT

## 2022-08-01 NOTE — PROGRESS NOTES
Pt present for visit with her brother. Reports having a sore throat and sore nose. Pt has white film over tongue and mouth sores. Pt also has redness to her nose along with a sore on her nose. Denies fever or chills. WBC and ANC have declined. D/w Aylin Viera saw pt and is sending in antibiotic, anti viral, and thrush medication. Pt will return in one week to see if counts and s/s are improving. Pt v/u and given copy of labs. Sent scheduling a message. Pt v/u.    Lab Results   Component Value Date    WBC 1.27 (L) 08/01/2022    HGB 11.2 (L) 08/01/2022    HCT 31.8 (L) 08/01/2022    MCV 90.3 08/01/2022     (L) 08/01/2022

## 2022-08-02 ENCOUNTER — HOSPITAL ENCOUNTER (OUTPATIENT)
Dept: CARDIOLOGY | Facility: HOSPITAL | Age: 82
Discharge: HOME | End: 2022-08-02
Admitting: INTERNAL MEDICINE

## 2022-08-02 ENCOUNTER — OFFICE VISIT (OUTPATIENT)
Dept: CARDIOLOGY | Facility: CLINIC | Age: 82
End: 2022-08-02

## 2022-08-02 ENCOUNTER — TELEPHONE (OUTPATIENT)
Dept: CARDIOLOGY | Facility: CLINIC | Age: 82
End: 2022-08-02

## 2022-08-02 VITALS
DIASTOLIC BLOOD PRESSURE: 60 MMHG | SYSTOLIC BLOOD PRESSURE: 116 MMHG | BODY MASS INDEX: 28.07 KG/M2 | HEART RATE: 76 BPM | HEIGHT: 60 IN | WEIGHT: 143 LBS

## 2022-08-02 VITALS
SYSTOLIC BLOOD PRESSURE: 110 MMHG | DIASTOLIC BLOOD PRESSURE: 70 MMHG | HEIGHT: 60 IN | HEART RATE: 73 BPM | WEIGHT: 144 LBS | BODY MASS INDEX: 28.27 KG/M2 | OXYGEN SATURATION: 99 %

## 2022-08-02 DIAGNOSIS — C83.38 DIFFUSE LARGE B-CELL LYMPHOMA OF LYMPH NODES OF MULTIPLE REGIONS: Primary | ICD-10-CM

## 2022-08-02 DIAGNOSIS — Z79.899 ENCOUNTER FOR MONITORING CARDIOTOXIC DRUG THERAPY: ICD-10-CM

## 2022-08-02 DIAGNOSIS — Z51.81 ENCOUNTER FOR MONITORING CARDIOTOXIC DRUG THERAPY: ICD-10-CM

## 2022-08-02 DIAGNOSIS — I48.0 PAROXYSMAL ATRIAL FIBRILLATION WITH RAPID VENTRICULAR RESPONSE: Primary | ICD-10-CM

## 2022-08-02 DIAGNOSIS — I48.0 PAROXYSMAL ATRIAL FIBRILLATION WITH RAPID VENTRICULAR RESPONSE: ICD-10-CM

## 2022-08-02 LAB
BH CV ECHO LEFT VENTRICLE GLOBAL LONGITUDINAL STRAIN: -25.5 %
BH CV ECHO MEAS - EDV(CUBED): 92.4 ML
BH CV ECHO MEAS - EDV(MOD-SP2): 119 ML
BH CV ECHO MEAS - EDV(MOD-SP4): 116 ML
BH CV ECHO MEAS - EF(MOD-BP): 73.3 %
BH CV ECHO MEAS - EF(MOD-SP2): 73.1 %
BH CV ECHO MEAS - EF(MOD-SP4): 72.4 %
BH CV ECHO MEAS - ESV(CUBED): 14.5 ML
BH CV ECHO MEAS - ESV(MOD-SP2): 32 ML
BH CV ECHO MEAS - ESV(MOD-SP4): 32 ML
BH CV ECHO MEAS - FS: 46 %
BH CV ECHO MEAS - IVS/LVPW: 0.99 CM
BH CV ECHO MEAS - IVSD: 1.28 CM
BH CV ECHO MEAS - LAT PEAK E' VEL: 9.8 CM/SEC
BH CV ECHO MEAS - LV DIASTOLIC VOL/BSA (35-75): 72.3 CM2
BH CV ECHO MEAS - LV MASS(C)D: 221.1 GRAMS
BH CV ECHO MEAS - LV SYSTOLIC VOL/BSA (12-30): 20 CM2
BH CV ECHO MEAS - LVIDD: 4.5 CM
BH CV ECHO MEAS - LVIDS: 2.44 CM
BH CV ECHO MEAS - LVPWD: 1.29 CM
BH CV ECHO MEAS - MED PEAK E' VEL: 7 CM/SEC
BH CV ECHO MEAS - MR MAX PG: 117 MMHG
BH CV ECHO MEAS - MR MAX VEL: 540.8 CM/SEC
BH CV ECHO MEAS - MV A DUR: 0.14 SEC
BH CV ECHO MEAS - MV A MAX VEL: 131.4 CM/SEC
BH CV ECHO MEAS - MV DEC SLOPE: 302 CM/SEC2
BH CV ECHO MEAS - MV DEC TIME: 0.26 MSEC
BH CV ECHO MEAS - MV E MAX VEL: 113 CM/SEC
BH CV ECHO MEAS - MV E/A: 0.86
BH CV ECHO MEAS - MV MAX PG: 7.7 MMHG
BH CV ECHO MEAS - MV MEAN PG: 3 MMHG
BH CV ECHO MEAS - MV P1/2T: 111.6 MSEC
BH CV ECHO MEAS - MV V2 VTI: 37.1 CM
BH CV ECHO MEAS - MVA(P1/2T): 1.97 CM2
BH CV ECHO MEAS - PULM A REVS DUR: 0.13 SEC
BH CV ECHO MEAS - PULM A REVS VEL: 44.1 CM/SEC
BH CV ECHO MEAS - PULM DIAS VEL: 29.1 CM/SEC
BH CV ECHO MEAS - PULM S/D: 1.53
BH CV ECHO MEAS - PULM SYS VEL: 44.6 CM/SEC
BH CV ECHO MEAS - SI(MOD-SP2): 54.2 ML/M2
BH CV ECHO MEAS - SI(MOD-SP4): 52.4 ML/M2
BH CV ECHO MEAS - SV(MOD-SP2): 87 ML
BH CV ECHO MEAS - SV(MOD-SP4): 84 ML
BH CV ECHO MEASUREMENTS AVERAGE E/E' RATIO: 13.45
BH CV XLRA - TDI S': 12.8 CM/SEC
LEFT ATRIUM VOLUME INDEX: 37.9 ML/M2
LV EF 2D ECHO EST: 73 %
MAXIMAL PREDICTED HEART RATE: 140 BPM
STRESS TARGET HR: 119 BPM

## 2022-08-02 PROCEDURE — 93321 DOPPLER ECHO F-UP/LMTD STD: CPT | Performed by: INTERNAL MEDICINE

## 2022-08-02 PROCEDURE — 93321 DOPPLER ECHO F-UP/LMTD STD: CPT

## 2022-08-02 PROCEDURE — 99214 OFFICE O/P EST MOD 30 MIN: CPT | Performed by: NURSE PRACTITIONER

## 2022-08-02 PROCEDURE — 93356 MYOCRD STRAIN IMG SPCKL TRCK: CPT | Performed by: INTERNAL MEDICINE

## 2022-08-02 PROCEDURE — 93325 DOPPLER ECHO COLOR FLOW MAPG: CPT | Performed by: INTERNAL MEDICINE

## 2022-08-02 PROCEDURE — 93325 DOPPLER ECHO COLOR FLOW MAPG: CPT

## 2022-08-02 PROCEDURE — 93356 MYOCRD STRAIN IMG SPCKL TRCK: CPT

## 2022-08-02 PROCEDURE — 93308 TTE F-UP OR LMTD: CPT

## 2022-08-02 PROCEDURE — 93000 ELECTROCARDIOGRAM COMPLETE: CPT | Performed by: NURSE PRACTITIONER

## 2022-08-02 PROCEDURE — 93308 TTE F-UP OR LMTD: CPT | Performed by: INTERNAL MEDICINE

## 2022-08-02 PROCEDURE — 25010000002 PERFLUTREN (DEFINITY) 8.476 MG IN SODIUM CHLORIDE (PF) 0.9 % 10 ML INJECTION: Performed by: INTERNAL MEDICINE

## 2022-08-02 RX ADMIN — PERFLUTREN 1.5 ML: 6.52 INJECTION, SUSPENSION INTRAVENOUS at 13:03

## 2022-08-02 NOTE — PROGRESS NOTES
Date of Office Visit: 2022  Encounter Provider: MAGGI Roper  Place of Service: [facility] CARDIOLOGY  Patient Name: Michelle Car  :1940    Chief Complaint   Patient presents with   • Atrial Fibrillation   :     HPI: Michelle Car is a 81 y.o. female.  She is a patient of Dr. Nichole' whom we follow for cardio oncology management due to large B-cell lymphoma.  Additionally, she has paroxysmal atrial fibrillation, hypertension, and hyperlipidemia.  She was previously on amiodarone which was discontinued due to a possible interaction with Adriamycin.   In April, she was admitted with neutropenia, fever, E. coli bacteremia, and atrial fibrillation RVR.  Amiodarone was resumed.   She was last seen in the office by Dr. Nichole on  at which time she was feeling better.  It was slightly unclear what her medical regimen was.  After long discussion, Dr. Nichole decided to continue her current regimen including amiodarone.  She was advised to follow-up in 2 months with a repeat echocardiogram.   From a cardiac standpoint, she has no complaints.  She denies any chest pain, shortness of breath, palpitations, dizziness, or syncope.  She reports lower extremity swelling that is chronic and unchanged.  Her biggest complaint is thrush for which she is currently using Hanane's Magic mouthwash and has also been prescribed an antibiotic.  She finished treatment #6 on  and is scheduled for a PET scan on the .    Past Medical History:   Diagnosis Date   • Abdominal aortic atherosclerosis (HCC)    • Atrial fibrillation (HCC)    • CAD (coronary artery disease)    • Colon polyps    • CRI (chronic renal insufficiency), stage 2 (mild)    • Diffuse large B cell lymphoma (HCC) 2022   • Hearing loss    • Hypercalcemia 2016    resolved as of    • Hypercholesterolemia    • Hypertension    • Hyponatremia 2022   • Venous insufficiency        Past Surgical History:    Procedure Laterality Date   • CHOLECYSTECTOMY N/A    • COLONOSCOPY N/A 2000    1 or 2 polyps, otherwise normal per patient   • VENOUS ACCESS DEVICE (PORT) INSERTION Left 3/23/2022    Procedure: INSERTION VENOUS ACCESS DEVICE;  Surgeon: Alin Delgado MD;  [place] [facility] [place] [facility] [place]  Service: General;  Laterality: Left;       Social History     Socioeconomic History   • Marital status:    Tobacco Use   • Smoking status: Never Smoker   • Smokeless tobacco: Never Used   Vaping Use   • Vaping Use: Never used   Substance and Sexual Activity   • Alcohol use: No   • Drug use: No   • Sexual activity: Not Currently       Family History   Problem Relation Age of Onset   • Malig Hyperthermia Neg Hx        Review of Systems   Constitutional: Negative.   Cardiovascular: Negative.  Negative for chest pain, dyspnea on exertion, leg swelling, orthopnea, paroxysmal nocturnal dyspnea and syncope.   Respiratory: Negative.    Hematologic/Lymphatic: Negative for bleeding problem.   Musculoskeletal: Negative for falls.   Gastrointestinal: Negative for melena.   Neurological: Negative for dizziness and light-headedness.       Allergies   Allergen Reactions   • Factive [Gemifloxacin] Rash         Current Outpatient Medications:   •  acetaminophen (TYLENOL) 500 MG tablet, Take 500 mg by mouth Every 6 (Six) Hours As Needed for Mild Pain ., Disp: , Rfl:   •  amiodarone (PACERONE) 200 MG tablet, Take 1 tablet by mouth Daily., Disp: 30 tablet, Rfl: 0  •  amoxicillin-clavulanate (AUGMENTIN) 875-125 MG per tablet, Take 1 tablet by mouth 2 (Two) Times a Day., Disp: 14 tablet, Rfl: 0  •  apixaban (ELIQUIS) 5 MG tablet tablet, Take 1 tablet by mouth Every 12 (Twelve) Hours. Indications: Atrial Fibrillation, Disp: 180 tablet, Rfl: 1  •  Calcium Carbonate (CALTRATE 600 PO), Take 1 tablet by mouth Daily., Disp: , Rfl:   •  carvedilol (COREG) 3.125 MG tablet, Take 1 tablet by mouth 2 (Two) Times a Day With Meals., Disp: 180 tablet, Rfl: 1  •   "clotrimazole (MYCELEX) 10 MG tayo, Take 1 tablet by mouth 5 (Five) Times a Day for 10 days., Disp: 50 tablet, Rfl: 0  •  DPH-Lido-AlHydr-MgHydr-Simeth (First Mouthwash, Magic Mouthwash,) suspension, Swish and spit 5 mL Every 6 (Six) Hours As Needed (Oral pain)., Disp: 237 mL, Rfl: 0  •  fexofenadine (ALLEGRA) 180 MG tablet, Take 180 mg by mouth Daily., Disp: , Rfl:   •  Multiple Vitamins-Minerals (CENTRUM SILVER) tablet, Take 1 tablet by mouth Daily., Disp: , Rfl:   •  ondansetron (ZOFRAN) 8 MG tablet, Take 1 tablet by mouth Every 8 (Eight) Hours As Needed for Nausea or Vomiting., Disp: 30 tablet, Rfl: 5  •  polyethylene glycol (MIRALAX) 17 GM/SCOOP powder, Take 17 g by mouth As Needed., Disp: , Rfl:   •  pravastatin (PRAVACHOL) 40 MG tablet, TAKE 1 TABLET BY MOUTH EVERY NIGHT AT BEDTIME, Disp: 90 tablet, Rfl: 1  •  predniSONE (DELTASONE) 50 MG tablet, TAKE 2 TABLETS BY MOUTH IN THE MORNING WITH FOOD ON DAYS 2-6 AFTER EACH CYCLE, Disp: , Rfl:   •  spironolactone (ALDACTONE) 25 MG tablet, Take 25 mg by mouth Daily., Disp: , Rfl:   •  valACYclovir (VALTREX) 1000 MG tablet, Take 1 tablet by mouth 2 (Two) Times a Day for 7 days., Disp: 14 tablet, Rfl: 0  No current facility-administered medications for this visit.      Objective:     Vitals:    08/02/22 1315   BP: 116/60   Pulse: 76   Weight: 64.9 kg (143 lb)   Height: 152.4 cm (60\")     Body mass index is 27.93 kg/m².    PHYSICAL EXAM:    Neck:      Vascular: No JVD.   Pulmonary:      Effort: Pulmonary effort is normal.      Breath sounds: Normal breath sounds.   Cardiovascular:      Normal rate. Regular rhythm.      Murmurs: There is no murmur.      No gallop. No click. No rub.   Pulses:     Intact distal pulses.           ECG 12 Lead    Date/Time: 8/2/2022 1:21 PM  Performed by: Vero Eid APRN  Authorized by: Vero Eid APRN   Comparison: compared with previous ECG from 6/2/2022  Similar to previous ECG  Rhythm: sinus rhythm  Rate: normal  BPM: " 76  Conduction: non-specific intraventricular conduction delay              Assessment:       Diagnosis Plan   1. Diffuse large B-cell lymphoma of lymph nodes of multiple regions (HCC)     2. Paroxysmal atrial fibrillation(HCC)  ECG 12 Lead     Orders Placed This Encounter   Procedures   • ECG 12 Lead     This order was created via procedure documentation     Order Specific Question:   Release to patient     Answer:   Immediate          Plan:       1.  B-cell lymphoma.  Finished treatment #6 on 7/25.  Repeat echocardiogram today pending.  Dr. Nichole will review the images and call her with recommendations.      2.  Paroxysmal atrial fibrillation.  She is maintaining sinus rhythm with amiodarone.  She is anticoagulated on Eliquis and rate control with carvedilol.      Overall I think she is doing well.  She denies any symptoms of angina or heart failure.  Further recommendations will be made pending the results of her echocardiogram today.      As always, it has been a pleasure to participate in your patient's care.      Sincerely,         MAGGI Melgar

## 2022-08-03 NOTE — TELEPHONE ENCOUNTER
Notified patient of results/recommendations. Patient verbalized understanding.    Dana Cheung RN  [place] [facility] [place]

## 2022-08-03 NOTE — TELEPHONE ENCOUNTER
Please let the patient know that the echocardiogram looks stable heart strong and strain is unchanged.  Leaky mitral valve is unchanged and remains moderate.  While on amiodarone just have thyroid studies and liver function test checked regularly.  Liver Function test were just checked and normal.  Please have her do a TSH next week when she does labs and CBC.  I have placed order.

## 2022-08-08 ENCOUNTER — LAB (OUTPATIENT)
Dept: LAB | Facility: HOSPITAL | Age: 82
End: 2022-08-08

## 2022-08-08 ENCOUNTER — CLINICAL SUPPORT (OUTPATIENT)
Dept: ONCOLOGY | Facility: HOSPITAL | Age: 82
End: 2022-08-08

## 2022-08-08 DIAGNOSIS — T45.1X5A CHEMOTHERAPY INDUCED NEUTROPENIA: ICD-10-CM

## 2022-08-08 DIAGNOSIS — I48.0 PAROXYSMAL ATRIAL FIBRILLATION WITH RAPID VENTRICULAR RESPONSE: ICD-10-CM

## 2022-08-08 DIAGNOSIS — D70.1 CHEMOTHERAPY INDUCED NEUTROPENIA: ICD-10-CM

## 2022-08-08 LAB
BASOPHILS # BLD AUTO: 0.08 10*3/MM3 (ref 0–0.2)
BASOPHILS NFR BLD AUTO: 1.5 % (ref 0–1.5)
DEPRECATED RDW RBC AUTO: 49.8 FL (ref 37–54)
EOSINOPHIL # BLD AUTO: 0.01 10*3/MM3 (ref 0–0.4)
EOSINOPHIL NFR BLD AUTO: 0.2 % (ref 0.3–6.2)
ERYTHROCYTE [DISTWIDTH] IN BLOOD BY AUTOMATED COUNT: 14.6 % (ref 12.3–15.4)
HCT VFR BLD AUTO: 31.4 % (ref 34–46.6)
HGB BLD-MCNC: 10.6 G/DL (ref 12–15.9)
IMM GRANULOCYTES # BLD AUTO: 0.18 10*3/MM3 (ref 0–0.05)
IMM GRANULOCYTES NFR BLD AUTO: 3.4 % (ref 0–0.5)
LYMPHOCYTES # BLD AUTO: 0.72 10*3/MM3 (ref 0.7–3.1)
LYMPHOCYTES NFR BLD AUTO: 13.6 % (ref 19.6–45.3)
MCH RBC QN AUTO: 31.6 PG (ref 26.6–33)
MCHC RBC AUTO-ENTMCNC: 33.8 G/DL (ref 31.5–35.7)
MCV RBC AUTO: 93.7 FL (ref 79–97)
MONOCYTES # BLD AUTO: 0.33 10*3/MM3 (ref 0.1–0.9)
MONOCYTES NFR BLD AUTO: 6.2 % (ref 5–12)
NEUTROPHILS NFR BLD AUTO: 3.99 10*3/MM3 (ref 1.7–7)
NEUTROPHILS NFR BLD AUTO: 75.1 % (ref 42.7–76)
NRBC BLD AUTO-RTO: 0 /100 WBC (ref 0–0.2)
PLATELET # BLD AUTO: 185 10*3/MM3 (ref 140–450)
PMV BLD AUTO: 8.8 FL (ref 6–12)
RBC # BLD AUTO: 3.35 10*6/MM3 (ref 3.77–5.28)
TSH SERPL DL<=0.05 MIU/L-ACNC: 3 UIU/ML (ref 0.27–4.2)
WBC NRBC COR # BLD: 5.31 10*3/MM3 (ref 3.4–10.8)

## 2022-08-08 PROCEDURE — 36415 COLL VENOUS BLD VENIPUNCTURE: CPT

## 2022-08-08 PROCEDURE — 85025 COMPLETE CBC W/AUTO DIFF WBC: CPT

## 2022-08-08 PROCEDURE — 84443 ASSAY THYROID STIM HORMONE: CPT | Performed by: INTERNAL MEDICINE

## 2022-08-08 RX ORDER — ACYCLOVIR 400 MG/1
400 TABLET ORAL 2 TIMES DAILY
Qty: 60 TABLET | Refills: 0 | Status: SHIPPED | OUTPATIENT
Start: 2022-08-08 | End: 2022-11-10

## 2022-08-08 NOTE — PROGRESS NOTES
Pt present for visit with family member. Pt reports thrush is doing better and cold sore is resolving. Denies sore throat. Pt still has red sores to her nose. Confirms completing valtrex and has 2 days left of antibiotic. Counts are improving. D/w Aylin NP. V/o for acyclovir 400 mg BID x 30 days. Routed script to pts pharmacy. Pt v/u.    Lab Results   Component Value Date    WBC 5.31 08/08/2022    HGB 10.6 (L) 08/08/2022    HCT 31.4 (L) 08/08/2022    MCV 93.7 08/08/2022     08/08/2022

## 2022-08-10 ENCOUNTER — TELEPHONE (OUTPATIENT)
Dept: CARDIOLOGY | Facility: CLINIC | Age: 82
End: 2022-08-10

## 2022-08-15 ENCOUNTER — APPOINTMENT (OUTPATIENT)
Dept: PET IMAGING | Facility: HOSPITAL | Age: 82
End: 2022-08-15

## 2022-08-15 ENCOUNTER — HOSPITAL ENCOUNTER (OUTPATIENT)
Dept: PET IMAGING | Facility: HOSPITAL | Age: 82
Discharge: HOME | End: 2022-08-15

## 2022-08-15 DIAGNOSIS — C83.38 DIFFUSE LARGE B-CELL LYMPHOMA OF LYMPH NODES OF MULTIPLE REGIONS: ICD-10-CM

## 2022-08-15 LAB — GLUCOSE BLDC GLUCOMTR-MCNC: 83 MG/DL (ref 70–130)

## 2022-08-15 PROCEDURE — 0 FLUDEOXYGLUCOSE F18 SOLUTION: Performed by: INTERNAL MEDICINE

## 2022-08-15 PROCEDURE — 78815 PET IMAGE W/CT SKULL-THIGH: CPT

## 2022-08-15 PROCEDURE — 82962 GLUCOSE BLOOD TEST: CPT

## 2022-08-15 PROCEDURE — A9552 F18 FDG: HCPCS | Performed by: INTERNAL MEDICINE

## 2022-08-15 RX ADMIN — FLUDEOXYGLUCOSE F18 1 DOSE: 300 INJECTION INTRAVENOUS at 13:31

## 2022-08-18 DIAGNOSIS — D70.1 CHEMOTHERAPY INDUCED NEUTROPENIA: Primary | ICD-10-CM

## 2022-08-18 DIAGNOSIS — T45.1X5A CHEMOTHERAPY INDUCED NEUTROPENIA: Primary | ICD-10-CM

## 2022-08-18 DIAGNOSIS — C83.38 DIFFUSE LARGE B-CELL LYMPHOMA OF LYMPH NODES OF MULTIPLE REGIONS: ICD-10-CM

## 2022-08-22 ENCOUNTER — INFUSION (OUTPATIENT)
Dept: ONCOLOGY | Facility: HOSPITAL | Age: 82
End: 2022-08-22

## 2022-08-22 ENCOUNTER — OFFICE VISIT (OUTPATIENT)
Dept: ONCOLOGY | Facility: CLINIC | Age: 82
End: 2022-08-22

## 2022-08-22 ENCOUNTER — TELEPHONE (OUTPATIENT)
Dept: ONCOLOGY | Facility: CLINIC | Age: 82
End: 2022-08-22

## 2022-08-22 VITALS
DIASTOLIC BLOOD PRESSURE: 74 MMHG | RESPIRATION RATE: 16 BRPM | OXYGEN SATURATION: 98 % | SYSTOLIC BLOOD PRESSURE: 115 MMHG | HEIGHT: 60 IN | HEART RATE: 66 BPM | TEMPERATURE: 97.3 F | WEIGHT: 144.9 LBS | BODY MASS INDEX: 28.45 KG/M2

## 2022-08-22 DIAGNOSIS — R91.8 PULMONARY NODULES: ICD-10-CM

## 2022-08-22 DIAGNOSIS — T45.1X5A CHEMOTHERAPY INDUCED NEUTROPENIA: ICD-10-CM

## 2022-08-22 DIAGNOSIS — D69.6 THROMBOCYTOPENIA: ICD-10-CM

## 2022-08-22 DIAGNOSIS — D50.9 IRON DEFICIENCY ANEMIA, UNSPECIFIED IRON DEFICIENCY ANEMIA TYPE: ICD-10-CM

## 2022-08-22 DIAGNOSIS — Z45.2: ICD-10-CM

## 2022-08-22 DIAGNOSIS — C83.38 DIFFUSE LARGE B-CELL LYMPHOMA OF LYMPH NODES OF MULTIPLE REGIONS: Primary | ICD-10-CM

## 2022-08-22 DIAGNOSIS — T45.1X5A ANEMIA DUE TO CHEMOTHERAPY: ICD-10-CM

## 2022-08-22 DIAGNOSIS — D70.1 CHEMOTHERAPY INDUCED NEUTROPENIA: ICD-10-CM

## 2022-08-22 DIAGNOSIS — K80.50 COMMON BILE DUCT STONE: ICD-10-CM

## 2022-08-22 DIAGNOSIS — Z79.01 CHRONIC ANTICOAGULATION: ICD-10-CM

## 2022-08-22 DIAGNOSIS — D64.81 ANEMIA DUE TO CHEMOTHERAPY: ICD-10-CM

## 2022-08-22 PROBLEM — D61.810 PANCYTOPENIA DUE TO CHEMOTHERAPY: Status: RESOLVED | Noted: 2022-04-27 | Resolved: 2022-08-22

## 2022-08-22 PROBLEM — D70.9 NEUTROPENIC FEVER: Status: RESOLVED | Noted: 2022-04-26 | Resolved: 2022-08-22

## 2022-08-22 PROBLEM — R50.81 NEUTROPENIC FEVER: Status: RESOLVED | Noted: 2022-04-26 | Resolved: 2022-08-22

## 2022-08-22 LAB
ALBUMIN SERPL-MCNC: 3.8 G/DL (ref 3.5–5.2)
ALBUMIN/GLOB SERPL: 1.5 G/DL (ref 1.1–2.4)
ALP SERPL-CCNC: 86 U/L (ref 38–116)
ALT SERPL W P-5'-P-CCNC: 16 U/L (ref 0–33)
ANION GAP SERPL CALCULATED.3IONS-SCNC: 11.3 MMOL/L (ref 5–15)
AST SERPL-CCNC: 24 U/L (ref 0–32)
BASOPHILS # BLD AUTO: 0.04 10*3/MM3 (ref 0–0.2)
BASOPHILS NFR BLD AUTO: 1.3 % (ref 0–1.5)
BILIRUB SERPL-MCNC: 0.3 MG/DL (ref 0.2–1.2)
BUN SERPL-MCNC: 20 MG/DL (ref 6–20)
BUN/CREAT SERPL: 27.4 (ref 7.3–30)
CALCIUM SPEC-SCNC: 9.3 MG/DL (ref 8.5–10.2)
CHLORIDE SERPL-SCNC: 95 MMOL/L (ref 98–107)
CO2 SERPL-SCNC: 23.7 MMOL/L (ref 22–29)
CREAT SERPL-MCNC: 0.73 MG/DL (ref 0.6–1.1)
DEPRECATED RDW RBC AUTO: 52.6 FL (ref 37–54)
EGFRCR SERPLBLD CKD-EPI 2021: 83.3 ML/MIN/1.73
EOSINOPHIL # BLD AUTO: 0.06 10*3/MM3 (ref 0–0.4)
EOSINOPHIL NFR BLD AUTO: 1.9 % (ref 0.3–6.2)
ERYTHROCYTE [DISTWIDTH] IN BLOOD BY AUTOMATED COUNT: 15.3 % (ref 12.3–15.4)
FERRITIN SERPL-MCNC: 330.4 NG/ML (ref 13–150)
GLOBULIN UR ELPH-MCNC: 2.5 GM/DL (ref 1.8–3.5)
GLUCOSE SERPL-MCNC: 96 MG/DL (ref 74–124)
HCT VFR BLD AUTO: 29.1 % (ref 34–46.6)
HGB BLD-MCNC: 9.9 G/DL (ref 12–15.9)
IMM GRANULOCYTES # BLD AUTO: 0.01 10*3/MM3 (ref 0–0.05)
IMM GRANULOCYTES NFR BLD AUTO: 0.3 % (ref 0–0.5)
IRON 24H UR-MRATE: 40 MCG/DL (ref 37–145)
IRON SATN MFR SERPL: 13 % (ref 14–48)
LDH SERPL-CCNC: 280 U/L (ref 99–259)
LYMPHOCYTES # BLD AUTO: 0.63 10*3/MM3 (ref 0.7–3.1)
LYMPHOCYTES NFR BLD AUTO: 20.3 % (ref 19.6–45.3)
MCH RBC QN AUTO: 31.8 PG (ref 26.6–33)
MCHC RBC AUTO-ENTMCNC: 34 G/DL (ref 31.5–35.7)
MCV RBC AUTO: 93.6 FL (ref 79–97)
MONOCYTES # BLD AUTO: 0.39 10*3/MM3 (ref 0.1–0.9)
MONOCYTES NFR BLD AUTO: 12.6 % (ref 5–12)
NEUTROPHILS NFR BLD AUTO: 1.97 10*3/MM3 (ref 1.7–7)
NEUTROPHILS NFR BLD AUTO: 63.6 % (ref 42.7–76)
NRBC BLD AUTO-RTO: 0 /100 WBC (ref 0–0.2)
PLATELET # BLD AUTO: 213 10*3/MM3 (ref 140–450)
PMV BLD AUTO: 8.8 FL (ref 6–12)
POTASSIUM SERPL-SCNC: 4.6 MMOL/L (ref 3.5–4.7)
PROT SERPL-MCNC: 6.3 G/DL (ref 6.3–8)
RBC # BLD AUTO: 3.11 10*6/MM3 (ref 3.77–5.28)
SODIUM SERPL-SCNC: 130 MMOL/L (ref 134–145)
TIBC SERPL-MCNC: 318 MCG/DL (ref 249–505)
TRANSFERRIN SERPL-MCNC: 227 MG/DL (ref 200–360)
WBC NRBC COR # BLD: 3.1 10*3/MM3 (ref 3.4–10.8)

## 2022-08-22 PROCEDURE — 85025 COMPLETE CBC W/AUTO DIFF WBC: CPT

## 2022-08-22 PROCEDURE — 36591 DRAW BLOOD OFF VENOUS DEVICE: CPT

## 2022-08-22 PROCEDURE — 83615 LACTATE (LD) (LDH) ENZYME: CPT | Performed by: INTERNAL MEDICINE

## 2022-08-22 PROCEDURE — 36415 COLL VENOUS BLD VENIPUNCTURE: CPT | Performed by: INTERNAL MEDICINE

## 2022-08-22 PROCEDURE — 96413 CHEMO IV INFUSION 1 HR: CPT

## 2022-08-22 PROCEDURE — 83540 ASSAY OF IRON: CPT | Performed by: INTERNAL MEDICINE

## 2022-08-22 PROCEDURE — 25010000002 HEPARIN LOCK FLUSH PER 10 UNITS: Performed by: INTERNAL MEDICINE

## 2022-08-22 PROCEDURE — 82728 ASSAY OF FERRITIN: CPT | Performed by: INTERNAL MEDICINE

## 2022-08-22 PROCEDURE — 99215 OFFICE O/P EST HI 40 MIN: CPT | Performed by: INTERNAL MEDICINE

## 2022-08-22 PROCEDURE — 80053 COMPREHEN METABOLIC PANEL: CPT

## 2022-08-22 PROCEDURE — 84466 ASSAY OF TRANSFERRIN: CPT | Performed by: INTERNAL MEDICINE

## 2022-08-22 RX ORDER — FERROUS SULFATE 325(65) MG
325 TABLET ORAL EVERY OTHER DAY
Qty: 90 TABLET | Refills: 1 | Status: SHIPPED | OUTPATIENT
Start: 2022-08-22 | End: 2023-03-14

## 2022-08-22 RX ORDER — HEPARIN SODIUM (PORCINE) LOCK FLUSH IV SOLN 100 UNIT/ML 100 UNIT/ML
500 SOLUTION INTRAVENOUS AS NEEDED
Status: DISCONTINUED | OUTPATIENT
Start: 2022-08-22 | End: 2022-08-22 | Stop reason: HOSPADM

## 2022-08-22 RX ORDER — HEPARIN SODIUM (PORCINE) LOCK FLUSH IV SOLN 100 UNIT/ML 100 UNIT/ML
500 SOLUTION INTRAVENOUS AS NEEDED
Status: CANCELLED | OUTPATIENT
Start: 2022-08-22

## 2022-08-22 RX ORDER — DOXYCYCLINE HYCLATE 100 MG/1
100 CAPSULE ORAL 2 TIMES DAILY
Qty: 60 CAPSULE | Refills: 0 | Status: SHIPPED | OUTPATIENT
Start: 2022-08-22 | End: 2022-11-10

## 2022-08-22 RX ORDER — SODIUM CHLORIDE 0.9 % (FLUSH) 0.9 %
10 SYRINGE (ML) INJECTION AS NEEDED
Status: CANCELLED | OUTPATIENT
Start: 2022-08-22

## 2022-08-22 RX ADMIN — SODIUM CHLORIDE, PRESERVATIVE FREE 500 UNITS: 5 INJECTION INTRAVENOUS at 08:06

## 2022-08-22 NOTE — PROGRESS NOTES
Subjective     CHIEF COMPLAINT:      Chief Complaint   Patient presents with   • Follow-up       HISTORY OF PRESENT ILLNESS:     Michelle Car is a 81 y.o. female patient who returns today for follow up on her lymphoma.  She returns today for follow-up accompanied by her son.  She reports improvement in the fatigue.  She is not having chest pain or shortness of breath.    Patient reports developing skin rash over the nose.  It improves slightly since last month.  No pain or itching.  No other areas of skin rash.    Patient is not having pain at the site of the port.    ROS:  Pertinent ROS is in the HPI.     Past medical, surgical, social and family history were reviewed.     MEDICATIONS:    Current Outpatient Medications:   •  acetaminophen (TYLENOL) 500 MG tablet, Take 500 mg by mouth Every 6 (Six) Hours As Needed for Mild Pain ., Disp: , Rfl:   •  acyclovir (ZOVIRAX) 400 MG tablet, Take 1 tablet by mouth 2 (Two) Times a Day. Take no more than 5 doses a day., Disp: 60 tablet, Rfl: 0  •  amiodarone (PACERONE) 200 MG tablet, Take 1 tablet by mouth Daily., Disp: 30 tablet, Rfl: 0  •  amoxicillin-clavulanate (AUGMENTIN) 875-125 MG per tablet, Take 1 tablet by mouth 2 (Two) Times a Day., Disp: 14 tablet, Rfl: 0  •  apixaban (ELIQUIS) 5 MG tablet tablet, Take 1 tablet by mouth Every 12 (Twelve) Hours. Indications: Atrial Fibrillation, Disp: 180 tablet, Rfl: 1  •  Calcium Carbonate (CALTRATE 600 PO), Take 1 tablet by mouth Daily., Disp: , Rfl:   •  carvedilol (COREG) 3.125 MG tablet, Take 1 tablet by mouth 2 (Two) Times a Day With Meals., Disp: 180 tablet, Rfl: 1  •  DPH-Lido-AlHydr-MgHydr-Simeth (First Mouthwash, Magic Mouthwash,) suspension, Swish and spit 5 mL Every 6 (Six) Hours As Needed (Oral pain)., Disp: 237 mL, Rfl: 0  •  fexofenadine (ALLEGRA) 180 MG tablet, Take 180 mg by mouth Daily., Disp: , Rfl:   •  Multiple Vitamins-Minerals (CENTRUM SILVER) tablet, Take 1 tablet by mouth Daily., Disp: , Rfl:   •   "ondansetron (ZOFRAN) 8 MG tablet, Take 1 tablet by mouth Every 8 (Eight) Hours As Needed for Nausea or Vomiting., Disp: 30 tablet, Rfl: 5  •  polyethylene glycol (MIRALAX) 17 GM/SCOOP powder, Take 17 g by mouth As Needed., Disp: , Rfl:   •  pravastatin (PRAVACHOL) 40 MG tablet, TAKE 1 TABLET BY MOUTH EVERY NIGHT AT BEDTIME, Disp: 90 tablet, Rfl: 1  •  predniSONE (DELTASONE) 50 MG tablet, TAKE 2 TABLETS BY MOUTH IN THE MORNING WITH FOOD ON DAYS 2-6 AFTER EACH CYCLE, Disp: , Rfl:   •  spironolactone (ALDACTONE) 25 MG tablet, Take 25 mg by mouth Daily., Disp: , Rfl:   No current facility-administered medications for this visit.  Objective     VITAL SIGNS:     Vitals:    08/22/22 0816   BP: 115/74   Pulse: 66   Resp: 16   Temp: 97.3 °F (36.3 °C)   TempSrc: Temporal   SpO2: 98%   Weight: 65.7 kg (144 lb 14.4 oz)   Height: 152.4 cm (60\")   PainSc: 0-No pain     Body mass index is 28.3 kg/m².     Wt Readings from Last 5 Encounters:   08/22/22 65.7 kg (144 lb 14.4 oz)   08/02/22 65.3 kg (144 lb)   08/02/22 64.9 kg (143 lb)   07/25/22 65.3 kg (144 lb)   06/27/22 66.5 kg (146 lb 11.2 oz)     PHYSICAL EXAMINATION:   GENERAL: The patient appears in good general condition, not in acute distress.   SKIN: No ecchymosis.  Mild erythema at the nose.  EYES: No jaundice. Pallor.  LYMPHATICS: No cervical lymphadenopathy.  CHEST: Normal respiratory effort.  Lungs clear bilaterally.  No added sounds.  CVS: Normal S1-S2.  Grade 2 systolic murmur.  ABDOMEN: Soft. No tenderness. No Hepatomegaly. No Splenomegaly. No masses.  EXTREMITIES: Trace edema at the ankles.  No calf tenderness.    DIAGNOSTIC DATA:     Results from last 7 days   Lab Units 08/22/22  0758   WBC 10*3/mm3 3.10*   NEUTROS ABS 10*3/mm3 1.97   HEMOGLOBIN g/dL 9.9*   HEMATOCRIT % 29.1*   PLATELETS 10*3/mm3 213     Results from last 7 days   Lab Units 08/22/22  0758   SODIUM mmol/L 130*   POTASSIUM mmol/L 4.6   CHLORIDE mmol/L 95*   CO2 mmol/L 23.7   BUN mg/dL 20   CREATININE " mg/dL 0.73   CALCIUM mg/dL 9.3   ALBUMIN g/dL 3.80   BILIRUBIN mg/dL 0.3   ALK PHOS U/L 86   ALT (SGPT) U/L 16   AST (SGOT) U/L 24   GLUCOSE mg/dL 96     Component      Latest Ref Rng & Units 3/7/2022 3/28/2022 8/22/2022   LDH      99 - 259 U/L 239 (H) 244 280 (H)         Lab 08/22/22  0758   IRON 40   IRON SATURATION 13*   TIBC 318   TRANSFERRIN 227   FERRITIN 330.40*      PET scan on 8/15/2022:  1.  No findings of FDG avid disease within the neck, chest, abdomen or  pelvis concerning uptake significantly above that of mediastinal blood  pool.  2.  Trace bilateral pleural effusions are grossly unchanged minimally  decreased in size since 06/23/2022.  3.  Choledocholithiasis with findings of intra and extra hepatic biliary  ductal calculi with severe intra-axial hepatic biliary ductal dilation,  as seen on 06/23/2022.  4.  A few new subcentimeter pulmonary nodules are present and  indeterminate. Continued close attention on follow-up is recommended  with chest CT in 3 months to ensure stability.      Assessment & Plan    *Diffuse large B-cell lymphoma.     · Patient was found to have bilateral pleural effusions.    · She underwent right thoracentesis on 2/21/2022 and the left thoracentesis on 2/22/2022.    · Analysis of the fluid revealed involvement with diffuse large B-cell lymphoma.    · FISH analysis for BCL6 rearrangement was positive.    · FISH analysis for BCL 1, BCL-2 and MYC rearrangement was negative.    · CT chest on 2/17/2022 revealed no hilar or mediastinal lymphadenopathy.    · Spleen was reported to be normal in size.  · Peripheral blood flow cytometry on 3/7/2022 was negative.  · PET scan on 3/10/2022 revealed significant hypermetabolism in the left adrenal gland and the surrounding soft tissue with SUV up to 34.5. Hypermetabolism in the left pleural thickening with SUV of 7.1. there was less hypermetabolism in the right adrenal gland and in the right pleura.  · She is considered to have stage III  non-bulky disease.  · The degree of hypermetabolism is consistent with high grade lymphoma. This concurs with the pathology exam of the pleural fluid cytology that revealed diffuse large B-cell lymphoma.  · R-IPI score of 3 associated with poor risk. This is associated with overall survival of 55%.   · R-CHOP with Neulasta support on 3/28/2022.  · Patient developed neutropenia and thrombocytopenia after cycle#1.  · Vincristine dose was reduced to 1 mg due to constipation.   · Patient developed neutropenia and neutropenic fever that required hospitalization after cycle #2.  · Her performance status decreased as a result. Although she felt better after discharge, she continues to be weak.   · CT scan on 5/1/2022 revealed a decrease in the right pleural effusion. The left suprarenal density was stable. The left pleural effusion was slightly larger. However, it had a density of serous fluid likely representing increase in the fluid due to cardiac or fluid overload state.  · Regimen was changed to mini-RCHOP starting cycle #3 which she received on 5/16/2022.  · She tolerated the treatment better after the dose reduction.   · CT scans on 6/23/2022 showed evidence of response.  There was resolution of the pericardial effusion and near complete resolution of the pleural effusions.  No new abnormality within the chest.  · Patient received cycle #6 on 7/25/2022.  · PET scan on 8/15/2022 revealed complete metabolic response.  · I explained to the patient that she is considered to be in remission.    *Neutropenia secondary to chemotherapy.  · Neutrophil count decreased to 0.04 on 4/4/2022.  · She was placed on prophylactic antibiotic with Augmentin.  · After cycle #2, she developed neutropenia with neutropenic fever despite Neulasta support and Augmentin which was started on day #6.  · Patient was hospitalized between 4/26/2022 and 5/4/2022.  · Neutrophil count subsequently recovered.  · Neutrophil count is 1170  today.    *Thrombocytopenia secondary to chemotherapy.  · Platelet count decreased to 110,000 on day #8 of her first cycle of chemotherapy.  · Platelet count improved afterwards.  · Following cycle #2, Platelet count decreased to 57,000 on 4/28/2022.  · Platelet count recovered afterwards.  It was 510,000 on 5/9/2022.  · Platelet count was 365,000 on 5/16/2022.  · She did not develop thrombocytopenia following cycle #3.  · Platelet count is 215,000 today.    *Bilateral pleural effusions.   · She is s/p right thoracentesis on 2/21/2022 and the effusion was found to be malignant attributed to the lymphoma.   · CT scan on 5/1/2022 revealed decrease in the right pleural effusion and an increase in the left effusion.   · The increase in the left effusion was suspected of being due to cardiac and fluid overload states (fluid appeared to be serous on CT scan).  · CT on 6/23/2022 revealed near resolution of the pleural effusions.  · PET scan on 8/15/2022 showed that the pleural effusions were trace and improved since June 2022.    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  · Patient had anemia secondary to her lymphoma.  · Hemoglobin was 11.1 on 3/28/2022.  · Anemia work-up showed no evidence of iron deficiency.  · Hgb decreased to 7.9 on 4/25/2022 and she was transfused.   · Hemoglobin decreased to 6.3 on 4/27/2022. She was transfused.  · Hemoglobin improved to 8.8 on 5/16/2022.  · Hemoglobin is 9.9 today.  · I obtained anemia iron studies today.  Transferrin saturation decreased to 13%.  Ferritin is elevated at 330 but likely reflects chronic inflammation.    *Paroxysmal atrial fibrillation.  · She is on Eliquis 5 mg twice daily.  · Eliquis was briefly held on 4/27/2022 due to thrombocytopenia.   · She continues Eliquis.    *Choledocholithiasis with increase in the intra and extrahepatic biliary dilatation seen on CT on 6/23/2022.  · Liver enzymes including bilirubin are normal today.   · She is  not having abdominal pain.  No redness no tenderness on exam of the right upper quadrant.  · PET scan on 8/15/2022 showed persistence of the findings.  No symptoms or signs of infection.  · I recommended evaluation by his Hepatology.    *Few new subcentimeter pulmonary nodules were seen on PET scan and they were considered indeterminate.  Patient reports having recent episodes of cough and congestion.  Cough was productive of yellow sputum.  I recommended repeating CT scan in 3 months to reevaluate.    *Patient has a port.  I recommended flushing the port every 6 weeks.  If there is no evidence of recurrence of the lymphoma at the 1 year cem, I would recommend removing the port.    PLAN:    1.  Start ferrous sulfate 325 mg every other day. In addition, I asked her to increase intake of iron rich food.   2.  We will treat the skin rash over the nose with doxycycline twice daily x1 month.  If it does not improve, I asked her to notify us and we will refer to dermatology.    3.  Return in 6 weeks for port flush.  4.  Obtain CT scan of the chest abdomen pelvis in 3 months.  I will see him in follow-up 1 week after to review the results.     I spent 60 minutes caring for Michelle on this date of service. This time includes time spent by me in the following activities: preparing for the visit, reviewing tests, obtaining and/or reviewing a separately obtained history, performing a medically appropriate examination and/or evaluation, counseling and educating the patient/family/caregiver, ordering medications, tests, or procedures, referring and communicating with other health care professionals, documenting information in the medical record, independently interpreting results and communicating that information with the patient/family/caregiver and care coordination       Renate Galo MD  08/22/22

## 2022-08-22 NOTE — TELEPHONE ENCOUNTER
Reviewed Dr. Galo's note and instructions with pts son, he v/u and repeated back the instructions. RN advised Helder that the ferrous sulfate should be taken 2 hours before or two hours after the antibiotic, nicole v/u.

## 2022-08-22 NOTE — TELEPHONE ENCOUNTER
----- Message from Renate Galo MD sent at 8/22/2022 12:22 PM EDT -----  Please inform the patient that her labs showed iron to be low.  I recommend starting ferrous sulfate 325 mg every other day.  Please explain to her that this may result in constipation and change in the stool color.    Thank you

## 2022-09-01 ENCOUNTER — TELEPHONE (OUTPATIENT)
Dept: GASTROENTEROLOGY | Facility: CLINIC | Age: 82
End: 2022-09-01

## 2022-09-01 ENCOUNTER — OFFICE VISIT (OUTPATIENT)
Dept: GASTROENTEROLOGY | Facility: CLINIC | Age: 82
End: 2022-09-01

## 2022-09-01 VITALS
DIASTOLIC BLOOD PRESSURE: 58 MMHG | OXYGEN SATURATION: 96 % | WEIGHT: 144.2 LBS | BODY MASS INDEX: 28.31 KG/M2 | HEIGHT: 60 IN | SYSTOLIC BLOOD PRESSURE: 104 MMHG | TEMPERATURE: 97.5 F | HEART RATE: 73 BPM

## 2022-09-01 DIAGNOSIS — K80.50 CALCULUS OF BILE DUCT WITHOUT CHOLECYSTITIS AND WITHOUT OBSTRUCTION: Primary | ICD-10-CM

## 2022-09-01 DIAGNOSIS — K64.4 EXTERNAL HEMORRHOID: ICD-10-CM

## 2022-09-01 PROCEDURE — 99203 OFFICE O/P NEW LOW 30 MIN: CPT | Performed by: INTERNAL MEDICINE

## 2022-09-01 NOTE — TELEPHONE ENCOUNTER
JOSE ARMANDO JONES  for ERCP on  OCT 21 arrive at 900AM .Prep instructions given to pt at office.

## 2022-09-01 NOTE — PROGRESS NOTES
Chief Complaint   Patient presents with   • B Cell Lymphoma   • Constipation   • Rectal polyp or hemorrhoid per patient     Mihcelle Car is a 81 y.o. female who presents with an abnormal CT  HPI     Patient 81-year-old female with history of hypertension, hyperlipidemia, hypercalcemia, coronary artery disease and B-cell lymphoma presenting with an abnormal CT.  Patient found on CT with layering calcified stones.  Patient 16 years post cholecystectomy here for evaluation of abnormal CT.  Patient denies nausea vomiting no fever chills no jaundice.    Past Medical History:   Diagnosis Date   • Abdominal aortic atherosclerosis (HCC)    • Atrial fibrillation (HCC)    • CAD (coronary artery disease)    • Colon polyps    • CRI (chronic renal insufficiency), stage 2 (mild) 2021   • Diffuse large B cell lymphoma (HCC) 02/2022   • Hearing loss    • Hypercalcemia 2016    resolved as of 2019   • Hypercholesterolemia    • Hypertension    • Hyponatremia 02/17/2022   • Venous insufficiency        Current Outpatient Medications:   •  acetaminophen (TYLENOL) 500 MG tablet, Take 500 mg by mouth Every 6 (Six) Hours As Needed for Mild Pain ., Disp: , Rfl:   •  acyclovir (ZOVIRAX) 400 MG tablet, Take 1 tablet by mouth 2 (Two) Times a Day. Take no more than 5 doses a day., Disp: 60 tablet, Rfl: 0  •  amiodarone (PACERONE) 200 MG tablet, Take 1 tablet by mouth Daily., Disp: 30 tablet, Rfl: 0  •  apixaban (ELIQUIS) 5 MG tablet tablet, Take 1 tablet by mouth Every 12 (Twelve) Hours. Indications: Atrial Fibrillation, Disp: 180 tablet, Rfl: 1  •  Calcium Carbonate (CALTRATE 600 PO), Take 1 tablet by mouth Daily., Disp: , Rfl:   •  carvedilol (COREG) 3.125 MG tablet, Take 1 tablet by mouth 2 (Two) Times a Day With Meals., Disp: 180 tablet, Rfl: 1  •  DPH-Lido-AlHydr-MgHydr-Simeth (First Mouthwash, Magic Mouthwash,) suspension, Swish and spit 5 mL Every 6 (Six) Hours As Needed (Oral pain)., Disp: 237 mL, Rfl: 0  •  ferrous sulfate 325 (65  FE) MG tablet, Take 1 tablet by mouth Every Other Day., Disp: 90 tablet, Rfl: 1  •  Multiple Vitamins-Minerals (CENTRUM SILVER) tablet, Take 1 tablet by mouth Daily., Disp: , Rfl:   •  polyethylene glycol (MIRALAX) 17 GM/SCOOP powder, Take 17 g by mouth As Needed., Disp: , Rfl:   •  pravastatin (PRAVACHOL) 40 MG tablet, TAKE 1 TABLET BY MOUTH EVERY NIGHT AT BEDTIME, Disp: 90 tablet, Rfl: 1  •  spironolactone (ALDACTONE) 25 MG tablet, Take 25 mg by mouth Daily., Disp: , Rfl:   •  doxycycline (VIBRAMYCIN) 100 MG capsule, Take 1 capsule by mouth 2 (Two) Times a Day., Disp: 60 capsule, Rfl: 0  •  ondansetron (ZOFRAN) 8 MG tablet, Take 1 tablet by mouth Every 8 (Eight) Hours As Needed for Nausea or Vomiting., Disp: 30 tablet, Rfl: 5  •  predniSONE (DELTASONE) 50 MG tablet, TAKE 2 TABLETS BY MOUTH IN THE MORNING WITH FOOD ON DAYS 2-6 AFTER EACH CYCLE, Disp: , Rfl:   Allergies   Allergen Reactions   • Factive [Gemifloxacin] Rash     Social History     Socioeconomic History   • Marital status:    Tobacco Use   • Smoking status: Never Smoker   • Smokeless tobacco: Never Used   Vaping Use   • Vaping Use: Never used   Substance and Sexual Activity   • Alcohol use: No   • Drug use: No   • Sexual activity: Not Currently     Family History   Problem Relation Age of Onset   • Malig Hyperthermia Neg Hx      Review of Systems   Constitutional: Negative.    HENT: Negative.    Eyes: Negative.    Respiratory: Negative.    Cardiovascular: Negative.    Gastrointestinal: Negative.    Endocrine: Negative.    Musculoskeletal: Negative.    Skin: Negative.    Allergic/Immunologic: Negative.    Hematological: Negative.      Vitals:    09/01/22 1409   BP: 104/58   Pulse: 73   Temp: 97.5 °F (36.4 °C)   SpO2: 96%     Physical Exam  Vitals and nursing note reviewed.   Constitutional:       Appearance: Normal appearance. She is well-developed.   HENT:      Head: Normocephalic and atraumatic.   Eyes:      General: No scleral icterus.      Pupils: Pupils are equal, round, and reactive to light.   Cardiovascular:      Rate and Rhythm: Normal rate and regular rhythm.      Heart sounds: Normal heart sounds. No murmur heard.    No friction rub. No gallop.   Pulmonary:      Effort: Pulmonary effort is normal.      Breath sounds: Normal breath sounds. No wheezing or rales.   Abdominal:      General: Bowel sounds are normal. There is no distension or abdominal bruit.      Palpations: Abdomen is soft. Abdomen is not rigid. There is no shifting dullness, fluid wave, mass or pulsatile mass.      Tenderness: There is no abdominal tenderness. There is no guarding.      Hernia: No hernia is present.   Musculoskeletal:         General: No swelling or tenderness. Normal range of motion.      Cervical back: Normal range of motion and neck supple. No rigidity.   Skin:     General: Skin is warm and dry.      Coloration: Skin is not jaundiced.      Findings: No rash.   Neurological:      General: No focal deficit present.      Mental Status: She is alert and oriented to person, place, and time.      Cranial Nerves: No cranial nerve deficit.   Psychiatric:         Behavior: Behavior normal.         Thought Content: Thought content normal.       Diagnoses and all orders for this visit:    Calculus of bile duct without cholecystitis and without obstruction    Patient 81-year-old female with history of hyperlipidemia, hypertension and hypercalcemia being treated for diffuse large B cell lymphoma noted on CAT scan with common bile duct stones.  Patient 16 years post cholecystectomy here for evaluation.  Patient denies abdominal pain no nausea vomiting no fever chills no chest pain or palpitations.  Would recommend proceeding with ERCP to remove the multiple common duct stones noted on CT.  Explained to patient risks of infection and to call immediately or come to the ER for signs of fever chills.  We will follow-up clinically postprocedure.

## 2022-09-06 RX ORDER — PRAVASTATIN SODIUM 40 MG
40 TABLET ORAL
Qty: 90 TABLET | Refills: 1 | Status: SHIPPED | OUTPATIENT
Start: 2022-09-06 | End: 2023-03-07

## 2022-09-15 ENCOUNTER — OFFICE VISIT (OUTPATIENT)
Dept: SURGERY | Facility: CLINIC | Age: 82
End: 2022-09-15

## 2022-09-15 VITALS
HEART RATE: 78 BPM | DIASTOLIC BLOOD PRESSURE: 92 MMHG | WEIGHT: 143 LBS | HEIGHT: 60 IN | SYSTOLIC BLOOD PRESSURE: 140 MMHG | BODY MASS INDEX: 28.07 KG/M2 | OXYGEN SATURATION: 98 %

## 2022-09-15 DIAGNOSIS — K64.4 EXTERNAL HEMORRHOID: Primary | ICD-10-CM

## 2022-09-15 PROCEDURE — 99204 OFFICE O/P NEW MOD 45 MIN: CPT | Performed by: PHYSICIAN ASSISTANT

## 2022-09-15 RX ORDER — HYDROCORTISONE 25 MG/G
CREAM TOPICAL
Qty: 30 G | Refills: 1 | Status: SHIPPED | OUTPATIENT
Start: 2022-09-15

## 2022-09-15 RX ORDER — LOSARTAN POTASSIUM 100 MG/1
TABLET ORAL
COMMUNITY
Start: 2022-09-07

## 2022-09-15 NOTE — PROGRESS NOTES
Michelle Car is a 81 y.o. female who is seen as a consult at the request of Peyman Ortega MD for hemorrhoid.      HPI:  Feels extra tissue around anal opening, which gets bigger when straining or constipated. Not sure exactly how long it has been there, but at least several months. Only small amount of bleeding when wiping aggressively. No pain. Has BM daily. Grayson 4. No change bowel habits or other changes.    Past Medical History:   Diagnosis Date   • Abdominal aortic atherosclerosis (HCC)    • Adrenal nodule (HCC) 03/2022   • Allergic rhinitis    • Anemia due to chemotherapy    • Bacteremia due to Escherichia coli 04/26/2022    ADMITTED TO [facility]   • Bug bite 10/24/2015    WITH CELLULITIS, LEFT LEG   • CAD (coronary artery disease)    • Calculus of bile duct without cholangitis or cholecystitis 09/2022   • Chemotherapy induced neutropenia ([facility])    • Chronic anticoagulation    • Chronic diastolic (congestive) heart failure ([facility])    • Colon polyps    • CRI (chronic renal insufficiency), stage 2 (mild) 2021   • Diffuse large B cell lymphoma (HCC) 02/2022    MULTIPLE LYMPH NODE INVOLVEMENT, FOLLOWED BY DR. JEN PATTERSON   • Drug induced constipation    • Hearing loss    • Hemorrhoids    • History of blood transfusion 04/2022   • History of chemotherapy 2022    FOLLOWED BY DR. JEN PATTERSON   • Hypercalcemia 2016    resolved as of 2019   • Hypercholesterolemia    • Hyperkalemia 03/2022   • Hypertension    • Hyponatremia 02/17/2022   • Leg swelling 10/2021   • Mixed hyperlipidemia    • Neutropenic fever ([facility]) 05/2022   • Nonrheumatic mitral valve regurgitation 03/2022   • PAF (paroxysmal atrial fibrillation) ([facility])     FOLLOWED BY DR. YOSI LANGLEY   • Pancytopenia ([facility])    • Pleural effusion, bilateral 06/2022   • Pulmonary nodule    • Seasonal allergies    • Thrombocytopenia ([facility]) 08/2022   • Venous insufficiency    • Vitamin D deficiency        Past Surgical History:   Procedure Laterality Date   •  CHOLECYSTECTOMY N/A 10/12/2011    LAPAROSCOPIC, DR. CHANCE KLINE AT [facility]   • COLONOSCOPY N/A 2000    1 or 2 polyps, otherwise normal per patient   • COLONOSCOPY W/ POLYPECTOMY N/A 01/24/2012    3 MM TUBULAR ADENOMA POLYP IN CECUM, DIVERTICULOSIS IN RECTO-SIGMOID, RESCOPE IN 3 YRS, DR. CHANCE KLINE AT [facility]   • CYST REMOVAL Left 10/12/2011    LEFT SCALP, PATH: BENIGN ADNEXAL NEOPLASM FAVORING ECCRINE SPIRADENOMA, DR. CHANCE KLINE AT [facility]   • VENOUS ACCESS DEVICE (PORT) INSERTION Left 03/23/2022    Procedure: INSERTION VENOUS ACCESS DEVICE;  Surgeon: Alin Delgado MD;  Location: [facility] OR [facility];  Service: General;  Laterality: Left;       Social History:   reports that she has never smoked. She has never used smokeless tobacco. She reports that she does not drink alcohol and does not use drugs.    Marriage status:     Family History   Problem Relation Age of Onset   • Malig Hyperthermia Neg Hx        Current Outpatient Medications:   •  acetaminophen (TYLENOL) 500 MG tablet, Take 500 mg by mouth Every 6 (Six) Hours As Needed for Mild Pain ., Disp: , Rfl:   •  acyclovir (ZOVIRAX) 400 MG tablet, Take 1 tablet by mouth 2 (Two) Times a Day. Take no more than 5 doses a day., Disp: 60 tablet, Rfl: 0  •  amiodarone (PACERONE) 200 MG tablet, Take 1 tablet by mouth Daily., Disp: 30 tablet, Rfl: 0  •  apixaban (ELIQUIS) 5 MG tablet tablet, Take 1 tablet by mouth Every 12 (Twelve) Hours. Indications: Atrial Fibrillation, Disp: 180 tablet, Rfl: 1  •  Calcium Carbonate (CALTRATE 600 PO), Take 1 tablet by mouth Daily., Disp: , Rfl:   •  carvedilol (COREG) 3.125 MG tablet, Take 1 tablet by mouth 2 (Two) Times a Day With Meals., Disp: 180 tablet, Rfl: 1  •  doxycycline (VIBRAMYCIN) 100 MG capsule, Take 1 capsule by mouth 2 (Two) Times a Day., Disp: 60 capsule, Rfl: 0  •  DPH-Lido-AlHydr-MgHydr-Simeth (First Mouthwash, Magic Mouthwash,) suspension, Swish and spit 5 mL Every 6 (Six) Hours As Needed (Oral pain)., Disp: 237 mL,  Rfl: 0  •  ferrous sulfate 325 (65 FE) MG tablet, Take 1 tablet by mouth Every Other Day., Disp: 90 tablet, Rfl: 1  •  losartan (COZAAR) 100 MG tablet, , Disp: , Rfl:   •  Multiple Vitamins-Minerals (CENTRUM SILVER) tablet, Take 1 tablet by mouth Daily., Disp: , Rfl:   •  polyethylene glycol (MIRALAX) 17 GM/SCOOP powder, Take 17 g by mouth As Needed., Disp: , Rfl:   •  pravastatin (PRAVACHOL) 40 MG tablet, TAKE 1 TABLET BY MOUTH EVERY NIGHT AT BEDTIME, Disp: 90 tablet, Rfl: 1  •  spironolactone (ALDACTONE) 25 MG tablet, Take 25 mg by mouth Daily., Disp: , Rfl:   •  Hydrocortisone, Perianal, (Anusol-HC) 2.5 % rectal cream, Apply to hemorrhoids 3 times daily for 7 days during hemorrhoid flare. Include applicator., Disp: 30 g, Rfl: 1  •  ondansetron (ZOFRAN) 8 MG tablet, Take 1 tablet by mouth Every 8 (Eight) Hours As Needed for Nausea or Vomiting., Disp: 30 tablet, Rfl: 5  •  predniSONE (DELTASONE) 50 MG tablet, TAKE 2 TABLETS BY MOUTH IN THE MORNING WITH FOOD ON DAYS 2-6 AFTER EACH CYCLE, Disp: , Rfl:     Allergy  Factive [gemifloxacin]    Vitals:    09/15/22 1357   BP: 140/92   Pulse: 78   SpO2: 98%   -  Body mass index is 27.93 kg/m².    Physical Exam  No acute distress  Perianal exam: enlarged external hemorrhoid in left lateral position    Assessment:  1. External hemorrhoid      Plan:  For the hemorrhoid, I advised the patient to start a fiber supplement to a daily total intake of 30 to 40 g for stool consistency.  I provided detailed instructions.  I encouraged adequate water intake and MiraLAX as needed to keep stools soft.  I prescribed Anusol cream for hemorrhoids and gave instructions for use, including that cream is not for daily long-term use. Follow up if symptoms do not resolve. Call with any questions, concerns, or worsening symptoms.    Time Spent: I spent >45 minutes caring for Michelle on this date of service. This time includes time spent by me in the following activities: preparing for the visit,  performing a medically appropriate examination and/or evaluation, counseling and educating the patient/family/caregiver and documenting information in the medical record.     Lucy Restrepo PA-C  Physician Assistant  Colorectal Surgery

## 2022-10-03 ENCOUNTER — INFUSION (OUTPATIENT)
Dept: ONCOLOGY | Facility: HOSPITAL | Age: 82
End: 2022-10-03

## 2022-10-03 DIAGNOSIS — Z45.2: Primary | ICD-10-CM

## 2022-10-03 PROCEDURE — 96523 IRRIG DRUG DELIVERY DEVICE: CPT

## 2022-10-03 PROCEDURE — 25010000002 HEPARIN LOCK FLUSH PER 10 UNITS: Performed by: INTERNAL MEDICINE

## 2022-10-03 RX ORDER — HEPARIN SODIUM (PORCINE) LOCK FLUSH IV SOLN 100 UNIT/ML 100 UNIT/ML
500 SOLUTION INTRAVENOUS AS NEEDED
Status: CANCELLED | OUTPATIENT
Start: 2022-10-03

## 2022-10-03 RX ORDER — SODIUM CHLORIDE 0.9 % (FLUSH) 0.9 %
10 SYRINGE (ML) INJECTION AS NEEDED
Status: DISCONTINUED | OUTPATIENT
Start: 2022-10-03 | End: 2022-10-03 | Stop reason: HOSPADM

## 2022-10-03 RX ORDER — SODIUM CHLORIDE 0.9 % (FLUSH) 0.9 %
10 SYRINGE (ML) INJECTION AS NEEDED
Status: CANCELLED | OUTPATIENT
Start: 2022-10-03

## 2022-10-03 RX ORDER — HEPARIN SODIUM (PORCINE) LOCK FLUSH IV SOLN 100 UNIT/ML 100 UNIT/ML
500 SOLUTION INTRAVENOUS AS NEEDED
Status: DISCONTINUED | OUTPATIENT
Start: 2022-10-03 | End: 2022-10-03 | Stop reason: HOSPADM

## 2022-10-03 RX ADMIN — Medication 10 ML: at 13:58

## 2022-10-03 RX ADMIN — Medication 500 UNITS: at 13:58

## 2022-10-21 ENCOUNTER — HOSPITAL ENCOUNTER (OUTPATIENT)
Facility: HOSPITAL | Age: 82
Setting detail: HOSPITAL OUTPATIENT SURGERY
Discharge: HOME | End: 2022-10-21
Attending: INTERNAL MEDICINE | Admitting: INTERNAL MEDICINE

## 2022-10-21 ENCOUNTER — APPOINTMENT (OUTPATIENT)
Dept: GENERAL RADIOLOGY | Facility: HOSPITAL | Age: 82
End: 2022-10-21

## 2022-10-21 ENCOUNTER — ANESTHESIA EVENT (OUTPATIENT)
Dept: GASTROENTEROLOGY | Facility: HOSPITAL | Age: 82
End: 2022-10-21

## 2022-10-21 ENCOUNTER — ANESTHESIA (OUTPATIENT)
Dept: GASTROENTEROLOGY | Facility: HOSPITAL | Age: 82
End: 2022-10-21

## 2022-10-21 VITALS
RESPIRATION RATE: 17 BRPM | BODY MASS INDEX: 26.24 KG/M2 | HEIGHT: 63 IN | WEIGHT: 148.1 LBS | HEART RATE: 61 BPM | OXYGEN SATURATION: 97 % | SYSTOLIC BLOOD PRESSURE: 112 MMHG | TEMPERATURE: 97.8 F | DIASTOLIC BLOOD PRESSURE: 68 MMHG

## 2022-10-21 DIAGNOSIS — K80.50 CALCULUS OF BILE DUCT WITHOUT CHOLECYSTITIS AND WITHOUT OBSTRUCTION: ICD-10-CM

## 2022-10-21 PROBLEM — K57.90 DIVERTICULOSIS: Status: ACTIVE | Noted: 2022-10-21

## 2022-10-21 PROCEDURE — C1769 GUIDE WIRE: HCPCS | Performed by: INTERNAL MEDICINE

## 2022-10-21 PROCEDURE — 25010000002 PROPOFOL 10 MG/ML EMULSION: Performed by: ANESTHESIOLOGY

## 2022-10-21 PROCEDURE — S0260 H&P FOR SURGERY: HCPCS | Performed by: INTERNAL MEDICINE

## 2022-10-21 PROCEDURE — 25010000002 IOPAMIDOL 61 % SOLUTION: Performed by: INTERNAL MEDICINE

## 2022-10-21 PROCEDURE — 43264 ERCP REMOVE DUCT CALCULI: CPT | Performed by: INTERNAL MEDICINE

## 2022-10-21 PROCEDURE — 74328 X-RAY BILE DUCT ENDOSCOPY: CPT

## 2022-10-21 PROCEDURE — 43273 ENDOSCOPIC PANCREATOSCOPY: CPT | Performed by: INTERNAL MEDICINE

## 2022-10-21 PROCEDURE — 43262 ENDO CHOLANGIOPANCREATOGRAPH: CPT | Performed by: INTERNAL MEDICINE

## 2022-10-21 RX ORDER — PROPOFOL 10 MG/ML
VIAL (ML) INTRAVENOUS AS NEEDED
Status: DISCONTINUED | OUTPATIENT
Start: 2022-10-21 | End: 2022-10-21 | Stop reason: SURG

## 2022-10-21 RX ORDER — SODIUM CHLORIDE, SODIUM LACTATE, POTASSIUM CHLORIDE, CALCIUM CHLORIDE 600; 310; 30; 20 MG/100ML; MG/100ML; MG/100ML; MG/100ML
30 INJECTION, SOLUTION INTRAVENOUS CONTINUOUS PRN
Status: DISCONTINUED | OUTPATIENT
Start: 2022-10-21 | End: 2022-10-21 | Stop reason: HOSPADM

## 2022-10-21 RX ORDER — PROPOFOL 10 MG/ML
VIAL (ML) INTRAVENOUS CONTINUOUS PRN
Status: DISCONTINUED | OUTPATIENT
Start: 2022-10-21 | End: 2022-10-21 | Stop reason: SURG

## 2022-10-21 RX ORDER — SODIUM CHLORIDE 0.9 % (FLUSH) 0.9 %
10 SYRINGE (ML) INJECTION EVERY 12 HOURS SCHEDULED
Status: DISCONTINUED | OUTPATIENT
Start: 2022-10-21 | End: 2022-10-21 | Stop reason: HOSPADM

## 2022-10-21 RX ORDER — SODIUM CHLORIDE 0.9 % (FLUSH) 0.9 %
10 SYRINGE (ML) INJECTION AS NEEDED
Status: DISCONTINUED | OUTPATIENT
Start: 2022-10-21 | End: 2022-10-21 | Stop reason: HOSPADM

## 2022-10-21 RX ORDER — LIDOCAINE HYDROCHLORIDE 20 MG/ML
INJECTION, SOLUTION INFILTRATION; PERINEURAL AS NEEDED
Status: DISCONTINUED | OUTPATIENT
Start: 2022-10-21 | End: 2022-10-21 | Stop reason: SURG

## 2022-10-21 RX ADMIN — LIDOCAINE HYDROCHLORIDE 100 MG: 20 INJECTION, SOLUTION INFILTRATION; PERINEURAL at 10:07

## 2022-10-21 RX ADMIN — PROPOFOL 100 MG: 10 INJECTION, EMULSION INTRAVENOUS at 10:08

## 2022-10-21 RX ADMIN — SODIUM CHLORIDE, POTASSIUM CHLORIDE, SODIUM LACTATE AND CALCIUM CHLORIDE: 600; 310; 30; 20 INJECTION, SOLUTION INTRAVENOUS at 10:05

## 2022-10-21 RX ADMIN — Medication 100 MCG/KG/MIN: at 10:10

## 2022-10-21 NOTE — H&P
[facility]  Pre Procedure History & Physical    Chief Complaint:   Bile duct calculus    Subjective     HPI:   Patient 82-year-old female with history of hypertension, hyperlipidemia, coronary artery disease with postcholecystectomy common duct stones noted on CT.  Patient here for ERCP stone removal.    Past Medical History:   Past Medical History:   Diagnosis Date   • Abdominal aortic atherosclerosis (HCC)    • Adrenal nodule (HCC) 03/2022   • Allergic rhinitis    • Anemia due to chemotherapy    • Bacteremia due to Escherichia coli 04/26/2022    ADMITTED TO [facility]   • Bug bite 10/24/2015    WITH CELLULITIS, LEFT LEG   • CAD (coronary artery disease)    • Calculus of bile duct without cholangitis or cholecystitis 09/2022   • Chemotherapy induced neutropenia (HCC)    • Chronic anticoagulation    • Chronic diastolic (congestive) heart failure (HCC)    • Colon polyps    • CRI (chronic renal insufficiency), stage 2 (mild) 2021   • Diffuse large B cell lymphoma (HCC) 02/2022    MULTIPLE LYMPH NODE INVOLVEMENT, FOLLOWED BY DR. JEN PATTERSON   • Drug induced constipation    • Hearing loss    • Hemorrhoids    • History of blood transfusion 04/2022   • History of chemotherapy 2022    FOLLOWED BY DR. JEN PATTERSON   • Hypercalcemia 2016    resolved as of 2019   • Hypercholesterolemia    • Hyperkalemia 03/2022   • Hypertension    • Hyponatremia 02/17/2022   • Leg swelling 10/2021   • Mixed hyperlipidemia    • Neutropenic fever (HCC) 05/2022   • Nonrheumatic mitral valve regurgitation 03/2022   • PAF (paroxysmal atrial fibrillation) ([facility])     FOLLOWED BY DR. YOSI LANGLEY   • Pancytopenia (HCC)    • Pleural effusion, bilateral 06/2022   • Pulmonary nodule    • Seasonal allergies    • Thrombocytopenia (HCC) 08/2022   • Venous insufficiency    • Vitamin D deficiency        Past Surgical History:  Past Surgical History:   Procedure Laterality Date   • CHOLECYSTECTOMY N/A 10/12/2011    LAPAROSCOPIC, DR. FLETCHER  RAISA AT [facility]   • COLONOSCOPY N/A 2000    1 or 2 polyps, otherwise normal per patient   • COLONOSCOPY W/ POLYPECTOMY N/A 01/24/2012    3 MM TUBULAR ADENOMA POLYP IN CECUM, DIVERTICULOSIS IN RECTO-SIGMOID, RESCOPE IN 3 YRS, DR. CHANCE KLINE AT [facility]   • CYST REMOVAL Left 10/12/2011    LEFT SCALP, PATH: BENIGN ADNEXAL NEOPLASM FAVORING ECCRINE SPIRADENOMA, DR. CHANCE KLINE AT [facility]   • VENOUS ACCESS DEVICE (PORT) INSERTION Left 03/23/2022    Procedure: INSERTION VENOUS ACCESS DEVICE;  Surgeon: Alin Delgado MD;  Location: [facility] OR [facility];  Service: General;  Laterality: Left;       Family History:  Family History   Problem Relation Age of Onset   • Malig Hyperthermia Neg Hx        Social History:   reports that she has never smoked. She has never used smokeless tobacco. She reports that she does not drink alcohol and does not use drugs.    Medications:   Medications Prior to Admission   Medication Sig Dispense Refill Last Dose   • amiodarone (PACERONE) 200 MG tablet Take 1 tablet by mouth Daily. 30 tablet 0 10/20/2022   • Calcium Carbonate (CALTRATE 600 PO) Take 1 tablet by mouth Daily.   10/20/2022   • carvedilol (COREG) 3.125 MG tablet Take 1 tablet by mouth 2 (Two) Times a Day With Meals. 180 tablet 1 10/20/2022   • ferrous sulfate 325 (65 FE) MG tablet Take 1 tablet by mouth Every Other Day. 90 tablet 1 Past Week   • Hydrocortisone, Perianal, (Anusol-HC) 2.5 % rectal cream Apply to hemorrhoids 3 times daily for 7 days during hemorrhoid flare. Include applicator. 30 g 1 Past Week   • losartan (COZAAR) 100 MG tablet    Past Week   • Multiple Vitamins-Minerals (CENTRUM SILVER) tablet Take 1 tablet by mouth Daily.   Past Week   • pravastatin (PRAVACHOL) 40 MG tablet TAKE 1 TABLET BY MOUTH EVERY NIGHT AT BEDTIME 90 tablet 1 10/20/2022   • spironolactone (ALDACTONE) 25 MG tablet Take 25 mg by mouth Daily.   Past Week   • acetaminophen (TYLENOL) 500 MG tablet Take 500 mg by mouth Every 6 (Six) Hours As Needed for Mild  "Pain .   More than a month   • acyclovir (ZOVIRAX) 400 MG tablet Take 1 tablet by mouth 2 (Two) Times a Day. Take no more than 5 doses a day. 60 tablet 0 More than a month   • apixaban (ELIQUIS) 5 MG tablet tablet Take 1 tablet by mouth Every 12 (Twelve) Hours. Indications: Atrial Fibrillation 180 tablet 1 10/19/2022   • doxycycline (VIBRAMYCIN) 100 MG capsule Take 1 capsule by mouth 2 (Two) Times a Day. 60 capsule 0 More than a month   • DPH-Lido-AlHydr-MgHydr-Simeth (First Mouthwash, Magic Mouthwash,) suspension Swish and spit 5 mL Every 6 (Six) Hours As Needed (Oral pain). 237 mL 0 More than a month   • ondansetron (ZOFRAN) 8 MG tablet Take 1 tablet by mouth Every 8 (Eight) Hours As Needed for Nausea or Vomiting. 30 tablet 5 More than a month   • polyethylene glycol (MIRALAX) 17 GM/SCOOP powder Take 17 g by mouth As Needed.   More than a month   • predniSONE (DELTASONE) 50 MG tablet TAKE 2 TABLETS BY MOUTH IN THE MORNING WITH FOOD ON DAYS 2-6 AFTER EACH CYCLE   More than a month       Allergies:  Factive [gemifloxacin]    ROS:    Pertinent items are noted in HPI     Objective     Blood pressure 131/78, pulse 68, temperature 97.8 °F (36.6 °C), temperature source Oral, resp. rate 16, height 160 cm (63\"), weight 67.2 kg (148 lb 1.6 oz), SpO2 100 %, not currently breastfeeding.    Physical Exam   Constitutional: Pt is oriented to person, place, and time and well-developed, well-nourished, and in no distress.   Mouth/Throat: Oropharynx is clear and moist.   Neck: Normal range of motion.   Cardiovascular: Normal rate, regular rhythm and normal heart sounds.    Pulmonary/Chest: Effort normal and breath sounds normal.   Abdominal: Soft. Nontender  Skin: Skin is warm and dry.   Psychiatric: Mood, memory, affect and judgment normal.     Assessment & Plan     Diagnosis:  Bile duct calculi    Anticipated Surgical Procedure:  ERCP    The risks, benefits, and alternatives of this procedure have been discussed with the patient " or the responsible party- the patient understands and agrees to proceed.

## 2022-10-21 NOTE — ANESTHESIA PREPROCEDURE EVALUATION
Anesthesia Evaluation     Patient summary reviewed and Nursing notes reviewed   NPO Solid Status: > 8 hours  NPO Liquid Status: > 8 hours           Airway   Mallampati: II  Neck ROM: full  No difficulty expected  Dental - normal exam     Pulmonary     breath sounds clear to auscultation  (+) pleural effusion, shortness of breath,   Cardiovascular     Rhythm: regular    (+) hypertension, valvular problems/murmurs MR, CAD, dysrhythmias Atrial Fib, CHF , hyperlipidemia,       Neuro/Psych  GI/Hepatic/Renal/Endo      Musculoskeletal     Abdominal    Substance History      OB/GYN          Other      history of cancer active                    Anesthesia Plan    ASA 3     MAC     intravenous induction     Anesthetic plan, risks, benefits, and alternatives have been provided, discussed and informed consent has been obtained with: patient.        CODE STATUS:

## 2022-10-21 NOTE — ANESTHESIA POSTPROCEDURE EVALUATION
"Patient: Michelle Car    Procedure Summary     Date: 10/21/22 Room / [facility]:  [facility]    Anesthesia Start: 1005 Anesthesia Stop: 1039    Procedure: ENDOSCOPIC RETROGRADE CHOLANGIOPANCREATOGRAPHY with sphincterotomy and balloon sweep Diagnosis:       Calculus of bile duct without cholecystitis and without obstruction      (Calculus of bile duct without cholecystitis and without obstruction [K80.50])    Surgeons: Peyman Ortega MD Provider: Zion Low MD    Anesthesia Type: MAC ASA Status: 3          Anesthesia Type: MAC    Vitals  Vitals Value Taken Time   /68 10/21/22 1057   Temp     Pulse 61 10/21/22 1057   Resp 17 10/21/22 1057   SpO2 97 % 10/21/22 1057           Post Anesthesia Care and Evaluation    Patient location during evaluation: bedside  Patient participation: complete - patient participated  Level of consciousness: sleepy but conscious  Pain score: 0  Pain management: adequate    Airway patency: patent  Anesthetic complications: No anesthetic complications    Cardiovascular status: acceptable  Respiratory status: acceptable  Hydration status: acceptable    Comments: /68 (BP Location: Left arm, Patient Position: Sitting)   Pulse 61   Temp 36.6 °C (97.8 °F) (Oral)   Resp 17   Ht 160 cm (63\")   Wt 67.2 kg (148 lb 1.6 oz)   SpO2 97%   BMI 26.23 kg/m²         "

## 2022-10-21 NOTE — BRIEF OP NOTE
ENDOSCOPIC RETROGRADE CHOLANGIOPANCREATOGRAPHY  Progress Note    Michelle Car  10/21/2022    Pre-op Diagnosis:   Calculus of bile duct without cholecystitis and without obstruction [K80.50]       Post-Op Diagnosis Codes:     * Calculus of bile duct without cholecystitis and without obstruction [K80.50]    Procedure/CPT® Codes:        Procedure(s):  ENDOSCOPIC RETROGRADE CHOLANGIOPANCREATOGRAPHY with sphincterotomy and balloon sweep        Surgeon(s):  Peyman Ortega MD    Anesthesia: Monitored Anesthesia Care    Staff:   Radiology Technologist: Indiana Means  Endo Technician: Kelsi Avila; Skyla Car  Endo Nurse: Vikram Cook RN         Estimated Blood Loss: minimal    Urine Voided: * No values recorded between 10/21/2022  9:53 AM and 10/21/2022 10:29 AM *    Specimens:                None          Drains:   External Urinary Catheter (Active)       [REMOVED] External Urinary Catheter (Removed)       Findings: ERCP with sphincterotomy balloon stone extraction performed revealing markedly dilated common bile duct and right left hepatic's with numerous common duct stones present.  Ampullary orifice noted between 2 large periampullary diverticulum.  Sphincterotomy was performed and balloon sweep with numerous stones removed.  No residual stone seen no extravasation of contrast.  Contrast was noted in the diverticulum postprocedure.  Patient tolerated well sent to recovery.        Complications: None          Peyman Ortega MD     Date: 10/21/2022  Time: 10:31 EDT

## 2022-10-21 NOTE — DISCHARGE INSTRUCTIONS
For the next 24 hours patient needs to be with a responsible adult.    For 24 hours DO NOT drive, operate machinery, appliances, drink alcohol, make important decisions or sign legal documents.    Start with a light or bland diet if you are feeling sick to your stomach otherwise advance to regular diet as tolerated.    Follow recommendations on procedure report if provided by your doctor.    Call Dr Ortega for problems [phone]    Problems may include but not limited to: large amounts of bleeding, trouble breathing, repeated vomiting, severe unrelieved pain, fever or chills.

## 2022-11-07 ENCOUNTER — INFUSION (OUTPATIENT)
Dept: ONCOLOGY | Facility: HOSPITAL | Age: 82
End: 2022-11-07

## 2022-11-07 ENCOUNTER — HOSPITAL ENCOUNTER (OUTPATIENT)
Dept: PET IMAGING | Facility: HOSPITAL | Age: 82
Discharge: HOME | End: 2022-11-07
Admitting: INTERNAL MEDICINE

## 2022-11-07 DIAGNOSIS — D64.81 ANEMIA DUE TO CHEMOTHERAPY: ICD-10-CM

## 2022-11-07 DIAGNOSIS — D50.9 IRON DEFICIENCY ANEMIA, UNSPECIFIED IRON DEFICIENCY ANEMIA TYPE: ICD-10-CM

## 2022-11-07 DIAGNOSIS — T45.1X5A ANEMIA DUE TO CHEMOTHERAPY: ICD-10-CM

## 2022-11-07 DIAGNOSIS — C83.38 DIFFUSE LARGE B-CELL LYMPHOMA OF LYMPH NODES OF MULTIPLE REGIONS: ICD-10-CM

## 2022-11-07 LAB
ALBUMIN SERPL-MCNC: 3.9 G/DL (ref 3.5–5.2)
ALBUMIN/GLOB SERPL: 1.5 G/DL (ref 1.1–2.4)
ALP SERPL-CCNC: 100 U/L (ref 38–116)
ALT SERPL W P-5'-P-CCNC: 17 U/L (ref 0–33)
ANION GAP SERPL CALCULATED.3IONS-SCNC: 12 MMOL/L (ref 5–15)
AST SERPL-CCNC: 19 U/L (ref 0–32)
BASOPHILS # BLD AUTO: 0.04 10*3/MM3 (ref 0–0.2)
BASOPHILS NFR BLD AUTO: 1.9 % (ref 0–1.5)
BILIRUB SERPL-MCNC: 0.4 MG/DL (ref 0.2–1.2)
BUN SERPL-MCNC: 20 MG/DL (ref 6–20)
BUN/CREAT SERPL: 23 (ref 7.3–30)
CALCIUM SPEC-SCNC: 9.7 MG/DL (ref 8.5–10.2)
CHLORIDE SERPL-SCNC: 100 MMOL/L (ref 98–107)
CO2 SERPL-SCNC: 24 MMOL/L (ref 22–29)
CREAT SERPL-MCNC: 0.87 MG/DL (ref 0.6–1.1)
DEPRECATED RDW RBC AUTO: 60.5 FL (ref 37–54)
EGFRCR SERPLBLD CKD-EPI 2021: 67 ML/MIN/1.73
EOSINOPHIL # BLD AUTO: 0.01 10*3/MM3 (ref 0–0.4)
EOSINOPHIL NFR BLD AUTO: 0.5 % (ref 0.3–6.2)
ERYTHROCYTE [DISTWIDTH] IN BLOOD BY AUTOMATED COUNT: 17.2 % (ref 12.3–15.4)
FERRITIN SERPL-MCNC: 60.2 NG/ML (ref 13–150)
GLOBULIN UR ELPH-MCNC: 2.6 GM/DL (ref 1.8–3.5)
GLUCOSE SERPL-MCNC: 92 MG/DL (ref 74–124)
HCT VFR BLD AUTO: 24.4 % (ref 34–46.6)
HGB BLD-MCNC: 7.9 G/DL (ref 12–15.9)
IMM GRANULOCYTES # BLD AUTO: 0 10*3/MM3 (ref 0–0.05)
IMM GRANULOCYTES NFR BLD AUTO: 0 % (ref 0–0.5)
IRON 24H UR-MRATE: 60 MCG/DL (ref 37–145)
IRON SATN MFR SERPL: 13 % (ref 14–48)
LDH SERPL-CCNC: 149 U/L (ref 99–259)
LYMPHOCYTES # BLD AUTO: 1.1 10*3/MM3 (ref 0.7–3.1)
LYMPHOCYTES NFR BLD AUTO: 51.9 % (ref 19.6–45.3)
MCH RBC QN AUTO: 31.1 PG (ref 26.6–33)
MCHC RBC AUTO-ENTMCNC: 32.4 G/DL (ref 31.5–35.7)
MCV RBC AUTO: 96.1 FL (ref 79–97)
MONOCYTES # BLD AUTO: 0.42 10*3/MM3 (ref 0.1–0.9)
MONOCYTES NFR BLD AUTO: 19.8 % (ref 5–12)
NEUTROPHILS NFR BLD AUTO: 0.55 10*3/MM3 (ref 1.7–7)
NEUTROPHILS NFR BLD AUTO: 25.9 % (ref 42.7–76)
NRBC BLD AUTO-RTO: 0 /100 WBC (ref 0–0.2)
PLATELET # BLD AUTO: 335 10*3/MM3 (ref 140–450)
PMV BLD AUTO: 8.7 FL (ref 6–12)
POTASSIUM SERPL-SCNC: 4.4 MMOL/L (ref 3.5–4.7)
PROT SERPL-MCNC: 6.5 G/DL (ref 6.3–8)
RBC # BLD AUTO: 2.54 10*6/MM3 (ref 3.77–5.28)
SODIUM SERPL-SCNC: 136 MMOL/L (ref 134–145)
TIBC SERPL-MCNC: 465 MCG/DL (ref 249–505)
TRANSFERRIN SERPL-MCNC: 332 MG/DL (ref 200–360)
WBC NRBC COR # BLD: 2.12 10*3/MM3 (ref 3.4–10.8)

## 2022-11-07 PROCEDURE — 0 DIATRIZOATE MEGLUMINE & SODIUM PER 1 ML: Performed by: INTERNAL MEDICINE

## 2022-11-07 PROCEDURE — 25010000002 IOPAMIDOL 61 % SOLUTION: Performed by: INTERNAL MEDICINE

## 2022-11-07 PROCEDURE — 80053 COMPREHEN METABOLIC PANEL: CPT

## 2022-11-07 PROCEDURE — 85025 COMPLETE CBC W/AUTO DIFF WBC: CPT

## 2022-11-07 PROCEDURE — 82728 ASSAY OF FERRITIN: CPT

## 2022-11-07 PROCEDURE — 83615 LACTATE (LD) (LDH) ENZYME: CPT

## 2022-11-07 PROCEDURE — 84466 ASSAY OF TRANSFERRIN: CPT

## 2022-11-07 PROCEDURE — 74177 CT ABD & PELVIS W/CONTRAST: CPT

## 2022-11-07 PROCEDURE — 71260 CT THORAX DX C+: CPT

## 2022-11-07 PROCEDURE — 83540 ASSAY OF IRON: CPT

## 2022-11-07 RX ADMIN — IOPAMIDOL 85 ML: 612 INJECTION, SOLUTION INTRAVENOUS at 14:35

## 2022-11-07 RX ADMIN — DIATRIZOATE MEGLUMINE AND DIATRIZOATE SODIUM 30 ML: 660; 100 LIQUID ORAL; RECTAL at 13:56

## 2022-11-08 ENCOUNTER — TELEPHONE (OUTPATIENT)
Dept: ONCOLOGY | Facility: CLINIC | Age: 82
End: 2022-11-08

## 2022-11-08 NOTE — TELEPHONE ENCOUNTER
Patient stated she is not having any symptoms of fatigue or shortness or breath. I asked the patient to call us if she is experiencing any of these symptoms. Patient is to see her PCP Thursday and back with our office on Monday. She v/u.

## 2022-11-08 NOTE — TELEPHONE ENCOUNTER
----- Message from Renate Galo MD sent at 11/7/2022 10:26 PM EST -----  Patient's hemoglobin decreased to 7.9. please check with her if she is having symptoms of fatigue or shortness of breath.     Thank you

## 2022-11-10 ENCOUNTER — OFFICE VISIT (OUTPATIENT)
Dept: FAMILY MEDICINE CLINIC | Facility: CLINIC | Age: 82
End: 2022-11-10

## 2022-11-10 VITALS
WEIGHT: 148.8 LBS | HEART RATE: 62 BPM | SYSTOLIC BLOOD PRESSURE: 132 MMHG | OXYGEN SATURATION: 99 % | TEMPERATURE: 98.6 F | BODY MASS INDEX: 26.36 KG/M2 | HEIGHT: 63 IN | DIASTOLIC BLOOD PRESSURE: 82 MMHG

## 2022-11-10 DIAGNOSIS — D50.0 IRON DEFICIENCY ANEMIA DUE TO CHRONIC BLOOD LOSS: ICD-10-CM

## 2022-11-10 DIAGNOSIS — Z00.00 MEDICARE ANNUAL WELLNESS VISIT, SUBSEQUENT: Primary | ICD-10-CM

## 2022-11-10 DIAGNOSIS — I10 PRIMARY HYPERTENSION: ICD-10-CM

## 2022-11-10 DIAGNOSIS — E78.00 PURE HYPERCHOLESTEROLEMIA: ICD-10-CM

## 2022-11-10 PROBLEM — D50.9 IRON DEFICIENCY ANEMIA: Status: ACTIVE | Noted: 2022-11-10

## 2022-11-10 LAB
FERRITIN SERPL-MCNC: 50.9 NG/ML (ref 13–150)
FOLATE SERPL-MCNC: >20 NG/ML (ref 4.78–24.2)
HCT VFR BLD AUTO: 27.4 % (ref 34–46.6)
HGB BLD-MCNC: 9 G/DL (ref 12–15.9)
IRON 24H UR-MRATE: 168 MCG/DL (ref 37–145)
IRON SATN MFR SERPL: 33 % (ref 20–50)
RETICS # AUTO: 0.19 10*6/MM3 (ref 0.02–0.13)
RETICS/RBC NFR AUTO: 6.42 % (ref 0.7–1.9)
TIBC SERPL-MCNC: 504 MCG/DL (ref 298–536)
TRANSFERRIN SERPL-MCNC: 338 MG/DL (ref 200–360)
VIT B12 BLD-MCNC: 487 PG/ML (ref 211–946)

## 2022-11-10 PROCEDURE — 0124A COVID-19 (PFIZER) BIVALENT BOOSTER 12+YRS: CPT | Performed by: INTERNAL MEDICINE

## 2022-11-10 PROCEDURE — 1126F AMNT PAIN NOTED NONE PRSNT: CPT | Performed by: INTERNAL MEDICINE

## 2022-11-10 PROCEDURE — G0439 PPPS, SUBSEQ VISIT: HCPCS | Performed by: INTERNAL MEDICINE

## 2022-11-10 PROCEDURE — 84466 ASSAY OF TRANSFERRIN: CPT | Performed by: INTERNAL MEDICINE

## 2022-11-10 PROCEDURE — 36415 COLL VENOUS BLD VENIPUNCTURE: CPT | Performed by: INTERNAL MEDICINE

## 2022-11-10 PROCEDURE — 85014 HEMATOCRIT: CPT | Performed by: INTERNAL MEDICINE

## 2022-11-10 PROCEDURE — 83540 ASSAY OF IRON: CPT | Performed by: INTERNAL MEDICINE

## 2022-11-10 PROCEDURE — 82607 VITAMIN B-12: CPT | Performed by: INTERNAL MEDICINE

## 2022-11-10 PROCEDURE — 91312 COVID-19 (PFIZER) BIVALENT BOOSTER 12+YRS: CPT | Performed by: INTERNAL MEDICINE

## 2022-11-10 PROCEDURE — 82746 ASSAY OF FOLIC ACID SERUM: CPT | Performed by: INTERNAL MEDICINE

## 2022-11-10 PROCEDURE — 1159F MED LIST DOCD IN RCRD: CPT | Performed by: INTERNAL MEDICINE

## 2022-11-10 PROCEDURE — 85018 HEMOGLOBIN: CPT | Performed by: INTERNAL MEDICINE

## 2022-11-10 PROCEDURE — 82728 ASSAY OF FERRITIN: CPT | Performed by: INTERNAL MEDICINE

## 2022-11-10 PROCEDURE — 85045 AUTOMATED RETICULOCYTE COUNT: CPT | Performed by: INTERNAL MEDICINE

## 2022-11-10 PROCEDURE — 90662 IIV NO PRSV INCREASED AG IM: CPT | Performed by: INTERNAL MEDICINE

## 2022-11-10 PROCEDURE — 1170F FXNL STATUS ASSESSED: CPT | Performed by: INTERNAL MEDICINE

## 2022-11-10 PROCEDURE — G0008 ADMIN INFLUENZA VIRUS VAC: HCPCS | Performed by: INTERNAL MEDICINE

## 2022-11-10 NOTE — PATIENT INSTRUCTIONS
Medicare Wellness  Personal Prevention Plan of Service     Date of Office Visit:    Encounter Provider:  Olayinka Pimentel MD  Place of Service:  [facility]  Patient Name: Michelle Car  :  1940    As part of the Medicare Wellness portion of your visit today, we are providing you with this personalized preventive plan of services (PPPS). This plan is based upon recommendations of the [place] Preventive Services Task Force (USPSTF) and the Advisory Committee on Immunization Practices (ACIP).    This lists the preventive care services that should be considered, and provides dates of when you are due. Items listed as completed are up-to-date and do not require any further intervention.    Health Maintenance   Topic Date Due    DXA SCAN  Never done    ZOSTER VACCINE (2 of 2) 2017    INFLUENZA VACCINE  2022    LIPID PANEL  10/07/2022    MAMMOGRAM  11/10/2022 (Originally 2018)    COVID-19 Vaccine (4 - Booster for Moderna series) 2022 (Originally 2022)    Pneumococcal Vaccine 65+ (3 - PCV) 2023 (Originally 2018)    ANNUAL WELLNESS VISIT  11/10/2023    COLORECTAL CANCER SCREENING  2026    TDAP/TD VACCINES (2 - Td or Tdap) 2027       Orders Placed This Encounter   Procedures    Fluzone High-Dose 65+yrs    COVID-19 Bivalent Booster (Pfizer) 12+yrs    Iron Profile     Standing Status:   Future     Standing Expiration Date:   11/10/2023    Occult Blood, Fecal By Immunoassay - Stool, Per Rectum     Order Specific Question:   Release to patient     Answer:   Routine Release    Reticulocytes     Standing Status:   Future     Standing Expiration Date:   11/10/2023    Vitamin B12 & Folate     Order Specific Question:   Release to patient     Answer:   Routine Release    Ferritin    Hemoglobin & Hematocrit, Blood     Standing Status:   Future     Standing Expiration Date:   11/10/2023     Order Specific Question:   Release to  patient     Answer:   Routine Release       No follow-ups on file.

## 2022-11-10 NOTE — PROGRESS NOTES
QUICK REFERENCE INFORMATION:  The ABCs of the Annual Wellness Visit    Subsequent Medicare Wellness Visit patient was seen for Medicare wellness exam.  Patient was seen by oncology and had an H&H of 7.9/24.4.  Patient is taking iron supplements is having labs rechecked today.  Patient is following up with oncology in 3 days.    HEALTH RISK ASSESSMENT    1940    Recent Hospitalizations:  Recently treated at the following:  [facility].        Current Medical Providers:  Patient Care Team:  Olayinka Pimentel MD as PCP - General  Renate Galo MD as Consulting Physician (Hematology and Oncology)  Nasrin Nichole MD as Consulting Physician (Cardiology)  Alin Delgado MD as Consulting Physician (General Surgery)  Dave Ramsey MD as Referring Physician (Cardiology)        Smoking Status:  Social History     Tobacco Use   Smoking Status Never   Smokeless Tobacco Never       Alcohol Consumption:  Social History     Substance and Sexual Activity   Alcohol Use No       Depression Screen:   PHQ-2/PHQ-9 Depression Screening 11/10/2022   Retired PHQ-9 Total Score -   Retired Total Score -   Little Interest or Pleasure in Doing Things 0-->not at all   Feeling Down, Depressed or Hopeless 1-->several days   Trouble Falling or Staying Asleep, or Sleeping Too Much -   Feeling Tired or Having Little Energy -   Poor Appetite or Overeating -   Feeling Bad about Yourself - or that You are a Failure or Have Let Yourself or Your Family Down -   Trouble Concentrating on Things, Such as Reading the Newspaper or Watching Television -   Moving or Speaking So Slowly that Other People Could Have Noticed? Or the Opposite - Being So Fidgety -   Thoughts that You Would be Better Off Dead or of Hurting Yourself in Some Way -   PHQ-9: Brief Depression Severity Measure Score 1       Health Habits and Functional and Cognitive Screening:  Functional & Cognitive Status 11/10/2022   Do you have difficulty preparing food and  eating? No   Do you have difficulty bathing yourself, getting dressed or grooming yourself? No   Do you have difficulty using the toilet? No   Do you have difficulty moving around from place to place? No   Do you have trouble with steps or getting out of a bed or a chair? No   Current Diet Well Balanced Diet   Dental Exam Not up to date   Eye Exam Not up to date   Exercise (times per week) 0 times per week   Current Exercises Include No Regular Exercise   Current Exercise Activities Include -   Do you need help using the phone?  No   Are you deaf or do you have serious difficulty hearing?  No   Do you need help with transportation? Yes   Do you need help shopping? Yes   Do you need help preparing meals?  No   Do you need help with housework?  No   Do you need help with laundry? No   Do you need help taking your medications? No   Do you need help managing money? No   Do you ever drive or ride in a car without wearing a seat belt? No   Have you felt unusual stress, anger or loneliness in the last month? No   Who do you live with? Alone   If you need help, do you have trouble finding someone available to you? No   Have you been bothered in the last four weeks by sexual problems? No   Do you have difficulty concentrating, remembering or making decisions? Yes           Does the patient have evidence of cognitive impairment? No    Aspirin use counseling: Does not need ASA (and currently is not on it)      Recent Lab Results:  CMP:  Lab Results   Component Value Date    BUN 20 11/07/2022    CREATININE 0.87 11/07/2022    EGFRIFNONA 62 02/23/2022    BCR 23.0 11/07/2022     11/07/2022    K 4.4 11/07/2022    CO2 24.0 11/07/2022    CALCIUM 9.7 11/07/2022    PROTENTOTREF 6.9 03/07/2022    ALBUMIN 3.90 11/07/2022    LABGLOBREF 3.4 03/07/2022    LABIL2 1.1 03/07/2022    BILITOT 0.4 11/07/2022    ALKPHOS 100 11/07/2022    AST 19 11/07/2022    ALT 17 11/07/2022     Lipid Panel:  Lab Results   Component Value Date    CHOL 194  10/07/2021    TRIG 81 10/07/2021    HDL 59 10/07/2021    VLDL 15 10/07/2021    LDLHDL 2.01 10/07/2021     HbA1c:       Visual Acuity:  No results found.    Age-appropriate Screening Schedule:  Refer to the list below for future screening recommendations based on patient's age, sex and/or medical conditions. Orders for these recommended tests are listed in the plan section. The patient has been provided with a written plan.    Health Maintenance   Topic Date Due   • DXA SCAN  Never done   • ZOSTER VACCINE (2 of 2) 02/08/2017   • LIPID PANEL  10/07/2022   • MAMMOGRAM  11/10/2022 (Originally 12/14/2018)   • TDAP/TD VACCINES (2 - Td or Tdap) 07/07/2027   • INFLUENZA VACCINE  Completed        Subjective   History of Present Illness    Michelle Car is a 82 y.o. female who presents for an Subsequent Wellness Visit.    The following portions of the patient's history were reviewed and updated as appropriate: allergies, current medications, past family history, past medical history, past social history, past surgical history and problem list.    Outpatient Medications Prior to Visit   Medication Sig Dispense Refill   • acetaminophen (TYLENOL) 500 MG tablet Take 500 mg by mouth Every 6 (Six) Hours As Needed for Mild Pain .     • amiodarone (PACERONE) 200 MG tablet Take 1 tablet by mouth Daily. 30 tablet 0   • apixaban (ELIQUIS) 5 MG tablet tablet Take 1 tablet by mouth Every 12 (Twelve) Hours. Indications: Atrial Fibrillation 180 tablet 1   • Calcium Carbonate (CALTRATE 600 PO) Take 1 tablet by mouth Daily.     • carvedilol (COREG) 3.125 MG tablet Take 1 tablet by mouth 2 (Two) Times a Day With Meals. 180 tablet 1   • ferrous sulfate 325 (65 FE) MG tablet Take 1 tablet by mouth Every Other Day. 90 tablet 1   • Hydrocortisone, Perianal, (Anusol-HC) 2.5 % rectal cream Apply to hemorrhoids 3 times daily for 7 days during hemorrhoid flare. Include applicator. 30 g 1   • losartan (COZAAR) 100 MG tablet      • Multiple  Vitamins-Minerals (CENTRUM SILVER) tablet Take 1 tablet by mouth Daily.     • ondansetron (ZOFRAN) 8 MG tablet Take 1 tablet by mouth Every 8 (Eight) Hours As Needed for Nausea or Vomiting. 30 tablet 5   • polyethylene glycol (MIRALAX) 17 GM/SCOOP powder Take 17 g by mouth As Needed.     • pravastatin (PRAVACHOL) 40 MG tablet TAKE 1 TABLET BY MOUTH EVERY NIGHT AT BEDTIME 90 tablet 1   • spironolactone (ALDACTONE) 25 MG tablet Take 25 mg by mouth Daily.     • acyclovir (ZOVIRAX) 400 MG tablet Take 1 tablet by mouth 2 (Two) Times a Day. Take no more than 5 doses a day. 60 tablet 0   • doxycycline (VIBRAMYCIN) 100 MG capsule Take 1 capsule by mouth 2 (Two) Times a Day. 60 capsule 0   • DPH-Lido-AlHydr-MgHydr-Simeth (First Mouthwash, Magic Mouthwash,) suspension Swish and spit 5 mL Every 6 (Six) Hours As Needed (Oral pain). 237 mL 0   • predniSONE (DELTASONE) 50 MG tablet TAKE 2 TABLETS BY MOUTH IN THE MORNING WITH FOOD ON DAYS 2-6 AFTER EACH CYCLE       No facility-administered medications prior to visit.       Patient Active Problem List   Diagnosis   • Hypertension   • Hyperlipidemia   • Abdominal aortic atherosclerosis (HCC)   • Generalized edema   • Fatigue due to excessive exertion   • Seasonal allergic rhinitis due to pollen   • Dyspnea   • Acute on chronic diastolic heart failure (HCC)   • Hypoxia   • Paroxysmal atrial fibrillation(HCC)   • Adrenal nodule (HCC)   • Edema of lower extremity   • Hyponatremia   • CRI (chronic renal insufficiency), stage 2 (mild)   • Diffuse large B-cell lymphoma of lymph nodes of multiple regions (HCC)   • Vascular catheter fitting or adjustment   • Chronic diastolic CHF (congestive heart failure) (HCC)   • Sepsis due to Escherichia coli (E. coli) (HCC)   • Bacteremia due to Escherichia coli   • Calculus of bile duct without cholecystitis and without obstruction   • Diverticulosis   • Iron deficiency anemia       Advance Care Planning:  ACP discussion was held with the patient  "during this visit. Patient has an advance directive in EMR which is still valid.     Identification of Risk Factors:  Risk factors include: NA.    Review of Systems   Constitutional: Negative for fatigue and fever.   HENT: Positive for congestion. Negative for trouble swallowing.    Eyes: Negative for discharge and visual disturbance.   Respiratory: Negative for choking and shortness of breath.    Cardiovascular: Negative for chest pain and palpitations.   Gastrointestinal: Negative for abdominal pain and blood in stool.   Endocrine: Negative.    Genitourinary: Negative for genital sores and hematuria.   Musculoskeletal: Negative for gait problem and joint swelling.   Skin: Negative for color change, pallor, rash and wound.   Allergic/Immunologic: Positive for environmental allergies. Negative for immunocompromised state.   Neurological: Negative for facial asymmetry and speech difficulty.   Psychiatric/Behavioral: Negative for hallucinations and suicidal ideas.       Compared to one year ago, the patient feels her physical health is better  Compared to one year ago, the patient feels her mental health is the same.    Objective     Physical Exam    Vitals:    11/10/22 1329   BP: 132/82   BP Location: Left arm   Patient Position: Sitting   Cuff Size: Adult   Pulse: 62   Temp: 98.6 °F (37 °C)   TempSrc: Infrared   SpO2: 99%   Weight: 67.5 kg (148 lb 12.8 oz)   Height: 160 cm (63\")   PainSc: 0-No pain       BMI is >= 25 and <30. (Overweight) The following options were offered after discussion;: NA      Assessment & Plan #1 anemia work-up with Hemoccult card #2 follow-up with oncology in 3 days  Patient Self-Management and Personalized Health Advice  The patient has been provided with information about: NA and preventive services including:   · NA.    Visit Diagnoses:    ICD-10-CM ICD-9-CM   1. Medicare annual wellness visit, subsequent  Z00.00 V70.0   2. Iron deficiency anemia due to chronic blood loss  D50.0 280.0 "   3. Pure hypercholesterolemia  E78.00 272.0   4. Primary hypertension  I10 401.9       Orders Placed This Encounter   Procedures   • Fluzone High-Dose 65+yrs   • COVID-19 Bivalent Booster (Pfizer) 12+yrs   • Iron Profile     Standing Status:   Future     Number of Occurrences:   1     Standing Expiration Date:   11/10/2023   • Occult Blood, Fecal By Immunoassay - Stool, Per Rectum     Order Specific Question:   Release to patient     Answer:   Routine Release   • Reticulocytes     Standing Status:   Future     Number of Occurrences:   1     Standing Expiration Date:   11/10/2023   • Vitamin B12 & Folate     Order Specific Question:   Release to patient     Answer:   Routine Release   • Ferritin   • Hemoglobin & Hematocrit, Blood     Standing Status:   Future     Number of Occurrences:   1     Standing Expiration Date:   11/10/2023     Order Specific Question:   Release to patient     Answer:   Routine Release       Outpatient Encounter Medications as of 11/10/2022   Medication Sig Dispense Refill   • acetaminophen (TYLENOL) 500 MG tablet Take 500 mg by mouth Every 6 (Six) Hours As Needed for Mild Pain .     • amiodarone (PACERONE) 200 MG tablet Take 1 tablet by mouth Daily. 30 tablet 0   • apixaban (ELIQUIS) 5 MG tablet tablet Take 1 tablet by mouth Every 12 (Twelve) Hours. Indications: Atrial Fibrillation 180 tablet 1   • Calcium Carbonate (CALTRATE 600 PO) Take 1 tablet by mouth Daily.     • carvedilol (COREG) 3.125 MG tablet Take 1 tablet by mouth 2 (Two) Times a Day With Meals. 180 tablet 1   • ferrous sulfate 325 (65 FE) MG tablet Take 1 tablet by mouth Every Other Day. 90 tablet 1   • Hydrocortisone, Perianal, (Anusol-HC) 2.5 % rectal cream Apply to hemorrhoids 3 times daily for 7 days during hemorrhoid flare. Include applicator. 30 g 1   • losartan (COZAAR) 100 MG tablet      • Multiple Vitamins-Minerals (CENTRUM SILVER) tablet Take 1 tablet by mouth Daily.     • ondansetron (ZOFRAN) 8 MG tablet Take 1  tablet by mouth Every 8 (Eight) Hours As Needed for Nausea or Vomiting. 30 tablet 5   • polyethylene glycol (MIRALAX) 17 GM/SCOOP powder Take 17 g by mouth As Needed.     • pravastatin (PRAVACHOL) 40 MG tablet TAKE 1 TABLET BY MOUTH EVERY NIGHT AT BEDTIME 90 tablet 1   • spironolactone (ALDACTONE) 25 MG tablet Take 25 mg by mouth Daily.     • [DISCONTINUED] acyclovir (ZOVIRAX) 400 MG tablet Take 1 tablet by mouth 2 (Two) Times a Day. Take no more than 5 doses a day. 60 tablet 0   • [DISCONTINUED] doxycycline (VIBRAMYCIN) 100 MG capsule Take 1 capsule by mouth 2 (Two) Times a Day. 60 capsule 0   • [DISCONTINUED] DPH-Lido-AlHydr-MgHydr-Simeth (First Mouthwash, Magic Mouthwash,) suspension Swish and spit 5 mL Every 6 (Six) Hours As Needed (Oral pain). 237 mL 0   • [DISCONTINUED] predniSONE (DELTASONE) 50 MG tablet TAKE 2 TABLETS BY MOUTH IN THE MORNING WITH FOOD ON DAYS 2-6 AFTER EACH CYCLE       No facility-administered encounter medications on file as of 11/10/2022.       Reviewed use of high risk medication in the elderly: not applicable  Reviewed for potential of harmful drug interactions in the elderly: not applicable    Follow Up:  Return in about 6 months (around 5/10/2023), or if symptoms worsen or fail to improve, for Recheck.     An After Visit Summary and PPPS with all of these plans were given to the patient.

## 2022-11-14 ENCOUNTER — LAB (OUTPATIENT)
Dept: FAMILY MEDICINE CLINIC | Facility: CLINIC | Age: 82
End: 2022-11-14

## 2022-11-14 ENCOUNTER — INFUSION (OUTPATIENT)
Dept: ONCOLOGY | Facility: HOSPITAL | Age: 82
End: 2022-11-14

## 2022-11-14 ENCOUNTER — APPOINTMENT (OUTPATIENT)
Dept: LAB | Facility: HOSPITAL | Age: 82
End: 2022-11-14

## 2022-11-14 ENCOUNTER — OFFICE VISIT (OUTPATIENT)
Dept: ONCOLOGY | Facility: CLINIC | Age: 82
End: 2022-11-14

## 2022-11-14 VITALS
DIASTOLIC BLOOD PRESSURE: 71 MMHG | BODY MASS INDEX: 29.08 KG/M2 | RESPIRATION RATE: 16 BRPM | WEIGHT: 148.1 LBS | HEART RATE: 63 BPM | OXYGEN SATURATION: 100 % | SYSTOLIC BLOOD PRESSURE: 133 MMHG | TEMPERATURE: 96.9 F | HEIGHT: 60 IN

## 2022-11-14 DIAGNOSIS — D50.9 IRON DEFICIENCY ANEMIA, UNSPECIFIED IRON DEFICIENCY ANEMIA TYPE: ICD-10-CM

## 2022-11-14 DIAGNOSIS — T45.1X5A ANEMIA DUE TO CHEMOTHERAPY: ICD-10-CM

## 2022-11-14 DIAGNOSIS — D64.81 ANEMIA DUE TO CHEMOTHERAPY: ICD-10-CM

## 2022-11-14 DIAGNOSIS — D70.1 CHEMOTHERAPY INDUCED NEUTROPENIA: ICD-10-CM

## 2022-11-14 DIAGNOSIS — C83.38 DIFFUSE LARGE B-CELL LYMPHOMA OF LYMPH NODES OF MULTIPLE REGIONS: ICD-10-CM

## 2022-11-14 DIAGNOSIS — K80.50 COMMON BILE DUCT STONE: ICD-10-CM

## 2022-11-14 DIAGNOSIS — C83.38 DIFFUSE LARGE B-CELL LYMPHOMA OF LYMPH NODES OF MULTIPLE REGIONS: Primary | ICD-10-CM

## 2022-11-14 DIAGNOSIS — Z45.2: ICD-10-CM

## 2022-11-14 DIAGNOSIS — J90 BILATERAL PLEURAL EFFUSION: ICD-10-CM

## 2022-11-14 DIAGNOSIS — T45.1X5A CHEMOTHERAPY INDUCED NEUTROPENIA: ICD-10-CM

## 2022-11-14 DIAGNOSIS — R91.8 PULMONARY NODULES: ICD-10-CM

## 2022-11-14 LAB
ALBUMIN SERPL-MCNC: 4.2 G/DL (ref 3.5–5.2)
ALBUMIN/GLOB SERPL: 1.6 G/DL (ref 1.1–2.4)
ALP SERPL-CCNC: 96 U/L (ref 38–116)
ALT SERPL W P-5'-P-CCNC: 16 U/L (ref 0–33)
ANION GAP SERPL CALCULATED.3IONS-SCNC: 10.8 MMOL/L (ref 5–15)
AST SERPL-CCNC: 22 U/L (ref 0–32)
BASOPHILS # BLD AUTO: 0.05 10*3/MM3 (ref 0–0.2)
BASOPHILS NFR BLD AUTO: 2.9 % (ref 0–1.5)
BILIRUB SERPL-MCNC: 0.3 MG/DL (ref 0.2–1.2)
BUN SERPL-MCNC: 20 MG/DL (ref 6–20)
BUN/CREAT SERPL: 25 (ref 7.3–30)
CALCIUM SPEC-SCNC: 9.5 MG/DL (ref 8.5–10.2)
CHLORIDE SERPL-SCNC: 100 MMOL/L (ref 98–107)
CO2 SERPL-SCNC: 24.2 MMOL/L (ref 22–29)
CREAT SERPL-MCNC: 0.8 MG/DL (ref 0.6–1.1)
DEPRECATED RDW RBC AUTO: 56.7 FL (ref 37–54)
EGFRCR SERPLBLD CKD-EPI 2021: 74.1 ML/MIN/1.73
EOSINOPHIL # BLD AUTO: 0.06 10*3/MM3 (ref 0–0.4)
EOSINOPHIL NFR BLD AUTO: 3.4 % (ref 0.3–6.2)
ERYTHROCYTE [DISTWIDTH] IN BLOOD BY AUTOMATED COUNT: 16.3 % (ref 12.3–15.4)
FOLATE SERPL-MCNC: >20 NG/ML (ref 4.78–24.2)
GLOBULIN UR ELPH-MCNC: 2.6 GM/DL (ref 1.8–3.5)
GLUCOSE SERPL-MCNC: 92 MG/DL (ref 74–124)
HCT VFR BLD AUTO: 28.5 % (ref 34–46.6)
HEMOCCULT STL QL IA: POSITIVE
HGB BLD-MCNC: 9.3 G/DL (ref 12–15.9)
HGB RETIC QN AUTO: 32.5 PG (ref 29.8–36.1)
IMM GRANULOCYTES # BLD AUTO: 0 10*3/MM3 (ref 0–0.05)
IMM GRANULOCYTES NFR BLD AUTO: 0 % (ref 0–0.5)
IMM RETICS NFR: 18.2 % (ref 3–15.8)
LDH SERPL-CCNC: 171 U/L (ref 99–259)
LYMPHOCYTES # BLD AUTO: 0.93 10*3/MM3 (ref 0.7–3.1)
LYMPHOCYTES NFR BLD AUTO: 53.1 % (ref 19.6–45.3)
MCH RBC QN AUTO: 30.9 PG (ref 26.6–33)
MCHC RBC AUTO-ENTMCNC: 32.6 G/DL (ref 31.5–35.7)
MCV RBC AUTO: 94.7 FL (ref 79–97)
MONOCYTES # BLD AUTO: 0.42 10*3/MM3 (ref 0.1–0.9)
MONOCYTES NFR BLD AUTO: 24 % (ref 5–12)
NEUTROPHILS NFR BLD AUTO: 0.29 10*3/MM3 (ref 1.7–7)
NEUTROPHILS NFR BLD AUTO: 16.6 % (ref 42.7–76)
NRBC BLD AUTO-RTO: 0 /100 WBC (ref 0–0.2)
PLATELET # BLD AUTO: 236 10*3/MM3 (ref 140–450)
PMV BLD AUTO: 8.9 FL (ref 6–12)
POTASSIUM SERPL-SCNC: 4.5 MMOL/L (ref 3.5–4.7)
PROT SERPL-MCNC: 6.8 G/DL (ref 6.3–8)
RBC # BLD AUTO: 3.01 10*6/MM3 (ref 3.77–5.28)
RETICS # AUTO: 0.15 10*6/MM3 (ref 0.02–0.13)
RETICS/RBC NFR AUTO: 5.03 % (ref 0.7–1.9)
SODIUM SERPL-SCNC: 135 MMOL/L (ref 134–145)
VIT B12 BLD-MCNC: 453 PG/ML (ref 211–946)
WBC NRBC COR # BLD: 1.75 10*3/MM3 (ref 3.4–10.8)

## 2022-11-14 PROCEDURE — 83615 LACTATE (LD) (LDH) ENZYME: CPT

## 2022-11-14 PROCEDURE — 80053 COMPREHEN METABOLIC PANEL: CPT

## 2022-11-14 PROCEDURE — 25010000002 HEPARIN LOCK FLUSH PER 10 UNITS: Performed by: INTERNAL MEDICINE

## 2022-11-14 PROCEDURE — 99215 OFFICE O/P EST HI 40 MIN: CPT | Performed by: INTERNAL MEDICINE

## 2022-11-14 PROCEDURE — 36591 DRAW BLOOD OFF VENOUS DEVICE: CPT

## 2022-11-14 PROCEDURE — 82274 ASSAY TEST FOR BLOOD FECAL: CPT | Performed by: INTERNAL MEDICINE

## 2022-11-14 PROCEDURE — 82746 ASSAY OF FOLIC ACID SERUM: CPT | Performed by: INTERNAL MEDICINE

## 2022-11-14 PROCEDURE — 85046 RETICYTE/HGB CONCENTRATE: CPT | Performed by: INTERNAL MEDICINE

## 2022-11-14 PROCEDURE — 82607 VITAMIN B-12: CPT | Performed by: INTERNAL MEDICINE

## 2022-11-14 PROCEDURE — 85025 COMPLETE CBC W/AUTO DIFF WBC: CPT

## 2022-11-14 RX ORDER — HEPARIN SODIUM (PORCINE) LOCK FLUSH IV SOLN 100 UNIT/ML 100 UNIT/ML
500 SOLUTION INTRAVENOUS AS NEEDED
Status: DISCONTINUED | OUTPATIENT
Start: 2022-11-14 | End: 2022-11-14 | Stop reason: HOSPADM

## 2022-11-14 RX ORDER — SODIUM CHLORIDE 0.9 % (FLUSH) 0.9 %
10 SYRINGE (ML) INJECTION AS NEEDED
Status: CANCELLED | OUTPATIENT
Start: 2022-11-14

## 2022-11-14 RX ORDER — HEPARIN SODIUM (PORCINE) LOCK FLUSH IV SOLN 100 UNIT/ML 100 UNIT/ML
500 SOLUTION INTRAVENOUS AS NEEDED
Status: CANCELLED | OUTPATIENT
Start: 2022-11-14

## 2022-11-14 RX ORDER — SODIUM CHLORIDE 0.9 % (FLUSH) 0.9 %
10 SYRINGE (ML) INJECTION AS NEEDED
Status: DISCONTINUED | OUTPATIENT
Start: 2022-11-14 | End: 2022-11-14 | Stop reason: HOSPADM

## 2022-11-14 RX ADMIN — Medication 500 UNITS: at 16:20

## 2022-11-14 RX ADMIN — Medication 10 ML: at 16:20

## 2022-11-14 NOTE — PROGRESS NOTES
Subjective     CHIEF COMPLAINT:      Chief Complaint   Patient presents with   • Follow-up     Discuss CT Scan     HISTORY OF PRESENT ILLNESS:     Michelle Car is a 82 y.o. female patient who returns today for follow up on her lymphoma.  She returns today for follow-up accompanied by her family.  She recently had ERCP done and tolerated the procedure well.  She did not notice any blood in the stool.  The stool is currently dark green due to the oral iron.    Patient reports fatigue.  Her energy level improved since she completed the chemotherapy treatment.      Patient is not having fever or chills.    ROS:  Pertinent ROS is in the HPI.     Past medical, surgical, social and family history were reviewed.     MEDICATIONS:    Current Outpatient Medications:   •  acetaminophen (TYLENOL) 500 MG tablet, Take 500 mg by mouth Every 6 (Six) Hours As Needed for Mild Pain ., Disp: , Rfl:   •  amiodarone (PACERONE) 200 MG tablet, Take 1 tablet by mouth Daily., Disp: 30 tablet, Rfl: 0  •  apixaban (ELIQUIS) 5 MG tablet tablet, Take 1 tablet by mouth Every 12 (Twelve) Hours. Indications: Atrial Fibrillation, Disp: 180 tablet, Rfl: 1  •  Calcium Carbonate (CALTRATE 600 PO), Take 1 tablet by mouth Daily., Disp: , Rfl:   •  carvedilol (COREG) 3.125 MG tablet, Take 1 tablet by mouth 2 (Two) Times a Day With Meals., Disp: 180 tablet, Rfl: 1  •  ferrous sulfate 325 (65 FE) MG tablet, Take 1 tablet by mouth Every Other Day., Disp: 90 tablet, Rfl: 1  •  Hydrocortisone, Perianal, (Anusol-HC) 2.5 % rectal cream, Apply to hemorrhoids 3 times daily for 7 days during hemorrhoid flare. Include applicator., Disp: 30 g, Rfl: 1  •  losartan (COZAAR) 100 MG tablet, , Disp: , Rfl:   •  Multiple Vitamins-Minerals (CENTRUM SILVER) tablet, Take 1 tablet by mouth Daily., Disp: , Rfl:   •  ondansetron (ZOFRAN) 8 MG tablet, Take 1 tablet by mouth Every 8 (Eight) Hours As Needed for Nausea or Vomiting., Disp: 30 tablet, Rfl: 5  •  polyethylene  "glycol (MIRALAX) 17 GM/SCOOP powder, Take 17 g by mouth As Needed., Disp: , Rfl:   •  pravastatin (PRAVACHOL) 40 MG tablet, TAKE 1 TABLET BY MOUTH EVERY NIGHT AT BEDTIME, Disp: 90 tablet, Rfl: 1  •  spironolactone (ALDACTONE) 25 MG tablet, Take 25 mg by mouth Daily., Disp: , Rfl:   Objective     VITAL SIGNS:     Vitals:    11/14/22 1529   BP: 133/71   Pulse: 63   Resp: 16   Temp: 96.9 °F (36.1 °C)   TempSrc: Temporal   SpO2: 100%   Weight: 67.2 kg (148 lb 1.6 oz)   Height: 152 cm (59.84\")   PainSc: 0-No pain     Body mass index is 29.08 kg/m².     Wt Readings from Last 5 Encounters:   11/14/22 67.2 kg (148 lb 1.6 oz)   11/10/22 67.5 kg (148 lb 12.8 oz)   10/21/22 67.2 kg (148 lb 1.6 oz)   09/15/22 64.9 kg (143 lb)   09/01/22 65.4 kg (144 lb 3.2 oz)     PHYSICAL EXAMINATION:   GENERAL: The patient appears in fair general condition, not in acute distress.   SKIN: No ecchymosis.  EYES: No jaundice. Pallor.  LYMPHATICS: No cervical or supraclavicular lymphadenopathy.  CHEST: Normal respiratory effort.  Lungs clear bilaterally.  No added sounds.  CVS: Normal S1-S2.  No murmurs.  ABDOMEN: Soft. No tenderness. No Hepatomegaly. No Splenomegaly. No masses.  EXTREMITIES: Chronic leg edema.    DIAGNOSTIC DATA:   Component      Latest Ref Rng & Units 11/7/2022 11/10/2022 11/14/2022   WBC      3.40 - 10.80 10*3/mm3 2.12 (L)  1.75 (L)   RBC      3.77 - 5.28 10*6/mm3 2.54 (L)  3.01 (L)   Hemoglobin      12.0 - 15.9 g/dL 7.9 (LL) 9.0 (L) 9.3 (L)   Hematocrit      34.0 - 46.6 % 24.4 (L) 27.4 (L) 28.5 (L)   MCV      79.0 - 97.0 fL 96.1  94.7   MCH      26.6 - 33.0 pg 31.1  30.9   MCHC      31.5 - 35.7 g/dL 32.4  32.6   RDW      12.3 - 15.4 % 17.2 (H)  16.3 (H)   RDW-SD      37.0 - 54.0 fl 60.5 (H)  56.7 (H)   MPV      6.0 - 12.0 fL 8.7  8.9   Platelets      140 - 450 10*3/mm3 335  236   Neutrophil Rel %      42.7 - 76.0 % 25.9 (L)  16.6 (L)   Lymphocyte Rel %      19.6 - 45.3 % 51.9 (H)  53.1 (H)   Monocyte Rel %      5.0 - 12.0 % " 19.8 (H)  24.0 (H)   Eosinophil Rel %      0.3 - 6.2 % 0.5  3.4   Basophil Rel %      0.0 - 1.5 % 1.9 (H)  2.9 (H)   Immature Granulocyte Rel %      0.0 - 0.5 % 0.0  0.0   Neutrophils Absolute      1.70 - 7.00 10*3/mm3 0.55 (L)  0.29 (L)   Lymphocytes Absolute      0.70 - 3.10 10*3/mm3 1.10  0.93   Monocytes Absolute      0.10 - 0.90 10*3/mm3 0.42  0.42   Eosinophils Absolute      0.00 - 0.40 10*3/mm3 0.01  0.06   Basophils Absolute      0.00 - 0.20 10*3/mm3 0.04  0.05   Immature Grans, Absolute      0.00 - 0.05 10*3/mm3 0.00  0.00   nRBC      0.0 - 0.2 /100 WBC 0.0  0.0     Component      Latest Ref Rng & Units 11/10/2022 11/14/2022   Reticulocyte %      0.70 - 1.90 % 6.42 (H) 5.03 (H)     Component      Latest Ref Rng & Units 11/7/2022 11/14/2022   Glucose      74 - 124 mg/dL 92 92   BUN      6 - 20 mg/dL 20 20   Creatinine      0.60 - 1.10 mg/dL 0.87 0.80   Sodium      134 - 145 mmol/L 136 135   Potassium      3.5 - 4.7 mmol/L 4.4 4.5   Chloride      98 - 107 mmol/L 100 100   CO2      22.0 - 29.0 mmol/L 24.0 24.2   Calcium      8.5 - 10.2 mg/dL 9.7 9.5   Total Protein      6.3 - 8.0 g/dL 6.5 6.8   Albumin      3.50 - 5.20 g/dL 3.90 4.20   ALT (SGPT)      0 - 33 U/L 17 16   AST (SGOT)      0 - 32 U/L 19 22   Alkaline Phosphatase      38 - 116 U/L 100 96   Total Bilirubin      0.2 - 1.2 mg/dL 0.4 0.3   Globulin      1.8 - 3.5 gm/dL 2.6 2.6   A/G Ratio      1.1 - 2.4 g/dL 1.5 1.6   BUN/Creatinine Ratio      7.3 - 30.0 23.0 25.0   Anion Gap      5.0 - 15.0 mmol/L 12.0 10.8   eGFR      >60.0 mL/min/1.73 67.0 74.1     Component      Latest Ref Rng & Units 11/14/2022   Fecal Occult Blood      Negative Positive (A)         Lab 11/10/22  1449   IRON 168*   IRON SATURATION 33   TIBC 504   TRANSFERRIN 338   FERRITIN 50.90   FOLATE >20.00   VITAMIN B 12 487      CT chest abdomen pelvis on 11/7/2022:  1. No adenopathy is identified, continued follow-up recommended as  indicated.  2. Minimal partly loculated right pleural  effusion has developed since  the prior exam.  3. Pneumobilia status post ERCP.    Assessment & Plan    *Diffuse large B-cell lymphoma.     · Patient was found to have bilateral pleural effusions.    · She underwent right thoracentesis on 2/21/2022 and the left thoracentesis on 2/22/2022.    · Analysis of the fluid revealed involvement with diffuse large B-cell lymphoma.    · FISH analysis for BCL6 rearrangement was positive.    · FISH analysis for BCL 1, BCL-2 and MYC rearrangement was negative.    · CT chest on 2/17/2022 revealed no hilar or mediastinal lymphadenopathy.    · Spleen was reported to be normal in size.  · Peripheral blood flow cytometry on 3/7/2022 was negative.  · PET scan on 3/10/2022 revealed significant hypermetabolism in the left adrenal gland and the surrounding soft tissue with SUV up to 34.5. Hypermetabolism in the left pleural thickening with SUV of 7.1. there was less hypermetabolism in the right adrenal gland and in the right pleura.  · She is considered to have stage III non-bulky disease.  · The degree of hypermetabolism is consistent with high grade lymphoma. This concurs with the pathology exam of the pleural fluid cytology that revealed diffuse large B-cell lymphoma.  · R-IPI score of 3 associated with poor risk. This is associated with overall survival of 55%.   · R-CHOP with Neulasta support on 3/28/2022.  · Patient developed neutropenia and thrombocytopenia after cycle#1.  · Vincristine dose was reduced to 1 mg due to constipation.   · Patient developed neutropenia and neutropenic fever that required hospitalization after cycle #2.  · Her performance status decreased as a result. Although she felt better after discharge, she continues to be weak.   · CT scan on 5/1/2022 revealed a decrease in the right pleural effusion. The left suprarenal density was stable. The left pleural effusion was slightly larger. However, it had a density of serous fluid likely representing increase in the  fluid due to cardiac or fluid overload state.  · Regimen was changed to mini-RCHOP starting cycle #3 which she received on 5/16/2022.  · CT scans on 6/23/2022 showed evidence of response.  There was resolution of the pericardial effusion and near complete resolution of the pleural effusions.  No new abnormality within the chest.  · Patient received cycle #6 on 7/25/2022.  · PET scan on 8/15/2022 revealed complete metabolic response.  · CT on 11/7/2022 revealed no evidence of recurrence of the lymphoma.  · I reassured the patient about the results.  I recommended repeating CT scan in 3 months.    *Neutropenia secondary to chemotherapy.  · Neutrophil count decreased to 0.04 on 4/4/2022.  · She was placed on prophylactic antibiotic with Augmentin.  · After cycle #2, she developed neutropenia with neutropenic fever despite Neulasta support and Augmentin which was started on day #6.  · Patient was hospitalized between 4/26/2022 and 5/4/2022.  · Neutrophil count subsequently recovered.  · Neutrophil count improved to 1970 on 8/22/2022.  · Neutrophil count decreased to 550 on 11/7/2022 and it is today at 290.  · This may represent decreased bone marrow reserve secondary to the recent chemotherapy treatment.    *Thrombocytopenia secondary to chemotherapy.  · Platelet count decreased to 110,000 on day #8 of her first cycle of chemotherapy.  · Platelet count improved afterwards.  · Following cycle #2, Platelet count decreased to 57,000 on 4/28/2022.  · Platelet count recovered afterwards.  It was 510,000 on 5/9/2022.  · Platelet count was 365,000 on 5/16/2022.  · She did not develop thrombocytopenia following cycle #3.  · Platelet count is 236,000 today.    *Bilateral pleural effusions.   · She is s/p right thoracentesis on 2/21/2022 and the effusion was found to be malignant attributed to the lymphoma.   · CT scan on 5/1/2022 revealed decrease in the right pleural effusion and an increase in the left effusion.   · The  increase in the left effusion was suspected of being due to cardiac and fluid overload states (fluid appeared to be serous on CT scan).  · CT on 6/23/2022 revealed near resolution of the pleural effusions.  · PET scan on 8/15/2022 showed that the pleural effusions were trace and improved since June 2022.  · CT chest revealed minimal partially loculated right pleural effusion.  · I recommended reevaluation on the follow-up CT scan in 3 months.    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  · Patient had anemia secondary to her lymphoma.  · Hemoglobin was 11.1 on 3/28/2022.  · Anemia work-up showed no evidence of iron deficiency.  · Hgb decreased to 7.9 on 4/25/2022 and she was transfused.   · Hemoglobin decreased to 6.3 on 4/27/2022. She was transfused.  · Hemoglobin improved to 8.8 on 5/16/2022.  · Hemoglobin was 9.9 on 8/22/2022.  · Labs revealed iron deficiency.  · She was started on ferrous sulfate 325 mg every other day.  · Hemoglobin was 7.9 on 11/7/2022.  · Hemoglobin was 9.0 on 11/10/2022.  · We repeated CBC today.  Hemoglobin improved to 9.3.  · She was found to have a positive stool Hemoccult on stool test done by her PCP earlier today.  · ?  Blood loss secondary to the recent ERCP.  · I will check with Dr. Ortega regarding this.    *Paroxysmal atrial fibrillation.  · She is on Eliquis 5 mg twice daily.  · Eliquis was briefly held on 4/27/2022 due to thrombocytopenia.   · She is tolerating Eliquis without problem with bleeding or bruising.    *Choledocholithiasis with increase in the intra and extrahepatic biliary dilatation seen on CT on 6/23/2022.  · Liver enzymes including bilirubin are normal today.   · She is not having abdominal pain.  No redness no tenderness on exam of the right upper quadrant.  · PET scan on 8/15/2022 showed persistence of the findings.  No symptoms or signs of infection.  · Patient was seen by Dr. Ortega.  She underwent ERCP on 10/21/2022.  · There was a  calculus of the bile duct without cholecystitis and without obstruction.  · She had sphincterotomy and balloon sweep.  · Alkaline phosphatase and bilirubin are normal today.    *Few new subcentimeter pulmonary nodules were seen on PET scan and they were considered indeterminate.    · Patient reports having recent episodes of cough and congestion.    · Cough was productive of yellow sputum.   · CT chest on 11/7/2022 revealed no suspicious lung lesions.  · There was right pleural effusion.    *Patient has a port.   · Port is being maintained every 6 weeks.    PLAN:    1.  Continue ferrous sulfate 325 mg daily.   2.  Repeat CBC in 2 weeks.   3.  Port flush + CBC CMP LDH ferritin iron panel in 6 weeks.   4.  Repeat CT scan of the chest abdomen pelvis in 3 months.  I will see her in follow-up after the CT scan with repeat labs.     I spent 45 minutes caring for Michelle on this date of service. This time includes time spent by me in the following activities: preparing for the visit, reviewing tests, obtaining and/or reviewing a separately obtained history, performing a medically appropriate examination and/or evaluation, counseling and educating the patient/family/caregiver, ordering medications, tests, or procedures, documenting information in the medical record, independently interpreting results and communicating that information with the patient/family/caregiver and care coordination         Renate Galo MD  11/14/22

## 2022-11-14 NOTE — NURSING NOTE
Pt added for port flush, cbc results reviewed with Dr Galo, additional lab orders placed.    PCP at [facility],   Phone: (   )    -  Fax: (   )    -  Follow Up Time:

## 2022-11-15 ENCOUNTER — TELEPHONE (OUTPATIENT)
Dept: ONCOLOGY | Facility: CLINIC | Age: 82
End: 2022-11-15

## 2022-11-15 DIAGNOSIS — C83.38 DIFFUSE LARGE B-CELL LYMPHOMA OF LYMPH NODES OF MULTIPLE REGIONS: Primary | ICD-10-CM

## 2022-11-15 NOTE — TELEPHONE ENCOUNTER
Phoned patient to inform her that due to her low WBC count at yesterday's office visit, Dr. Galo would like her to return in 2 weeks for a CBC and RN review. Patient v/u. Order placed and scheduling notified.

## 2022-11-17 LAB — METHYLMALONATE SERPL-SCNC: 226 NMOL/L (ref 0–378)

## 2022-11-30 ENCOUNTER — CLINICAL SUPPORT (OUTPATIENT)
Dept: ONCOLOGY | Facility: HOSPITAL | Age: 82
End: 2022-11-30

## 2022-11-30 ENCOUNTER — LAB (OUTPATIENT)
Dept: LAB | Facility: HOSPITAL | Age: 82
End: 2022-11-30

## 2022-11-30 DIAGNOSIS — C83.38 DIFFUSE LARGE B-CELL LYMPHOMA OF LYMPH NODES OF MULTIPLE REGIONS: ICD-10-CM

## 2022-11-30 LAB
BASOPHILS # BLD AUTO: 0.06 10*3/MM3 (ref 0–0.2)
BASOPHILS NFR BLD AUTO: 1.3 % (ref 0–1.5)
DEPRECATED RDW RBC AUTO: 48.6 FL (ref 37–54)
EOSINOPHIL # BLD AUTO: 0.07 10*3/MM3 (ref 0–0.4)
EOSINOPHIL NFR BLD AUTO: 1.5 % (ref 0.3–6.2)
ERYTHROCYTE [DISTWIDTH] IN BLOOD BY AUTOMATED COUNT: 14.9 % (ref 12.3–15.4)
HCT VFR BLD AUTO: 31.5 % (ref 34–46.6)
HGB BLD-MCNC: 10.8 G/DL (ref 12–15.9)
IMM GRANULOCYTES # BLD AUTO: 0.02 10*3/MM3 (ref 0–0.05)
IMM GRANULOCYTES NFR BLD AUTO: 0.4 % (ref 0–0.5)
LYMPHOCYTES # BLD AUTO: 1.5 10*3/MM3 (ref 0.7–3.1)
LYMPHOCYTES NFR BLD AUTO: 32.1 % (ref 19.6–45.3)
MCH RBC QN AUTO: 30.8 PG (ref 26.6–33)
MCHC RBC AUTO-ENTMCNC: 34.3 G/DL (ref 31.5–35.7)
MCV RBC AUTO: 89.7 FL (ref 79–97)
MONOCYTES # BLD AUTO: 0.54 10*3/MM3 (ref 0.1–0.9)
MONOCYTES NFR BLD AUTO: 11.5 % (ref 5–12)
NEUTROPHILS NFR BLD AUTO: 2.49 10*3/MM3 (ref 1.7–7)
NEUTROPHILS NFR BLD AUTO: 53.2 % (ref 42.7–76)
NRBC BLD AUTO-RTO: 0 /100 WBC (ref 0–0.2)
PLATELET # BLD AUTO: 214 10*3/MM3 (ref 140–450)
PMV BLD AUTO: 9.4 FL (ref 6–12)
RBC # BLD AUTO: 3.51 10*6/MM3 (ref 3.77–5.28)
WBC NRBC COR # BLD: 4.68 10*3/MM3 (ref 3.4–10.8)

## 2022-11-30 PROCEDURE — G0463 HOSPITAL OUTPT CLINIC VISIT: HCPCS

## 2022-11-30 PROCEDURE — 36415 COLL VENOUS BLD VENIPUNCTURE: CPT

## 2022-11-30 PROCEDURE — 85025 COMPLETE CBC W/AUTO DIFF WBC: CPT

## 2022-11-30 NOTE — NURSING NOTE
Patient is here for lab review with RN.  CBC reviewed, counts are stable for this patient at this time. Patient has no complaints. Copy of labs given to patient and follow up appointment reviewed. Patient is instructed to call the office with any concerns or new symptoms prior to next visit. Patient verbalized understanding and discharged in stable condition.  Lab Results   Component Value Date    WBC 4.68 11/30/2022    HGB 10.8 (L) 11/30/2022    HCT 31.5 (L) 11/30/2022    MCV 89.7 11/30/2022     11/30/2022

## 2022-12-15 ENCOUNTER — TELEPHONE (OUTPATIENT)
Dept: CARDIOLOGY | Facility: CLINIC | Age: 82
End: 2022-12-15

## 2022-12-15 NOTE — TELEPHONE ENCOUNTER
Alexandra from Dr. Mason's office called, informing me that patient was there for dental extractions.  Dr. Mason would like to know how long patient can be off Eliquis prior to the extraction.  I spoke with Vero TAY.  She said Dr. Mason may do the extraction with the patient on Eliquis if he is ok with it, or patient may hold Eliquis up to 48 hours prior.  I called and informed Alexandra.  Kelli

## 2022-12-20 RX ORDER — SPIRONOLACTONE 25 MG/1
25 TABLET ORAL DAILY
Qty: 90 TABLET | Refills: 1 | Status: SHIPPED | OUTPATIENT
Start: 2022-12-20

## 2022-12-20 RX ORDER — CARVEDILOL 3.12 MG/1
3.12 TABLET ORAL 2 TIMES DAILY WITH MEALS
Qty: 180 TABLET | Refills: 1 | Status: SHIPPED | OUTPATIENT
Start: 2022-12-20

## 2022-12-27 ENCOUNTER — INFUSION (OUTPATIENT)
Dept: ONCOLOGY | Facility: HOSPITAL | Age: 82
End: 2022-12-27

## 2022-12-27 DIAGNOSIS — Z45.2: Primary | ICD-10-CM

## 2022-12-27 PROCEDURE — 25010000002 HEPARIN LOCK FLUSH PER 10 UNITS: Performed by: INTERNAL MEDICINE

## 2022-12-27 PROCEDURE — 96523 IRRIG DRUG DELIVERY DEVICE: CPT

## 2022-12-27 RX ORDER — HEPARIN SODIUM (PORCINE) LOCK FLUSH IV SOLN 100 UNIT/ML 100 UNIT/ML
500 SOLUTION INTRAVENOUS AS NEEDED
Status: DISCONTINUED | OUTPATIENT
Start: 2022-12-27 | End: 2022-12-27 | Stop reason: HOSPADM

## 2022-12-27 RX ORDER — SODIUM CHLORIDE 0.9 % (FLUSH) 0.9 %
10 SYRINGE (ML) INJECTION AS NEEDED
Status: DISCONTINUED | OUTPATIENT
Start: 2022-12-27 | End: 2022-12-27 | Stop reason: HOSPADM

## 2022-12-27 RX ORDER — SODIUM CHLORIDE 0.9 % (FLUSH) 0.9 %
10 SYRINGE (ML) INJECTION AS NEEDED
Status: CANCELLED | OUTPATIENT
Start: 2022-12-27

## 2022-12-27 RX ORDER — HEPARIN SODIUM (PORCINE) LOCK FLUSH IV SOLN 100 UNIT/ML 100 UNIT/ML
500 SOLUTION INTRAVENOUS AS NEEDED
Status: CANCELLED | OUTPATIENT
Start: 2022-12-27

## 2022-12-27 RX ADMIN — Medication 500 UNITS: at 14:40

## 2022-12-27 RX ADMIN — Medication 10 ML: at 14:40

## 2023-01-30 ENCOUNTER — INFUSION (OUTPATIENT)
Dept: ONCOLOGY | Facility: HOSPITAL | Age: 83
End: 2023-01-30
Payer: MEDICARE

## 2023-01-30 ENCOUNTER — OFFICE VISIT (OUTPATIENT)
Dept: ONCOLOGY | Facility: CLINIC | Age: 83
End: 2023-01-30
Payer: MEDICARE

## 2023-01-30 VITALS
SYSTOLIC BLOOD PRESSURE: 135 MMHG | TEMPERATURE: 97.3 F | WEIGHT: 149.4 LBS | BODY MASS INDEX: 29.33 KG/M2 | RESPIRATION RATE: 16 BRPM | HEIGHT: 60 IN | HEART RATE: 60 BPM | DIASTOLIC BLOOD PRESSURE: 82 MMHG | OXYGEN SATURATION: 98 %

## 2023-01-30 DIAGNOSIS — R91.8 PULMONARY NODULES: ICD-10-CM

## 2023-01-30 DIAGNOSIS — Z45.2: Primary | ICD-10-CM

## 2023-01-30 DIAGNOSIS — D69.6 THROMBOCYTOPENIA: ICD-10-CM

## 2023-01-30 DIAGNOSIS — C83.38 DIFFUSE LARGE B-CELL LYMPHOMA OF LYMPH NODES OF MULTIPLE REGIONS: Primary | ICD-10-CM

## 2023-01-30 DIAGNOSIS — K80.50 COMMON BILE DUCT STONE: ICD-10-CM

## 2023-01-30 DIAGNOSIS — Z45.2: ICD-10-CM

## 2023-01-30 DIAGNOSIS — D70.1 CHEMOTHERAPY INDUCED NEUTROPENIA: ICD-10-CM

## 2023-01-30 DIAGNOSIS — J90 BILATERAL PLEURAL EFFUSION: ICD-10-CM

## 2023-01-30 DIAGNOSIS — T45.1X5A ANEMIA DUE TO CHEMOTHERAPY: ICD-10-CM

## 2023-01-30 DIAGNOSIS — T45.1X5A CHEMOTHERAPY INDUCED NEUTROPENIA: ICD-10-CM

## 2023-01-30 DIAGNOSIS — D64.81 ANEMIA DUE TO CHEMOTHERAPY: ICD-10-CM

## 2023-01-30 DIAGNOSIS — D50.9 IRON DEFICIENCY ANEMIA, UNSPECIFIED IRON DEFICIENCY ANEMIA TYPE: ICD-10-CM

## 2023-01-30 DIAGNOSIS — C83.38 DIFFUSE LARGE B-CELL LYMPHOMA OF LYMPH NODES OF MULTIPLE REGIONS: ICD-10-CM

## 2023-01-30 LAB
ALBUMIN SERPL-MCNC: 4 G/DL (ref 3.5–5.2)
ALBUMIN/GLOB SERPL: 1.5 G/DL (ref 1.1–2.4)
ALP SERPL-CCNC: 90 U/L (ref 38–116)
ALT SERPL W P-5'-P-CCNC: 25 U/L (ref 0–33)
ANION GAP SERPL CALCULATED.3IONS-SCNC: 10.3 MMOL/L (ref 5–15)
AST SERPL-CCNC: 27 U/L (ref 0–32)
BASOPHILS # BLD AUTO: 0.04 10*3/MM3 (ref 0–0.2)
BASOPHILS NFR BLD AUTO: 1.1 % (ref 0–1.5)
BILIRUB SERPL-MCNC: 0.3 MG/DL (ref 0.2–1.2)
BUN SERPL-MCNC: 22 MG/DL (ref 6–20)
BUN/CREAT SERPL: 27.5 (ref 7.3–30)
CALCIUM SPEC-SCNC: 9.8 MG/DL (ref 8.5–10.2)
CHLORIDE SERPL-SCNC: 96 MMOL/L (ref 98–107)
CO2 SERPL-SCNC: 26.7 MMOL/L (ref 22–29)
CREAT SERPL-MCNC: 0.8 MG/DL (ref 0.6–1.1)
DEPRECATED RDW RBC AUTO: 46.2 FL (ref 37–54)
EGFRCR SERPLBLD CKD-EPI 2021: 74.1 ML/MIN/1.73
EOSINOPHIL # BLD AUTO: 0.03 10*3/MM3 (ref 0–0.4)
EOSINOPHIL NFR BLD AUTO: 0.9 % (ref 0.3–6.2)
ERYTHROCYTE [DISTWIDTH] IN BLOOD BY AUTOMATED COUNT: 14.4 % (ref 12.3–15.4)
FERRITIN SERPL-MCNC: 49.6 NG/ML (ref 13–150)
GLOBULIN UR ELPH-MCNC: 2.7 GM/DL (ref 1.8–3.5)
GLUCOSE SERPL-MCNC: 86 MG/DL (ref 74–124)
HCT VFR BLD AUTO: 34.3 % (ref 34–46.6)
HGB BLD-MCNC: 11.7 G/DL (ref 12–15.9)
IMM GRANULOCYTES # BLD AUTO: 0.02 10*3/MM3 (ref 0–0.05)
IMM GRANULOCYTES NFR BLD AUTO: 0.6 % (ref 0–0.5)
IRON 24H UR-MRATE: 89 MCG/DL (ref 37–145)
IRON SATN MFR SERPL: 20 % (ref 14–48)
LDH SERPL-CCNC: 173 U/L (ref 99–259)
LYMPHOCYTES # BLD AUTO: 1.15 10*3/MM3 (ref 0.7–3.1)
LYMPHOCYTES NFR BLD AUTO: 33 % (ref 19.6–45.3)
MCH RBC QN AUTO: 30.2 PG (ref 26.6–33)
MCHC RBC AUTO-ENTMCNC: 34.1 G/DL (ref 31.5–35.7)
MCV RBC AUTO: 88.4 FL (ref 79–97)
MONOCYTES # BLD AUTO: 0.4 10*3/MM3 (ref 0.1–0.9)
MONOCYTES NFR BLD AUTO: 11.5 % (ref 5–12)
NEUTROPHILS NFR BLD AUTO: 1.84 10*3/MM3 (ref 1.7–7)
NEUTROPHILS NFR BLD AUTO: 52.9 % (ref 42.7–76)
NRBC BLD AUTO-RTO: 0 /100 WBC (ref 0–0.2)
PLATELET # BLD AUTO: 199 10*3/MM3 (ref 140–450)
PMV BLD AUTO: 8.6 FL (ref 6–12)
POTASSIUM SERPL-SCNC: 4.6 MMOL/L (ref 3.5–4.7)
PROT SERPL-MCNC: 6.7 G/DL (ref 6.3–8)
RBC # BLD AUTO: 3.88 10*6/MM3 (ref 3.77–5.28)
SODIUM SERPL-SCNC: 133 MMOL/L (ref 134–145)
TIBC SERPL-MCNC: 454 MCG/DL (ref 249–505)
TRANSFERRIN SERPL-MCNC: 324 MG/DL (ref 200–360)
WBC NRBC COR # BLD: 3.48 10*3/MM3 (ref 3.4–10.8)

## 2023-01-30 PROCEDURE — 84466 ASSAY OF TRANSFERRIN: CPT

## 2023-01-30 PROCEDURE — 80053 COMPREHEN METABOLIC PANEL: CPT

## 2023-01-30 PROCEDURE — 82728 ASSAY OF FERRITIN: CPT

## 2023-01-30 PROCEDURE — 25010000002 HEPARIN LOCK FLUSH PER 10 UNITS: Performed by: INTERNAL MEDICINE

## 2023-01-30 PROCEDURE — 85025 COMPLETE CBC W/AUTO DIFF WBC: CPT

## 2023-01-30 PROCEDURE — 83615 LACTATE (LD) (LDH) ENZYME: CPT

## 2023-01-30 PROCEDURE — 99214 OFFICE O/P EST MOD 30 MIN: CPT | Performed by: INTERNAL MEDICINE

## 2023-01-30 PROCEDURE — 83540 ASSAY OF IRON: CPT

## 2023-01-30 PROCEDURE — 36591 DRAW BLOOD OFF VENOUS DEVICE: CPT

## 2023-01-30 RX ORDER — HEPARIN SODIUM (PORCINE) LOCK FLUSH IV SOLN 100 UNIT/ML 100 UNIT/ML
500 SOLUTION INTRAVENOUS AS NEEDED
Status: DISCONTINUED | OUTPATIENT
Start: 2023-01-30 | End: 2023-01-30 | Stop reason: HOSPADM

## 2023-01-30 RX ORDER — SODIUM CHLORIDE 0.9 % (FLUSH) 0.9 %
10 SYRINGE (ML) INJECTION AS NEEDED
Status: DISCONTINUED | OUTPATIENT
Start: 2023-01-30 | End: 2023-01-30 | Stop reason: HOSPADM

## 2023-01-30 RX ORDER — HEPARIN SODIUM (PORCINE) LOCK FLUSH IV SOLN 100 UNIT/ML 100 UNIT/ML
500 SOLUTION INTRAVENOUS AS NEEDED
Status: CANCELLED | OUTPATIENT
Start: 2023-01-30

## 2023-01-30 RX ORDER — SODIUM CHLORIDE 0.9 % (FLUSH) 0.9 %
10 SYRINGE (ML) INJECTION AS NEEDED
Status: CANCELLED | OUTPATIENT
Start: 2023-01-30

## 2023-01-30 RX ADMIN — Medication 500 UNITS: at 14:06

## 2023-01-30 RX ADMIN — Medication 10 ML: at 14:06

## 2023-01-30 NOTE — PROGRESS NOTES
Subjective     CHIEF COMPLAINT:      Chief Complaint   Patient presents with   • Follow-up     Discuss port placements and/mucinex fast max upcoming appointments      HISTORY OF PRESENT ILLNESS:     Michelle Car is a 82 y.o. female patient who returns today for follow up on her lymphoma.  She returns today for follow-up reporting intermittent cough and congestion.  No chest pain.  No shortness of breath.  No symptoms related to the port.  Her fatigue has improved.  She is able to do more things at home than before.    ROS:  Pertinent ROS is in the HPI.     Past medical, surgical, social and family history were reviewed.     MEDICATIONS:    Current Outpatient Medications:   •  acetaminophen (TYLENOL) 500 MG tablet, Take 500 mg by mouth Every 6 (Six) Hours As Needed for Mild Pain ., Disp: , Rfl:   •  amiodarone (PACERONE) 200 MG tablet, Take 1 tablet by mouth Daily., Disp: 30 tablet, Rfl: 0  •  apixaban (ELIQUIS) 5 MG tablet tablet, Take 1 tablet by mouth Every 12 (Twelve) Hours. Indications: Atrial Fibrillation, Disp: 180 tablet, Rfl: 1  •  Calcium Carbonate (CALTRATE 600 PO), Take 1 tablet by mouth Daily., Disp: , Rfl:   •  carvedilol (COREG) 3.125 MG tablet, Take 1 tablet by mouth 2 (Two) Times a Day With Meals., Disp: 180 tablet, Rfl: 1  •  ferrous sulfate 325 (65 FE) MG tablet, Take 1 tablet by mouth Every Other Day., Disp: 90 tablet, Rfl: 1  •  losartan (COZAAR) 100 MG tablet, , Disp: , Rfl:   •  Multiple Vitamins-Minerals (CENTRUM SILVER) tablet, Take 1 tablet by mouth Daily., Disp: , Rfl:   •  polyethylene glycol (MIRALAX) 17 GM/SCOOP powder, Take 17 g by mouth As Needed., Disp: , Rfl:   •  pravastatin (PRAVACHOL) 40 MG tablet, TAKE 1 TABLET BY MOUTH EVERY NIGHT AT BEDTIME, Disp: 90 tablet, Rfl: 1  •  spironolactone (ALDACTONE) 25 MG tablet, Take 1 tablet by mouth Daily., Disp: 90 tablet, Rfl: 1  •  Hydrocortisone, Perianal, (Anusol-HC) 2.5 % rectal cream, Apply to hemorrhoids 3 times daily for 7 days  "during hemorrhoid flare. Include applicator., Disp: 30 g, Rfl: 1  •  ondansetron (ZOFRAN) 8 MG tablet, Take 1 tablet by mouth Every 8 (Eight) Hours As Needed for Nausea or Vomiting., Disp: 30 tablet, Rfl: 5  No current facility-administered medications for this visit.  Objective     VITAL SIGNS:     Vitals:    01/30/23 1426   BP: 135/82   Pulse: 60   Resp: 16   Temp: 97.3 °F (36.3 °C)   TempSrc: Temporal   SpO2: 98%   Weight: 67.8 kg (149 lb 6.4 oz)   Height: 152 cm (59.84\")   PainSc: 0-No pain     Body mass index is 29.33 kg/m².     Wt Readings from Last 5 Encounters:   01/30/23 67.8 kg (149 lb 6.4 oz)   11/14/22 67.2 kg (148 lb 1.6 oz)   11/10/22 67.5 kg (148 lb 12.8 oz)   10/21/22 67.2 kg (148 lb 1.6 oz)   09/15/22 64.9 kg (143 lb)     PHYSICAL EXAMINATION:   GENERAL: The patient appears in good general condition, not in acute distress.   SKIN: No ecchymosis.  EYES: No jaundice. No pallor.  LYMPHATICS: No cervical, supraclavicular or axillary lymphadenopathy.  CHEST: Normal respiratory effort.  Lungs clear bilaterally.  No added sounds.  CVS: Normal S1-S2.  No murmurs.  ABDOMEN: Soft. No tenderness. No Hepatomegaly. No Splenomegaly. No masses.    DIAGNOSTIC DATA:     Results from last 7 days   Lab Units 01/30/23  1406   WBC 10*3/mm3 3.48   NEUTROS ABS 10*3/mm3 1.84   HEMOGLOBIN g/dL 11.7*   HEMATOCRIT % 34.3   PLATELETS 10*3/mm3 199     Results from last 7 days   Lab Units 01/30/23  1406   SODIUM mmol/L 133*   POTASSIUM mmol/L 4.6   CHLORIDE mmol/L 96*   CO2 mmol/L 26.7   BUN mg/dL 22*   CREATININE mg/dL 0.80   CALCIUM mg/dL 9.8   ALBUMIN g/dL 4.0   BILIRUBIN mg/dL 0.3   ALK PHOS U/L 90   ALT (SGPT) U/L 25   AST (SGOT) U/L 27   GLUCOSE mg/dL 86         Lab 01/30/23  1406   IRON 89   IRON SATURATION 20   TIBC 454   TRANSFERRIN 324   FERRITIN 49.60      Component      Latest Ref Rng & Units 8/22/2022 11/7/2022 11/14/2022 1/30/2023   LDH      99 - 259 U/L 280 (H) 149 171 173     Assessment & Plan    *Diffuse large " B-cell lymphoma.     · Patient was found to have bilateral pleural effusions.    · She underwent right thoracentesis on 2/21/2022 and the left thoracentesis on 2/22/2022.    · Analysis of the fluid revealed involvement with diffuse large B-cell lymphoma.    · FISH analysis for BCL6 rearrangement was positive.    · FISH analysis for BCL 1, BCL-2 and MYC rearrangement was negative.    · CT chest on 2/17/2022 revealed no hilar or mediastinal lymphadenopathy.    · Spleen was reported to be normal in size.  · Peripheral blood flow cytometry on 3/7/2022 was negative.  · PET scan on 3/10/2022 revealed significant hypermetabolism in the left adrenal gland and the surrounding soft tissue with SUV up to 34.5. Hypermetabolism in the left pleural thickening with SUV of 7.1. there was less hypermetabolism in the right adrenal gland and in the right pleura.  · She is considered to have stage III non-bulky disease.  · The degree of hypermetabolism is consistent with high grade lymphoma. This concurs with the pathology exam of the pleural fluid cytology that revealed diffuse large B-cell lymphoma.  · R-IPI score of 3 associated with poor risk. This is associated with overall survival of 55%.   · R-CHOP with Neulasta support on 3/28/2022.  · Patient developed neutropenia and thrombocytopenia after cycle#1.  · Vincristine dose was reduced to 1 mg due to constipation.   · Patient developed neutropenia and neutropenic fever that required hospitalization after cycle #2.  · Her performance status decreased as a result. Although she felt better after discharge, she continues to be weak.   · CT scan on 5/1/2022 revealed a decrease in the right pleural effusion. The left suprarenal density was stable. The left pleural effusion was slightly larger. However, it had a density of serous fluid likely representing increase in the fluid due to cardiac or fluid overload state.  · Regimen was changed to mini-RCHOP starting cycle #3 which she  received on 5/16/2022.  · CT scans on 6/23/2022 showed evidence of response.  There was resolution of the pericardial effusion and near complete resolution of the pleural effusions.  No new abnormality within the chest.  · Patient received cycle #6 on 7/25/2022.  · PET scan on 8/15/2022 revealed complete metabolic response.  · CT on 11/7/2022 revealed no evidence of recurrence of the lymphoma.  · Exam today revealed no lymphadenopathy.  · LDH is normal at 173.    *Neutropenia secondary to chemotherapy.  · Neutrophil count decreased to 0.04 on 4/4/2022.  · She was placed on prophylactic antibiotic with Augmentin.  · After cycle #2, she developed neutropenia with neutropenic fever despite Neulasta support and Augmentin which was started on day #6.  · Patient was hospitalized between 4/26/2022 and 5/4/2022.  · Neutrophil count improved afterwards.  · Neutrophil count decreased to 550 on 11/7/2022.  · Neutrophil count was 290 on 11/14/2022.   · Neutrophil count is normal today at 1840.     *Bilateral pleural effusions.   · She is s/p right thoracentesis on 2/21/2022 and the effusion was found to be malignant attributed to the lymphoma.   · CT scan on 5/1/2022 revealed decrease in the right pleural effusion and an increase in the left effusion.   · The increase in the left effusion was suspected of being due to cardiac and fluid overload states (fluid appeared to be serous on CT scan).  · CT on 6/23/2022 revealed near resolution of the pleural effusions.  · PET scan on 8/15/2022 showed that the pleural effusions were trace and improved since June 2022.  · CT chest revealed minimal partially loculated right pleural effusion.  · Exam today revealed no evidence of recurrent effusion.    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  · Patient had anemia secondary to her lymphoma.  · Hemoglobin was 11.1 on 3/28/2022.  · Anemia work-up showed no evidence of iron deficiency.  · Hgb decreased to 7.9 on  4/25/2022 and she was transfused.   · Hemoglobin decreased to 6.3 on 4/27/2022. She was transfused.  · Hemoglobin improved to 8.8 on 5/16/2022.  · Hemoglobin was 9.9 on 8/22/2022.  · Labs revealed iron deficiency.  · She was started on ferrous sulfate 325 mg every other day.  · Hemoglobin was 7.9 on 11/7/2022.  · Hemoglobin was 9.0 on 11/10/2022.  · She was found to have a positive stool Hemoccult on stool test done by her PCP earlier today.  · Hemoglobin improved to 11.7 today.  · Ferritin is 49 and transferrin saturation is 20%.    *Few new subcentimeter pulmonary nodules were seen on PET scan and they were considered indeterminate.    · Patient reports having recent episodes of cough and congestion.    · Cough was productive of yellow sputum.   · CT chest on 11/7/2022 revealed no suspicious lung lesions.  · The nodules will be reevaluated on follow-up scan.      *Choledocholithiasis with increase in the intra and extrahepatic biliary dilatation seen on CT on 6/23/2022.  · Liver enzymes including bilirubin are normal today.   · She is not having abdominal pain.  No redness no tenderness on exam of the right upper quadrant.  · PET scan on 8/15/2022 showed persistence of the findings.  No symptoms or signs of infection.  · Patient was seen by Dr. Ortega.  She underwent ERCP on 10/21/2022.  · There was a calculus of the bile duct without cholecystitis and without obstruction.  · She had sphincterotomy and balloon sweep.  · Alkaline phosphatase is normal today.    *Patient has a port.   · The port is being maintained every 6 weeks.    PLAN:    1.  Continue ferrous sulfate 325 mg daily.  2.  Obtain CT scan of the chest abdomen pelvis in 5 weeks.  3.  Repeat CBC CMP LDH in 5 weeks.   4.  I will see her in follow-up after the upcoming CT scans.  If there is no evidence of lymphoma and if there are no concerning findings, I would recommend removal of the port.         Renate Galo MD  01/30/23

## 2023-02-13 ENCOUNTER — OFFICE VISIT (OUTPATIENT)
Dept: CARDIOLOGY | Facility: CLINIC | Age: 83
End: 2023-02-13
Payer: MEDICARE

## 2023-02-13 VITALS
HEART RATE: 56 BPM | BODY MASS INDEX: 29.45 KG/M2 | WEIGHT: 150 LBS | SYSTOLIC BLOOD PRESSURE: 110 MMHG | HEIGHT: 60 IN | DIASTOLIC BLOOD PRESSURE: 86 MMHG

## 2023-02-13 DIAGNOSIS — I48.0 PAROXYSMAL ATRIAL FIBRILLATION WITH RAPID VENTRICULAR RESPONSE: ICD-10-CM

## 2023-02-13 DIAGNOSIS — I10 PRIMARY HYPERTENSION: ICD-10-CM

## 2023-02-13 DIAGNOSIS — E78.00 PURE HYPERCHOLESTEROLEMIA: ICD-10-CM

## 2023-02-13 DIAGNOSIS — I50.32 CHRONIC DIASTOLIC CHF (CONGESTIVE HEART FAILURE): ICD-10-CM

## 2023-02-13 DIAGNOSIS — I70.0 ABDOMINAL AORTIC ATHEROSCLEROSIS: ICD-10-CM

## 2023-02-13 DIAGNOSIS — I48.0 PAF (PAROXYSMAL ATRIAL FIBRILLATION): Primary | ICD-10-CM

## 2023-02-13 PROCEDURE — 93000 ELECTROCARDIOGRAM COMPLETE: CPT | Performed by: NURSE PRACTITIONER

## 2023-02-13 PROCEDURE — 99214 OFFICE O/P EST MOD 30 MIN: CPT | Performed by: NURSE PRACTITIONER

## 2023-02-13 NOTE — PROGRESS NOTES
Subjective:     Encounter Date:02/13/2023      Patient ID: Michelle Car is a 82 y.o. female.    Chief Complaint:follow up atrial fibrillation  History of Present Illness  This is a 83 y/o female who follows with Dr. Nichole for cardio-oncology management due to large B-cell lymphoma. She also has a pmhx of PAF, HTN, and HLD. She was admitted to the hospital in April with neutropenia, fever, E. Coli bacteremia and atrial fibrillation with RVR. She was restarted on amiodarone at that time. She was then seen in the office by Dr. Nichole in June and a follow up echocardiogram was obtained in August which was stable. She saw MAGGI Melgar in August as well and was doing well from a cardiac standpoint. No changes were made. She is here today for a follow up visit and I will see her for the first time today.    She continues to do well from a cardiac standpoint. She denies any chest pain, shortness of breath, palpitations, dizziness or syncope. She denies swelling in her extremities, orthopnea or PND. She has finished up her cancer treatment and is scheduled to return in March for a follow up scan. Her blood pressure at home is stable and overall she is feeling very well.     I have reviewed and updated as appropriate allergies, current medications, past family history, past medical history, past surgical history and problem list.    Review of Systems   Constitutional: Negative for fever, malaise/fatigue, weight gain and weight loss.   HENT: Negative for congestion, hoarse voice and sore throat.    Eyes: Negative for blurred vision and double vision.   Cardiovascular: Negative for chest pain, dyspnea on exertion, leg swelling, orthopnea, palpitations and syncope.   Respiratory: Negative for cough, shortness of breath and wheezing.    Gastrointestinal: Negative for abdominal pain, hematemesis, hematochezia and melena.   Genitourinary: Negative for dysuria and hematuria.   Neurological: Negative for dizziness,  headaches, light-headedness and numbness.   Psychiatric/Behavioral: Negative for depression. The patient is not nervous/anxious.          Current Outpatient Medications:   •  acetaminophen (TYLENOL) 500 MG tablet, Take 500 mg by mouth Every 6 (Six) Hours As Needed for Mild Pain ., Disp: , Rfl:   •  amiodarone (PACERONE) 200 MG tablet, Take 1 tablet by mouth Daily., Disp: 30 tablet, Rfl: 0  •  apixaban (ELIQUIS) 5 MG tablet tablet, Take 1 tablet by mouth Every 12 (Twelve) Hours. Indications: Atrial Fibrillation, Disp: 180 tablet, Rfl: 1  •  Calcium Carbonate (CALTRATE 600 PO), Take 1 tablet by mouth Daily., Disp: , Rfl:   •  carvedilol (COREG) 3.125 MG tablet, Take 1 tablet by mouth 2 (Two) Times a Day With Meals., Disp: 180 tablet, Rfl: 1  •  ferrous sulfate 325 (65 FE) MG tablet, Take 1 tablet by mouth Every Other Day., Disp: 90 tablet, Rfl: 1  •  Hydrocortisone, Perianal, (Anusol-HC) 2.5 % rectal cream, Apply to hemorrhoids 3 times daily for 7 days during hemorrhoid flare. Include applicator., Disp: 30 g, Rfl: 1  •  Multiple Vitamins-Minerals (CENTRUM SILVER) tablet, Take 1 tablet by mouth Daily., Disp: , Rfl:   •  polyethylene glycol (MIRALAX) 17 GM/SCOOP powder, Take 17 g by mouth As Needed., Disp: , Rfl:   •  pravastatin (PRAVACHOL) 40 MG tablet, TAKE 1 TABLET BY MOUTH EVERY NIGHT AT BEDTIME, Disp: 90 tablet, Rfl: 1  •  spironolactone (ALDACTONE) 25 MG tablet, Take 1 tablet by mouth Daily., Disp: 90 tablet, Rfl: 1  •  losartan (COZAAR) 100 MG tablet, , Disp: , Rfl:   •  ondansetron (ZOFRAN) 8 MG tablet, Take 1 tablet by mouth Every 8 (Eight) Hours As Needed for Nausea or Vomiting., Disp: 30 tablet, Rfl: 5    Past Medical History:   Diagnosis Date   • Abdominal aortic atherosclerosis (HCC)    • Adrenal nodule (HCC) 03/2022   • Allergic rhinitis    • Anemia due to chemotherapy    • Bacteremia due to Escherichia coli 04/26/2022    ADMITT[facility] TO [facility]   • Bug bite 10/24/2015    WITH CELLULITIS, LEFT LEG   • CAD  (coronary artery disease)    • Calculus of bile duct without cholangitis or cholecystitis 09/2022   • Chemotherapy induced neutropenia (HCC)    • Chronic anticoagulation    • Chronic diastolic (congestive) heart failure (HCC)    • Colon polyps    • CRI (chronic renal insufficiency), stage 2 (mild) 2021   • Diffuse large B cell lymphoma (HCC) 02/2022    MULTIPLE LYMPH NODE INVOLVEMENT, FOLLOWED BY DR. JEN PATTERSON   • Drug induced constipation    • Hearing loss    • Hemorrhoids    • History of blood transfusion 04/2022   • History of chemotherapy 2022    FOLLOWED BY DR. JEN PATTERSON   • Hypercalcemia 2016    resolved as of 2019   • Hypercholesterolemia    • Hyperkalemia 03/2022   • Hypertension    • Hyponatremia 02/17/2022   • Leg swelling 10/2021   • Mixed hyperlipidemia    • Neutropenic fever (HCC) 05/2022   • Nonrheumatic mitral valve regurgitation 03/2022   • PAF (paroxysmal atrial fibrillation) (HCC)     FOLLOWED BY DR. YOSI LANGLEY   • Pancytopenia (HCC)    • Pleural effusion, bilateral 06/2022   • Pulmonary nodule    • Seasonal allergies    • Thrombocytopenia (HCC) 08/2022   • Venous insufficiency    • Vitamin D deficiency        Past Surgical History:   Procedure Laterality Date   • CHOLECYSTECTOMY N/A 10/12/2011    LAPAROSCOPIC, DR. CHANCE KLINE AT [facility]   • COLONOSCOPY N/A 2000    1 or 2 polyps, otherwise normal per patient   • COLONOSCOPY W/ POLYPECTOMY N/A 01/24/2012    3 MM TUBULAR ADENOMA POLYP IN CECUM, DIVERTICULOSIS IN RECTO-SIGMOID, RESCOPE IN 3 YRS, DR. CHANCE KLINE AT [facility]   • CYST REMOVAL Left 10/12/2011    LEFT SCALP, PATH: BENIGN ADNEXAL NEOPLASM FAVORING ECCRINE SPIRADENOMA, DR. CHANCE KLINE AT [facility]   • ERCP N/A 10/21/2022    Procedure: ENDOSCOPIC RETROGRADE CHOLANGIOPANCREATOGRAPHY with sphincterotomy and balloon sweep;  Surgeon: Peyman Ortega MD;  Location: [facility] ENDOSCOPY;  Service: Gastroenterology;  Laterality: N/A;  PRE: Common Duct Stones  POST: Common Duct Stones   • VENOUS ACCESS  "DEVICE (PORT) INSERTION Left 03/23/2022    Procedure: INSERTION VENOUS ACCESS DEVICE;  Surgeon: Alin Delgado MD;  [place] [facility] [place] [facility] [place]  Service: General;  Laterality: Left;       Family History   Problem Relation Age of Onset   • Malig Hyperthermia Neg Hx        Social History     Tobacco Use   • Smoking status: Never   • Smokeless tobacco: Never   Vaping Use   • Vaping Use: Never used   Substance Use Topics   • Alcohol use: No   • Drug use: No         ECG 12 Lead    Date/Time: 2/13/2023 4:08 PM  Performed by: Alexandra Springer APRN  Authorized by: Alexandra Springer APRN   Comparison: compared with previous ECG from 8/2/2022  Rhythm: sinus rhythm  Conduction: right bundle branch block  Comments: RBBB is new               Objective:     Visit Vitals  /86 (BP Location: Left arm, Patient Position: Sitting, Cuff Size: Adult)   Pulse 56   Ht 152 cm (59.84\")   Wt 68 kg (150 lb)   BMI 29.45 kg/m²             Physical Exam  Constitutional:       Appearance: Normal appearance. She is obese.   HENT:      Head: Normocephalic.   Neck:      Vascular: No carotid bruit.   Cardiovascular:      Rate and Rhythm: Normal rate and regular rhythm.      Chest Wall: PMI is not displaced.      Pulses: Normal pulses.           Radial pulses are 2+ on the right side and 2+ on the left side.        Posterior tibial pulses are 2+ on the right side and 2+ on the left side.      Heart sounds: Normal heart sounds. No murmur heard.    No friction rub. No gallop.   Pulmonary:      Effort: Pulmonary effort is normal.      Breath sounds: Normal breath sounds.   Abdominal:      General: Bowel sounds are normal. There is no distension.      Palpations: Abdomen is soft.   Musculoskeletal:      Right lower leg: No edema.      Left lower leg: No edema.   Skin:     General: Skin is warm and dry.      Capillary Refill: Capillary refill takes less than 2 seconds.   Neurological:      Mental Status: She is alert and oriented to person, place, and " time.   Psychiatric:         Mood and Affect: Mood normal.         Behavior: Behavior normal.         Thought Content: Thought content normal.          Lab Review:         Cardiac Procedures:   1. LTD echocardiogram 8/2/22:  • Calculated left ventricular EF = 73.3% Estimated left ventricular EF = 73% Estimated left ventricular EF was in agreement with the calculated left ventricular EF. Left ventricular systolic function is hyperdynamic (EF > 70%). Normal global longitudinal LV strain (GLS) = -25.5%. Left ventricle strain data was reviewed by the physician and found to be accurate. Normal left ventricular cavity size and wall thickness noted. All left ventricular wall segments contract normally. Left ventricular diastolic function is consistent with (grade II w/high LAP) pseudonormalization.  • Severe mitral annular calcification is present. There is mild, bileaflet mitral valve thickening present. Moderate mitral valve regurgitation is present. No significant mitral valve stenosis is present.     2. Echocardiogram 3/25/22:  • Calculated left ventricular EF = 72.5% Estimated left ventricular EF = 73% Estimated left ventricular EF was in agreement with the calculated left ventricular EF. Left ventricular systolic function is hyperdynamic (EF > 70%). Normal global longitudinal LV strain (GLS) = -25.4%. Left ventricle strain data was reviewed by the physician. Normal left ventricular cavity size noted. Left ventricular wall thickness is consistent with mild concentric hypertrophy. All left ventricular wall segments contract normally. Left ventricular diastolic function is consistent with (grade II w/high LAP) pseudonormalization.  • Left atrial volume is severely increased. The right atrial cavity is moderately dilated  • No aortic valve regurgitation or stenosis is present. The aortic valve is abnormal in structure. There is moderate calcification of the aortic valve.  • Mild mitral annular calcification is present.  Moderate mitral valve regurgitation is present. No significant mitral valve stenosis is present      Assessment:         Diagnoses and all orders for this visit:    1. PAF (paroxysmal atrial fibrillation) ([facility]) (Primary)  -     ECG 12 Lead    2. Primary hypertension    3. Pure hypercholesterolemia    4. Abdominal aortic atherosclerosis ([facility])    5. Paroxysmal atrial fibrillation([facility])    6. Chronic diastolic CHF (congestive heart failure) ([facility])            Plan:       1. Paroxysmal atrial fibrillation: in sinus rhythm on today's EKG. On amiodarone, carvedilol and anticoagulated with apixaban. No reported hematuria or melena. Continue with current regimen. Monitor LFTs and thyroid while on amiodarone.  2. RBBB: noted on today's EKG and it appears that this is new for her in reviewing previous EKGs.   3. HTN: blood pressure well controlled on current medication regimen. No changes.  4. Chronic diastolic CHF: appears euvolemic on exam. No complaints of shortness of breath or orthopnea. Continue current management.    Thank you for allowing me to participate in this patient's care. Please call with any questions or concerns. Ms. Car will follow up with Dr. Nichole in 6 months.          Your medication list          Accurate as of February 13, 2023 11:59 PM. If you have any questions, ask your nurse or doctor.            CONTINUE taking these medications      Instructions Last Dose Given Next Dose Due   acetaminophen 500 MG tablet  Commonly known as: TYLENOL      Take 500 mg by mouth Every 6 (Six) Hours As Needed for Mild Pain .       amiodarone 200 MG tablet  Commonly known as: PACERONE      Take 1 tablet by mouth Daily.       apixaban 5 MG tablet tablet  Commonly known as: ELIQUIS      Take 1 tablet by mouth Every 12 (Twelve) Hours. Indications: Atrial Fibrillation       CALTRATE 600 PO      Take 1 tablet by mouth Daily.       carvedilol 3.125 MG tablet  Commonly known as: COREG      Take 1 tablet by mouth 2 (Two) Times  a Day With Meals.       Centrum Silver tablet tablet  Generic drug: multivitamin with minerals      Take 1 tablet by mouth Daily.       ferrous sulfate 325 (65 FE) MG tablet      Take 1 tablet by mouth Every Other Day.       Hydrocortisone (Perianal) 2.5 % rectal cream  Commonly known as: Anusol-HC      Apply to hemorrhoids 3 times daily for 7 days during hemorrhoid flare. Include applicator.       losartan 100 MG tablet  Commonly known as: COZAAR           ondansetron 8 MG tablet  Commonly known as: ZOFRAN      Take 1 tablet by mouth Every 8 (Eight) Hours As Needed for Nausea or Vomiting.       polyethylene glycol 17 GM/SCOOP powder  Commonly known as: MIRALAX      Take 17 g by mouth As Needed.       pravastatin 40 MG tablet  Commonly known as: PRAVACHOL      TAKE 1 TABLET BY MOUTH EVERY NIGHT AT BEDTIME       spironolactone 25 MG tablet  Commonly known as: ALDACTONE      Take 1 tablet by mouth Daily.                Alexandra Springer, MAGGI  02/14/23  2:26 PM EST

## 2023-03-07 ENCOUNTER — HOSPITAL ENCOUNTER (OUTPATIENT)
Dept: CT IMAGING | Facility: HOSPITAL | Age: 83
Discharge: HOME | End: 2023-03-07
Admitting: INTERNAL MEDICINE
Payer: MEDICARE

## 2023-03-07 DIAGNOSIS — C83.38 DIFFUSE LARGE B-CELL LYMPHOMA OF LYMPH NODES OF MULTIPLE REGIONS: ICD-10-CM

## 2023-03-07 LAB — CREAT BLDA-MCNC: 1 MG/DL (ref 0.6–1.3)

## 2023-03-07 PROCEDURE — 82565 ASSAY OF CREATININE: CPT

## 2023-03-07 PROCEDURE — 0 DIATRIZOATE MEGLUMINE & SODIUM PER 1 ML: Performed by: INTERNAL MEDICINE

## 2023-03-07 PROCEDURE — 74177 CT ABD & PELVIS W/CONTRAST: CPT

## 2023-03-07 PROCEDURE — 71260 CT THORAX DX C+: CPT

## 2023-03-07 PROCEDURE — 25510000001 IOPAMIDOL 61 % SOLUTION: Performed by: INTERNAL MEDICINE

## 2023-03-07 RX ORDER — PRAVASTATIN SODIUM 40 MG
40 TABLET ORAL
Qty: 90 TABLET | Refills: 1 | Status: SHIPPED | OUTPATIENT
Start: 2023-03-07

## 2023-03-07 RX ADMIN — IOPAMIDOL 85 ML: 612 INJECTION, SOLUTION INTRAVENOUS at 15:26

## 2023-03-07 RX ADMIN — DIATRIZOATE MEGLUMINE AND DIATRIZOATE SODIUM 30 ML: 660; 100 LIQUID ORAL; RECTAL at 15:26

## 2023-03-14 ENCOUNTER — OFFICE VISIT (OUTPATIENT)
Dept: ONCOLOGY | Facility: CLINIC | Age: 83
End: 2023-03-14
Payer: MEDICARE

## 2023-03-14 ENCOUNTER — LAB (OUTPATIENT)
Dept: ONCOLOGY | Facility: HOSPITAL | Age: 83
End: 2023-03-14
Payer: MEDICARE

## 2023-03-14 VITALS
OXYGEN SATURATION: 99 % | HEIGHT: 60 IN | SYSTOLIC BLOOD PRESSURE: 121 MMHG | HEART RATE: 60 BPM | DIASTOLIC BLOOD PRESSURE: 70 MMHG | WEIGHT: 148 LBS | TEMPERATURE: 97.3 F | RESPIRATION RATE: 16 BRPM | BODY MASS INDEX: 29.06 KG/M2

## 2023-03-14 DIAGNOSIS — L98.9 SCALP LESION: ICD-10-CM

## 2023-03-14 DIAGNOSIS — J90 BILATERAL PLEURAL EFFUSION: ICD-10-CM

## 2023-03-14 DIAGNOSIS — Z45.2: ICD-10-CM

## 2023-03-14 DIAGNOSIS — K80.50 COMMON BILE DUCT STONE: ICD-10-CM

## 2023-03-14 DIAGNOSIS — E87.1 HYPONATREMIA: ICD-10-CM

## 2023-03-14 DIAGNOSIS — D50.9 IRON DEFICIENCY ANEMIA, UNSPECIFIED IRON DEFICIENCY ANEMIA TYPE: ICD-10-CM

## 2023-03-14 DIAGNOSIS — T45.1X5A ANEMIA DUE TO CHEMOTHERAPY: ICD-10-CM

## 2023-03-14 DIAGNOSIS — D70.1 CHEMOTHERAPY INDUCED NEUTROPENIA: ICD-10-CM

## 2023-03-14 DIAGNOSIS — T45.1X5A CHEMOTHERAPY INDUCED NEUTROPENIA: ICD-10-CM

## 2023-03-14 DIAGNOSIS — D64.81 ANEMIA DUE TO CHEMOTHERAPY: ICD-10-CM

## 2023-03-14 DIAGNOSIS — C83.38 DIFFUSE LARGE B-CELL LYMPHOMA OF LYMPH NODES OF MULTIPLE REGIONS: Primary | ICD-10-CM

## 2023-03-14 DIAGNOSIS — R91.8 PULMONARY NODULES: ICD-10-CM

## 2023-03-14 LAB
ALBUMIN SERPL-MCNC: 4.3 G/DL (ref 3.5–5.2)
ALBUMIN/GLOB SERPL: 1.7 G/DL (ref 1.1–2.4)
ALP SERPL-CCNC: 99 U/L (ref 38–116)
ALT SERPL W P-5'-P-CCNC: 29 U/L (ref 0–33)
ANION GAP SERPL CALCULATED.3IONS-SCNC: 9.5 MMOL/L (ref 5–15)
AST SERPL-CCNC: 29 U/L (ref 0–32)
BASOPHILS # BLD AUTO: 0.03 10*3/MM3 (ref 0–0.2)
BASOPHILS NFR BLD AUTO: 0.9 % (ref 0–1.5)
BILIRUB SERPL-MCNC: 0.4 MG/DL (ref 0.2–1.2)
BUN SERPL-MCNC: 21 MG/DL (ref 6–20)
BUN/CREAT SERPL: 22.1 (ref 7.3–30)
CALCIUM SPEC-SCNC: 9.5 MG/DL (ref 8.5–10.2)
CHLORIDE SERPL-SCNC: 91 MMOL/L (ref 98–107)
CO2 SERPL-SCNC: 26.5 MMOL/L (ref 22–29)
CREAT SERPL-MCNC: 0.95 MG/DL (ref 0.6–1.1)
DEPRECATED RDW RBC AUTO: 49 FL (ref 37–54)
EGFRCR SERPLBLD CKD-EPI 2021: 60.3 ML/MIN/1.73
EOSINOPHIL # BLD AUTO: 0.03 10*3/MM3 (ref 0–0.4)
EOSINOPHIL NFR BLD AUTO: 0.9 % (ref 0.3–6.2)
ERYTHROCYTE [DISTWIDTH] IN BLOOD BY AUTOMATED COUNT: 15.1 % (ref 12.3–15.4)
GLOBULIN UR ELPH-MCNC: 2.6 GM/DL (ref 1.8–3.5)
GLUCOSE SERPL-MCNC: 106 MG/DL (ref 74–124)
HCT VFR BLD AUTO: 33.7 % (ref 34–46.6)
HGB BLD-MCNC: 11.8 G/DL (ref 12–15.9)
IMM GRANULOCYTES # BLD AUTO: 0.01 10*3/MM3 (ref 0–0.05)
IMM GRANULOCYTES NFR BLD AUTO: 0.3 % (ref 0–0.5)
LDH SERPL-CCNC: 177 U/L (ref 99–259)
LYMPHOCYTES # BLD AUTO: 0.91 10*3/MM3 (ref 0.7–3.1)
LYMPHOCYTES NFR BLD AUTO: 26.4 % (ref 19.6–45.3)
MCH RBC QN AUTO: 31.1 PG (ref 26.6–33)
MCHC RBC AUTO-ENTMCNC: 35 G/DL (ref 31.5–35.7)
MCV RBC AUTO: 88.7 FL (ref 79–97)
MONOCYTES # BLD AUTO: 0.37 10*3/MM3 (ref 0.1–0.9)
MONOCYTES NFR BLD AUTO: 10.7 % (ref 5–12)
NEUTROPHILS NFR BLD AUTO: 2.1 10*3/MM3 (ref 1.7–7)
NEUTROPHILS NFR BLD AUTO: 60.8 % (ref 42.7–76)
NRBC BLD AUTO-RTO: 0 /100 WBC (ref 0–0.2)
PLATELET # BLD AUTO: 173 10*3/MM3 (ref 140–450)
PMV BLD AUTO: 8 FL (ref 6–12)
POTASSIUM SERPL-SCNC: 4.6 MMOL/L (ref 3.5–4.7)
PROT SERPL-MCNC: 6.9 G/DL (ref 6.3–8)
RBC # BLD AUTO: 3.8 10*6/MM3 (ref 3.77–5.28)
SODIUM SERPL-SCNC: 127 MMOL/L (ref 134–145)
WBC NRBC COR # BLD: 3.45 10*3/MM3 (ref 3.4–10.8)

## 2023-03-14 PROCEDURE — 1126F AMNT PAIN NOTED NONE PRSNT: CPT | Performed by: INTERNAL MEDICINE

## 2023-03-14 PROCEDURE — 36415 COLL VENOUS BLD VENIPUNCTURE: CPT

## 2023-03-14 PROCEDURE — 1159F MED LIST DOCD IN RCRD: CPT | Performed by: INTERNAL MEDICINE

## 2023-03-14 PROCEDURE — 80053 COMPREHEN METABOLIC PANEL: CPT

## 2023-03-14 PROCEDURE — 96523 IRRIG DRUG DELIVERY DEVICE: CPT

## 2023-03-14 PROCEDURE — 85025 COMPLETE CBC W/AUTO DIFF WBC: CPT

## 2023-03-14 PROCEDURE — 99215 OFFICE O/P EST HI 40 MIN: CPT | Performed by: INTERNAL MEDICINE

## 2023-03-14 PROCEDURE — 3078F DIAST BP <80 MM HG: CPT | Performed by: INTERNAL MEDICINE

## 2023-03-14 PROCEDURE — 83615 LACTATE (LD) (LDH) ENZYME: CPT

## 2023-03-14 PROCEDURE — 1160F RVW MEDS BY RX/DR IN RCRD: CPT | Performed by: INTERNAL MEDICINE

## 2023-03-14 PROCEDURE — 25010000002 HEPARIN LOCK FLUSH PER 10 UNITS: Performed by: INTERNAL MEDICINE

## 2023-03-14 PROCEDURE — 3074F SYST BP LT 130 MM HG: CPT | Performed by: INTERNAL MEDICINE

## 2023-03-14 RX ORDER — SODIUM CHLORIDE 0.9 % (FLUSH) 0.9 %
10 SYRINGE (ML) INJECTION AS NEEDED
OUTPATIENT
Start: 2023-03-14

## 2023-03-14 RX ORDER — SODIUM CHLORIDE 0.9 % (FLUSH) 0.9 %
10 SYRINGE (ML) INJECTION AS NEEDED
Status: DISCONTINUED | OUTPATIENT
Start: 2023-03-14 | End: 2023-03-14 | Stop reason: HOSPADM

## 2023-03-14 RX ORDER — HEPARIN SODIUM (PORCINE) LOCK FLUSH IV SOLN 100 UNIT/ML 100 UNIT/ML
500 SOLUTION INTRAVENOUS AS NEEDED
Status: DISCONTINUED | OUTPATIENT
Start: 2023-03-14 | End: 2023-03-14 | Stop reason: HOSPADM

## 2023-03-14 RX ORDER — HEPARIN SODIUM (PORCINE) LOCK FLUSH IV SOLN 100 UNIT/ML 100 UNIT/ML
500 SOLUTION INTRAVENOUS AS NEEDED
OUTPATIENT
Start: 2023-03-14

## 2023-03-14 RX ORDER — FERROUS SULFATE 325(65) MG
325 TABLET ORAL 3 TIMES WEEKLY
Start: 2023-03-15 | End: 2023-03-20

## 2023-03-14 RX ADMIN — Medication 10 ML: at 15:21

## 2023-03-14 RX ADMIN — Medication 500 UNITS: at 15:21

## 2023-03-14 NOTE — PROGRESS NOTES
Subjective     CHIEF COMPLAINT:      Chief Complaint   Patient presents with   • Follow-up     DISCUSS CT SCAN       HISTORY OF PRESENT ILLNESS:     Michelle Car is a 82 y.o. female patient who returns today for follow up on her lymphoma.  She returns today for follow-up accompanied by her family.  She reports feeling better.  She is able to do more things at home.  She is not having chest pain or shortness of breath.    Patient reports feeling a lump in the scalp.  It is not painful.      ROS:  Pertinent ROS is in the HPI.     Past medical, surgical, social and family history were reviewed.     MEDICATIONS:    Current Outpatient Medications:   •  acetaminophen (TYLENOL) 500 MG tablet, Take 1 tablet by mouth Every 6 (Six) Hours As Needed for Mild Pain., Disp: , Rfl:   •  amiodarone (PACERONE) 200 MG tablet, Take 1 tablet by mouth Daily., Disp: 30 tablet, Rfl: 0  •  apixaban (ELIQUIS) 5 MG tablet tablet, Take 1 tablet by mouth Every 12 (Twelve) Hours. Indications: Atrial Fibrillation, Disp: 180 tablet, Rfl: 1  •  Calcium Carbonate (CALTRATE 600 PO), Take 1 tablet by mouth Daily., Disp: , Rfl:   •  carvedilol (COREG) 3.125 MG tablet, Take 1 tablet by mouth 2 (Two) Times a Day With Meals., Disp: 180 tablet, Rfl: 1  •  [START ON 3/15/2023] ferrous sulfate 325 (65 FE) MG tablet, Take 1 tablet by mouth 3 (Three) Times a Week. On Mondays Wednesdays and Fridays, Disp: , Rfl:   •  Hydrocortisone, Perianal, (Anusol-HC) 2.5 % rectal cream, Apply to hemorrhoids 3 times daily for 7 days during hemorrhoid flare. Include applicator., Disp: 30 g, Rfl: 1  •  losartan (COZAAR) 100 MG tablet, , Disp: , Rfl:   •  Multiple Vitamins-Minerals (CENTRUM SILVER) tablet, Take 1 tablet by mouth Daily., Disp: , Rfl:   •  polyethylene glycol (MIRALAX) 17 GM/SCOOP powder, Take 17 g by mouth As Needed., Disp: , Rfl:   •  pravastatin (PRAVACHOL) 40 MG tablet, TAKE 1 TABLET BY MOUTH EVERY NIGHT AT BEDTIME, Disp: 90 tablet, Rfl: 1  •   "spironolactone (ALDACTONE) 25 MG tablet, Take 1 tablet by mouth Daily., Disp: 90 tablet, Rfl: 1  No current facility-administered medications for this visit.  Objective     VITAL SIGNS:     Vitals:    03/14/23 1529   BP: 121/70   Pulse: 60   Resp: 16   Temp: 97.3 °F (36.3 °C)   TempSrc: Temporal   SpO2: 99%   Weight: 67.1 kg (148 lb)   Height: 152 cm (59.84\")   PainSc: 0-No pain     Body mass index is 29.06 kg/m².     Wt Readings from Last 5 Encounters:   03/14/23 67.1 kg (148 lb)   02/13/23 68 kg (150 lb)   01/30/23 67.8 kg (149 lb 6.4 oz)   11/14/22 67.2 kg (148 lb 1.6 oz)   11/10/22 67.5 kg (148 lb 12.8 oz)     PHYSICAL EXAMINATION:   GENERAL: The patient appears in good general condition, not in acute distress.   SKIN: No ecchymosis.  1 cm lesion in the left side of the scalp.  It is movable.  Normal overlying skin.  EYES: No jaundice. No pallor.  LYMPHATICS: No cervical, supraclavicular or axillary lymphadenopathy.  CHEST: Normal respiratory effort.  Lungs clear bilaterally.  No added sounds.  CVS: Normal S1-S2.  Grade 2 systolic murmur.  ABDOMEN: Soft. No tenderness. No Hepatomegaly. No Splenomegaly. No masses.  EXTREMITIES: Chronic bilateral leg lymphedema.    DIAGNOSTIC DATA:     Results from last 7 days   Lab Units 03/14/23  1513   WBC 10*3/mm3 3.45   NEUTROS ABS 10*3/mm3 2.10   HEMOGLOBIN g/dL 11.8*   HEMATOCRIT % 33.7*   PLATELETS 10*3/mm3 173     Results from last 7 days   Lab Units 03/14/23  1513   SODIUM mmol/L 127*   POTASSIUM mmol/L 4.6   CHLORIDE mmol/L 91*   CO2 mmol/L 26.5   BUN mg/dL 21*   CREATININE mg/dL 0.95   CALCIUM mg/dL 9.5   ALBUMIN g/dL 4.3   BILIRUBIN mg/dL 0.4   ALK PHOS U/L 99   ALT (SGPT) U/L 29   AST (SGOT) U/L 29   GLUCOSE mg/dL 106     CT chest abdomen pelvis on 3/7/2023:  FINDINGS:Chest:     Atelectasis is seen in the lungs, without consolidation, pleural  effusion or pneumothorax. The central airways are patent.     No enlarged supraclavicular, axillary, mediastinal, or hilar " lymph nodes  are demonstrated. Pulmonary arterial enlargement likely reflects  underlying lung disease. The systemic mediastinal vasculature is normal  in caliber. A right internal jugular venous Port-A-Cath terminates in  the caudal superior vena cava. The heart is normal in size and  configuration, without pericardial effusion. Coronary artery and mitral  annular calcifications are seen.     Abdomen and pelvis:     There is grossly stable intra and extrahepatic biliary ductal dilation  with pneumobilia. The common duct measures 1.8 cm in its midportion, as  seen previously. Old granulomatous disease is. Cholecystectomy changes  are seen. There is mild stable left-sided hydronephrosis.     The adrenal glands, right kidney, pancreas, stomach, small bowel,  uterus, adnexal structures, urinary bladder, and abdominal vasculature  are normal. Colonic diverticula are seen, without evidence of acute  diverticulitis. No intraperitoneal fluid collection or free gas are  seen. No enlarged lymph nodes are demonstrated.     Bone windows demonstrate degenerative changes, without suspicious  osseous lesion seen.     IMPRESSION:  Stable incidental findings as above, without acute or  suspicious process seen in the chest, abdomen, or pelvis.    Assessment & Plan    *Diffuse large B-cell lymphoma.     · Patient was found to have bilateral pleural effusions.    · She underwent right thoracentesis on 2/21/2022 and the left thoracentesis on 2/22/2022.    · Analysis of the fluid revealed involvement with diffuse large B-cell lymphoma.    · FISH analysis for BCL6 rearrangement was positive.    · FISH analysis for BCL 1, BCL-2 and MYC rearrangement was negative.    · CT chest on 2/17/2022 revealed no hilar or mediastinal lymphadenopathy.    · Spleen was reported to be normal in size.  · Peripheral blood flow cytometry on 3/7/2022 was negative.  · PET scan on 3/10/2022 revealed significant hypermetabolism in the left adrenal gland and the  surrounding soft tissue with SUV up to 34.5. Hypermetabolism in the left pleural thickening with SUV of 7.1. there was less hypermetabolism in the right adrenal gland and in the right pleura.  · She is considered to have stage III non-bulky disease.  · The degree of hypermetabolism is consistent with high grade lymphoma. This concurs with the pathology exam of the pleural fluid cytology that revealed diffuse large B-cell lymphoma.  · R-IPI score of 3 associated with poor risk. This is associated with overall survival of 55%.   · R-CHOP with Neulasta support on 3/28/2022.  · Patient developed neutropenia and thrombocytopenia after cycle#1.  · Vincristine dose was reduced to 1 mg due to constipation.   · Patient developed neutropenic fever after cycle #2 and was hospitalized.  · Her performance status decreased as a result.   · CT scan on 5/1/2022 revealed a decrease in the right pleural effusion. The left suprarenal density was stable.  · Regimen was changed to mini-RCHOP starting cycle #3 on 5/16/2022.  · CT scans on 6/23/2022 showed evidence of response.  There was resolution of the pericardial effusion and near complete resolution of the pleural effusions.   · Patient received cycle #6 on 7/25/2022.  · PET scan on 8/15/2022 revealed complete metabolic response.  · CT on 11/7/2022 revealed no evidence of recurrence of the lymphoma.  · CT on 3/7/2023 revealed no evidence of recurrence of the lymphoma.  · I recommended repeating CT scan in 6 months.    *Bilateral pleural effusions.   · She is s/p right thoracentesis on 2/21/2022 and the effusion was found to be malignant attributed to the lymphoma.   · CT scan on 5/1/2022 revealed decrease in the right pleural effusion and an increase in the left effusion.   · The increase in the left effusion was suspected of being due to cardiac and fluid overload states (fluid appeared to be serous on CT scan).  · CT on 6/23/2022 revealed near resolution of the pleural  effusions.  · PET scan on 8/15/2022 showed that the pleural effusions were trace and improved since June 2022.  · CT chest on 11/7/2022 revealed minimal partially loculated right pleural effusion.  · CT chest on 3/7/2023 showed no recurrence of the effusion.  · Exam today reveals no evidence of effusion.    *Iron deficiency anemia.  · Patient also developed anemia secondary to lymphoma and secondary to chemotherapy.  She  · Hemoglobin was 11.1 on 3/28/2022.  · Anemia work-up showed no evidence of iron deficiency.  · Hgb decreased to 7.9 on 4/25/2022 and she was transfused.   · Hemoglobin decreased to 6.3 on 4/27/2022. She was transfused.  · Hemoglobin improved to 8.8 on 5/16/2022.  · Hemoglobin was 9.9 on 8/22/2022.  · Labs revealed iron deficiency.  · She was started on ferrous sulfate 325 mg every other day.  · Hemoglobin was 7.9 on 11/7/2022.  · Hemoglobin was 9.0 on 11/10/2022.  · She was found to have a positive stool Hemoccult on stool test done by her PCP earlier today.  · Hemoglobin improved to 11.7 on 1/30/2023.   · Ferritin was 49 and transferrin saturation was 20%.  · She was continued on ferrous sulfate 325 mg daily.  · Hemoglobin improved to 11.8 today.    *Few new subcentimeter pulmonary nodules were seen on PET scan and they were considered indeterminate.    · Patient reports having recent episodes of cough and congestion.    · Cough was productive of yellow sputum.   · CT chest on 11/7/2022 revealed no suspicious lung lesions.  · CT chest on 3/7/2023 showed no suspicious lung lesions.    *Choledocholithiasis with increase in the intra and extrahepatic biliary dilatation seen on CT on 6/23/2022.  · Liver enzymes including bilirubin are normal today.   · She is not having abdominal pain.  No redness no tenderness on exam of the right upper quadrant.  · PET scan on 8/15/2022 showed persistence of the findings.  No symptoms or signs of infection.  · Patient was seen by Dr. Ortega.  She underwent ERCP on  10/21/2022.  · There was a calculus of the bile duct without cholecystitis and without obstruction.  · She had sphincterotomy and balloon sweep.  · Alkaline phosphatase normalized afterwards.  · CT on 3/7/2023 revealed stable findings.    *Left scalp lesion.  · Exam revealed a 1 cm lesion that is not fixed to the underlying tissue.  · It is likely representing a sebaceous cyst.  · I recommended removal by general surgery.    *Hyponatremia.  · Patient had problem with hyponatremia in the past.  · Sodium is 127 today.  · Recommend repeating CMP and 1 week.    *Patient has a port.   · Port was flushed today.  · I recommended referral back to surgery for removal of the port.    PLAN:    1.  Refer back to general surgery for excision of the left scalp lesion and for removal of the port.  2.  Reduce ferrous sulfate to 3 days a week.  3.  Repeat CMP in 1 week  4.  Follow-up in 3 months with CBC CMP [facility].  5.  Plan to repeat CT scan in 6 months.    I spent 50 minutes caring for Michelle on this date of service. This time includes time spent by me in the following activities: preparing for the visit, reviewing tests, obtaining and/or reviewing a separately obtained history, performing a medically appropriate examination and/or evaluation, counseling and educating the patient/family/caregiver, ordering medications, tests, or procedures, referring and communicating with other health care professionals, documenting information in the medical record, independently interpreting results and communicating that information with the patient/family/caregiver and care coordination      Renate Galo MD  03/14/23

## 2023-03-15 ENCOUNTER — TELEPHONE (OUTPATIENT)
Dept: ONCOLOGY | Facility: CLINIC | Age: 83
End: 2023-03-15
Payer: MEDICARE

## 2023-03-15 DIAGNOSIS — E87.1 LOW SODIUM LEVELS: ICD-10-CM

## 2023-03-15 DIAGNOSIS — C83.38 DIFFUSE LARGE B-CELL LYMPHOMA OF LYMPH NODES OF MULTIPLE REGIONS: Primary | ICD-10-CM

## 2023-03-15 NOTE — TELEPHONE ENCOUNTER
Reviewed Dr. Galo's note and instructions with pt, she is drinking green tea and water daily and taking her diuretic as directed. Instructed patient to stay hydrated, but to add some salt to her diet and monitor. Patient would like to have her CMP checked at Dr. Pimentel's office as travel is difficult for her because she is not able to drive. We will reach out to Dr. Pimentel's office to add CMP. She v/u to all.

## 2023-03-17 RX ORDER — AMIODARONE HYDROCHLORIDE 200 MG/1
200 TABLET ORAL
Qty: 30 TABLET | Refills: 5 | Status: SHIPPED | OUTPATIENT
Start: 2023-03-17 | End: 2023-04-04 | Stop reason: SDUPTHER

## 2023-03-17 NOTE — TELEPHONE ENCOUNTER
Next appointment with Dr Nichole: 2023  Last appointment with Alexandra: 2023    Last labs: 2022 (TSH) 3/15/2023 (CMP)   Last CXR: 2022  No PFT found  Last EK2023

## 2023-03-20 ENCOUNTER — TELEPHONE (OUTPATIENT)
Dept: ONCOLOGY | Facility: CLINIC | Age: 83
End: 2023-03-20
Payer: MEDICARE

## 2023-03-20 RX ORDER — PRAVASTATIN SODIUM 40 MG
40 TABLET ORAL
Qty: 90 TABLET | Refills: 1 | OUTPATIENT
Start: 2023-03-20

## 2023-03-20 RX ORDER — FERROUS SULFATE 325(65) MG
1 TABLET ORAL 3 TIMES WEEKLY
Qty: 36 TABLET | Refills: 0 | Status: SHIPPED | OUTPATIENT
Start: 2023-03-20

## 2023-03-20 NOTE — TELEPHONE ENCOUNTER
DR. PATTERSON'S NURSE CALLED BECAUSE PT HAS APPT WITH DR. PARSONS TOMORROW AND SHE IS NEEDING A CMP LAB    ORDERS ARE IN THE CHART FOR [facility] BUT [facility] IN OUR OFFICE NEEDS THE ORDERS TO BE PUT IN FROM OUR OFFICE FOR THEM TO DO THE LABS.

## 2023-03-20 NOTE — TELEPHONE ENCOUNTER
Spoke with Billie at Dr. Pimentel's office and informed her we added a CMP lab to be drawn tomorrow. She v/u.

## 2023-03-21 ENCOUNTER — OFFICE VISIT (OUTPATIENT)
Dept: FAMILY MEDICINE CLINIC | Facility: CLINIC | Age: 83
End: 2023-03-21
Payer: MEDICARE

## 2023-03-21 VITALS
WEIGHT: 152.4 LBS | HEART RATE: 88 BPM | OXYGEN SATURATION: 98 % | BODY MASS INDEX: 29.92 KG/M2 | TEMPERATURE: 98.2 F | SYSTOLIC BLOOD PRESSURE: 126 MMHG | HEIGHT: 60 IN | DIASTOLIC BLOOD PRESSURE: 76 MMHG

## 2023-03-21 DIAGNOSIS — I48.0 PAROXYSMAL ATRIAL FIBRILLATION: ICD-10-CM

## 2023-03-21 DIAGNOSIS — E87.1 HYPONATREMIA: ICD-10-CM

## 2023-03-21 DIAGNOSIS — I10 PRIMARY HYPERTENSION: Primary | ICD-10-CM

## 2023-03-21 PROCEDURE — 3078F DIAST BP <80 MM HG: CPT | Performed by: INTERNAL MEDICINE

## 2023-03-21 PROCEDURE — 99214 OFFICE O/P EST MOD 30 MIN: CPT | Performed by: INTERNAL MEDICINE

## 2023-03-21 PROCEDURE — 3074F SYST BP LT 130 MM HG: CPT | Performed by: INTERNAL MEDICINE

## 2023-03-21 PROCEDURE — 1159F MED LIST DOCD IN RCRD: CPT | Performed by: INTERNAL MEDICINE

## 2023-03-21 PROCEDURE — 1160F RVW MEDS BY RX/DR IN RCRD: CPT | Performed by: INTERNAL MEDICINE

## 2023-03-21 NOTE — PROGRESS NOTES
"Chief Complaint  f/u  HTN    Subjective        Michelle Car presents to [facility]  History of Present Illness patient was seen for hypertension.  Blood pressures been running 120s over 70s.  We will continue carvedilol 3.125 mg p.o. twice daily, losartan 100 mg daily.  Patient was also noticed to be hyponatremic.  Patient's latest sodium was 127.  Patient is getting labs today with urine and serum osmole's.  Patient is on Aldactone 25 mg daily.  Patient does have paroxysmal atrial fibrillation.  Patient does take apixaban 5 mg twice daily, is also on amiodarone 200 mg daily.    Dictated utilizing Dragon dictation. If there are questions or for further clarification, please contact me.    Objective   Vital Signs:  Blood Pressure 126/76   Pulse 88   Temperature 98.2 °F (36.8 °C)   Height 152.4 cm (60\")   Weight 69.1 kg (152 lb 6.4 oz)   Oxygen Saturation 98%   Body Mass Index 29.76 kg/m²   Estimated body mass index is 29.76 kg/m² as calculated from the following:    Height as of this encounter: 152.4 cm (60\").    Weight as of this encounter: 69.1 kg (152 lb 6.4 oz).             Physical Exam  Vitals and nursing note reviewed.   Constitutional:       Appearance: Normal appearance. She is well-developed.   HENT:      Head: Normocephalic and atraumatic.      Nose: Nose normal.      Mouth/Throat:      Mouth: Mucous membranes are moist.      Pharynx: Oropharynx is clear.   Eyes:      Extraocular Movements: Extraocular movements intact.      Conjunctiva/sclera: Conjunctivae normal.      Pupils: Pupils are equal, round, and reactive to light.   Cardiovascular:      Rate and Rhythm: Normal rate and regular rhythm.      Heart sounds: Normal heart sounds. No murmur heard.    No friction rub. No gallop.   Pulmonary:      Effort: Pulmonary effort is normal. No respiratory distress.      Breath sounds: Normal breath sounds. No stridor. No wheezing, rhonchi or rales.   Chest:      Chest " wall: No tenderness.   Abdominal:      General: Bowel sounds are normal.      Palpations: Abdomen is soft.   Musculoskeletal:         General: Normal range of motion.      Cervical back: Normal range of motion and neck supple.   Skin:     General: Skin is warm and dry.   Neurological:      General: No focal deficit present.      Mental Status: She is alert and oriented to person, place, and time. Mental status is at baseline.   Psychiatric:         Mood and Affect: Mood normal.         Behavior: Behavior normal.         Thought Content: Thought content normal.         Judgment: Judgment normal.        Result Review :                   Assessment and Plan  #1 BMP, serum and urine osmole's, thyroid function test #2 continue monitoring blood pressure at home  Diagnoses and all orders for this visit:    1. Primary hypertension (Primary)    2. Hyponatremia  -     Basic Metabolic Panel  -     Osmolality, Serum  -     Osmolality, Urine - Urine, Clean Catch    3. Paroxysmal atrial fibrillation (HCC)  -     Thyroid Panel With TSH             Follow Up   Return in about 4 months (around 7/21/2023), or if symptoms worsen or fail to improve, for Recheck.  Patient was given instructions and counseling regarding her condition or for health maintenance advice. Please see specific information pulled into the AVS if appropriate.

## 2023-03-23 LAB
BUN SERPL-MCNC: 14 MG/DL (ref 8–27)
BUN/CREAT SERPL: 16 (ref 12–28)
CALCIUM SERPL-MCNC: 9.6 MG/DL (ref 8.7–10.3)
CHLORIDE SERPL-SCNC: 94 MMOL/L (ref 96–106)
CO2 SERPL-SCNC: 25 MMOL/L (ref 20–29)
CREAT SERPL-MCNC: 0.89 MG/DL (ref 0.57–1)
EGFRCR SERPLBLD CKD-EPI 2021: 65 ML/MIN/1.73
FT4I SERPL CALC-MCNC: 3.3 (ref 1.2–4.9)
GLUCOSE SERPL-MCNC: 91 MG/DL (ref 70–99)
OSMOLALITY SERPL: 270 MOSMOL/KG (ref 280–301)
OSMOLALITY UR: 328 MOSMOL/KG
POTASSIUM SERPL-SCNC: 4.8 MMOL/L (ref 3.5–5.2)
SODIUM SERPL-SCNC: 133 MMOL/L (ref 134–144)
T3RU NFR SERPL: 27 % (ref 24–39)
T4 SERPL-MCNC: 12.2 UG/DL (ref 4.5–12)
TSH SERPL DL<=0.005 MIU/L-ACNC: 3.96 UIU/ML (ref 0.45–4.5)

## 2023-04-04 RX ORDER — AMIODARONE HYDROCHLORIDE 200 MG/1
200 TABLET ORAL
Qty: 30 TABLET | Refills: 5 | Status: SHIPPED | OUTPATIENT
Start: 2023-04-04

## 2023-04-04 NOTE — TELEPHONE ENCOUNTER
Rx Refill Note  Requested Prescriptions      No prescriptions requested or ordered in this encounter      Last office visit with prescribing clinician: 2/13/2023   Last telemedicine visit with prescribing clinician: 8/22/2023   Next office visit with prescribing clinician: Visit date not found                         Would you like a call back once the refill request has been completed: [] Yes [] No    If the office needs to give you a call back, can they leave a voicemail: [] Yes [] No    Karolina Vincent MA  04/04/23, 13:39 EDT

## 2023-04-17 ENCOUNTER — TELEPHONE (OUTPATIENT)
Dept: FAMILY MEDICINE CLINIC | Facility: CLINIC | Age: 83
End: 2023-04-17
Payer: MEDICARE

## 2023-04-17 NOTE — TELEPHONE ENCOUNTER
Caller: LYLA PEREZ    Relationship: PATIENTS SISTER [place] call back number:     What is the best time to reach you:     Who are you requesting to speak with (clinical staff, provider,  specific staff member):     Do you know the name of the person who called:     What was the call regarding:PATIENTS SISTER LYLA IS CALLING IN STATING THAT THE PATIENT HAS A RASH ON HER NECK AND LEFT ARM AND WANTS TO KNOW IF SHE NEEDS TO COME IN TO BE SEEN BY DR PARSONS ABOUT THIS AS SHE IS NOT SURE IF IT IS SHINGLES.     Do you require a callback: YES

## 2023-05-15 ENCOUNTER — HOSPITAL ENCOUNTER (EMERGENCY)
Facility: HOSPITAL | Age: 83
Discharge: HOME | End: 2023-05-15
Attending: EMERGENCY MEDICINE | Admitting: EMERGENCY MEDICINE
Payer: MEDICARE

## 2023-05-15 ENCOUNTER — APPOINTMENT (OUTPATIENT)
Dept: GENERAL RADIOLOGY | Facility: HOSPITAL | Age: 83
End: 2023-05-15
Payer: MEDICARE

## 2023-05-15 VITALS
BODY MASS INDEX: 29.84 KG/M2 | RESPIRATION RATE: 18 BRPM | HEART RATE: 56 BPM | SYSTOLIC BLOOD PRESSURE: 134 MMHG | TEMPERATURE: 97 F | OXYGEN SATURATION: 100 % | HEIGHT: 60 IN | DIASTOLIC BLOOD PRESSURE: 68 MMHG | WEIGHT: 152 LBS

## 2023-05-15 DIAGNOSIS — M54.50 ACUTE MIDLINE LOW BACK PAIN WITHOUT SCIATICA: Primary | ICD-10-CM

## 2023-05-15 LAB
ALBUMIN SERPL-MCNC: 3.9 G/DL (ref 3.5–5.2)
ALBUMIN/GLOB SERPL: 1.6 G/DL
ALP SERPL-CCNC: 134 U/L (ref 39–117)
ALT SERPL W P-5'-P-CCNC: 14 U/L (ref 1–33)
ANION GAP SERPL CALCULATED.3IONS-SCNC: 13.1 MMOL/L (ref 5–15)
AST SERPL-CCNC: 19 U/L (ref 1–32)
BASOPHILS # BLD AUTO: 0.03 10*3/MM3 (ref 0–0.2)
BASOPHILS NFR BLD AUTO: 0.7 % (ref 0–1.5)
BILIRUB SERPL-MCNC: 0.5 MG/DL (ref 0–1.2)
BILIRUB UR QL STRIP: NEGATIVE
BUN SERPL-MCNC: 15 MG/DL (ref 8–23)
BUN/CREAT SERPL: 19.2 (ref 7–25)
CALCIUM SPEC-SCNC: 9.4 MG/DL (ref 8.6–10.5)
CHLORIDE SERPL-SCNC: 93 MMOL/L (ref 98–107)
CLARITY UR: CLEAR
CO2 SERPL-SCNC: 22.9 MMOL/L (ref 22–29)
COLOR UR: YELLOW
CREAT SERPL-MCNC: 0.78 MG/DL (ref 0.57–1)
DEPRECATED RDW RBC AUTO: 46 FL (ref 37–54)
EGFRCR SERPLBLD CKD-EPI 2021: 76.4 ML/MIN/1.73
EOSINOPHIL # BLD AUTO: 0.02 10*3/MM3 (ref 0–0.4)
EOSINOPHIL NFR BLD AUTO: 0.5 % (ref 0.3–6.2)
ERYTHROCYTE [DISTWIDTH] IN BLOOD BY AUTOMATED COUNT: 13.6 % (ref 12.3–15.4)
GLOBULIN UR ELPH-MCNC: 2.5 GM/DL
GLUCOSE SERPL-MCNC: 94 MG/DL (ref 65–99)
GLUCOSE UR STRIP-MCNC: NEGATIVE MG/DL
HCT VFR BLD AUTO: 35.1 % (ref 34–46.6)
HGB BLD-MCNC: 12.1 G/DL (ref 12–15.9)
HGB UR QL STRIP.AUTO: NEGATIVE
IMM GRANULOCYTES # BLD AUTO: 0.01 10*3/MM3 (ref 0–0.05)
IMM GRANULOCYTES NFR BLD AUTO: 0.2 % (ref 0–0.5)
KETONES UR QL STRIP: NEGATIVE
LEUKOCYTE ESTERASE UR QL STRIP.AUTO: NEGATIVE
LYMPHOCYTES # BLD AUTO: 1 10*3/MM3 (ref 0.7–3.1)
LYMPHOCYTES NFR BLD AUTO: 22.6 % (ref 19.6–45.3)
MCH RBC QN AUTO: 31.8 PG (ref 26.6–33)
MCHC RBC AUTO-ENTMCNC: 34.5 G/DL (ref 31.5–35.7)
MCV RBC AUTO: 92.4 FL (ref 79–97)
MONOCYTES # BLD AUTO: 0.41 10*3/MM3 (ref 0.1–0.9)
MONOCYTES NFR BLD AUTO: 9.3 % (ref 5–12)
NEUTROPHILS NFR BLD AUTO: 2.96 10*3/MM3 (ref 1.7–7)
NEUTROPHILS NFR BLD AUTO: 66.7 % (ref 42.7–76)
NITRITE UR QL STRIP: NEGATIVE
NRBC BLD AUTO-RTO: 0 /100 WBC (ref 0–0.2)
PH UR STRIP.AUTO: 8 [PH] (ref 5–8)
PLATELET # BLD AUTO: 205 10*3/MM3 (ref 140–450)
PMV BLD AUTO: 9 FL (ref 6–12)
POTASSIUM SERPL-SCNC: 4.4 MMOL/L (ref 3.5–5.2)
PROT SERPL-MCNC: 6.4 G/DL (ref 6–8.5)
PROT UR QL STRIP: NEGATIVE
QT INTERVAL: 441 MS
RBC # BLD AUTO: 3.8 10*6/MM3 (ref 3.77–5.28)
SODIUM SERPL-SCNC: 129 MMOL/L (ref 136–145)
SP GR UR STRIP: 1.01 (ref 1–1.03)
TROPONIN T SERPL HS-MCNC: 28 NG/L
TROPONIN T SERPL HS-MCNC: 30 NG/L
UROBILINOGEN UR QL STRIP: NORMAL
WBC NRBC COR # BLD: 4.43 10*3/MM3 (ref 3.4–10.8)

## 2023-05-15 PROCEDURE — 25010000002 ONDANSETRON PER 1 MG: Performed by: EMERGENCY MEDICINE

## 2023-05-15 PROCEDURE — 81003 URINALYSIS AUTO W/O SCOPE: CPT | Performed by: EMERGENCY MEDICINE

## 2023-05-15 PROCEDURE — 80053 COMPREHEN METABOLIC PANEL: CPT | Performed by: EMERGENCY MEDICINE

## 2023-05-15 PROCEDURE — 99284 EMERGENCY DEPT VISIT MOD MDM: CPT

## 2023-05-15 PROCEDURE — 36415 COLL VENOUS BLD VENIPUNCTURE: CPT

## 2023-05-15 PROCEDURE — 85025 COMPLETE CBC W/AUTO DIFF WBC: CPT | Performed by: EMERGENCY MEDICINE

## 2023-05-15 PROCEDURE — 72110 X-RAY EXAM L-2 SPINE 4/>VWS: CPT

## 2023-05-15 PROCEDURE — 25010000002 MORPHINE PER 10 MG: Performed by: EMERGENCY MEDICINE

## 2023-05-15 PROCEDURE — 96374 THER/PROPH/DIAG INJ IV PUSH: CPT

## 2023-05-15 PROCEDURE — 93005 ELECTROCARDIOGRAM TRACING: CPT | Performed by: EMERGENCY MEDICINE

## 2023-05-15 PROCEDURE — 96375 TX/PRO/DX INJ NEW DRUG ADDON: CPT

## 2023-05-15 PROCEDURE — 84484 ASSAY OF TROPONIN QUANT: CPT | Performed by: EMERGENCY MEDICINE

## 2023-05-15 RX ORDER — TRAMADOL HYDROCHLORIDE 50 MG/1
50 TABLET ORAL EVERY 6 HOURS PRN
Qty: 12 TABLET | Refills: 0 | Status: SHIPPED | OUTPATIENT
Start: 2023-05-15

## 2023-05-15 RX ORDER — HYDROCODONE BITARTRATE AND ACETAMINOPHEN 5; 325 MG/1; MG/1
1 TABLET ORAL EVERY 6 HOURS PRN
Status: DISCONTINUED | OUTPATIENT
Start: 2023-05-15 | End: 2023-05-15 | Stop reason: HOSPADM

## 2023-05-15 RX ORDER — MORPHINE SULFATE 2 MG/ML
2 INJECTION, SOLUTION INTRAMUSCULAR; INTRAVENOUS ONCE
Status: COMPLETED | OUTPATIENT
Start: 2023-05-15 | End: 2023-05-15

## 2023-05-15 RX ORDER — ONDANSETRON 2 MG/ML
4 INJECTION INTRAMUSCULAR; INTRAVENOUS ONCE
Status: COMPLETED | OUTPATIENT
Start: 2023-05-15 | End: 2023-05-15

## 2023-05-15 RX ADMIN — ONDANSETRON 4 MG: 2 INJECTION INTRAMUSCULAR; INTRAVENOUS at 14:28

## 2023-05-15 RX ADMIN — HYDROCODONE BITARTRATE AND ACETAMINOPHEN 1 TABLET: 5; 325 TABLET ORAL at 11:09

## 2023-05-15 RX ADMIN — MORPHINE SULFATE 2 MG: 2 INJECTION, SOLUTION INTRAMUSCULAR; INTRAVENOUS at 14:28

## 2023-05-15 NOTE — ED PROVIDER NOTES
" EMERGENCY DEPARTMENT ENCOUNTER    Room Number:  10/10  Date seen:  5/15/2023  PCP: Olayinka Pimentel MD  Historian: Patient, EMS      HPI:  Chief Complaint: Low back pain  A complete HPI/ROS/PMH/PSH/SH/FH are unobtainable due to: Nothing  Context: Michelle Car is a 82 y.o. female, with a history of atrial fibrillation and non-Hodgkin's lymphoma, who presents to the ED from home by EMS c/o low back pain for the past 2 weeks.  Pain is constant and worse with moving and bending.  She took 1 Tylenol this morning with minimal relief.  Pain does not radiate.  Denies recent injury, numbness/tingling/weakness in her legs, or saddle anesthesia.  She has chronic urinary incontinence which is unchanged.  Denies dysuria or fever.  She was scheduled to have her port removed today.  She has been off Eliquis for 3 days.  She told the triage nurse that she had chest pain.  However, she now denies chest pain and states that she was \"just upset\".  She was diagnosed with shingles on her left upper extremity several weeks ago and reports that the rash is almost completely resolved.  Denies history of back surgery.  Denies cough, fever, shortness of breath, abdominal pain, vomiting, or diarrhea.  Patient has been able to ambulate using a walker.            PAST MEDICAL HISTORY  Active Ambulatory Problems     Diagnosis Date Noted   • Hypertension    • Hyperlipidemia    • Abdominal aortic atherosclerosis    • Generalized edema 07/12/2016   • Fatigue due to excessive exertion 07/12/2016   • Seasonal allergic rhinitis due to pollen 12/14/2016   • Dyspnea 02/17/2022   • Acute on chronic diastolic heart failure 02/18/2022   • Hypoxia 02/18/2022   • Paroxysmal atrial fibrillation(HCC) 02/18/2022   • Adrenal nodule 02/18/2022   • Edema of lower extremity 03/02/2022   • Hyponatremia 02/17/2022   • CRI (chronic renal insufficiency), stage 2 (mild) 2021   • Diffuse large B-cell lymphoma of lymph nodes of multiple regions 02/01/2022   • " Vascular catheter fitting or adjustment 04/25/2022   • Chronic diastolic CHF (congestive heart failure) 04/26/2022   • Sepsis due to Escherichia coli (E. coli) 04/27/2022   • Bacteremia due to Escherichia coli 04/27/2022   • Calculus of bile duct without cholecystitis and without obstruction 09/01/2022   • Diverticulosis 10/21/2022   • Iron deficiency anemia 11/10/2022     Resolved Ambulatory Problems     Diagnosis Date Noted   • Heart disease    • Low vitamin D level 12/14/2016   • Non Hodgkin's lymphoma 02/28/2022   • Neutropenic fever 04/26/2022   • Pancytopenia due to chemotherapy 04/27/2022     Past Medical History:   Diagnosis Date   • Allergic rhinitis    • Anemia due to chemotherapy    • Bug bite 10/24/2015   • CAD (coronary artery disease)    • Calculus of bile duct without cholangitis or cholecystitis 09/2022   • Chemotherapy induced neutropenia    • Chronic anticoagulation    • Chronic diastolic (congestive) heart failure    • Colon polyps    • Diffuse large B cell lymphoma 02/2022   • Drug induced constipation    • Hearing loss    • Hemorrhoids    • History of blood transfusion 04/2022   • History of chemotherapy 2022   • Hypercalcemia 2016   • Hypercholesterolemia    • Hyperkalemia 03/2022   • Leg swelling 10/2021   • Mixed hyperlipidemia    • Nonrheumatic mitral valve regurgitation 03/2022   • PAF (paroxysmal atrial fibrillation)    • Pancytopenia    • Pleural effusion, bilateral 06/2022   • Pulmonary nodule    • Seasonal allergies    • Thrombocytopenia 08/2022   • Venous insufficiency    • Vitamin D deficiency          PAST SURGICAL HISTORY  Past Surgical History:   Procedure Laterality Date   • CHOLECYSTECTOMY N/A 10/12/2011    LAPAROSCOPIC, DR. CHANCE KLINE AT [facility]   • COLONOSCOPY N/A 2000    1 or 2 polyps, otherwise normal per patient   • COLONOSCOPY W/ POLYPECTOMY N/A 01/24/2012    3 MM TUBULAR ADENOMA POLYP IN CECUM, DIVERTICULOSIS IN RECTO-SIGMOID, RESCOPE IN 3 YRS, DR. CHANCE KLINE AT [facility]   •  CYST REMOVAL Left 10/12/2011    LEFT SCALP, PATH: BENIGN ADNEXAL NEOPLASM FAVORING ECCRINE SPIRADENOMA, DR. CHANCE KLINE AT [facility]   • ERCP N/A 10/21/2022    Procedure: ENDOSCOPIC RETROGRADE CHOLANGIOPANCREATOGRAPHY with sphincterotomy and balloon sweep;  Surgeon: Peyman Ortega MD;  Location: [facility] ENDOSCOPY;  Service: Gastroenterology;  Laterality: N/A;  PRE: Common Duct Stones  POST: Common Duct Stones   • VENOUS ACCESS DEVICE (PORT) INSERTION Left 03/23/2022    Procedure: INSERTION VENOUS ACCESS DEVICE;  Surgeon: Alin Delgado MD;  Location: [facility] OR [facility];  Service: General;  Laterality: Left;         FAMILY HISTORY  Family History   Problem Relation Age of Onset   • Malig Hyperthermia Neg Hx          SOCIAL HISTORY  Social History     Socioeconomic History   • Marital status:    Tobacco Use   • Smoking status: Never   • Smokeless tobacco: Never   Vaping Use   • Vaping Use: Never used   Substance and Sexual Activity   • Alcohol use: No   • Drug use: No   • Sexual activity: Not Currently     Birth control/protection: Post-menopausal         ALLERGIES  Factive [gemifloxacin]        REVIEW OF SYSTEMS  Review of Systems     All systems have been reviewed and are negative except as as discussed in the HPI    PHYSICAL EXAM  ED Triage Vitals [05/15/23 1001]   Temp Heart Rate Resp BP SpO2   97 °F (36.1 °C) 65 18 138/80 100 %      Temp src Heart Rate Source Patient Position BP Location FiO2 (%)   -- -- -- -- --       Physical Exam      GENERAL: Awake, alert, oriented x3.  Well-developed, well-nourished elderly female.  Resting comfortably in no acute distress  HENT: NCAT, nares patent  EYES: no scleral icterus  CV: regular rhythm, normal rate  RESPIRATORY: normal effort, clear to auscultation bilaterally  ABDOMEN: soft, nontender, no CVA tenderness  MUSCULOSKELETAL: Extremities are nontender with full range of motion.  C/T/L-spine are nontender.  There is 1+ pedal edema in both lower legs (chronic and  unchanged per patient  NEURO: Speech is normal.  No facial droop.  Normal and equal strength of bilateral hip flexion/extension, knee flexion/extension, ankle dorsiflexion/plantarflexion, and extension of the great toes.  Normal light touch sensation in both lower extremities.  No saddle anesthesia.  Straight leg raise is negative.  PSYCH:  calm, cooperative  SKIN: warm, dry, there are faint erythematous patches on the left upper extremity and left lateral neck    Vital signs and nursing notes reviewed.          LAB RESULTS  Recent Results (from the past 24 hour(s))   ECG 12 Lead Chest Pain    Collection Time: 05/15/23 10:09 AM   Result Value Ref Range    QT Interval 441 ms   Comprehensive Metabolic Panel    Collection Time: 05/15/23 10:24 AM    Specimen: Blood   Result Value Ref Range    Glucose 94 65 - 99 mg/dL    BUN 15 8 - 23 mg/dL    Creatinine 0.78 0.57 - 1.00 mg/dL    Sodium 129 (L) 136 - 145 mmol/L    Potassium 4.4 3.5 - 5.2 mmol/L    Chloride 93 (L) 98 - 107 mmol/L    CO2 22.9 22.0 - 29.0 mmol/L    Calcium 9.4 8.6 - 10.5 mg/dL    Total Protein 6.4 6.0 - 8.5 g/dL    Albumin 3.9 3.5 - 5.2 g/dL    ALT (SGPT) 14 1 - 33 U/L    AST (SGOT) 19 1 - 32 U/L    Alkaline Phosphatase 134 (H) 39 - 117 U/L    Total Bilirubin 0.5 0.0 - 1.2 mg/dL    Globulin 2.5 gm/dL    A/G Ratio 1.6 g/dL    BUN/Creatinine Ratio 19.2 7.0 - 25.0    Anion Gap 13.1 5.0 - 15.0 mmol/L    eGFR 76.4 >60.0 mL/min/1.73   Single High Sensitivity Troponin T    Collection Time: 05/15/23 10:24 AM    Specimen: Blood   Result Value Ref Range    HS Troponin T 28 (H) <10 ng/L   CBC Auto Differential    Collection Time: 05/15/23 10:24 AM    Specimen: Blood   Result Value Ref Range    WBC 4.43 3.40 - 10.80 10*3/mm3    RBC 3.80 3.77 - 5.28 10*6/mm3    Hemoglobin 12.1 12.0 - 15.9 g/dL    Hematocrit 35.1 34.0 - 46.6 %    MCV 92.4 79.0 - 97.0 fL    MCH 31.8 26.6 - 33.0 pg    MCHC 34.5 31.5 - 35.7 g/dL    RDW 13.6 12.3 - 15.4 %    RDW-SD 46.0 37.0 - 54.0 fl    MPV  9.0 6.0 - 12.0 fL    Platelets 205 140 - 450 10*3/mm3    Neutrophil % 66.7 42.7 - 76.0 %    Lymphocyte % 22.6 19.6 - 45.3 %    Monocyte % 9.3 5.0 - 12.0 %    Eosinophil % 0.5 0.3 - 6.2 %    Basophil % 0.7 0.0 - 1.5 %    Immature Grans % 0.2 0.0 - 0.5 %    Neutrophils, Absolute 2.96 1.70 - 7.00 10*3/mm3    Lymphocytes, Absolute 1.00 0.70 - 3.10 10*3/mm3    Monocytes, Absolute 0.41 0.10 - 0.90 10*3/mm3    Eosinophils, Absolute 0.02 0.00 - 0.40 10*3/mm3    Basophils, Absolute 0.03 0.00 - 0.20 10*3/mm3    Immature Grans, Absolute 0.01 0.00 - 0.05 10*3/mm3    nRBC 0.0 0.0 - 0.2 /100 WBC   Urinalysis With Microscopic If Indicated (No Culture) - Urine, Catheter    Collection Time: 05/15/23 11:10 AM    Specimen: Urine, Catheter   Result Value Ref Range    Color, UA Yellow Yellow, Straw    Appearance, UA Clear Clear    pH, UA 8.0 5.0 - 8.0    Specific Gravity, UA 1.010 1.005 - 1.030    Glucose, UA Negative Negative    Ketones, UA Negative Negative    Bilirubin, UA Negative Negative    Blood, UA Negative Negative    Protein, UA Negative Negative    Leuk Esterase, UA Negative Negative    Nitrite, UA Negative Negative    Urobilinogen, UA 1.0 E.U./dL 0.2 - 1.0 E.U./dL   Single High Sensitivity Troponin T    Collection Time: 05/15/23 12:35 PM    Specimen: Blood   Result Value Ref Range    HS Troponin T 30 (H) <10 ng/L       Ordered the above labs and reviewed the results.        RADIOLOGY  XR Spine Lumbar Complete 4+VW    Result Date: 5/15/2023  LUMBAR SPINE X-RAYS  HISTORY: back pain for several days. No known injury.  TECHNIQUE: Six x-rays of lumbosacral spine are provided.  FINDINGS: There is very extensive atheromatous vascular calcification. Advanced degenerative disc changes are observed throughout the visualized lower thoracic spine, at L1-2, and at L4-S1. No compression deformity or bone lesion is evident. There are clips from cholecystectomy.      Extensive degenerative change in the spine. No acute abnormality is  evident.  Very extensive atheromatous vascular calcification is incidentally noted.  This report was finalized on 5/15/2023 11:12 AM by Dr. Philip Carr M.D.        Ordered the above noted radiological studies. Reviewed by me in PACS.            PROCEDURES  Procedures              MEDICATIONS GIVEN IN ER  Medications   HYDROcodone-acetaminophen (NORCO) 5-325 MG per tablet 1 tablet (1 tablet Oral Given 5/15/23 1109)   morphine injection 2 mg (2 mg Intravenous Given 5/15/23 1428)   ondansetron (ZOFRAN) injection 4 mg (4 mg Intravenous Given 5/15/23 1428)                   MEDICAL DECISION MAKING, PROGRESS, and CONSULTS    All labs have been independently reviewed by me.  All radiology studies have been reviewed by me and I have also reviewed the radiology report.   EKG's independently viewed and interpreted by me.  Discussion below represents my analysis of pertinent findings related to patient's condition, differential diagnosis, treatment plan and final disposition.      Additional sources:  - Discussed/ obtained information from independent historians: EMS, son at bedside    - External (non-ED) record review: Patient saw Dr. Pimentel, her PCP, in March 2023 for follow-up for hypertension, hyponatremia, and paroxysmal atrial fibrillation.  She was last admitted here in April 2022 for neutropenic fever.  Blood cultures were positive for E. coli.  She developed A-fib with RVR and was started on amiodarone.  She was taken off digoxin.    - Chronic or social conditions impacting care: N/A          Orders placed during this visit:  Orders Placed This Encounter   Procedures   • XR Spine Lumbar Complete 4+VW   • Comprehensive Metabolic Panel   • Single High Sensitivity Troponin T   • Urinalysis With Microscopic If Indicated (No Culture) - Urine, Catheter   • CBC Auto Differential   • Single High Sensitivity Troponin T   • ECG 12 Lead Chest Pain   • CBC & Differential         Additional orders considered but not  ordered:  N/A        Differential diagnosis:    Compression fracture, radiculopathy, degenerative disc disease, arthritis, UTI      Independent interpretation of labs, radiology studies, and discussions with consultants:  ED Course as of 05/15/23 1435   Mon May 15, 2023   1021 EKG personally interpreted by me.  My personal interpretation is:          EKG time: 10:09 AM  Rhythm/Rate: Sinus rhythm, rate 64  P waves and OR: First-degree AV block  QRS, axis: RBBB  ST and T waves: Nonspecific ST/T wave changes in the anterior and inferior leads    Interpreted Contemporaneously by me at 10:10 AM, independently viewed  EKG is not significantly changed compared to prior EKG done on 2/13/2023   [WH]   1200 Glucose: 94 [WH]   1200 Creatinine: 0.78 [WH]   1200 Sodium(!): 129 [WH]   1200 HS Troponin T(!): 28 [WH]   1200 WBC: 4.43 [WH]   1200 Hemoglobin: 12.1 [WH]   1200 Urinalysis is normal. [WH]   1200 Lumbar spine x-rays personally interpreted by me.  My personal interpretation is: Alignment is normal.  There are degenerative changes.  No compression deformities. [WH]   1408 HS Troponin T(!): 30  Delta is +2 [WH]   1416 Test results discussed with the patient and her son, who is not bedside.  Pain has improved.  She has been able to ambulate at home with a walker.  Sodium was slightly low.  Troponin was stable.  Labs are otherwise unremarkable.  She does not have a UTI.  X-rays do not show any evidence of a compression fracture.  There is degenerative disc disease.  Neuro exam is nonfocal.  Plan is for symptomatic treatment.  Return precautions were discussed [WH]   1418 BAO query complete. Treatment plan to include limited course of prescribed  controlled substance. Risks including addiction, benefits, and alternatives presented to patient.    [WH]      ED Course User Index  [WH] Claude Lewis MD               DIAGNOSIS  Final diagnoses:   Acute midline low back pain without sciatica          DISPOSITION  DISCHARGE    Patient discharged in stable condition.    Reviewed implications of results, diagnosis, meds, responsibility to follow up, warning signs and symptoms of possible worsening, potential complications and reasons to return to ER, including worsening pain, numbness/tingling/weakness in legs, saddle anesthesia, fever, trouble walking, or other concern.    Patient/Family voiced understanding of above instructions.    Discussed plan for discharge, as there is no emergent indication for admission. Patient referred to primary care provider for BP management due to today's BP. Pt/family is agreeable and understands need for follow up and repeat testing.  Pt is aware that discharge does not mean that nothing is wrong but it indicates no emergency is present that requires admission and they must continue care with follow-up as given below or physician of their choice.     FOLLOW-UP  Olayinka Pimentel MD  [address] [facility] [address] [phone]    Schedule an appointment as soon as possible for a visit   If symptoms persist         Medication List      New Prescriptions    traMADol 50 MG tablet  Commonly known as: ULTRAM  Take 1 tablet by mouth Every 6 (Six) Hours As Needed for Moderate Pain.           Where to Get Your Medications      These medications were sent to [id number] - [place], [place] - [address] [address] [address] AT [facility] [address] [address] [address] [address] - [phone]  - [phone] [address] [address] [address], [facility] [address]    Phone: [phone]   · traMADol 50 MG tablet                   Latest Documented Vital Signs:  As of 14:35 EDT  BP- 134/68 HR- 56 Temp- 97 °F (36.1 °C) O2 sat- 100%      BAO reviewed by Claude Lewis MD       --    Please note that portions of this were completed with a voice recognition program.       Note Disclaimer: At [facility], we believe that sharing information builds trust and better relationships. You  are receiving this note because you are receiving care at [facility] or recently visited. It is possible you will see health information before a provider has talked with you about it. This kind of information can be easy to misunderstand. To help you fully understand what it means for your health, we urge you to discuss this note with your provider.           Claude Lewis MD  05/15/23 0823

## 2023-05-15 NOTE — ED TRIAGE NOTES
Patient to ER via ems from home for low back pain x 2 weeks NKI  Patient was able to walk with EMS with walker    Patient reports she was supposed to have her port removed and has been off blood thinner for 3 days  Patient reports chest pain at time of triage

## 2023-05-15 NOTE — DISCHARGE INSTRUCTIONS
Take Ultram/tramadol as prescribed.  You may also take Tylenol as needed for pain.  Apply moist heat to your back as needed.  Return to the emergency department for worsening pain, trouble walking, numbness/tingling/weakness in your legs, numbness in your groin area, or other concern.  Follow-up with your primary care doctor if symptoms are not improving in the next 4 to 5 days.

## 2023-05-17 ENCOUNTER — PATIENT OUTREACH (OUTPATIENT)
Dept: CASE MANAGEMENT | Facility: OTHER | Age: 83
End: 2023-05-17
Payer: MEDICARE

## 2023-05-17 NOTE — OUTREACH NOTE
Patient Outreach    AMBULATORY CASE MANAGEMENT NOTE    Name and Relationship of Patient/Support Person: Josep Michelle J - Self    Call placed to patient to follow up recent ED visit. Introduced self and role of ACM. She states she is still in  Pain but does realize this will take awhile. Discussed need to contact her PCP for an appointment. She reports that Dr Pimentel's has already left the practice and will be going to a non [facility] practice. She has not decided if she will stay with him and is unsure if his practice will take her insurance. She has discussed the with [facility] and they did not have this information or his start date as this will depend on the time the office is ready. Discussed need to make sure she does not run out of her medication. She has thought about transfer to either [facility] or [facility]. She is undecided. [facility] did offer to call her back next month and if needed could assist with scheduling with either Dr Pimentel's new office or one of the [facility]. She would like this. Will call again next month. Long enjoyable conversation.         Nilsa ALMENDAREZ  Ambulatory Case Management    5/17/2023, 15:06 EDT

## 2023-06-05 ENCOUNTER — PROCEDURE VISIT (OUTPATIENT)
Dept: SURGERY | Facility: CLINIC | Age: 83
End: 2023-06-05
Payer: MEDICARE

## 2023-06-05 NOTE — PROGRESS NOTES
Procedure note:      Preoperative diagnosis: B-cell lymphoma, in remission    Postoperative diagnosis: Same    Procedure performed: Removal of left subclavian vein approach [id number]    Surgeon: Alin Delgado MD    Anesthesia: Local    Estimated blood loss: Less than 3 cc    Specimens: None    Complications: None    Indication: 82-year-old female who I placed a port on in March 2022 has since completed treatment and is in remission and was referred for port removal by her oncologist.  She has held her anticoagulants in anticipation of this procedure.    Description of procedure:  After discussion with patient, she was brought to the procedure room and placed supine on the table.  Her chest and neck were then sterilely prepped and draped.  I then infiltrated the area of her prior scar and the port with a mixture of lidocaine and Marcaine.  A transversely oriented incision was made through her prior scar with a #15 blade I then dissected down to the junction of the port and catheter.  This was freed up and I was then able to remove the catheter from the subcutaneous tissues in its entirety.  There was no bleeding through the tunneled site.  I then opened up the capsule surrounding the port and removed the Prolene sutures holding the port in place and removed the vice in its entirety.  I inspected for hemostasis and there was no bleeding.  The subcutaneous tissues were reapproximated with 3-0 Vicryl and the skin was then closed with glue.  The patient tolerated this well.    Alin Delgado MD  General and Endoscopic Surgery  [facility]    [address], [address]  [place], [place], [address]  P: [phone]  F: [phone]

## 2023-06-06 ENCOUNTER — OFFICE VISIT (OUTPATIENT)
Dept: ONCOLOGY | Facility: CLINIC | Age: 83
End: 2023-06-06
Payer: MEDICARE

## 2023-06-06 ENCOUNTER — LAB (OUTPATIENT)
Dept: LAB | Facility: HOSPITAL | Age: 83
End: 2023-06-06
Payer: MEDICARE

## 2023-06-06 VITALS
TEMPERATURE: 98 F | RESPIRATION RATE: 18 BRPM | WEIGHT: 150 LBS | DIASTOLIC BLOOD PRESSURE: 76 MMHG | SYSTOLIC BLOOD PRESSURE: 137 MMHG | BODY MASS INDEX: 29.45 KG/M2 | HEART RATE: 67 BPM | OXYGEN SATURATION: 98 % | HEIGHT: 60 IN

## 2023-06-06 DIAGNOSIS — D50.9 IRON DEFICIENCY ANEMIA, UNSPECIFIED IRON DEFICIENCY ANEMIA TYPE: ICD-10-CM

## 2023-06-06 DIAGNOSIS — T45.1X5A ANEMIA DUE TO CHEMOTHERAPY: ICD-10-CM

## 2023-06-06 DIAGNOSIS — Z45.2: ICD-10-CM

## 2023-06-06 DIAGNOSIS — E87.1 HYPONATREMIA: ICD-10-CM

## 2023-06-06 DIAGNOSIS — D64.81 ANEMIA DUE TO CHEMOTHERAPY: ICD-10-CM

## 2023-06-06 DIAGNOSIS — K80.50 COMMON BILE DUCT STONE: ICD-10-CM

## 2023-06-06 DIAGNOSIS — C83.38 DIFFUSE LARGE B-CELL LYMPHOMA OF LYMPH NODES OF MULTIPLE REGIONS: ICD-10-CM

## 2023-06-06 DIAGNOSIS — J90 BILATERAL PLEURAL EFFUSION: ICD-10-CM

## 2023-06-06 DIAGNOSIS — T14.8XXA HEMATOMA: ICD-10-CM

## 2023-06-06 DIAGNOSIS — C83.38 DIFFUSE LARGE B-CELL LYMPHOMA OF LYMPH NODES OF MULTIPLE REGIONS: Primary | ICD-10-CM

## 2023-06-06 LAB
ALBUMIN SERPL-MCNC: 4.1 G/DL (ref 3.5–5.2)
ALBUMIN/GLOB SERPL: 1.3 G/DL (ref 1.1–2.4)
ALP SERPL-CCNC: 155 U/L (ref 38–116)
ALT SERPL W P-5'-P-CCNC: 16 U/L (ref 0–33)
ANION GAP SERPL CALCULATED.3IONS-SCNC: 13 MMOL/L (ref 5–15)
AST SERPL-CCNC: 23 U/L (ref 0–32)
BASOPHILS # BLD AUTO: 0.03 10*3/MM3 (ref 0–0.2)
BASOPHILS NFR BLD AUTO: 0.6 % (ref 0–1.5)
BILIRUB SERPL-MCNC: 0.6 MG/DL (ref 0.2–1.2)
BUN SERPL-MCNC: 19 MG/DL (ref 6–20)
BUN/CREAT SERPL: 25.7 (ref 7.3–30)
CALCIUM SPEC-SCNC: 9.8 MG/DL (ref 8.5–10.2)
CHLORIDE SERPL-SCNC: 88 MMOL/L (ref 98–107)
CO2 SERPL-SCNC: 25 MMOL/L (ref 22–29)
CREAT SERPL-MCNC: 0.74 MG/DL (ref 0.6–1.1)
DEPRECATED RDW RBC AUTO: 43.8 FL (ref 37–54)
EGFRCR SERPLBLD CKD-EPI 2021: 81.4 ML/MIN/1.73
EOSINOPHIL # BLD AUTO: 0.03 10*3/MM3 (ref 0–0.4)
EOSINOPHIL NFR BLD AUTO: 0.6 % (ref 0.3–6.2)
ERYTHROCYTE [DISTWIDTH] IN BLOOD BY AUTOMATED COUNT: 13.2 % (ref 12.3–15.4)
FERRITIN SERPL-MCNC: 122.4 NG/ML (ref 13–150)
GLOBULIN UR ELPH-MCNC: 3.1 GM/DL (ref 1.8–3.5)
GLUCOSE SERPL-MCNC: 97 MG/DL (ref 74–124)
HCT VFR BLD AUTO: 35.3 % (ref 34–46.6)
HGB BLD-MCNC: 12.3 G/DL (ref 12–15.9)
IMM GRANULOCYTES # BLD AUTO: 0.01 10*3/MM3 (ref 0–0.05)
IMM GRANULOCYTES NFR BLD AUTO: 0.2 % (ref 0–0.5)
IRON 24H UR-MRATE: 85 MCG/DL (ref 37–145)
IRON SATN MFR SERPL: 19 % (ref 14–48)
LDH SERPL-CCNC: 227 U/L (ref 99–259)
LYMPHOCYTES # BLD AUTO: 1.27 10*3/MM3 (ref 0.7–3.1)
LYMPHOCYTES NFR BLD AUTO: 26 % (ref 19.6–45.3)
MCH RBC QN AUTO: 31.3 PG (ref 26.6–33)
MCHC RBC AUTO-ENTMCNC: 34.8 G/DL (ref 31.5–35.7)
MCV RBC AUTO: 89.8 FL (ref 79–97)
MONOCYTES # BLD AUTO: 0.37 10*3/MM3 (ref 0.1–0.9)
MONOCYTES NFR BLD AUTO: 7.6 % (ref 5–12)
NEUTROPHILS NFR BLD AUTO: 3.18 10*3/MM3 (ref 1.7–7)
NEUTROPHILS NFR BLD AUTO: 65 % (ref 42.7–76)
NRBC BLD AUTO-RTO: 0 /100 WBC (ref 0–0.2)
PLATELET # BLD AUTO: 240 10*3/MM3 (ref 140–450)
PMV BLD AUTO: 8.3 FL (ref 6–12)
POTASSIUM SERPL-SCNC: 4.6 MMOL/L (ref 3.5–4.7)
PROT SERPL-MCNC: 7.2 G/DL (ref 6.3–8)
RBC # BLD AUTO: 3.93 10*6/MM3 (ref 3.77–5.28)
SODIUM SERPL-SCNC: 126 MMOL/L (ref 134–145)
TIBC SERPL-MCNC: 449 MCG/DL (ref 249–505)
TRANSFERRIN SERPL-MCNC: 321 MG/DL (ref 200–360)
WBC NRBC COR # BLD: 4.89 10*3/MM3 (ref 3.4–10.8)

## 2023-06-06 PROCEDURE — 83615 LACTATE (LD) (LDH) ENZYME: CPT

## 2023-06-06 PROCEDURE — 84466 ASSAY OF TRANSFERRIN: CPT

## 2023-06-06 PROCEDURE — 80053 COMPREHEN METABOLIC PANEL: CPT

## 2023-06-06 PROCEDURE — 82728 ASSAY OF FERRITIN: CPT

## 2023-06-06 PROCEDURE — 83540 ASSAY OF IRON: CPT

## 2023-06-06 PROCEDURE — 36415 COLL VENOUS BLD VENIPUNCTURE: CPT

## 2023-06-06 PROCEDURE — 85025 COMPLETE CBC W/AUTO DIFF WBC: CPT

## 2023-06-06 NOTE — PROGRESS NOTES
Subjective     CHIEF COMPLAINT:      Chief Complaint   Patient presents with    Follow-up     HISTORY OF PRESENT ILLNESS:     Michelle Car is a 82 y.o. female patient who returns today for follow up on her lymphoma.  She returns today for follow-up accompanied by her daughter.  She reports that she developed severe back pain and went to the ER on 5/15/2023.  X-ray was obtained and there was no evidence of a fracture.  There was no preceding trauma.    Patient reports that she developed skin rash over the left shoulder that looked like shingles to her.  It gradually improved.  She did not need to take any medicine for it.  She then developed skin rash over the left thigh and proximal aspect of the left leg.  It looked different than the rash over the left shoulder.    Patient had her port removed yesterday.  She held Eliquis before the procedure and started back on it today.  She noticed swelling and redness at the site.    ROS:  Pertinent ROS is in the HPI.     Past medical, surgical, social and family history were reviewed.     MEDICATIONS:    Current Outpatient Medications:     acetaminophen (TYLENOL) 500 MG tablet, Take 1 tablet by mouth Every 6 (Six) Hours As Needed for Mild Pain., Disp: , Rfl:     amiodarone (PACERONE) 200 MG tablet, Take 1 tablet by mouth Daily., Disp: 30 tablet, Rfl: 5    apixaban (ELIQUIS) 5 MG tablet tablet, Take 1 tablet by mouth Every 12 (Twelve) Hours. Indications: Atrial Fibrillation, Disp: 180 tablet, Rfl: 1    Calcium Carbonate (CALTRATE 600 PO), Take 1 tablet by mouth Daily., Disp: , Rfl:     carvedilol (COREG) 3.125 MG tablet, Take 1 tablet by mouth 2 (Two) Times a Day With Meals., Disp: 180 tablet, Rfl: 1    ferrous sulfate (FeroSul) 325 (65 FE) MG tablet, Take 1 tablet by mouth 3 (Three) Times a Week., Disp: 36 tablet, Rfl: 0    Hydrocortisone, Perianal, (Anusol-HC) 2.5 % rectal cream, Apply to hemorrhoids 3 times daily for 7 days during hemorrhoid flare. Include  "applicator., Disp: 30 g, Rfl: 1    losartan (COZAAR) 100 MG tablet, , Disp: , Rfl:     Multiple Vitamins-Minerals (CENTRUM SILVER) tablet, Take 1 tablet by mouth Daily., Disp: , Rfl:     polyethylene glycol (MIRALAX) 17 GM/SCOOP powder, Take 17 g by mouth As Needed., Disp: , Rfl:     pravastatin (PRAVACHOL) 40 MG tablet, TAKE 1 TABLET BY MOUTH EVERY NIGHT AT BEDTIME, Disp: 90 tablet, Rfl: 1    spironolactone (ALDACTONE) 25 MG tablet, Take 1 tablet by mouth Daily., Disp: 90 tablet, Rfl: 1    traMADol (ULTRAM) 50 MG tablet, Take 1 tablet by mouth Every 6 (Six) Hours As Needed for Moderate Pain., Disp: 12 tablet, Rfl: 0  Objective     VITAL SIGNS:     Vitals:    06/06/23 1612   BP: 137/76   Pulse: 67   Resp: 18   Temp: 98 °F (36.7 °C)   TempSrc: Temporal   SpO2: 98%   Weight: 68 kg (150 lb)   Height: 152.4 cm (60\")   PainSc:   5   PainLoc: Generalized  Comment: PT STATED LOWER BACK AND PORT AREA PAIN     Body mass index is 29.29 kg/m².     Wt Readings from Last 5 Encounters:   06/06/23 68 kg (150 lb)   05/15/23 68.9 kg (152 lb)   03/21/23 69.1 kg (152 lb 6.4 oz)   03/14/23 67.1 kg (148 lb)   02/13/23 68 kg (150 lb)     PHYSICAL EXAMINATION:   GENERAL: The patient appears in fair general condition, not in acute distress.   SKIN: No ecchymosis.  Skin rash over the left thigh.  EYES: No jaundice. Pallor.  LYMPHATICS: No cervical, supraclavicular or axillary lymphadenopathy.  CHEST: Normal respiratory effort.  Lung is clear bilaterally.  No added sounds.  Swelling at the site of the port with a 4 cm hematoma.  CVS: Normal S1-S2.  No murmurs.  ABDOMEN: Soft. No tenderness. No Hepatomegaly. No Splenomegaly. No masses.  EXTREMITIES: No noted deformity.     DIAGNOSTIC DATA:     Results from last 7 days   Lab Units 06/06/23  1600   WBC 10*3/mm3 4.89   NEUTROS ABS 10*3/mm3 3.18   HEMOGLOBIN g/dL 12.3   HEMATOCRIT % 35.3   PLATELETS 10*3/mm3 240     Results from last 7 days   Lab Units 06/06/23  1600   SODIUM mmol/L 126* "   POTASSIUM mmol/L 4.6   CHLORIDE mmol/L 88*   CO2 mmol/L 25.0   BUN mg/dL 19   CREATININE mg/dL 0.74   CALCIUM mg/dL 9.8   ALBUMIN g/dL 4.1   BILIRUBIN mg/dL 0.6   ALK PHOS U/L 155*   ALT (SGPT) U/L 16   AST (SGOT) U/L 23   GLUCOSE mg/dL 97         Lab 06/06/23  1600   IRON 85   IRON SATURATION (TSAT) 19   TIBC 449   TRANSFERRIN 321   FERRITIN 122.40      Component      Latest Ref Rng 6/6/2023   LDH      99 - 259 U/L 227      X-ray spine on 5/15/2023:  Extensive degenerative change in the spine. No acute  abnormality is evident.    Assessment & Plan    *Diffuse large B-cell lymphoma.     Patient was found to have bilateral pleural effusions.    She underwent right thoracentesis on 2/21/2022 and the left thoracentesis on 2/22/2022.    Analysis of the fluid revealed involvement with diffuse large B-cell lymphoma.    FISH analysis for BCL6 rearrangement was positive.    FISH analysis for BCL 1, BCL-2 and MYC rearrangement was negative.    CT chest on 2/17/2022 revealed no hilar or mediastinal lymphadenopathy.    PET scan on 3/10/2022 revealed significant hypermetabolism in the left adrenal gland and the surrounding soft tissue with SUV up to 34.5. Hypermetabolism in the left pleural thickening with SUV of 7.1. there was less hypermetabolism in the right adrenal gland and in the right pleura.  She is considered to have stage III non-bulky disease.  The degree of hypermetabolism is consistent with high grade lymphoma.   This concurred with the pathology exam of the pleural fluid cytology that revealed diffuse large B-cell lymphoma.  R-IPI score of 3 associated with poor risk. This is associated with overall survival of 55%.   R-CHOP with Neulasta support on 3/28/2022.  Patient developed neutropenia and thrombocytopenia after cycle#1.  Vincristine dose was reduced to 1 mg due to constipation.   Patient developed neutropenic fever after cycle #2 and was hospitalized.  Her performance status decreased as a result.   CT  scan on 5/1/2022 revealed a decrease in the right pleural effusion. The left suprarenal density was stable.  Regimen was changed to mini-RCHOP starting cycle #3 on 5/16/2022.  CT scans on 6/23/2022 showed evidence of response.  There was resolution of the pericardial effusion and near complete resolution of the pleural effusions.   Patient received cycle #6 on 7/25/2022.  PET scan on 8/15/2022 revealed complete metabolic response.  CT on 11/7/2022 revealed no evidence of recurrence of the lymphoma.  CT on 3/7/2023 revealed no evidence of recurrence of the lymphoma.  Exam today revealed no lymphadenopathy.  No signs of recurrence of the lymphoma on exam today.    *Bilateral pleural effusions.   She is s/p right thoracentesis on 2/21/2022 and the effusion was found to be malignant attributed to the lymphoma.   CT scan on 5/1/2022 revealed decrease in the right pleural effusion and an increase in the left effusion.   The increase in the left effusion was suspected of being due to cardiac and fluid overload states (fluid appeared to be serous on CT scan).  CT on 6/23/2022 revealed near resolution of the pleural effusions.  PET scan on 8/15/2022 showed that the pleural effusions were trace and improved since June 2022.  CT chest on 11/7/2022 revealed minimal partially loculated right pleural effusion.  CT chest on 3/7/2023 showed no recurrence of the effusion.  Exam today revealed no clinical evidence of recurrence of the pleural effusions.    *Iron deficiency anemia.  Patient also developed anemia secondary to lymphoma and secondary to chemotherapy.    Hemoglobin was 11.1 on 3/28/2022.  Hgb decreased to 7.9 on 4/25/2022 and she was transfused.   Hemoglobin decreased to 6.3 on 4/27/2022. She was transfused.  Hemoglobin improved to 8.8 on 5/16/2022.  Hemoglobin was 9.9 on 8/22/2022. Labs revealed iron deficiency.  She was started on ferrous sulfate 325 mg every other day.  Hemoglobin was 7.9 on 11/7/2022.  She was found  to have a positive stool Hemoccult on stool test done by her PCP earlier today.  Hemoglobin improved to 11.7 on 1/30/2023.   Ferritin was 49 and transferrin saturation was 20%.  She was continued on ferrous sulfate 325 mg daily.  Hemoglobin improved to 11.8 on 3/14/2023.  Ferrous sulfate was reduced to 3 days a week.  Hemoglobin is 12.3 today.  Iron stores are adequate with ferritin of 122 and transferrin saturation of 19%.       *Choledocholithiasis with increase in the intra and extrahepatic biliary dilatation seen on CT on 6/23/2022.  Liver enzymes including bilirubin are normal today.   She is not having abdominal pain.  No redness no tenderness on exam of the right upper quadrant.  PET scan on 8/15/2022 showed persistence of the findings.  No symptoms or signs of infection.  Patient was seen by Dr. Ortega.  She underwent ERCP on 10/21/2022.  There was a calculus of the bile duct without cholecystitis and without obstruction.  She had sphincterotomy and balloon sweep.  Alkaline phosphatase proved afterwards.  CT on 3/7/2023 revealed stable findings.  Liver enzymes are normal today except for the increase in the alkaline phosphatase to 155.  We will reevaluate on her follow-up CT scans.    *Hyponatremia.  Patient had problem with hyponatremia in the past.  Sodium is 127 on 3/14/2023.  She was advised to increase salt intake.  Sodium improved to 133 on repeat lab on 3/21/2023.  However, sodium decreased to 126 today.  This is likely secondary to her medications.    *Patient had a port.   Port was removed by her surgeon yesterday.  She restarted Eliquis this morning.  There is ecchymosis and small hematoma at the site of the port.    PLAN:    1.   Increase salt intake today and tomorrow.  After that, I recommended stopping her salt restriction.   2.  I recommended follow-up with her PCP with repeat labs to make sure her hyponatremia has improved.  We discussed symptoms of worsening of the hyponatremia of (fatigue,  confusion and sleepiness).    3.  Apply ice to the area of the port x15 minutes every 4 hours x3 days.   4.  Continue ferrous sulfate 325 mg 3 days a week.  5.  Obtain CT scan of the chest abdomen pelvis in 3 months.  We will see her in follow-up 1 week after with CBC CMP LDH ferritin and iron panel.     I spent 44 minutes caring for Michelle on this date of service. This time includes time spent by me in the following activities: preparing for the visit, reviewing tests, obtaining and/or reviewing a separately obtained history, performing a medically appropriate examination and/or evaluation, counseling and educating the patient/family/caregiver, ordering medications, tests, or procedures, documenting information in the medical record, independently interpreting results and communicating that information with the patient/family/caregiver, and care coordination       Renate Galo MD  06/06/23

## 2023-06-15 RX ORDER — APIXABAN 5 MG/1
TABLET, FILM COATED ORAL
Qty: 180 TABLET | Refills: 1 | Status: SHIPPED | OUTPATIENT
Start: 2023-06-15

## 2023-06-15 RX ORDER — SPIRONOLACTONE 25 MG/1
25 TABLET ORAL DAILY
Qty: 90 TABLET | Refills: 1 | Status: SHIPPED | OUTPATIENT
Start: 2023-06-15

## 2023-06-15 RX ORDER — CARVEDILOL 3.12 MG/1
TABLET ORAL
Qty: 180 TABLET | Refills: 1 | Status: SHIPPED | OUTPATIENT
Start: 2023-06-15

## 2023-06-19 ENCOUNTER — PATIENT OUTREACH (OUTPATIENT)
Dept: CASE MANAGEMENT | Facility: OTHER | Age: 83
End: 2023-06-19
Payer: MEDICARE

## 2023-06-19 NOTE — OUTREACH NOTE
Patient Outreach    AMBULATORY CASE MANAGEMENT NOTE    Name and Relationship of Patient/Support Person: Michelle Car - Self    Call placed ot patient to assist with appointment if needed. She has made a follow up with Dr Pimentel and will be going to his new office with [facility]. Denies needs at this time.         Nilsa ALMENDAREZ  Ambulatory Case Management    6/19/2023, 10:14 EDT

## 2023-07-25 RX ORDER — FERROUS SULFATE 325(65) MG
TABLET ORAL
Qty: 36 TABLET | Refills: 0 | Status: SHIPPED | OUTPATIENT
Start: 2023-07-25

## 2023-08-02 ENCOUNTER — TELEPHONE (OUTPATIENT)
Dept: CARDIOLOGY | Facility: CLINIC | Age: 83
End: 2023-08-02

## 2023-08-02 NOTE — TELEPHONE ENCOUNTER
Caller: Michelle Car    Relationship: Self    Best call back number:  [phone]        PATIENT STATED SHE GOT A VM 8.1.23 FROM RAVI

## 2023-08-07 ENCOUNTER — OFFICE VISIT (OUTPATIENT)
Dept: SURGERY | Facility: CLINIC | Age: 83
End: 2023-08-07
Payer: MEDICARE

## 2023-08-07 VITALS
DIASTOLIC BLOOD PRESSURE: 78 MMHG | BODY MASS INDEX: 29.84 KG/M2 | SYSTOLIC BLOOD PRESSURE: 135 MMHG | WEIGHT: 152 LBS | HEIGHT: 60 IN

## 2023-08-07 DIAGNOSIS — S20.212S: ICD-10-CM

## 2023-08-07 DIAGNOSIS — T14.8XXA HEMATOMA: Primary | ICD-10-CM

## 2023-08-07 PROCEDURE — 3075F SYST BP GE 130 - 139MM HG: CPT | Performed by: SURGERY

## 2023-08-07 PROCEDURE — 3078F DIAST BP <80 MM HG: CPT | Performed by: SURGERY

## 2023-08-07 PROCEDURE — 99212 OFFICE O/P EST SF 10 MIN: CPT | Performed by: SURGERY

## 2023-08-07 PROCEDURE — 1159F MED LIST DOCD IN RCRD: CPT | Performed by: SURGERY

## 2023-08-07 PROCEDURE — 1160F RVW MEDS BY RX/DR IN RCRD: CPT | Performed by: SURGERY

## 2023-08-07 NOTE — PROGRESS NOTES
Follow-up removal of port    Subjective:  This nice lady underwent the removal of her [id number] in June of this year.  She was followed up in the office and seen by my partner in my absence with finding of a hematoma at the site.  She is chronically on Eliquis.  At the time the hematoma was larger and somewhat painful for the patient, estimated by Dr. Reyes at about 6 cm.  The patient states that in the last month it has shrunk, she really has no tenderness and no overlying skin change.    Objective:  BMI 29.7  General: Awake and alert without distress  Chest: On the left upper chest at the site of her previous port there is an area of probable hematoma, estimated at about 3 to 4 cm in diameter.  There is no tenderness and no overlying skin change.    Assessment and plan:  -Lymphoma, currently doing well, status post port removal  -History of atrial fibrillation on Eliquis, complicating above  -Hematoma of the chest wall appears to be clinically improving  -I do not think that any active intervention is likely to be beneficial at this time  -She may follow-up here as needed for any worsening of the issue    Alin Delgado MD  General and Endoscopic Surgery  [facility]    [address], [address]  [place], [place], [address]  P: [phone]  F: [phone]

## 2023-08-24 ENCOUNTER — HOSPITAL ENCOUNTER (OUTPATIENT)
Dept: CT IMAGING | Facility: HOSPITAL | Age: 83
Discharge: HOME | End: 2023-08-24
Admitting: INTERNAL MEDICINE
Payer: MEDICARE

## 2023-08-24 DIAGNOSIS — C83.38 DIFFUSE LARGE B-CELL LYMPHOMA OF LYMPH NODES OF MULTIPLE REGIONS: ICD-10-CM

## 2023-08-24 LAB — CREAT BLDA-MCNC: 0.9 MG/DL (ref 0.6–1.3)

## 2023-08-24 PROCEDURE — 71260 CT THORAX DX C+: CPT

## 2023-08-24 PROCEDURE — 74177 CT ABD & PELVIS W/CONTRAST: CPT

## 2023-08-24 PROCEDURE — 82565 ASSAY OF CREATININE: CPT

## 2023-08-24 PROCEDURE — 25510000001 IOPAMIDOL 61 % SOLUTION: Performed by: INTERNAL MEDICINE

## 2023-08-24 PROCEDURE — 0 DIATRIZOATE MEGLUMINE & SODIUM PER 1 ML: Performed by: INTERNAL MEDICINE

## 2023-08-24 RX ADMIN — IOPAMIDOL 95 ML: 612 INJECTION, SOLUTION INTRAVENOUS at 12:33

## 2023-08-24 RX ADMIN — DIATRIZOATE MEGLUMINE AND DIATRIZOATE SODIUM 30 ML: 600; 100 SOLUTION ORAL; RECTAL at 12:33

## 2023-08-29 ENCOUNTER — OFFICE VISIT (OUTPATIENT)
Dept: ONCOLOGY | Facility: CLINIC | Age: 83
End: 2023-08-29
Payer: MEDICARE

## 2023-08-29 ENCOUNTER — LAB (OUTPATIENT)
Dept: LAB | Facility: HOSPITAL | Age: 83
End: 2023-08-29
Payer: MEDICARE

## 2023-08-29 VITALS
SYSTOLIC BLOOD PRESSURE: 137 MMHG | RESPIRATION RATE: 16 BRPM | HEART RATE: 65 BPM | WEIGHT: 153 LBS | DIASTOLIC BLOOD PRESSURE: 90 MMHG | TEMPERATURE: 97.3 F | BODY MASS INDEX: 30.04 KG/M2 | HEIGHT: 60 IN | OXYGEN SATURATION: 98 %

## 2023-08-29 DIAGNOSIS — S22.080A COMPRESSION FRACTURE OF T12 VERTEBRA, INITIAL ENCOUNTER: ICD-10-CM

## 2023-08-29 DIAGNOSIS — T14.8XXA HEMATOMA: ICD-10-CM

## 2023-08-29 DIAGNOSIS — J90 BILATERAL PLEURAL EFFUSION: ICD-10-CM

## 2023-08-29 DIAGNOSIS — K80.50 COMMON BILE DUCT STONE: ICD-10-CM

## 2023-08-29 DIAGNOSIS — E87.1 HYPONATREMIA: ICD-10-CM

## 2023-08-29 DIAGNOSIS — D50.9 IRON DEFICIENCY ANEMIA, UNSPECIFIED IRON DEFICIENCY ANEMIA TYPE: ICD-10-CM

## 2023-08-29 DIAGNOSIS — C83.38 DIFFUSE LARGE B-CELL LYMPHOMA OF LYMPH NODES OF MULTIPLE REGIONS: Primary | ICD-10-CM

## 2023-08-29 DIAGNOSIS — R91.8 PULMONARY NODULES: ICD-10-CM

## 2023-08-29 DIAGNOSIS — C83.38 DIFFUSE LARGE B-CELL LYMPHOMA OF LYMPH NODES OF MULTIPLE REGIONS: ICD-10-CM

## 2023-08-29 DIAGNOSIS — J18.9 LUNG INFECTION: ICD-10-CM

## 2023-08-29 LAB
ALBUMIN SERPL-MCNC: 4 G/DL (ref 3.5–5.2)
ALBUMIN/GLOB SERPL: 1.6 G/DL
ALP SERPL-CCNC: 147 U/L (ref 39–117)
ALT SERPL W P-5'-P-CCNC: 19 U/L (ref 1–33)
ANION GAP SERPL CALCULATED.3IONS-SCNC: 11.4 MMOL/L (ref 5–15)
AST SERPL-CCNC: 27 U/L (ref 1–32)
BASOPHILS # BLD AUTO: 0.05 10*3/MM3 (ref 0–0.2)
BASOPHILS NFR BLD AUTO: 1.3 % (ref 0–1.5)
BILIRUB SERPL-MCNC: 0.4 MG/DL (ref 0–1.2)
BUN SERPL-MCNC: 15 MG/DL (ref 8–23)
BUN/CREAT SERPL: 16.7 (ref 7–25)
CALCIUM SPEC-SCNC: 9.3 MG/DL (ref 8.6–10.5)
CHLORIDE SERPL-SCNC: 96 MMOL/L (ref 98–107)
CO2 SERPL-SCNC: 24.6 MMOL/L (ref 22–29)
CREAT SERPL-MCNC: 0.9 MG/DL (ref 0.6–1.1)
DEPRECATED RDW RBC AUTO: 48.6 FL (ref 37–54)
EGFRCR SERPLBLD CKD-EPI 2021: 64.4 ML/MIN/1.73
EOSINOPHIL # BLD AUTO: 0.03 10*3/MM3 (ref 0–0.4)
EOSINOPHIL NFR BLD AUTO: 0.8 % (ref 0.3–6.2)
ERYTHROCYTE [DISTWIDTH] IN BLOOD BY AUTOMATED COUNT: 14.2 % (ref 12.3–15.4)
FERRITIN SERPL-MCNC: 79.7 NG/ML (ref 13–150)
GLOBULIN UR ELPH-MCNC: 2.5 GM/DL
GLUCOSE SERPL-MCNC: 91 MG/DL (ref 65–99)
HCT VFR BLD AUTO: 36.3 % (ref 34–46.6)
HGB BLD-MCNC: 12.1 G/DL (ref 12–15.9)
IMM GRANULOCYTES # BLD AUTO: 0 10*3/MM3 (ref 0–0.05)
IMM GRANULOCYTES NFR BLD AUTO: 0 % (ref 0–0.5)
IRON 24H UR-MRATE: 84 MCG/DL (ref 37–145)
IRON SATN MFR SERPL: 20 % (ref 20–50)
LDH SERPL-CCNC: 206 U/L (ref 135–214)
LYMPHOCYTES # BLD AUTO: 1.15 10*3/MM3 (ref 0.7–3.1)
LYMPHOCYTES NFR BLD AUTO: 30.7 % (ref 19.6–45.3)
MCH RBC QN AUTO: 31.2 PG (ref 26.6–33)
MCHC RBC AUTO-ENTMCNC: 33.3 G/DL (ref 31.5–35.7)
MCV RBC AUTO: 93.6 FL (ref 79–97)
MONOCYTES # BLD AUTO: 0.39 10*3/MM3 (ref 0.1–0.9)
MONOCYTES NFR BLD AUTO: 10.4 % (ref 5–12)
NEUTROPHILS NFR BLD AUTO: 2.12 10*3/MM3 (ref 1.7–7)
NEUTROPHILS NFR BLD AUTO: 56.8 % (ref 42.7–76)
NRBC BLD AUTO-RTO: 0 /100 WBC (ref 0–0.2)
PLATELET # BLD AUTO: 210 10*3/MM3 (ref 140–450)
PMV BLD AUTO: 8.6 FL (ref 6–12)
POTASSIUM SERPL-SCNC: 4.8 MMOL/L (ref 3.5–5.2)
PROT SERPL-MCNC: 6.5 G/DL (ref 6–8.5)
RBC # BLD AUTO: 3.88 10*6/MM3 (ref 3.77–5.28)
SODIUM SERPL-SCNC: 132 MMOL/L (ref 136–145)
TIBC SERPL-MCNC: 414 MCG/DL (ref 298–536)
TRANSFERRIN SERPL-MCNC: 296 MG/DL (ref 200–360)
WBC NRBC COR # BLD: 3.74 10*3/MM3 (ref 3.4–10.8)

## 2023-08-29 PROCEDURE — 83615 LACTATE (LD) (LDH) ENZYME: CPT

## 2023-08-29 PROCEDURE — 36415 COLL VENOUS BLD VENIPUNCTURE: CPT

## 2023-08-29 PROCEDURE — 85025 COMPLETE CBC W/AUTO DIFF WBC: CPT

## 2023-08-29 PROCEDURE — 83540 ASSAY OF IRON: CPT

## 2023-08-29 PROCEDURE — 84466 ASSAY OF TRANSFERRIN: CPT

## 2023-08-29 PROCEDURE — 80053 COMPREHEN METABOLIC PANEL: CPT

## 2023-08-29 PROCEDURE — 82728 ASSAY OF FERRITIN: CPT

## 2023-08-29 RX ORDER — AMOXICILLIN AND CLAVULANATE POTASSIUM 875; 125 MG/1; MG/1
1 TABLET, FILM COATED ORAL EVERY 12 HOURS SCHEDULED
Qty: 14 TABLET | Refills: 0 | Status: SHIPPED | OUTPATIENT
Start: 2023-08-29 | End: 2023-09-05

## 2023-08-29 NOTE — PROGRESS NOTES
Subjective     CHIEF COMPLAINT:      Chief Complaint   Patient presents with    Follow-up     Discuss CT Scan      HISTORY OF PRESENT ILLNESS:     Michelle Car is a 82 y.o. female patient who returns today for follow up on her lymphoma.  She returns today for follow-up reporting that she recently developed more congestion.  She has been coughing more and noticed that her voice has become somewhat hoarse.  No fever or chills.  She has intermittent discomfort in the epigastric area of her abdomen.  She has been taking Mucinex with some improvement.    ROS:  Pertinent ROS is in the HPI.     Past medical, surgical, social and family history were reviewed.     MEDICATIONS:    Current Outpatient Medications:     acetaminophen (TYLENOL) 500 MG tablet, Take 1 tablet by mouth Every 6 (Six) Hours As Needed for Mild Pain., Disp: , Rfl:     amiodarone (PACERONE) 200 MG tablet, Take 1 tablet by mouth Daily., Disp: 30 tablet, Rfl: 5    Calcium Carbonate (CALTRATE 600 PO), Take 1 tablet by mouth Daily., Disp: , Rfl:     carvedilol (COREG) 3.125 MG tablet, TAKE 1 TABLET BY MOUTH TWICE DAILY WITH MEALS, Disp: 180 tablet, Rfl: 1    Eliquis 5 MG tablet tablet, TAKE 1 TABLET BY MOUTH EVERY 12 HOURS FOR ATRIAL FIBRILLATION, Disp: 180 tablet, Rfl: 1    FeroSul 325 (65 Fe) MG tablet, TAKE 1 TABLET BY MOUTH 3 TIMES A WEEK, Disp: 36 tablet, Rfl: 0    Hydrocortisone, Perianal, (Anusol-HC) 2.5 % rectal cream, Apply to hemorrhoids 3 times daily for 7 days during hemorrhoid flare. Include applicator., Disp: 30 g, Rfl: 1    losartan (COZAAR) 100 MG tablet, , Disp: , Rfl:     Multiple Vitamins-Minerals (CENTRUM SILVER) tablet, Take 1 tablet by mouth Daily., Disp: , Rfl:     polyethylene glycol (MIRALAX) 17 GM/SCOOP powder, Take 17 g by mouth As Needed., Disp: , Rfl:     pravastatin (PRAVACHOL) 40 MG tablet, TAKE 1 TABLET BY MOUTH EVERY NIGHT AT BEDTIME, Disp: 90 tablet, Rfl: 1    spironolactone (ALDACTONE) 25 MG tablet, TAKE 1 TABLET BY  "MOUTH DAILY, Disp: 90 tablet, Rfl: 1    traMADol (ULTRAM) 50 MG tablet, Take 1 tablet by mouth Every 6 (Six) Hours As Needed for Moderate Pain., Disp: 12 tablet, Rfl: 0  Objective     VITAL SIGNS:     Vitals:    08/29/23 1608   BP: 137/90   Pulse: 65   Resp: 16   Temp: 97.3 øF (36.3 øC)   TempSrc: Temporal   SpO2: 98%   Weight: 69.4 kg (153 lb)   Height: 152.4 cm (60\")   PainSc: 0-No pain     Body mass index is 29.88 kg/mý.     Wt Readings from Last 5 Encounters:   08/29/23 69.4 kg (153 lb)   08/07/23 68.9 kg (152 lb)   07/06/23 68.7 kg (151 lb 6.4 oz)   06/06/23 68 kg (150 lb)   05/15/23 68.9 kg (152 lb)     PHYSICAL EXAMINATION:   GENERAL: The patient appears in good general condition, not in acute distress.   SKIN: No ecchymosis.  EYES: No jaundice. No pallor.  LYMPHATICS: No cervical lymphadenopathy.  CHEST: Normal respiratory effort.  Lungs clear bilaterally.  No added sounds.  CVS: Normal S1-S2. Grade 2 systolic murmur.  ABDOMEN: Soft.  Epigastric tenderness. No Hepatomegaly. No Splenomegaly. No masses.  EXTREMITIES: Chronic bilateral leg edema.    DIAGNOSTIC DATA:     Results from last 7 days   Lab Units 08/29/23  1535   WBC 10*3/mm3 3.74   NEUTROS ABS 10*3/mm3 2.12   HEMOGLOBIN g/dL 12.1   HEMATOCRIT % 36.3   PLATELETS 10*3/mm3 210     Results from last 7 days   Lab Units 08/29/23  1535 08/24/23  1208   SODIUM mmol/L 132*  --    POTASSIUM mmol/L 4.8  --    CHLORIDE mmol/L 96*  --    CO2 mmol/L 24.6  --    BUN mg/dL 15  --    CREATININE mg/dL 0.90 0.90   CALCIUM mg/dL 9.3  --    ALBUMIN g/dL 4.0  --    BILIRUBIN mg/dL 0.4  --    ALK PHOS U/L 147*  --    ALT (SGPT) U/L 19  --    AST (SGOT) U/L 27  --    GLUCOSE mg/dL 91  --          Lab 08/29/23  1535   IRON 84   IRON SATURATION (TSAT) 20   TIBC 414   TRANSFERRIN 296   FERRITIN 79.70      Component      Latest Ref Rng 6/6/2023 8/29/2023   LDH      135 - 214 U/L 227  206      CT chest abdomen pelvis on 8/24/2023:  1. New 13 x 9 mm nodular focus of consolidation " has developed within the  right upper lobe. This could be infectious/inflammatory or neoplastic.  Lungs otherwise clear. No thoracic adenopathy.     2. No abdominal or pelvic adenopathy. Spleen is normal in size.     3. Pneumobilia without biliary duct dilatation, debris within the CBD  seen on the previous examination has diminished with a suggestion of  nominal residual at this time.     4. New T12 compression deformity. No lytic or sclerotic lesion noted at  this location.    Assessment & Plan    *Diffuse large B-cell lymphoma.     Patient was found to have bilateral pleural effusions.    She underwent right thoracentesis on 2/21/2022 and the left thoracentesis on 2/22/2022.    Analysis of the fluid revealed involvement with diffuse large B-cell lymphoma.    FISH analysis for BCL6 rearrangement was positive.    FISH analysis for BCL 1, BCL-2 and MYC rearrangement was negative.    CT chest on 2/17/2022 revealed no hilar or mediastinal lymphadenopathy.    PET scan on 3/10/2022 revealed significant hypermetabolism in the left adrenal gland and the surrounding soft tissue with SUV up to 34.5. Hypermetabolism in the left pleural thickening with SUV of 7.1. there was less hypermetabolism in the right adrenal gland and in the right pleura.  She was considered to have stage III non-bulky disease.  The degree of hypermetabolism is consistent with high grade lymphoma.   This concurred with the pathology exam of the pleural fluid cytology that revealed diffuse large B-cell lymphoma.  R-IPI score of 3 associated with poor risk - associated with overall survival of 55%.   R-CHOP with Neulasta support was started on 3/28/2022.  Patient developed neutropenia and thrombocytopenia after cycle#1.  Vincristine dose was reduced to 1 mg due to constipation.   Patient developed neutropenic fever after cycle #2 and was hospitalized.  Her performance status decreased as a result.   CT scan on 5/1/2022 revealed a decrease in the right  pleural effusion. The left suprarenal density was stable.  Regimen was changed to mini-RCHOP starting cycle #3 on 5/16/2022.  CT scans on 6/23/2022 showed evidence of response.  There was resolution of the pericardial effusion and near complete resolution of the pleural effusions.   Patient received cycle #6 on 7/25/2022.  PET scan on 8/15/2022 revealed complete metabolic response.  CT on 11/7/2022 revealed no evidence of recurrence of the lymphoma.  CT on 3/7/2023 revealed no evidence of recurrence of the lymphoma.  CT on 8/24/2023 showed no evidence of recurrence of the lymphoma.  I reassured the patient about the results.    *Bilateral pleural effusions.   She is s/p right thoracentesis on 2/21/2022 and the effusion was found to be malignant attributed to the lymphoma.   CT scan on 5/1/2022 revealed decrease in the right pleural effusion and an increase in the left effusion.   The increase in the left effusion was suspected of being due to cardiac and fluid overload states (fluid appeared to be serous on CT scan).  CT on 6/23/2022 revealed near resolution of the pleural effusions.  PET scan on 8/15/2022 showed that the pleural effusions were trace and improved since June 2022.  CT chest on 11/7/2022 revealed minimal partially loculated right pleural effusion.  CT chest on 3/7/2023 showed no recurrence of the effusion.  CT on 8/24/2023 showed no evidence of recurrence of the pleural effusions.    *Iron deficiency anemia.  Patient also developed anemia secondary to lymphoma and secondary to chemotherapy.    Hemoglobin was 11.1 on 3/28/2022.  Hgb decreased to 7.9 on 4/25/2022 and she was transfused.   Hemoglobin decreased to 6.3 on 4/27/2022. She was transfused.  Hemoglobin improved to 8.8 on 5/16/2022.  Hemoglobin was 9.9 on 8/22/2022. Labs revealed iron deficiency.  She was started on ferrous sulfate 325 mg every other day.  Hemoglobin was 7.9 on 11/7/2022.  She was found to have a positive stool Hemoccult on  stool test done by her PCP.  Hemoglobin improved to 11.7 on 1/30/2023.   Ferritin was 49 and transferrin saturation was 20%.  She was continued on ferrous sulfate 325 mg daily.  Hemoglobin improved to 11.8 on 3/14/2023.  Ferrous sulfate was reduced to 3 days a week.  8/29/2023: Hemoglobin 12.1.  Ferritin 79.7.  Transferrin saturation 20%.  She is tolerating the ferrous sulfate.    *Choledocholithiasis with increase in the intra and extrahepatic biliary dilatation seen on CT on 6/23/2022.  Liver enzymes including bilirubin are normal today.   She is not having abdominal pain.  No redness no tenderness on exam of the right upper quadrant.  PET scan on 8/15/2022 showed persistence of the findings.  No symptoms or signs of infection.  Patient was seen by Dr. Ortega.  She underwent ERCP on 10/21/2022.  There was a calculus of the bile duct without cholecystitis and without obstruction.  She had sphincterotomy and balloon sweep.  Alkaline phosphatase proved afterwards.  CT on 3/7/2023 revealed stable findings.  CT scan on 8/24/2023 showed chronic biliary duct dilatation.    Alk phos stable at 147.    *Hyponatremia.  Patient had problem with hyponatremia in the past.  Sodium is 127 on 3/14/2023.  She was advised to increase salt intake.  Sodium improved to 133 on repeat lab on 3/21/2023.  However, sodium decreased to 126 on 6/7/2023.  This is likely secondary to her medications.  Sodium improved to 132.    *13 x 9 mm nodular focus of consolidation in the right upper lobe.  Patient is reporting cough and congestion.  This is concerning for evolving pneumonia.  An underlying malignancy could not be excluded.  I recommended repeating CT scan in 3 months.     *A new T12 compression deformity.  Patient is not complaining of pain in the area.  We will obtain a bone density to evaluate for osteopenia/osteoporosis.    PLAN:    1.  Augmentin 875 mg twice daily x 7 days.  2.  Ok to take Mucinex DM.  3.  Continue Ferrous Sulfate 3  days a week.   4.  We will obtain follow up CT scans of the chest abdomen pelvis in 3 months.  We will see her in follow-up 1 week after with CBC CMP LDH ferritin and iron panel.         Renate Galo MD  08/29/23

## 2023-08-30 ENCOUNTER — TELEPHONE (OUTPATIENT)
Dept: ONCOLOGY | Facility: CLINIC | Age: 83
End: 2023-08-30
Payer: MEDICARE

## 2023-08-30 DIAGNOSIS — S22.080A COMPRESSION FRACTURE OF T12 VERTEBRA, INITIAL ENCOUNTER: Primary | ICD-10-CM

## 2023-08-30 NOTE — TELEPHONE ENCOUNTER
Reviewed Dr. Galo's note and instructions with pt, she v/u. She is not having pain, but does have tenderness at her mid back from time to time. We reviewed her upcoming CT scan and follow up.

## 2023-08-30 NOTE — TELEPHONE ENCOUNTER
----- Message from Renate Galo MD sent at 8/29/2023  5:14 PM EDT -----  Please inform the patient that the CT scan also showed compression on the 12th thoracic vertebra that was new.  Please check if she is having pain in the area-she did not report this during the visit.    I recommend obtaining a bone density to evaluate for possible osteoporosis as the cause.    Thank you

## 2023-09-11 RX ORDER — AMIODARONE HYDROCHLORIDE 200 MG/1
200 TABLET ORAL
Qty: 30 TABLET | Refills: 5 | Status: SHIPPED | OUTPATIENT
Start: 2023-09-11

## 2023-09-14 ENCOUNTER — HOSPITAL ENCOUNTER (OUTPATIENT)
Dept: BONE DENSITY | Facility: HOSPITAL | Age: 83
Discharge: HOME | End: 2023-09-14
Admitting: INTERNAL MEDICINE
Payer: MEDICARE

## 2023-09-14 DIAGNOSIS — S22.080A COMPRESSION FRACTURE OF T12 VERTEBRA, INITIAL ENCOUNTER: ICD-10-CM

## 2023-09-14 PROCEDURE — 77080 DXA BONE DENSITY AXIAL: CPT

## 2023-09-20 ENCOUNTER — TELEPHONE (OUTPATIENT)
Dept: ONCOLOGY | Facility: CLINIC | Age: 83
End: 2023-09-20
Payer: MEDICARE

## 2023-09-20 NOTE — TELEPHONE ENCOUNTER
----- Message from Renate Galo MD sent at 9/19/2023 10:32 PM EDT -----  Please inform the patient that her bone density revealed osteoporosis. I recommend starting Fosamax 70 mg weekly. Please make sure she does not have problem with her teeth and inform her that she will need to stay upright for 30 min after taking the medication.    Thank you

## 2023-09-22 ENCOUNTER — TELEPHONE (OUTPATIENT)
Dept: ONCOLOGY | Facility: CLINIC | Age: 83
End: 2023-09-22
Payer: MEDICARE

## 2023-09-22 NOTE — TELEPHONE ENCOUNTER
Asked patient to consult with Dr. Mason for clearance on Fosamax 70 mg once a week. She v/u and repeated it back to me. She will call us Wednesday morning to let us know.

## 2023-09-22 NOTE — TELEPHONE ENCOUNTER
----- Message from Renate Galo MD sent at 9/20/2023  4:01 PM EDT -----  Please ask her to check with the dentist and get the okay to start Fosamax.    Okay to take the calcium supplement.      ----- Message -----  From: Ellen Johnston RN  Sent: 9/20/2023   3:38 PM EDT  To: Renate Galo MD    Patient states she has a history of dental issues. In December 2022 she had all her top teeth removed and now has dentures. Currently she only has her bottom front teeth, no jaw teeth. She is wanting to keep them if she can. She sees her dentist Dr. Jm Mason on Tuesday for a check up. I did not send in the medication just in case. If she does start the Fosamax is she okay to continue the calcium supplement?    ----- Message -----  From: Renate Galo MD  Sent: 9/19/2023  10:34 PM EDT  To: Ellen Johnston, ALLAN    Please inform the patient that her bone density revealed osteoporosis. I recommend starting Fosamax 70 mg weekly. Please make sure she does not have problem with her teeth and inform her that she will need to stay upright for 30 min after taking the medication.    Thank you

## 2023-10-04 ENCOUNTER — TELEPHONE (OUTPATIENT)
Dept: ONCOLOGY | Facility: CLINIC | Age: 83
End: 2023-10-04
Payer: MEDICARE

## 2023-10-04 NOTE — TELEPHONE ENCOUNTER
Spoke with the patient who states that her dentist cleared her to take the Fosamax, but the patient is uneasy and apprehensive to take the medication. She had a jaw tooth pulled when she saw her dentist last week. She has issues with her teeth and has had a history of teeth being pulled. She did not know she was going to have a tooth pulled when she saw her dentist. I will review all of this with Dr. Galo to see what her next steps are. She v/u.

## 2023-10-06 NOTE — TELEPHONE ENCOUNTER
Spoke with patient to let her know that due to her recent jaw tooth being pulled Dr. Galo is recommending waiting 3 months before starting her on the Fosamax to decrease the chance of developing a problem with her jaw. She v/u to all.       ----- Message -----   From: Renate Galo MD   Sent: 10/5/2023  11:23 AM EDT   To: Kamla Cervantes RN     Since she had a tooth pulled, I recommend waiting for 3 months before starting Fosamax to decrease the chance of developing problem with the jaw.     Thank you

## 2023-10-23 ENCOUNTER — OFFICE VISIT (OUTPATIENT)
Dept: CARDIOLOGY | Facility: CLINIC | Age: 83
End: 2023-10-23
Payer: MEDICARE

## 2023-10-23 VITALS
BODY MASS INDEX: 29.84 KG/M2 | HEART RATE: 61 BPM | WEIGHT: 152 LBS | SYSTOLIC BLOOD PRESSURE: 130 MMHG | HEIGHT: 60 IN | DIASTOLIC BLOOD PRESSURE: 78 MMHG

## 2023-10-23 DIAGNOSIS — C83.38 DIFFUSE LARGE B-CELL LYMPHOMA OF LYMPH NODES OF MULTIPLE REGIONS: ICD-10-CM

## 2023-10-23 DIAGNOSIS — E78.3 HYPERCHYLOMICRONEMIA: ICD-10-CM

## 2023-10-23 DIAGNOSIS — I34.0 NONRHEUMATIC MITRAL VALVE REGURGITATION: ICD-10-CM

## 2023-10-23 DIAGNOSIS — N18.2 CRI (CHRONIC RENAL INSUFFICIENCY), STAGE 2 (MILD): ICD-10-CM

## 2023-10-23 DIAGNOSIS — I48.0 PAROXYSMAL ATRIAL FIBRILLATION WITH RAPID VENTRICULAR RESPONSE: ICD-10-CM

## 2023-10-23 DIAGNOSIS — I10 PRIMARY HYPERTENSION: Primary | ICD-10-CM

## 2023-10-23 DIAGNOSIS — I50.9 OTHER CONGESTIVE HEART FAILURE: ICD-10-CM

## 2023-10-23 RX ORDER — AMIODARONE HYDROCHLORIDE 100 MG/1
100 TABLET ORAL
Qty: 30 TABLET | Refills: 6 | Status: SHIPPED | OUTPATIENT
Start: 2023-10-23 | End: 2023-10-24 | Stop reason: SDUPTHER

## 2023-10-23 NOTE — PROGRESS NOTES
Date of Office Visit: 10/23/2023  Encounter Provider: Nasrin Nichole MD  Place of Service: [facility] CARDIOLOGY  Patient Name: Michelle Car  :1940    Chief complaint  Cardio oncology care/paroxysmal atrial fibrillation    History of Present Illness  Patient is a 83-year-old female with hypertension, hyperlipidemia, venous insufficiency who in  was seen for chest pain and possible strokelike symptoms.  A CT angiogram of her chest revealed moderate atheromatous disease of the abdominal aorta but no pulmonary emboli.  Echocardiogram revealed normal systolic function with mild diastolic dysfunction and mild valve regurgitation.  A stress perfusion study was negative for ischemia.  A carotid Doppler study was negative.  In 2022 she was noted to have atrial fibrillation and treated with Eliquis and amiodarone.  Echo at that time showed an ejection fraction 56% with mild left ventricular hypertrophy mild to moderate mitral vegetation, mild tricuspid regurgitation normal right-sided pressures.    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  R-CHOP therapy with Neulasta support was recommended.  However there was pharmacy literature self-reported interaction between amiodarone and Adriamycin with possible increased Adriamycin levels.  There is also potential interaction to carvedilol.  Patient was started on Adriamycin which is started on 3/28/2022.  Digoxin level was 1.1 on 2022 after much discussion regarding interaction of Adriamycin and amiodarone we decided to discontinue amiodarone and continue at that time with possible transition to bisoprolol given potential cross reaction to amiodarone with limited literature available regarding this.  However patient was admitted on 2022 with neutropenia, fever, E. coli bacteremia and atrial fibrillation with rapid rates.  Amiodarone was  reinstituted digoxin losartan and spironolactone discontinued.    Since last visit patient is active doing yard work denies any palpitation shortness of breath dizziness or chest pain.  Just mild ankle edema.  She remains in sinus rhythm and is taking 200 mg of amiodarone.  She does note that she has had some gait instability has not fallen but does feel unstable on her feet been working out in the yard.  She is using a cane on occasion.  Has had mild edema of her ankles.  Blood pressure has been as it is today's but she can tell      Past Medical History:   Diagnosis Date    Abdominal aortic atherosclerosis     Adrenal nodule 03/2022    Allergic rhinitis     Anemia due to chemotherapy     Bacteremia due to Escherichia coli 04/26/2022    ADMITTED TO [facility]    Bug bite 10/24/2015    WITH CELLULITIS, LEFT LEG    CAD (coronary artery disease)     Calculus of bile duct without cholangitis or cholecystitis 09/2022    Chemotherapy induced neutropenia     Chronic anticoagulation     Chronic diastolic (congestive) heart failure     Colon polyps     CRI (chronic renal insufficiency), stage 2 (mild) 2021    Diffuse large B cell lymphoma 02/2022    MULTIPLE LYMPH NODE INVOLVEMENT, FOLLOWED BY DR. EJN PATTERSON    Drug induced constipation     Hearing loss     Hemorrhoids     History of blood transfusion 04/2022    History of chemotherapy 2022    FOLLOWED BY DR. JEN PATTERSON    Hypercalcemia 2016    resolved as of 2019    Hypercholesterolemia     Hyperkalemia 03/2022    Hypertension     Hyponatremia 02/17/2022    Leg swelling 10/2021    Mixed hyperlipidemia     Neutropenic fever 05/2022    Nonrheumatic mitral valve regurgitation 03/2022    PAF (paroxysmal atrial fibrillation)     FOLLOWED BY DR. YOSI LANGLEY    Pancytopenia     Pleural effusion, bilateral 06/2022    Pulmonary nodule     Seasonal allergies     Thrombocytopenia 08/2022    Venous insufficiency     Vitamin D deficiency      Past Surgical History:   Procedure Laterality  Date    CHOLECYSTECTOMY N/A 10/12/2011    LAPAROSCOPIC, DR. CHANCE KLINE AT [facility]    COLONOSCOPY N/A 2000    1 or 2 polyps, otherwise normal per patient    COLONOSCOPY W/ POLYPECTOMY N/A 01/24/2012    3 MM TUBULAR ADENOMA POLYP IN CECUM, DIVERTICULOSIS IN RECTO-SIGMOID, RESCOPE IN 3 YRS, DR. CHANCE KLINE AT [facility]    CYST REMOVAL Left 10/12/2011    LEFT SCALP, PATH: BENIGN ADNEXAL NEOPLASM FAVORING ECCRINE SPIRADENOMA, DR. CHANCE KLINE AT [facility]    ERCP N/A 10/21/2022    Procedure: ENDOSCOPIC RETROGRADE CHOLANGIOPANCREATOGRAPHY with sphincterotomy and balloon sweep;  Surgeon: Peyman Ortega MD;  Location: [facility] ENDOSCOPY;  Service: Gastroenterology;  Laterality: N/A;  PRE: Common Duct Stones  POST: Common Duct Stones    VENOUS ACCESS DEVICE (PORT) INSERTION Left 03/23/2022    Procedure: INSERTION VENOUS ACCESS DEVICE;  Surgeon: Alin Delgado MD;  [place] [facility] [place] [facility]  Service: General;  Laterality: Left;     Outpatient Medications Prior to Visit   Medication Sig Dispense Refill    acetaminophen (TYLENOL) 500 MG tablet Take 1 tablet by mouth Every 6 (Six) Hours As Needed for Mild Pain.      Calcium Carbonate (CALTRATE 600 PO) Take 1 tablet by mouth Daily.      carvedilol (COREG) 3.125 MG tablet TAKE 1 TABLET BY MOUTH TWICE DAILY WITH MEALS 180 tablet 1    Eliquis 5 MG tablet tablet TAKE 1 TABLET BY MOUTH EVERY 12 HOURS FOR ATRIAL FIBRILLATION 180 tablet 1    FeroSul 325 (65 Fe) MG tablet TAKE 1 TABLET BY MOUTH 3 TIMES A WEEK 36 tablet 0    Hydrocortisone, Perianal, (Anusol-HC) 2.5 % rectal cream Apply to hemorrhoids 3 times daily for 7 days during hemorrhoid flare. Include applicator. 30 g 1    losartan (COZAAR) 100 MG tablet       Multiple Vitamins-Minerals (CENTRUM SILVER) tablet Take 1 tablet by mouth Daily.      polyethylene glycol (MIRALAX) 17 GM/SCOOP powder Take 17 g by mouth As Needed.      pravastatin (PRAVACHOL) 40 MG tablet TAKE 1 TABLET BY MOUTH EVERY NIGHT AT BEDTIME 90 tablet 1    spironolactone  "(ALDACTONE) 25 MG tablet TAKE 1 TABLET BY MOUTH DAILY 90 tablet 1    traMADol (ULTRAM) 50 MG tablet Take 1 tablet by mouth Every 6 (Six) Hours As Needed for Moderate Pain. 12 tablet 0    amiodarone (PACERONE) 200 MG tablet TAKE 1 TABLET BY MOUTH DAILY 30 tablet 5     No facility-administered medications prior to visit.       Allergies as of 10/23/2023 - Reviewed 10/23/2023   Allergen Reaction Noted    Factive [gemifloxacin] Rash 03/30/2016     Social History     Socioeconomic History    Marital status:    Tobacco Use    Smoking status: Never    Smokeless tobacco: Never   Vaping Use    Vaping Use: Never used   Substance and Sexual Activity    Alcohol use: No    Drug use: No    Sexual activity: Not Currently     Birth control/protection: Post-menopausal     Family History   Problem Relation Age of Onset    Malig Hyperthermia Neg Hx      Review of Systems   Constitutional: Negative for chills, fever, weight gain and weight loss.   Cardiovascular:  Negative for leg swelling.   Respiratory:  Negative for cough, snoring and wheezing.    Hematologic/Lymphatic: Negative for bleeding problem. Does not bruise/bleed easily.   Skin:  Negative for color change.   Musculoskeletal:  Negative for falls, joint pain and myalgias.   Gastrointestinal:  Negative for melena.   Genitourinary:  Negative for hematuria.   Neurological:  Negative for excessive daytime sleepiness.   Psychiatric/Behavioral:  Negative for depression. The patient is not nervous/anxious.         Objective:     Vitals:    10/23/23 1301   BP: 130/78   Pulse: 61   Weight: 68.9 kg (152 lb)   Height: 152.4 cm (60\")     Body mass index is 29.69 kg/m².    Vitals reviewed.   Constitutional:       Appearance: Well-developed.   Eyes:      General: No scleral icterus.        Right eye: No discharge.      Conjunctiva/sclera: Conjunctivae normal.      Pupils: Pupils are equal, round, and reactive to light.   HENT:      Head: Normocephalic.      Nose: Nose normal. "   Neck:      Thyroid: No thyromegaly.      Vascular: No JVD or JVR. JVD elevated.   Pulmonary:      Effort: Pulmonary effort is normal. No respiratory distress.      Breath sounds: Normal breath sounds. No wheezing. No rales.   Cardiovascular:      Normal rate. Regular rhythm. Normal S1. Normal S2.       Murmurs: There is a high frequency blowing holosystolic murmur at the apex.      No gallop.    Pulses:     Intact distal pulses.      Carotid: 2+ bilaterally.     Radial: 2+ bilaterally.     Femoral: 2+ bilaterally.     Popliteal: 2+ bilaterally.     Dorsalis pedis: 2+ bilaterally.     Posterior tibial: 2+ bilaterally.  Edema:     Peripheral edema absent.   Abdominal:      General: Bowel sounds are normal. There is no distension.      Palpations: Abdomen is soft.      Tenderness: There is no abdominal tenderness. There is no rebound.   Musculoskeletal: Normal range of motion.         General: No tenderness.      Cervical back: Normal range of motion and neck supple. Skin:     General: Skin is warm and dry.      Findings: No erythema or rash.   Neurological:      Mental Status: Alert and oriented to person, place, and time.   Psychiatric:         Behavior: Behavior normal.         Thought Content: Thought content normal.         Judgment: Judgment normal.       Lab Review:   Lab Results - Last 18 Months   Lab Units 08/29/23  1535 06/06/23  1600   WBC 10*3/mm3 3.74 4.89   RBC 10*6/mm3 3.88 3.93   HEMOGLOBIN g/dL 12.1 12.3   HEMATOCRIT % 36.3 35.3   MCV fL 93.6 89.8   MCH pg 31.2 31.3   MCHC g/dL 33.3 34.8   RDW % 14.2 13.2   PLATELETS 10*3/mm3 210 240   NEUTROPHIL % % 56.8 65.0   LYMPHOCYTE % % 30.7 26.0   MONOCYTES % % 10.4 7.6   EOSINOPHIL % % 0.8 0.6   BASOPHIL % % 1.3 0.6   NEUTROS ABS 10*3/mm3 2.12 3.18   LYMPHS ABS 10*3/mm3 1.15 1.27   MONOS ABS 10*3/mm3 0.39 0.37   EOS ABS 10*3/mm3 0.03 0.03   BASOS ABS 10*3/mm3 0.05 0.03   RDW-SD fl 48.6 43.8   MPV fL 8.6 8.3       Lab Results - Last 18 Months   Lab Units  08/29/23  1535 08/24/23  1208 06/06/23  1600   GLUCOSE mg/dL 91  --  97   BUN mg/dL 15  --  19   CREATININE mg/dL 0.90 0.90 0.74   SODIUM mmol/L 132*  --  126*   POTASSIUM mmol/L 4.8  --  4.6   CHLORIDE mmol/L 96*  --  88*   CO2 mmol/L 24.6  --  25.0   CALCIUM mg/dL 9.3  --  9.8   TOTAL PROTEIN g/dL 6.5  --  7.2   ALBUMIN g/dL 4.0  --  4.1   ALT (SGPT) U/L 19  --  16   AST (SGOT) U/L 27  --  23   ALK PHOS U/L 147*  --  155*   BILIRUBIN mg/dL 0.4  --  0.6   GLOBULIN gm/dL 2.5  --  3.1   A/G RATIO g/dL 1.6  --  1.3   BUN / CREAT RATIO  16.7  --  25.7   ANION GAP mmol/L 11.4  --  13.0   EGFR mL/min/1.73 64.4  --  81.4       Lab Results - Last 18 Months   Lab Units 05/15/23  1235 05/15/23  1024   HSTROP T ng/L 30* 28*     Lab Results - Last 18 Months   Lab Units 10/27/23  1548 03/21/23  1452   TSH mIU/L 2.83 3.960         ECG 12 Lead    Date/Time: 10/23/2023 1:07 PM  Performed by: Nasrin Nichole MD    Authorized by: Nasrin Nichole MD  Comparison: compared with previous ECG   Similar to previous ECG  Rhythm: sinus rhythm  Conduction: right bundle branch block and 1st degree AV block    Clinical impression: abnormal EKG            Diagnosis Plan   1. Primary hypertension  ECG 12 Lead    Adult Transthoracic Echo Complete W/ Cont if Necessary Per Protocol    proBNP      2. Nonrheumatic mitral valve regurgitation  ECG 12 Lead    Adult Transthoracic Echo Complete W/ Cont if Necessary Per Protocol    proBNP    Basic Metabolic Panel      3. Other congestive heart failure  ECG 12 Lead    proBNP      4. Hyperchylomicronemia        5. Paroxysmal atrial fibrillation with rapid ventricular response        6. CRI (chronic renal insufficiency), stage 2 (mild)        7. Diffuse large B-cell lymphoma of lymph nodes of multiple regions          Plan:       1.  Large B-cell lymphoma, receiving R-CHOP and mini CHOP therapy all completed by 7/2022 with a total dose exposure 200 mg/m2 of doxorubicin.  No evidence of recurrent lymphoma.  2.   Cardio oncology care, as above  3.  Murmur.  Most consistent with severe mitral valve regurgitation.  Her echo 8/2022 showed moderate regurgitation.  We will check an echocardiogram and hopefully she has not developed worsening LV systolic dysfunction along with worsening mitral regurgitation.  4.  Paroxysmal atrial fibrillation.  As above.  Continue with Coreg, amiodarone and Eliquis.  I am concerned that her gait imbalance may in part be due to amiodarone.  We discussed decreasing amiodarone and will do so after echo results are available.  If there is significant mild regurgitation or LV dysfunction, decreasing amiodarone may not be favorable if atrial arrhythmias recur especially tachycardia as she demonstrated previously when amiodarone was discontinued.  May also need further internal medicine evaluation for other etiologies of gait disturbance.  We will contact Dr. Pimentel regarding this.  She may also benefit from physical therapy.  5.  Hypertension, fair control.  6.  Hyperlipidemia  7.  Aortic atheromatous disease  8.  Frail state.  I have cautioned her to be very careful with her activity and to use a cane.  She seems somewhat hesitant to do so but hopefully will reconsider.      Time Spent: I spent 55 minutes caring for Michelle on this date of service. This time includes time spent by me in the following activities: preparing for the visit, reviewing tests, obtaining and/or reviewing a separately obtained history, performing a medically appropriate examination and/or evaluation, counseling and educating the patient/family/caregiver, ordering medications, tests, or procedures, documenting information in the medical record, and independently interpreting results and communicating that information with the patient/family/caregiver.   I spent 1 minutes on the separately reported service of ECG. This time is not included in the time used to support the E/M service also reported today.        Your medication  list            Accurate as of October 23, 2023 11:59 PM. If you have any questions, ask your nurse or doctor.                CONTINUE taking these medications        Instructions Last Dose Given Next Dose Due   acetaminophen 500 MG tablet  Commonly known as: TYLENOL      Take 1 tablet by mouth Every 6 (Six) Hours As Needed for Mild Pain.       amiodarone 200 MG tablet  Commonly known as: PACERONE      Take 1 tablet by mouth Daily.       CALTRATE 600 PO      Take 1 tablet by mouth Daily.       carvedilol 3.125 MG tablet  Commonly known as: COREG      TAKE 1 TABLET BY MOUTH TWICE DAILY WITH MEALS       Centrum Silver tablet  Generic drug: multivitamin with minerals      Take 1 tablet by mouth Daily.       Eliquis 5 MG tablet tablet  Generic drug: apixaban      TAKE 1 TABLET BY MOUTH EVERY 12 HOURS FOR ATRIAL FIBRILLATION       FeroSul 325 (65 Fe) MG tablet  Generic drug: ferrous sulfate      TAKE 1 TABLET BY MOUTH 3 TIMES A WEEK       Hydrocortisone (Perianal) 2.5 % rectal cream  Commonly known as: Anusol-HC      Apply to hemorrhoids 3 times daily for 7 days during hemorrhoid flare. Include applicator.       losartan 100 MG tablet  Commonly known as: COZAAR           polyethylene glycol 17 GM/SCOOP powder  Commonly known as: MIRALAX      Take 17 g by mouth As Needed.       pravastatin 40 MG tablet  Commonly known as: PRAVACHOL      TAKE 1 TABLET BY MOUTH EVERY NIGHT AT BEDTIME       spironolactone 25 MG tablet  Commonly known as: ALDACTONE      TAKE 1 TABLET BY MOUTH DAILY       traMADol 50 MG tablet  Commonly known as: ULTRAM      Take 1 tablet by mouth Every 6 (Six) Hours As Needed for Moderate Pain.                 Where to Get Your Medications        These medications were sent to [id number] - [facility] [address] [address] [address] AT SEC OF [address] [address] & [facility] [address] - [phone] [facility] [phone]   [id number] [place] [address] E, [facility] [address]      Phone: [phone]   amiodarone 200  MG tablet         Patient is no longer taking -.  I corrected the med list to reflect this.  I did not stop these medications.      Dictated utilizing Dragon dictation

## 2023-10-24 ENCOUNTER — TELEPHONE (OUTPATIENT)
Dept: CARDIOLOGY | Facility: CLINIC | Age: 83
End: 2023-10-24
Payer: MEDICARE

## 2023-10-24 RX ORDER — AMIODARONE HYDROCHLORIDE 200 MG/1
200 TABLET ORAL
Qty: 90 TABLET | Refills: 3 | Status: SHIPPED | OUTPATIENT
Start: 2023-10-24

## 2023-10-24 NOTE — TELEPHONE ENCOUNTER
Please let patient know that I talked with Dr. Pimentel and he agrees that patient would benefit from additional therapy.  He asked that they contact him to arrange.  Please let them know this.

## 2023-10-24 NOTE — TELEPHONE ENCOUNTER
Called and left a message for Dr. Pimentel to call you about this patient.     Dana Cheung RN  Triage [facility]

## 2023-10-25 NOTE — TELEPHONE ENCOUNTER
Notified patient of recommendations. Patient verbalized understanding.    Dana Cheung RN  Triage [facility]

## 2023-11-08 ENCOUNTER — TELEPHONE (OUTPATIENT)
Dept: ONCOLOGY | Facility: CLINIC | Age: 83
End: 2023-11-08
Payer: MEDICARE

## 2023-11-08 NOTE — TELEPHONE ENCOUNTER
Provider: Dr. Renate Galo  Caller: Dr Shin Tsai  Relationship to Patient: Provider  Call Back Phone Number: [phone]  Reason for Call: Dr Shin Tsai called stated he needs to speak to Dr. Galo. Pt had a large tumor that showed up on an Xray. Dr Tsai is setting up a CT scan for pt. Dr Tsai wants to know if follow up appt with Dr. Galo can be moved up to a sooner appt.

## 2023-11-09 ENCOUNTER — TELEPHONE (OUTPATIENT)
Dept: ONCOLOGY | Facility: CLINIC | Age: 83
End: 2023-11-09
Payer: MEDICARE

## 2023-11-09 DIAGNOSIS — R93.0: Primary | ICD-10-CM

## 2023-11-09 NOTE — TELEPHONE ENCOUNTER
----- Message from Renate Galo MD sent at 11/9/2023 12:19 PM EST -----  I was able to review the report of the x-ray from the dentist.      Please add CT scan of the neck with attention to left side of the mandible-evaluate for tumor seen on x-ray.  Please add the CT scan of the neck to her upcoming CT scan of the chest abdomen and pelvis.    Thank you

## 2023-11-09 NOTE — TELEPHONE ENCOUNTER
Reviewed Dr. Galo's note with the pt, she v/u. We will make Dr. Tsai aware of Dr. Galo's recommendations. A message has been sent to scheduling.

## 2023-11-10 ENCOUNTER — HOSPITAL ENCOUNTER (OUTPATIENT)
Dept: CARDIOLOGY | Facility: HOSPITAL | Age: 83
Discharge: HOME | End: 2023-11-10
Payer: MEDICARE

## 2023-11-10 VITALS
BODY MASS INDEX: 29.84 KG/M2 | HEART RATE: 56 BPM | HEIGHT: 60 IN | SYSTOLIC BLOOD PRESSURE: 124 MMHG | WEIGHT: 152 LBS | OXYGEN SATURATION: 99 % | DIASTOLIC BLOOD PRESSURE: 72 MMHG

## 2023-11-10 DIAGNOSIS — I10 PRIMARY HYPERTENSION: ICD-10-CM

## 2023-11-10 DIAGNOSIS — I34.0 NONRHEUMATIC MITRAL VALVE REGURGITATION: ICD-10-CM

## 2023-11-10 LAB
AORTIC ARCH: 2.9 CM
ASCENDING AORTA: 3.8 CM
BH CV ECHO LEFT VENTRICLE GLOBAL LONGITUDINAL STRAIN: -25.4 %
BH CV ECHO MEAS - ACS: 1.48 CM
BH CV ECHO MEAS - AO MAX PG: 12.5 MMHG
BH CV ECHO MEAS - AO MEAN PG: 7 MMHG
BH CV ECHO MEAS - AO ROOT DIAM: 3 CM
BH CV ECHO MEAS - AO V2 MAX: 177 CM/SEC
BH CV ECHO MEAS - AO V2 VTI: 39.4 CM
BH CV ECHO MEAS - AVA(I,D): 1.29 CM2
BH CV ECHO MEAS - EDV(CUBED): 157.5 ML
BH CV ECHO MEAS - EDV(MOD-SP2): 124 ML
BH CV ECHO MEAS - EDV(MOD-SP4): 109 ML
BH CV ECHO MEAS - EF(MOD-BP): 67.2 %
BH CV ECHO MEAS - EF(MOD-SP2): 64.5 %
BH CV ECHO MEAS - EF(MOD-SP4): 67.9 %
BH CV ECHO MEAS - ESV(CUBED): 27.1 ML
BH CV ECHO MEAS - ESV(MOD-SP2): 44 ML
BH CV ECHO MEAS - ESV(MOD-SP4): 35 ML
BH CV ECHO MEAS - FS: 44.4 %
BH CV ECHO MEAS - IVS/LVPW: 0.69 CM
BH CV ECHO MEAS - IVSD: 0.9 CM
BH CV ECHO MEAS - LAT PEAK E' VEL: 9.6 CM/SEC
BH CV ECHO MEAS - LV DIASTOLIC VOL/BSA (35-75): 65.6 CM2
BH CV ECHO MEAS - LV MASS(C)D: 234.8 GRAMS
BH CV ECHO MEAS - LV MAX PG: 3.7 MMHG
BH CV ECHO MEAS - LV MEAN PG: 2 MMHG
BH CV ECHO MEAS - LV SYSTOLIC VOL/BSA (12-30): 21.1 CM2
BH CV ECHO MEAS - LV V1 MAX: 95.7 CM/SEC
BH CV ECHO MEAS - LV V1 VTI: 21 CM
BH CV ECHO MEAS - LVIDD: 5.4 CM
BH CV ECHO MEAS - LVIDS: 3 CM
BH CV ECHO MEAS - LVOT AREA: 2.41 CM2
BH CV ECHO MEAS - LVOT DIAM: 1.75 CM
BH CV ECHO MEAS - LVPWD: 1.3 CM
BH CV ECHO MEAS - MED PEAK E' VEL: 5.2 CM/SEC
BH CV ECHO MEAS - MR MAX PG: 139.1 MMHG
BH CV ECHO MEAS - MR MAX VEL: 589.7 CM/SEC
BH CV ECHO MEAS - MV A DUR: 0.12 SEC
BH CV ECHO MEAS - MV A MAX VEL: 61.8 CM/SEC
BH CV ECHO MEAS - MV DEC SLOPE: 882.1 CM/SEC2
BH CV ECHO MEAS - MV DEC TIME: 0.25 SEC
BH CV ECHO MEAS - MV E MAX VEL: 134 CM/SEC
BH CV ECHO MEAS - MV E/A: 2.17
BH CV ECHO MEAS - MV MAX PG: 11.2 MMHG
BH CV ECHO MEAS - MV MEAN PG: 2.6 MMHG
BH CV ECHO MEAS - MV P1/2T: 56.6 MSEC
BH CV ECHO MEAS - MV V2 VTI: 48.2 CM
BH CV ECHO MEAS - MVA(P1/2T): 3.9 CM2
BH CV ECHO MEAS - MVA(VTI): 1.05 CM2
BH CV ECHO MEAS - PA ACC TIME: 0.14 SEC
BH CV ECHO MEAS - PA V2 MAX: 89.6 CM/SEC
BH CV ECHO MEAS - PULM A REVS DUR: 0.12 SEC
BH CV ECHO MEAS - PULM A REVS VEL: 20.1 CM/SEC
BH CV ECHO MEAS - PULM DIAS VEL: 37 CM/SEC
BH CV ECHO MEAS - PULM S/D: 1.18
BH CV ECHO MEAS - PULM SYS VEL: 43.7 CM/SEC
BH CV ECHO MEAS - QP/QS: 0.56
BH CV ECHO MEAS - RAP SYSTOLE: 3 MMHG
BH CV ECHO MEAS - RV MAX PG: 0.86 MMHG
BH CV ECHO MEAS - RV V1 MAX: 46.3 CM/SEC
BH CV ECHO MEAS - RV V1 VTI: 10.2 CM
BH CV ECHO MEAS - RVOT DIAM: 1.89 CM
BH CV ECHO MEAS - RVSP: 41.6 MMHG
BH CV ECHO MEAS - SI(MOD-SP2): 48.2 ML/M2
BH CV ECHO MEAS - SI(MOD-SP4): 44.5 ML/M2
BH CV ECHO MEAS - SUP REN AO DIAM: 1.5 CM
BH CV ECHO MEAS - SV(LVOT): 50.7 ML
BH CV ECHO MEAS - SV(MOD-SP2): 80 ML
BH CV ECHO MEAS - SV(MOD-SP4): 74 ML
BH CV ECHO MEAS - SV(RVOT): 28.6 ML
BH CV ECHO MEAS - TAPSE (>1.6): 2.08 CM
BH CV ECHO MEAS - TR MAX PG: 38.6 MMHG
BH CV ECHO MEAS - TR MAX VEL: 310.8 CM/SEC
BH CV ECHO MEASUREMENTS AVERAGE E/E' RATIO: 18.11
BH CV XLRA - RV BASE: 3.6 CM
BH CV XLRA - RV LENGTH: 6.2 CM
BH CV XLRA - RV MID: 3.2 CM
BH CV XLRA - TDI S': 12 CM/SEC
LEFT ATRIUM VOLUME INDEX: 72.3 ML/M2
SINUS: 2.7 CM
STJ: 2.5 CM

## 2023-11-10 PROCEDURE — 25510000001 PERFLUTREN (DEFINITY) 8.476 MG IN SODIUM CHLORIDE (PF) 0.9 % 10 ML INJECTION: Performed by: INTERNAL MEDICINE

## 2023-11-10 PROCEDURE — 93306 TTE W/DOPPLER COMPLETE: CPT

## 2023-11-10 PROCEDURE — 93356 MYOCRD STRAIN IMG SPCKL TRCK: CPT

## 2023-11-10 RX ADMIN — PERFLUTREN 1.5 ML: 6.52 INJECTION, SUSPENSION INTRAVENOUS at 15:21

## 2023-11-11 ENCOUNTER — TELEPHONE (OUTPATIENT)
Dept: CARDIOLOGY | Facility: CLINIC | Age: 83
End: 2023-11-11
Payer: MEDICARE

## 2023-11-12 NOTE — TELEPHONE ENCOUNTER
Echo demonstrates normal systolic function grade 2 diastolic function mitral regurgitation severe and mild aortic stenosis.  May need to consider mitral valve intervention such as clip if eligible.  We will call patient Monday regarding this.

## 2023-11-16 ENCOUNTER — TELEPHONE (OUTPATIENT)
Dept: CARDIOLOGY | Facility: CLINIC | Age: 83
End: 2023-11-16
Payer: MEDICARE

## 2023-11-16 ENCOUNTER — HOSPITAL ENCOUNTER (OUTPATIENT)
Dept: CT IMAGING | Facility: HOSPITAL | Age: 83
Discharge: HOME | End: 2023-11-16
Admitting: INTERNAL MEDICINE
Payer: MEDICARE

## 2023-11-16 DIAGNOSIS — E87.1 HYPONATREMIA: ICD-10-CM

## 2023-11-16 DIAGNOSIS — I34.0 NONRHEUMATIC MITRAL VALVE REGURGITATION: Primary | ICD-10-CM

## 2023-11-16 DIAGNOSIS — S22.080A COMPRESSION FRACTURE OF T12 VERTEBRA, INITIAL ENCOUNTER: ICD-10-CM

## 2023-11-16 DIAGNOSIS — C83.38 DIFFUSE LARGE B-CELL LYMPHOMA OF LYMPH NODES OF MULTIPLE REGIONS: ICD-10-CM

## 2023-11-16 DIAGNOSIS — R91.8 PULMONARY NODULES: ICD-10-CM

## 2023-11-16 DIAGNOSIS — J18.9 LUNG INFECTION: ICD-10-CM

## 2023-11-16 DIAGNOSIS — R93.0: ICD-10-CM

## 2023-11-16 DIAGNOSIS — D50.9 IRON DEFICIENCY ANEMIA, UNSPECIFIED IRON DEFICIENCY ANEMIA TYPE: ICD-10-CM

## 2023-11-16 DIAGNOSIS — J90 BILATERAL PLEURAL EFFUSION: ICD-10-CM

## 2023-11-16 PROCEDURE — 25510000001 IOPAMIDOL 61 % SOLUTION: Performed by: INTERNAL MEDICINE

## 2023-11-16 PROCEDURE — 74177 CT ABD & PELVIS W/CONTRAST: CPT

## 2023-11-16 PROCEDURE — 71260 CT THORAX DX C+: CPT

## 2023-11-16 PROCEDURE — 82565 ASSAY OF CREATININE: CPT

## 2023-11-16 PROCEDURE — 70491 CT SOFT TISSUE NECK W/DYE: CPT

## 2023-11-16 RX ADMIN — IOPAMIDOL 85 ML: 612 INJECTION, SOLUTION INTRAVENOUS at 16:18

## 2023-11-16 NOTE — TELEPHONE ENCOUNTER
I talked to patient regarding mitral regurgitation which has worsened and with dilated left ventricle.  Recommended she consider valve intervention at least an appointment with structural heart team for possible clip device.  However she is hesitant to consider anything at this point and really now says she does not have much in the way of symptoms.  Therefore we will have her return for follow-up in 6 months with repeat echo and she will call earlier if she has any worsening chest pain shortness of breath palpitations near syncope or questions    Please arrange follow-up with me an echo in 6 months

## 2023-11-17 LAB — CREAT BLDA-MCNC: 0.8 MG/DL (ref 0.6–1.3)

## 2023-11-20 NOTE — TELEPHONE ENCOUNTER
ECHO was completed 11/10/2023.  From your note you wanted this in April 2024 with appointment with you correct?

## 2023-11-20 NOTE — TELEPHONE ENCOUNTER
Caller: Michelle Car    Relationship: Self    Best call back number: [phone]    What is the best time to reach you: ANY    What was the call regarding: PATIENT IS SAYING SHE IS WAITING ON SOMEONE TO CALL HER BACK ABOUT A 6 MONTH FOLLOW UP FROM 10/23/23. NOTES SAY SHE NEEDS A FOLLOW UP ECHO BUT SHE THINKS SHE ALREADY HAD THIS. DOES SHE NEED ANOTHER ECHO IN 5-6 MONTHS OR IS THE ECHO ON 11/10/23 THE NEW ONE YOU NEED?  ALSO WANTED TO CHECK WITH DR ABOUT AMIODARONE, SHE SAID SHE IS TAKING 200MG BUT WASN'T SURE IF SHE SHOULD ONLY BE TAKING 100MG NOW? SHE THINKS THAT THE ECHO ON 11/10/23 WAS GOING TO DECIDE IF MEDS SHOULD CHANGE.

## 2023-11-21 ENCOUNTER — LAB (OUTPATIENT)
Dept: LAB | Facility: HOSPITAL | Age: 83
End: 2023-11-21
Payer: MEDICARE

## 2023-11-21 ENCOUNTER — OFFICE VISIT (OUTPATIENT)
Dept: ONCOLOGY | Facility: CLINIC | Age: 83
End: 2023-11-21
Payer: MEDICARE

## 2023-11-21 VITALS
RESPIRATION RATE: 16 BRPM | HEIGHT: 60 IN | TEMPERATURE: 97.7 F | SYSTOLIC BLOOD PRESSURE: 152 MMHG | DIASTOLIC BLOOD PRESSURE: 84 MMHG | HEART RATE: 61 BPM | WEIGHT: 152 LBS | OXYGEN SATURATION: 100 % | BODY MASS INDEX: 29.84 KG/M2

## 2023-11-21 DIAGNOSIS — R91.8 PULMONARY NODULES: ICD-10-CM

## 2023-11-21 DIAGNOSIS — M80.00XA AGE-RELATED OSTEOPOROSIS WITH CURRENT PATHOLOGICAL FRACTURE, INITIAL ENCOUNTER: ICD-10-CM

## 2023-11-21 DIAGNOSIS — E87.1 HYPONATREMIA: ICD-10-CM

## 2023-11-21 DIAGNOSIS — D50.9 IRON DEFICIENCY ANEMIA, UNSPECIFIED IRON DEFICIENCY ANEMIA TYPE: ICD-10-CM

## 2023-11-21 DIAGNOSIS — J90 BILATERAL PLEURAL EFFUSION: ICD-10-CM

## 2023-11-21 DIAGNOSIS — C83.38 DIFFUSE LARGE B-CELL LYMPHOMA OF LYMPH NODES OF MULTIPLE REGIONS: Primary | ICD-10-CM

## 2023-11-21 DIAGNOSIS — J18.9 LUNG INFECTION: ICD-10-CM

## 2023-11-21 DIAGNOSIS — C83.38 DIFFUSE LARGE B-CELL LYMPHOMA OF LYMPH NODES OF MULTIPLE REGIONS: ICD-10-CM

## 2023-11-21 DIAGNOSIS — M27.8 JAW MASS: ICD-10-CM

## 2023-11-21 DIAGNOSIS — S22.080A COMPRESSION FRACTURE OF T12 VERTEBRA, INITIAL ENCOUNTER: ICD-10-CM

## 2023-11-21 LAB
ALBUMIN SERPL-MCNC: 3.9 G/DL (ref 3.5–5.2)
ALBUMIN/GLOB SERPL: 1.5 G/DL
ALP SERPL-CCNC: 142 U/L (ref 39–117)
ALT SERPL W P-5'-P-CCNC: 30 U/L (ref 1–33)
ANION GAP SERPL CALCULATED.3IONS-SCNC: 7.8 MMOL/L (ref 5–15)
AST SERPL-CCNC: 26 U/L (ref 1–32)
BASOPHILS # BLD AUTO: 0.05 10*3/MM3 (ref 0–0.2)
BASOPHILS NFR BLD AUTO: 1.4 % (ref 0–1.5)
BILIRUB SERPL-MCNC: 0.4 MG/DL (ref 0–1.2)
BUN SERPL-MCNC: 13 MG/DL (ref 8–23)
BUN/CREAT SERPL: 16.7 (ref 7–25)
CALCIUM SPEC-SCNC: 9.1 MG/DL (ref 8.6–10.5)
CHLORIDE SERPL-SCNC: 90 MMOL/L (ref 98–107)
CO2 SERPL-SCNC: 27.2 MMOL/L (ref 22–29)
CREAT SERPL-MCNC: 0.78 MG/DL (ref 0.57–1)
DEPRECATED RDW RBC AUTO: 45 FL (ref 37–54)
EGFRCR SERPLBLD CKD-EPI 2021: 75.9 ML/MIN/1.73
EOSINOPHIL # BLD AUTO: 0.03 10*3/MM3 (ref 0–0.4)
EOSINOPHIL NFR BLD AUTO: 0.8 % (ref 0.3–6.2)
ERYTHROCYTE [DISTWIDTH] IN BLOOD BY AUTOMATED COUNT: 13.5 % (ref 12.3–15.4)
FERRITIN SERPL-MCNC: 141 NG/ML (ref 13–150)
GLOBULIN UR ELPH-MCNC: 2.6 GM/DL
GLUCOSE SERPL-MCNC: 97 MG/DL (ref 65–99)
HCT VFR BLD AUTO: 34.7 % (ref 34–46.6)
HGB BLD-MCNC: 12.1 G/DL (ref 12–15.9)
IMM GRANULOCYTES # BLD AUTO: 0.01 10*3/MM3 (ref 0–0.05)
IMM GRANULOCYTES NFR BLD AUTO: 0.3 % (ref 0–0.5)
IRON 24H UR-MRATE: 102 MCG/DL (ref 37–145)
IRON SATN MFR SERPL: 25 % (ref 20–50)
LDH SERPL-CCNC: 246 U/L (ref 135–214)
LYMPHOCYTES # BLD AUTO: 1 10*3/MM3 (ref 0.7–3.1)
LYMPHOCYTES NFR BLD AUTO: 27.5 % (ref 19.6–45.3)
MCH RBC QN AUTO: 31.6 PG (ref 26.6–33)
MCHC RBC AUTO-ENTMCNC: 34.9 G/DL (ref 31.5–35.7)
MCV RBC AUTO: 90.6 FL (ref 79–97)
MONOCYTES # BLD AUTO: 0.39 10*3/MM3 (ref 0.1–0.9)
MONOCYTES NFR BLD AUTO: 10.7 % (ref 5–12)
NEUTROPHILS NFR BLD AUTO: 2.16 10*3/MM3 (ref 1.7–7)
NEUTROPHILS NFR BLD AUTO: 59.3 % (ref 42.7–76)
NRBC BLD AUTO-RTO: 0 /100 WBC (ref 0–0.2)
PLATELET # BLD AUTO: 210 10*3/MM3 (ref 140–450)
PMV BLD AUTO: 8 FL (ref 6–12)
POTASSIUM SERPL-SCNC: 4.6 MMOL/L (ref 3.5–5.2)
PROT SERPL-MCNC: 6.5 G/DL (ref 6–8.5)
RBC # BLD AUTO: 3.83 10*6/MM3 (ref 3.77–5.28)
SODIUM SERPL-SCNC: 125 MMOL/L (ref 136–145)
TIBC SERPL-MCNC: 405 MCG/DL (ref 298–536)
TRANSFERRIN SERPL-MCNC: 272 MG/DL (ref 200–360)
WBC NRBC COR # BLD AUTO: 3.64 10*3/MM3 (ref 3.4–10.8)

## 2023-11-21 PROCEDURE — 83615 LACTATE (LD) (LDH) ENZYME: CPT | Performed by: INTERNAL MEDICINE

## 2023-11-21 PROCEDURE — 80053 COMPREHEN METABOLIC PANEL: CPT | Performed by: INTERNAL MEDICINE

## 2023-11-21 PROCEDURE — 82728 ASSAY OF FERRITIN: CPT | Performed by: INTERNAL MEDICINE

## 2023-11-21 PROCEDURE — 36415 COLL VENOUS BLD VENIPUNCTURE: CPT

## 2023-11-21 PROCEDURE — 85025 COMPLETE CBC W/AUTO DIFF WBC: CPT

## 2023-11-21 PROCEDURE — 84466 ASSAY OF TRANSFERRIN: CPT | Performed by: INTERNAL MEDICINE

## 2023-11-21 PROCEDURE — 83540 ASSAY OF IRON: CPT | Performed by: INTERNAL MEDICINE

## 2023-11-21 RX ORDER — CEPHALEXIN 500 MG/1
500 CAPSULE ORAL 3 TIMES DAILY
Qty: 7 CAPSULE | Refills: 0 | Status: SHIPPED | OUTPATIENT
Start: 2023-11-21

## 2023-11-21 NOTE — PROGRESS NOTES
Subjective     CHIEF COMPLAINT:      Chief Complaint   Patient presents with    Follow-up     DISCUSS CT SCAN AND CONCERN ABOUT LUMP ON LT SIDE OF JAW/NAUSEA     HISTORY OF PRESENT ILLNESS:     Michelle Car is a 83 y.o. female patient who returns today for follow up on her lymphoma.  She returns today for follow-up accompanied by her daughter.  She reports that she was having work done to have new dentures placed.  She then started having swelling of the left side of her lower jaw.  She was seen by her dentist and referred to oral surgeon-Dr. Tsai.  He recommended obtaining CT scan for further evaluation of this.  The patient was going to have CT scan for follow-up on her lymphoma and the CT scan of the neck was requested along with a CT of the chest abdomen pelvis.    Patient reports having episodes of nausea.  It is not significant enough to cause vomiting.  She frequently takes Tylenol when she develops the nausea and this usually suffices.    ROS:  Pertinent ROS is in the HPI.     Past medical, surgical, social and family history were reviewed.     MEDICATIONS:    Current Outpatient Medications:     acetaminophen (TYLENOL) 500 MG tablet, Take 1 tablet by mouth Every 6 (Six) Hours As Needed for Mild Pain., Disp: , Rfl:     amiodarone (PACERONE) 200 MG tablet, Take 1 tablet by mouth Daily., Disp: 90 tablet, Rfl: 3    Calcium Carbonate (CALTRATE 600 PO), Take 1 tablet by mouth Daily., Disp: , Rfl:     carvedilol (COREG) 3.125 MG tablet, TAKE 1 TABLET BY MOUTH TWICE DAILY WITH MEALS, Disp: 180 tablet, Rfl: 1    Eliquis 5 MG tablet tablet, TAKE 1 TABLET BY MOUTH EVERY 12 HOURS FOR ATRIAL FIBRILLATION, Disp: 180 tablet, Rfl: 1    FeroSul 325 (65 Fe) MG tablet, TAKE 1 TABLET BY MOUTH 3 TIMES A WEEK, Disp: 36 tablet, Rfl: 0    Hydrocortisone, Perianal, (Anusol-HC) 2.5 % rectal cream, Apply to hemorrhoids 3 times daily for 7 days during hemorrhoid flare. Include applicator., Disp: 30 g, Rfl: 1    losartan (COZAAR)  "100 MG tablet, , Disp: , Rfl:     Multiple Vitamins-Minerals (CENTRUM SILVER) tablet, Take 1 tablet by mouth Daily., Disp: , Rfl:     polyethylene glycol (MIRALAX) 17 GM/SCOOP powder, Take 17 g by mouth As Needed., Disp: , Rfl:     pravastatin (PRAVACHOL) 40 MG tablet, TAKE 1 TABLET BY MOUTH EVERY NIGHT AT BEDTIME, Disp: 90 tablet, Rfl: 1    spironolactone (ALDACTONE) 25 MG tablet, TAKE 1 TABLET BY MOUTH DAILY, Disp: 90 tablet, Rfl: 1    traMADol (ULTRAM) 50 MG tablet, Take 1 tablet by mouth Every 6 (Six) Hours As Needed for Moderate Pain., Disp: 12 tablet, Rfl: 0  Objective     VITAL SIGNS:     Vitals:    11/21/23 1514   BP: 152/84   Pulse: 61   Resp: 16   Temp: 97.7 °F (36.5 °C)   TempSrc: Temporal   SpO2: 100%   Weight: 68.9 kg (152 lb)   Height: 152.4 cm (60\")   PainSc: 0-No pain     Body mass index is 29.69 kg/m².     Wt Readings from Last 5 Encounters:   11/21/23 68.9 kg (152 lb)   11/10/23 68.9 kg (152 lb)   10/23/23 68.9 kg (152 lb)   08/29/23 69.4 kg (153 lb)   08/07/23 68.9 kg (152 lb)     PHYSICAL EXAMINATION:   GENERAL: The patient appears in good general condition, not in acute distress.   SKIN: No ecchymosis.  EYES: No jaundice. No Pallor.  MOUTH: A mass at the left lower jaw, slightly tender.  No mucosal lesions.  LYMPHATIC: No cervical supraclavicular or axillary lymphadenopathy.  CHEST: Normal respiratory effort.   CVS: No edema.  ABDOMEN: Nondistended.  EXTREMITIES: Macular erythematous rash at the distal aspect of the left leg.    DIAGNOSTIC DATA:     Results from last 7 days   Lab Units 11/21/23  1503   WBC 10*3/mm3 3.64   NEUTROS ABS 10*3/mm3 2.16   HEMOGLOBIN g/dL 12.1   HEMATOCRIT % 34.7   PLATELETS 10*3/mm3 210     Results from last 7 days   Lab Units 11/21/23  1503 11/16/23  1612   SODIUM mmol/L 125*  --    POTASSIUM mmol/L 4.6  --    CHLORIDE mmol/L 90*  --    CO2 mmol/L 27.2  --    BUN mg/dL 13  --    CREATININE mg/dL 0.78 0.80   CALCIUM mg/dL 9.1  --    ALBUMIN g/dL 3.9  --    BILIRUBIN " mg/dL 0.4  --    ALK PHOS U/L 142*  --    ALT (SGPT) U/L 30  --    AST (SGOT) U/L 26  --    GLUCOSE mg/dL 97  --          Lab 11/21/23  1503   IRON 102   IRON SATURATION (TSAT) 25   TIBC 405   TRANSFERRIN 272   FERRITIN 141.00      CT neck chest abdomen pelvis on 11/16/2023:  NECK: There are new lytic lesions at the left mandible with the dominant  lytic expansile mass at the body of the mandible measuring approximately  2.8 x 1.4 cm and extends to the angle of the mandible and is contiguous  with a lytic expansile mass at the ramus measuring approximately 3.4 x  2.1 cm.  The coronoid process and condyle appear intact. There is  diffuse osseous demineralization. Multiple myeloma with a left  mandibular plasmacytoma is a consideration.     There is no lymphadenopathy at the neck. The parotid, submandibular, and  thyroid glands appear unremarkable. The visualized paranasal sinuses are  clear.     CHEST: There is no mediastinal, hilar, axillary, or subpectoral  lymphadenopathy. There are no new or suspicious pulmonary opacities and  there are no pleural or pericardial effusions. Stable appearing T11 and  T12 compression deformities.     There has been interval development of healing fractures at the right  anterior 4th, 5th, 6th, 7th and 8th ribs.     ABDOMEN/PELVIS: Splenic size remains within normal limits. There is no  lymphadenopathy. Liver with biliary dilatation is stable. Bilateral  adrenal hyperplasia is stable. Pancreas and kidneys appear unremarkable.  Small hiatal hernia. There is no acute bowel abnormality. Uterus and  adnexa appear unremarkable. There is significant pelvic floor  relaxation.     Old left superior and inferior pubic rami fractures and old bilateral  sacral insufficiency fractures are noted.    Assessment & Plan    *Diffuse large B-cell lymphoma.     Patient was found to have bilateral pleural effusions.    She underwent right thoracentesis on 2/21/2022 and the left thoracentesis on  2/22/2022.    Analysis of the fluid revealed involvement with diffuse large B-cell lymphoma.    FISH analysis for BCL6 rearrangement was positive.    FISH analysis for BCL 1, BCL-2 and MYC rearrangement was negative.    PET scan on 3/10/2022 revealed significant hypermetabolism in the left adrenal gland and the surrounding soft tissue with SUV up to 34.5. Hypermetabolism in the left pleural thickening with SUV of 7.1. there was less hypermetabolism in the right adrenal gland and in the right pleura.  She was considered to have stage III non-bulky disease.  The degree of hypermetabolism is consistent with high grade lymphoma.   This concurred with the pathology exam of the pleural fluid cytology that revealed diffuse large B-cell lymphoma.  R-IPI score of 3 associated with poor risk - associated with overall survival of 55%.   R-CHOP with Neulasta support was started on 3/28/2022.  Patient developed neutropenia and thrombocytopenia after cycle#1.  Vincristine dose was reduced to 1 mg due to constipation.   Patient developed neutropenic fever after cycle #2 and was hospitalized.  Her performance status decreased as a result.   CT scan on 5/1/2022 revealed a decrease in the right pleural effusion. The left suprarenal density was stable.  Regimen was changed to mini-RCHOP starting cycle #3 on 5/16/2022.  CT scans on 6/23/2022 showed evidence of response.  There was resolution of the pericardial effusion and near complete resolution of the pleural effusions.   Patient received cycle #6 on 7/25/2022.  PET scan on 8/15/2022 revealed complete metabolic response.  CT on 11/16/2023 showed no evidence of recurrence of the lymphoma.    *Lytic lesion at the left mandible.  Patient started a few months ago having swelling of the left lower jaw.  No fever/chills or symptoms of infection.  She was evaluated by oral surgeon-mass at the left mandible was identified.  CT on 11/16/2023 revealed new lytic lesions at the left mandible  with a dominant lesion measuring 2.8 x 1.4 cm at the body of the mandible.  It extended to the angle of the mandible and is contiguous with a lytic expansile mass at the ramus measuring 3.4 x 2.1 cm.  Multiple myeloma with the left mandibular plasmacytoma was considered in the differential.    *Bilateral pleural effusions.   She is s/p right thoracentesis on 2/21/2022 and the effusion was found to be malignant attributed to the lymphoma.   CT scan on 5/1/2022 revealed decrease in the right pleural effusion and an increase in the left effusion.   The increase in the left effusion was suspected of being due to cardiac and fluid overload states (fluid appeared to be serous on CT scan).  CT on 6/23/2022 revealed near resolution of the pleural effusions.  PET scan on 8/15/2022 showed that the pleural effusions were trace and improved since June 2022.  CT chest on 11/7/2022 revealed minimal partially loculated right pleural effusion.  CT chest on 3/7/2023 showed no recurrence of the effusion.  CT on 8/24/2023 showed no evidence of recurrence of the pleural effusions.  CT on 11/16/2023 showed no evidence of recurrence of the pleural effusions.    *Iron deficiency anemia.  Patient also developed anemia secondary to lymphoma and secondary to chemotherapy.    Hemoglobin was 11.1 on 3/28/2022.  Hgb decreased to 7.9 on 4/25/2022 and she was transfused.   Hemoglobin decreased to 6.3 on 4/27/2022. She was transfused.  Hemoglobin improved to 8.8 on 5/16/2022.  Hemoglobin was 9.9 on 8/22/2022. Labs revealed iron deficiency.  She was started on ferrous sulfate 325 mg every other day.  Hemoglobin was 7.9 on 11/7/2022.  She was found to have a positive stool Hemoccult on stool test done by her PCP.  Hemoglobin improved to 11.7 on 1/30/2023.   Ferritin was 49 and transferrin saturation was 20%.  She was continued on ferrous sulfate 325 mg daily.  Hemoglobin improved to 11.8 on 3/14/2023.  Ferrous sulfate was reduced to 3 days a  week.  8/29/2023: Hemoglobin 12.1.  Ferritin 79.7.  Transferrin saturation 20%.  11/21/2023: Hemoglobin 12.1.    Iron stores adequate with ferritin 141 and transferrin saturation 25%.    *Choledocholithiasis with increase in the intra and extrahepatic biliary dilatation seen on CT on 6/23/2022.  Liver enzymes including bilirubin are normal today.   She is not having abdominal pain.  No redness no tenderness on exam of the right upper quadrant.  PET scan on 8/15/2022 showed persistence of the findings.  No symptoms or signs of infection.  Patient was seen by Dr. Ortega.  She underwent ERCP on 10/21/2022.  There was a calculus of the bile duct without cholecystitis and without obstruction.  She had sphincterotomy and balloon sweep.  Alkaline phosphatase improved afterwards.  CT on 3/7/2023 revealed stable findings.  CT scan on 8/24/2023 showed chronic biliary duct dilatation.    CT scan on 11/16/2023 showed no changes.    *Hyponatremia.  Patient had problem with hyponatremia in the past.  Sodium is 127 on 3/14/2023.  She was advised to increase salt intake.  Sodium improved to 133 on repeat lab on 3/21/2023.  However, sodium decreased to 126 on 6/7/2023.  This is likely secondary to her medications.  11/21/2023: Sodium decreased to 125.    *13 x 9 mm nodular focus of consolidation in the right upper lobe.  Patient is reporting cough and congestion.  This is concerning for evolving pneumonia.  An underlying malignancy could not be excluded.  CT on 11/16/2023 showed no suspicious findings.    *A new T12 compression deformity.  Patient is not complaining of pain in the area.  Bone density on 9/14/2023 showed osteoporosis.    We recommended starting treatment with Fosamax.    However, she developed a problem with her teeth and needed to have tooth extraction.   Fosamax was not started.    PLAN:    1.  I discussed the case with oral surgeon, Dr. Tsai.  I recommended obtaining tissue diagnosis.  He indicated that he will see  the patient and obtain a biopsy from the area.  2.  Will obtain SPEP [facility].  3.  Due to her cardiac history, will avoid sodium tablets.  We will ask her to increase sodium in the diet.  4.  Continue ferrous sulfate 325 mg 3 days a week.  5.  Follow-up in 2 weeks to review the results of the biopsy.    I spent 55 minutes caring for Michelle on this date of service. This time includes time spent by me in the following activities: preparing for the visit, reviewing tests, obtaining and/or reviewing a separately obtained history, performing a medically appropriate examination and/or evaluation, counseling and educating the patient/family/caregiver, ordering medications, tests, or procedures, referring and communicating with other health care professionals, documenting information in the medical record, independently interpreting results and communicating that information with the patient/family/caregiver, and care coordination       Renate Galo MD  11/21/23

## 2023-11-22 ENCOUNTER — TELEPHONE (OUTPATIENT)
Dept: ONCOLOGY | Facility: CLINIC | Age: 83
End: 2023-11-22
Payer: MEDICARE

## 2023-11-22 LAB
ALBUMIN SERPL ELPH-MCNC: 3.5 G/DL (ref 2.9–4.4)
ALBUMIN/GLOB SERPL: 1.3 {RATIO} (ref 0.7–1.7)
ALPHA1 GLOB SERPL ELPH-MCNC: 0.3 G/DL (ref 0–0.4)
ALPHA2 GLOB SERPL ELPH-MCNC: 0.7 G/DL (ref 0.4–1)
B-GLOBULIN SERPL ELPH-MCNC: 0.9 G/DL (ref 0.7–1.3)
GAMMA GLOB SERPL ELPH-MCNC: 0.9 G/DL (ref 0.4–1.8)
GLOBULIN SER-MCNC: 2.7 G/DL (ref 2.2–3.9)
IGA SERPL-MCNC: 157 MG/DL (ref 64–422)
IGG SERPL-MCNC: 1002 MG/DL (ref 586–1602)
IGM SERPL-MCNC: 74 MG/DL (ref 26–217)
INTERPRETATION SERPL IEP-IMP: ABNORMAL
KAPPA LC FREE SER-MCNC: 25.4 MG/L (ref 3.3–19.4)
KAPPA LC FREE/LAMBDA FREE SER: 1.02 {RATIO} (ref 0.26–1.65)
LABORATORY COMMENT REPORT: ABNORMAL
LAMBDA LC FREE SERPL-MCNC: 24.8 MG/L (ref 5.7–26.3)
M PROTEIN SERPL ELPH-MCNC: ABNORMAL G/DL
PROT SERPL-MCNC: 6.2 G/DL (ref 6–8.5)

## 2023-11-22 NOTE — TELEPHONE ENCOUNTER
----- Message from Renate Galo MD sent at 11/21/2023  6:09 PM EST -----  Please inform the patient that her labs showed sodium to be low.  Ask her to increase intake of salt.    I discussed her case with Dr. Tsai and he will obtain a biopsy from the jaw mass.  Please make sure she has contacted his office to schedule the appointment.    Thank you

## 2023-11-22 NOTE — TELEPHONE ENCOUNTER
Reviewed Dr. Galo's note and instructions with pt, she v/u. Patients biopsy is scheduled for 11/28/2023 at 11:40.

## 2023-11-30 NOTE — TELEPHONE ENCOUNTER
Spoke with pt.  Verbalized understanding.  No other questions.  She feels her gait is somewhat better.  She will talk with PCP if any further issues with her gait. (Done)

## 2023-12-01 RX ORDER — AMIODARONE HYDROCHLORIDE 200 MG/1
200 TABLET ORAL
Qty: 30 TABLET | OUTPATIENT
Start: 2023-12-01

## 2023-12-04 RX ORDER — AMIODARONE HYDROCHLORIDE 200 MG/1
200 TABLET ORAL
Qty: 30 TABLET | OUTPATIENT
Start: 2023-12-04

## 2023-12-06 ENCOUNTER — LAB (OUTPATIENT)
Dept: LAB | Facility: HOSPITAL | Age: 83
End: 2023-12-06
Payer: MEDICARE

## 2023-12-06 ENCOUNTER — OFFICE VISIT (OUTPATIENT)
Dept: ONCOLOGY | Facility: CLINIC | Age: 83
End: 2023-12-06
Payer: MEDICARE

## 2023-12-06 VITALS
DIASTOLIC BLOOD PRESSURE: 81 MMHG | RESPIRATION RATE: 16 BRPM | SYSTOLIC BLOOD PRESSURE: 143 MMHG | TEMPERATURE: 97.7 F | OXYGEN SATURATION: 99 % | HEART RATE: 59 BPM | HEIGHT: 60 IN | BODY MASS INDEX: 29.08 KG/M2 | WEIGHT: 148.1 LBS

## 2023-12-06 DIAGNOSIS — M27.8 JAW MASS: ICD-10-CM

## 2023-12-06 DIAGNOSIS — C83.38 DIFFUSE LARGE B-CELL LYMPHOMA OF LYMPH NODES OF MULTIPLE REGIONS: Primary | ICD-10-CM

## 2023-12-06 DIAGNOSIS — D50.9 IRON DEFICIENCY ANEMIA, UNSPECIFIED IRON DEFICIENCY ANEMIA TYPE: ICD-10-CM

## 2023-12-06 DIAGNOSIS — C83.38 DIFFUSE LARGE B-CELL LYMPHOMA OF LYMPH NODES OF MULTIPLE REGIONS: ICD-10-CM

## 2023-12-06 DIAGNOSIS — M80.00XA AGE-RELATED OSTEOPOROSIS WITH CURRENT PATHOLOGICAL FRACTURE, INITIAL ENCOUNTER: ICD-10-CM

## 2023-12-06 DIAGNOSIS — J90 BILATERAL PLEURAL EFFUSION: ICD-10-CM

## 2023-12-06 DIAGNOSIS — R91.8 PULMONARY NODULES: ICD-10-CM

## 2023-12-06 DIAGNOSIS — E87.1 HYPONATREMIA: ICD-10-CM

## 2023-12-06 LAB
ALBUMIN SERPL-MCNC: 3.9 G/DL (ref 3.5–5.2)
ALBUMIN/GLOB SERPL: 1.5 G/DL
ALP SERPL-CCNC: 121 U/L (ref 39–117)
ALT SERPL W P-5'-P-CCNC: 28 U/L (ref 1–33)
ANION GAP SERPL CALCULATED.3IONS-SCNC: 9 MMOL/L (ref 5–15)
AST SERPL-CCNC: 28 U/L (ref 1–32)
B2 MICROGLOB SERPL-MCNC: 3.7 MG/L (ref 0.8–2.2)
BASOPHILS # BLD AUTO: 0.03 10*3/MM3 (ref 0–0.2)
BASOPHILS NFR BLD AUTO: 0.7 % (ref 0–1.5)
BILIRUB SERPL-MCNC: 0.5 MG/DL (ref 0–1.2)
BUN SERPL-MCNC: 15 MG/DL (ref 8–23)
BUN/CREAT SERPL: 17.9 (ref 7–25)
CALCIUM SPEC-SCNC: 9.1 MG/DL (ref 8.6–10.5)
CHLORIDE SERPL-SCNC: 94 MMOL/L (ref 98–107)
CO2 SERPL-SCNC: 29 MMOL/L (ref 22–29)
CREAT SERPL-MCNC: 0.84 MG/DL (ref 0.57–1)
DEPRECATED RDW RBC AUTO: 47.8 FL (ref 37–54)
EGFRCR SERPLBLD CKD-EPI 2021: 69.5 ML/MIN/1.73
EOSINOPHIL # BLD AUTO: 0.04 10*3/MM3 (ref 0–0.4)
EOSINOPHIL NFR BLD AUTO: 1 % (ref 0.3–6.2)
ERYTHROCYTE [DISTWIDTH] IN BLOOD BY AUTOMATED COUNT: 14.2 % (ref 12.3–15.4)
GLOBULIN UR ELPH-MCNC: 2.6 GM/DL
GLUCOSE SERPL-MCNC: 87 MG/DL (ref 65–99)
HBV SURFACE AB SER RIA-ACNC: NORMAL
HBV SURFACE AG SERPL QL IA: NORMAL
HCT VFR BLD AUTO: 35.7 % (ref 34–46.6)
HGB BLD-MCNC: 12.2 G/DL (ref 12–15.9)
IMM GRANULOCYTES # BLD AUTO: 0.01 10*3/MM3 (ref 0–0.05)
IMM GRANULOCYTES NFR BLD AUTO: 0.2 % (ref 0–0.5)
LDH SERPL-CCNC: 219 U/L (ref 135–214)
LYMPHOCYTES # BLD AUTO: 1 10*3/MM3 (ref 0.7–3.1)
LYMPHOCYTES NFR BLD AUTO: 24.9 % (ref 19.6–45.3)
MCH RBC QN AUTO: 31.6 PG (ref 26.6–33)
MCHC RBC AUTO-ENTMCNC: 34.2 G/DL (ref 31.5–35.7)
MCV RBC AUTO: 92.5 FL (ref 79–97)
MONOCYTES # BLD AUTO: 0.35 10*3/MM3 (ref 0.1–0.9)
MONOCYTES NFR BLD AUTO: 8.7 % (ref 5–12)
NEUTROPHILS NFR BLD AUTO: 2.58 10*3/MM3 (ref 1.7–7)
NEUTROPHILS NFR BLD AUTO: 64.5 % (ref 42.7–76)
NRBC BLD AUTO-RTO: 0 /100 WBC (ref 0–0.2)
PLATELET # BLD AUTO: 181 10*3/MM3 (ref 140–450)
PMV BLD AUTO: 8.5 FL (ref 6–12)
POTASSIUM SERPL-SCNC: 4.6 MMOL/L (ref 3.5–5.2)
PROT SERPL-MCNC: 6.5 G/DL (ref 6–8.5)
RBC # BLD AUTO: 3.86 10*6/MM3 (ref 3.77–5.28)
SODIUM SERPL-SCNC: 132 MMOL/L (ref 136–145)
URATE SERPL-MCNC: 3.2 MG/DL (ref 2.4–5.7)
WBC NRBC COR # BLD AUTO: 4.01 10*3/MM3 (ref 3.4–10.8)

## 2023-12-06 PROCEDURE — 80053 COMPREHEN METABOLIC PANEL: CPT

## 2023-12-06 PROCEDURE — 84550 ASSAY OF BLOOD/URIC ACID: CPT | Performed by: INTERNAL MEDICINE

## 2023-12-06 PROCEDURE — 87340 HEPATITIS B SURFACE AG IA: CPT | Performed by: INTERNAL MEDICINE

## 2023-12-06 PROCEDURE — 83615 LACTATE (LD) (LDH) ENZYME: CPT

## 2023-12-06 PROCEDURE — 85025 COMPLETE CBC W/AUTO DIFF WBC: CPT

## 2023-12-06 PROCEDURE — 82232 ASSAY OF BETA-2 PROTEIN: CPT | Performed by: INTERNAL MEDICINE

## 2023-12-06 PROCEDURE — 86706 HEP B SURFACE ANTIBODY: CPT | Performed by: INTERNAL MEDICINE

## 2023-12-06 PROCEDURE — 36415 COLL VENOUS BLD VENIPUNCTURE: CPT

## 2023-12-06 RX ORDER — SODIUM CHLORIDE 9 MG/ML
20 INJECTION, SOLUTION INTRAVENOUS ONCE
OUTPATIENT
Start: 2023-12-12

## 2023-12-06 RX ORDER — MEPERIDINE HYDROCHLORIDE 25 MG/ML
12.5 INJECTION INTRAMUSCULAR; INTRAVENOUS; SUBCUTANEOUS
OUTPATIENT
Start: 2023-12-12

## 2023-12-06 RX ORDER — PALONOSETRON 0.05 MG/ML
0.25 INJECTION, SOLUTION INTRAVENOUS ONCE
OUTPATIENT
Start: 2023-12-12

## 2023-12-06 RX ORDER — FAMOTIDINE 10 MG/ML
20 INJECTION, SOLUTION INTRAVENOUS AS NEEDED
OUTPATIENT
Start: 2023-12-12

## 2023-12-06 RX ORDER — AMIODARONE HYDROCHLORIDE 200 MG/1
200 TABLET ORAL
Qty: 30 TABLET | OUTPATIENT
Start: 2023-12-06

## 2023-12-06 RX ORDER — DIPHENHYDRAMINE HYDROCHLORIDE 50 MG/ML
50 INJECTION INTRAMUSCULAR; INTRAVENOUS AS NEEDED
OUTPATIENT
Start: 2023-12-12

## 2023-12-06 RX ORDER — ACETAMINOPHEN 325 MG/1
650 TABLET ORAL ONCE
OUTPATIENT
Start: 2023-12-12

## 2023-12-06 RX ORDER — SODIUM CHLORIDE 9 MG/ML
20 INJECTION, SOLUTION INTRAVENOUS ONCE
OUTPATIENT
Start: 2023-12-13

## 2023-12-06 NOTE — PROGRESS NOTES
Subjective     CHIEF COMPLAINT:      Chief Complaint   Patient presents with    Follow-up     Concern about derick on lt chin      HISTORY OF PRESENT ILLNESS:     Michelle Car is a 83 y.o. female patient who returns today for follow up on her lymphoma. She returns today for follow up accompanied by her son. She reports increase in the size of the left jaw mass. She noticed bruising that developed after she had the biopsy with the oral surgeon.     Patient is not noticing other lumps or enlarged lymph nodes.    ROS:  Pertinent ROS is in the HPI.     Past medical, surgical, social and family history were reviewed.     MEDICATIONS:    Current Outpatient Medications:     acetaminophen (TYLENOL) 500 MG tablet, Take 1 tablet by mouth Every 6 (Six) Hours As Needed for Mild Pain., Disp: , Rfl:     amiodarone (PACERONE) 200 MG tablet, Take 1 tablet by mouth Daily., Disp: 90 tablet, Rfl: 3    Calcium Carbonate (CALTRATE 600 PO), Take 1 tablet by mouth Daily., Disp: , Rfl:     carvedilol (COREG) 3.125 MG tablet, TAKE 1 TABLET BY MOUTH TWICE DAILY WITH MEALS, Disp: 180 tablet, Rfl: 1    cephalexin (Keflex) 500 MG capsule, Take 1 capsule by mouth 3 (Three) Times a Day., Disp: 7 capsule, Rfl: 0    Eliquis 5 MG tablet tablet, TAKE 1 TABLET BY MOUTH EVERY 12 HOURS FOR ATRIAL FIBRILLATION, Disp: 180 tablet, Rfl: 1    FeroSul 325 (65 Fe) MG tablet, TAKE 1 TABLET BY MOUTH 3 TIMES A WEEK, Disp: 36 tablet, Rfl: 0    Hydrocortisone, Perianal, (Anusol-HC) 2.5 % rectal cream, Apply to hemorrhoids 3 times daily for 7 days during hemorrhoid flare. Include applicator., Disp: 30 g, Rfl: 1    losartan (COZAAR) 100 MG tablet, , Disp: , Rfl:     Multiple Vitamins-Minerals (CENTRUM SILVER) tablet, Take 1 tablet by mouth Daily., Disp: , Rfl:     polyethylene glycol (MIRALAX) 17 GM/SCOOP powder, Take 17 g by mouth As Needed., Disp: , Rfl:     pravastatin (PRAVACHOL) 40 MG tablet, TAKE 1 TABLET BY MOUTH EVERY NIGHT AT BEDTIME, Disp: 90 tablet,  "Rfl: 1    spironolactone (ALDACTONE) 25 MG tablet, TAKE 1 TABLET BY MOUTH DAILY, Disp: 90 tablet, Rfl: 1    traMADol (ULTRAM) 50 MG tablet, Take 1 tablet by mouth Every 6 (Six) Hours As Needed for Moderate Pain., Disp: 12 tablet, Rfl: 0  Objective     VITAL SIGNS:     Vitals:    12/06/23 1152   BP: 143/81   Pulse: 59   Resp: 16   Temp: 97.7 °F (36.5 °C)   TempSrc: Temporal   SpO2: 99%   Weight: 67.2 kg (148 lb 1.6 oz)   Height: 152.4 cm (60\")   PainSc: 0-No pain     Body mass index is 28.92 kg/m².     Wt Readings from Last 5 Encounters:   12/06/23 67.2 kg (148 lb 1.6 oz)   11/21/23 68.9 kg (152 lb)   11/10/23 68.9 kg (152 lb)   10/23/23 68.9 kg (152 lb)   08/29/23 69.4 kg (153 lb)     PHYSICAL EXAMINATION:   GENERAL: The patient appears in good general condition, not in acute distress.   SKIN: No ecchymosis.  EYES: No jaundice. No Pallor.  MOUTH: Left jaw mass with a necrotic exposed area. No bleeding.  LYMPHATIC:  No cervical, supraclavicular or axillary lymphadenopathy.   CHEST: Normal respiratory effort. Lungs clear. No added sounds.  CVS: Normal S1 and S2. Grade II systolic murmur.  ABDOMEN: Non-distended.  EXTREMITIES: No noted deformity.     DIAGNOSTIC DATA:     Results from last 7 days   Lab Units 12/06/23  1136   WBC 10*3/mm3 4.01   NEUTROS ABS 10*3/mm3 2.58   HEMOGLOBIN g/dL 12.2   HEMATOCRIT % 35.7   PLATELETS 10*3/mm3 181     Results from last 7 days   Lab Units 12/06/23  1136   SODIUM mmol/L 132*   POTASSIUM mmol/L 4.6   CHLORIDE mmol/L 94*   CO2 mmol/L 29.0   BUN mg/dL 15   CREATININE mg/dL 0.84   CALCIUM mg/dL 9.1   ALBUMIN g/dL 3.9   BILIRUBIN mg/dL 0.5   ALK PHOS U/L 121*   ALT (SGPT) U/L 28   AST (SGOT) U/L 28   GLUCOSE mg/dL 87     Component      Latest Ref Rng 12/6/2023   LDH      135 - 214 U/L 219 (H)    Beta-2 Microglobulin      0.8 - 2.2 mg/L 3.7 (H)    Uric Acid      2.4 - 5.7 mg/dL 3.2       Pathology exam on 11/28/2023:  MANDIBLE, LEFT SIDE, BIOPSY (BLK [id number]):        - DIFFUSE LARGE B " CELL LYMPHOMA, NON-GCB PHENOTYPE             - KI-67: 90%             - DELILAH NEGATIVE        - POSITIVE FOR BCL6 REARRANGEMENT; NEGATIVE FOR BCL2 AND MYC REARRANGEMENT            Sections show sheets of large lymphocytes with little cytoplasm and a prominent   nucleoli.  Frequent apoptosis is found in the background.     Immunohistochemical stains are performed and show the large lymphocytes are CD20   positive B cells that express BCL6 (30%), MUM1 (strong diffuse), and BLC2 while   negative for CD10 and DELILAH ROBERT.  The Ki-67 index is approximately 90%.     Assessment & Plan    *Diffuse large B-cell lymphoma.     Patient was found to have bilateral pleural effusions.    She underwent right thoracentesis on 2/21/2022 and the left thoracentesis on 2/22/2022.    Analysis of the fluid revealed involvement with diffuse large B-cell lymphoma.    FISH analysis for BCL6 rearrangement was positive.    FISH analysis for BCL 1, BCL-2 and MYC rearrangement was negative.    PET scan on 3/10/2022 revealed significant hypermetabolism in the left adrenal gland and the surrounding soft tissue with SUV up to 34.5. Hypermetabolism in the left pleural thickening with SUV of 7.1. there was less hypermetabolism in the right adrenal gland and in the right pleura.  She was considered to have stage III non-bulky disease.  The degree of hypermetabolism is consistent with high grade lymphoma.   This concurred with the pathology exam of the pleural fluid cytology that revealed diffuse large B-cell lymphoma.  R-IPI score of 3 associated with poor risk - associated with overall survival of 55%.   R-CHOP with Neulasta support was started on 3/28/2022.  Patient developed neutropenia and thrombocytopenia after cycle#1.  Vincristine dose was reduced to 1 mg due to constipation.   Patient developed neutropenic fever after cycle #2 and was hospitalized.  Her performance status decreased as a result.   CT scan on 5/1/2022 revealed a decrease in the  right pleural effusion. The left suprarenal density was stable.  Regimen was changed to mini-RCHOP starting cycle #3 on 5/16/2022.  CT scans on 6/23/2022 showed evidence of response.  There was resolution of the pericardial effusion and near complete resolution of the pleural effusions.   Patient received cycle #6 on 7/25/2022.  PET scan on 8/15/2022 revealed complete metabolic response.  Biopsy of left mandible mass on 11/28/2023 showed recurrence of the lymphoma, diffuse large B cell, non-GCB phenotype.  Ki-67 was 90%.    It was positive for Bcl-6 rearrangement but negative for Bcl-2 and MYC rearrangement.  I explained the things to the patient.  I recommended combination regimen of Rituxan Polivy and Treanda with Treanda on days 1 and 2 every 3 weeks for 6 cycles.  We discussed possible radiation therapy to the mandibular lymphoma mass after completion of chemotherapy.  I explained the side effects including myelosuppression with risk for infection and bleeding.  We also discussed possible infusion reaction from Rituxan or Polivy.  I recommended 20% dose reduction of Treanda due to the patient's age.  I also recommended growth factor support with on body Neulasta.    *Lytic lesion at the left mandible.  Patient started a few months ago having swelling of the left lower jaw.  CT on 11/16/2023 revealed new lytic lesions at the left mandible with a dominant lesion measuring 2.8 x 1.4 cm at the body of the mandible.  It extended to the angle of the mandible and is contiguous with a lytic expansile mass at the ramus measuring 3.4 x 2.1 cm.  S/p biopsy by oral surgery on 11/28/2023.  Pathology exam revealed diffuse large B-cell lymphoma.  I explained to the patient that this represents recurrence of the lymphoma.    *Bilateral pleural effusions.   She is s/p right thoracentesis on 2/21/2022 and the effusion was found to be malignant attributed to the lymphoma.   CT scan on 5/1/2022 revealed decrease in the right  pleural effusion and an increase in the left effusion.   The increase in the left effusion was suspected of being due to cardiac and fluid overload states (fluid appeared to be serous on CT scan).  CT on 6/23/2022 revealed near resolution of the pleural effusions.  PET scan on 8/15/2022 showed that the pleural effusions were trace and improved since June 2022.  CT chest on 11/7/2022 revealed minimal partially loculated right pleural effusion.  CT chest on 3/7/2023 showed no recurrence of the effusion.  CT on 8/24/2023 showed no evidence of recurrence of the pleural effusions.  CT on 11/16/2023 showed no evidence of recurrence of the pleural effusions.  Exam today revealed no recurrence of the infusion.    *Iron deficiency anemia.  Patient also developed anemia secondary to lymphoma and secondary to chemotherapy.    Hemoglobin was 11.1 on 3/28/2022.  Hgb decreased to 7.9 on 4/25/2022 and she was transfused.   Hemoglobin decreased to 6.3 on 4/27/2022. She was transfused.  Hemoglobin improved to 8.8 on 5/16/2022.  Patient is on ferrous sulfate 325 mg 3 days a week.  11/21/2023: Hemoglobin 12.1.    Iron stores adequate with ferritin 141 and transferrin saturation 25%.  12/6/2023: Hemoglobin improved to 12.2.    *Hyponatremia.  Patient had problem with hyponatremia in the past.  Sodium is 127 on 3/14/2023.  She was advised to increase salt intake.  Sodium improved to 133 on repeat lab on 3/21/2023.  However, sodium decreased to 126 on 6/7/2023.  This is likely secondary to her medications.  11/21/2023: Sodium decreased to 125.  Patient was advised to increase sodium in her diet.    12/6/2023: Sodium improved to 132.    *Chronic anticoagulation.  Patient is on Eliquis 5 mg twice daily.  We will need to hold if platelets decreased to <50,000.    *13 x 9 mm nodular focus of consolidation in the right upper lobe.  Patient is reporting cough and congestion.  This is concerning for evolving pneumonia.  An underlying malignancy  could not be excluded.  CT on 11/16/2023 showed no suspicious findings.  This will be further evaluated on the PET scan.    *T12 compression deformity.  Patient is not complaining of pain in the area.  Bone density on 9/14/2023 showed osteoporosis.    We recommended starting treatment with Fosamax.    Due to the patient having problem with her teeth, Fosamax was not started.    *Venous access.  Patient previously had a port which was removed on 6/5/2023.  We discussed having a port placed for this treatment.  She has good veins and we will therefore use peripheral IVs for now.    Will consider port placement if there is difficulty with placement of IV lines in the future.    *Prophylaxis.  Patient had shingles in April 2023.  She is at risk of developing shingles while on treatment.  I recommended placement on acyclovir 400 mg twice daily.  I also recommended Bactrim DS 3 days a week on Mondays Wednesdays and Fridays.  I recommended receiving the shingles vaccine before she starts chemotherapy (but after the PET scan).     *Choledocholithiasis with increase in the intra and extrahepatic biliary dilatation seen on CT on 6/23/2022.  Liver enzymes including bilirubin are normal today.   She is not having abdominal pain.  No redness no tenderness on exam of the right upper quadrant.  PET scan on 8/15/2022 showed persistence of the findings.  No symptoms or signs of infection.  Patient was seen by Dr. Ortega.  She underwent ERCP on 10/21/2022.  There was a calculus of the bile duct without cholecystitis and without obstruction.  She had sphincterotomy and balloon sweep.  Alkaline phosphatase improved afterwards.  CT on 3/7/2023 revealed stable findings.  CT scan on 8/24/2023 showed chronic biliary duct dilatation.    CT scan on 11/16/2023 showed no changes.    PLAN:    1.  We will obtain a staging PET scan.   2.  We will schedule her for chemotherapy education.  3.  Plan to start Rituxan Polivy Treanda in 1 week.  Neulasta on pro will be given on day #2.  4.  We will schedule her for a CBC with RN review on day #8. She will see the NP on day #15 for toxicity assessment.   5.  I will see her in follow up in 4 weeks and she will be scheduled for cycle #2.     I spent 50 minutes caring for Michelle on this date of service. This time includes time spent by me in the following activities: preparing for the visit, reviewing tests, obtaining and/or reviewing a separately obtained history, performing a medically appropriate examination and/or evaluation, counseling and educating the patient/family/caregiver, ordering medications, tests, or procedures, documenting information in the medical record, independently interpreting results and communicating that information with the patient/family/caregiver, and care coordination     Renate Galo MD  12/06/23

## 2023-12-07 LAB — HBV CORE AB SERPL QL IA: NEGATIVE

## 2023-12-07 NOTE — TELEPHONE ENCOUNTER
I called [facility].  They did not get the prescription form 10/24/2023 sent by Dr Nichole.    Last TSH was 3/21/2023  Last EKG was 10/23/2023  Last CMP was yesterday      Wanted to verify before sending.

## 2023-12-08 RX ORDER — AMIODARONE HYDROCHLORIDE 200 MG/1
200 TABLET ORAL
Qty: 90 TABLET | Refills: 2 | Status: SHIPPED | OUTPATIENT
Start: 2023-12-08

## 2023-12-11 ENCOUNTER — HOSPITAL ENCOUNTER (OUTPATIENT)
Dept: PET IMAGING | Facility: HOSPITAL | Age: 83
Discharge: HOME | End: 2023-12-11
Payer: MEDICARE

## 2023-12-11 ENCOUNTER — OFFICE VISIT (OUTPATIENT)
Dept: ONCOLOGY | Facility: CLINIC | Age: 83
End: 2023-12-11
Payer: MEDICARE

## 2023-12-11 VITALS
RESPIRATION RATE: 18 BRPM | OXYGEN SATURATION: 100 % | TEMPERATURE: 98 F | HEIGHT: 60 IN | DIASTOLIC BLOOD PRESSURE: 77 MMHG | HEART RATE: 56 BPM | SYSTOLIC BLOOD PRESSURE: 136 MMHG | BODY MASS INDEX: 29.12 KG/M2 | WEIGHT: 148.3 LBS

## 2023-12-11 DIAGNOSIS — M27.8 JAW MASS: ICD-10-CM

## 2023-12-11 DIAGNOSIS — C83.38 DIFFUSE LARGE B-CELL LYMPHOMA OF LYMPH NODES OF MULTIPLE REGIONS: ICD-10-CM

## 2023-12-11 DIAGNOSIS — C83.38 DIFFUSE LARGE B-CELL LYMPHOMA OF LYMPH NODES OF MULTIPLE REGIONS: Primary | ICD-10-CM

## 2023-12-11 LAB — GLUCOSE BLDC GLUCOMTR-MCNC: 104 MG/DL (ref 70–130)

## 2023-12-11 PROCEDURE — 78815 PET IMAGE W/CT SKULL-THIGH: CPT

## 2023-12-11 PROCEDURE — 1126F AMNT PAIN NOTED NONE PRSNT: CPT | Performed by: NURSE PRACTITIONER

## 2023-12-11 PROCEDURE — 3078F DIAST BP <80 MM HG: CPT | Performed by: NURSE PRACTITIONER

## 2023-12-11 PROCEDURE — 0 FLUDEOXYGLUCOSE F18 SOLUTION: Performed by: INTERNAL MEDICINE

## 2023-12-11 PROCEDURE — 3075F SYST BP GE 130 - 139MM HG: CPT | Performed by: NURSE PRACTITIONER

## 2023-12-11 PROCEDURE — A9552 F18 FDG: HCPCS | Performed by: INTERNAL MEDICINE

## 2023-12-11 PROCEDURE — 99215 OFFICE O/P EST HI 40 MIN: CPT | Performed by: NURSE PRACTITIONER

## 2023-12-11 PROCEDURE — 82948 REAGENT STRIP/BLOOD GLUCOSE: CPT

## 2023-12-11 RX ORDER — ONDANSETRON HYDROCHLORIDE 8 MG/1
8 TABLET, FILM COATED ORAL 3 TIMES DAILY PRN
Qty: 30 TABLET | Refills: 5 | Status: SHIPPED | OUTPATIENT
Start: 2023-12-11

## 2023-12-11 RX ORDER — ACYCLOVIR 400 MG/1
400 TABLET ORAL 2 TIMES DAILY
Qty: 60 TABLET | Refills: 7 | Status: SHIPPED | OUTPATIENT
Start: 2023-12-11

## 2023-12-11 RX ORDER — FERROUS SULFATE 325(65) MG
325 TABLET ORAL EVERY OTHER DAY
Qty: 36 TABLET | Refills: 2 | Status: SHIPPED | OUTPATIENT
Start: 2023-12-11

## 2023-12-11 RX ORDER — SULFAMETHOXAZOLE AND TRIMETHOPRIM 800; 160 MG/1; MG/1
1 TABLET ORAL 3 TIMES WEEKLY
Qty: 12 TABLET | Refills: 7 | Status: SHIPPED | OUTPATIENT
Start: 2023-12-11

## 2023-12-11 RX ADMIN — FLUDEOXYGLUCOSE F 18 1 DOSE: 200 INJECTION, SOLUTION INTRAVENOUS at 13:42

## 2023-12-11 NOTE — PROGRESS NOTES
[facility] Hematology/Oncology Treatment Plan Summary    Name: Michelle Car  Acct# [id number]  MD: Dr. Galo    Diagnosis:     ICD-10-CM ICD-9-CM   1. Diffuse large B-cell lymphoma of lymph nodes of multiple regions  C83.38 202.88     Stage: Recurrent      Goal of treatment: disease control    Treatment Medication(s):   Rituxan Day 1  Polivy Day 1  Treanda days 1 and 2  Neulasta    Frequency: every 21 days    Number of cycles: 6    Starting on: 12/13/23    Repeat after 2-3 cycles: CT Scan    Items for home use: Thermometer    Rx written for: [x] Nausea    [x] Pre-Treatment   acyclovir 400 mg by mouth twice daily , Bactrim DS 1 tab by mouth on Mondays, Wednesdays, and Fridays, and ondansetron 8 mg by mouth every 8 hours as needed for nausea    Notes:     Next Steps:     Completing Provider: [facility]           Date/time: 12/11/2023      Please note: You will be seen by a provider frequently with your treatment plan. This plan may change depending on many factors, if so, this will be discussed with you by your physician.  Last update 03/2022.

## 2023-12-11 NOTE — PROGRESS NOTES
TREATMENT  PREPARATION    Michelle Car  [id number]  1940    Chief Complaint: Treatment preparation and needs assessment    History of present illness:  Michelle Car is a 83 y.o. year old female who is here today for treatment preparation and needs assessment.  The patient has been diagnosed with   Encounter Diagnosis   Name Primary?    Diffuse large B-cell lymphoma of lymph nodes of multiple regions Yes    and is scheduled to begin treatment with:     Oncology History:    Oncology/Hematology History   Diffuse large B-cell lymphoma of lymph nodes of multiple regions   2/2022 Initial Diagnosis    Diffuse large B-cell lymphoma of lymph nodes of multiple regions (HCC)     3/14/2022 Cancer Staged    Staging form: Hodgkin And Non-Hodgkin Lymphoma, AJCC 8th Edition  - Clinical: Stage III (Diffuse large B-cell lymphoma) - Signed by Renate Galo MD on 3/28/2022     3/28/2022 - 7/25/2022 Chemotherapy    OP LYMPHOMA R-CHOP RiTUXimab / Cyclophosphamide / DOXOrubicin / VinCRIStine / PredniSONE     4/25/2022 - 4/25/2022 Chemotherapy    OP SUPPORTIVE HYDRATION + ANTIEMETICS     4/25/2022 - 4/25/2022 Chemotherapy    OP HYDRATION AND ANTIEMETICS     12/12/2023 -  Chemotherapy    OP LYMPHOMA Bendamustine / Polatuzumab Vedotin-piiq / RiTUXimab         The current medication list and allergy list were reviewed and reconciled.     Past Medical History, Past Surgical History, Social History, Family History have been reviewed and are without significant changes except as mentioned.    Physical Exam:    Vitals:    12/11/23 1149   BP: 136/77   Pulse: 56   Resp: 18   Temp: 98 °F (36.7 °C)   SpO2: 100%     Vitals:    12/11/23 1149   PainSc: 0-No pain        ECOG score: 0             Physical Exam  HENT:      Head: Normocephalic and atraumatic.   Eyes:      Extraocular Movements: Extraocular movements intact.      Conjunctiva/sclera: Conjunctivae normal.   Pulmonary:      Effort: Pulmonary effort is normal. No  respiratory distress.   Neurological:      General: No focal deficit present.      Mental Status: She is alert and oriented to person, place, and time.   Psychiatric:         Mood and Affect: Mood normal.         Behavior: Behavior normal.           NEEDS ASSESSMENTS    Genetics  The patient's new diagnosis and family history have been reviewed for genetic counseling needs. The patient will not be referred..     Psychosocial and Barriers to care  The patient has completed a PHQ-9 Depression Screening and the Distress Thermometer (DT) today.  PHQ-9 results show PHQ-2 Total Score: 0 PHQ-9 Total Score: PHQ-9 Total Score: 0     The patient scored their distress today as Distress Level: 0-No distress on a scale of 0-10 with 0 being no distress and 10 being extreme distress. Problems marked by the patient as being an issue for them within the last week include   .      Results were reviewed along with psychosocial resources offered by our cancer center.  Our Supportive Oncology team will be flagged for a score of 4 or above, and a same day call will be made for a score of 9 or 10.  A mental health referral is offered at that time. Patients who score less than 4 have been educated on our support services and can be referred to our  upon request.  The patient will not be referred to our .       Nutrition  The patient has completed the malnutrition screening today. They scored Malnutrition Screening Tool  Have you recently lost weight without trying?  If yes, how much weight have you lost?: 0--> No  Have you been eating poorly because of a decreased appetite?: 0--> No  MST score: 0   with a score of 0-1 meaning not at risk in a score of 2 or greater meaning at risk.  Patients with a score of 3 or higher will be referred to our oncology dietitian for support. Patients beginning at risk treatment regimens or who have dietary concerns will also be referred to our oncology dietitian. The patient will  not be referred.    Functional Assessment  Persons who are age 70 or greater will be screened for qualification of a comprehensive geriatric assessment by our survivorship nurse practitioner.  Older adults with cancer face unique challenges. These may include an increased risk of drug reactions, financial burdens, and caregiver stress. The patient scored G8 Questionnaire  Has food intake declined over the past 3 months due to loss of appetite, digestive problems, chewing or swallowing difficulties?: Severe decrease in food intake  Weight loss during the last 3 months: Weight loss between 1 kg and 3 kg / 2.2 lbs and 6.6 lbs  Mobility: Goes out  Neuropsychological Problems: No psychological problems  Body Mass Index (BMI (weight in kg) / (height in m2)): BMI 23 and > 23  Takes more than 3 medications a day: Yes, takes more than 3 prescription drugs per day  In comparison with other people of the same age, how does the patient consider his/her health status?: As good  Age: < 80  Total Score: 12 . Patients scoring 14 or lower will referred for an older adult functional assessment with the survivorship advanced practice registered nurse to ensure all needed support is provided as patients plan for their treatments. The patient will be referred.    Intravenous Access Assessment  The patient and I discussed planned intravenous chemo/biotherapy as well as other IV treatments that are often needed throughout the course of treatment. These may include, but are not limited to blood transfusions, antibiotics, and IV hydration. Discussed that depending on selected treatment and vein assessment, patient may require venous access device (VAD) which could include but not limited to a Mediport or PICC line. Risks and benefits of VADs reviewed. The patient will be treated via PIV.    Reproductive/Sexual Activity   People should avoid becoming pregnant and should not get a partner pregnant while undergoing chemo/biotherapy.  People of  "childbearing age should use effective contraception during active therapy. The best recommendation for all people is to use a barrier method for a minimum of 1 week after the last infusion of chemo/biotherapy to prevent your partner being exposed to byproducts from treatment medications in bodily fluids. Effective contraception should be discussed with your oncology team to make sure it is safe to take based on your diagnosis. Possible options include oral contraceptives, barrier methods. Chemo/biotherapy can change your ability to reproduce children in the future.  There are options for fertility preservation. NOT APPLICABLE    Advanced Care Planning  Advance Care Planning   The patient and I discussed advanced care planning, \"Conversations that Matter\".   This service is offered for development of advance directives with a certified ACP facilitator.  The patient does have an up-to-date advanced directive. This document is on file with our office. The patient is not interested in an appointment with one of our facilitators to create or update their advanced directives.    Have you reviewed your Advance Directive and is it valid for this stay?: Not applicable  Patient Requests Assistance on Advance Directives: Patient Declined          Smoking cessation  Tobacco Use: Low Risk  (12/11/2023)    Patient History     Smoking Tobacco Use: Never     Smokeless Tobacco Use: Never     Passive Exposure: Not on file       Patient and I discussed their tobacco use history. Referral will not be made for smoking cessation.      Palliative Care  When appropriate, the patient and I discussed the availability palliative care services and when appropriate Hospice care. Palliative care is not the same as Hospice care which was explained to the patient.The patient is not interested in additional information from our  on these services.    Survivorship   When appropriate, we discussed that we will refer the patient to " survivorship clinic to discuss next steps following completion of planned treatment.  Reviewed this visit will include assessment of your physical, psychological, functional, and spiritual needs as a survivor and the need at attend this visit when scheduled.    TREATMENT EDUCATION    Today I met with the patient to discuss the chemo/biotherapy regimen recommended for treatment of Diffuse large B-cell lymphoma of lymph nodes of multiple regions  - Provider Communication  - Provider Communication  - acyclovir (Zovirax) 400 MG tablet  - sulfamethoxazole-trimethoprim (BACTRIM DS,SEPTRA DS) 800-160 MG per tablet  - ondansetron (ZOFRAN) 8 MG tablet  .  The patient was given explanation of treatment premed side effects including office policy that prohibits patients to drive if sedating medications are administered, MD explanation given regarding benefits, side effects, toxicities and goals of treatment.  The patient received a Chemotherapy/Biotherapy Plan Summary including diagnosis and explanation of specific treatment plan.    SIDE EFFECTS:  Common side effects were discussed with the patient and/or significant other.  Discussion included where applicable hair loss/discoloration, anemia/fatigue, infection/chills/fever, appetite, bleeding risk/precautions, constipation, diarrhea, mouth sores, taste alteration, loss of appetite, nausea/vomiting, peripheral neuropathy, skin/nail changes, rash, muscle aches/weakness, photosensitivity, weight gain/loss, hearing loss, dizziness, menopausal symptoms, menstrual irregularity, sterility, high blood pressure, heart damage, liver damage, lung damage, kidney damage, DVT/PE risk, fluid retention, pleural/pericardial effusion, somnolence, electrolyte/LFT imbalance, vein exercises and/or the possible need for vascular access/port placement.  The patient was advised that although uncommon, leakage of an infused medication from the vein or venous access device may lead to skin breakdown  and/or other tissue damage.  The patient was advised that he/she may have pain, bleeding, and/or bruising from the insertion of a needle in their vein or venous access device (port).  The patient was further advised that, in spite of proper technique, infection with redness and irritation may rarely occur at the site where the needle was inserted.  The patient was advised that if complications occur, additional medical treatment is available.  Finally, where applicable we have reviewed rare but potential immune mediated side effects including shortness of breath, cough, chest pain (pneumonitis), abdominal pain, diarrhea (colitis), thyroiditis (hypothyroid or hyperthyroid), hepatitis and liver dysfunction, nephritis and renal dysfunction.    Discussion also included side effects specific to drugs in the treatment plan, specifically:    Treatment Plans       Name Type Plan Dates Plan Provider         Active    OP LYMPHOMA Bendamustine / Polatuzumab Vedotin-piiq / RiTUXimab ONCOLOGY TREATMENT  12/11/2023 - Present Renate Galo MD                      Questions answered and additional information discussed on topics including:  Anemia, Thrombocytopenia, Neutropenia, Nutrition and appetite changes, Diarrhea, Nausea & vomiting, Mouth sores, Nervous system changes, Skin & nail changes, and Organ toxicities       Assessment and Plan:    Diagnoses and all orders for this visit:    1. Diffuse large B-cell lymphoma of lymph nodes of multiple regions (Primary)  -     Provider Communication  -     Provider Communication  -     acyclovir (Zovirax) 400 MG tablet; Take 1 tablet by mouth 2 (Two) Times a Day.  Dispense: 60 tablet; Refill: 7  -     sulfamethoxazole-trimethoprim (BACTRIM DS,SEPTRA DS) 800-160 MG per tablet; Take 1 tablet by mouth 3 (Three) Times a Week.  Dispense: 12 tablet; Refill: 7  -     ondansetron (ZOFRAN) 8 MG tablet; Take 1 tablet by mouth 3 (Three) Times a Day As Needed for Nausea or Vomiting.  Dispense:  30 tablet; Refill: 5    Other orders  -     ferrous sulfate (FeroSul) 325 (65 FE) MG tablet; Take 1 tablet by mouth Every Other Day.  Dispense: 36 tablet; Refill: 2      Orders Placed This Encounter   Procedures    Provider Communication     Patient must have Hepatitis B test performed prior to starting treatment.    Provider Communication     Patient at risk for Tumor Lysis Syndrome         The patient and I have reviewed their diagnosis and scheduled treatment plan. Needs assessment was completed where applicable including genetics, psychosocial needs, barriers to care, VAD evaluation, advanced care planning, survivorship, and palliative care services where indicated. Referrals have been ordered as appropriate based upon evaluation today and patient desires.   Chemo/biotherapy teaching was completed today and consent obtained. See separate documentation for further details.  Adequate time was given to answer questions.  Patient made aware of their care team members and contact information if they have questions or problems throughout the treatment course.  Discussion held and written information provided describing frequency of office visits and ongoing monitoring throughout the treatment plan.     Reviewed with patient any prescribed medication sent to pharmacy.  Education provided regarding proper storage, safe handling, and proper disposal of unused medication.  Proper handling of body fluids and waste discussed and written information provided.  If appropriate, patient had pretreatment labs drawn today.    Learning assessment completed at initial patient encounter. See separate flowsheet. Chemo/biotherapy education comprehension assessed at today's visit.    I spent 45 minutes caring for Michelle on this date of service. This time includes time spent by me in the following activities: preparing for the visit, reviewing tests, obtaining and/or reviewing a separately obtained history, performing a medically  appropriate examination and/or evaluation, counseling and educating the patient/family/caregiver, ordering medications, tests, or procedures, referring and communicating with other health care professionals, and documenting information in the medical record.     Aylin Spears, APRN   12/11/23

## 2023-12-12 ENCOUNTER — OFFICE VISIT (OUTPATIENT)
Dept: ONCOLOGY | Facility: CLINIC | Age: 83
End: 2023-12-12
Payer: MEDICARE

## 2023-12-12 ENCOUNTER — INFUSION (OUTPATIENT)
Dept: ONCOLOGY | Facility: HOSPITAL | Age: 83
End: 2023-12-12
Payer: MEDICARE

## 2023-12-12 VITALS — DIASTOLIC BLOOD PRESSURE: 64 MMHG | SYSTOLIC BLOOD PRESSURE: 120 MMHG | HEART RATE: 60 BPM

## 2023-12-12 VITALS
TEMPERATURE: 97.8 F | HEIGHT: 60 IN | OXYGEN SATURATION: 100 % | WEIGHT: 147.9 LBS | DIASTOLIC BLOOD PRESSURE: 73 MMHG | SYSTOLIC BLOOD PRESSURE: 121 MMHG | BODY MASS INDEX: 29.04 KG/M2 | RESPIRATION RATE: 18 BRPM | HEART RATE: 60 BPM

## 2023-12-12 DIAGNOSIS — C83.38 DIFFUSE LARGE B-CELL LYMPHOMA OF LYMPH NODES OF MULTIPLE REGIONS: ICD-10-CM

## 2023-12-12 DIAGNOSIS — R91.8 PULMONARY NODULES: ICD-10-CM

## 2023-12-12 DIAGNOSIS — J90 BILATERAL PLEURAL EFFUSION: ICD-10-CM

## 2023-12-12 DIAGNOSIS — C83.38 DIFFUSE LARGE B-CELL LYMPHOMA OF LYMPH NODES OF MULTIPLE REGIONS: Primary | ICD-10-CM

## 2023-12-12 DIAGNOSIS — D50.9 IRON DEFICIENCY ANEMIA, UNSPECIFIED IRON DEFICIENCY ANEMIA TYPE: ICD-10-CM

## 2023-12-12 DIAGNOSIS — E87.1 HYPONATREMIA: ICD-10-CM

## 2023-12-12 DIAGNOSIS — M80.00XA AGE-RELATED OSTEOPOROSIS WITH CURRENT PATHOLOGICAL FRACTURE, INITIAL ENCOUNTER: ICD-10-CM

## 2023-12-12 LAB
ALBUMIN SERPL-MCNC: 3.9 G/DL (ref 3.5–5.2)
ALBUMIN/GLOB SERPL: 1.4 G/DL
ALP SERPL-CCNC: 127 U/L (ref 39–117)
ALT SERPL W P-5'-P-CCNC: 25 U/L (ref 1–33)
ANION GAP SERPL CALCULATED.3IONS-SCNC: 6.2 MMOL/L (ref 5–15)
AST SERPL-CCNC: 30 U/L (ref 1–32)
BASOPHILS # BLD AUTO: 0.05 10*3/MM3 (ref 0–0.2)
BASOPHILS NFR BLD AUTO: 1.3 % (ref 0–1.5)
BILIRUB SERPL-MCNC: 0.5 MG/DL (ref 0–1.2)
BUN SERPL-MCNC: 15 MG/DL (ref 8–23)
BUN/CREAT SERPL: 17.4 (ref 7–25)
CALCIUM SPEC-SCNC: 9.2 MG/DL (ref 8.6–10.5)
CHLORIDE SERPL-SCNC: 89 MMOL/L (ref 98–107)
CO2 SERPL-SCNC: 27.8 MMOL/L (ref 22–29)
CREAT SERPL-MCNC: 0.86 MG/DL (ref 0.57–1)
DEPRECATED RDW RBC AUTO: 46.7 FL (ref 37–54)
EGFRCR SERPLBLD CKD-EPI 2021: 67.5 ML/MIN/1.73
EOSINOPHIL # BLD AUTO: 0.05 10*3/MM3 (ref 0–0.4)
EOSINOPHIL NFR BLD AUTO: 1.3 % (ref 0.3–6.2)
ERYTHROCYTE [DISTWIDTH] IN BLOOD BY AUTOMATED COUNT: 14.2 % (ref 12.3–15.4)
GLOBULIN UR ELPH-MCNC: 2.7 GM/DL
GLUCOSE SERPL-MCNC: 85 MG/DL (ref 65–99)
HCT VFR BLD AUTO: 34.4 % (ref 34–46.6)
HGB BLD-MCNC: 12.2 G/DL (ref 12–15.9)
IMM GRANULOCYTES # BLD AUTO: 0.01 10*3/MM3 (ref 0–0.05)
IMM GRANULOCYTES NFR BLD AUTO: 0.3 % (ref 0–0.5)
LYMPHOCYTES # BLD AUTO: 0.93 10*3/MM3 (ref 0.7–3.1)
LYMPHOCYTES NFR BLD AUTO: 24.5 % (ref 19.6–45.3)
MCH RBC QN AUTO: 32.1 PG (ref 26.6–33)
MCHC RBC AUTO-ENTMCNC: 35.5 G/DL (ref 31.5–35.7)
MCV RBC AUTO: 90.5 FL (ref 79–97)
MONOCYTES # BLD AUTO: 0.42 10*3/MM3 (ref 0.1–0.9)
MONOCYTES NFR BLD AUTO: 11.1 % (ref 5–12)
NEUTROPHILS NFR BLD AUTO: 2.34 10*3/MM3 (ref 1.7–7)
NEUTROPHILS NFR BLD AUTO: 61.5 % (ref 42.7–76)
NRBC BLD AUTO-RTO: 0 /100 WBC (ref 0–0.2)
PLATELET # BLD AUTO: 183 10*3/MM3 (ref 140–450)
PMV BLD AUTO: 8.5 FL (ref 6–12)
POTASSIUM SERPL-SCNC: 4.8 MMOL/L (ref 3.5–5.2)
PROT SERPL-MCNC: 6.6 G/DL (ref 6–8.5)
RBC # BLD AUTO: 3.8 10*6/MM3 (ref 3.77–5.28)
SODIUM SERPL-SCNC: 123 MMOL/L (ref 136–145)
WBC NRBC COR # BLD AUTO: 3.8 10*3/MM3 (ref 3.4–10.8)

## 2023-12-12 PROCEDURE — 25010000002 PALONOSETRON PER 25 MCG: Performed by: INTERNAL MEDICINE

## 2023-12-12 PROCEDURE — 25810000003 SODIUM CHLORIDE 0.9 % SOLUTION 250 ML FLEX CONT: Performed by: INTERNAL MEDICINE

## 2023-12-12 PROCEDURE — 25810000003 SODIUM CHLORIDE 0.9 % SOLUTION: Performed by: INTERNAL MEDICINE

## 2023-12-12 PROCEDURE — 96413 CHEMO IV INFUSION 1 HR: CPT

## 2023-12-12 PROCEDURE — 25010000002 RITUXIMAB 10 MG/ML SOLUTION 50 ML VIAL: Performed by: INTERNAL MEDICINE

## 2023-12-12 PROCEDURE — 25010000002 DIPHENHYDRAMINE PER 50 MG: Performed by: INTERNAL MEDICINE

## 2023-12-12 PROCEDURE — 25010000002 DEXAMETHASONE SODIUM PHOSPHATE 100 MG/10ML SOLUTION: Performed by: INTERNAL MEDICINE

## 2023-12-12 PROCEDURE — 25010000002 BENDAMUSTINE HCL 100 MG/4ML SOLUTION 4 ML VIAL: Performed by: INTERNAL MEDICINE

## 2023-12-12 PROCEDURE — 96415 CHEMO IV INFUSION ADDL HR: CPT

## 2023-12-12 PROCEDURE — 80053 COMPREHEN METABOLIC PANEL: CPT

## 2023-12-12 PROCEDURE — 85025 COMPLETE CBC W/AUTO DIFF WBC: CPT

## 2023-12-12 PROCEDURE — 96417 CHEMO IV INFUS EACH ADDL SEQ: CPT

## 2023-12-12 PROCEDURE — 25810000003 SODIUM CHLORIDE 0.9 % SOLUTION 500 ML FLEX CONT: Performed by: INTERNAL MEDICINE

## 2023-12-12 PROCEDURE — 25010000002 POLATUZUMAB VEDOTIN-PIIQ 30 MG RECONSTITUTED SOLUTION 1 EACH VIAL: Performed by: INTERNAL MEDICINE

## 2023-12-12 PROCEDURE — 25010000002 RITUXIMAB 10 MG/ML SOLUTION 10 ML VIAL: Performed by: INTERNAL MEDICINE

## 2023-12-12 PROCEDURE — 96375 TX/PRO/DX INJ NEW DRUG ADDON: CPT

## 2023-12-12 RX ORDER — SODIUM CHLORIDE 1 G/1
1 TABLET ORAL DAILY
Qty: 30 TABLET | Refills: 1 | Status: SHIPPED | OUTPATIENT
Start: 2023-12-12

## 2023-12-12 RX ORDER — FAMOTIDINE 10 MG/ML
20 INJECTION, SOLUTION INTRAVENOUS ONCE
Status: COMPLETED | OUTPATIENT
Start: 2023-12-12 | End: 2023-12-12

## 2023-12-12 RX ORDER — FAMOTIDINE 10 MG/ML
20 INJECTION, SOLUTION INTRAVENOUS ONCE
Status: CANCELLED | OUTPATIENT
Start: 2023-12-12

## 2023-12-12 RX ORDER — PALONOSETRON 0.05 MG/ML
0.25 INJECTION, SOLUTION INTRAVENOUS ONCE
Status: COMPLETED | OUTPATIENT
Start: 2023-12-12 | End: 2023-12-12

## 2023-12-12 RX ORDER — ACETAMINOPHEN 325 MG/1
650 TABLET ORAL ONCE
Status: COMPLETED | OUTPATIENT
Start: 2023-12-12 | End: 2023-12-12

## 2023-12-12 RX ORDER — SODIUM CHLORIDE 9 MG/ML
20 INJECTION, SOLUTION INTRAVENOUS ONCE
Status: COMPLETED | OUTPATIENT
Start: 2023-12-12 | End: 2023-12-12

## 2023-12-12 RX ADMIN — SODIUM CHLORIDE 20 ML/HR: 9 INJECTION, SOLUTION INTRAVENOUS at 09:33

## 2023-12-12 RX ADMIN — DIPHENHYDRAMINE HYDROCHLORIDE 50 MG: 50 INJECTION, SOLUTION INTRAMUSCULAR; INTRAVENOUS at 09:37

## 2023-12-12 RX ADMIN — POLATUZUMAB VEDOTIN 120 MG: 30 INJECTION, POWDER, LYOPHILIZED, FOR SOLUTION INTRAVENOUS at 14:45

## 2023-12-12 RX ADMIN — PALONOSETRON HYDROCHLORIDE 0.25 MG: 0.25 INJECTION INTRAVENOUS at 09:37

## 2023-12-12 RX ADMIN — ACETAMINOPHEN 650 MG: 325 TABLET ORAL at 09:33

## 2023-12-12 RX ADMIN — RITUXIMAB 620 MG: 10 INJECTION, SOLUTION INTRAVENOUS at 11:16

## 2023-12-12 RX ADMIN — DEXAMETHASONE SODIUM PHOSPHATE 12 MG: 10 INJECTION, SOLUTION INTRAMUSCULAR; INTRAVENOUS at 09:57

## 2023-12-12 RX ADMIN — BENDAMUSTINE HYDROCHLORIDE 120 MG: 100 INJECTION INTRAVENOUS at 10:22

## 2023-12-12 RX ADMIN — FAMOTIDINE 20 MG: 10 INJECTION INTRAVENOUS at 09:34

## 2023-12-12 NOTE — PROGRESS NOTES
Subjective     CHIEF COMPLAINT:      Chief Complaint   Patient presents with    Follow-up     Tx     HISTORY OF PRESENT ILLNESS:     Michelle Car is a 83 y.o. female patient who returns today for follow up on her lymphoma.  She returns today for follow-up accompanied by her daughter.  She underwent PET scan yesterday.    Patient reports that she occasionally has discomfort in the left side of her mouth when she chews and food comes over the area of the left mandibular mass.  She is not having pain otherwise.  She is not complaining of chest pain or shortness of breath.    ROS:  Pertinent ROS is in the HPI.     Past medical, surgical, social and family history were reviewed.     MEDICATIONS:    Current Outpatient Medications:     acetaminophen (TYLENOL) 500 MG tablet, Take 1 tablet by mouth Every 6 (Six) Hours As Needed for Mild Pain., Disp: , Rfl:     acyclovir (Zovirax) 400 MG tablet, Take 1 tablet by mouth 2 (Two) Times a Day., Disp: 60 tablet, Rfl: 7    amiodarone (PACERONE) 200 MG tablet, Take 1 tablet by mouth Daily., Disp: 90 tablet, Rfl: 2    Calcium Carbonate (CALTRATE 600 PO), Take 1 tablet by mouth Daily., Disp: , Rfl:     carvedilol (COREG) 3.125 MG tablet, TAKE 1 TABLET BY MOUTH TWICE DAILY WITH MEALS, Disp: 180 tablet, Rfl: 1    cephalexin (Keflex) 500 MG capsule, Take 1 capsule by mouth 3 (Three) Times a Day., Disp: 7 capsule, Rfl: 0    Eliquis 5 MG tablet tablet, TAKE 1 TABLET BY MOUTH EVERY 12 HOURS FOR ATRIAL FIBRILLATION, Disp: 180 tablet, Rfl: 1    ferrous sulfate (FeroSul) 325 (65 FE) MG tablet, Take 1 tablet by mouth Every Other Day., Disp: 36 tablet, Rfl: 2    Hydrocortisone, Perianal, (Anusol-HC) 2.5 % rectal cream, Apply to hemorrhoids 3 times daily for 7 days during hemorrhoid flare. Include applicator., Disp: 30 g, Rfl: 1    losartan (COZAAR) 100 MG tablet, , Disp: , Rfl:     Multiple Vitamins-Minerals (CENTRUM SILVER) tablet, Take 1 tablet by mouth Daily., Disp: , Rfl:      "ondansetron (ZOFRAN) 8 MG tablet, Take 1 tablet by mouth 3 (Three) Times a Day As Needed for Nausea or Vomiting., Disp: 30 tablet, Rfl: 5    polyethylene glycol (MIRALAX) 17 GM/SCOOP powder, Take 17 g by mouth As Needed., Disp: , Rfl:     pravastatin (PRAVACHOL) 40 MG tablet, TAKE 1 TABLET BY MOUTH EVERY NIGHT AT BEDTIME, Disp: 90 tablet, Rfl: 1    spironolactone (ALDACTONE) 25 MG tablet, TAKE 1 TABLET BY MOUTH DAILY, Disp: 90 tablet, Rfl: 1    sulfamethoxazole-trimethoprim (BACTRIM DS,SEPTRA DS) 800-160 MG per tablet, Take 1 tablet by mouth 3 (Three) Times a Week., Disp: 12 tablet, Rfl: 7    traMADol (ULTRAM) 50 MG tablet, Take 1 tablet by mouth Every 6 (Six) Hours As Needed for Moderate Pain., Disp: 12 tablet, Rfl: 0  No current facility-administered medications for this visit.  Objective     VITAL SIGNS:     Vitals:    12/12/23 0841   BP: 121/73   Pulse: 60   Resp: 18   Temp: 97.8 °F (36.6 °C)   TempSrc: Temporal   SpO2: 100%   Weight: 67.1 kg (147 lb 14.4 oz)   Height: 152.4 cm (60\")   PainSc: 0-No pain     Body mass index is 28.88 kg/m².     Wt Readings from Last 5 Encounters:   12/12/23 67.1 kg (147 lb 14.4 oz)   12/11/23 67.3 kg (148 lb 4.8 oz)   12/06/23 67.2 kg (148 lb 1.6 oz)   11/21/23 68.9 kg (152 lb)   11/10/23 68.9 kg (152 lb)     PHYSICAL EXAMINATION:   GENERAL: The patient appears in good general condition, not in acute distress.   SKIN: No ecchymosis.  EYES: No jaundice. No Pallor.  MOUTH: Large left mandibular mass.  CHEST: Normal respiratory effort.   CVS: No edema.  ABDOMEN: Nondistended.  EXTREMITIES: No noted deformity.     DIAGNOSTIC DATA:     Results from last 7 days   Lab Units 12/12/23  0832 12/06/23  1136   WBC 10*3/mm3 3.80 4.01   NEUTROS ABS 10*3/mm3 2.34 2.58   HEMOGLOBIN g/dL 12.2 12.2   HEMATOCRIT % 34.4 35.7   PLATELETS 10*3/mm3 183 181     Results from last 7 days   Lab Units 12/06/23  1136   SODIUM mmol/L 132*   POTASSIUM mmol/L 4.6   CHLORIDE mmol/L 94*   CO2 mmol/L 29.0   BUN " mg/dL 15   CREATININE mg/dL 0.84   CALCIUM mg/dL 9.1   ALBUMIN g/dL 3.9   BILIRUBIN mg/dL 0.5   ALK PHOS U/L 121*   ALT (SGPT) U/L 28   AST (SGOT) U/L 28   GLUCOSE mg/dL 87       Component      Latest Ref Rng 12/6/2023   Hepatitis B Surface Ag      Non-Reactive  Non-Reactive    Hep B Core Total Ab      Negative  Negative    Hep B S Ab      Non-Reactive  Non-Reactive      Component      Latest Ref Rng 12/6/2023   LDH      135 - 214 U/L 219 (H)    Beta-2 Microglobulin      0.8 - 2.2 mg/L 3.7 (H)    Uric Acid      2.4 - 5.7 mg/dL 3.2       PET scan on 12/11/2023:  There are many hypermetabolic metastases.  1. The large lytic mass at the left mandible is intensely hypermetabolic  with a maximal SUV of 43. There is a subtle hypermetabolic 1.2 cm soft  tissue nodule posterior to the posterior lateral wall of the left  maxillary sinus, CT image 20, with a maximal SUV of 13.0.     2. There is a 1.4 x 1.1 cm nodular density at the prevascular space of  the mediastinum, CT image 118, which has a maximal SUV of 18.3. There is  a subcentimeter density at the left epicardial fat pad which has a  maximal SUV of 5.9 and there is low-level pleural activity posterior to  the left anterior 4th rib with a maximal SUV of 4.9.There is  hypermetabolic activity at the heart, likely along the pericardium or  possibly intramuscular with a maximal SUV of 9.8. There is low-level  activity at old healing fractures at the anterior aspect of the right  4th-7th ribs, likely benign.     3. There is also new hypermetabolic activity at both adrenal glands  which have uniformly increased in size and appear hyperplastic with a  maximal SUV on the right of 12.6 and on the left 12.1. There is a subtle  hypermetabolic 2.7 x 1.1 cm soft tissue nodule lateral to the right  erector spinae muscle posterior to the right posterior 11th rib with a  SUV 20.5 and there is a subtle 1.3 cm nodule lateral to the posterior  margin of the left psoas muscle at the  level of the upper pole of the  left kidney with SUV of 24.9. There is also a very small hypermetabolic  focus posterior to the left kidney along slightly thickened posterior  peritoneum, image 175. There is nonspecific bowel activity. There is no  suspicious hypermetabolic activity within the pelvis.     Assessment & Plan    *Diffuse large B-cell lymphoma.     Patient was found to have bilateral pleural effusions.    She underwent right thoracentesis on 2/21/2022 and the left thoracentesis on 2/22/2022.    Analysis of the fluid revealed involvement with diffuse large B-cell lymphoma.    FISH analysis for BCL6 rearrangement was positive.    FISH analysis for BCL 1, BCL-2 and MYC rearrangement was negative.    PET scan on 3/10/2022 revealed significant hypermetabolism in the left adrenal gland and the surrounding soft tissue with SUV up to 34.5. Hypermetabolism in the left pleural thickening with SUV of 7.1. there was less hypermetabolism in the right adrenal gland and in the right pleura.  She was considered to have stage III non-bulky disease.  This concurred with the pathology exam of the pleural fluid cytology that revealed diffuse large B-cell lymphoma.  R-IPI score of 3 associated with poor risk - associated with overall survival of 55%.   R-CHOP with Neulasta support was started on 3/28/2022.  Patient developed neutropenia and thrombocytopenia after cycle#1.  Patient developed neutropenic fever after cycle #2 and was hospitalized.  Her performance status decreased as a result.   Regimen was changed to mini-RCHOP starting cycle #3 on 5/16/2022.  CT scans on 6/23/2022 showed evidence of response.  There was resolution of the pericardial effusion and near complete resolution of the pleural effusions.   Patient received cycle #6 on 7/25/2022.  PET scan on 8/15/2022 revealed complete metabolic response.  Patient was found to have recurrence with development of a left mandibular mass in October/November  2023.  Biopsy of left mandible mass on 11/28/2023 showed recurrence of the lymphoma, diffuse large B cell, non-GCB phenotype.  Ki-67 was 90%.    It was positive for Bcl-6 rearrangement but negative for Bcl-2 and MYC rearrangement.  PET scan on 12/11/2023 revealed multiple areas of involvement -prevascular space, left epicardial fat pad, pleural density posterior to the left fourth rib anterior aspect, lesion along the pericardium versus intramuscular with SUV of 9.8.  Hypermetabolic activity at both adrenal glands, uniformly increased in size and appeared hyperplastic.  There was a 2.7 x 1.1 cm soft tissue nodule lateral to the right erector spinae muscle posterior to the right posterior 11th rib with an SUV of 20.5 and there was a 1.3 cm nodule lateral to the posterior margin of the left psoas muscle with an SUV of 24.9.  I explained the things to the patient.  Treatment with Rituxan Polivy and Treanda with Treanda on days 1 and 2 every 3 weeks for 6 cycles was recommended.  Patient decided to proceed.  I explained to the patient that the treatment is not curative and that the goal is to control the lymphoma and improve her symptoms.   We will consider after completion of treatment radiation therapy to the left mandibular mass.    After completion of treatment, we will also consider maintenance therapy with ibrutinib or Revlimid.    *Bilateral pleural effusions.   She is s/p right thoracentesis on 2/21/2022 and the effusion was found to be malignant attributed to the lymphoma.   CT scan on 5/1/2022 revealed decrease in the right pleural effusion and an increase in the left effusion.   The increase in the left effusion was suspected of being due to cardiac and fluid overload states (fluid appeared to be serous on CT scan).  CT on 6/23/2022 revealed near resolution of the pleural effusions.  PET scan on 8/15/2022 showed that the pleural effusions were trace and improved since June 2022.  CT chest on 11/7/2022  revealed minimal partially loculated right pleural effusion.  CT chest on 3/7/2023 showed no recurrence of the effusion.  CT on 8/24/2023 showed no evidence of recurrence of the pleural effusions.  CT on 11/16/2023 showed no evidence of recurrence of the pleural effusions.  No evidence of recurrence of the effusions.    *Iron deficiency anemia.  Patient also developed anemia secondary to lymphoma and secondary to chemotherapy.    Hemoglobin was 11.1 on 3/28/2022.  Hgb decreased to 7.9 on 4/25/2022 and she was transfused.   Hemoglobin decreased to 6.3 on 4/27/2022. She was transfused.  Hemoglobin improved to 8.8 on 5/16/2022.  Patient is on ferrous sulfate 325 mg 3 days a week.  11/21/2023: Hemoglobin 12.1.    Iron stores adequate with ferritin 141 and transferrin saturation 25%.  12/12/2023: Hemoglobin 12.2.    *Hyponatremia.  Patient had problem with hyponatremia in the past.  Sodium is 127 on 3/14/2023.  She was advised to increase salt intake.  Sodium improved to 133 on repeat lab on 3/21/2023.  However, sodium decreased to 126 on 6/7/2023.  This is likely secondary to her medications.  11/21/2023: Sodium decreased to 125.  Patient was advised to increase sodium in her diet.    12/6/2023: Sodium improved to 132.  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    *Chronic anticoagulation.  Patient is on Eliquis 5 mg twice daily.  We will need to hold if platelets decrease to <50,000.    *13 x 9 mm nodular focus of consolidation in the right upper lobe.  Patient is reporting cough and congestion.  This is concerning for evolving pneumonia.  An underlying malignancy could not be excluded.  CT on 11/16/2023 showed no suspicious findings.  No hypermetabolism in this area.    *T12 compression deformity.  Patient is not complaining of pain in the area.  Bone density on 9/14/2023 showed osteoporosis.    We recommended starting treatment with Fosamax.    Due to the patient having  problem with her teeth, Fosamax was not started.    *Venous access.  Patient previously had a port which was removed on 6/5/2023.  We discussed having a port placed for this treatment.  She has good veins and we will therefore use peripheral IVs for now.    Will consider port placement if there is difficulty with placement of IV lines in the future.    *Prophylaxis.  Patient had shingles in April 2023.  She is at risk of developing shingles while on treatment.  I recommended placement on acyclovir 400 mg twice daily.  I also recommended Bactrim DS 3 days a week on Mondays Wednesdays and Fridays.  I recommended receiving the shingles vaccine before she starts chemotherapy (but after the PET scan).     *Choledocholithiasis with increase in the intra and extrahepatic biliary dilatation seen on CT on 6/23/2022.  Liver enzymes including bilirubin are normal today.   She is not having abdominal pain.  No redness no tenderness on exam of the right upper quadrant.  PET scan on 8/15/2022 showed persistence of the findings.  No symptoms or signs of infection.  Patient was seen by Dr. Ortega.  She underwent ERCP on 10/21/2022.  There was a calculus of the bile duct without cholecystitis and without obstruction.  She had sphincterotomy and balloon sweep.  Alkaline phosphatase improved afterwards.  CT on 3/7/2023 revealed stable findings.  CT scan on 8/24/2023 showed chronic biliary duct dilatation.    CT scan on 11/16/2023 showed no changes.    PLAN:    1.  Start treatment today with Rituxan, Polivy and Treanda.  Treanda will be given with 20% dose reduction.  2.  Patient will have on body Neulasta on day#2.  3.  Start sodium chloride 1 g daily.  4.  Increase dietary salt intake.  5.  Return in 1 week for CBC and CMP with RN review.  6.  Patient will see the nurse practitioner in 2 weeks for toxicity assessment.  7.  I will see her in follow-up in 3 weeks.  She will be scheduled for cycle #2.    I spent 45 minutes caring for  Michelle on this date of service. This time includes time spent by me in the following activities: preparing for the visit, reviewing tests, obtaining and/or reviewing a separately obtained history, performing a medically appropriate examination and/or evaluation, counseling and educating the patient/family/caregiver, ordering medications, tests, or procedures, documenting information in the medical record, independently interpreting results and communicating that information with the patient/family/caregiver, and care coordination       Renate Galo MD  12/12/23

## 2023-12-13 ENCOUNTER — INFUSION (OUTPATIENT)
Dept: ONCOLOGY | Facility: HOSPITAL | Age: 83
End: 2023-12-13
Payer: MEDICARE

## 2023-12-13 VITALS
DIASTOLIC BLOOD PRESSURE: 66 MMHG | TEMPERATURE: 97.7 F | WEIGHT: 149 LBS | BODY MASS INDEX: 29.1 KG/M2 | OXYGEN SATURATION: 97 % | HEART RATE: 66 BPM | RESPIRATION RATE: 16 BRPM | SYSTOLIC BLOOD PRESSURE: 119 MMHG

## 2023-12-13 DIAGNOSIS — C83.38 DIFFUSE LARGE B-CELL LYMPHOMA OF LYMPH NODES OF MULTIPLE REGIONS: Primary | ICD-10-CM

## 2023-12-13 PROCEDURE — 96413 CHEMO IV INFUSION 1 HR: CPT

## 2023-12-13 PROCEDURE — 25810000003 SODIUM CHLORIDE 0.9 % SOLUTION: Performed by: INTERNAL MEDICINE

## 2023-12-13 PROCEDURE — 96375 TX/PRO/DX INJ NEW DRUG ADDON: CPT

## 2023-12-13 PROCEDURE — 25810000003 SODIUM CHLORIDE 0.9 % SOLUTION 500 ML FLEX CONT: Performed by: INTERNAL MEDICINE

## 2023-12-13 PROCEDURE — 25010000002 PEGFILGRASTIM 6 MG/0.6ML PREFILLED SYRINGE KIT: Performed by: INTERNAL MEDICINE

## 2023-12-13 PROCEDURE — 25010000002 BENDAMUSTINE HCL 100 MG/4ML SOLUTION 4 ML VIAL: Performed by: INTERNAL MEDICINE

## 2023-12-13 PROCEDURE — 96377 APPLICATON ON-BODY INJECTOR: CPT

## 2023-12-13 PROCEDURE — 25010000002 DEXAMETHASONE SODIUM PHOSPHATE 100 MG/10ML SOLUTION: Performed by: INTERNAL MEDICINE

## 2023-12-13 RX ORDER — SODIUM CHLORIDE 9 MG/ML
20 INJECTION, SOLUTION INTRAVENOUS ONCE
Status: COMPLETED | OUTPATIENT
Start: 2023-12-13 | End: 2023-12-13

## 2023-12-13 RX ADMIN — PEGFILGRASTIM 6 MG: KIT SUBCUTANEOUS at 15:09

## 2023-12-13 RX ADMIN — SODIUM CHLORIDE 20 ML/HR: 9 INJECTION, SOLUTION INTRAVENOUS at 14:53

## 2023-12-13 RX ADMIN — BENDAMUSTINE HYDROCHLORIDE 120 MG: 100 INJECTION INTRAVENOUS at 15:08

## 2023-12-13 RX ADMIN — DEXAMETHASONE SODIUM PHOSPHATE 12 MG: 10 INJECTION, SOLUTION INTRAMUSCULAR; INTRAVENOUS at 14:53

## 2023-12-13 NOTE — ADDENDUM NOTE
Addended by: PAVAN SCHOFIELD on: 12/13/2023 02:59 PM     Modules accepted: Orders     Patient is needing a refill on his     pregabalin (Lyrica) 50 MG capsule [[id number]] (Order [id number])    Needing a 90 day supply.     Pharmacy    [facility]/pharmacy #[id number] - [place], [place] - [address] [place] [address] - [phone]  - [phone] [address] [address]   Phone:  [phone]  Fax:  [phone]     Patient will be out Wednesday

## 2023-12-18 ENCOUNTER — TELEPHONE (OUTPATIENT)
Dept: OTHER | Facility: HOSPITAL | Age: 83
End: 2023-12-18
Payer: MEDICARE

## 2023-12-18 NOTE — TELEPHONE ENCOUNTER
Patient referred from [facility] for an OAFA.  Spoke with patient regarding appt and she stated that it is to difficult to find transportation to all these appts.  Would like to have an appt while she is already in the building with CBC.  Will watch patients schedule to see if we can coordinate./SP

## 2023-12-19 ENCOUNTER — APPOINTMENT (OUTPATIENT)
Dept: ONCOLOGY | Facility: HOSPITAL | Age: 83
End: 2023-12-19
Payer: MEDICARE

## 2023-12-19 ENCOUNTER — LAB (OUTPATIENT)
Dept: LAB | Facility: HOSPITAL | Age: 83
End: 2023-12-19
Payer: MEDICARE

## 2023-12-19 DIAGNOSIS — E87.1 HYPONATREMIA: ICD-10-CM

## 2023-12-19 DIAGNOSIS — C83.38 DIFFUSE LARGE B-CELL LYMPHOMA OF LYMPH NODES OF MULTIPLE REGIONS: ICD-10-CM

## 2023-12-19 LAB
ALBUMIN SERPL-MCNC: 4 G/DL (ref 3.5–5.2)
ALBUMIN/GLOB SERPL: 1.7 G/DL
ALP SERPL-CCNC: 204 U/L (ref 39–117)
ALT SERPL W P-5'-P-CCNC: 77 U/L (ref 1–33)
ANION GAP SERPL CALCULATED.3IONS-SCNC: 6.8 MMOL/L (ref 5–15)
AST SERPL-CCNC: 53 U/L (ref 1–32)
BASOPHILS # BLD AUTO: 0.02 10*3/MM3 (ref 0–0.2)
BASOPHILS NFR BLD AUTO: 0.1 % (ref 0–1.5)
BILIRUB SERPL-MCNC: 0.5 MG/DL (ref 0–1.2)
BUN SERPL-MCNC: 11 MG/DL (ref 8–23)
BUN/CREAT SERPL: 11.6 (ref 7–25)
CALCIUM SPEC-SCNC: 9.4 MG/DL (ref 8.6–10.5)
CHLORIDE SERPL-SCNC: 93 MMOL/L (ref 98–107)
CO2 SERPL-SCNC: 30.2 MMOL/L (ref 22–29)
CREAT SERPL-MCNC: 0.95 MG/DL (ref 0.57–1)
DEPRECATED RDW RBC AUTO: 49.7 FL (ref 37–54)
EGFRCR SERPLBLD CKD-EPI 2021: 59.9 ML/MIN/1.73
EOSINOPHIL # BLD AUTO: 0.11 10*3/MM3 (ref 0–0.4)
EOSINOPHIL NFR BLD AUTO: 0.6 % (ref 0.3–6.2)
ERYTHROCYTE [DISTWIDTH] IN BLOOD BY AUTOMATED COUNT: 14.7 % (ref 12.3–15.4)
GLOBULIN UR ELPH-MCNC: 2.4 GM/DL
GLUCOSE SERPL-MCNC: 101 MG/DL (ref 65–99)
HCT VFR BLD AUTO: 35.5 % (ref 34–46.6)
HGB BLD-MCNC: 12.4 G/DL (ref 12–15.9)
IMM GRANULOCYTES # BLD AUTO: 0.34 10*3/MM3 (ref 0–0.05)
IMM GRANULOCYTES NFR BLD AUTO: 1.9 % (ref 0–0.5)
LYMPHOCYTES # BLD AUTO: 0.49 10*3/MM3 (ref 0.7–3.1)
LYMPHOCYTES NFR BLD AUTO: 2.7 % (ref 19.6–45.3)
MCH RBC QN AUTO: 32.4 PG (ref 26.6–33)
MCHC RBC AUTO-ENTMCNC: 34.9 G/DL (ref 31.5–35.7)
MCV RBC AUTO: 92.7 FL (ref 79–97)
MONOCYTES # BLD AUTO: 1.6 10*3/MM3 (ref 0.1–0.9)
MONOCYTES NFR BLD AUTO: 8.8 % (ref 5–12)
NEUTROPHILS NFR BLD AUTO: 15.67 10*3/MM3 (ref 1.7–7)
NEUTROPHILS NFR BLD AUTO: 85.9 % (ref 42.7–76)
NRBC BLD AUTO-RTO: 0 /100 WBC (ref 0–0.2)
PLATELET # BLD AUTO: 249 10*3/MM3 (ref 140–450)
PMV BLD AUTO: 8.6 FL (ref 6–12)
POTASSIUM SERPL-SCNC: 4.9 MMOL/L (ref 3.5–5.2)
PROT SERPL-MCNC: 6.4 G/DL (ref 6–8.5)
RBC # BLD AUTO: 3.83 10*6/MM3 (ref 3.77–5.28)
SODIUM SERPL-SCNC: 130 MMOL/L (ref 136–145)
WBC NRBC COR # BLD AUTO: 18.23 10*3/MM3 (ref 3.4–10.8)

## 2023-12-19 PROCEDURE — 36415 COLL VENOUS BLD VENIPUNCTURE: CPT

## 2023-12-19 PROCEDURE — 80053 COMPREHEN METABOLIC PANEL: CPT

## 2023-12-19 PROCEDURE — 85025 COMPLETE CBC W/AUTO DIFF WBC: CPT

## 2023-12-20 RX ORDER — SPIRONOLACTONE 25 MG/1
25 TABLET ORAL DAILY
Qty: 90 TABLET | Refills: 1 | Status: SHIPPED | OUTPATIENT
Start: 2023-12-20

## 2023-12-20 RX ORDER — APIXABAN 5 MG/1
TABLET, FILM COATED ORAL
Qty: 180 TABLET | Refills: 1 | Status: SHIPPED | OUTPATIENT
Start: 2023-12-20

## 2023-12-20 RX ORDER — CARVEDILOL 3.12 MG/1
TABLET ORAL
Qty: 180 TABLET | Refills: 1 | Status: SHIPPED | OUTPATIENT
Start: 2023-12-20

## 2023-12-22 ENCOUNTER — TELEPHONE (OUTPATIENT)
Dept: ONCOLOGY | Facility: CLINIC | Age: 83
End: 2023-12-22
Payer: MEDICARE

## 2023-12-22 NOTE — TELEPHONE ENCOUNTER
Reviewed pts CBC and CMP with Dr. Galo, he recommends holding her Pravastatin until her next follow up. We will re-check a CMP on 12/27/2023 to let the patient know if she can restart or not.

## 2023-12-27 ENCOUNTER — LAB (OUTPATIENT)
Dept: LAB | Facility: HOSPITAL | Age: 83
End: 2023-12-27
Payer: MEDICARE

## 2023-12-27 ENCOUNTER — OFFICE VISIT (OUTPATIENT)
Dept: ONCOLOGY | Facility: CLINIC | Age: 83
End: 2023-12-27
Payer: MEDICARE

## 2023-12-27 VITALS
RESPIRATION RATE: 16 BRPM | OXYGEN SATURATION: 98 % | BODY MASS INDEX: 27.43 KG/M2 | SYSTOLIC BLOOD PRESSURE: 120 MMHG | DIASTOLIC BLOOD PRESSURE: 81 MMHG | HEART RATE: 64 BPM | TEMPERATURE: 98.6 F | HEIGHT: 60 IN | WEIGHT: 139.7 LBS

## 2023-12-27 DIAGNOSIS — E87.1 HYPONATREMIA: ICD-10-CM

## 2023-12-27 DIAGNOSIS — D50.9 IRON DEFICIENCY ANEMIA, UNSPECIFIED IRON DEFICIENCY ANEMIA TYPE: ICD-10-CM

## 2023-12-27 DIAGNOSIS — C83.38 DIFFUSE LARGE B-CELL LYMPHOMA OF LYMPH NODES OF MULTIPLE REGIONS: ICD-10-CM

## 2023-12-27 DIAGNOSIS — C83.38 DIFFUSE LARGE B-CELL LYMPHOMA OF LYMPH NODES OF MULTIPLE REGIONS: Primary | ICD-10-CM

## 2023-12-27 DIAGNOSIS — R79.89 ELEVATED LFTS: ICD-10-CM

## 2023-12-27 LAB
ALBUMIN SERPL-MCNC: 3.9 G/DL (ref 3.5–5.2)
ALBUMIN/GLOB SERPL: 1.7 G/DL
ALP SERPL-CCNC: 125 U/L (ref 39–117)
ALT SERPL W P-5'-P-CCNC: 57 U/L (ref 1–33)
ANION GAP SERPL CALCULATED.3IONS-SCNC: 7.8 MMOL/L (ref 5–15)
AST SERPL-CCNC: 44 U/L (ref 1–32)
BASOPHILS # BLD AUTO: 0.05 10*3/MM3 (ref 0–0.2)
BASOPHILS NFR BLD AUTO: 1 % (ref 0–1.5)
BILIRUB SERPL-MCNC: 0.5 MG/DL (ref 0–1.2)
BUN SERPL-MCNC: 12 MG/DL (ref 8–23)
BUN/CREAT SERPL: 12.8 (ref 7–25)
CALCIUM SPEC-SCNC: 9 MG/DL (ref 8.6–10.5)
CHLORIDE SERPL-SCNC: 93 MMOL/L (ref 98–107)
CO2 SERPL-SCNC: 27.2 MMOL/L (ref 22–29)
CREAT SERPL-MCNC: 0.94 MG/DL (ref 0.57–1)
DEPRECATED RDW RBC AUTO: 50.1 FL (ref 37–54)
EGFRCR SERPLBLD CKD-EPI 2021: 60.7 ML/MIN/1.73
EOSINOPHIL # BLD AUTO: 0.05 10*3/MM3 (ref 0–0.4)
EOSINOPHIL NFR BLD AUTO: 1 % (ref 0.3–6.2)
ERYTHROCYTE [DISTWIDTH] IN BLOOD BY AUTOMATED COUNT: 15.1 % (ref 12.3–15.4)
GLOBULIN UR ELPH-MCNC: 2.3 GM/DL
GLUCOSE SERPL-MCNC: 99 MG/DL (ref 65–99)
HCT VFR BLD AUTO: 33.9 % (ref 34–46.6)
HGB BLD-MCNC: 11.6 G/DL (ref 12–15.9)
IMM GRANULOCYTES # BLD AUTO: 0.03 10*3/MM3 (ref 0–0.05)
IMM GRANULOCYTES NFR BLD AUTO: 0.6 % (ref 0–0.5)
LYMPHOCYTES # BLD AUTO: 0.29 10*3/MM3 (ref 0.7–3.1)
LYMPHOCYTES NFR BLD AUTO: 5.5 % (ref 19.6–45.3)
MCH RBC QN AUTO: 32 PG (ref 26.6–33)
MCHC RBC AUTO-ENTMCNC: 34.2 G/DL (ref 31.5–35.7)
MCV RBC AUTO: 93.6 FL (ref 79–97)
MONOCYTES # BLD AUTO: 0.5 10*3/MM3 (ref 0.1–0.9)
MONOCYTES NFR BLD AUTO: 9.5 % (ref 5–12)
NEUTROPHILS NFR BLD AUTO: 4.32 10*3/MM3 (ref 1.7–7)
NEUTROPHILS NFR BLD AUTO: 82.4 % (ref 42.7–76)
NRBC BLD AUTO-RTO: 0 /100 WBC (ref 0–0.2)
PLATELET # BLD AUTO: 203 10*3/MM3 (ref 140–450)
PMV BLD AUTO: 9.2 FL (ref 6–12)
POTASSIUM SERPL-SCNC: 4.7 MMOL/L (ref 3.5–5.2)
PROT SERPL-MCNC: 6.2 G/DL (ref 6–8.5)
RBC # BLD AUTO: 3.62 10*6/MM3 (ref 3.77–5.28)
SODIUM SERPL-SCNC: 128 MMOL/L (ref 136–145)
WBC NRBC COR # BLD AUTO: 5.24 10*3/MM3 (ref 3.4–10.8)

## 2023-12-27 PROCEDURE — 80053 COMPREHEN METABOLIC PANEL: CPT

## 2023-12-27 PROCEDURE — 36415 COLL VENOUS BLD VENIPUNCTURE: CPT

## 2023-12-27 PROCEDURE — 85025 COMPLETE CBC W/AUTO DIFF WBC: CPT

## 2023-12-27 NOTE — PROGRESS NOTES
Subjective     CHIEF COMPLAINT:      Chief Complaint   Patient presents with    Follow-up     Lymphoma, toxicity check     HISTORY OF PRESENT ILLNESS:     Michelle Car is a 83 y.o. female with the above-mentioned history, who returns to the office today for 2-week follow-up and toxicity check.  2 weeks ago, she initiated therapy with Rituxan, Bendamustine and Polivy.  She reports today she is tolerated treatment reasonably well.  She reports some fatigue though otherwise has had minimal toxicity.  She denies nausea or diarrhea.  She has struggled with her intake due to ongoing pain in her mouth as well as issues with the fit of her dentures.  She has to eat a mechanical soft diet.  She has been utilizing 1 can of Ensure daily.  She denies fevers or chills, signs or symptoms of infection.  She does feel her left mandibular mass has improved.    ROS:  Pertinent ROS is in the HPI.     Past medical, surgical, social and family history were reviewed.     MEDICATIONS:    Current Outpatient Medications:     acetaminophen (TYLENOL) 500 MG tablet, Take 1 tablet by mouth Every 6 (Six) Hours As Needed for Mild Pain., Disp: , Rfl:     acyclovir (Zovirax) 400 MG tablet, Take 1 tablet by mouth 2 (Two) Times a Day., Disp: 60 tablet, Rfl: 7    amiodarone (PACERONE) 200 MG tablet, Take 1 tablet by mouth Daily., Disp: 90 tablet, Rfl: 2    Calcium Carbonate (CALTRATE 600 PO), Take 1 tablet by mouth Daily., Disp: , Rfl:     carvedilol (COREG) 3.125 MG tablet, TAKE 1 TABLET BY MOUTH TWICE DAILY WITH MEALS, Disp: 180 tablet, Rfl: 1    cephalexin (Keflex) 500 MG capsule, Take 1 capsule by mouth 3 (Three) Times a Day., Disp: 7 capsule, Rfl: 0    Eliquis 5 MG tablet tablet, TAKE 1 TABLET BY MOUTH EVERY 12 HOURS FOR ATRIAL FIBRILLATION, Disp: 180 tablet, Rfl: 1    ferrous sulfate (FeroSul) 325 (65 FE) MG tablet, Take 1 tablet by mouth Every Other Day., Disp: 36 tablet, Rfl: 2    Hydrocortisone, Perianal, (Anusol-HC) 2.5 % rectal cream,  "Apply to hemorrhoids 3 times daily for 7 days during hemorrhoid flare. Include applicator., Disp: 30 g, Rfl: 1    losartan (COZAAR) 100 MG tablet, , Disp: , Rfl:     Multiple Vitamins-Minerals (CENTRUM SILVER) tablet, Take 1 tablet by mouth Daily., Disp: , Rfl:     ondansetron (ZOFRAN) 8 MG tablet, Take 1 tablet by mouth 3 (Three) Times a Day As Needed for Nausea or Vomiting., Disp: 30 tablet, Rfl: 5    polyethylene glycol (MIRALAX) 17 GM/SCOOP powder, Take 17 g by mouth As Needed., Disp: , Rfl:     pravastatin (PRAVACHOL) 40 MG tablet, TAKE 1 TABLET BY MOUTH EVERY NIGHT AT BEDTIME, Disp: 90 tablet, Rfl: 1    sodium chloride 1 g tablet, Take 1 tablet by mouth Daily., Disp: 30 tablet, Rfl: 1    spironolactone (ALDACTONE) 25 MG tablet, TAKE 1 TABLET BY MOUTH DAILY, Disp: 90 tablet, Rfl: 1    sulfamethoxazole-trimethoprim (BACTRIM DS,SEPTRA DS) 800-160 MG per tablet, Take 1 tablet by mouth 3 (Three) Times a Week., Disp: 12 tablet, Rfl: 7    traMADol (ULTRAM) 50 MG tablet, Take 1 tablet by mouth Every 6 (Six) Hours As Needed for Moderate Pain., Disp: 12 tablet, Rfl: 0  Objective     VITAL SIGNS:     Vitals:    12/27/23 1103   BP: 120/81   Pulse: 64   Resp: 16   Temp: 98.6 °F (37 °C)   TempSrc: Temporal   SpO2: 98%   Weight: 63.4 kg (139 lb 11.2 oz)   Height: 152.4 cm (60\")   PainSc:   4   PainLoc: Jaw     Body mass index is 27.28 kg/m².     Wt Readings from Last 5 Encounters:   12/27/23 63.4 kg (139 lb 11.2 oz)   12/13/23 67.6 kg (149 lb)   12/12/23 67.1 kg (147 lb 14.4 oz)   12/11/23 67.3 kg (148 lb 4.8 oz)   12/06/23 67.2 kg (148 lb 1.6 oz)     PHYSICAL EXAMINATION:   GENERAL: The patient appears in good general condition, not in acute distress.   SKIN: No ecchymosis.  EYES: No jaundice. No Pallor.  MOUTH: Large left mandibular mass.  This has decreased in size.  CHEST: Normal respiratory effort.   CVS: No edema.  ABDOMEN: Nondistended.  EXTREMITIES: No noted deformity.     DIAGNOSTIC DATA:     Results from last 7 " days   Lab Units 12/27/23  1059   WBC 10*3/mm3 5.24   NEUTROS ABS 10*3/mm3 4.32   HEMOGLOBIN g/dL 11.6*   HEMATOCRIT % 33.9*   PLATELETS 10*3/mm3 203     Results from last 7 days   Lab Units 12/27/23  1059   SODIUM mmol/L 128*   POTASSIUM mmol/L 4.7   CHLORIDE mmol/L 93*   CO2 mmol/L 27.2   BUN mg/dL 12   CREATININE mg/dL 0.94   CALCIUM mg/dL 9.0   ALBUMIN g/dL 3.9   BILIRUBIN mg/dL 0.5   ALK PHOS U/L 125*   ALT (SGPT) U/L 57*   AST (SGOT) U/L 44*   GLUCOSE mg/dL 99         Component      Latest Ref Rng 12/6/2023   Hepatitis B Surface Ag      Non-Reactive  Non-Reactive    Hep B Core Total Ab      Negative  Negative    Hep B S Ab      Non-Reactive  Non-Reactive      Component      Latest Ref Rng 12/6/2023   LDH      135 - 214 U/L 219 (H)    Beta-2 Microglobulin      0.8 - 2.2 mg/L 3.7 (H)    Uric Acid      2.4 - 5.7 mg/dL 3.2       PET scan on 12/11/2023:  There are many hypermetabolic metastases.  1. The large lytic mass at the left mandible is intensely hypermetabolic  with a maximal SUV of 43. There is a subtle hypermetabolic 1.2 cm soft  tissue nodule posterior to the posterior lateral wall of the left  maxillary sinus, CT image 20, with a maximal SUV of 13.0.     2. There is a 1.4 x 1.1 cm nodular density at the prevascular space of  the mediastinum, CT image 118, which has a maximal SUV of 18.3. There is  a subcentimeter density at the left epicardial fat pad which has a  maximal SUV of 5.9 and there is low-level pleural activity posterior to  the left anterior 4th rib with a maximal SUV of 4.9.There is  hypermetabolic activity at the heart, likely along the pericardium or  possibly intramuscular with a maximal SUV of 9.8. There is low-level  activity at old healing fractures at the anterior aspect of the right  4th-7th ribs, likely benign.     3. There is also new hypermetabolic activity at both adrenal glands  which have uniformly increased in size and appear hyperplastic with a  maximal SUV on the right of  12.6 and on the left 12.1. There is a subtle  hypermetabolic 2.7 x 1.1 cm soft tissue nodule lateral to the right  erector spinae muscle posterior to the right posterior 11th rib with a  SUV 20.5 and there is a subtle 1.3 cm nodule lateral to the posterior  margin of the left psoas muscle at the level of the upper pole of the  left kidney with SUV of 24.9. There is also a very small hypermetabolic  focus posterior to the left kidney along slightly thickened posterior  peritoneum, image 175. There is nonspecific bowel activity. There is no  suspicious hypermetabolic activity within the pelvis.     Assessment & Plan    *Diffuse large B-cell lymphoma.     Patient was found to have bilateral pleural effusions.    She underwent right thoracentesis on 2/21/2022 and the left thoracentesis on 2/22/2022.    Analysis of the fluid revealed involvement with diffuse large B-cell lymphoma.    FISH analysis for BCL6 rearrangement was positive.    FISH analysis for BCL 1, BCL-2 and MYC rearrangement was negative.    PET scan on 3/10/2022 revealed significant hypermetabolism in the left adrenal gland and the surrounding soft tissue with SUV up to 34.5. Hypermetabolism in the left pleural thickening with SUV of 7.1. there was less hypermetabolism in the right adrenal gland and in the right pleura.  She was considered to have stage III non-bulky disease.  This concurred with the pathology exam of the pleural fluid cytology that revealed diffuse large B-cell lymphoma.  R-IPI score of 3 associated with poor risk - associated with overall survival of 55%.   R-CHOP with Neulasta support was started on 3/28/2022.  Patient developed neutropenia and thrombocytopenia after cycle#1.  Patient developed neutropenic fever after cycle #2 and was hospitalized.  Her performance status decreased as a result.   Regimen was changed to mini-RCHOP starting cycle #3 on 5/16/2022.  CT scans on 6/23/2022 showed evidence of response.  There was resolution of  the pericardial effusion and near complete resolution of the pleural effusions.   Patient received cycle #6 on 7/25/2022.  PET scan on 8/15/2022 revealed complete metabolic response.  Patient was found to have recurrence with development of a left mandibular mass in October/November 2023.  Biopsy of left mandible mass on 11/28/2023 showed recurrence of the lymphoma, diffuse large B cell, non-GCB phenotype.  Ki-67 was 90%.    It was positive for Bcl-6 rearrangement but negative for Bcl-2 and MYC rearrangement.  PET scan on 12/11/2023 revealed multiple areas of involvement -prevascular space, left epicardial fat pad, pleural density posterior to the left fourth rib anterior aspect, lesion along the pericardium versus intramuscular with SUV of 9.8.  Hypermetabolic activity at both adrenal glands, uniformly increased in size and appeared hyperplastic.  There was a 2.7 x 1.1 cm soft tissue nodule lateral to the right erector spinae muscle posterior to the right posterior 11th rib with an SUV of 20.5 and there was a 1.3 cm nodule lateral to the posterior margin of the left psoas muscle with an SUV of 24.9.  Treatment with Rituxan Polivy and Treanda with Treanda on days 1 and 2 every 3 weeks for 6 cycles was recommended.  Dr. Galo explained to the patient that the treatment is not curative and that the goal is to control the lymphoma and improve her symptoms.   We will consider after completion of treatment radiation therapy to the left mandibular mass.    After completion of treatment, we will also consider maintenance therapy with ibrutinib or Revlimid.  Cycle 1 day 1 Rituxan, Polivy and Treanda initiated 12/12/2023  Overall fair tolerance to cycle 1 therapy.    *Bilateral pleural effusions.   She is s/p right thoracentesis on 2/21/2022 and the effusion was found to be malignant attributed to the lymphoma.   CT scan on 5/1/2022 revealed decrease in the right pleural effusion and an increase in the left effusion.   The  increase in the left effusion was suspected of being due to cardiac and fluid overload states (fluid appeared to be serous on CT scan).  CT on 6/23/2022 revealed near resolution of the pleural effusions.  PET scan on 8/15/2022 showed that the pleural effusions were trace and improved since June 2022.  CT chest on 11/7/2022 revealed minimal partially loculated right pleural effusion.  CT chest on 3/7/2023 showed no recurrence of the effusion.  CT on 8/24/2023 showed no evidence of recurrence of the pleural effusions.  CT on 11/16/2023 showed no evidence of recurrence of the pleural effusions.  No evidence of recurrence of the effusions.    *Iron deficiency anemia.  Patient also developed anemia secondary to lymphoma and secondary to chemotherapy.    Hemoglobin was 11.1 on 3/28/2022.  Hgb decreased to 7.9 on 4/25/2022 and she was transfused.   Hemoglobin decreased to 6.3 on 4/27/2022. She was transfused.  Hemoglobin improved to 8.8 on 5/16/2022.  Patient is on ferrous sulfate 325 mg 3 days a week.  11/21/2023: Hemoglobin 12.1.    Iron stores adequate with ferritin 141 and transferrin saturation 25%.  12/27/2023 hemoglobin is slightly declined at 11.6, we reviewed today this is most likely secondary to chemotherapy.    *Hyponatremia.  Patient had problem with hyponatremia in the past.  Sodium is 127 on 3/14/2023.  She was advised to increase salt intake.  Sodium improved to 133 on repeat lab on 3/21/2023.  However, sodium decreased to 126 on 6/7/2023.  This is likely secondary to her medications.  11/21/2023: Sodium decreased to 125.  Patient was advised to increase sodium in her diet.    12/6/2023: Sodium improved to 132.  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  12/27/2023 sodium is currently 128.  She has been taking salt tablets 1 g daily which will be continued.    *Chronic anticoagulation.  Patient is on Eliquis 5 mg twice daily.  We will need to hold if platelets  decrease to <50,000.    *13 x 9 mm nodular focus of consolidation in the right upper lobe.  Patient is reporting cough and congestion.  This is concerning for evolving pneumonia.  An underlying malignancy could not be excluded.  CT on 11/16/2023 showed no suspicious findings.  No hypermetabolism in this area.    *T12 compression deformity.  Patient is not complaining of pain in the area.  Bone density on 9/14/2023 showed osteoporosis.    We recommended starting treatment with Fosamax.    Due to the patient having problem with her teeth, Fosamax was not started.    *Venous access.  Patient previously had a port which was removed on 6/5/2023.  We discussed having a port placed for this treatment.  She has good veins and we will therefore use peripheral IVs for now.    Will consider port placement if there is difficulty with placement of IV lines in the future.    *Prophylaxis.  Patient had shingles in April 2023.  She is at risk of developing shingles while on treatment.  I recommended placement on acyclovir 400 mg twice daily.  I also recommended Bactrim DS 3 days a week on Mondays Wednesdays and Fridays.  I recommended receiving the shingles vaccine before she starts chemotherapy (but after the PET scan).     *Choledocholithiasis with increase in the intra and extrahepatic biliary dilatation seen on CT on 6/23/2022.  Liver enzymes including bilirubin are normal today.   She is not having abdominal pain.  No redness no tenderness on exam of the right upper quadrant.  PET scan on 8/15/2022 showed persistence of the findings.  No symptoms or signs of infection.  Patient was seen by Dr. Ortega.  She underwent ERCP on 10/21/2022.  There was a calculus of the bile duct without cholecystitis and without obstruction.  She had sphincterotomy and balloon sweep.  Alkaline phosphatase improved afterwards.  CT on 3/7/2023 revealed stable findings.  CT scan on 8/24/2023 showed chronic biliary duct dilatation.    CT scan on  11/16/2023 showed no changes.    *Elevated liver function studies  1 week following initiation of treatment, 12/19/2023 LFTs slightly increased with AST 53, ALT 77, alkaline phosphatase 204.  Pravastatin was placed on hold  Today, 12/27/2023 liver function studies are slightly improved with AST 44, ALT 57, alkaline phosphatase 125.  Pravastatin will remain on hold until further discussion with Dr. Galo 1 week    *Weight loss  The patient reports a good appetite and is without nausea or diarrhea  She attributes her weight loss due to difficulty eating from incorrect fit of her upper denture and pain in her left mandible  We did discuss increasing her use of supplemental shakes such as boost or Ensure  I will ask oncology dietitian to meet with the patient next week and infusion    PLAN:    Status post cycle 1 Alice Bangura Treanda 12/12/2023 with overall excellent tolerance  We discussed increasing her use of supplemental shakes such as boost or Ensure due to weight loss.   Referral to oncology dietitian to meet with the patient next week  Continue sodium chloride to 1 g daily  Pravastatin will remain on hold  Return in 1 week for CBC, CMP, MD follow-up with Dr. Galo on cycle 2-day 1 Rituxan, Polivy, Treanda.    Radha Oneill, MAGGI  12/27/23

## 2024-01-02 ENCOUNTER — INFUSION (OUTPATIENT)
Dept: ONCOLOGY | Facility: HOSPITAL | Age: 84
End: 2024-01-02
Payer: MEDICARE

## 2024-01-02 ENCOUNTER — APPOINTMENT (OUTPATIENT)
Dept: ONCOLOGY | Facility: HOSPITAL | Age: 84
End: 2024-01-02
Payer: MEDICARE

## 2024-01-02 ENCOUNTER — OFFICE VISIT (OUTPATIENT)
Dept: ONCOLOGY | Facility: CLINIC | Age: 84
End: 2024-01-02
Payer: MEDICARE

## 2024-01-02 ENCOUNTER — NUTRITION (OUTPATIENT)
Dept: OTHER | Facility: HOSPITAL | Age: 84
End: 2024-01-02
Payer: MEDICARE

## 2024-01-02 VITALS
BODY MASS INDEX: 27.66 KG/M2 | OXYGEN SATURATION: 98 % | HEIGHT: 60 IN | DIASTOLIC BLOOD PRESSURE: 71 MMHG | SYSTOLIC BLOOD PRESSURE: 113 MMHG | HEART RATE: 61 BPM | TEMPERATURE: 98.4 F | WEIGHT: 140.9 LBS | RESPIRATION RATE: 16 BRPM

## 2024-01-02 DIAGNOSIS — R79.89 ELEVATED LFTS: ICD-10-CM

## 2024-01-02 DIAGNOSIS — D50.9 IRON DEFICIENCY ANEMIA, UNSPECIFIED IRON DEFICIENCY ANEMIA TYPE: ICD-10-CM

## 2024-01-02 DIAGNOSIS — J90 BILATERAL PLEURAL EFFUSION: ICD-10-CM

## 2024-01-02 DIAGNOSIS — C83.38 DIFFUSE LARGE B-CELL LYMPHOMA OF LYMPH NODES OF MULTIPLE REGIONS: Primary | ICD-10-CM

## 2024-01-02 DIAGNOSIS — E87.1 HYPONATREMIA: ICD-10-CM

## 2024-01-02 DIAGNOSIS — C83.38 DIFFUSE LARGE B-CELL LYMPHOMA OF LYMPH NODES OF MULTIPLE REGIONS: ICD-10-CM

## 2024-01-02 LAB
ALBUMIN SERPL-MCNC: 4.2 G/DL (ref 3.5–5.2)
ALBUMIN/GLOB SERPL: 1.8 G/DL
ALP SERPL-CCNC: 125 U/L (ref 39–117)
ALT SERPL W P-5'-P-CCNC: 57 U/L (ref 1–33)
ANION GAP SERPL CALCULATED.3IONS-SCNC: 9.6 MMOL/L (ref 5–15)
AST SERPL-CCNC: 42 U/L (ref 1–32)
BASOPHILS # BLD AUTO: 0.09 10*3/MM3 (ref 0–0.2)
BASOPHILS NFR BLD AUTO: 1.8 % (ref 0–1.5)
BILIRUB SERPL-MCNC: 0.5 MG/DL (ref 0–1.2)
BUN SERPL-MCNC: 14 MG/DL (ref 8–23)
BUN/CREAT SERPL: 15.6 (ref 7–25)
CALCIUM SPEC-SCNC: 9.3 MG/DL (ref 8.6–10.5)
CHLORIDE SERPL-SCNC: 90 MMOL/L (ref 98–107)
CO2 SERPL-SCNC: 26.4 MMOL/L (ref 22–29)
CREAT SERPL-MCNC: 0.9 MG/DL (ref 0.57–1)
DEPRECATED RDW RBC AUTO: 52.5 FL (ref 37–54)
EGFRCR SERPLBLD CKD-EPI 2021: 64 ML/MIN/1.73
EOSINOPHIL # BLD AUTO: 0.06 10*3/MM3 (ref 0–0.4)
EOSINOPHIL NFR BLD AUTO: 1.2 % (ref 0.3–6.2)
ERYTHROCYTE [DISTWIDTH] IN BLOOD BY AUTOMATED COUNT: 15.7 % (ref 12.3–15.4)
GLOBULIN UR ELPH-MCNC: 2.4 GM/DL
GLUCOSE SERPL-MCNC: 86 MG/DL (ref 65–99)
HCT VFR BLD AUTO: 37 % (ref 34–46.6)
HGB BLD-MCNC: 12.5 G/DL (ref 12–15.9)
IMM GRANULOCYTES # BLD AUTO: 0.03 10*3/MM3 (ref 0–0.05)
IMM GRANULOCYTES NFR BLD AUTO: 0.6 % (ref 0–0.5)
LYMPHOCYTES # BLD AUTO: 0.32 10*3/MM3 (ref 0.7–3.1)
LYMPHOCYTES NFR BLD AUTO: 6.3 % (ref 19.6–45.3)
MCH RBC QN AUTO: 31.8 PG (ref 26.6–33)
MCHC RBC AUTO-ENTMCNC: 33.8 G/DL (ref 31.5–35.7)
MCV RBC AUTO: 94.1 FL (ref 79–97)
MONOCYTES # BLD AUTO: 0.54 10*3/MM3 (ref 0.1–0.9)
MONOCYTES NFR BLD AUTO: 10.7 % (ref 5–12)
NEUTROPHILS NFR BLD AUTO: 4.03 10*3/MM3 (ref 1.7–7)
NEUTROPHILS NFR BLD AUTO: 79.4 % (ref 42.7–76)
NRBC BLD AUTO-RTO: 0 /100 WBC (ref 0–0.2)
PLATELET # BLD AUTO: 239 10*3/MM3 (ref 140–450)
PMV BLD AUTO: 8.5 FL (ref 6–12)
POTASSIUM SERPL-SCNC: 4.7 MMOL/L (ref 3.5–5.2)
PROT SERPL-MCNC: 6.6 G/DL (ref 6–8.5)
RBC # BLD AUTO: 3.93 10*6/MM3 (ref 3.77–5.28)
SODIUM SERPL-SCNC: 126 MMOL/L (ref 136–145)
WBC NRBC COR # BLD AUTO: 5.07 10*3/MM3 (ref 3.4–10.8)

## 2024-01-02 PROCEDURE — 96413 CHEMO IV INFUSION 1 HR: CPT

## 2024-01-02 PROCEDURE — 96417 CHEMO IV INFUS EACH ADDL SEQ: CPT

## 2024-01-02 PROCEDURE — 25010000002 POLATUZUMAB VEDOTIN-PIIQ 140 MG RECONSTITUTED SOLUTION 1 EACH VIAL: Performed by: INTERNAL MEDICINE

## 2024-01-02 PROCEDURE — 25010000002 DEXAMETHASONE SODIUM PHOSPHATE 100 MG/10ML SOLUTION: Performed by: INTERNAL MEDICINE

## 2024-01-02 PROCEDURE — 96415 CHEMO IV INFUSION ADDL HR: CPT

## 2024-01-02 PROCEDURE — 80053 COMPREHEN METABOLIC PANEL: CPT

## 2024-01-02 PROCEDURE — 25010000002 PALONOSETRON PER 25 MCG: Performed by: INTERNAL MEDICINE

## 2024-01-02 PROCEDURE — 25010000002 BENDAMUSTINE HCL 100 MG/4ML SOLUTION 4 ML VIAL: Performed by: INTERNAL MEDICINE

## 2024-01-02 PROCEDURE — 25810000003 SODIUM CHLORIDE 0.9 % SOLUTION 500 ML FLEX CONT: Performed by: INTERNAL MEDICINE

## 2024-01-02 PROCEDURE — 3074F SYST BP LT 130 MM HG: CPT | Performed by: INTERNAL MEDICINE

## 2024-01-02 PROCEDURE — 1160F RVW MEDS BY RX/DR IN RCRD: CPT | Performed by: INTERNAL MEDICINE

## 2024-01-02 PROCEDURE — 1126F AMNT PAIN NOTED NONE PRSNT: CPT | Performed by: INTERNAL MEDICINE

## 2024-01-02 PROCEDURE — 25010000002 RITUXIMAB 10 MG/ML SOLUTION 50 ML VIAL: Performed by: INTERNAL MEDICINE

## 2024-01-02 PROCEDURE — 25010000002 RITUXIMAB 10 MG/ML SOLUTION 10 ML VIAL: Performed by: INTERNAL MEDICINE

## 2024-01-02 PROCEDURE — 25010000002 DIPHENHYDRAMINE PER 50 MG: Performed by: INTERNAL MEDICINE

## 2024-01-02 PROCEDURE — 3078F DIAST BP <80 MM HG: CPT | Performed by: INTERNAL MEDICINE

## 2024-01-02 PROCEDURE — 96375 TX/PRO/DX INJ NEW DRUG ADDON: CPT

## 2024-01-02 PROCEDURE — 1159F MED LIST DOCD IN RCRD: CPT | Performed by: INTERNAL MEDICINE

## 2024-01-02 PROCEDURE — 85025 COMPLETE CBC W/AUTO DIFF WBC: CPT

## 2024-01-02 PROCEDURE — 99214 OFFICE O/P EST MOD 30 MIN: CPT | Performed by: INTERNAL MEDICINE

## 2024-01-02 PROCEDURE — 25810000003 SODIUM CHLORIDE 0.9 % SOLUTION 250 ML FLEX CONT: Performed by: INTERNAL MEDICINE

## 2024-01-02 PROCEDURE — 25810000003 SODIUM CHLORIDE 0.9 % SOLUTION: Performed by: INTERNAL MEDICINE

## 2024-01-02 RX ORDER — FAMOTIDINE 20 MG/1
20 TABLET, FILM COATED ORAL ONCE
Status: COMPLETED | OUTPATIENT
Start: 2024-01-02 | End: 2024-01-02

## 2024-01-02 RX ORDER — FAMOTIDINE 10 MG/ML
20 INJECTION, SOLUTION INTRAVENOUS AS NEEDED
Status: CANCELLED | OUTPATIENT
Start: 2024-01-02

## 2024-01-02 RX ORDER — DIPHENHYDRAMINE HYDROCHLORIDE 50 MG/ML
50 INJECTION INTRAMUSCULAR; INTRAVENOUS AS NEEDED
Status: CANCELLED | OUTPATIENT
Start: 2024-01-02

## 2024-01-02 RX ORDER — SODIUM CHLORIDE 9 MG/ML
20 INJECTION, SOLUTION INTRAVENOUS ONCE
Status: COMPLETED | OUTPATIENT
Start: 2024-01-02 | End: 2024-01-02

## 2024-01-02 RX ORDER — PALONOSETRON 0.05 MG/ML
0.25 INJECTION, SOLUTION INTRAVENOUS ONCE
Status: CANCELLED | OUTPATIENT
Start: 2024-01-02

## 2024-01-02 RX ORDER — ACETAMINOPHEN 325 MG/1
650 TABLET ORAL ONCE
Status: CANCELLED | OUTPATIENT
Start: 2024-01-02

## 2024-01-02 RX ORDER — MEPERIDINE HYDROCHLORIDE 25 MG/ML
12.5 INJECTION INTRAMUSCULAR; INTRAVENOUS; SUBCUTANEOUS
Status: CANCELLED | OUTPATIENT
Start: 2024-01-02

## 2024-01-02 RX ORDER — SODIUM CHLORIDE 9 MG/ML
20 INJECTION, SOLUTION INTRAVENOUS ONCE
Status: CANCELLED | OUTPATIENT
Start: 2024-01-02

## 2024-01-02 RX ORDER — FAMOTIDINE 20 MG/1
20 TABLET, FILM COATED ORAL ONCE
Status: CANCELLED | OUTPATIENT
Start: 2024-01-02

## 2024-01-02 RX ORDER — SODIUM CHLORIDE 9 MG/ML
20 INJECTION, SOLUTION INTRAVENOUS ONCE
Status: CANCELLED | OUTPATIENT
Start: 2024-01-03

## 2024-01-02 RX ORDER — PALONOSETRON 0.05 MG/ML
0.25 INJECTION, SOLUTION INTRAVENOUS ONCE
Status: COMPLETED | OUTPATIENT
Start: 2024-01-02 | End: 2024-01-02

## 2024-01-02 RX ORDER — ACETAMINOPHEN 325 MG/1
650 TABLET ORAL ONCE
Status: COMPLETED | OUTPATIENT
Start: 2024-01-02 | End: 2024-01-02

## 2024-01-02 RX ADMIN — SODIUM CHLORIDE 20 ML/HR: 9 INJECTION, SOLUTION INTRAVENOUS at 10:50

## 2024-01-02 RX ADMIN — PALONOSETRON HYDROCHLORIDE 0.25 MG: 0.25 INJECTION INTRAVENOUS at 09:53

## 2024-01-02 RX ADMIN — BENDAMUSTINE HYDROCHLORIDE 120 MG: 100 INJECTION INTRAVENOUS at 10:49

## 2024-01-02 RX ADMIN — DEXAMETHASONE SODIUM PHOSPHATE 12 MG: 10 INJECTION, SOLUTION INTRAMUSCULAR; INTRAVENOUS at 09:54

## 2024-01-02 RX ADMIN — RITUXIMAB 620 MG: 10 INJECTION, SOLUTION INTRAVENOUS at 11:43

## 2024-01-02 RX ADMIN — DIPHENHYDRAMINE HYDROCHLORIDE 25 MG: 50 INJECTION, SOLUTION INTRAMUSCULAR; INTRAVENOUS at 10:11

## 2024-01-02 RX ADMIN — FAMOTIDINE 20 MG: 20 TABLET ORAL at 09:53

## 2024-01-02 RX ADMIN — ACETAMINOPHEN 650 MG: 325 TABLET ORAL at 09:53

## 2024-01-02 RX ADMIN — POLATUZUMAB VEDOTIN 120 MG: 140 INJECTION, POWDER, LYOPHILIZED, FOR SOLUTION INTRAVENOUS at 14:12

## 2024-01-02 NOTE — PROGRESS NOTES
OUTPATIENT ONCOLOGY NUTRITION ASSESSMENT    Patient Name: Michelle Car  YOB: 1940  MRN: [id number]  Assessment Date: 1/2/2024    COMMENTS: Referral from nurse practitioner for decreased appetite and weight loss.  Visited patient today during infusion.   Previously patient complained of dentures not fitting properly and having pain L mandible but dentures have been adjusted and she is not having as much pain now. Continues to eat soft foods, and drinking 1-2 Ensures per day.   Encouraged small frequent meals and discussed ways to optimize calories and protein in the food she can tolerate. Provided coupons for Ensure products. Will follow for weights, po intake, etc.          Reason for Assessment New assessment, Nurse Practitioner referral , Education      Diagnosis/Problem   Diffuse large B cell lymphoma   Treatment Plan Chemotherapy Rituxan, Treanda, Polivy   Frequency Every 21 days   Goal of cancer treatment Disease control   Comments:          Encounter Information        Nutrition/Diet History:  Decreased appetite but improved   Oral Nutrition Supplements: Ensure   Factors/Symptoms Affecting Intake: Mouth pain, Weight loss   Comments: Soft foods     Medical/Surgical History Past Medical History:   Diagnosis Date    Abdominal aortic atherosclerosis     Adrenal nodule 03/2022    Allergic rhinitis     Anemia due to chemotherapy     Bacteremia due to Escherichia coli 04/26/2022    ADMITTED TO [facility]    Bug bite 10/24/2015    WITH CELLULITIS, LEFT LEG    CAD (coronary artery disease)     Calculus of bile duct without cholangitis or cholecystitis 09/2022    Chemotherapy induced neutropenia     Chronic anticoagulation     Chronic diastolic (congestive) heart failure     Colon polyps     CRI (chronic renal insufficiency), stage 2 (mild) 2021    Diffuse large B cell lymphoma 02/2022    MULTIPLE LYMPH NODE INVOLVEMENT, FOLLOWED BY DR. JEN PATTERSON    Drug induced constipation     Hearing loss      "Hemorrhoids     History of blood transfusion 04/2022    History of chemotherapy 2022    FOLLOWED BY DR. JEN PATTERSON    Hypercalcemia 2016    resolved as of 2019    Hypercholesterolemia     Hyperkalemia 03/2022    Hypertension     Hyponatremia 02/17/2022    Leg swelling 10/2021    Mixed hyperlipidemia     Neutropenic fever 05/2022    Nonrheumatic mitral valve regurgitation 03/2022    PAF (paroxysmal atrial fibrillation)     FOLLOWED BY DR. YOSI LANGLEY    Pancytopenia     Pleural effusion, bilateral 06/2022    Pulmonary nodule     Seasonal allergies     Thrombocytopenia 08/2022    Venous insufficiency     Vitamin D deficiency        Past Surgical History:   Procedure Laterality Date    CHOLECYSTECTOMY N/A 10/12/2011    LAPAROSCOPIC, DR. CHANCE KLINE AT [facility]    COLONOSCOPY N/A 2000    1 or 2 polyps, otherwise normal per patient    COLONOSCOPY W/ POLYPECTOMY N/A 01/24/2012    3 MM TUBULAR ADENOMA POLYP IN CECUM, DIVERTICULOSIS IN RECTO-SIGMOID, RESCOPE IN 3 YRS, DR. CHANCE KLINE AT [facility]    CYST REMOVAL Left 10/12/2011    LEFT SCALP, PATH: BENIGN ADNEXAL NEOPLASM FAVORING ECCRINE SPIRADENOMA, DR. CHANCE KLINE AT [facility]    ERCP N/A 10/21/2022    Procedure: ENDOSCOPIC RETROGRADE CHOLANGIOPANCREATOGRAPHY with sphincterotomy and balloon sweep;  Surgeon: Peyman Ortega MD;  Location: [facility] ENDOSCOPY;  Service: Gastroenterology;  Laterality: N/A;  PRE: Common Duct Stones  POST: Common Duct Stones    VENOUS ACCESS DEVICE (PORT) INSERTION Left 03/23/2022    Procedure: INSERTION VENOUS ACCESS DEVICE;  Surgeon: Alin Delgado MD;  Location: [facility] OR [facility];  Service: General;  Laterality: Left;            Anthropometrics        Current Height Ht Readings from Last 1 Encounters:   01/02/24 152.4 cm (60\")      Current Weight Wt Readings from Last 1 Encounters:   01/02/24 63.9 kg (140 lb 14.4 oz)      BMI  27.5   Ideal Body Weight (IBW) 100 lb   Usual Body Weight (UBW) 150 lb   Weight Change/Trend Loss   Weight History Wt " Readings from Last 30 Encounters:   01/02/24 0846 63.9 kg (140 lb 14.4 oz)   12/27/23 1103 63.4 kg (139 lb 11.2 oz)   12/13/23 1431 67.6 kg (149 lb)   12/12/23 0841 67.1 kg (147 lb 14.4 oz)   12/11/23 1149 67.3 kg (148 lb 4.8 oz)   12/06/23 1152 67.2 kg (148 lb 1.6 oz)   11/21/23 1514 68.9 kg (152 lb)   11/10/23 1520 68.9 kg (152 lb)   10/23/23 1301 68.9 kg (152 lb)   08/29/23 1608 69.4 kg (153 lb)   08/07/23 1513 68.9 kg (152 lb)   07/06/23 1143 68.7 kg (151 lb 6.4 oz)   06/06/23 1612 68 kg (150 lb)   05/15/23 1001 68.9 kg (152 lb)   03/21/23 1348 69.1 kg (152 lb 6.4 oz)   03/14/23 1529 67.1 kg (148 lb)   02/13/23 1415 68 kg (150 lb)   01/30/23 1426 67.8 kg (149 lb 6.4 oz)   11/14/22 1529 67.2 kg (148 lb 1.6 oz)   11/10/22 1329 67.5 kg (148 lb 12.8 oz)   10/21/22 1011 67.2 kg (148 lb 1.6 oz)   09/15/22 1357 64.9 kg (143 lb)   09/01/22 1409 65.4 kg (144 lb 3.2 oz)   08/22/22 0816 65.7 kg (144 lb 14.4 oz)   08/02/22 1302 65.3 kg (144 lb)   08/02/22 1315 64.9 kg (143 lb)   07/25/22 0857 65.3 kg (144 lb)   06/27/22 0810 66.5 kg (146 lb 11.2 oz)   06/22/22 1432 65.8 kg (145 lb 2 oz)   06/17/22 1336 65 kg (143 lb 6 oz)          Medications           Current medications: Calcium Carbonate, Hydrocortisone (Perianal), acetaminophen, acyclovir, amiodarone, apixaban, carvedilol, cephalexin, ferrous sulfate, losartan, multivitamin with minerals, ondansetron, polyethylene glycol, pravastatin, sodium chloride, spironolactone, sulfamethoxazole-trimethoprim, and traMADol                 Tests/Procedures        Tests/Procedures Chemotherapy     Labs       Pertinent Labs    Results from last 7 days   Lab Units 01/02/24  0825 12/27/23  1059   SODIUM mmol/L 126* 128*   POTASSIUM mmol/L 4.7 4.7   CHLORIDE mmol/L 90* 93*   CO2 mmol/L 26.4 27.2   BUN mg/dL 14 12   CREATININE mg/dL 0.90 0.94   CALCIUM mg/dL 9.3 9.0   BILIRUBIN mg/dL 0.5 0.5   ALK PHOS U/L 125* 125*   ALT (SGPT) U/L 57* 57*   AST (SGOT) U/L 42* 44*   GLUCOSE mg/dL 86 99  "    Results from last 7 days   Lab Units 01/02/24  0825   HEMOGLOBIN g/dL 12.5   HEMATOCRIT % 37.0   WBC 10*3/mm3 5.07   ALBUMIN g/dL 4.2     Results from last 7 days   Lab Units 01/02/24  0825 12/27/23  1059   PLATELETS 10*3/mm3 239 203     COVID19   Date Value Ref Range Status   04/26/2022 Not Detected Not Detected - Ref. Range Final     No results found for: \"HGBA1C\"       Physical Findings        Physical Appearance alert     Edema  no edema   Gastrointestinal None   Tubes/Drains none peripheral IV   Oral/Mouth Cavity dentures   Skin        Estimated/Assessed Needs        Energy Requirements    Height for Calculation      Weight for Calculation 63 kg   Method for Estimation  30 kcal/kg   EST Needs (kcal/day) 1890 kcals/d       Protein Requirements    Weight for Calculation 63 kg   EST Protein Needs (g/kg) 1.2 gm/kg   EST Daily Needs (g/day) 75 g/d       Fluid Requirements     Method for Estimation 30 mL/kg    Estimated Needs (mL/day) 1890 ml/d           PES STATEMENT / NUTRITION DIAGNOSIS      Nutrition Dx Problem Problem:    NutritionDiagnosisProblem: Unintentional Weight Loss and Inadequate Oral Intake    Etiology:  Medical diagnosis: Lymphoma  Factors affecting nutrition: Reported/Observed By, Appetite, Oral  Functional diagnosis:chewing difficulty    Signs/Symptoms:  Information Deficit and Unintended Weight Change    Comment:      NUTRITION INTERVENTION / PLAN OF CARE      Intervention Goal(s) Provide information, Meet estimated needs, Disease management/therapy, Tolerate PO , Increase intake, Maintain weight, and No significant weight loss         RD Intervention/Action Encouraged intake, Follow Tx progress, Recommended/ordered         Recommendations:       PO Diet Frequent meals, increase caloric density and protein-- shared strategies      Supplements Ensure complete or plus      Snacks       Other    --      Monitor/Evaluation PO intake, Supplement intake, Pertinent labs, Weight, Symptoms   Education " Education provided   --    RD to follow     Electronically signed by:  Felicita Cruz RD, IKER  01/02/24 11:01 EST

## 2024-01-02 NOTE — PROGRESS NOTES
Subjective     CHIEF COMPLAINT:      Chief Complaint   Patient presents with    Follow-up     No concerns      HISTORY OF PRESENT ILLNESS:     Michelle Car is a 83 y.o. female patient who returns today for follow up on her lymphoma.  She returns today for follow-up accompanied by her sister.  She noticed improvement in the left jaw swelling since the start of treatment.  She tolerated the chemotherapy except for fatigue.  She had nausea x 2 which improved with Zofran.  She is able to chew better than before.    ROS:  Pertinent ROS is in the HPI.     Past medical, surgical, social and family history were reviewed.     MEDICATIONS:    Current Outpatient Medications:     acetaminophen (TYLENOL) 500 MG tablet, Take 1 tablet by mouth Every 6 (Six) Hours As Needed for Mild Pain., Disp: , Rfl:     acyclovir (Zovirax) 400 MG tablet, Take 1 tablet by mouth 2 (Two) Times a Day., Disp: 60 tablet, Rfl: 7    amiodarone (PACERONE) 200 MG tablet, Take 1 tablet by mouth Daily., Disp: 90 tablet, Rfl: 2    Calcium Carbonate (CALTRATE 600 PO), Take 1 tablet by mouth Daily., Disp: , Rfl:     carvedilol (COREG) 3.125 MG tablet, TAKE 1 TABLET BY MOUTH TWICE DAILY WITH MEALS, Disp: 180 tablet, Rfl: 1    cephalexin (Keflex) 500 MG capsule, Take 1 capsule by mouth 3 (Three) Times a Day., Disp: 7 capsule, Rfl: 0    Eliquis 5 MG tablet tablet, TAKE 1 TABLET BY MOUTH EVERY 12 HOURS FOR ATRIAL FIBRILLATION, Disp: 180 tablet, Rfl: 1    ferrous sulfate (FeroSul) 325 (65 FE) MG tablet, Take 1 tablet by mouth Every Other Day., Disp: 36 tablet, Rfl: 2    Hydrocortisone, Perianal, (Anusol-HC) 2.5 % rectal cream, Apply to hemorrhoids 3 times daily for 7 days during hemorrhoid flare. Include applicator., Disp: 30 g, Rfl: 1    losartan (COZAAR) 100 MG tablet, , Disp: , Rfl:     Multiple Vitamins-Minerals (CENTRUM SILVER) tablet, Take 1 tablet by mouth Daily., Disp: , Rfl:     ondansetron (ZOFRAN) 8 MG tablet, Take 1 tablet by mouth 3 (Three) Times  "a Day As Needed for Nausea or Vomiting., Disp: 30 tablet, Rfl: 5    polyethylene glycol (MIRALAX) 17 GM/SCOOP powder, Take 17 g by mouth As Needed., Disp: , Rfl:     pravastatin (PRAVACHOL) 40 MG tablet, TAKE 1 TABLET BY MOUTH EVERY NIGHT AT BEDTIME, Disp: 90 tablet, Rfl: 1    sodium chloride 1 g tablet, Take 1 tablet by mouth Daily., Disp: 30 tablet, Rfl: 1    spironolactone (ALDACTONE) 25 MG tablet, TAKE 1 TABLET BY MOUTH DAILY, Disp: 90 tablet, Rfl: 1    sulfamethoxazole-trimethoprim (BACTRIM DS,SEPTRA DS) 800-160 MG per tablet, Take 1 tablet by mouth 3 (Three) Times a Week., Disp: 12 tablet, Rfl: 7    traMADol (ULTRAM) 50 MG tablet, Take 1 tablet by mouth Every 6 (Six) Hours As Needed for Moderate Pain., Disp: 12 tablet, Rfl: 0  Objective     VITAL SIGNS:     Vitals:    01/02/24 0846   BP: 113/71   Pulse: 61   Resp: 16   Temp: 98.4 °F (36.9 °C)   TempSrc: Temporal   SpO2: 98%   Weight: 63.9 kg (140 lb 14.4 oz)   Height: 152.4 cm (60\")   PainSc: 0-No pain     Body mass index is 27.52 kg/m².     Wt Readings from Last 5 Encounters:   01/02/24 63.9 kg (140 lb 14.4 oz)   12/27/23 63.4 kg (139 lb 11.2 oz)   12/13/23 67.6 kg (149 lb)   12/12/23 67.1 kg (147 lb 14.4 oz)   12/11/23 67.3 kg (148 lb 4.8 oz)     PHYSICAL EXAMINATION:   GENERAL: The patient appears in fair general condition, not in acute distress.   SKIN: No ecchymosis.    EYES: No jaundice. Pallor.  MOUTH: Significant decrease in the left jaw mass.  It measured 1.5-2 cm.  The tumor is no longer seen on inspection of the gum area.  CHEST: Normal respiratory effort.  Lungs clear bilaterally.  No added sounds.  CVS: Normal S1-S2.  Grade 3 systolic murmur.  ABDOMEN: Soft. No tenderness. No Hepatomegaly. No Splenomegaly. No masses.  EXTREMITIES: Chronic edema of the lower extremities.    DIAGNOSTIC DATA:     Results from last 7 days   Lab Units 01/02/24  0825 12/27/23  1059   WBC 10*3/mm3 5.07 5.24   NEUTROS ABS 10*3/mm3 4.03 4.32   HEMOGLOBIN g/dL 12.5 11.6* "   HEMATOCRIT % 37.0 33.9*   PLATELETS 10*3/mm3 239 203     Results from last 7 days   Lab Units 01/02/24  0825 12/27/23  1059   SODIUM mmol/L 126* 128*   POTASSIUM mmol/L 4.7 4.7   CHLORIDE mmol/L 90* 93*   CO2 mmol/L 26.4 27.2   BUN mg/dL 14 12   CREATININE mg/dL 0.90 0.94   CALCIUM mg/dL 9.3 9.0   ALBUMIN g/dL 4.2 3.9   BILIRUBIN mg/dL 0.5 0.5   ALK PHOS U/L 125* 125*   ALT (SGPT) U/L 57* 57*   AST (SGOT) U/L 42* 44*   GLUCOSE mg/dL 86 99     Assessment & Plan    *Diffuse large B-cell lymphoma.     Patient was found to have bilateral pleural effusions.    She underwent right thoracentesis on 2/21/2022 and the left thoracentesis on 2/22/2022.    Analysis of the fluid revealed involvement with diffuse large B-cell lymphoma.    FISH analysis for BCL6 rearrangement was positive.    FISH analysis for BCL 1, BCL-2 and MYC rearrangement was negative.    PET scan on 3/10/2022 revealed significant hypermetabolism in the left adrenal gland and the surrounding soft tissue with SUV up to 34.5. Hypermetabolism in the left pleural thickening with SUV of 7.1. there was less hypermetabolism in the right adrenal gland and in the right pleura.  She was considered to have stage III non-bulky disease.  This concurred with the pathology exam of the pleural fluid cytology that revealed diffuse large B-cell lymphoma.  R-IPI score of 3 associated with poor risk - associated with overall survival of 55%.   R-CHOP with Neulasta support was started on 3/28/2022.  Patient developed neutropenia and thrombocytopenia after cycle#1.  Patient developed neutropenic fever after cycle #2 and was hospitalized.  Her performance status decreased as a result.   Regimen was changed to mini-RCHOP starting cycle #3 on 5/16/2022.  CT scans on 6/23/2022 showed evidence of response.  There was resolution of the pericardial effusion and near complete resolution of the pleural effusions.   Patient received cycle #6 on 7/25/2022.  PET scan on 8/15/2022 revealed  complete metabolic response.  Patient was found to have recurrence with development of a left mandibular mass in October/November 2023.  Biopsy of left mandible mass on 11/28/2023 showed recurrence of the lymphoma, diffuse large B cell, non-GCB phenotype.  Ki-67 was 90%.    It was positive for Bcl-6 but negative for Bcl-2 and MYC rearrangement.  PET scan on 12/11/2023 revealed multiple areas of involvement -prevascular space, left epicardial fat pad, pleural density posterior to the left fourth rib anterior aspect, lesion along the pericardium versus intramuscular with SUV of 9.8.  Hypermetabolic activity at both adrenal glands, uniformly increased in size and appeared hyperplastic.  There was a 2.7 x 1.1 cm soft tissue nodule lateral to the right erector spinae muscle posterior to the right posterior 11th rib with an SUV of 20.5 and there was a 1.3 cm nodule lateral to the posterior margin of the left psoas muscle with an SUV of 24.9.  Treatment with Rituxan Polivy and Treanda with Treanda on days 1 and 2 every 3 weeks for 6 cycles was recommended.  I explained to the patient that the treatment is not curative and that the goal is to control the lymphoma and improve her symptoms.   We will radiation therapy to the left mandibular mass after completion of chemotherapy.    After completion of treatment, we will also consider maintenance therapy with ibrutinib or Revlimid.  3 tolerated the treatment well.  She is due for cycle #2 today.    *Bilateral pleural effusions.   She is s/p right thoracentesis on 2/21/2022 and the effusion was found to be malignant attributed to the lymphoma.   CT scan on 5/1/2022 revealed decrease in the right pleural effusion and an increase in the left effusion.   The increase in the left effusion was suspected of being due to cardiac and fluid overload states (fluid appeared to be serous on CT scan).  CT on 6/23/2022 revealed near resolution of the pleural effusions.  PET scan on 8/15/2022  showed that the pleural effusions were trace and improved since June 2022.  CT chest on 11/7/2022 revealed minimal partially loculated right pleural effusion.  CT chest on 3/7/2023 showed no recurrence of the effusion.  CT on 8/24/2023 showed no evidence of recurrence of the pleural effusions.  CT on 11/16/2023 showed no evidence of recurrence of the pleural effusions.  Exam today reveals no evidence of recurrence of the effusion.    *Iron deficiency anemia.  Patient also developed anemia secondary to lymphoma and secondary to chemotherapy.    Hemoglobin was 11.1 on 3/28/2022.  Hgb decreased to 7.9 on 4/25/2022 and she was transfused.   Hemoglobin decreased to 6.3 on 4/27/2022. She was transfused.  Hemoglobin improved to 8.8 on 5/16/2022.  Patient is on ferrous sulfate 325 mg 3 days a week.  11/21/2023: Hemoglobin 12.1.    Iron stores adequate with ferritin 141 and transferrin saturation 25%.  12/12/2023: Hemoglobin 12.2.  1/2/2024: Hemoglobin 12.5.    *Hyponatremia.  Patient had problem with hyponatremia in the past.  Sodium is 127 on 3/14/2023.  She was advised to increase salt intake.  Sodium improved to 133 on repeat lab on 3/21/2023.  However, sodium decreased to 126 on 6/7/2023.  This is likely secondary to her medications.  11/21/2023: Sodium decreased to 125.  Patient was advised to increase sodium in her diet.    12/6/2023: Sodium improved to 132.  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  She was started on sodium chloride 1 g daily.  12/27/2023: Sodium 128.  1/2/2024: Sodium decreased to 125.    *Chronic anticoagulation.  Patient is on Eliquis 5 mg twice daily.  We will need to hold if platelets decrease to <50,000.    *Elevated liver enzymes.  Patient developed elevated liver enzymes on 12/19/2023.  AST increased to 53.  ALT increased to 77.  Alk phos was 204.  She was advised to hold Lipitor.  12/27/2023: AST improved to 44.  ALT improved to 57.  Alk phos  improved to 125.  1/2/2024: AST decreased to 42..  ALT stable at 57.    *Venous access.  Patient previously had a port which was removed on 6/5/2023.  We discussed having a port placed for this treatment.  She has good veins and we will therefore use peripheral IVs for now.    She was able to have IV line placed.    *Prophylaxis.  Patient had shingles in April 2023.  She is at risk of developing shingles while on treatment.  She was placed on acyclovir 400 mg twice daily.  She is on Bactrim DS 3 days a week.    *13 x 9 mm nodular focus of consolidation in the right upper lobe.  Patient is reporting cough and congestion.  This is concerning for evolving pneumonia.  An underlying malignancy could not be excluded.  CT on 11/16/2023 showed no suspicious findings.  No hypermetabolism in this area.    *T12 compression deformity.  Patient is not complaining of pain in the area.  Bone density on 9/14/2023 showed osteoporosis.    We recommended starting treatment with Fosamax.    Due to the patient having problem with her teeth, Fosamax was not started.    *Choledocholithiasis with increase in the intra and extrahepatic biliary dilatation seen on CT on 6/23/2022.  Liver enzymes including bilirubin are normal today.   She is not having abdominal pain.  No redness no tenderness on exam of the right upper quadrant.  PET scan on 8/15/2022 showed persistence of the findings.  No symptoms or signs of infection.  Patient was seen by Dr. Ortega.  She underwent ERCP on 10/21/2022.  There was a calculus of the bile duct without cholecystitis and without obstruction.  She had sphincterotomy and balloon sweep.  Alkaline phosphatase improved afterwards.  CT on 3/7/2023 revealed stable findings.  CT scan on 8/24/2023 showed chronic biliary duct dilatation.    CT scan on 11/16/2023 showed no changes.    PLAN:    1.  Proceed with cycle #2 of Rituxan Polivy and Treanda at the same doses.    2.  Patient will have on body Neulasta on day #2.     3.  Continue sodium chloride 1 g daily.    4.  Continue to hold Lipitor.    5.  Follow-up in 3 weeks.  Will schedule her for cycle #3 of Rituxan Polivy and Treanda.      Renate Galo MD  01/02/24

## 2024-01-03 ENCOUNTER — INFUSION (OUTPATIENT)
Dept: ONCOLOGY | Facility: HOSPITAL | Age: 84
End: 2024-01-03
Payer: MEDICARE

## 2024-01-03 VITALS
RESPIRATION RATE: 16 BRPM | HEART RATE: 67 BPM | BODY MASS INDEX: 28.08 KG/M2 | WEIGHT: 143.8 LBS | OXYGEN SATURATION: 100 % | DIASTOLIC BLOOD PRESSURE: 57 MMHG | SYSTOLIC BLOOD PRESSURE: 98 MMHG | TEMPERATURE: 97.8 F

## 2024-01-03 DIAGNOSIS — C83.38 DIFFUSE LARGE B-CELL LYMPHOMA OF LYMPH NODES OF MULTIPLE REGIONS: Primary | ICD-10-CM

## 2024-01-03 PROCEDURE — 25810000003 SODIUM CHLORIDE 0.9 % SOLUTION: Performed by: INTERNAL MEDICINE

## 2024-01-03 PROCEDURE — 25010000002 DEXAMETHASONE SODIUM PHOSPHATE 100 MG/10ML SOLUTION: Performed by: INTERNAL MEDICINE

## 2024-01-03 PROCEDURE — 25810000003 SODIUM CHLORIDE 0.9 % SOLUTION 500 ML FLEX CONT: Performed by: INTERNAL MEDICINE

## 2024-01-03 PROCEDURE — 96377 APPLICATON ON-BODY INJECTOR: CPT

## 2024-01-03 PROCEDURE — 25010000002 BENDAMUSTINE HCL 100 MG/4ML SOLUTION 4 ML VIAL: Performed by: INTERNAL MEDICINE

## 2024-01-03 PROCEDURE — 96375 TX/PRO/DX INJ NEW DRUG ADDON: CPT

## 2024-01-03 PROCEDURE — 25010000002 PEGFILGRASTIM 6 MG/0.6ML PREFILLED SYRINGE KIT: Performed by: INTERNAL MEDICINE

## 2024-01-03 PROCEDURE — 96413 CHEMO IV INFUSION 1 HR: CPT

## 2024-01-03 RX ORDER — SODIUM CHLORIDE 9 MG/ML
20 INJECTION, SOLUTION INTRAVENOUS ONCE
Status: COMPLETED | OUTPATIENT
Start: 2024-01-03 | End: 2024-01-03

## 2024-01-03 RX ADMIN — SODIUM CHLORIDE 20 ML/HR: 9 INJECTION, SOLUTION INTRAVENOUS at 13:27

## 2024-01-03 RX ADMIN — BENDAMUSTINE HYDROCHLORIDE 120 MG: 100 INJECTION INTRAVENOUS at 13:46

## 2024-01-03 RX ADMIN — PEGFILGRASTIM 6 MG: KIT SUBCUTANEOUS at 14:41

## 2024-01-03 RX ADMIN — DEXAMETHASONE SODIUM PHOSPHATE 12 MG: 10 INJECTION, SOLUTION INTRAMUSCULAR; INTRAVENOUS at 13:27

## 2024-01-23 ENCOUNTER — INFUSION (OUTPATIENT)
Dept: ONCOLOGY | Facility: HOSPITAL | Age: 84
End: 2024-01-23
Payer: MEDICARE

## 2024-01-23 ENCOUNTER — OFFICE VISIT (OUTPATIENT)
Dept: ONCOLOGY | Facility: CLINIC | Age: 84
End: 2024-01-23
Payer: MEDICARE

## 2024-01-23 VITALS
HEART RATE: 66 BPM | BODY MASS INDEX: 27.15 KG/M2 | WEIGHT: 138.3 LBS | TEMPERATURE: 98.2 F | OXYGEN SATURATION: 99 % | DIASTOLIC BLOOD PRESSURE: 67 MMHG | HEIGHT: 60 IN | SYSTOLIC BLOOD PRESSURE: 107 MMHG | RESPIRATION RATE: 16 BRPM

## 2024-01-23 DIAGNOSIS — Z79.01 CHRONIC ANTICOAGULATION: ICD-10-CM

## 2024-01-23 DIAGNOSIS — C83.38 DIFFUSE LARGE B-CELL LYMPHOMA OF LYMPH NODES OF MULTIPLE REGIONS: ICD-10-CM

## 2024-01-23 DIAGNOSIS — T45.1X5A CHEMOTHERAPY-INDUCED NAUSEA: ICD-10-CM

## 2024-01-23 DIAGNOSIS — C83.38 DIFFUSE LARGE B-CELL LYMPHOMA OF LYMPH NODES OF MULTIPLE REGIONS: Primary | ICD-10-CM

## 2024-01-23 DIAGNOSIS — E87.1 HYPONATREMIA: ICD-10-CM

## 2024-01-23 DIAGNOSIS — R11.0 CHEMOTHERAPY-INDUCED NAUSEA: ICD-10-CM

## 2024-01-23 DIAGNOSIS — R79.89 ELEVATED LFTS: ICD-10-CM

## 2024-01-23 DIAGNOSIS — L27.0 DRUG RASH: ICD-10-CM

## 2024-01-23 DIAGNOSIS — J90 BILATERAL PLEURAL EFFUSION: ICD-10-CM

## 2024-01-23 DIAGNOSIS — D50.9 IRON DEFICIENCY ANEMIA, UNSPECIFIED IRON DEFICIENCY ANEMIA TYPE: ICD-10-CM

## 2024-01-23 LAB
ALBUMIN SERPL-MCNC: 3.8 G/DL (ref 3.5–5.2)
ALBUMIN/GLOB SERPL: 1.5 G/DL
ALP SERPL-CCNC: 119 U/L (ref 39–117)
ALT SERPL W P-5'-P-CCNC: 25 U/L (ref 1–33)
ANION GAP SERPL CALCULATED.3IONS-SCNC: 8.5 MMOL/L (ref 5–15)
AST SERPL-CCNC: 31 U/L (ref 1–32)
BASOPHILS # BLD AUTO: 0.06 10*3/MM3 (ref 0–0.2)
BASOPHILS NFR BLD AUTO: 1 % (ref 0–1.5)
BILIRUB SERPL-MCNC: 0.4 MG/DL (ref 0–1.2)
BUN SERPL-MCNC: 20 MG/DL (ref 8–23)
BUN/CREAT SERPL: 20.8 (ref 7–25)
CALCIUM SPEC-SCNC: 9.4 MG/DL (ref 8.6–10.5)
CHLORIDE SERPL-SCNC: 98 MMOL/L (ref 98–107)
CO2 SERPL-SCNC: 27.5 MMOL/L (ref 22–29)
CREAT SERPL-MCNC: 0.96 MG/DL (ref 0.57–1)
DEPRECATED RDW RBC AUTO: 64.6 FL (ref 37–54)
EGFRCR SERPLBLD CKD-EPI 2021: 59.2 ML/MIN/1.73
EOSINOPHIL # BLD AUTO: 0.04 10*3/MM3 (ref 0–0.4)
EOSINOPHIL NFR BLD AUTO: 0.7 % (ref 0.3–6.2)
ERYTHROCYTE [DISTWIDTH] IN BLOOD BY AUTOMATED COUNT: 18.3 % (ref 12.3–15.4)
GLOBULIN UR ELPH-MCNC: 2.6 GM/DL
GLUCOSE SERPL-MCNC: 92 MG/DL (ref 65–99)
HCT VFR BLD AUTO: 33.7 % (ref 34–46.6)
HGB BLD-MCNC: 11.4 G/DL (ref 12–15.9)
IMM GRANULOCYTES # BLD AUTO: 0.02 10*3/MM3 (ref 0–0.05)
IMM GRANULOCYTES NFR BLD AUTO: 0.3 % (ref 0–0.5)
LYMPHOCYTES # BLD AUTO: 0.34 10*3/MM3 (ref 0.7–3.1)
LYMPHOCYTES NFR BLD AUTO: 5.8 % (ref 19.6–45.3)
MCH RBC QN AUTO: 33.2 PG (ref 26.6–33)
MCHC RBC AUTO-ENTMCNC: 33.8 G/DL (ref 31.5–35.7)
MCV RBC AUTO: 98.3 FL (ref 79–97)
MONOCYTES # BLD AUTO: 0.57 10*3/MM3 (ref 0.1–0.9)
MONOCYTES NFR BLD AUTO: 9.6 % (ref 5–12)
NEUTROPHILS NFR BLD AUTO: 4.88 10*3/MM3 (ref 1.7–7)
NEUTROPHILS NFR BLD AUTO: 82.6 % (ref 42.7–76)
NRBC BLD AUTO-RTO: 0 /100 WBC (ref 0–0.2)
PLATELET # BLD AUTO: 200 10*3/MM3 (ref 140–450)
PMV BLD AUTO: 9.3 FL (ref 6–12)
POTASSIUM SERPL-SCNC: 4.5 MMOL/L (ref 3.5–5.2)
PROT SERPL-MCNC: 6.4 G/DL (ref 6–8.5)
RBC # BLD AUTO: 3.43 10*6/MM3 (ref 3.77–5.28)
SODIUM SERPL-SCNC: 134 MMOL/L (ref 136–145)
WBC NRBC COR # BLD AUTO: 5.91 10*3/MM3 (ref 3.4–10.8)

## 2024-01-23 PROCEDURE — 25010000002 PALONOSETRON PER 25 MCG: Performed by: INTERNAL MEDICINE

## 2024-01-23 PROCEDURE — 96417 CHEMO IV INFUS EACH ADDL SEQ: CPT

## 2024-01-23 PROCEDURE — 25010000002 POLATUZUMAB VEDOTIN-PIIQ 30 MG RECONSTITUTED SOLUTION 1 EACH VIAL: Performed by: INTERNAL MEDICINE

## 2024-01-23 PROCEDURE — 1159F MED LIST DOCD IN RCRD: CPT | Performed by: INTERNAL MEDICINE

## 2024-01-23 PROCEDURE — 80053 COMPREHEN METABOLIC PANEL: CPT

## 2024-01-23 PROCEDURE — 25010000002 RITUXIMAB 10 MG/ML SOLUTION 10 ML VIAL: Performed by: INTERNAL MEDICINE

## 2024-01-23 PROCEDURE — 85025 COMPLETE CBC W/AUTO DIFF WBC: CPT

## 2024-01-23 PROCEDURE — 96375 TX/PRO/DX INJ NEW DRUG ADDON: CPT

## 2024-01-23 PROCEDURE — 96415 CHEMO IV INFUSION ADDL HR: CPT

## 2024-01-23 PROCEDURE — 25810000003 SODIUM CHLORIDE 0.9 % SOLUTION: Performed by: INTERNAL MEDICINE

## 2024-01-23 PROCEDURE — 25010000002 DIPHENHYDRAMINE PER 50 MG: Performed by: INTERNAL MEDICINE

## 2024-01-23 PROCEDURE — 3074F SYST BP LT 130 MM HG: CPT | Performed by: INTERNAL MEDICINE

## 2024-01-23 PROCEDURE — 25010000002 RITUXIMAB 10 MG/ML SOLUTION 50 ML VIAL: Performed by: INTERNAL MEDICINE

## 2024-01-23 PROCEDURE — 1160F RVW MEDS BY RX/DR IN RCRD: CPT | Performed by: INTERNAL MEDICINE

## 2024-01-23 PROCEDURE — 1126F AMNT PAIN NOTED NONE PRSNT: CPT | Performed by: INTERNAL MEDICINE

## 2024-01-23 PROCEDURE — 3078F DIAST BP <80 MM HG: CPT | Performed by: INTERNAL MEDICINE

## 2024-01-23 PROCEDURE — 96413 CHEMO IV INFUSION 1 HR: CPT

## 2024-01-23 PROCEDURE — 25810000003 SODIUM CHLORIDE 0.9 % SOLUTION 250 ML FLEX CONT: Performed by: INTERNAL MEDICINE

## 2024-01-23 PROCEDURE — 25810000003 SODIUM CHLORIDE 0.9 % SOLUTION 500 ML FLEX CONT: Performed by: INTERNAL MEDICINE

## 2024-01-23 PROCEDURE — 25010000002 BENDAMUSTINE HCL 100 MG/4ML SOLUTION 4 ML VIAL: Performed by: INTERNAL MEDICINE

## 2024-01-23 PROCEDURE — 25010000002 DEXAMETHASONE SODIUM PHOSPHATE 100 MG/10ML SOLUTION: Performed by: INTERNAL MEDICINE

## 2024-01-23 PROCEDURE — 99214 OFFICE O/P EST MOD 30 MIN: CPT | Performed by: INTERNAL MEDICINE

## 2024-01-23 RX ORDER — DIPHENHYDRAMINE HYDROCHLORIDE 50 MG/ML
50 INJECTION INTRAMUSCULAR; INTRAVENOUS AS NEEDED
Status: CANCELLED | OUTPATIENT
Start: 2024-01-23

## 2024-01-23 RX ORDER — FAMOTIDINE 10 MG/ML
20 INJECTION, SOLUTION INTRAVENOUS AS NEEDED
Status: CANCELLED | OUTPATIENT
Start: 2024-01-23

## 2024-01-23 RX ORDER — ACETAMINOPHEN 325 MG/1
650 TABLET ORAL ONCE
Status: CANCELLED | OUTPATIENT
Start: 2024-01-23

## 2024-01-23 RX ORDER — PALONOSETRON 0.05 MG/ML
0.25 INJECTION, SOLUTION INTRAVENOUS ONCE
Status: CANCELLED | OUTPATIENT
Start: 2024-01-23

## 2024-01-23 RX ORDER — PALONOSETRON 0.05 MG/ML
0.25 INJECTION, SOLUTION INTRAVENOUS ONCE
Status: COMPLETED | OUTPATIENT
Start: 2024-01-23 | End: 2024-01-23

## 2024-01-23 RX ORDER — MEPERIDINE HYDROCHLORIDE 25 MG/ML
12.5 INJECTION INTRAMUSCULAR; INTRAVENOUS; SUBCUTANEOUS
Status: CANCELLED | OUTPATIENT
Start: 2024-01-23

## 2024-01-23 RX ORDER — PROCHLORPERAZINE MALEATE 5 MG/1
5-10 TABLET ORAL EVERY 6 HOURS PRN
Qty: 60 TABLET | Refills: 1 | Status: SHIPPED | OUTPATIENT
Start: 2024-01-23

## 2024-01-23 RX ORDER — SODIUM CHLORIDE 9 MG/ML
20 INJECTION, SOLUTION INTRAVENOUS ONCE
Status: CANCELLED | OUTPATIENT
Start: 2024-01-23

## 2024-01-23 RX ORDER — SODIUM CHLORIDE 9 MG/ML
20 INJECTION, SOLUTION INTRAVENOUS ONCE
Status: CANCELLED | OUTPATIENT
Start: 2024-01-24

## 2024-01-23 RX ORDER — SODIUM CHLORIDE 9 MG/ML
20 INJECTION, SOLUTION INTRAVENOUS ONCE
Status: COMPLETED | OUTPATIENT
Start: 2024-01-23 | End: 2024-01-23

## 2024-01-23 RX ORDER — FAMOTIDINE 20 MG/1
20 TABLET, FILM COATED ORAL ONCE
Status: CANCELLED | OUTPATIENT
Start: 2024-01-23

## 2024-01-23 RX ORDER — ACETAMINOPHEN 325 MG/1
650 TABLET ORAL ONCE
Status: COMPLETED | OUTPATIENT
Start: 2024-01-23 | End: 2024-01-23

## 2024-01-23 RX ORDER — FAMOTIDINE 20 MG/1
20 TABLET, FILM COATED ORAL ONCE
Status: COMPLETED | OUTPATIENT
Start: 2024-01-23 | End: 2024-01-23

## 2024-01-23 RX ADMIN — DIPHENHYDRAMINE HYDROCHLORIDE 25 MG: 50 INJECTION, SOLUTION INTRAMUSCULAR; INTRAVENOUS at 10:08

## 2024-01-23 RX ADMIN — ACETAMINOPHEN 650 MG: 325 TABLET ORAL at 09:49

## 2024-01-23 RX ADMIN — RITUXIMAB 620 MG: 10 INJECTION, SOLUTION INTRAVENOUS at 11:16

## 2024-01-23 RX ADMIN — PALONOSETRON HYDROCHLORIDE 0.25 MG: 0.25 INJECTION INTRAVENOUS at 09:49

## 2024-01-23 RX ADMIN — SODIUM CHLORIDE 20 ML/HR: 9 INJECTION, SOLUTION INTRAVENOUS at 09:53

## 2024-01-23 RX ADMIN — FAMOTIDINE 20 MG: 20 TABLET ORAL at 09:49

## 2024-01-23 RX ADMIN — DEXAMETHASONE SODIUM PHOSPHATE 12 MG: 10 INJECTION, SOLUTION INTRAMUSCULAR; INTRAVENOUS at 09:53

## 2024-01-23 RX ADMIN — POLATUZUMAB VEDOTIN 120 MG: 30 INJECTION, POWDER, LYOPHILIZED, FOR SOLUTION INTRAVENOUS at 13:58

## 2024-01-23 RX ADMIN — BENDAMUSTINE HYDROCHLORIDE 120 MG: 100 INJECTION INTRAVENOUS at 10:28

## 2024-01-23 NOTE — PROGRESS NOTES
Subjective     CHIEF COMPLAINT:      Chief Complaint   Patient presents with    Follow-up     Discuss labs appointment between md appointments      HISTORY OF PRESENT ILLNESS:     Michelle Car is a 83 y.o. female patient who returns today for follow up on her relapsed lymphoma. She returns today for follow up accompanied by her family. She reports that she is tolerating the treatment well. No nausea or vomiting. She continues to notice improvement in the left jaw mass. She reports that it feels to her like a small hump. She is able to chew on the left but prefers to chew on the right side.     Patient reports improvement in the skin rash over the left leg. She has been applying cortisone cream to it. No new skin rashes.     Patient reports developing constipation after taking Zofran. As a result, she stopped taking Zofran.     ROS:  Pertinent ROS is in the HPI.     Past medical, surgical, social and family history were reviewed.     MEDICATIONS:    Current Outpatient Medications:     acetaminophen (TYLENOL) 500 MG tablet, Take 1 tablet by mouth Every 6 (Six) Hours As Needed for Mild Pain., Disp: , Rfl:     acyclovir (Zovirax) 400 MG tablet, Take 1 tablet by mouth 2 (Two) Times a Day., Disp: 60 tablet, Rfl: 7    amiodarone (PACERONE) 200 MG tablet, Take 1 tablet by mouth Daily., Disp: 90 tablet, Rfl: 2    Calcium Carbonate (CALTRATE 600 PO), Take 1 tablet by mouth Daily., Disp: , Rfl:     carvedilol (COREG) 3.125 MG tablet, TAKE 1 TABLET BY MOUTH TWICE DAILY WITH MEALS, Disp: 180 tablet, Rfl: 1    Eliquis 5 MG tablet tablet, TAKE 1 TABLET BY MOUTH EVERY 12 HOURS FOR ATRIAL FIBRILLATION, Disp: 180 tablet, Rfl: 1    ferrous sulfate (FeroSul) 325 (65 FE) MG tablet, Take 1 tablet by mouth Every Other Day., Disp: 36 tablet, Rfl: 2    Hydrocortisone, Perianal, (Anusol-HC) 2.5 % rectal cream, Apply to hemorrhoids 3 times daily for 7 days during hemorrhoid flare. Include applicator., Disp: 30 g, Rfl: 1    losartan  "(COZAAR) 100 MG tablet, , Disp: , Rfl:     Multiple Vitamins-Minerals (CENTRUM SILVER) tablet, Take 1 tablet by mouth Daily., Disp: , Rfl:     polyethylene glycol (MIRALAX) 17 GM/SCOOP powder, Take 17 g by mouth As Needed., Disp: , Rfl:     pravastatin (PRAVACHOL) 40 MG tablet, TAKE 1 TABLET BY MOUTH EVERY NIGHT AT BEDTIME, Disp: 90 tablet, Rfl: 1    sodium chloride 1 g tablet, Take 1 tablet by mouth Daily., Disp: 30 tablet, Rfl: 1    spironolactone (ALDACTONE) 25 MG tablet, TAKE 1 TABLET BY MOUTH DAILY, Disp: 90 tablet, Rfl: 1    sulfamethoxazole-trimethoprim (BACTRIM DS,SEPTRA DS) 800-160 MG per tablet, Take 1 tablet by mouth 3 (Three) Times a Week., Disp: 12 tablet, Rfl: 7    traMADol (ULTRAM) 50 MG tablet, Take 1 tablet by mouth Every 6 (Six) Hours As Needed for Moderate Pain., Disp: 12 tablet, Rfl: 0    prochlorperazine (COMPAZINE) 5 MG tablet, Take 1-2 tablets by mouth Every 6 (Six) Hours As Needed for Nausea or Vomiting., Disp: 60 tablet, Rfl: 1  Objective     VITAL SIGNS:     Vitals:    01/23/24 0903   BP: 107/67   Pulse: 66   Resp: 16   Temp: 98.2 °F (36.8 °C)   TempSrc: Temporal   SpO2: 99%   Weight: 62.7 kg (138 lb 4.8 oz)   Height: 152.4 cm (60\")   PainSc: 0-No pain     Body mass index is 27.01 kg/m².     Wt Readings from Last 5 Encounters:   01/23/24 62.7 kg (138 lb 4.8 oz)   01/03/24 65.2 kg (143 lb 12.8 oz)   01/02/24 63.9 kg (140 lb 14.4 oz)   12/27/23 63.4 kg (139 lb 11.2 oz)   12/13/23 67.6 kg (149 lb)     PHYSICAL EXAMINATION:   GENERAL: The patient appears in fair general condition, not in acute distress.   SKIN: No ecchymosis.  EYES: No jaundice. Pallor.  CHEST: Normal respiratory effort. Lungs clear bilaterally. No added sounds.  CVS: Normal S1 and S2. No murmurs.  ABDOMEN: Soft. No tenderness. No Hepatomegaly. No Splenomegaly. No masses.  EXTREMITIES: Chronic leg edema. Skin rash over the left leg less erythematous.      DIAGNOSTIC DATA:     Results from last 7 days   Lab Units 01/23/24  0839 "   WBC 10*3/mm3 5.91   NEUTROS ABS 10*3/mm3 4.88   HEMOGLOBIN g/dL 11.4*   HEMATOCRIT % 33.7*   PLATELETS 10*3/mm3 200     Results from last 7 days   Lab Units 01/23/24  0839   SODIUM mmol/L 134*   POTASSIUM mmol/L 4.5   CHLORIDE mmol/L 98   CO2 mmol/L 27.5   BUN mg/dL 20   CREATININE mg/dL 0.96   CALCIUM mg/dL 9.4   ALBUMIN g/dL 3.8   BILIRUBIN mg/dL 0.4   ALK PHOS U/L 119*   ALT (SGPT) U/L 25   AST (SGOT) U/L 31   GLUCOSE mg/dL 92     Assessment & Plan    *Diffuse large B-cell lymphoma.     Patient was found to have bilateral pleural effusions.    She underwent right thoracentesis on 2/21/2022 and the left thoracentesis on 2/22/2022.    Analysis of the fluid revealed involvement with diffuse large B-cell lymphoma.    FISH analysis for BCL6 rearrangement was positive.    FISH analysis for BCL 1, BCL-2 and MYC rearrangement was negative.    PET scan on 3/10/2022 revealed significant hypermetabolism in the left adrenal gland and the surrounding soft tissue with SUV up to 34.5. Hypermetabolism in the left pleural thickening with SUV of 7.1. there was less hypermetabolism in the right adrenal gland and in the right pleura.  She was considered to have stage III non-bulky disease.  This concurred with the pathology exam of the pleural fluid cytology that revealed diffuse large B-cell lymphoma.  R-IPI score of 3 associated with poor risk - associated with overall survival of 55%.   R-CHOP with Neulasta support was started on 3/28/2022.  Patient developed neutropenia and thrombocytopenia after cycle#1.  Patient developed neutropenic fever after cycle #2 and was hospitalized.  Her performance status decreased as a result.   Regimen was changed to mini-RCHOP starting cycle #3 on 5/16/2022.  CT scans on 6/23/2022 showed evidence of response.  There was resolution of the pericardial effusion and near complete resolution of the pleural effusions.   Patient received cycle #6 on 7/25/2022.  PET scan on 8/15/2022 revealed complete  metabolic response.  Patient was found to have recurrence with development of a left mandibular mass in October/November 2023.  Biopsy of left mandible mass on 11/28/2023 showed recurrence of the lymphoma, diffuse large B cell, non-GCB phenotype.  Ki-67 was 90%.    It was positive for Bcl-6 but negative for Bcl-2 and MYC rearrangement.  PET scan on 12/11/2023 revealed multiple areas of involvement -prevascular space, left epicardial fat pad, pleural density posterior to the left fourth rib anterior aspect, lesion along the pericardium versus intramuscular with SUV of 9.8.  Hypermetabolic activity at both adrenal glands, uniformly increased in size and appeared hyperplastic.  There was a 2.7 x 1.1 cm soft tissue nodule lateral to the right erector spinae muscle posterior to the right posterior 11th rib with an SUV of 20.5 and there was a 1.3 cm nodule lateral to the posterior margin of the left psoas muscle with an SUV of 24.9.  Rituxan Polivy and Treanda with Treanda on days 1 and 2 every 3 weeks for 6 cycles was started on 12/12/2023.  We will consider possible radiation therapy to the left mandibular mass after completion of chemotherapy.    After completion of treatment, we will also consider maintenance therapy with ibrutinib or Revlimid.  She is tolerating the treatment except for some nausea.  There is clinical evidence of improvement with continued decrease in the size of the left jaw mass.  She is due for cycle #3 today.  She is tolerating it except for anemia, mild nausea and skin rash, possibly due to Treanda.  She developed constipation after taking Zofran.     *Bilateral pleural effusions.   She is s/p right thoracentesis on 2/21/2022 and the effusion was found to be malignant attributed to the lymphoma.   CT scan on 5/1/2022 revealed decrease in the right pleural effusion and an increase in the left effusion.   The increase in the left effusion was suspected of being due to cardiac and fluid overload  states (fluid appeared to be serous on CT scan).  CT on 6/23/2022 revealed near resolution of the pleural effusions.  PET scan on 8/15/2022 showed that the pleural effusions were trace and improved since June 2022.  CT chest on 11/7/2022 revealed minimal partially loculated right pleural effusion.  CT chest on 3/7/2023 showed no recurrence of the effusion.  CT on 8/24/2023 showed no evidence of recurrence of the pleural effusions.  CT on 11/16/2023 showed no evidence of recurrence of the pleural effusions.  No evidence of effusion on today's exam.    *Iron deficiency anemia.  Patient also developed anemia secondary to lymphoma and secondary to chemotherapy.    Hemoglobin was 11.1 on 3/28/2022.  Hgb decreased to 7.9 on 4/25/2022 and she was transfused.   Hemoglobin decreased to 6.3 on 4/27/2022. She was transfused.  Hemoglobin improved to 8.8 on 5/16/2022.  Patient is on ferrous sulfate 325 mg 3 days a week.  11/21/2023: Hemoglobin 12.1.    Iron stores adequate with ferritin 141 and transferrin saturation 25%.  12/12/2023: Hemoglobin 12.2.  1/2/2024: Hemoglobin 12.5.  1/23/2024: Hemoglobin decreased to 11.4-secondary to anemia due to chemotherapy.    *Hyponatremia.  Patient had problem with hyponatremia in the past.  Sodium is 127 on 3/14/2023.  She was advised to increase salt intake.  Sodium improved to 133 on repeat lab on 3/21/2023.  However, sodium decreased to 126 on 6/7/2023.  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  She was started on sodium chloride 1 g daily.  12/27/2023: Sodium 128.  1/2/2024: Sodium decreased to 125.  1/23/2024: Sodium improved to 134.    *Chronic anticoagulation.  Patient is on Eliquis 5 mg twice daily.  We will need to hold if platelets decrease to <50,000.  Platelet count has stayed above 50,000 and she did not need interruption of Eliquis.    *Elevated liver enzymes.  Patient developed elevated liver enzymes on 12/19/2023.  AST increased to 53.  ALT  increased to 77.  Alk phos was 204.  She was advised to hold Pravachol.  12/27/2023: AST improved to 44.  ALT improved to 57.  Alk phos improved to 125.  1/2/2024: AST decreased to 42..  ALT stable at 57.  1/23/2024: ALT improved to 25.  AST improved to 31.  Okay to resume Pravachol.    *Venous access.  Patient previously had a port which was removed on 6/5/2023.  She is receiving treatment through peripheral IV.    *Prophylaxis.  Patient had shingles in April 2023.  She is at risk of developing shingles while on treatment.  She was placed on acyclovir 400 mg twice daily.  She is on Bactrim DS 3 days a week.    *Skin rash.   She developed rash over the left leg likely representing a side effect from medications, possibly Treanda.  Skin rash improved with topical steroid cream.     *13 x 9 mm nodular focus of consolidation in the right upper lobe.  Patient is reporting cough and congestion.  This is concerning for evolving pneumonia.  An underlying malignancy could not be excluded.  CT on 11/16/2023 showed no suspicious findings.  No hypermetabolism in this area.    *T12 compression deformity.  Patient is not complaining of pain in the area.  Bone density on 9/14/2023 showed osteoporosis.    We recommended starting treatment with Fosamax.    Due to the patient having problem with her teeth, Fosamax was not started.    *Choledocholithiasis with increase in the intra and extrahepatic biliary dilatation seen on CT on 6/23/2022.  Liver enzymes including bilirubin are normal today.   She is not having abdominal pain.  No redness no tenderness on exam of the right upper quadrant.  PET scan on 8/15/2022 showed persistence of the findings.  No symptoms or signs of infection.  Patient was seen by Dr. Ortega.  She underwent ERCP on 10/21/2022.  There was a calculus of the bile duct without cholecystitis and without obstruction.  She had sphincterotomy and balloon sweep.  Alkaline phosphatase improved afterwards.  CT on  3/7/2023 revealed stable findings.  CT scan on 8/24/2023 showed chronic biliary duct dilatation.    CT scan on 11/16/2023 showed no changes.    PLAN:    1.  Proceed with cycle #3 of Rituxan Polivy and Treanda.  2.  On body Neulasta will be given on day #2.  3.  Continue sodium chloride 1 g daily.  4.  Change antiemetic to Compazine 5 mg to take 1-2 tablets every 6 hours as needed.  5.  Obtain CT scan neck chest abdomen pelvis in 2 weeks.  6.  Follow-up in 3 weeks.  Will schedule her to receive cycle #4.        Renate Galo MD  01/23/24

## 2024-01-24 ENCOUNTER — INFUSION (OUTPATIENT)
Dept: ONCOLOGY | Facility: HOSPITAL | Age: 84
End: 2024-01-24
Payer: MEDICARE

## 2024-01-24 VITALS
TEMPERATURE: 97.8 F | SYSTOLIC BLOOD PRESSURE: 106 MMHG | DIASTOLIC BLOOD PRESSURE: 61 MMHG | WEIGHT: 142.6 LBS | OXYGEN SATURATION: 96 % | RESPIRATION RATE: 16 BRPM | BODY MASS INDEX: 27.85 KG/M2 | HEART RATE: 70 BPM

## 2024-01-24 DIAGNOSIS — C83.38 DIFFUSE LARGE B-CELL LYMPHOMA OF LYMPH NODES OF MULTIPLE REGIONS: Primary | ICD-10-CM

## 2024-01-24 PROCEDURE — 25010000002 PEGFILGRASTIM 6 MG/0.6ML PREFILLED SYRINGE KIT: Performed by: INTERNAL MEDICINE

## 2024-01-24 PROCEDURE — 25010000002 DEXAMETHASONE SODIUM PHOSPHATE 100 MG/10ML SOLUTION: Performed by: INTERNAL MEDICINE

## 2024-01-24 PROCEDURE — 96377 APPLICATON ON-BODY INJECTOR: CPT

## 2024-01-24 PROCEDURE — 96413 CHEMO IV INFUSION 1 HR: CPT

## 2024-01-24 PROCEDURE — 25810000003 SODIUM CHLORIDE 0.9 % SOLUTION: Performed by: INTERNAL MEDICINE

## 2024-01-24 PROCEDURE — 25010000002 BENDAMUSTINE HCL 100 MG/4ML SOLUTION 4 ML VIAL: Performed by: INTERNAL MEDICINE

## 2024-01-24 PROCEDURE — 96375 TX/PRO/DX INJ NEW DRUG ADDON: CPT

## 2024-01-24 PROCEDURE — 25810000003 SODIUM CHLORIDE 0.9 % SOLUTION 500 ML FLEX CONT: Performed by: INTERNAL MEDICINE

## 2024-01-24 RX ORDER — SODIUM CHLORIDE 9 MG/ML
20 INJECTION, SOLUTION INTRAVENOUS ONCE
Status: COMPLETED | OUTPATIENT
Start: 2024-01-24 | End: 2024-01-24

## 2024-01-24 RX ADMIN — PEGFILGRASTIM 6 MG: KIT SUBCUTANEOUS at 14:50

## 2024-01-24 RX ADMIN — BENDAMUSTINE HYDROCHLORIDE 120 MG: 25 INJECTION, SOLUTION INTRAVENOUS at 13:51

## 2024-01-24 RX ADMIN — SODIUM CHLORIDE 20 ML/HR: 9 INJECTION, SOLUTION INTRAVENOUS at 13:30

## 2024-01-24 RX ADMIN — DEXAMETHASONE SODIUM PHOSPHATE 12 MG: 10 INJECTION, SOLUTION INTRAMUSCULAR; INTRAVENOUS at 13:30

## 2024-02-05 RX ORDER — SODIUM CHLORIDE 1 G/1
1 TABLET ORAL DAILY
Qty: 30 TABLET | Refills: 1 | Status: SHIPPED | OUTPATIENT
Start: 2024-02-05

## 2024-02-08 ENCOUNTER — HOSPITAL ENCOUNTER (OUTPATIENT)
Dept: CT IMAGING | Facility: HOSPITAL | Age: 84
Discharge: HOME | End: 2024-02-08
Admitting: INTERNAL MEDICINE
Payer: MEDICARE

## 2024-02-08 DIAGNOSIS — C83.38 DIFFUSE LARGE B-CELL LYMPHOMA OF LYMPH NODES OF MULTIPLE REGIONS: ICD-10-CM

## 2024-02-08 PROCEDURE — 74177 CT ABD & PELVIS W/CONTRAST: CPT

## 2024-02-08 PROCEDURE — 25510000001 IOPAMIDOL 61 % SOLUTION: Performed by: INTERNAL MEDICINE

## 2024-02-08 PROCEDURE — 71260 CT THORAX DX C+: CPT

## 2024-02-08 PROCEDURE — 70491 CT SOFT TISSUE NECK W/DYE: CPT

## 2024-02-08 PROCEDURE — 0 DIATRIZOATE MEGLUMINE & SODIUM PER 1 ML: Performed by: INTERNAL MEDICINE

## 2024-02-08 RX ADMIN — IOPAMIDOL 90 ML: 612 INJECTION, SOLUTION INTRAVENOUS at 13:43

## 2024-02-08 RX ADMIN — DIATRIZOATE MEGLUMINE AND DIATRIZOATE SODIUM 30 ML: 660; 100 LIQUID ORAL; RECTAL at 12:40

## 2024-02-13 ENCOUNTER — INFUSION (OUTPATIENT)
Dept: ONCOLOGY | Facility: HOSPITAL | Age: 84
End: 2024-02-13
Payer: MEDICARE

## 2024-02-13 ENCOUNTER — OFFICE VISIT (OUTPATIENT)
Dept: ONCOLOGY | Facility: CLINIC | Age: 84
End: 2024-02-13
Payer: MEDICARE

## 2024-02-13 VITALS
TEMPERATURE: 97.8 F | OXYGEN SATURATION: 100 % | HEIGHT: 60 IN | DIASTOLIC BLOOD PRESSURE: 66 MMHG | WEIGHT: 137.4 LBS | RESPIRATION RATE: 18 BRPM | SYSTOLIC BLOOD PRESSURE: 111 MMHG | HEART RATE: 66 BPM | BODY MASS INDEX: 26.97 KG/M2

## 2024-02-13 VITALS — HEART RATE: 70 BPM | DIASTOLIC BLOOD PRESSURE: 58 MMHG | SYSTOLIC BLOOD PRESSURE: 91 MMHG

## 2024-02-13 DIAGNOSIS — R11.0 CHEMOTHERAPY-INDUCED NAUSEA: ICD-10-CM

## 2024-02-13 DIAGNOSIS — C83.38 DIFFUSE LARGE B-CELL LYMPHOMA OF LYMPH NODES OF MULTIPLE REGIONS: Primary | ICD-10-CM

## 2024-02-13 DIAGNOSIS — Z79.01 CHRONIC ANTICOAGULATION: ICD-10-CM

## 2024-02-13 DIAGNOSIS — R79.89 ELEVATED LFTS: ICD-10-CM

## 2024-02-13 DIAGNOSIS — J90 BILATERAL PLEURAL EFFUSION: ICD-10-CM

## 2024-02-13 DIAGNOSIS — E87.1 HYPONATREMIA: ICD-10-CM

## 2024-02-13 DIAGNOSIS — D50.9 IRON DEFICIENCY ANEMIA, UNSPECIFIED IRON DEFICIENCY ANEMIA TYPE: ICD-10-CM

## 2024-02-13 DIAGNOSIS — N85.9 LESION OF UTERUS: ICD-10-CM

## 2024-02-13 DIAGNOSIS — T45.1X5A CHEMOTHERAPY-INDUCED NAUSEA: ICD-10-CM

## 2024-02-13 DIAGNOSIS — C83.38 DIFFUSE LARGE B-CELL LYMPHOMA OF LYMPH NODES OF MULTIPLE REGIONS: ICD-10-CM

## 2024-02-13 DIAGNOSIS — L27.0 DRUG RASH: ICD-10-CM

## 2024-02-13 LAB
ALBUMIN SERPL-MCNC: 3.4 G/DL (ref 3.5–5.2)
ALBUMIN/GLOB SERPL: 1.5 G/DL
ALP SERPL-CCNC: 117 U/L (ref 39–117)
ALT SERPL W P-5'-P-CCNC: 30 U/L (ref 1–33)
ANION GAP SERPL CALCULATED.3IONS-SCNC: 8.3 MMOL/L (ref 5–15)
AST SERPL-CCNC: 38 U/L (ref 1–32)
BASOPHILS # BLD AUTO: 0.06 10*3/MM3 (ref 0–0.2)
BASOPHILS NFR BLD AUTO: 1.2 % (ref 0–1.5)
BILIRUB SERPL-MCNC: 0.4 MG/DL (ref 0–1.2)
BUN SERPL-MCNC: 18 MG/DL (ref 8–23)
BUN/CREAT SERPL: 16.7 (ref 7–25)
CALCIUM SPEC-SCNC: 8.7 MG/DL (ref 8.6–10.5)
CHLORIDE SERPL-SCNC: 100 MMOL/L (ref 98–107)
CO2 SERPL-SCNC: 25.7 MMOL/L (ref 22–29)
CREAT SERPL-MCNC: 1.08 MG/DL (ref 0.57–1)
DEPRECATED RDW RBC AUTO: 68.6 FL (ref 37–54)
EGFRCR SERPLBLD CKD-EPI 2021: 51.4 ML/MIN/1.73
EOSINOPHIL # BLD AUTO: 0.02 10*3/MM3 (ref 0–0.4)
EOSINOPHIL NFR BLD AUTO: 0.4 % (ref 0.3–6.2)
ERYTHROCYTE [DISTWIDTH] IN BLOOD BY AUTOMATED COUNT: 18.9 % (ref 12.3–15.4)
GLOBULIN UR ELPH-MCNC: 2.3 GM/DL
GLUCOSE SERPL-MCNC: 96 MG/DL (ref 65–99)
HCT VFR BLD AUTO: 31.9 % (ref 34–46.6)
HGB BLD-MCNC: 10.8 G/DL (ref 12–15.9)
IMM GRANULOCYTES # BLD AUTO: 0.06 10*3/MM3 (ref 0–0.05)
IMM GRANULOCYTES NFR BLD AUTO: 1.2 % (ref 0–0.5)
LDH SERPL-CCNC: 209 U/L (ref 135–214)
LYMPHOCYTES # BLD AUTO: 1.07 10*3/MM3 (ref 0.7–3.1)
LYMPHOCYTES NFR BLD AUTO: 21.7 % (ref 19.6–45.3)
MCH RBC QN AUTO: 33.4 PG (ref 26.6–33)
MCHC RBC AUTO-ENTMCNC: 33.9 G/DL (ref 31.5–35.7)
MCV RBC AUTO: 98.8 FL (ref 79–97)
MONOCYTES # BLD AUTO: 0.49 10*3/MM3 (ref 0.1–0.9)
MONOCYTES NFR BLD AUTO: 9.9 % (ref 5–12)
NEUTROPHILS NFR BLD AUTO: 3.23 10*3/MM3 (ref 1.7–7)
NEUTROPHILS NFR BLD AUTO: 65.6 % (ref 42.7–76)
NRBC BLD AUTO-RTO: 0 /100 WBC (ref 0–0.2)
PLATELET # BLD AUTO: 174 10*3/MM3 (ref 140–450)
PMV BLD AUTO: 8.6 FL (ref 6–12)
POTASSIUM SERPL-SCNC: 4.8 MMOL/L (ref 3.5–5.2)
PROT SERPL-MCNC: 5.7 G/DL (ref 6–8.5)
RBC # BLD AUTO: 3.23 10*6/MM3 (ref 3.77–5.28)
SODIUM SERPL-SCNC: 134 MMOL/L (ref 136–145)
WBC NRBC COR # BLD AUTO: 4.93 10*3/MM3 (ref 3.4–10.8)

## 2024-02-13 PROCEDURE — 25010000002 POLATUZUMAB VEDOTIN-PIIQ 30 MG RECONSTITUTED SOLUTION 1 EACH VIAL: Performed by: INTERNAL MEDICINE

## 2024-02-13 PROCEDURE — 25010000002 BENDAMUSTINE 100 MG/4ML SOLUTION 4 ML VIAL: Performed by: INTERNAL MEDICINE

## 2024-02-13 PROCEDURE — 96415 CHEMO IV INFUSION ADDL HR: CPT

## 2024-02-13 PROCEDURE — 83615 LACTATE (LD) (LDH) ENZYME: CPT

## 2024-02-13 PROCEDURE — 25010000002 PALONOSETRON PER 25 MCG: Performed by: INTERNAL MEDICINE

## 2024-02-13 PROCEDURE — 85025 COMPLETE CBC W/AUTO DIFF WBC: CPT

## 2024-02-13 PROCEDURE — 25010000002 RITUXIMAB 10 MG/ML SOLUTION 50 ML VIAL: Performed by: INTERNAL MEDICINE

## 2024-02-13 PROCEDURE — 25010000002 DEXAMETHASONE SODIUM PHOSPHATE 100 MG/10ML SOLUTION: Performed by: INTERNAL MEDICINE

## 2024-02-13 PROCEDURE — 25010000002 RITUXIMAB 10 MG/ML SOLUTION 10 ML VIAL: Performed by: INTERNAL MEDICINE

## 2024-02-13 PROCEDURE — 96413 CHEMO IV INFUSION 1 HR: CPT

## 2024-02-13 PROCEDURE — 25810000003 SODIUM CHLORIDE 0.9 % SOLUTION: Performed by: INTERNAL MEDICINE

## 2024-02-13 PROCEDURE — 25010000002 DIPHENHYDRAMINE PER 50 MG: Performed by: INTERNAL MEDICINE

## 2024-02-13 PROCEDURE — 25810000003 SODIUM CHLORIDE 0.9 % SOLUTION 500 ML FLEX CONT: Performed by: INTERNAL MEDICINE

## 2024-02-13 PROCEDURE — 80053 COMPREHEN METABOLIC PANEL: CPT

## 2024-02-13 PROCEDURE — 25810000003 SODIUM CHLORIDE 0.9 % SOLUTION 250 ML FLEX CONT: Performed by: INTERNAL MEDICINE

## 2024-02-13 PROCEDURE — 96417 CHEMO IV INFUS EACH ADDL SEQ: CPT

## 2024-02-13 PROCEDURE — 96375 TX/PRO/DX INJ NEW DRUG ADDON: CPT

## 2024-02-13 RX ORDER — SODIUM CHLORIDE 9 MG/ML
20 INJECTION, SOLUTION INTRAVENOUS ONCE
Status: CANCELLED | OUTPATIENT
Start: 2024-02-13

## 2024-02-13 RX ORDER — DIPHENHYDRAMINE HYDROCHLORIDE 50 MG/ML
50 INJECTION INTRAMUSCULAR; INTRAVENOUS AS NEEDED
Status: CANCELLED | OUTPATIENT
Start: 2024-02-13

## 2024-02-13 RX ORDER — PALONOSETRON 0.05 MG/ML
0.25 INJECTION, SOLUTION INTRAVENOUS ONCE
Status: CANCELLED | OUTPATIENT
Start: 2024-02-13

## 2024-02-13 RX ORDER — ACETAMINOPHEN 325 MG/1
650 TABLET ORAL ONCE
Status: CANCELLED | OUTPATIENT
Start: 2024-02-13

## 2024-02-13 RX ORDER — MEPERIDINE HYDROCHLORIDE 25 MG/ML
12.5 INJECTION INTRAMUSCULAR; INTRAVENOUS; SUBCUTANEOUS
Status: CANCELLED | OUTPATIENT
Start: 2024-02-13

## 2024-02-13 RX ORDER — FAMOTIDINE 20 MG/1
20 TABLET, FILM COATED ORAL ONCE
Status: COMPLETED | OUTPATIENT
Start: 2024-02-13 | End: 2024-02-13

## 2024-02-13 RX ORDER — PALONOSETRON 0.05 MG/ML
0.25 INJECTION, SOLUTION INTRAVENOUS ONCE
Status: COMPLETED | OUTPATIENT
Start: 2024-02-13 | End: 2024-02-13

## 2024-02-13 RX ORDER — ACETAMINOPHEN 325 MG/1
650 TABLET ORAL ONCE
Status: COMPLETED | OUTPATIENT
Start: 2024-02-13 | End: 2024-02-13

## 2024-02-13 RX ORDER — FAMOTIDINE 20 MG/1
20 TABLET, FILM COATED ORAL ONCE
Status: CANCELLED | OUTPATIENT
Start: 2024-02-13

## 2024-02-13 RX ORDER — SODIUM CHLORIDE 9 MG/ML
20 INJECTION, SOLUTION INTRAVENOUS ONCE
Status: COMPLETED | OUTPATIENT
Start: 2024-02-13 | End: 2024-02-13

## 2024-02-13 RX ORDER — SODIUM CHLORIDE 9 MG/ML
20 INJECTION, SOLUTION INTRAVENOUS ONCE
Status: CANCELLED | OUTPATIENT
Start: 2024-02-14

## 2024-02-13 RX ORDER — FAMOTIDINE 10 MG/ML
20 INJECTION, SOLUTION INTRAVENOUS AS NEEDED
Status: CANCELLED | OUTPATIENT
Start: 2024-02-13

## 2024-02-13 RX ADMIN — RITUXIMAB 600 MG: 10 INJECTION, SOLUTION INTRAVENOUS at 11:11

## 2024-02-13 RX ADMIN — SODIUM CHLORIDE 20 ML/HR: 9 INJECTION, SOLUTION INTRAVENOUS at 09:30

## 2024-02-13 RX ADMIN — ACETAMINOPHEN 650 MG: 325 TABLET ORAL at 09:30

## 2024-02-13 RX ADMIN — BENDAMUSTINE HYDROCHLORIDE 115 MG: 100 INJECTION INTRAVENOUS at 10:23

## 2024-02-13 RX ADMIN — DEXAMETHASONE SODIUM PHOSPHATE 12 MG: 10 INJECTION, SOLUTION INTRAMUSCULAR; INTRAVENOUS at 09:50

## 2024-02-13 RX ADMIN — POLATUZUMAB VEDOTIN 112 MG: 30 INJECTION, POWDER, LYOPHILIZED, FOR SOLUTION INTRAVENOUS at 13:51

## 2024-02-13 RX ADMIN — DIPHENHYDRAMINE HYDROCHLORIDE 25 MG: 50 INJECTION, SOLUTION INTRAMUSCULAR; INTRAVENOUS at 09:33

## 2024-02-13 RX ADMIN — FAMOTIDINE 20 MG: 20 TABLET ORAL at 09:30

## 2024-02-13 RX ADMIN — PALONOSETRON 0.25 MG: 0.05 INJECTION, SOLUTION INTRAVENOUS at 09:31

## 2024-02-14 ENCOUNTER — INFUSION (OUTPATIENT)
Dept: ONCOLOGY | Facility: HOSPITAL | Age: 84
End: 2024-02-14
Payer: MEDICARE

## 2024-02-14 VITALS
TEMPERATURE: 98 F | DIASTOLIC BLOOD PRESSURE: 56 MMHG | BODY MASS INDEX: 27.54 KG/M2 | RESPIRATION RATE: 16 BRPM | HEART RATE: 66 BPM | SYSTOLIC BLOOD PRESSURE: 96 MMHG | OXYGEN SATURATION: 95 % | WEIGHT: 141 LBS

## 2024-02-14 DIAGNOSIS — C83.38 DIFFUSE LARGE B-CELL LYMPHOMA OF LYMPH NODES OF MULTIPLE REGIONS: Primary | ICD-10-CM

## 2024-02-14 PROCEDURE — 96375 TX/PRO/DX INJ NEW DRUG ADDON: CPT

## 2024-02-14 PROCEDURE — 25810000003 SODIUM CHLORIDE 0.9 % SOLUTION 500 ML FLEX CONT: Performed by: INTERNAL MEDICINE

## 2024-02-14 PROCEDURE — 25010000002 PEGFILGRASTIM 6 MG/0.6ML PREFILLED SYRINGE KIT: Performed by: INTERNAL MEDICINE

## 2024-02-14 PROCEDURE — 25010000002 BENDAMUSTINE 100 MG/4ML SOLUTION 4 ML VIAL: Performed by: INTERNAL MEDICINE

## 2024-02-14 PROCEDURE — 25810000003 SODIUM CHLORIDE 0.9 % SOLUTION: Performed by: INTERNAL MEDICINE

## 2024-02-14 PROCEDURE — 96413 CHEMO IV INFUSION 1 HR: CPT

## 2024-02-14 PROCEDURE — 25010000002 DEXAMETHASONE SODIUM PHOSPHATE 100 MG/10ML SOLUTION: Performed by: INTERNAL MEDICINE

## 2024-02-14 PROCEDURE — 96377 APPLICATON ON-BODY INJECTOR: CPT

## 2024-02-14 RX ORDER — SODIUM CHLORIDE 9 MG/ML
20 INJECTION, SOLUTION INTRAVENOUS ONCE
Status: COMPLETED | OUTPATIENT
Start: 2024-02-14 | End: 2024-02-14

## 2024-02-14 RX ADMIN — DEXAMETHASONE SODIUM PHOSPHATE 12 MG: 10 INJECTION, SOLUTION INTRAMUSCULAR; INTRAVENOUS at 13:10

## 2024-02-14 RX ADMIN — PEGFILGRASTIM 6 MG: KIT SUBCUTANEOUS at 13:54

## 2024-02-14 RX ADMIN — BENDAMUSTINE HYDROCHLORIDE 115 MG: 100 INJECTION INTRAVENOUS at 13:24

## 2024-02-14 RX ADMIN — SODIUM CHLORIDE 20 ML/HR: 9 INJECTION, SOLUTION INTRAVENOUS at 13:10

## 2024-03-04 RX ORDER — SODIUM CHLORIDE 1 G/1
1 TABLET ORAL DAILY
Qty: 90 TABLET | Refills: 1 | Status: SHIPPED | OUTPATIENT
Start: 2024-03-04

## 2024-03-05 ENCOUNTER — INFUSION (OUTPATIENT)
Dept: ONCOLOGY | Facility: HOSPITAL | Age: 84
End: 2024-03-05
Payer: MEDICARE

## 2024-03-05 ENCOUNTER — OFFICE VISIT (OUTPATIENT)
Dept: ONCOLOGY | Facility: CLINIC | Age: 84
End: 2024-03-05
Payer: MEDICARE

## 2024-03-05 VITALS
OXYGEN SATURATION: 100 % | HEART RATE: 67 BPM | RESPIRATION RATE: 16 BRPM | SYSTOLIC BLOOD PRESSURE: 115 MMHG | HEIGHT: 60 IN | TEMPERATURE: 98 F | WEIGHT: 132.6 LBS | DIASTOLIC BLOOD PRESSURE: 66 MMHG | BODY MASS INDEX: 26.03 KG/M2

## 2024-03-05 DIAGNOSIS — Z79.01 CHRONIC ANTICOAGULATION: ICD-10-CM

## 2024-03-05 DIAGNOSIS — D50.9 IRON DEFICIENCY ANEMIA, UNSPECIFIED IRON DEFICIENCY ANEMIA TYPE: ICD-10-CM

## 2024-03-05 DIAGNOSIS — J90 BILATERAL PLEURAL EFFUSION: ICD-10-CM

## 2024-03-05 DIAGNOSIS — T45.1X5A CHEMOTHERAPY-INDUCED NAUSEA: ICD-10-CM

## 2024-03-05 DIAGNOSIS — C83.38 DIFFUSE LARGE B-CELL LYMPHOMA OF LYMPH NODES OF MULTIPLE REGIONS: ICD-10-CM

## 2024-03-05 DIAGNOSIS — C83.38 DIFFUSE LARGE B-CELL LYMPHOMA OF LYMPH NODES OF MULTIPLE REGIONS: Primary | ICD-10-CM

## 2024-03-05 DIAGNOSIS — R11.0 CHEMOTHERAPY-INDUCED NAUSEA: ICD-10-CM

## 2024-03-05 DIAGNOSIS — L27.0 DRUG RASH: ICD-10-CM

## 2024-03-05 DIAGNOSIS — E87.1 HYPONATREMIA: ICD-10-CM

## 2024-03-05 DIAGNOSIS — R79.89 ELEVATED LFTS: ICD-10-CM

## 2024-03-05 LAB
ALBUMIN SERPL-MCNC: 3.4 G/DL (ref 3.5–5.2)
ALBUMIN/GLOB SERPL: 1.6 G/DL
ALP SERPL-CCNC: 106 U/L (ref 39–117)
ALT SERPL W P-5'-P-CCNC: 27 U/L (ref 1–33)
ANION GAP SERPL CALCULATED.3IONS-SCNC: 9.3 MMOL/L (ref 5–15)
AST SERPL-CCNC: 53 U/L (ref 1–32)
BASOPHILS # BLD AUTO: 0.04 10*3/MM3 (ref 0–0.2)
BASOPHILS NFR BLD AUTO: 1 % (ref 0–1.5)
BILIRUB SERPL-MCNC: 0.4 MG/DL (ref 0–1.2)
BUN SERPL-MCNC: 16 MG/DL (ref 8–23)
BUN/CREAT SERPL: 16.2 (ref 7–25)
CALCIUM SPEC-SCNC: 8.7 MG/DL (ref 8.6–10.5)
CHLORIDE SERPL-SCNC: 101 MMOL/L (ref 98–107)
CO2 SERPL-SCNC: 25.7 MMOL/L (ref 22–29)
CREAT SERPL-MCNC: 0.99 MG/DL (ref 0.57–1)
DEPRECATED RDW RBC AUTO: 71.7 FL (ref 37–54)
EGFRCR SERPLBLD CKD-EPI 2021: 57 ML/MIN/1.73
EOSINOPHIL # BLD AUTO: 0.01 10*3/MM3 (ref 0–0.4)
EOSINOPHIL NFR BLD AUTO: 0.2 % (ref 0.3–6.2)
ERYTHROCYTE [DISTWIDTH] IN BLOOD BY AUTOMATED COUNT: 19.1 % (ref 12.3–15.4)
GLOBULIN UR ELPH-MCNC: 2.1 GM/DL
GLUCOSE SERPL-MCNC: 88 MG/DL (ref 65–99)
HCT VFR BLD AUTO: 32.5 % (ref 34–46.6)
HGB BLD-MCNC: 11.1 G/DL (ref 12–15.9)
IMM GRANULOCYTES # BLD AUTO: 0.38 10*3/MM3 (ref 0–0.05)
IMM GRANULOCYTES NFR BLD AUTO: 9.3 % (ref 0–0.5)
LDH SERPL-CCNC: 333 U/L (ref 135–214)
LYMPHOCYTES # BLD AUTO: 0.76 10*3/MM3 (ref 0.7–3.1)
LYMPHOCYTES NFR BLD AUTO: 18.6 % (ref 19.6–45.3)
MCH RBC QN AUTO: 34.7 PG (ref 26.6–33)
MCHC RBC AUTO-ENTMCNC: 34.2 G/DL (ref 31.5–35.7)
MCV RBC AUTO: 101.6 FL (ref 79–97)
MONOCYTES # BLD AUTO: 0.46 10*3/MM3 (ref 0.1–0.9)
MONOCYTES NFR BLD AUTO: 11.2 % (ref 5–12)
NEUTROPHILS NFR BLD AUTO: 2.44 10*3/MM3 (ref 1.7–7)
NEUTROPHILS NFR BLD AUTO: 59.7 % (ref 42.7–76)
NRBC BLD AUTO-RTO: 0 /100 WBC (ref 0–0.2)
PLATELET # BLD AUTO: 155 10*3/MM3 (ref 140–450)
PMV BLD AUTO: 10.4 FL (ref 6–12)
POTASSIUM SERPL-SCNC: 4.8 MMOL/L (ref 3.5–5.2)
PROT SERPL-MCNC: 5.5 G/DL (ref 6–8.5)
RBC # BLD AUTO: 3.2 10*6/MM3 (ref 3.77–5.28)
SODIUM SERPL-SCNC: 136 MMOL/L (ref 136–145)
WBC NRBC COR # BLD AUTO: 4.09 10*3/MM3 (ref 3.4–10.8)

## 2024-03-05 PROCEDURE — 96375 TX/PRO/DX INJ NEW DRUG ADDON: CPT

## 2024-03-05 PROCEDURE — 80053 COMPREHEN METABOLIC PANEL: CPT

## 2024-03-05 PROCEDURE — 25010000002 POLATUZUMAB VEDOTIN-PIIQ 30 MG RECONSTITUTED SOLUTION 1 EACH VIAL: Performed by: INTERNAL MEDICINE

## 2024-03-05 PROCEDURE — 25010000002 PALONOSETRON PER 25 MCG: Performed by: INTERNAL MEDICINE

## 2024-03-05 PROCEDURE — 3078F DIAST BP <80 MM HG: CPT | Performed by: INTERNAL MEDICINE

## 2024-03-05 PROCEDURE — 96413 CHEMO IV INFUSION 1 HR: CPT

## 2024-03-05 PROCEDURE — 85025 COMPLETE CBC W/AUTO DIFF WBC: CPT

## 2024-03-05 PROCEDURE — 1126F AMNT PAIN NOTED NONE PRSNT: CPT | Performed by: INTERNAL MEDICINE

## 2024-03-05 PROCEDURE — 83615 LACTATE (LD) (LDH) ENZYME: CPT

## 2024-03-05 PROCEDURE — 25010000002 RITUXIMAB 10 MG/ML SOLUTION 10 ML VIAL: Performed by: INTERNAL MEDICINE

## 2024-03-05 PROCEDURE — 25010000002 RITUXIMAB 10 MG/ML SOLUTION 50 ML VIAL: Performed by: INTERNAL MEDICINE

## 2024-03-05 PROCEDURE — 99214 OFFICE O/P EST MOD 30 MIN: CPT | Performed by: INTERNAL MEDICINE

## 2024-03-05 PROCEDURE — 96417 CHEMO IV INFUS EACH ADDL SEQ: CPT

## 2024-03-05 PROCEDURE — 25810000003 SODIUM CHLORIDE 0.9 % SOLUTION 250 ML FLEX CONT: Performed by: INTERNAL MEDICINE

## 2024-03-05 PROCEDURE — 25810000003 SODIUM CHLORIDE 0.9 % SOLUTION 500 ML FLEX CONT: Performed by: INTERNAL MEDICINE

## 2024-03-05 PROCEDURE — 25010000002 DIPHENHYDRAMINE PER 50 MG: Performed by: INTERNAL MEDICINE

## 2024-03-05 PROCEDURE — 96415 CHEMO IV INFUSION ADDL HR: CPT

## 2024-03-05 PROCEDURE — 25010000002 BENDAMUSTINE 100 MG/4ML SOLUTION 4 ML VIAL: Performed by: INTERNAL MEDICINE

## 2024-03-05 PROCEDURE — 3074F SYST BP LT 130 MM HG: CPT | Performed by: INTERNAL MEDICINE

## 2024-03-05 PROCEDURE — 25810000003 SODIUM CHLORIDE 0.9 % SOLUTION: Performed by: INTERNAL MEDICINE

## 2024-03-05 PROCEDURE — 25010000002 DEXAMETHASONE SODIUM PHOSPHATE 100 MG/10ML SOLUTION: Performed by: INTERNAL MEDICINE

## 2024-03-05 RX ORDER — PALONOSETRON 0.05 MG/ML
0.25 INJECTION, SOLUTION INTRAVENOUS ONCE
Status: COMPLETED | OUTPATIENT
Start: 2024-03-05 | End: 2024-03-05

## 2024-03-05 RX ORDER — MEPERIDINE HYDROCHLORIDE 25 MG/ML
12.5 INJECTION INTRAMUSCULAR; INTRAVENOUS; SUBCUTANEOUS
Status: CANCELLED | OUTPATIENT
Start: 2024-03-05

## 2024-03-05 RX ORDER — PALONOSETRON 0.05 MG/ML
0.25 INJECTION, SOLUTION INTRAVENOUS ONCE
Status: CANCELLED | OUTPATIENT
Start: 2024-03-05

## 2024-03-05 RX ORDER — SODIUM CHLORIDE 9 MG/ML
20 INJECTION, SOLUTION INTRAVENOUS ONCE
Status: COMPLETED | OUTPATIENT
Start: 2024-03-05 | End: 2024-03-05

## 2024-03-05 RX ORDER — SODIUM CHLORIDE 9 MG/ML
20 INJECTION, SOLUTION INTRAVENOUS ONCE
Status: CANCELLED | OUTPATIENT
Start: 2024-03-05

## 2024-03-05 RX ORDER — ACETAMINOPHEN 325 MG/1
650 TABLET ORAL ONCE
Status: CANCELLED | OUTPATIENT
Start: 2024-03-05

## 2024-03-05 RX ORDER — FAMOTIDINE 10 MG/ML
20 INJECTION, SOLUTION INTRAVENOUS AS NEEDED
Status: CANCELLED | OUTPATIENT
Start: 2024-03-05

## 2024-03-05 RX ORDER — FAMOTIDINE 20 MG/1
20 TABLET, FILM COATED ORAL ONCE
Status: CANCELLED | OUTPATIENT
Start: 2024-03-05

## 2024-03-05 RX ORDER — SODIUM CHLORIDE 9 MG/ML
20 INJECTION, SOLUTION INTRAVENOUS ONCE
Status: CANCELLED | OUTPATIENT
Start: 2024-03-06

## 2024-03-05 RX ORDER — ACETAMINOPHEN 325 MG/1
650 TABLET ORAL ONCE
Status: COMPLETED | OUTPATIENT
Start: 2024-03-05 | End: 2024-03-05

## 2024-03-05 RX ORDER — DIPHENHYDRAMINE HYDROCHLORIDE 50 MG/ML
50 INJECTION INTRAMUSCULAR; INTRAVENOUS AS NEEDED
Status: CANCELLED | OUTPATIENT
Start: 2024-03-05

## 2024-03-05 RX ORDER — FAMOTIDINE 20 MG/1
20 TABLET, FILM COATED ORAL ONCE
Status: COMPLETED | OUTPATIENT
Start: 2024-03-05 | End: 2024-03-05

## 2024-03-05 RX ADMIN — RITUXIMAB 590 MG: 10 INJECTION, SOLUTION INTRAVENOUS at 11:05

## 2024-03-05 RX ADMIN — DEXAMETHASONE SODIUM PHOSPHATE 12 MG: 10 INJECTION, SOLUTION INTRAMUSCULAR; INTRAVENOUS at 09:43

## 2024-03-05 RX ADMIN — ACETAMINOPHEN 650 MG: 325 TABLET ORAL at 09:43

## 2024-03-05 RX ADMIN — SODIUM CHLORIDE 20 ML/HR: 900 INJECTION, SOLUTION INTRAVENOUS at 09:42

## 2024-03-05 RX ADMIN — POLATUZUMAB VEDOTIN 108 MG: 30 INJECTION, POWDER, LYOPHILIZED, FOR SOLUTION INTRAVENOUS at 13:43

## 2024-03-05 RX ADMIN — PALONOSETRON 0.25 MG: 0.05 INJECTION, SOLUTION INTRAVENOUS at 09:43

## 2024-03-05 RX ADMIN — BENDAMUSTINE HYDROCHLORIDE 115 MG: 100 INJECTION INTRAVENOUS at 10:21

## 2024-03-05 RX ADMIN — FAMOTIDINE 20 MG: 20 TABLET ORAL at 09:43

## 2024-03-05 RX ADMIN — DIPHENHYDRAMINE HYDROCHLORIDE 25 MG: 50 INJECTION, SOLUTION INTRAMUSCULAR; INTRAVENOUS at 10:05

## 2024-03-05 NOTE — PROGRESS NOTES
Subjective     CHIEF COMPLAINT:      Chief Complaint   Patient presents with    Follow-up     HISTORY OF PRESENT ILLNESS:     Michelle Car is a 83 y.o. female patient who returns today for follow up on her lymphoma.  She returns today for follow-up accompanied by her sister.  She reports having altered taste secondary to chemotherapy and she has not been eating good because food did not taste right.  Only few food items had normal taste to them.      She reports having some difficulty with her dentures and she is waiting until she completes her chemotherapy treatment before returning back to the dentist for adjustment.    ROS:  Pertinent ROS is in the HPI.     Past medical, surgical, social and family history were reviewed.     MEDICATIONS:    Current Outpatient Medications:     acetaminophen (TYLENOL) 500 MG tablet, Take 1 tablet by mouth Every 6 (Six) Hours As Needed for Mild Pain., Disp: , Rfl:     acyclovir (Zovirax) 400 MG tablet, Take 1 tablet by mouth 2 (Two) Times a Day., Disp: 60 tablet, Rfl: 7    amiodarone (PACERONE) 200 MG tablet, Take 1 tablet by mouth Daily., Disp: 90 tablet, Rfl: 2    Calcium Carbonate (CALTRATE 600 PO), Take 1 tablet by mouth Daily., Disp: , Rfl:     carvedilol (COREG) 3.125 MG tablet, TAKE 1 TABLET BY MOUTH TWICE DAILY WITH MEALS, Disp: 180 tablet, Rfl: 1    Eliquis 5 MG tablet tablet, TAKE 1 TABLET BY MOUTH EVERY 12 HOURS FOR ATRIAL FIBRILLATION, Disp: 180 tablet, Rfl: 1    ferrous sulfate (FeroSul) 325 (65 FE) MG tablet, Take 1 tablet by mouth Every Other Day., Disp: 36 tablet, Rfl: 2    Hydrocortisone, Perianal, (Anusol-HC) 2.5 % rectal cream, Apply to hemorrhoids 3 times daily for 7 days during hemorrhoid flare. Include applicator., Disp: 30 g, Rfl: 1    losartan (COZAAR) 100 MG tablet, , Disp: , Rfl:     Multiple Vitamins-Minerals (CENTRUM SILVER) tablet, Take 1 tablet by mouth Daily., Disp: , Rfl:     polyethylene glycol (MIRALAX) 17 GM/SCOOP powder, Take 17 g by mouth  "As Needed., Disp: , Rfl:     pravastatin (PRAVACHOL) 40 MG tablet, TAKE 1 TABLET BY MOUTH EVERY NIGHT AT BEDTIME, Disp: 90 tablet, Rfl: 1    prochlorperazine (COMPAZINE) 5 MG tablet, Take 1-2 tablets by mouth Every 6 (Six) Hours As Needed for Nausea or Vomiting., Disp: 60 tablet, Rfl: 1    sodium chloride 1 g tablet, TAKE 1 TABLET BY MOUTH DAILY, Disp: 90 tablet, Rfl: 1    spironolactone (ALDACTONE) 25 MG tablet, TAKE 1 TABLET BY MOUTH DAILY, Disp: 90 tablet, Rfl: 1    sulfamethoxazole-trimethoprim (BACTRIM DS,SEPTRA DS) 800-160 MG per tablet, Take 1 tablet by mouth 3 (Three) Times a Week., Disp: 12 tablet, Rfl: 7    traMADol (ULTRAM) 50 MG tablet, Take 1 tablet by mouth Every 6 (Six) Hours As Needed for Moderate Pain., Disp: 12 tablet, Rfl: 0  Objective     VITAL SIGNS:     Vitals:    03/05/24 0840   BP: 115/66   Pulse: 67   Resp: 16   Temp: 98 °F (36.7 °C)   TempSrc: Temporal   SpO2: 100%   Weight: 60.1 kg (132 lb 9.6 oz)   Height: 152.4 cm (60\")   PainSc: 0-No pain     Body mass index is 25.9 kg/m².     Wt Readings from Last 5 Encounters:   03/05/24 60.1 kg (132 lb 9.6 oz)   02/14/24 64 kg (141 lb)   02/13/24 62.3 kg (137 lb 6.4 oz)   01/24/24 64.7 kg (142 lb 9.6 oz)   01/23/24 62.7 kg (138 lb 4.8 oz)     PHYSICAL EXAMINATION:   GENERAL: The patient appears in good general condition, not in acute distress.   SKIN: No ecchymosis.  EYES: No jaundice. No Pallor.  CHEST: Normal respiratory effort.  Lungs clear bilaterally.  No added sounds.  CVS: Irregular.  ABDOMEN: Soft. No tenderness. No Hepatomegaly. No Splenomegaly. No masses.  EXTREMITIES: Chronic edema at the ankles.    DIAGNOSTIC DATA:     Results from last 7 days   Lab Units 03/05/24  0822   WBC 10*3/mm3 4.09   NEUTROS ABS 10*3/mm3 2.44   HEMOGLOBIN g/dL 11.1*   HEMATOCRIT % 32.5*   PLATELETS 10*3/mm3 155     Results from last 7 days   Lab Units 03/05/24  0822   SODIUM mmol/L 136   POTASSIUM mmol/L 4.8   CHLORIDE mmol/L 101   CO2 mmol/L 25.7   BUN mg/dL 16 "   CREATININE mg/dL 0.99   CALCIUM mg/dL 8.7   ALBUMIN g/dL 3.4*   BILIRUBIN mg/dL 0.4   ALK PHOS U/L 106   ALT (SGPT) U/L 27   AST (SGOT) U/L 53*   GLUCOSE mg/dL 88     Component      Latest Ref Rng 2/13/2024 3/5/2024   LDH      135 - 214 U/L 209  333 (H)      Assessment & Plan    *Diffuse large B-cell lymphoma.     Patient was found to have bilateral pleural effusions.    She underwent right thoracentesis on 2/21/2022 and the left thoracentesis on 2/22/2022.    Analysis of the fluid revealed involvement with diffuse large B-cell lymphoma.    FISH analysis for BCL6 rearrangement was positive.    FISH analysis for BCL 1, BCL-2 and MYC rearrangement was negative.    PET scan on 3/10/2022 revealed significant hypermetabolism in the left adrenal gland and the surrounding soft tissue with SUV up to 34.5. Hypermetabolism in the left pleural thickening with SUV of 7.1. there was less hypermetabolism in the right adrenal gland and in the right pleura.  She was considered to have stage III non-bulky disease.  This concurred with the pathology exam of the pleural fluid cytology that revealed diffuse large B-cell lymphoma.  R-IPI score of 3 associated with poor risk - associated with overall survival of 55%.   R-CHOP with Neulasta support was started on 3/28/2022.  Patient developed neutropenia and thrombocytopenia after cycle#1.  Patient developed neutropenic fever after cycle #2 and was hospitalized.  Her performance status decreased as a result.   Regimen was changed to mini-RCHOP starting cycle #3 on 5/16/2022.  CT scans on 6/23/2022 showed evidence of response.  There was resolution of the pericardial effusion and near complete resolution of the pleural effusions.   Patient received cycle #6 on 7/25/2022.  PET scan on 8/15/2022 revealed complete metabolic response.  Patient was found to have recurrence with development of a left mandibular mass in October/November 2023.  Biopsy of left mandible mass on 11/28/2023 showed  recurrence of the lymphoma, diffuse large B cell, non-GCB phenotype.  Ki-67 was 90%.    It was positive for Bcl-6 but negative for Bcl-2 and MYC rearrangement.  PET scan on 12/11/2023 revealed multiple areas of involvement -prevascular space, left epicardial fat pad, pleural density posterior to the left fourth rib anterior aspect, lesion along the pericardium versus intramuscular with SUV of 9.8.  Hypermetabolic activity at both adrenal glands, uniformly increased in size and appeared hyperplastic.  There was a 2.7 x 1.1 cm soft tissue nodule lateral to the right erector spinae muscle posterior to the right posterior 11th rib with an SUV of 20.5 and there was a 1.3 cm nodule lateral to the posterior margin of the left psoas muscle with an SUV of 24.9.  Rituxan Polivy and Treanda with Treanda on days 1 and 2 every 3 weeks for 6 cycles was started on 12/12/2023.  We will consider possible radiation therapy to the left mandibular mass after completion of chemotherapy.    After completion of treatment, we will also consider maintenance therapy with ibrutinib or Revlimid.  There is clinical evidence of improvement with continued decrease in the size of the left jaw mass.  She is tolerating the treatment except for anemia, nausea and skin rash possibly secondary to Treanda.  CT scan on 2/8/2024 showed evidence of response to treatment.  She is tolerating the treatment except for the mckeon, fatigue and altered taste.  She received 4 cycles so far.    *Nausea secondary to chemotherapy.  She developed constipation after taking Zofran.   She was switched to Compazine 5 mg to take 1-2 tablets every 6 hours as needed.  She is taking Compazine for the nausea and it has been helping.    *Bilateral pleural effusions.   She is s/p right thoracentesis on 2/21/2022 and the effusion was found to be malignant attributed to the lymphoma.   CT scan on 5/1/2022 revealed decrease in the right pleural effusion and an increase in the left  effusion.   The increase in the left effusion was suspected of being due to cardiac and fluid overload states (fluid appeared to be serous on CT scan).  CT on 6/23/2022 revealed near resolution of the pleural effusions.  PET scan on 8/15/2022 showed that the pleural effusions were trace and improved since June 2022.  CT chest on 11/7/2022 revealed minimal partially loculated right pleural effusion.  CT chest on 3/7/2023 showed no recurrence of the effusion.  CT on 8/24/2023 showed no evidence of recurrence of the pleural effusions.  CT on 11/16/2023 showed no evidence of recurrence of the pleural effusions.  CT scan on 2/8/2024 revealed no evidence of pleural effusion.   No evidence of effusion on today's exam.    *Iron deficiency anemia.  Patient also developed anemia secondary to lymphoma and secondary to chemotherapy.    Hemoglobin was 11.1 on 3/28/2022.  Hgb decreased to 7.9 on 4/25/2022 and she was transfused.   Hemoglobin decreased to 6.3 on 4/27/2022. She was transfused.  Hemoglobin improved to 8.8 on 5/16/2022.  Patient is on ferrous sulfate 325 mg 3 days a week.  11/21/2023: Hemoglobin 12.1.    Iron stores adequate with ferritin 141 and transferrin saturation 25%.  12/12/2023: Hemoglobin 12.2.  1/2/2024: Hemoglobin 12.5.  1/23/2024: Hemoglobin decreased to 11.4-secondary to anemia due to chemotherapy.  2/13/2024: Hemoglobin decreased to 10.8.  3/5/2024: Hemoglobin 11.1.    *Hyponatremia.  Patient had problem with hyponatremia in the past.  Sodium is 127 on 3/14/2023.  She was advised to increase salt intake.  Sodium improved to 133 on repeat lab on 3/21/2023.  However, sodium decreased to 126 on 6/7/2023.  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  She was started on sodium chloride 1 g daily.  12/27/2023: Sodium 128.  1/2/2024: Sodium decreased to 125.  Sodium improved afterwards.  2/13/2024: Sodium low normal at 134.  3/5/2024: Sodium improved to 136.    *Chronic  anticoagulation.  Patient is on Eliquis 5 mg twice daily.  We will need to hold if platelets decrease to <50,000.  Platelet count has stayed above 50,000 and she did not need interruption of Eliquis.    *Elevated liver enzymes.  Patient developed elevated liver enzymes on 12/19/2023.  AST increased to 53.  ALT increased to 77.  Alk phos was 204.  She was advised to hold Pravachol.  12/27/2023: AST improved to 44.  ALT improved to 57.  Alk phos improved to 125.  1/2/2024: AST decreased to 42..  ALT stable at 57.  1/23/2024: ALT improved to 25.  AST improved to 31.  Patient given the okay to restart Pravachol.  2/13/2024: ALT 30.  AST increased to 38.  3/5/2024: ALT 27. AST increased to 53.?  Secondary to Pravachol.    *Venous access.  Patient previously had a port which was removed on 6/5/2023.  She is receiving treatment through peripheral IV.  She is having peripheral IV placed for chemotherapy.    *Prophylaxis.  Patient had shingles in April 2023.  She is at risk of developing shingles while on treatment.  She was placed on acyclovir 400 mg twice daily.  She is on Bactrim DS 3 days a week.  Recent infections.    *Skin rash.   She developed rash over the left leg likely representing a side effect from medications, Bactrim versus Treanda.  Skin rash improved with steroid cream.    *Low-density lesion in the uterus.    The radiologist recommended pelvic ultrasound.    The patient is asymptomatic.    This will be reevaluated on follow-up PET scan.    *13 x 9 mm nodular focus of consolidation in the right upper lobe.  Patient is reporting cough and congestion.  This is concerning for evolving pneumonia.  An underlying malignancy could not be excluded.  CT on 11/16/2023 showed no suspicious findings.  No hypermetabolism in this area.    *T12 compression deformity.  Patient is not complaining of pain in the area.  Bone density on 9/14/2023 showed osteoporosis.    We recommended starting treatment with Fosamax.    Due to  the patient having problem with her teeth, Fosamax was not started.    *Choledocholithiasis with increase in the intra and extrahepatic biliary dilatation seen on CT on 6/23/2022.  Liver enzymes including bilirubin are normal today.   She is not having abdominal pain.  No redness no tenderness on exam of the right upper quadrant.  PET scan on 8/15/2022 showed persistence of the findings.  No symptoms or signs of infection.  Patient was seen by Dr. Ortega.  She underwent ERCP on 10/21/2022.  There was a calculus of the bile duct without cholecystitis and without obstruction.  She had sphincterotomy and balloon sweep.  Alkaline phosphatase improved afterwards.  CT on 3/7/2023 revealed stable findings.  CT scan on 8/24/2023 showed chronic biliary duct dilatation.    CT scan on 11/16/2023 showed no changes.    PLAN:    1.  Proceed with cycle #5 of Rituxan Polivy and Treanda today.  2.  She will receive day #2 of Treanda and will have on body Neulasta on day #2.    3.  Continue sodium chloride 1 g daily.    4.  I recommended drinking boost/Ensure 2 a day.  5.  Continue Compazine 5 mg 1 to 2 tablets every 6 hours as needed.  6.  Follow-up in 3 weeks.  She will be scheduled for cycle #6.  7.  Plan to obtain PET scan after cycle #6.  Will consider radiation therapy to the left mandible mass.  We will also consider maintenance therapy.        Renate Galo MD  03/05/24

## 2024-03-06 ENCOUNTER — NUTRITION (OUTPATIENT)
Dept: OTHER | Facility: HOSPITAL | Age: 84
End: 2024-03-06
Payer: MEDICARE

## 2024-03-06 ENCOUNTER — INFUSION (OUTPATIENT)
Dept: ONCOLOGY | Facility: HOSPITAL | Age: 84
End: 2024-03-06
Payer: MEDICARE

## 2024-03-06 VITALS
TEMPERATURE: 97.5 F | HEART RATE: 72 BPM | OXYGEN SATURATION: 98 % | RESPIRATION RATE: 16 BRPM | BODY MASS INDEX: 26.8 KG/M2 | DIASTOLIC BLOOD PRESSURE: 67 MMHG | WEIGHT: 137.2 LBS | SYSTOLIC BLOOD PRESSURE: 107 MMHG

## 2024-03-06 DIAGNOSIS — C83.38 DIFFUSE LARGE B-CELL LYMPHOMA OF LYMPH NODES OF MULTIPLE REGIONS: Primary | ICD-10-CM

## 2024-03-06 PROCEDURE — 25010000002 DEXAMETHASONE SODIUM PHOSPHATE 100 MG/10ML SOLUTION: Performed by: INTERNAL MEDICINE

## 2024-03-06 PROCEDURE — 96413 CHEMO IV INFUSION 1 HR: CPT

## 2024-03-06 PROCEDURE — 96377 APPLICATON ON-BODY INJECTOR: CPT

## 2024-03-06 PROCEDURE — 96375 TX/PRO/DX INJ NEW DRUG ADDON: CPT

## 2024-03-06 PROCEDURE — 25010000002 PEGFILGRASTIM 6 MG/0.6ML PREFILLED SYRINGE KIT: Performed by: INTERNAL MEDICINE

## 2024-03-06 PROCEDURE — 25810000003 SODIUM CHLORIDE 0.9 % SOLUTION 500 ML FLEX CONT: Performed by: INTERNAL MEDICINE

## 2024-03-06 PROCEDURE — 96367 TX/PROPH/DG ADDL SEQ IV INF: CPT

## 2024-03-06 PROCEDURE — 25810000003 SODIUM CHLORIDE 0.9 % SOLUTION: Performed by: INTERNAL MEDICINE

## 2024-03-06 PROCEDURE — 25010000002 BENDAMUSTINE 100 MG/4ML SOLUTION 4 ML VIAL: Performed by: INTERNAL MEDICINE

## 2024-03-06 RX ORDER — SODIUM CHLORIDE 9 MG/ML
20 INJECTION, SOLUTION INTRAVENOUS ONCE
Status: COMPLETED | OUTPATIENT
Start: 2024-03-06 | End: 2024-03-06

## 2024-03-06 RX ADMIN — DEXAMETHASONE SODIUM PHOSPHATE 12 MG: 10 INJECTION, SOLUTION INTRAMUSCULAR; INTRAVENOUS at 14:12

## 2024-03-06 RX ADMIN — SODIUM CHLORIDE 20 ML/HR: 9 INJECTION, SOLUTION INTRAVENOUS at 14:12

## 2024-03-06 RX ADMIN — PEGFILGRASTIM 6 MG: KIT SUBCUTANEOUS at 15:12

## 2024-03-06 RX ADMIN — BENDAMUSTINE HYDROCHLORIDE 115 MG: 100 INJECTION INTRAVENOUS at 14:31

## 2024-03-06 NOTE — PROGRESS NOTES
OUTPATIENT ONCOLOGY NUTRITION ASSESSMENT    Patient Name: Michelle Car  YOB: 1940  MRN: [id number]  Assessment Date: 3/6/2024    COMMENTS: Follow up in infusion area. The patient reports that her taste for foods has improved a little bit but dentures don't fit and it is challenging finding foods that she can eat that also taste good. She is drinking one Ensure a day. Provided coupons for Ensure as well as samples of Ensure complete and Ensure plus.  Encouraged intake and discussed some other ideas for meals and snacks that seemed to appeal to her. Will continue to be available as needed.           Reason for Assessment Follow up    Diagnosis/Problem    Diffuse large B cell lymphoma   Treatment Plan Chemotherapy Rituxan, Treanda, Polivy   Frequency Every 21 days   Goal of cancer treatment Disease control   Comments:              Encounter Information           Nutrition/Diet History:  Decreased appetite but improved   Oral Nutrition Supplements: Ensure   Factors/Symptoms Affecting Intake: Taste changes, ill fitting dentures   Comments: Soft foods      Medical/Surgical History Medical History        Past Medical History:   Diagnosis Date    Abdominal aortic atherosclerosis      Adrenal nodule 03/2022    Allergic rhinitis      Anemia due to chemotherapy      Bacteremia due to Escherichia coli 04/26/2022     ADMITTED TO [facility]    Bug bite 10/24/2015     WITH CELLULITIS, LEFT LEG    CAD (coronary artery disease)      Calculus of bile duct without cholangitis or cholecystitis 09/2022    Chemotherapy induced neutropenia      Chronic anticoagulation      Chronic diastolic (congestive) heart failure      Colon polyps      CRI (chronic renal insufficiency), stage 2 (mild) 2021    Diffuse large B cell lymphoma 02/2022     MULTIPLE LYMPH NODE INVOLVEMENT, FOLLOWED BY DR. JEN PATTERSON    Drug induced constipation      Hearing loss      Hemorrhoids      History of blood transfusion 04/2022    History of  "chemotherapy 2022     FOLLOWED BY DR. JEN PATTERSON    Hypercalcemia 2016     resolved as of 2019    Hypercholesterolemia      Hyperkalemia 03/2022    Hypertension      Hyponatremia 02/17/2022    Leg swelling 10/2021    Mixed hyperlipidemia      Neutropenic fever 05/2022    Nonrheumatic mitral valve regurgitation 03/2022    PAF (paroxysmal atrial fibrillation)       FOLLOWED BY DR. YOSI LANGLEY    Pancytopenia      Pleural effusion, bilateral 06/2022    Pulmonary nodule      Seasonal allergies      Thrombocytopenia 08/2022    Venous insufficiency      Vitamin D deficiency              Surgical History         Past Surgical History:   Procedure Laterality Date    CHOLECYSTECTOMY N/A 10/12/2011     LAPAROSCOPIC, DR. CHANCE KLINE AT [facility]    COLONOSCOPY N/A 2000     1 or 2 polyps, otherwise normal per patient    COLONOSCOPY W/ POLYPECTOMY N/A 01/24/2012     3 MM TUBULAR ADENOMA POLYP IN CECUM, DIVERTICULOSIS IN RECTO-SIGMOID, RESCOPE IN 3 YRS, DR. CHANCE KLINE AT [facility]    CYST REMOVAL Left 10/12/2011     LEFT SCALP, PATH: BENIGN ADNEXAL NEOPLASM FAVORING ECCRINE SPIRADENOMA, DR. CHANCE KLINE AT [facility]    ERCP N/A 10/21/2022     Procedure: ENDOSCOPIC RETROGRADE CHOLANGIOPANCREATOGRAPHY with sphincterotomy and balloon sweep;  Surgeon: Peyman Ortega MD;  Location: [facility] ENDOSCOPY;  Service: Gastroenterology;  Laterality: N/A;  PRE: Common Duct Stones  POST: Common Duct Stones    VENOUS ACCESS DEVICE (PORT) INSERTION Left 03/23/2022     Procedure: INSERTION VENOUS ACCESS DEVICE;  Surgeon: Alin Delgado MD;  [place] [facility] [place] [facility] [place]  Service: General;  Laterality: Left;              Anthropometrics        Current Height Ht Readings from Last 1 Encounters:   03/05/24 152.4 cm (60\")      Current Weight Wt Readings from Last 1 Encounters:   03/06/24 62.2 kg (137 lb 3.2 oz)      Weight Change/Trend Stable   Weight History Wt Readings from Last 30 Encounters:   03/06/24 1341 62.2 kg (137 lb 3.2 oz)   03/05/24 0840 60.1 kg " (132 lb 9.6 oz)   02/14/24 1300 64 kg (141 lb)   02/13/24 0833 62.3 kg (137 lb 6.4 oz)   01/24/24 1309 64.7 kg (142 lb 9.6 oz)   01/23/24 0903 62.7 kg (138 lb 4.8 oz)   01/03/24 1309 65.2 kg (143 lb 12.8 oz)   01/02/24 0846 63.9 kg (140 lb 14.4 oz)   12/27/23 1103 63.4 kg (139 lb 11.2 oz)   12/13/23 1431 67.6 kg (149 lb)   12/12/23 0841 67.1 kg (147 lb 14.4 oz)   12/11/23 1149 67.3 kg (148 lb 4.8 oz)   12/06/23 1152 67.2 kg (148 lb 1.6 oz)   11/21/23 1514 68.9 kg (152 lb)   11/10/23 1520 68.9 kg (152 lb)   10/23/23 1301 68.9 kg (152 lb)   08/29/23 1608 69.4 kg (153 lb)   08/07/23 1513 68.9 kg (152 lb)   07/06/23 1143 68.7 kg (151 lb 6.4 oz)   06/06/23 1612 68 kg (150 lb)   05/15/23 1001 68.9 kg (152 lb)   03/21/23 1348 69.1 kg (152 lb 6.4 oz)   03/14/23 1529 67.1 kg (148 lb)   02/13/23 1415 68 kg (150 lb)   01/30/23 1426 67.8 kg (149 lb 6.4 oz)   11/14/22 1529 67.2 kg (148 lb 1.6 oz)   11/10/22 1329 67.5 kg (148 lb 12.8 oz)   10/21/22 1011 67.2 kg (148 lb 1.6 oz)   09/15/22 1357 64.9 kg (143 lb)   09/01/22 1409 65.4 kg (144 lb 3.2 oz)          Medications           Current medications: Calcium Carbonate, Hydrocortisone (Perianal), acetaminophen, acyclovir, amiodarone, apixaban, carvedilol, ferrous sulfate, losartan, multivitamin with minerals, polyethylene glycol, pravastatin, prochlorperazine, sodium chloride, spironolactone, sulfamethoxazole-trimethoprim, and traMADol                 Tests/Procedures        Tests/Procedures Other:      Labs       Pertinent Labs    Results from last 7 days   Lab Units 03/05/24  0822   SODIUM mmol/L 136   POTASSIUM mmol/L 4.8   CHLORIDE mmol/L 101   CO2 mmol/L 25.7   BUN mg/dL 16   CREATININE mg/dL 0.99   CALCIUM mg/dL 8.7   BILIRUBIN mg/dL 0.4   ALK PHOS U/L 106   ALT (SGPT) U/L 27   AST (SGOT) U/L 53*   GLUCOSE mg/dL 88     Results from last 7 days   Lab Units 03/05/24  0822   HEMOGLOBIN g/dL 11.1*   HEMATOCRIT % 32.5*   WBC 10*3/mm3 4.09   ALBUMIN g/dL 3.4*     Results from last  "7 days   Lab Units 03/05/24  0822   PLATELETS 10*3/mm3 155     COVID19   Date Value Ref Range Status   04/26/2022 Not Detected Not Detected - Ref. Range Final     No results found for: \"HGBA1C\"       Physical Findings        Physical Appearance alert     Edema  no edema   Gastrointestinal None   Tubes/Drains none   Oral/Mouth Cavity ill-fitting dentures   Skin      PES STATEMENT / NUTRITION DIAGNOSIS        Nutrition Dx Problem Problem:    NutritionDiagnosisProblem: Unintentional Weight Loss and Inadequate Oral Intake     Etiology:  Medical diagnosis: Lymphoma  Factors affecting nutrition: Reported/Observed By, Appetite, Oral  Functional diagnosis:chewing difficulty     Signs/Symptoms:  Information Deficit and Unintended Weight Change     Comment:       NUTRITION INTERVENTION / PLAN OF CARE        Intervention Goal(s) Provide information, Meet estimated needs, Disease management/therapy, Tolerate PO , Increase intake, Maintain weight, and No significant weight loss            RD Intervention/Action Encouraged intake, Follow Tx progress, Recommended/ordered            Recommendations:        PO Diet Frequent meals, increase caloric density and protein-- shared strategies      Supplements Ensure complete or plus      Snacks        Other     --        Monitor/Evaluation PO intake, Supplement intake, Pertinent labs, Weight, Symptoms   Education Education provided       RD to follow     Electronically signed by:  Felicita Cruz RD, LD  03/06/24 14:57 EST    "

## 2024-03-25 ENCOUNTER — TELEPHONE (OUTPATIENT)
Dept: ONCOLOGY | Facility: CLINIC | Age: 84
End: 2024-03-25
Payer: MEDICARE

## 2024-03-25 NOTE — TELEPHONE ENCOUNTER
Caller: Keely Lozano    Relationship to patient: Emergency Contact    Best call back number: [phone]    Chief complaint: PATIENT FELL NEEDS TO GET BETTER    Type of visit: PORT FLUSH, F/U 1 & INFUSION     Requested date: WED, THURS OR FRI OF NEXT WEEK , CALL TO R/S     If rescheduling, when is the original appointment: 3/26/2024 & 3/27/2024

## 2024-04-01 ENCOUNTER — OFFICE VISIT (OUTPATIENT)
Dept: ONCOLOGY | Facility: CLINIC | Age: 84
End: 2024-04-01
Payer: MEDICARE

## 2024-04-01 ENCOUNTER — INFUSION (OUTPATIENT)
Dept: ONCOLOGY | Facility: HOSPITAL | Age: 84
End: 2024-04-01
Payer: MEDICARE

## 2024-04-01 VITALS
BODY MASS INDEX: 26.31 KG/M2 | HEART RATE: 67 BPM | TEMPERATURE: 98.2 F | SYSTOLIC BLOOD PRESSURE: 109 MMHG | DIASTOLIC BLOOD PRESSURE: 62 MMHG | HEIGHT: 60 IN | OXYGEN SATURATION: 100 % | RESPIRATION RATE: 16 BRPM | WEIGHT: 134 LBS

## 2024-04-01 DIAGNOSIS — E87.1 HYPONATREMIA: ICD-10-CM

## 2024-04-01 DIAGNOSIS — J90 BILATERAL PLEURAL EFFUSION: ICD-10-CM

## 2024-04-01 DIAGNOSIS — R11.0 CHEMOTHERAPY-INDUCED NAUSEA: ICD-10-CM

## 2024-04-01 DIAGNOSIS — Z79.01 CHRONIC ANTICOAGULATION: ICD-10-CM

## 2024-04-01 DIAGNOSIS — R79.89 ELEVATED LFTS: ICD-10-CM

## 2024-04-01 DIAGNOSIS — C83.38 DIFFUSE LARGE B-CELL LYMPHOMA OF LYMPH NODES OF MULTIPLE REGIONS: ICD-10-CM

## 2024-04-01 DIAGNOSIS — C83.38 DIFFUSE LARGE B-CELL LYMPHOMA OF LYMPH NODES OF MULTIPLE REGIONS: Primary | ICD-10-CM

## 2024-04-01 DIAGNOSIS — D50.9 IRON DEFICIENCY ANEMIA, UNSPECIFIED IRON DEFICIENCY ANEMIA TYPE: ICD-10-CM

## 2024-04-01 DIAGNOSIS — T45.1X5A CHEMOTHERAPY-INDUCED NAUSEA: ICD-10-CM

## 2024-04-01 DIAGNOSIS — M79.89 LEG SWELLING: ICD-10-CM

## 2024-04-01 LAB
ALBUMIN SERPL-MCNC: 3.6 G/DL (ref 3.5–5.2)
ALBUMIN/GLOB SERPL: 1.5 G/DL
ALP SERPL-CCNC: 109 U/L (ref 39–117)
ALT SERPL W P-5'-P-CCNC: 13 U/L (ref 1–33)
ANION GAP SERPL CALCULATED.3IONS-SCNC: 9.1 MMOL/L (ref 5–15)
AST SERPL-CCNC: 25 U/L (ref 1–32)
BASOPHILS # BLD AUTO: 0.04 10*3/MM3 (ref 0–0.2)
BASOPHILS NFR BLD AUTO: 1 % (ref 0–1.5)
BILIRUB SERPL-MCNC: 0.4 MG/DL (ref 0–1.2)
BUN SERPL-MCNC: 18 MG/DL (ref 8–23)
BUN/CREAT SERPL: 17.5 (ref 7–25)
CALCIUM SPEC-SCNC: 9 MG/DL (ref 8.6–10.5)
CHLORIDE SERPL-SCNC: 98 MMOL/L (ref 98–107)
CO2 SERPL-SCNC: 24.9 MMOL/L (ref 22–29)
CREAT SERPL-MCNC: 1.03 MG/DL (ref 0.57–1)
DEPRECATED RDW RBC AUTO: 65.3 FL (ref 37–54)
EGFRCR SERPLBLD CKD-EPI 2021: 54.4 ML/MIN/1.73
EOSINOPHIL # BLD AUTO: 0.04 10*3/MM3 (ref 0–0.4)
EOSINOPHIL NFR BLD AUTO: 1 % (ref 0.3–6.2)
ERYTHROCYTE [DISTWIDTH] IN BLOOD BY AUTOMATED COUNT: 17 % (ref 12.3–15.4)
GLOBULIN UR ELPH-MCNC: 2.4 GM/DL
GLUCOSE SERPL-MCNC: 96 MG/DL (ref 65–99)
HCT VFR BLD AUTO: 28.7 % (ref 34–46.6)
HGB BLD-MCNC: 9.6 G/DL (ref 12–15.9)
IMM GRANULOCYTES # BLD AUTO: 0.28 10*3/MM3 (ref 0–0.05)
IMM GRANULOCYTES NFR BLD AUTO: 7.3 % (ref 0–0.5)
LDH SERPL-CCNC: 238 U/L (ref 135–214)
LYMPHOCYTES # BLD AUTO: 0.64 10*3/MM3 (ref 0.7–3.1)
LYMPHOCYTES NFR BLD AUTO: 16.6 % (ref 19.6–45.3)
MCH RBC QN AUTO: 35 PG (ref 26.6–33)
MCHC RBC AUTO-ENTMCNC: 33.4 G/DL (ref 31.5–35.7)
MCV RBC AUTO: 104.7 FL (ref 79–97)
MONOCYTES # BLD AUTO: 0.41 10*3/MM3 (ref 0.1–0.9)
MONOCYTES NFR BLD AUTO: 10.6 % (ref 5–12)
NEUTROPHILS NFR BLD AUTO: 2.44 10*3/MM3 (ref 1.7–7)
NEUTROPHILS NFR BLD AUTO: 63.5 % (ref 42.7–76)
NRBC BLD AUTO-RTO: 0 /100 WBC (ref 0–0.2)
PLATELET # BLD AUTO: 145 10*3/MM3 (ref 140–450)
PMV BLD AUTO: 9.2 FL (ref 6–12)
POTASSIUM SERPL-SCNC: 4.7 MMOL/L (ref 3.5–5.2)
PROT SERPL-MCNC: 6 G/DL (ref 6–8.5)
RBC # BLD AUTO: 2.74 10*6/MM3 (ref 3.77–5.28)
SODIUM SERPL-SCNC: 132 MMOL/L (ref 136–145)
WBC NRBC COR # BLD AUTO: 3.85 10*3/MM3 (ref 3.4–10.8)

## 2024-04-01 PROCEDURE — 25010000002 RITUXIMAB 10 MG/ML SOLUTION 10 ML VIAL: Performed by: INTERNAL MEDICINE

## 2024-04-01 PROCEDURE — 96413 CHEMO IV INFUSION 1 HR: CPT

## 2024-04-01 PROCEDURE — 25010000002 POLATUZUMAB VEDOTIN-PIIQ 30 MG RECONSTITUTED SOLUTION 1 EACH VIAL: Performed by: INTERNAL MEDICINE

## 2024-04-01 PROCEDURE — 25010000002 DEXAMETHASONE SODIUM PHOSPHATE 100 MG/10ML SOLUTION: Performed by: INTERNAL MEDICINE

## 2024-04-01 PROCEDURE — 83615 LACTATE (LD) (LDH) ENZYME: CPT

## 2024-04-01 PROCEDURE — 96415 CHEMO IV INFUSION ADDL HR: CPT

## 2024-04-01 PROCEDURE — 25810000003 SODIUM CHLORIDE 0.9 % SOLUTION: Performed by: INTERNAL MEDICINE

## 2024-04-01 PROCEDURE — 25810000003 SODIUM CHLORIDE 0.9 % SOLUTION 250 ML FLEX CONT: Performed by: INTERNAL MEDICINE

## 2024-04-01 PROCEDURE — 25010000002 BENDAMUSTINE 100 MG/4ML SOLUTION 4 ML VIAL: Performed by: INTERNAL MEDICINE

## 2024-04-01 PROCEDURE — 96375 TX/PRO/DX INJ NEW DRUG ADDON: CPT

## 2024-04-01 PROCEDURE — 80053 COMPREHEN METABOLIC PANEL: CPT

## 2024-04-01 PROCEDURE — 25010000002 RITUXIMAB 10 MG/ML SOLUTION 50 ML VIAL: Performed by: INTERNAL MEDICINE

## 2024-04-01 PROCEDURE — 96417 CHEMO IV INFUS EACH ADDL SEQ: CPT

## 2024-04-01 PROCEDURE — 25810000003 SODIUM CHLORIDE 0.9 % SOLUTION 500 ML FLEX CONT: Performed by: INTERNAL MEDICINE

## 2024-04-01 PROCEDURE — 25010000002 DIPHENHYDRAMINE PER 50 MG: Performed by: INTERNAL MEDICINE

## 2024-04-01 PROCEDURE — 85025 COMPLETE CBC W/AUTO DIFF WBC: CPT

## 2024-04-01 PROCEDURE — 25010000002 PALONOSETRON PER 25 MCG: Performed by: INTERNAL MEDICINE

## 2024-04-01 RX ORDER — ACETAMINOPHEN 325 MG/1
650 TABLET ORAL ONCE
Status: COMPLETED | OUTPATIENT
Start: 2024-04-01 | End: 2024-04-01

## 2024-04-01 RX ORDER — PALONOSETRON 0.05 MG/ML
0.25 INJECTION, SOLUTION INTRAVENOUS ONCE
Status: COMPLETED | OUTPATIENT
Start: 2024-04-01 | End: 2024-04-01

## 2024-04-01 RX ORDER — SODIUM CHLORIDE 9 MG/ML
20 INJECTION, SOLUTION INTRAVENOUS ONCE
Status: CANCELLED | OUTPATIENT
Start: 2024-04-01

## 2024-04-01 RX ORDER — SODIUM CHLORIDE 9 MG/ML
20 INJECTION, SOLUTION INTRAVENOUS ONCE
Status: COMPLETED | OUTPATIENT
Start: 2024-04-01 | End: 2024-04-01

## 2024-04-01 RX ORDER — FUROSEMIDE 40 MG/1
40 TABLET ORAL DAILY PRN
Qty: 30 TABLET | Refills: 0 | Status: SHIPPED | OUTPATIENT
Start: 2024-04-01

## 2024-04-01 RX ORDER — SODIUM CHLORIDE 9 MG/ML
20 INJECTION, SOLUTION INTRAVENOUS ONCE
Status: CANCELLED | OUTPATIENT
Start: 2024-04-02

## 2024-04-01 RX ORDER — FAMOTIDINE 20 MG/1
20 TABLET, FILM COATED ORAL ONCE
Status: COMPLETED | OUTPATIENT
Start: 2024-04-01 | End: 2024-04-01

## 2024-04-01 RX ORDER — DIPHENHYDRAMINE HYDROCHLORIDE 50 MG/ML
50 INJECTION INTRAMUSCULAR; INTRAVENOUS AS NEEDED
Status: CANCELLED | OUTPATIENT
Start: 2024-04-01

## 2024-04-01 RX ORDER — FAMOTIDINE 20 MG/1
20 TABLET, FILM COATED ORAL ONCE
Status: CANCELLED | OUTPATIENT
Start: 2024-04-01

## 2024-04-01 RX ORDER — PALONOSETRON 0.05 MG/ML
0.25 INJECTION, SOLUTION INTRAVENOUS ONCE
Status: CANCELLED | OUTPATIENT
Start: 2024-04-01

## 2024-04-01 RX ORDER — FAMOTIDINE 10 MG/ML
20 INJECTION, SOLUTION INTRAVENOUS AS NEEDED
Status: CANCELLED | OUTPATIENT
Start: 2024-04-01

## 2024-04-01 RX ORDER — MEPERIDINE HYDROCHLORIDE 25 MG/ML
12.5 INJECTION INTRAMUSCULAR; INTRAVENOUS; SUBCUTANEOUS
Status: CANCELLED | OUTPATIENT
Start: 2024-04-01

## 2024-04-01 RX ORDER — ACETAMINOPHEN 325 MG/1
650 TABLET ORAL ONCE
Status: CANCELLED | OUTPATIENT
Start: 2024-04-01

## 2024-04-01 RX ADMIN — DEXAMETHASONE SODIUM PHOSPHATE 12 MG: 10 INJECTION, SOLUTION INTRAMUSCULAR; INTRAVENOUS at 11:38

## 2024-04-01 RX ADMIN — FAMOTIDINE 20 MG: 20 TABLET ORAL at 11:26

## 2024-04-01 RX ADMIN — DIPHENHYDRAMINE HYDROCHLORIDE 25 MG: 50 INJECTION, SOLUTION INTRAMUSCULAR; INTRAVENOUS at 11:53

## 2024-04-01 RX ADMIN — POLATUZUMAB VEDOTIN 108 MG: 30 INJECTION, POWDER, LYOPHILIZED, FOR SOLUTION INTRAVENOUS at 15:18

## 2024-04-01 RX ADMIN — PALONOSETRON 0.25 MG: 0.05 INJECTION, SOLUTION INTRAVENOUS at 11:26

## 2024-04-01 RX ADMIN — BENDAMUSTINE HYDROCHLORIDE 115 MG: 100 INJECTION INTRAVENOUS at 12:12

## 2024-04-01 RX ADMIN — SODIUM CHLORIDE 20 ML/HR: 9 INJECTION, SOLUTION INTRAVENOUS at 11:27

## 2024-04-01 RX ADMIN — RITUXIMAB 590 MG: 10 INJECTION, SOLUTION INTRAVENOUS at 12:53

## 2024-04-01 RX ADMIN — ACETAMINOPHEN 650 MG: 325 TABLET ORAL at 11:26

## 2024-04-01 NOTE — PROGRESS NOTES
Subjective     CHIEF COMPLAINT:      Chief Complaint   Patient presents with    Follow-up     Swelling in legs and feet     HISTORY OF PRESENT ILLNESS:     Michelle Car is a 83 y.o. female patient who returns today for follow up on her lymphoma.  She returns today for follow-up accompanied by her sister.  She had a fall in the bathtub and landed on her buttock and hit her head.  EMS was called.  She was assessed and decided not to go to the ER.  She is not having headaches.  She scraped the skin of the left elbow area.  She covered it with a dressing and used Neosporin.  As a result of this fall, her treatment was delayed from last week to today.    Patient continued Eliquis.  Due to the fall, she was not able to sleep in her bed.  She slept in the recliner.  She developed significant swelling of the legs especially the left.    Patient states that she did not take the salt tablets for 3 days but took the tablet this morning.    ROS:  Pertinent ROS is in the HPI.     Past medical, surgical, social and family history were reviewed.     MEDICATIONS:    Current Outpatient Medications:     acetaminophen (TYLENOL) 500 MG tablet, Take 1 tablet by mouth Every 6 (Six) Hours As Needed for Mild Pain., Disp: , Rfl:     acyclovir (Zovirax) 400 MG tablet, Take 1 tablet by mouth 2 (Two) Times a Day., Disp: 60 tablet, Rfl: 7    amiodarone (PACERONE) 200 MG tablet, Take 1 tablet by mouth Daily., Disp: 90 tablet, Rfl: 2    Calcium Carbonate (CALTRATE 600 PO), Take 1 tablet by mouth Daily., Disp: , Rfl:     carvedilol (COREG) 3.125 MG tablet, TAKE 1 TABLET BY MOUTH TWICE DAILY WITH MEALS, Disp: 180 tablet, Rfl: 1    Eliquis 5 MG tablet tablet, TAKE 1 TABLET BY MOUTH EVERY 12 HOURS FOR ATRIAL FIBRILLATION, Disp: 180 tablet, Rfl: 1    ferrous sulfate (FeroSul) 325 (65 FE) MG tablet, Take 1 tablet by mouth Every Other Day., Disp: 36 tablet, Rfl: 2    Hydrocortisone, Perianal, (Anusol-HC) 2.5 % rectal cream, Apply to hemorrhoids 3  "times daily for 7 days during hemorrhoid flare. Include applicator., Disp: 30 g, Rfl: 1    losartan (COZAAR) 100 MG tablet, , Disp: , Rfl:     Multiple Vitamins-Minerals (CENTRUM SILVER) tablet, Take 1 tablet by mouth Daily., Disp: , Rfl:     polyethylene glycol (MIRALAX) 17 GM/SCOOP powder, Take 17 g by mouth As Needed., Disp: , Rfl:     pravastatin (PRAVACHOL) 40 MG tablet, TAKE 1 TABLET BY MOUTH EVERY NIGHT AT BEDTIME, Disp: 90 tablet, Rfl: 1    prochlorperazine (COMPAZINE) 5 MG tablet, Take 1-2 tablets by mouth Every 6 (Six) Hours As Needed for Nausea or Vomiting., Disp: 60 tablet, Rfl: 1    sodium chloride 1 g tablet, TAKE 1 TABLET BY MOUTH DAILY, Disp: 90 tablet, Rfl: 1    spironolactone (ALDACTONE) 25 MG tablet, TAKE 1 TABLET BY MOUTH DAILY, Disp: 90 tablet, Rfl: 1    sulfamethoxazole-trimethoprim (BACTRIM DS,SEPTRA DS) 800-160 MG per tablet, Take 1 tablet by mouth 3 (Three) Times a Week., Disp: 12 tablet, Rfl: 7    traMADol (ULTRAM) 50 MG tablet, Take 1 tablet by mouth Every 6 (Six) Hours As Needed for Moderate Pain., Disp: 12 tablet, Rfl: 0    Objective     VITAL SIGNS:     Vitals:    04/01/24 1027   BP: 109/62   Pulse: 67   Resp: 16   Temp: 98.2 °F (36.8 °C)   TempSrc: Temporal   SpO2: 100%   Weight: 60.8 kg (134 lb)   Height: 152.4 cm (60\")   PainSc: 0-No pain     Body mass index is 26.17 kg/m².     Wt Readings from Last 5 Encounters:   04/01/24 60.8 kg (134 lb)   03/06/24 62.2 kg (137 lb 3.2 oz)   03/05/24 60.1 kg (132 lb 9.6 oz)   02/14/24 64 kg (141 lb)   02/13/24 62.3 kg (137 lb 6.4 oz)     PHYSICAL EXAMINATION:   GENERAL: The patient appears in fair general condition, not in acute distress.   SKIN: Left upper extremity ecchymosis.  Left elbow area covered with a dressing.  EYES: No jaundice. No Pallor.  CHEST: Normal respiratory effort.  Lungs clear bilaterally.  No added sounds.  CVS: Normal S1-S2.  Grade 2 systolic murmur.  ABDOMEN: Nondistended.  EXTREMITIES: +3 edema bilaterally.  No calf " tenderness.    DIAGNOSTIC DATA:     Results from last 7 days   Lab Units 04/01/24  1011   WBC 10*3/mm3 3.85   NEUTROS ABS 10*3/mm3 2.44   HEMOGLOBIN g/dL 9.6*   HEMATOCRIT % 28.7*   PLATELETS 10*3/mm3 145     Results from last 7 days   Lab Units 04/01/24  1011   SODIUM mmol/L 132*   POTASSIUM mmol/L 4.7   CHLORIDE mmol/L 98   CO2 mmol/L 24.9   BUN mg/dL 18   CREATININE mg/dL 1.03*   CALCIUM mg/dL 9.0   ALBUMIN g/dL 3.6   BILIRUBIN mg/dL 0.4   ALK PHOS U/L 109   ALT (SGPT) U/L 13   AST (SGOT) U/L 25   GLUCOSE mg/dL 96     Component      Latest Ref Rng 2/13/2024 3/5/2024   LDH      135 - 214 U/L 209  333 (H)       Assessment & Plan    *Diffuse large B-cell lymphoma.     Patient was found to have bilateral pleural effusions.    She underwent right thoracentesis on 2/21/2022 and the left thoracentesis on 2/22/2022.    Analysis of the fluid revealed involvement with diffuse large B-cell lymphoma.    FISH analysis for BCL6 rearrangement was positive.    FISH analysis for BCL 1, BCL-2 and MYC rearrangement was negative.    PET scan on 3/10/2022 revealed significant hypermetabolism in the left adrenal gland and the surrounding soft tissue with SUV up to 34.5. Hypermetabolism in the left pleural thickening with SUV of 7.1. there was less hypermetabolism in the right adrenal gland and in the right pleura.  She was considered to have stage III non-bulky disease.  This concurred with the pathology exam of the pleural fluid cytology that revealed diffuse large B-cell lymphoma.  R-IPI score of 3 associated with poor risk - associated with overall survival of 55%.   R-CHOP with Neulasta support was started on 3/28/2022.  Patient developed neutropenia and thrombocytopenia after cycle#1.  Patient developed neutropenic fever after cycle #2 and was hospitalized.  Her performance status decreased as a result.   Regimen was changed to mini-RCHOP starting cycle #3 on 5/16/2022.  CT scans on 6/23/2022 showed evidence of response.  There  was resolution of the pericardial effusion and near complete resolution of the pleural effusions.   Patient received cycle #6 on 7/25/2022.  PET scan on 8/15/2022 revealed complete metabolic response.  Patient was found to have recurrence with development of a left mandibular mass in October/November 2023.  Biopsy of left mandible mass on 11/28/2023 showed recurrence of the lymphoma, diffuse large B cell, non-GCB phenotype.  Ki-67 was 90%.    It was positive for Bcl-6 but negative for Bcl-2 and MYC rearrangement.  PET scan on 12/11/2023 revealed multiple areas of involvement -prevascular space, left epicardial fat pad, pleural density posterior to the left fourth rib anterior aspect, lesion along the pericardium versus intramuscular with SUV of 9.8.  Hypermetabolic activity at both adrenal glands, uniformly increased in size and appeared hyperplastic.  There was a 2.7 x 1.1 cm soft tissue nodule lateral to the right erector spinae muscle posterior to the right posterior 11th rib with an SUV of 20.5 and there was a 1.3 cm nodule lateral to the posterior margin of the left psoas muscle with an SUV of 24.9.  Rituxan Polivy and Treanda with Treanda on days 1 and 2 every 3 weeks for 6 cycles was started on 12/12/2023.  We will consider possible radiation therapy to the left mandibular mass after completion of chemotherapy.    After completion of treatment, we will also consider maintenance therapy with ibrutinib or Revlimid.  There is clinical evidence of improvement with continued decrease in the size of the left jaw mass.  She is tolerating the treatment except for anemia, nausea and skin rash possibly secondary to Treanda.  CT scan on 2/8/2024 showed evidence of response to treatment.  She is tolerating the treatment except for anemia, fatigue and altered taste.  She received 5 cycles so far.  She was due for cycle #6 last week but it was delayed due to a fall she had at home.    *Nausea secondary to  chemotherapy.  She developed constipation after taking Zofran.   She was switched to Compazine 5 mg to take 1-2 tablets every 6 hours as needed.  Compazine is helping with her nausea.    *Bilateral pleural effusions.   She is s/p right thoracentesis on 2/21/2022 and the effusion was found to be malignant attributed to the lymphoma.   CT scan on 5/1/2022 revealed decrease in the right pleural effusion and an increase in the left effusion.   The increase in the left effusion was suspected of being due to cardiac and fluid overload states (fluid appeared to be serous on CT scan).  CT on 6/23/2022 revealed near resolution of the pleural effusions.  PET scan on 8/15/2022 showed that the pleural effusions were trace and improved since June 2022.  CT chest on 11/7/2022 revealed minimal partially loculated right pleural effusion.  CT chest on 3/7/2023 showed no recurrence of the effusion.  CT on 8/24/2023 showed no evidence of recurrence of the pleural effusions.  CT on 11/16/2023 showed no evidence of recurrence of the pleural effusions.  CT scan on 2/8/2024 revealed no evidence of pleural effusion.   Exam today revealed no evidence of pleural effusion.    *Iron deficiency anemia.  Patient also developed anemia secondary to lymphoma and secondary to chemotherapy.    Hemoglobin was 11.1 on 3/28/2022.  Hgb decreased to 7.9 on 4/25/2022 and she was transfused.   Hemoglobin decreased to 6.3 on 4/27/2022. She was transfused.  Hemoglobin improved to 8.8 on 5/16/2022.  Patient is on ferrous sulfate 325 mg 3 days a week.  11/21/2023: Hemoglobin 12.1.    Iron stores adequate with ferritin 141 and transferrin saturation 25%.  12/12/2023: Hemoglobin 12.2.  1/2/2024: Hemoglobin 12.5.  1/23/2024: Hemoglobin decreased to 11.4-secondary to anemia due to chemotherapy.  2/13/2024: Hemoglobin decreased to 10.8.  3/5/2024: Hemoglobin 11.1.  4/1/2024: Hemoglobin decreased to 9.6.  This is attributed to recent fall that resulted in bleeding  from the left arm.    *Hyponatremia.  Patient had problem with hyponatremia in the past.  Sodium is 127 on 3/14/2023.  She was advised to increase salt intake.  Sodium improved to 133 on repeat lab on 3/21/2023.  However, sodium decreased to 126 on 6/7/2023.  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  She was started on sodium chloride 1 g daily.  4/1/2024: Sodium decreased to 132.  She is having severe bilateral leg swelling, left > right.    *Chronic anticoagulation.  Patient is on Eliquis 5 mg twice daily.  She is tolerating Eliquis.  She had some bleeding following a fall but the bleeding has subsided.    *Elevated liver enzymes.  Patient developed elevated liver enzymes on 12/19/2023.  AST increased to 53.  ALT increased to 77.  Alk phos was 204.  She was advised to hold Pravachol.  12/27/2023: AST improved to 44.  ALT improved to 57.  Alk phos improved to 125.  1/2/2024: AST decreased to 42..  ALT stable at 57.  1/23/2024: ALT improved to 25.  AST improved to 31.  Patient given the okay to restart Pravachol.  2/13/2024: ALT 30.  AST increased to 38.  3/5/2024: ALT 27. AST increased to 53.?  Secondary to Pravachol.  4/1/2024: ALT 13.  AST 25.    *Venous access.  Patient previously had a port which was removed on 6/5/2023.  She is tolerating having a peripheral IV line placed for the treatment.    *Prophylaxis.  Patient had shingles in April 2023.  She is at risk of developing shingles while on treatment.  She was placed on acyclovir 400 mg twice daily.  She is on Bactrim DS 3 days a week.  No recent infection.    *Low-density lesion in the uterus.    The radiologist recommended pelvic ultrasound.    The patient is asymptomatic.    This will be reevaluated on follow-up PET scan.    *13 x 9 mm nodular focus of consolidation in the right upper lobe.  Patient is reporting cough and congestion.  This is concerning for evolving pneumonia.  An underlying malignancy could not be excluded.  CT  on 11/16/2023 showed no suspicious findings.  No hypermetabolism in this area.    *T12 compression deformity.  Patient is not complaining of pain in the area.  Bone density on 9/14/2023 showed osteoporosis.    We recommended starting treatment with Fosamax.    Due to the patient having problem with her teeth, Fosamax was not started.    *Choledocholithiasis with increase in the intra and extrahepatic biliary dilatation seen on CT on 6/23/2022.  Liver enzymes including bilirubin are normal today.   She is not having abdominal pain.  No redness no tenderness on exam of the right upper quadrant.  PET scan on 8/15/2022 showed persistence of the findings.  No symptoms or signs of infection.  Patient was seen by Dr. Ortega.  She underwent ERCP on 10/21/2022.  There was a calculus of the bile duct without cholecystitis and without obstruction.  She had sphincterotomy and balloon sweep.  Alkaline phosphatase improved afterwards.  CT on 3/7/2023 revealed stable findings.  CT scan on 8/24/2023 showed chronic biliary duct dilatation.    CT scan on 11/16/2023 showed no changes.    PLAN:    1.   Proceed with cycle #6 of Rituxan, Polivy and Treanda today.    2.  She will return tomorrow for day #2 Treanda.  She will have on-body Neulasta.  3.  Start Lasix 40 mg daily as needed.  I asked her to take Lasix daily for 3 days.  After that, I asked her to take it as needed.   4.  Hold sodium tablets.   5.  Obtain venous Doppler of both lower extremities to evaluate for DVT.   6.  Continue ferrous sulfate 325 mg every other day.  7.  Obtain PET scan in 3 weeks.  8.  I will see her in follow-up in 4 weeks.  We will review the PET scan.  CBC CMP LDH and uric acid will be obtained.       I spent 45 minutes caring for Michelle on this date of service. This time includes time spent by me in the following activities: preparing for the visit, reviewing tests, obtaining and/or reviewing a separately obtained history, performing a medically  appropriate examination and/or evaluation, counseling and educating the patient/family/caregiver, ordering medications, tests, or procedures, documenting information in the medical record, independently interpreting results and communicating that information with the patient/family/caregiver, and care coordination         Renate Galo MD  04/01/24

## 2024-04-02 ENCOUNTER — HOSPITAL ENCOUNTER (OUTPATIENT)
Dept: CARDIOLOGY | Facility: HOSPITAL | Age: 84
Discharge: HOME | End: 2024-04-02
Admitting: INTERNAL MEDICINE
Payer: MEDICARE

## 2024-04-02 ENCOUNTER — INFUSION (OUTPATIENT)
Dept: ONCOLOGY | Facility: HOSPITAL | Age: 84
End: 2024-04-02
Payer: MEDICARE

## 2024-04-02 VITALS
RESPIRATION RATE: 16 BRPM | DIASTOLIC BLOOD PRESSURE: 62 MMHG | HEART RATE: 74 BPM | SYSTOLIC BLOOD PRESSURE: 109 MMHG | OXYGEN SATURATION: 98 % | WEIGHT: 136.8 LBS | BODY MASS INDEX: 26.72 KG/M2 | TEMPERATURE: 98.6 F

## 2024-04-02 DIAGNOSIS — M79.89 LEG SWELLING: ICD-10-CM

## 2024-04-02 DIAGNOSIS — C83.38 DIFFUSE LARGE B-CELL LYMPHOMA OF LYMPH NODES OF MULTIPLE REGIONS: Primary | ICD-10-CM

## 2024-04-02 LAB

## 2024-04-02 PROCEDURE — 96377 APPLICATON ON-BODY INJECTOR: CPT

## 2024-04-02 PROCEDURE — 93970 EXTREMITY STUDY: CPT

## 2024-04-02 PROCEDURE — 25810000003 SODIUM CHLORIDE 0.9 % SOLUTION: Performed by: INTERNAL MEDICINE

## 2024-04-02 PROCEDURE — 96375 TX/PRO/DX INJ NEW DRUG ADDON: CPT

## 2024-04-02 PROCEDURE — 25810000003 SODIUM CHLORIDE 0.9 % SOLUTION 500 ML FLEX CONT: Performed by: INTERNAL MEDICINE

## 2024-04-02 PROCEDURE — 25010000002 BENDAMUSTINE 100 MG/4ML SOLUTION 4 ML VIAL: Performed by: INTERNAL MEDICINE

## 2024-04-02 PROCEDURE — 25010000002 PEGFILGRASTIM 6 MG/0.6ML PREFILLED SYRINGE KIT: Performed by: INTERNAL MEDICINE

## 2024-04-02 PROCEDURE — 25010000002 DEXAMETHASONE SODIUM PHOSPHATE 100 MG/10ML SOLUTION: Performed by: INTERNAL MEDICINE

## 2024-04-02 PROCEDURE — 96413 CHEMO IV INFUSION 1 HR: CPT

## 2024-04-02 RX ORDER — SODIUM CHLORIDE 9 MG/ML
20 INJECTION, SOLUTION INTRAVENOUS ONCE
Status: COMPLETED | OUTPATIENT
Start: 2024-04-02 | End: 2024-04-02

## 2024-04-02 RX ADMIN — DEXAMETHASONE SODIUM PHOSPHATE 12 MG: 10 INJECTION, SOLUTION INTRAMUSCULAR; INTRAVENOUS at 13:30

## 2024-04-02 RX ADMIN — BENDAMUSTINE HYDROCHLORIDE 115 MG: 100 INJECTION INTRAVENOUS at 13:49

## 2024-04-02 RX ADMIN — PEGFILGRASTIM 6 MG: KIT SUBCUTANEOUS at 14:40

## 2024-04-02 RX ADMIN — SODIUM CHLORIDE 20 ML/HR: 9 INJECTION, SOLUTION INTRAVENOUS at 13:29

## 2024-04-03 ENCOUNTER — TELEPHONE (OUTPATIENT)
Dept: ONCOLOGY | Facility: CLINIC | Age: 84
End: 2024-04-03
Payer: MEDICARE

## 2024-04-03 NOTE — TELEPHONE ENCOUNTER
----- Message from Renate Galo MD sent at 4/3/2024  8:07 AM EDT -----  Please inform the patient that the venous doppler showed no evidence of a blood clot.    Thank you

## 2024-04-22 ENCOUNTER — HOSPITAL ENCOUNTER (OUTPATIENT)
Dept: PET IMAGING | Facility: HOSPITAL | Age: 84
Discharge: HOME | End: 2024-04-22
Payer: MEDICARE

## 2024-04-22 DIAGNOSIS — C83.38 DIFFUSE LARGE B-CELL LYMPHOMA OF LYMPH NODES OF MULTIPLE REGIONS: ICD-10-CM

## 2024-04-22 LAB — GLUCOSE BLDC GLUCOMTR-MCNC: 98 MG/DL (ref 70–130)

## 2024-04-22 PROCEDURE — A9552 F18 FDG: HCPCS | Performed by: INTERNAL MEDICINE

## 2024-04-22 PROCEDURE — 0 FLUDEOXYGLUCOSE F18 SOLUTION: Performed by: INTERNAL MEDICINE

## 2024-04-22 PROCEDURE — 82948 REAGENT STRIP/BLOOD GLUCOSE: CPT

## 2024-04-22 PROCEDURE — 78815 PET IMAGE W/CT SKULL-THIGH: CPT

## 2024-04-22 RX ADMIN — FLUDEOXYGLUCOSE F 18 1 DOSE: 200 INJECTION, SOLUTION INTRAVENOUS at 09:38

## 2024-05-03 ENCOUNTER — SPECIALTY PHARMACY (OUTPATIENT)
Dept: PHARMACY | Facility: HOSPITAL | Age: 84
End: 2024-05-03
Payer: MEDICARE

## 2024-05-03 ENCOUNTER — TELEPHONE (OUTPATIENT)
Dept: ONCOLOGY | Facility: CLINIC | Age: 84
End: 2024-05-03

## 2024-05-03 ENCOUNTER — LAB (OUTPATIENT)
Dept: LAB | Facility: HOSPITAL | Age: 84
End: 2024-05-03
Payer: MEDICARE

## 2024-05-03 ENCOUNTER — OFFICE VISIT (OUTPATIENT)
Dept: ONCOLOGY | Facility: CLINIC | Age: 84
End: 2024-05-03
Payer: MEDICARE

## 2024-05-03 VITALS
OXYGEN SATURATION: 97 % | RESPIRATION RATE: 18 BRPM | HEART RATE: 81 BPM | BODY MASS INDEX: 23.62 KG/M2 | WEIGHT: 120.3 LBS | DIASTOLIC BLOOD PRESSURE: 57 MMHG | HEIGHT: 60 IN | SYSTOLIC BLOOD PRESSURE: 95 MMHG | TEMPERATURE: 98.2 F

## 2024-05-03 DIAGNOSIS — J90 BILATERAL PLEURAL EFFUSION: ICD-10-CM

## 2024-05-03 DIAGNOSIS — N85.9 LESION OF UTERUS: ICD-10-CM

## 2024-05-03 DIAGNOSIS — D50.9 IRON DEFICIENCY ANEMIA, UNSPECIFIED IRON DEFICIENCY ANEMIA TYPE: ICD-10-CM

## 2024-05-03 DIAGNOSIS — C83.38 DIFFUSE LARGE B-CELL LYMPHOMA OF LYMPH NODES OF MULTIPLE REGIONS: Primary | ICD-10-CM

## 2024-05-03 DIAGNOSIS — Z79.01 CHRONIC ANTICOAGULATION: ICD-10-CM

## 2024-05-03 DIAGNOSIS — C83.38 DIFFUSE LARGE B-CELL LYMPHOMA OF LYMPH NODES OF MULTIPLE REGIONS: ICD-10-CM

## 2024-05-03 DIAGNOSIS — M79.89 LEG SWELLING: ICD-10-CM

## 2024-05-03 DIAGNOSIS — E87.1 HYPONATREMIA: ICD-10-CM

## 2024-05-03 DIAGNOSIS — S22.43XA CLOSED FRACTURE OF MULTIPLE RIBS OF BOTH SIDES, INITIAL ENCOUNTER: ICD-10-CM

## 2024-05-03 LAB
ALBUMIN SERPL-MCNC: 3.5 G/DL (ref 3.5–5.2)
ALBUMIN/GLOB SERPL: 1.4 G/DL
ALP SERPL-CCNC: 113 U/L (ref 39–117)
ALT SERPL W P-5'-P-CCNC: 19 U/L (ref 1–33)
ANION GAP SERPL CALCULATED.3IONS-SCNC: 10.3 MMOL/L (ref 5–15)
AST SERPL-CCNC: 31 U/L (ref 1–32)
BASOPHILS # BLD AUTO: 0.03 10*3/MM3 (ref 0–0.2)
BASOPHILS NFR BLD AUTO: 0.5 % (ref 0–1.5)
BILIRUB SERPL-MCNC: 0.4 MG/DL (ref 0–1.2)
BUN SERPL-MCNC: 19 MG/DL (ref 8–23)
BUN/CREAT SERPL: 17.4 (ref 7–25)
CALCIUM SPEC-SCNC: 9.2 MG/DL (ref 8.6–10.5)
CHLORIDE SERPL-SCNC: 94 MMOL/L (ref 98–107)
CO2 SERPL-SCNC: 25.7 MMOL/L (ref 22–29)
CREAT SERPL-MCNC: 1.09 MG/DL (ref 0.57–1)
DEPRECATED RDW RBC AUTO: 59.6 FL (ref 37–54)
EGFRCR SERPLBLD CKD-EPI 2021: 50.8 ML/MIN/1.73
EOSINOPHIL # BLD AUTO: 0.01 10*3/MM3 (ref 0–0.4)
EOSINOPHIL NFR BLD AUTO: 0.2 % (ref 0.3–6.2)
ERYTHROCYTE [DISTWIDTH] IN BLOOD BY AUTOMATED COUNT: 15.4 % (ref 12.3–15.4)
GLOBULIN UR ELPH-MCNC: 2.5 GM/DL
GLUCOSE SERPL-MCNC: 96 MG/DL (ref 65–99)
HCT VFR BLD AUTO: 30.1 % (ref 34–46.6)
HGB BLD-MCNC: 10.2 G/DL (ref 12–15.9)
IMM GRANULOCYTES # BLD AUTO: 0.03 10*3/MM3 (ref 0–0.05)
IMM GRANULOCYTES NFR BLD AUTO: 0.5 % (ref 0–0.5)
LDH SERPL-CCNC: 164 U/L (ref 135–214)
LYMPHOCYTES # BLD AUTO: 0.52 10*3/MM3 (ref 0.7–3.1)
LYMPHOCYTES NFR BLD AUTO: 9.1 % (ref 19.6–45.3)
MCH RBC QN AUTO: 35.2 PG (ref 26.6–33)
MCHC RBC AUTO-ENTMCNC: 33.9 G/DL (ref 31.5–35.7)
MCV RBC AUTO: 103.8 FL (ref 79–97)
MONOCYTES # BLD AUTO: 0.54 10*3/MM3 (ref 0.1–0.9)
MONOCYTES NFR BLD AUTO: 9.5 % (ref 5–12)
NEUTROPHILS NFR BLD AUTO: 4.57 10*3/MM3 (ref 1.7–7)
NEUTROPHILS NFR BLD AUTO: 80.2 % (ref 42.7–76)
NRBC BLD AUTO-RTO: 0 /100 WBC (ref 0–0.2)
PLATELET # BLD AUTO: 164 10*3/MM3 (ref 140–450)
PMV BLD AUTO: 8.9 FL (ref 6–12)
POTASSIUM SERPL-SCNC: 4.9 MMOL/L (ref 3.5–5.2)
PROT SERPL-MCNC: 6 G/DL (ref 6–8.5)
RBC # BLD AUTO: 2.9 10*6/MM3 (ref 3.77–5.28)
SODIUM SERPL-SCNC: 130 MMOL/L (ref 136–145)
URATE SERPL-MCNC: 3.5 MG/DL (ref 2.4–5.7)
WBC NRBC COR # BLD AUTO: 5.7 10*3/MM3 (ref 3.4–10.8)

## 2024-05-03 PROCEDURE — 84550 ASSAY OF BLOOD/URIC ACID: CPT

## 2024-05-03 PROCEDURE — 83615 LACTATE (LD) (LDH) ENZYME: CPT

## 2024-05-03 PROCEDURE — 85025 COMPLETE CBC W/AUTO DIFF WBC: CPT

## 2024-05-03 PROCEDURE — 80053 COMPREHEN METABOLIC PANEL: CPT

## 2024-05-03 PROCEDURE — 36415 COLL VENOUS BLD VENIPUNCTURE: CPT

## 2024-05-03 NOTE — PROGRESS NOTES
Subjective     CHIEF COMPLAINT:      Chief Complaint   Patient presents with    Follow-up     HISTORY OF PRESENT ILLNESS:     Michelle Car is a 83 y.o. female patient who returns today for follow up on her lymphoma. She returns today for follow up accompanied by her sister. She reports feeling better and noticed improvement in her fatigue. No shortness of breath or chest pain. She had a fall recently and fell backwards. She started sleeping in her recliner. She noticed worsening of her leg and foot edema. She was placed on Lasix 40 mg daily that she took for 3 days and then she was instructed to take it on as needed basis. She noticed significant improvement in her swelling. She did not need to take it over the past few days.    ROS:  Pertinent ROS is in the HPI.     Past medical, surgical, social and family history were reviewed.     MEDICATIONS:    Current Outpatient Medications:     acetaminophen (TYLENOL) 500 MG tablet, Take 1 tablet by mouth Every 6 (Six) Hours As Needed for Mild Pain., Disp: , Rfl:     acyclovir (Zovirax) 400 MG tablet, Take 1 tablet by mouth 2 (Two) Times a Day., Disp: 60 tablet, Rfl: 7    amiodarone (PACERONE) 200 MG tablet, Take 1 tablet by mouth Daily., Disp: 90 tablet, Rfl: 2    Calcium Carbonate (CALTRATE 600 PO), Take 1 tablet by mouth Daily., Disp: , Rfl:     carvedilol (COREG) 3.125 MG tablet, TAKE 1 TABLET BY MOUTH TWICE DAILY WITH MEALS, Disp: 180 tablet, Rfl: 1    Eliquis 5 MG tablet tablet, TAKE 1 TABLET BY MOUTH EVERY 12 HOURS FOR ATRIAL FIBRILLATION, Disp: 180 tablet, Rfl: 1    ferrous sulfate (FeroSul) 325 (65 FE) MG tablet, Take 1 tablet by mouth Every Other Day., Disp: 36 tablet, Rfl: 2    furosemide (LASIX) 40 MG tablet, Take 1 tablet by mouth Daily As Needed (Leg swelling)., Disp: 30 tablet, Rfl: 0    Hydrocortisone, Perianal, (Anusol-HC) 2.5 % rectal cream, Apply to hemorrhoids 3 times daily for 7 days during hemorrhoid flare. Include applicator., Disp: 30 g, Rfl:  "1    losartan (COZAAR) 100 MG tablet, , Disp: , Rfl:     Multiple Vitamins-Minerals (CENTRUM SILVER) tablet, Take 1 tablet by mouth Daily., Disp: , Rfl:     polyethylene glycol (MIRALAX) 17 GM/SCOOP powder, Take 17 g by mouth As Needed., Disp: , Rfl:     pravastatin (PRAVACHOL) 40 MG tablet, TAKE 1 TABLET BY MOUTH EVERY NIGHT AT BEDTIME, Disp: 90 tablet, Rfl: 1    prochlorperazine (COMPAZINE) 5 MG tablet, Take 1-2 tablets by mouth Every 6 (Six) Hours As Needed for Nausea or Vomiting., Disp: 60 tablet, Rfl: 1    sodium chloride 1 g tablet, TAKE 1 TABLET BY MOUTH DAILY, Disp: 90 tablet, Rfl: 1    spironolactone (ALDACTONE) 25 MG tablet, TAKE 1 TABLET BY MOUTH DAILY, Disp: 90 tablet, Rfl: 1    sulfamethoxazole-trimethoprim (BACTRIM DS,SEPTRA DS) 800-160 MG per tablet, Take 1 tablet by mouth 3 (Three) Times a Week., Disp: 12 tablet, Rfl: 7    traMADol (ULTRAM) 50 MG tablet, Take 1 tablet by mouth Every 6 (Six) Hours As Needed for Moderate Pain., Disp: 12 tablet, Rfl: 0  Objective     VITAL SIGNS:     Vitals:    05/03/24 1037   BP: 95/57   Pulse: 81   Resp: 18   Temp: 98.2 °F (36.8 °C)   TempSrc: Temporal   SpO2: 97%   Weight: 54.6 kg (120 lb 4.8 oz)   Height: 152.4 cm (60\")   PainSc: 0-No pain     Body mass index is 23.49 kg/m².     Wt Readings from Last 5 Encounters:   05/03/24 54.6 kg (120 lb 4.8 oz)   04/02/24 62.1 kg (136 lb 12.8 oz)   04/01/24 60.8 kg (134 lb)   03/06/24 62.2 kg (137 lb 3.2 oz)   03/05/24 60.1 kg (132 lb 9.6 oz)     PHYSICAL EXAMINATION:   GENERAL: The patient appears in good general condition, not in acute distress.   SKIN: No ecchymosis.  EYES: No jaundice. No Pallor.  CHEST: Normal respiratory effort. No tenderness on exam of the right clavicle medial aspect.   CVS: No edema.  ABDOMEN: Non-distended.  EXTREMITIES: Improvement in the leg edema.     DIAGNOSTIC DATA:     Results from last 7 days   Lab Units 05/03/24  0948   WBC 10*3/mm3 5.70   NEUTROS ABS 10*3/mm3 4.57   HEMOGLOBIN g/dL 10.2* "   HEMATOCRIT % 30.1*   PLATELETS 10*3/mm3 164     Results from last 7 days   Lab Units 05/03/24  0948   SODIUM mmol/L 130*   POTASSIUM mmol/L 4.9   CHLORIDE mmol/L 94*   CO2 mmol/L 25.7   BUN mg/dL 19   CREATININE mg/dL 1.09*   CALCIUM mg/dL 9.2   ALBUMIN g/dL 3.5   BILIRUBIN mg/dL 0.4   ALK PHOS U/L 113   ALT (SGPT) U/L 19   AST (SGOT) U/L 31   GLUCOSE mg/dL 96                  PET scan on 4/22/2024:  Head/neck: Prior hypermetabolic lytic left mandibular mass.  Hypermetabolism and extraosseous soft tissue component has resolved.     Prior hypermetabolic left  space soft tissue nodule is now  subcentimeter without associated hypermetabolism.     Chest: Resolution of prior hypermetabolic anterior mediastinal lymph  node.     Resolution of prior hypermetabolic anterior pericardial thickening.     More extensive mild tubular hypermetabolism throughout the esophagus.     Abdomen/pelvis: Resolution of prior hypermetabolic right mid back  subcutaneous nodule.     Resolution of prior hypermetabolic adrenal masses. Right adrenal gland  has persistent mild hypermetabolism with max SUV of 3.2 (series 4/image  182).     Resolution of few prior hypermetabolic soft tissue nodules about the  left kidney.     Spleen remains normal in size with uptake near the liver.     Moderate to large volume of fluid density throughout the upper uterine  endometrial canal without associated hypermetabolism, new from prior FDG  PET/CT and increased from February CT abdomen/pelvis.     Bones: Mildly displaced right clavicular head fracture with mild  associated hypermetabolism is new from prior imaging. Nondisplaced  anterior lateral sixth seventh and eighth rib fractures with mild  associated hypermetabolism are also new from prior imaging. Linear  hypermetabolism subjacent to the left eighth rib has resolved. Prior  right-sided rib fractures are no longer hypermetabolic. New focal mild  hypermetabolism in the otherwise  normal-appearing right upper back  musculature, likely muscle activation or posttraumatic.     IMPRESSION:  1. Of the prior disease, mild hypermetabolism near that of liver remains  at the right adrenal gland. Deauville score: 3.  2. Moderate to large volume of fluid throughout the upper uterine  endometrial canal, new from December 2023 FDG PET/CT and increased from  February 2024 CT abdomen/pelvis. Finding is of indeterminate etiology  but needs further work-up with pelvic ultrasound and gynecologic  consultation. Deauville score: X.  3. New mildly displaced right clavicular head fracture and new  nondisplaced left anterolateral sixth through eighth rib fractures.  Deauville score: X.  4. More extensive mild tubular hypermetabolism throughout the esophagus  suggesting esophagitis. Deauville score: X.     Assessment & Plan    *Diffuse large B-cell lymphoma.     Patient was found to have bilateral pleural effusions.    She underwent right thoracentesis on 2/21/2022 and the left thoracentesis on 2/22/2022.    Analysis of the fluid revealed involvement with diffuse large B-cell lymphoma.    FISH analysis for BCL6 rearrangement was positive.    FISH analysis for BCL 1, BCL-2 and MYC rearrangement was negative.    PET scan on 3/10/2022 revealed significant hypermetabolism in the left adrenal gland and the surrounding soft tissue with SUV up to 34.5. Hypermetabolism in the left pleural thickening with SUV of 7.1. there was less hypermetabolism in the right adrenal gland and in the right pleura.  She was considered to have stage III non-bulky disease.  This concurred with the pathology exam of the pleural fluid cytology that revealed diffuse large B-cell lymphoma.  R-IPI score of 3 associated with poor risk - associated with overall survival of 55%.   R-CHOP with Neulasta support was started on 3/28/2022.  Patient developed neutropenia and thrombocytopenia after cycle#1.  Patient developed neutropenic fever after cycle #2  and was hospitalized.  Her performance status decreased as a result.   Regimen was changed to mini-RCHOP starting cycle #3 on 5/16/2022.  CT scans on 6/23/2022 showed evidence of response.  There was resolution of the pericardial effusion and near complete resolution of the pleural effusions.   Patient received cycle #6 on 7/25/2022.  PET scan on 8/15/2022 revealed complete metabolic response.  Patient was found to have recurrence with development of a left mandibular mass in October/November 2023.  Biopsy of left mandible mass on 11/28/2023 showed recurrence of the lymphoma, diffuse large B cell, non-GCB phenotype.  Ki-67 was 90%.    It was positive for Bcl-6 but negative for Bcl-2 and MYC rearrangement.  PET scan on 12/11/2023 revealed multiple areas of involvement -prevascular space, left epicardial fat pad, pleural density posterior to the left fourth rib anterior aspect, lesion along the pericardium versus intramuscular with SUV of 9.8.  Hypermetabolic activity at both adrenal glands, uniformly increased in size and appeared hyperplastic.  There was a 2.7 x 1.1 cm soft tissue nodule lateral to the right erector spinae muscle posterior to the right posterior 11th rib with an SUV of 20.5 and there was a 1.3 cm nodule lateral to the posterior margin of the left psoas muscle with an SUV of 24.9.  Rituxan Polivy and Treanda with Treanda on days 1 and 2 every 3 weeks for 6 cycles was started on 12/12/2023.  The jaw mass started to decrease in size after starting treatment.  CT scan on 2/8/2024 showed evidence of response to treatment.  Cycle #6 was given on 4/1/2024.  PET scan on 4/22/2024 revealed decrease in the hypermetabolism in the mediastinal and pericardial lymph nodes/lesions and in the lesions above the left kidney and adrenal gland.  The left mandibular lesion was no longer hypermetabolic.   She is at increased risk for relapse of the lymphoma.  I recommended Revlimid at a reduced dose of 10 mg daily for 21  days of a 28 day cycle (based on renal function).  I explained the side effects including skin rash, diarrhea and development of deep vein thrombosis.  She decided to proceed.     *Bilateral pleural effusions.   She is s/p right thoracentesis on 2/21/2022 and the effusion was found to be malignant attributed to the lymphoma.   CT scan on 5/1/2022 revealed decrease in the right pleural effusion and an increase in the left effusion.   The increase in the left effusion was suspected of being due to cardiac and fluid overload states (fluid appeared to be serous on CT scan).  CT on 6/23/2022 revealed near resolution of the pleural effusions.  PET scan on 8/15/2022 showed that the pleural effusions were trace and improved since June 2022.  CT chest on 11/7/2022 revealed minimal partially loculated right pleural effusion.  CT chest on 3/7/2023 showed no recurrence of the effusion.  CT on 8/24/2023 showed no evidence of recurrence of the pleural effusions.  CT on 11/16/2023 showed no evidence of recurrence of the pleural effusions.  CT scan on 2/8/2024 revealed no evidence of pleural effusion.   No evidence of effusion on PET scan on 4/22/2024.    *Iron deficiency anemia.  Patient also developed anemia secondary to lymphoma and secondary to chemotherapy.    Hemoglobin was 11.1 on 3/28/2022.  Hgb decreased to 7.9 on 4/25/2022 and she was transfused.   Hemoglobin decreased to 6.3 on 4/27/2022. She was transfused.  Hemoglobin improved to 8.8 on 5/16/2022.  Patient is on ferrous sulfate 325 mg 3 days a week.  11/21/2023: Hemoglobin 12.1.    Iron stores adequate with ferritin 141 and transferrin saturation 25%.  12/12/2023: Hemoglobin 12.2.  1/2/2024: Hemoglobin 12.5.  1/23/2024: Hemoglobin decreased to 11.4-secondary to anemia due to chemotherapy.  2/13/2024: Hemoglobin decreased to 10.8.  3/5/2024: Hemoglobin 11.1.  4/1/2024: Hemoglobin decreased to 9.6.  This is attributed to recent fall that resulted in bleeding from the  left arm.  5/3/2024: Hemoglobin increased to 10.2.    *Hyponatremia.  Patient had problem with hyponatremia in the past.  Sodium is 127 on 3/14/2023.  She was advised to increase salt intake.  Sodium improved to 133 on repeat lab on 3/21/2023.  However, sodium decreased to 126 on 6/7/2023.  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  She was started on sodium chloride 1 g daily.  4/1/2024: Sodium decreased to 132.  Leg swelling improved with Lasix.  5/3/2024: Sodium 130.    *Chronic anticoagulation.  Patient is on Eliquis 5 mg twice daily.  No problem with bleeding.     *Prophylaxis.  Patient had shingles in April 2023.  She is at risk of developing shingles while on treatment.  She was placed on acyclovir 400 mg twice daily.  She is on Bactrim DS 3 days a week.  No recent infection.    *Low-density lesion in the uterus.    The radiologist recommended pelvic ultrasound.    The patient is asymptomatic.    PET scan on 4/22/2024 reported fluid in the lower abdomen.  She is asymptomatic.   We will reevaluate on her follow up scan.     *Multiple rib fractures.  Patient had a fall in late March 2024.  She was found on PET scan on 4/22/2024 to have multiple fractures in the left ribs and a right clavicular fracture.     *13 x 9 mm nodular focus of consolidation in the right upper lobe.  Patient is reporting cough and congestion.  This is concerning for evolving pneumonia.  An underlying malignancy could not be excluded.  CT on 11/16/2023 showed no suspicious findings.  No hypermetabolism in this area.    *T12 compression deformity.  Patient is not complaining of pain in the area.  Bone density on 9/14/2023 showed osteoporosis.    We recommended starting treatment with Fosamax.    Due to the patient having problem with her teeth, Fosamax was not started.    *Choledocholithiasis with increase in the intra and extrahepatic biliary dilatation seen on CT on 6/23/2022.  Liver enzymes including bilirubin  are normal today.   She is not having abdominal pain.  No redness no tenderness on exam of the right upper quadrant.  PET scan on 8/15/2022 showed persistence of the findings.  No symptoms or signs of infection.  Patient was seen by Dr. Ortega.  She underwent ERCP on 10/21/2022.  There was a calculus of the bile duct without cholecystitis and without obstruction.  She had sphincterotomy and balloon sweep.  Alkaline phosphatase improved afterwards.  CT on 3/7/2023 revealed stable findings.  CT scan on 8/24/2023 showed chronic biliary duct dilatation.    CT scan on 11/16/2023 showed no changes.    PLAN:    1.  We will contact our office pharmacy team to start Revlimid 10 mg daily for 21 days of a 28 day cycle. She will be scheduled for official education session.   2.  We will refer to Radiation Oncology to evaluate for radiation therapy to the left mandibular lesion since she is considered at high risk for relapse in this area.  3.  Continue Ferrous Sulfate 325 mg every other day.  4.  Continue Eliquis 5 mg twice daily.  5.  Continue Acyclovir 400 mg twice daily.  6.  Continue Sodium Chloride 1 gm daily.    7.  She will take Lasix 20 mg daily PRN for leg swelling.  8.  Follow up with the NP in 3 weeks. I will see her in follow up in 6 weeks with CBC CMP LDH and uric acid.       I spent 60 minutes caring for Michelle on this date of service. This time includes time spent by me in the following activities: preparing for the visit, reviewing tests, obtaining and/or reviewing a separately obtained history, performing a medically appropriate examination and/or evaluation, counseling and educating the patient/family/caregiver, ordering medications, tests, or procedures, referring and communicating with other health care professionals, documenting information in the medical record, independently interpreting results and communicating that information with the patient/family/caregiver, and care coordination       Renate BROCK  MD [place]  05/03/24

## 2024-05-03 NOTE — PROGRESS NOTES
PA for generic Revlimid has been approved by insurance.  The co-pay was around $3300 determine by test claim in [facility].  Prescription can be sent to [facility] pharmacy.  They provide financial assistance for this medication since no grants are available at this time.       Carol Gill  Specialty Pharmacy Technician

## 2024-05-03 NOTE — TELEPHONE ENCOUNTER
Hub staff attempted to follow warm transfer process and was unsuccessful     Caller: Keely Lozano    Relationship to patient: Emergency Contact    Best call back number: [phone]    Patient is needing: RUNNING 20 MIN LATE, APPOINTMENT IS  AND 9

## 2024-05-10 ENCOUNTER — OFFICE VISIT (OUTPATIENT)
Dept: ONCOLOGY | Facility: CLINIC | Age: 84
End: 2024-05-10
Payer: MEDICARE

## 2024-05-10 ENCOUNTER — TELEPHONE (OUTPATIENT)
Dept: RADIATION ONCOLOGY | Facility: HOSPITAL | Age: 84
End: 2024-05-10
Payer: MEDICARE

## 2024-05-10 ENCOUNTER — SPECIALTY PHARMACY (OUTPATIENT)
Dept: ONCOLOGY | Facility: HOSPITAL | Age: 84
End: 2024-05-10
Payer: MEDICARE

## 2024-05-10 ENCOUNTER — APPOINTMENT (OUTPATIENT)
Dept: LAB | Facility: HOSPITAL | Age: 84
End: 2024-05-10
Payer: MEDICARE

## 2024-05-10 ENCOUNTER — LAB (OUTPATIENT)
Dept: LAB | Facility: HOSPITAL | Age: 84
End: 2024-05-10
Payer: MEDICARE

## 2024-05-10 VITALS
WEIGHT: 120.1 LBS | BODY MASS INDEX: 23.58 KG/M2 | DIASTOLIC BLOOD PRESSURE: 65 MMHG | TEMPERATURE: 98.2 F | HEIGHT: 60 IN | OXYGEN SATURATION: 99 % | HEART RATE: 72 BPM | SYSTOLIC BLOOD PRESSURE: 102 MMHG | RESPIRATION RATE: 16 BRPM

## 2024-05-10 DIAGNOSIS — C83.38 DIFFUSE LARGE B-CELL LYMPHOMA OF LYMPH NODES OF MULTIPLE REGIONS: Primary | ICD-10-CM

## 2024-05-10 DIAGNOSIS — K62.5 BRIGHT RED BLOOD PER RECTUM: ICD-10-CM

## 2024-05-10 LAB
BASOPHILS # BLD AUTO: 0.03 10*3/MM3 (ref 0–0.2)
BASOPHILS NFR BLD AUTO: 0.6 % (ref 0–1.5)
DEPRECATED RDW RBC AUTO: 52.7 FL (ref 37–54)
EOSINOPHIL # BLD AUTO: 0.01 10*3/MM3 (ref 0–0.4)
EOSINOPHIL NFR BLD AUTO: 0.2 % (ref 0.3–6.2)
ERYTHROCYTE [DISTWIDTH] IN BLOOD BY AUTOMATED COUNT: 14.7 % (ref 12.3–15.4)
HCT VFR BLD AUTO: 30.3 % (ref 34–46.6)
HGB BLD-MCNC: 10.8 G/DL (ref 12–15.9)
IMM GRANULOCYTES # BLD AUTO: 0.06 10*3/MM3 (ref 0–0.05)
IMM GRANULOCYTES NFR BLD AUTO: 1.1 % (ref 0–0.5)
LYMPHOCYTES # BLD AUTO: 0.79 10*3/MM3 (ref 0.7–3.1)
LYMPHOCYTES NFR BLD AUTO: 15 % (ref 19.6–45.3)
MCH RBC QN AUTO: 34.8 PG (ref 26.6–33)
MCHC RBC AUTO-ENTMCNC: 35.6 G/DL (ref 31.5–35.7)
MCV RBC AUTO: 97.7 FL (ref 79–97)
MONOCYTES # BLD AUTO: 0.55 10*3/MM3 (ref 0.1–0.9)
MONOCYTES NFR BLD AUTO: 10.4 % (ref 5–12)
NEUTROPHILS NFR BLD AUTO: 3.84 10*3/MM3 (ref 1.7–7)
NEUTROPHILS NFR BLD AUTO: 72.7 % (ref 42.7–76)
NRBC BLD AUTO-RTO: 0 /100 WBC (ref 0–0.2)
PLATELET # BLD AUTO: 168 10*3/MM3 (ref 140–450)
PMV BLD AUTO: 9.5 FL (ref 6–12)
RBC # BLD AUTO: 3.1 10*6/MM3 (ref 3.77–5.28)
WBC NRBC COR # BLD AUTO: 5.28 10*3/MM3 (ref 3.4–10.8)

## 2024-05-10 PROCEDURE — 36415 COLL VENOUS BLD VENIPUNCTURE: CPT

## 2024-05-10 PROCEDURE — 85025 COMPLETE CBC W/AUTO DIFF WBC: CPT

## 2024-05-10 RX ORDER — LORATADINE 10 MG/1
10 TABLET ORAL DAILY
COMMUNITY

## 2024-05-10 RX ORDER — LENALIDOMIDE 10 MG/1
10 CAPSULE ORAL DAILY
Qty: 21 CAPSULE | Refills: 0 | Status: SHIPPED | OUTPATIENT
Start: 2024-05-10 | End: 2024-05-13 | Stop reason: SDUPTHER

## 2024-05-10 RX ORDER — GUAIFENESIN 600 MG/1
1200 TABLET, EXTENDED RELEASE ORAL 2 TIMES DAILY
COMMUNITY

## 2024-05-13 ENCOUNTER — SPECIALTY PHARMACY (OUTPATIENT)
Dept: PHARMACY | Facility: HOSPITAL | Age: 84
End: 2024-05-13
Payer: MEDICARE

## 2024-05-13 ENCOUNTER — CONSULT (OUTPATIENT)
Dept: RADIATION ONCOLOGY | Facility: HOSPITAL | Age: 84
End: 2024-05-13
Payer: MEDICARE

## 2024-05-13 VITALS
SYSTOLIC BLOOD PRESSURE: 87 MMHG | DIASTOLIC BLOOD PRESSURE: 67 MMHG | HEART RATE: 68 BPM | OXYGEN SATURATION: 93 % | WEIGHT: 118.8 LBS | BODY MASS INDEX: 23.2 KG/M2

## 2024-05-13 DIAGNOSIS — C83.38 DIFFUSE LARGE B-CELL LYMPHOMA OF LYMPH NODES OF MULTIPLE REGIONS: ICD-10-CM

## 2024-05-13 DIAGNOSIS — C83.38 DIFFUSE LARGE B-CELL LYMPHOMA OF LYMPH NODES OF MULTIPLE REGIONS: Primary | ICD-10-CM

## 2024-05-13 PROCEDURE — 77263 THER RADIOLOGY TX PLNG CPLX: CPT | Performed by: RADIOLOGY

## 2024-05-13 PROCEDURE — G0463 HOSPITAL OUTPT CLINIC VISIT: HCPCS

## 2024-05-13 RX ORDER — LENALIDOMIDE 10 MG/1
10 CAPSULE ORAL DAILY
Qty: 21 CAPSULE | Refills: 0 | Status: SHIPPED | OUTPATIENT
Start: 2024-05-13 | End: 2024-06-03

## 2024-05-13 NOTE — PROGRESS NOTES
Drug: Revlimid (lenalidomide)  Strength: 10 mg  Directions: Take 1 capsule by mouth daily on days 1-21 of each 28 day cycle  Quantity: 21  Refills: 0  Pharmacy prescription sent to: [facility]    Completed independent double check on medication order/RX.  Suellen Storey, ArashD, BCPS

## 2024-05-13 NOTE — PROGRESS NOTES
Re: Refills of Oral Specialty Medication - Revlimid (lenalidomide)    Drug-Drug Interactions: The current medication list was reviewed and there are no relevant drug-drug interactions with the specialty medication.  Medication Allergies: The patient has no relevant allergies as it relates to their oral specialty medication  Review of Labs/Dose Adjustments: NO DOSE CHANGE - I reviewed the most recent note and labs and the patient will continue without any dose changes.  I sent refills as described below.    Drug: Revlimid (lenalidomide)  Strength: 10 mg  Directions: Take 1 capsule by mouth daily on days 1-21 of each 28 day cycle  Quantity: 21  Refills: 0  Pharmacy prescription sent to: [facility]    Name/Credentials: Urbano Medrano, ArashD, BCOP  Clinical Oncology Pharmacist    5/13/2024  13:40 EDT

## 2024-05-13 NOTE — PROGRESS NOTES
Radiation Oncology Consult Note    Name: Michelle Car  YOB: 1940  MRN #: [id number]  Date of Service: 5/13/2024  Referring Provider: Renate Galo MD  [address] [place] [address]  Primary Care Provider: Olayinka Pimentel MD        DIAGNOSIS:  Diffuse large B-cell lymphoma of lymph nodes of multiple regions          CHIEF COMPLAINT: Lower extremity swelling                                 REQUESTING PHYSICIAN:  Renate Galo Md  [address] [place] [address]    RECORDS OBTAINED:  Records of the patients history including those obtained from the referring provider were reviewed and summarized in detail.    HISTORY OF PRESENT ILLNESS:  Michelle Car is a 83 y.o. female who was diagnosed in February 2022 to have bilateral pleural effusions.  Thoracentesis demonstrated the presence of diffuse large B-cell lymphoma.  Patient was initially staged with PET/CT imaging which demonstrated hypermetabolic activity involving left adrenal gland, right adrenal gland and pleural surfaces.  Patient was treated with R-CHOP with Neulasta support with excellent response and resolution of pleural effusions.  Patient subsequently presented with a left mandibular mass which was initially felt to be associated with a molar which had been removed.  Patient's dentist, Dr. Mason told the patient that this was not related and she was subsequent referred to an oral surgeon.  This too was biopsied 11/28/2023 demonstrating local recurrence of diffuse B-cell lymphoma with Ki-67 of 90%.  This mass was quite large on initial 6 PET/CT imaging.  Patient began treatment with Rituxan, polatuzumab as well as Bendamustine.  Patient did respond well.  Other areas of involvement included mediastinal and pericardial lymph nodes.  On repeat pet imaging 4/22/2024 the left mandibular lesion was no longer hypermetabolic.  Patient was then recommended to begin Revlimid.  She is referred  at this time for consideration of consolidative radiotherapy to soft tissues adjacent to the left mandible given the extreme size and hypermetabolic activity of this area of recurrent disease.      The following portions of the patient's history were reviewed and updated as appropriate: allergies, current medications, past family history, past medical history, past social history, past surgical history and problem list. Reviewed with the patient and remain unchanged.    PAST MEDICAL HISTORY:  she  has a past medical history of Abdominal aortic atherosclerosis, Adrenal nodule (03/2022), Allergic rhinitis, Anemia due to chemotherapy, Bacteremia due to Escherichia coli (04/26/2022), Bug bite (10/24/2015), CAD (coronary artery disease), Calculus of bile duct without cholangitis or cholecystitis (09/2022), Chemotherapy induced neutropenia, Chronic anticoagulation, Chronic diastolic (congestive) heart failure, Colon polyps, CRI (chronic renal insufficiency), stage 2 (mild) (2021), Diffuse large B cell lymphoma (02/2022), Drug induced constipation, Hearing loss, Hemorrhoids, History of blood transfusion (04/2022), History of chemotherapy (2022), Hypercalcemia (2016), Hypercholesterolemia, Hyperkalemia (03/2022), Hypertension, Hyponatremia (02/17/2022), Leg swelling (10/2021), Mixed hyperlipidemia, Neutropenic fever (05/2022), Nonrheumatic mitral valve regurgitation (03/2022), PAF (paroxysmal atrial fibrillation), Pancytopenia, Pleural effusion, bilateral (06/2022), Pulmonary nodule, Seasonal allergies, Thrombocytopenia (08/2022), Venous insufficiency, and Vitamin D deficiency.  MEDICATIONS:   Current Outpatient Medications:     acetaminophen (TYLENOL) 500 MG tablet, Take 1 tablet by mouth Every 6 (Six) Hours As Needed for Mild Pain., Disp: , Rfl:     acyclovir (Zovirax) 400 MG tablet, Take 1 tablet by mouth 2 (Two) Times a Day., Disp: 60 tablet, Rfl: 7    amiodarone (PACERONE) 200 MG tablet, Take 1 tablet by mouth Daily.,  Disp: 90 tablet, Rfl: 2    Calcium Carbonate (CALTRATE 600 PO), Take 1 tablet by mouth Daily., Disp: , Rfl:     carvedilol (COREG) 3.125 MG tablet, TAKE 1 TABLET BY MOUTH TWICE DAILY WITH MEALS, Disp: 180 tablet, Rfl: 1    Eliquis 5 MG tablet tablet, TAKE 1 TABLET BY MOUTH EVERY 12 HOURS FOR ATRIAL FIBRILLATION, Disp: 180 tablet, Rfl: 1    ferrous sulfate (FeroSul) 325 (65 FE) MG tablet, Take 1 tablet by mouth Every Other Day., Disp: 36 tablet, Rfl: 2    furosemide (LASIX) 40 MG tablet, Take 1 tablet by mouth Daily As Needed (Leg swelling)., Disp: 30 tablet, Rfl: 0    guaiFENesin (MUCINEX) 600 MG 12 hr tablet, Take 2 tablets by mouth 2 (Two) Times a Day., Disp: , Rfl:     Hydrocortisone, Perianal, (Anusol-HC) 2.5 % rectal cream, Apply to hemorrhoids 3 times daily for 7 days during hemorrhoid flare. Include applicator., Disp: 30 g, Rfl: 1    lenalidomide (REVLIMID) 10 MG capsule, Take 1 capsule by mouth Daily for 21 days. Take daily on Days 1-21, and off 7 days. Do not crush, break or chew., Disp: 21 capsule, Rfl: 0    loratadine (Claritin) 10 MG tablet, Take 1 tablet by mouth Daily., Disp: , Rfl:     Multiple Vitamins-Minerals (CENTRUM SILVER) tablet, Take 1 tablet by mouth Daily., Disp: , Rfl:     polyethylene glycol (MIRALAX) 17 GM/SCOOP powder, Take 17 g by mouth As Needed., Disp: , Rfl:     pravastatin (PRAVACHOL) 40 MG tablet, TAKE 1 TABLET BY MOUTH EVERY NIGHT AT BEDTIME, Disp: 90 tablet, Rfl: 1    prochlorperazine (COMPAZINE) 5 MG tablet, Take 1-2 tablets by mouth Every 6 (Six) Hours As Needed for Nausea or Vomiting., Disp: 60 tablet, Rfl: 1    sodium chloride 1 g tablet, TAKE 1 TABLET BY MOUTH DAILY, Disp: 90 tablet, Rfl: 1    spironolactone (ALDACTONE) 25 MG tablet, TAKE 1 TABLET BY MOUTH DAILY, Disp: 90 tablet, Rfl: 1    sulfamethoxazole-trimethoprim (BACTRIM DS,SEPTRA DS) 800-160 MG per tablet, Take 1 tablet by mouth 3 (Three) Times a Week., Disp: 12 tablet, Rfl: 7  ALLERGIES:   Allergies   Allergen  Reactions    Factive [Gemifloxacin] Rash     PAST SURGICAL HISTORY: she has a past surgical history that includes Cholecystectomy (N/A, 10/12/2011); Colonoscopy (N/A, 2000); Venous Access Device (Port) (Left, 03/23/2022); Cyst Removal (Left, 10/12/2011); Colonoscopy w/ polypectomy (N/A, 01/24/2012); and ERCP (N/A, 10/21/2022).  PREVIOUS RADIOTHERAPY OR CHEMOTHERAPY: Yes  FAMILY HISTORY: her family history is not on file.  SOCIAL HISTORY: she  reports that she has never smoked. She has never used smokeless tobacco. She reports that she does not drink alcohol and does not use drugs.  PAIN AND PAIN MANAGEMENT:   Vitals:    05/13/24 1353   BP: (!) 87/67   Pulse: 68   SpO2: 93%   Weight: 53.9 kg (118 lb 12.8 oz)   PainSc: 0-No pain     NUTRITIONAL STATUS:  Otherwise no issues  KPS: 80:  Normal activity with effort; some signs or symptoms  ECOG: (2) Ambulatory and capable of self care, unable to carry out work activity, up and about > 50% or waking hours       PHQ-9 Total Score: 0     Review of Systems:   Review of Systems   Constitutional:         Patient has lost 10 to 15 pounds which is mainly related to ill fitting dentures.  Patient's appetite remains good.  Patient denies fever, chills or pain at this time.  She has not recovered her energy since beginning chemotherapy.   HENT:  Positive for dental problem.    Eyes: Negative.    Respiratory: Negative.     Cardiovascular: Negative.    Gastrointestinal: Negative.    Endocrine: Negative.    Genitourinary: Negative.    Musculoskeletal:         Patient has a history of multiple rib fractures following a fall in March 2024.  She also has a known compression fracture at T12.   Skin:         Patient presented with shingles In April 2023.  As result, she has been placed on acyclovir prophylactically   Hematological:         Patient does have a history of hyponatremia..  She also has a history of mild anemia   Psychiatric/Behavioral: Negative.            Objective      Vitals:  Vitals:    05/13/24 1353   BP: (!) 87/67   Pulse: 68   SpO2: 93%   Weight: 53.9 kg (118 lb 12.8 oz)   PainSc: 0-No pain         PHYSICAL EXAM:  Physical Exam  Constitutional:       Appearance: Normal appearance.   HENT:      Head: Normocephalic.      Comments: Patient has an upper dental plate which is somewhat ill fitting.  She is missing multiple teeth on the lower jaw.  The remainder of the oral cavity is completely free of lesion.  Patient has no evidence of herpetic infection.     Nose: Nose normal.      Mouth/Throat:      Mouth: Mucous membranes are moist.   Eyes:      Extraocular Movements: Extraocular movements intact.      Pupils: Pupils are equal, round, and reactive to light.   Cardiovascular:      Rate and Rhythm: Normal rate and regular rhythm.      Comments: Patient has a grade 3/6 systolic ejection murmur  Pulmonary:      Effort: Pulmonary effort is normal.      Breath sounds: Normal breath sounds.   Abdominal:      General: Abdomen is flat. Bowel sounds are normal.      Palpations: Abdomen is soft.   Musculoskeletal:         General: Normal range of motion.      Cervical back: Normal range of motion.   Skin:     General: Skin is warm.   Neurological:      General: No focal deficit present.      Mental Status: She is alert and oriented to person, place, and time.   Psychiatric:         Mood and Affect: Mood normal.         Behavior: Behavior normal.          PERTINENT IMAGING/PATHOLOGY/LABS (Medical Decision Making):     COORDINATION OF CARE: A copy of this note is sent to the referring provider.    PATHOLOGY (Reviewed):     IMAGING (Reviewed):   F-18 FDG PET SKULL BASE TO MID THIGH WITH PET CT FUSION.     HISTORY: Diffuse large B-cell lymphoma undergoing systemic therapy.  Subsequent treatment strategy.     TECHNIQUE: Radiation dose reduction techniques were utilized, including  automated exposure control and exposure modulation based on body size.   Blood glucose level at time of  injection was 98 mg/dL. 5.8 mCi of F-18  FDG were injected and PET was performed from skull base to mid thigh. CT  was obtained for localization and attenuation correction. Time at  injection 938. PET start time 1114.     Comparison: CT neck chest abdomen pelvis 2/8/2024 and 11/16/2023. FDG  PET/CT 12/11/2023..     FINDINGS:     Head/neck: Prior hypermetabolic lytic left mandibular mass.  Hypermetabolism and extraosseous soft tissue component has resolved.     Prior hypermetabolic left  space soft tissue nodule is now  subcentimeter without associated hypermetabolism.     Chest: Resolution of prior hypermetabolic anterior mediastinal lymph  node.     Resolution of prior hypermetabolic anterior pericardial thickening.     More extensive mild tubular hypermetabolism throughout the esophagus.     Abdomen/pelvis: Resolution of prior hypermetabolic right mid back  subcutaneous nodule.     Resolution of prior hypermetabolic adrenal masses. Right adrenal gland  has persistent mild hypermetabolism with max SUV of 3.2 (series 4/image  182).     Resolution of few prior hypermetabolic soft tissue nodules about the  left kidney.     Spleen remains normal in size with uptake near the liver.     Moderate to large volume of fluid density throughout the upper uterine  endometrial canal without associated hypermetabolism, new from prior FDG  PET/CT and increased from February CT abdomen/pelvis.     Bones: Mildly displaced right clavicular head fracture with mild  associated hypermetabolism is new from prior imaging. Nondisplaced  anterior lateral sixth seventh and eighth rib fractures with mild  associated hypermetabolism are also new from prior imaging. Linear  hypermetabolism subjacent to the left eighth rib has resolved. Prior  right-sided rib fractures are no longer hypermetabolic. New focal mild  hypermetabolism in the otherwise normal-appearing right upper back  musculature, likely muscle activation or  posttraumatic.           IMPRESSION:  1. Of the prior disease, mild hypermetabolism near that of liver remains  at the right adrenal gland. Deauville score: 3.  2. Moderate to large volume of fluid throughout the upper uterine  endometrial canal, new from December 2023 FDG PET/CT and increased from  February 2024 CT abdomen/pelvis. Finding is of indeterminate etiology  but needs further work-up with pelvic ultrasound and gynecologic  consultation. Deauville score: X.  3. New mildly displaced right clavicular head fracture and new  nondisplaced left anterolateral sixth through eighth rib fractures.  Deauville score: X.  4. More extensive mild tubular hypermetabolism throughout the esophagus  suggesting esophagitis. Deauville score: X.           Reference blood pool Max SUV: 1.8  Reference hepatic max SUV: 2.8  Deauville score: 3, X       PRIOR PET/CT 12/13/2023          This report was finalized on 4/23/2024 10:36 AM by Dr. Morales Elmore,  LABS (Reviewed):  Hematology WBC   Date Value Ref Range Status   05/10/2024 5.28 3.40 - 10.80 10*3/mm3 Final     RBC   Date Value Ref Range Status   05/10/2024 3.10 (L) 3.77 - 5.28 10*6/mm3 Final     Hemoglobin   Date Value Ref Range Status   05/10/2024 10.8 (L) 12.0 - 15.9 g/dL Final     Hematocrit   Date Value Ref Range Status   05/10/2024 30.3 (L) 34.0 - 46.6 % Final     Platelets   Date Value Ref Range Status   05/10/2024 168 140 - 450 10*3/mm3 Final      Chemistry   Lab Results   Component Value Date    GLUCOSE 96 05/03/2024    BUN 19 05/03/2024    CREATININE 1.09 (H) 05/03/2024    EGFRIFNONA 62 02/23/2022    BCR 17.4 05/03/2024    K 4.9 05/03/2024    CO2 25.7 05/03/2024    CALCIUM 9.2 05/03/2024    PROTENTOTREF 6.2 11/21/2023    ALBUMIN 3.5 05/03/2024    LABIL2 1.3 11/21/2023    AST 31 05/03/2024    ALT 19 05/03/2024       Assessment & Plan     ASSESSMENT :  Diagnoses and all orders for this visit:    1. Diffuse large B-cell lymphoma of lymph nodes of multiple regions  (Primary)  -     Ambulatory Referral to OP [facility] Nutrition Services    2.  Iron deficiency anemia-patient has had previous transfusions for hemoglobin less than 8    3.  Chronic hyponatremia-patient does have chronic ankle and leg swelling which is improved on Lasix.  Her sodium was 130 dated 5/3/2024.    4.  Chronic anticoagulation currently on Eliquis 5 mg twice daily    PLAN:   Ms. Car remains at high risk for recurrent disease especially in the region of the previous soft tissue mass adjacent to the left mandible.  This lesion measures some 53 x 36 mm with an SUV of 43.  For this reason, I will offer Ms. Car consolidative radiotherapy to this region.  My plans would be to deliver a total of 36 Gray over 28 equal fractions.  Side effects and complications associated with radiotherapy adjacent to the oral cavity are discussed in detail with Ms. Car and her brother who is in attendance.  These include, but are not limited to, oral mucositis and skin reaction, lethargy, decreased taste and potential dry mouth.  Based on the calculated dose to the major and minor salivary glands, we may offer Ms. Car pilocarpine during the course of treatment.      I spent 61 minutes caring for Michelle on this date of service. This time includes time spent by me in the following activities:preparing for the visit, reviewing tests, obtaining and/or reviewing a separately obtained history, performing a medically appropriate examination and/or evaluation , ordering medications, tests, or procedures, referring and communicating with other health care professionals , and independently interpreting results and communicating that information with the patient/family/caregiver       CC: Renate Galo MD Sasser, Robert L, MD Mark R. Jones, MD  5/13/2024  1:12 PM EDT

## 2024-05-14 ENCOUNTER — DOCUMENTATION (OUTPATIENT)
Dept: OTHER | Facility: HOSPITAL | Age: 84
End: 2024-05-14
Payer: MEDICARE

## 2024-05-14 ENCOUNTER — SPECIALTY PHARMACY (OUTPATIENT)
Dept: PHARMACY | Facility: HOSPITAL | Age: 84
End: 2024-05-14
Payer: MEDICARE

## 2024-05-14 NOTE — PROGRESS NOTES
"Oncology Social Work     Distress Screening Follow-up    Diagnosis: Diffuse large B-cell lymphoma.     Location of Distress Screening: Radiation Oncology    Distress Level: 2 (5/13/2024  1:43 PM)    Physical Concerns: Patient reported that she uses a walker when outside the house. She walks independently inside the home. No DME needed. Patient voiced that she struggles with not being able to do more physically. She was voiced that she wants to \"bounce back\" but feels really fatigued.     Fatigue: Y  Pain: Y  Sleep: Y  Loss or change of physical abilities: Y    Practical Problems: Patient's son gets her medication for her and will bring her meals. Patient cannot cook for herself anymore. She is sleeping well at night. She reported that she feels extremely fatigued.     Treatment decisions: Y  Taking care of myself: Y    Emotional Concerns: Patient worries about being a burden on her family. She reported that she tried to remain as independent as possible.     Changes in appearance: Y  Feelings of worthlessness or being a burden: Y    Family Concerns: Patient's sons are her neighbors and assist her frequently. Patient does have a daughter that is local as well.        Spiritual Concerns: Patient voiced that she is guided by her ana. She uses her ana and belief in God to guide her through her health struggles.         Interventions: OSW will mail patient OSW contact information.        Comments:    OSW reached out to the patient related to her distress score. Patient is under the care of Dr. Galo. She has Diffuse large B-cell lymphoma. Patient first voiced concern over her decreased strength. She stated that she is not depressed but frustrated she isn't getting better. Patient's family provides her transportation but patient did voice worried about it. OSW offered assistance with transportation options. Patient said she prefers family bring her to appointments and declined any assistance.  Patient " lives alone but her next door neighbors are her sons. Patient uses a walker when she is outside her home. No DME needed. Patient voiced that she sleeps well at night. Her son will bring her meals because she is unable to cook. Patient's son will also get her medications for her.   OSW assessed financial distress. Patient voiced she can afford her medication and medical bills. She did not voice any concern.     No additional follow up is needed.     Yancy Graf W

## 2024-05-14 NOTE — PROGRESS NOTES
Contacted [facility] to determine status of Revlimid.  Co-pay was ~$4000.  After inquiring about financial assistance, it was determined that [facility] no longer has it for generic Revlimid.  I am now looking into free drug.  Patient is wanting me to ask her son for help with this process.  I left him a VM on his phone asking him to call me back.  I need income information and signatures for the application.     Addendum: 5/15/24- Patient's son was able to call me back yesterday and give me the income information.  He told me that his mother was being seen on Monday 5/20/24 in radiation oncology and we could get a signature then.  I was able to verify this with the help of Ellen.  I will get the signature during the appointment with the radiation oncology nurse- Edith.  Dr Galo's nurse is aware of the delay.     Carol Gill  Specialty Pharmacy Technician

## 2024-05-20 ENCOUNTER — SPECIALTY PHARMACY (OUTPATIENT)
Dept: PHARMACY | Facility: HOSPITAL | Age: 84
End: 2024-05-20
Payer: MEDICARE

## 2024-05-20 ENCOUNTER — LAB (OUTPATIENT)
Dept: LAB | Facility: HOSPITAL | Age: 84
End: 2024-05-20
Payer: MEDICARE

## 2024-05-20 ENCOUNTER — HOSPITAL ENCOUNTER (OUTPATIENT)
Dept: RADIATION ONCOLOGY | Facility: HOSPITAL | Age: 84
Setting detail: RADIATION/ONCOLOGY SERIES
End: 2024-05-20
Payer: MEDICARE

## 2024-05-20 DIAGNOSIS — C83.38 DIFFUSE LARGE B-CELL LYMPHOMA OF LYMPH NODES OF MULTIPLE REGIONS: Primary | ICD-10-CM

## 2024-05-20 DIAGNOSIS — C83.38 DIFFUSE LARGE B-CELL LYMPHOMA OF LYMPH NODES OF MULTIPLE REGIONS: ICD-10-CM

## 2024-05-20 LAB
BASOPHILS # BLD AUTO: 0.02 10*3/MM3 (ref 0–0.2)
BASOPHILS NFR BLD AUTO: 1.1 % (ref 0–1.5)
DEPRECATED RDW RBC AUTO: 52.3 FL (ref 37–54)
EOSINOPHIL # BLD AUTO: 0 10*3/MM3 (ref 0–0.4)
EOSINOPHIL NFR BLD AUTO: 0 % (ref 0.3–6.2)
ERYTHROCYTE [DISTWIDTH] IN BLOOD BY AUTOMATED COUNT: 14.3 % (ref 12.3–15.4)
HCT VFR BLD AUTO: 26 % (ref 34–46.6)
HGB BLD-MCNC: 8.8 G/DL (ref 12–15.9)
IMM GRANULOCYTES # BLD AUTO: 0.21 10*3/MM3 (ref 0–0.05)
IMM GRANULOCYTES NFR BLD AUTO: 12 % (ref 0–0.5)
LYMPHOCYTES # BLD AUTO: 0.24 10*3/MM3 (ref 0.7–3.1)
LYMPHOCYTES NFR BLD AUTO: 13.7 % (ref 19.6–45.3)
MCH RBC QN AUTO: 34.1 PG (ref 26.6–33)
MCHC RBC AUTO-ENTMCNC: 33.8 G/DL (ref 31.5–35.7)
MCV RBC AUTO: 100.8 FL (ref 79–97)
MONOCYTES # BLD AUTO: 0.34 10*3/MM3 (ref 0.1–0.9)
MONOCYTES NFR BLD AUTO: 19.4 % (ref 5–12)
NEUTROPHILS NFR BLD AUTO: 0.94 10*3/MM3 (ref 1.7–7)
NEUTROPHILS NFR BLD AUTO: 53.8 % (ref 42.7–76)
NRBC BLD AUTO-RTO: 0 /100 WBC (ref 0–0.2)
PLATELET # BLD AUTO: 196 10*3/MM3 (ref 140–450)
PMV BLD AUTO: 9.8 FL (ref 6–12)
RBC # BLD AUTO: 2.58 10*6/MM3 (ref 3.77–5.28)
WBC NRBC COR # BLD AUTO: 1.75 10*3/MM3 (ref 3.4–10.8)

## 2024-05-20 PROCEDURE — 77334 RADIATION TREATMENT AID(S): CPT | Performed by: RADIOLOGY

## 2024-05-20 PROCEDURE — 36415 COLL VENOUS BLD VENIPUNCTURE: CPT

## 2024-05-20 PROCEDURE — 85025 COMPLETE CBC W/AUTO DIFF WBC: CPT

## 2024-05-20 NOTE — PROGRESS NOTES
Free drug application for Revlimid sent to [facility].  Waiting on response.     Addendum: 5/20/24- Received message from [facility]- need to resend the doctor's signature.  Left a new page with Ellen LEMUS.  Will re-send in when receive the new page.     Addendum: 5/21/24- New signature page faxed to [facility].  Waiting on response.      Carol Gill  Specialty Pharmacy Technician

## 2024-05-21 ENCOUNTER — TELEPHONE (OUTPATIENT)
Dept: RADIATION ONCOLOGY | Facility: HOSPITAL | Age: 84
End: 2024-05-21
Payer: MEDICARE

## 2024-05-24 ENCOUNTER — INFUSION (OUTPATIENT)
Dept: ONCOLOGY | Facility: HOSPITAL | Age: 84
End: 2024-05-24
Payer: MEDICARE

## 2024-05-24 ENCOUNTER — SPECIALTY PHARMACY (OUTPATIENT)
Dept: PHARMACY | Facility: HOSPITAL | Age: 84
End: 2024-05-24
Payer: MEDICARE

## 2024-05-24 ENCOUNTER — LAB (OUTPATIENT)
Dept: LAB | Facility: HOSPITAL | Age: 84
End: 2024-05-24
Payer: MEDICARE

## 2024-05-24 ENCOUNTER — TELEPHONE (OUTPATIENT)
Dept: CARDIOLOGY | Facility: CLINIC | Age: 84
End: 2024-05-24
Payer: MEDICARE

## 2024-05-24 ENCOUNTER — OFFICE VISIT (OUTPATIENT)
Dept: ONCOLOGY | Facility: CLINIC | Age: 84
End: 2024-05-24
Payer: MEDICARE

## 2024-05-24 ENCOUNTER — HOSPITAL ENCOUNTER (INPATIENT)
Facility: HOSPITAL | Age: 84
LOS: 18 days | Discharge: NURSING, REHAB OR LONG-TERM CARE | DRG: 808 | End: 2024-06-11
Attending: INTERNAL MEDICINE | Admitting: INTERNAL MEDICINE
Payer: MEDICARE

## 2024-05-24 VITALS
WEIGHT: 114.9 LBS | DIASTOLIC BLOOD PRESSURE: 51 MMHG | SYSTOLIC BLOOD PRESSURE: 90 MMHG | RESPIRATION RATE: 18 BRPM | HEIGHT: 60 IN | OXYGEN SATURATION: 100 % | TEMPERATURE: 97.9 F | BODY MASS INDEX: 22.56 KG/M2 | HEART RATE: 73 BPM

## 2024-05-24 DIAGNOSIS — C83.38 DIFFUSE LARGE B-CELL LYMPHOMA OF LYMPH NODES OF MULTIPLE REGIONS: ICD-10-CM

## 2024-05-24 DIAGNOSIS — D50.9 IRON DEFICIENCY ANEMIA, UNSPECIFIED IRON DEFICIENCY ANEMIA TYPE: ICD-10-CM

## 2024-05-24 DIAGNOSIS — C83.38 DIFFUSE LARGE B-CELL LYMPHOMA OF LYMPH NODES OF MULTIPLE REGIONS: Primary | ICD-10-CM

## 2024-05-24 DIAGNOSIS — Z79.01 CHRONIC ANTICOAGULATION: ICD-10-CM

## 2024-05-24 DIAGNOSIS — E87.1 HYPONATREMIA: ICD-10-CM

## 2024-05-24 PROBLEM — D64.9 ANEMIA: Status: ACTIVE | Noted: 2024-05-24

## 2024-05-24 PROBLEM — D72.819 LEUKOPENIA: Status: ACTIVE | Noted: 2024-05-24

## 2024-05-24 PROBLEM — R63.4 WEIGHT LOSS: Status: ACTIVE | Noted: 2024-05-24

## 2024-05-24 PROBLEM — E27.40 ADRENAL INSUFFICIENCY: Status: ACTIVE | Noted: 2024-05-24

## 2024-05-24 PROBLEM — E87.5 HYPERKALEMIA: Status: ACTIVE | Noted: 2024-05-24

## 2024-05-24 LAB
ALBUMIN SERPL-MCNC: 3.1 G/DL (ref 3.5–5.2)
ALBUMIN/GLOB SERPL: 1.2 G/DL
ALP SERPL-CCNC: 72 U/L (ref 39–117)
ALT SERPL W P-5'-P-CCNC: 42 U/L (ref 1–33)
ANION GAP SERPL CALCULATED.3IONS-SCNC: 13.6 MMOL/L (ref 5–15)
AST SERPL-CCNC: 51 U/L (ref 1–32)
BASOPHILS # BLD AUTO: 0.01 10*3/MM3 (ref 0–0.2)
BASOPHILS NFR BLD AUTO: 0.8 % (ref 0–1.5)
BILIRUB SERPL-MCNC: 0.3 MG/DL (ref 0–1.2)
BUN SERPL-MCNC: 26 MG/DL (ref 8–23)
BUN/CREAT SERPL: 27.4 (ref 7–25)
CALCIUM SPEC-SCNC: 8.7 MG/DL (ref 8.6–10.5)
CHLORIDE SERPL-SCNC: 90 MMOL/L (ref 98–107)
CO2 SERPL-SCNC: 22.4 MMOL/L (ref 22–29)
CREAT SERPL-MCNC: 0.95 MG/DL (ref 0.57–1)
D-LACTATE SERPL-SCNC: 2.2 MMOL/L (ref 0.5–2)
DEPRECATED RDW RBC AUTO: 52.2 FL (ref 37–54)
EGFRCR SERPLBLD CKD-EPI 2021: 59.9 ML/MIN/1.73
EOSINOPHIL # BLD AUTO: 0.01 10*3/MM3 (ref 0–0.4)
EOSINOPHIL NFR BLD AUTO: 0.8 % (ref 0.3–6.2)
ERYTHROCYTE [DISTWIDTH] IN BLOOD BY AUTOMATED COUNT: 14.3 % (ref 12.3–15.4)
FERRITIN SERPL-MCNC: 1953 NG/ML (ref 13–150)
GLOBULIN UR ELPH-MCNC: 2.5 GM/DL
GLUCOSE SERPL-MCNC: 103 MG/DL (ref 65–99)
HCT VFR BLD AUTO: 27.9 % (ref 34–46.6)
HGB BLD-MCNC: 9.3 G/DL (ref 12–15.9)
IMM GRANULOCYTES # BLD AUTO: 0.09 10*3/MM3 (ref 0–0.05)
IMM GRANULOCYTES NFR BLD AUTO: 7 % (ref 0–0.5)
IRON 24H UR-MRATE: 27 MCG/DL (ref 37–145)
IRON SATN MFR SERPL: 11 % (ref 20–50)
LDH SERPL-CCNC: 245 U/L (ref 135–214)
LYMPHOCYTES # BLD AUTO: 0.31 10*3/MM3 (ref 0.7–3.1)
LYMPHOCYTES NFR BLD AUTO: 24 % (ref 19.6–45.3)
MCH RBC QN AUTO: 33.6 PG (ref 26.6–33)
MCHC RBC AUTO-ENTMCNC: 33.3 G/DL (ref 31.5–35.7)
MCV RBC AUTO: 100.7 FL (ref 79–97)
MONOCYTES # BLD AUTO: 0.19 10*3/MM3 (ref 0.1–0.9)
MONOCYTES NFR BLD AUTO: 14.7 % (ref 5–12)
MRSA DNA SPEC QL NAA+PROBE: NORMAL
NEUTROPHILS NFR BLD AUTO: 0.68 10*3/MM3 (ref 1.7–7)
NEUTROPHILS NFR BLD AUTO: 52.7 % (ref 42.7–76)
NRBC BLD AUTO-RTO: 0 /100 WBC (ref 0–0.2)
PLATELET # BLD AUTO: 240 10*3/MM3 (ref 140–450)
PMV BLD AUTO: 9.8 FL (ref 6–12)
POTASSIUM SERPL-SCNC: 5.6 MMOL/L (ref 3.5–5.2)
PROT SERPL-MCNC: 5.6 G/DL (ref 6–8.5)
RBC # BLD AUTO: 2.77 10*6/MM3 (ref 3.77–5.28)
SODIUM SERPL-SCNC: 126 MMOL/L (ref 136–145)
TIBC SERPL-MCNC: 237 MCG/DL (ref 298–536)
TRANSFERRIN SERPL-MCNC: 159 MG/DL (ref 200–360)
URATE SERPL-MCNC: 3.5 MG/DL (ref 2.4–5.7)
WBC NRBC COR # BLD AUTO: 1.29 10*3/MM3 (ref 3.4–10.8)

## 2024-05-24 PROCEDURE — 83615 LACTATE (LD) (LDH) ENZYME: CPT

## 2024-05-24 PROCEDURE — 83540 ASSAY OF IRON: CPT | Performed by: NURSE PRACTITIONER

## 2024-05-24 PROCEDURE — 82728 ASSAY OF FERRITIN: CPT

## 2024-05-24 PROCEDURE — 25010000002 CEFEPIME PER 500 MG: Performed by: INTERNAL MEDICINE

## 2024-05-24 PROCEDURE — 25810000003 SODIUM CHLORIDE 0.9 % SOLUTION: Performed by: NURSE PRACTITIONER

## 2024-05-24 PROCEDURE — 87641 MR-STAPH DNA AMP PROBE: CPT | Performed by: INTERNAL MEDICINE

## 2024-05-24 PROCEDURE — 84466 ASSAY OF TRANSFERRIN: CPT | Performed by: NURSE PRACTITIONER

## 2024-05-24 PROCEDURE — 80053 COMPREHEN METABOLIC PANEL: CPT

## 2024-05-24 PROCEDURE — 85025 COMPLETE CBC W/AUTO DIFF WBC: CPT

## 2024-05-24 PROCEDURE — 87040 BLOOD CULTURE FOR BACTERIA: CPT | Performed by: INTERNAL MEDICINE

## 2024-05-24 PROCEDURE — 83605 ASSAY OF LACTIC ACID: CPT | Performed by: INTERNAL MEDICINE

## 2024-05-24 PROCEDURE — 36415 COLL VENOUS BLD VENIPUNCTURE: CPT

## 2024-05-24 PROCEDURE — 84550 ASSAY OF BLOOD/URIC ACID: CPT

## 2024-05-24 RX ORDER — AMOXICILLIN 250 MG
2 CAPSULE ORAL 2 TIMES DAILY PRN
Status: DISCONTINUED | OUTPATIENT
Start: 2024-05-24 | End: 2024-06-11 | Stop reason: HOSPADM

## 2024-05-24 RX ORDER — FERROUS SULFATE 325(65) MG
325 TABLET ORAL EVERY OTHER DAY
Status: DISCONTINUED | OUTPATIENT
Start: 2024-05-24 | End: 2024-06-11 | Stop reason: HOSPADM

## 2024-05-24 RX ORDER — HYDROCORTISONE 25 MG/G
CREAM TOPICAL DAILY
Status: DISCONTINUED | OUTPATIENT
Start: 2024-05-24 | End: 2024-06-11 | Stop reason: HOSPADM

## 2024-05-24 RX ORDER — ACETAMINOPHEN 650 MG/1
650 SUPPOSITORY RECTAL EVERY 4 HOURS PRN
Status: DISCONTINUED | OUTPATIENT
Start: 2024-05-24 | End: 2024-06-11 | Stop reason: HOSPADM

## 2024-05-24 RX ORDER — SODIUM CHLORIDE 0.9 % (FLUSH) 0.9 %
10 SYRINGE (ML) INJECTION EVERY 12 HOURS SCHEDULED
Status: DISCONTINUED | OUTPATIENT
Start: 2024-05-24 | End: 2024-06-11 | Stop reason: HOSPADM

## 2024-05-24 RX ORDER — ONDANSETRON 2 MG/ML
4 INJECTION INTRAMUSCULAR; INTRAVENOUS EVERY 6 HOURS PRN
Status: DISCONTINUED | OUTPATIENT
Start: 2024-05-24 | End: 2024-06-11 | Stop reason: HOSPADM

## 2024-05-24 RX ORDER — ACETAMINOPHEN 325 MG/1
650 TABLET ORAL EVERY 4 HOURS PRN
Status: DISCONTINUED | OUTPATIENT
Start: 2024-05-24 | End: 2024-06-11 | Stop reason: HOSPADM

## 2024-05-24 RX ORDER — SODIUM CHLORIDE 0.9 % (FLUSH) 0.9 %
10 SYRINGE (ML) INJECTION AS NEEDED
Status: DISCONTINUED | OUTPATIENT
Start: 2024-05-24 | End: 2024-06-11 | Stop reason: HOSPADM

## 2024-05-24 RX ORDER — BISACODYL 5 MG/1
5 TABLET, DELAYED RELEASE ORAL DAILY PRN
Status: DISCONTINUED | OUTPATIENT
Start: 2024-05-24 | End: 2024-06-11 | Stop reason: HOSPADM

## 2024-05-24 RX ORDER — CETIRIZINE HYDROCHLORIDE 10 MG/1
10 TABLET ORAL DAILY
Status: DISCONTINUED | OUTPATIENT
Start: 2024-05-24 | End: 2024-06-11 | Stop reason: HOSPADM

## 2024-05-24 RX ORDER — ACETAMINOPHEN 160 MG/5ML
650 SOLUTION ORAL EVERY 4 HOURS PRN
Status: DISCONTINUED | OUTPATIENT
Start: 2024-05-24 | End: 2024-06-11 | Stop reason: HOSPADM

## 2024-05-24 RX ORDER — MULTIPLE VITAMINS W/ MINERALS TAB 9MG-400MCG
1 TAB ORAL DAILY
Status: DISCONTINUED | OUTPATIENT
Start: 2024-05-24 | End: 2024-06-11 | Stop reason: HOSPADM

## 2024-05-24 RX ORDER — AMIODARONE HYDROCHLORIDE 200 MG/1
200 TABLET ORAL
Status: DISCONTINUED | OUTPATIENT
Start: 2024-05-24 | End: 2024-06-11 | Stop reason: HOSPADM

## 2024-05-24 RX ORDER — ALBUTEROL SULFATE 2.5 MG/3ML
2.5 SOLUTION RESPIRATORY (INHALATION) EVERY 4 HOURS PRN
Status: DISCONTINUED | OUTPATIENT
Start: 2024-05-24 | End: 2024-06-11 | Stop reason: HOSPADM

## 2024-05-24 RX ORDER — SODIUM CHLORIDE 9 MG/ML
40 INJECTION, SOLUTION INTRAVENOUS AS NEEDED
Status: DISCONTINUED | OUTPATIENT
Start: 2024-05-24 | End: 2024-06-11 | Stop reason: HOSPADM

## 2024-05-24 RX ORDER — CARVEDILOL 3.12 MG/1
3.12 TABLET ORAL 2 TIMES DAILY WITH MEALS
Status: DISCONTINUED | OUTPATIENT
Start: 2024-05-24 | End: 2024-05-25

## 2024-05-24 RX ORDER — NITROGLYCERIN 0.4 MG/1
0.4 TABLET SUBLINGUAL
Status: DISCONTINUED | OUTPATIENT
Start: 2024-05-24 | End: 2024-06-11 | Stop reason: HOSPADM

## 2024-05-24 RX ORDER — POLYETHYLENE GLYCOL 3350 17 G/17G
17 POWDER, FOR SOLUTION ORAL DAILY PRN
Status: DISCONTINUED | OUTPATIENT
Start: 2024-05-24 | End: 2024-06-11 | Stop reason: HOSPADM

## 2024-05-24 RX ORDER — BISACODYL 10 MG
10 SUPPOSITORY, RECTAL RECTAL DAILY PRN
Status: DISCONTINUED | OUTPATIENT
Start: 2024-05-24 | End: 2024-06-11 | Stop reason: HOSPADM

## 2024-05-24 RX ORDER — SODIUM CHLORIDE 1 G/1
1 TABLET ORAL DAILY
Status: DISCONTINUED | OUTPATIENT
Start: 2024-05-24 | End: 2024-05-29

## 2024-05-24 RX ORDER — SODIUM CHLORIDE 9 MG/ML
75 INJECTION, SOLUTION INTRAVENOUS CONTINUOUS
Status: DISCONTINUED | OUTPATIENT
Start: 2024-05-25 | End: 2024-05-25

## 2024-05-24 RX ADMIN — APIXABAN 2.5 MG: 2.5 TABLET, FILM COATED ORAL at 23:38

## 2024-05-24 RX ADMIN — SODIUM CHLORIDE 500 ML: 9 INJECTION, SOLUTION INTRAVENOUS at 22:12

## 2024-05-24 RX ADMIN — Medication 10 ML: at 21:32

## 2024-05-24 RX ADMIN — ACETAMINOPHEN 325MG 325 MG: 325 TABLET ORAL at 23:36

## 2024-05-24 RX ADMIN — CEFEPIME 2000 MG: 2 INJECTION, POWDER, FOR SOLUTION INTRAVENOUS at 21:35

## 2024-05-24 RX ADMIN — SODIUM ZIRCONIUM CYCLOSILICATE 5 G: 5 POWDER, FOR SUSPENSION ORAL at 21:33

## 2024-05-24 NOTE — H&P
Internal medicine history and physical  INTERNAL MEDICINE   [facility]       Patient Identification:  Name: Michelle Car  Age: 83 y.o.  Sex: female  :  1940  MRN: [id number]                   Primary Care Physician: Olayinka Pimentel MD                               Date of admission:2024    Chief Complaint: Sent from oncology office for evaluation of worsening neutropenia and progressive decline and worsening sense of wellbeing.    History of Present Illness:   Patient is 83-year-old female who was treated at oncology office for diffuse large B-cell lymphoma and received her last chemotherapy in 2024.  According to her she used to get sick after chemotherapy and usually used to bounce back but this time she never improved and continues to feel poorly with continued sense of ill health chills fatigue decreased appetite and starting to lose weight.  With these complaints patient presented to the oncology office today for routine follow-up and mentioned the symptoms resulting in routine blood work at the oncology office which showed evidence of hyponatremia hyperkalemia and worsening white blood cell count and hemoglobin.  His numbers are worse compared to 4 days ago.  Patient has prior history of choledocholithiasis which required ERCP and sphincterotomy.  Patient also takes oral Bactrim for chemoprophylaxis as well as acyclovir for prevention of shingles which she had in .  She was also noted to be hypertensive with the weakness at the office.  Patient herself denies any specific localizing symptoms such as burning in urination frequency urgency cough or shortness of breath.  Patient is being admitted for further care.      Past Medical History:  Past Medical History:   Diagnosis Date    Abdominal aortic atherosclerosis     Adrenal nodule 2022    Allergic rhinitis     Anemia due to chemotherapy     Bacteremia due to Escherichia coli 2022    ADMITTED TO [facility]     Bug bite 10/24/2015    WITH CELLULITIS, LEFT LEG    CAD (coronary artery disease)     Calculus of bile duct without cholangitis or cholecystitis 09/2022    Chemotherapy induced neutropenia     Chronic anticoagulation     Chronic diastolic (congestive) heart failure     Colon polyps     CRI (chronic renal insufficiency), stage 2 (mild) 2021    Diffuse large B cell lymphoma 02/2022    MULTIPLE LYMPH NODE INVOLVEMENT, FOLLOWED BY DR. JEN PATTERSON    Drug induced constipation     Hearing loss     Hemorrhoids     History of blood transfusion 04/2022    History of chemotherapy 2022    FOLLOWED BY DR. JEN PATTERSON    Hypercalcemia 2016    resolved as of 2019    Hypercholesterolemia     Hyperkalemia 03/2022    Hypertension     Hyponatremia 02/17/2022    Leg swelling 10/2021    Mixed hyperlipidemia     Neutropenic fever 05/2022    Nonrheumatic mitral valve regurgitation 03/2022    PAF (paroxysmal atrial fibrillation)     FOLLOWED BY DR. YOSI LANGLEY    Pancytopenia     Pleural effusion, bilateral 06/2022    Pulmonary nodule     Seasonal allergies     Thrombocytopenia 08/2022    Venous insufficiency     Vitamin D deficiency      Past Surgical History:  Past Surgical History:   Procedure Laterality Date    CHOLECYSTECTOMY N/A 10/12/2011    LAPAROSCOPIC, DR. CHANCE KLINE AT [facility]    COLONOSCOPY N/A 2000    1 or 2 polyps, otherwise normal per patient    COLONOSCOPY W/ POLYPECTOMY N/A 01/24/2012    3 MM TUBULAR ADENOMA POLYP IN CECUM, DIVERTICULOSIS IN RECTO-SIGMOID, RESCOPE IN 3 YRS, DR. CHANCE KLINE AT [facility]    CYST REMOVAL Left 10/12/2011    LEFT SCALP, PATH: BENIGN ADNEXAL NEOPLASM FAVORING ECCRINE SPIRADENOMA, DR. CHANCE KLINE AT [facility]    ERCP N/A 10/21/2022    Procedure: ENDOSCOPIC RETROGRADE CHOLANGIOPANCREATOGRAPHY with sphincterotomy and balloon sweep;  Surgeon: Peyman Ortega MD;  Location: [facility] ENDOSCOPY;  Service: Gastroenterology;  Laterality: N/A;  PRE: Common Duct Stones  POST: Common Duct Stones    VENOUS ACCESS  DEVICE (PORT) INSERTION Left 03/23/2022    Procedure: INSERTION VENOUS ACCESS DEVICE;  Surgeon: Alin Delgado MD;  Location: [facility] OR [facility];  Service: General;  Laterality: Left;      Home Meds:  Medications Prior to Admission   Medication Sig Dispense Refill Last Dose    acetaminophen (TYLENOL) 500 MG tablet Take 1 tablet by mouth Every 6 (Six) Hours As Needed for Mild Pain.       acyclovir (Zovirax) 400 MG tablet Take 1 tablet by mouth 2 (Two) Times a Day. 60 tablet 7     amiodarone (PACERONE) 200 MG tablet Take 1 tablet by mouth Daily. 90 tablet 2     Calcium Carbonate (CALTRATE 600 PO) Take 1 tablet by mouth Daily.       carvedilol (COREG) 3.125 MG tablet TAKE 1 TABLET BY MOUTH TWICE DAILY WITH MEALS 180 tablet 1     Eliquis 5 MG tablet tablet TAKE 1 TABLET BY MOUTH EVERY 12 HOURS FOR ATRIAL FIBRILLATION 180 tablet 1     ferrous sulfate (FeroSul) 325 (65 FE) MG tablet Take 1 tablet by mouth Every Other Day. 36 tablet 2     furosemide (LASIX) 40 MG tablet Take 1 tablet by mouth Daily As Needed (Leg swelling). 30 tablet 0     guaiFENesin (MUCINEX) 600 MG 12 hr tablet Take 2 tablets by mouth 2 (Two) Times a Day.       Hydrocortisone, Perianal, (Anusol-HC) 2.5 % rectal cream Apply to hemorrhoids 3 times daily for 7 days during hemorrhoid flare. Include applicator. 30 g 1     lenalidomide (REVLIMID) 10 MG capsule Take 1 capsule by mouth Daily for 21 days. Take daily on Days 1-21, and off 7 days. Do not crush, break or chew. 21 capsule 0     loratadine (Claritin) 10 MG tablet Take 1 tablet by mouth Daily.       Multiple Vitamins-Minerals (CENTRUM SILVER) tablet Take 1 tablet by mouth Daily.       polyethylene glycol (MIRALAX) 17 GM/SCOOP powder Take 17 g by mouth As Needed.       pravastatin (PRAVACHOL) 40 MG tablet TAKE 1 TABLET BY MOUTH EVERY NIGHT AT BEDTIME 90 tablet 1     prochlorperazine (COMPAZINE) 5 MG tablet Take 1-2 tablets by mouth Every 6 (Six) Hours As Needed for Nausea or Vomiting. 60 tablet 1      sodium chloride 1 g tablet TAKE 1 TABLET BY MOUTH DAILY 90 tablet 1     spironolactone (ALDACTONE) 25 MG tablet TAKE 1 TABLET BY MOUTH DAILY 90 tablet 1     sulfamethoxazole-trimethoprim (BACTRIM DS,SEPTRA DS) 800-160 MG per tablet Take 1 tablet by mouth 3 (Three) Times a Week. 12 tablet 7      Current Meds:   No current facility-administered medications for this encounter.  Allergies:  Allergies   Allergen Reactions    Factive [Gemifloxacin] Rash     Social History:   Social History     Tobacco Use    Smoking status: Never    Smokeless tobacco: Never   Substance Use Topics    Alcohol use: No      Family History:  Family History   Problem Relation Age of Onset    Malig Hyperthermia Neg Hx           Review of Systems  See history of present illness and past medical history.    As described in history of presenting illness.    Vitals:   /63 (BP Location: Left arm, Patient Position: Lying)   Pulse 67   Temp 97.7 °F (36.5 °C) (Oral)   Resp 16   SpO2 100%   I/O: No intake or output data in the 24 hours ending 05/24/24 1705  Exam:  Patient is examined using the personal protective equipment as per guidelines from infection control for this particular patient as enacted.  Hand washing was performed before and after patient interaction.  General Appearance:  Elderly ill-appearing female who does not appear to be in any acute distress   Head:    Normocephalic, without obvious abnormality, atraumatic   Eyes:  Pale conjunctiva   Ears:    Normal external ear canals, both ears   Nose:   Nares normal, septum midline, mucosa normal, no drainage    or sinus tenderness   Throat:   Lips, tongue, gums normal; oral mucosa pink and moist   Neck: Supple and submandibular and cervical adenopathy on the right side.   Back:     Symmetric, no curvature, ROM normal, no CVA tenderness   Lungs:     Clear to auscultation bilaterally, respirations unlabored   Chest Wall:    No tenderness or deformity    Heart:  S1-S2 regular   Abdomen:      Soft, non-tender, bowel sounds active all four quadrants,     no masses, no hepatomegaly, no splenomegaly   Extremities: Chronic lower extremity edema noted   Pulses:   Pulses palpable in all extremities; symmetric all extremities   Skin: No rash noted but has bruising and ecchymotic changes   Neurologic: Alert and oriented and grossly nonfocal       Data Review:      I reviewed the patient's new clinical results.  Results from last 7 days   Lab Units 05/24/24  1150 05/20/24  1433   WBC 10*3/mm3 1.29* 1.75*   HEMOGLOBIN g/dL 9.3* 8.8*   PLATELETS 10*3/mm3 240 196     Results from last 7 days   Lab Units 05/24/24  1150   SODIUM mmol/L 126*   POTASSIUM mmol/L 5.6*   CHLORIDE mmol/L 90*   CO2 mmol/L 22.4   BUN mg/dL 26*   CREATININE mg/dL 0.95   CALCIUM mg/dL 8.7   GLUCOSE mg/dL 103*     No radiology results for the last 30 days.  Microbiology Results (last 10 days)       Procedure Component Value - Date/Time    MRSA Screen, PCR (Inpatient) - Swab, Nares [[id number]]  (Normal) Collected: 05/24/24 1736    Lab Status: Final result Specimen: Swab from Nares Updated: 05/24/24 1942     MRSA PCR No MRSA Detected    Narrative:      The negative predictive value of this diagnostic test is high and should only be used to consider de-escalating anti-MRSA therapy. A positive result may indicate colonization with MRSA and must be correlated clinically.          Brief Urine Lab Results       None              Assessment:  Active Hospital Problems    Diagnosis  POA    Leukopenia [D72.819]  Unknown    Anemia [D64.9]  Unknown    Hyperkalemia [E87.5]  Unknown    Adrenal insufficiency [E27.40]  Unknown    Weight loss [R63.4]  Unknown    Chronic diastolic CHF (congestive heart failure) [I50.32]  Yes    Paroxysmal atrial fibrillation with rapid ventricular response [I48.0]  Yes    Hyponatremia [E87.1]  Yes    Diffuse large B-cell lymphoma of lymph nodes of multiple regions [C83.38]  Yes    CRI (chronic renal insufficiency), stage 2  (mild) [N18.2]  Yes       Medical decision making/care plan: See admitting orders  Progressive leukopenia and anemia in the setting of underlying diffuse large cell B-cell lymphoma and latest chemotherapy in April with evidence of resolution of disease based on the PET scan with associated signs of ill health and weight loss-etiology unclear and it could very well be combination of factors contributing to it such as:   - primary bone marrow disorder due to chemotherapy and underlying lymphoma and   - transitioning her into myelofibrosis versus   - effect of medication such as Bactrim etc.  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  Hold her Bactrim.  Hyponatremia and hypokalemia with hypertension and elevated BUN to creatinine ratio-multifactorial including ongoing use of spironolactone and other diuretics along with ongoing use of Bactrim.  Plan is to provide her with hydration, hold her Bactrim and spironolactone, nephrology consultation, kelma and repeat BMP.  Patient is at risk of possible adrenal insufficiency given the associated hypertension though it could very well be due to volume depletion from ongoing diuretic use.  May need workup for underlying adrenal insufficiency if her electrolytes and hypertension are not corrected with above interventions.  Continue her salt supplementation.  Provide her with fluid restriction in her diet.  Atrial fibrillation on chronic anticoagulation therapy with prior history of rapid ventricular response-continue with amiodarone and apixaban and carvedilol.  Hypertension currently hypotensive-continue holding diuretics and continue with IV fluids  Diffuse large cell B-cell lymphoma-status post chemotherapy-consult hematology oncology service.  Chronic diastolic  congestive heart failure currently appears to be volume depleted-hold her diuretics start her on IV fluids while awaiting nephrology consultation and closely monitoring her volume status to decide the need for IV fluids.  Abel Pelaez MD   5/24/2024  17:05 EDT    Parts of this note may be an electronic transcription/translation of spoken language to printed text using the Dragon dictation system.

## 2024-05-24 NOTE — TELEPHONE ENCOUNTER
LVM with patient going over appt details per order   karly wing 11/15  prostate cancer s/p XRT 2021  psa's low stable  viagra recently refilled with [facility] to [facility]  no current needs    have psa in nov/dec with phone call  defer appt to 1 year with next set of labs

## 2024-05-24 NOTE — PROGRESS NOTES
[employer] denied the free drug application for Revlimid.   They denied the application because the patient's insurance covers the generic. To do an appeal, we will need to send in a letter of medical necessity explaining the need for brand over the generic.  They also require proof of income. The proof of income is the 1040 tax form for 2023.  If patient doesn't file taxes, they will need a letter from the patient stating that they don't file taxes and the social security income letter.      Carol Gill  Specialty Pharmacy Technician

## 2024-05-24 NOTE — NURSING NOTE
Arrived to  at 1847 with brother.  She states her son Helder will be able to discuss home med list when he gets here later.  Oriented to room, call light in reach.  Transfer from [facility].

## 2024-05-24 NOTE — PROGRESS NOTES
I verified with [employer] that they do not accept applications for brand name Revlimid when the generic is covered if the patient can take the generic without any problem.  If the reason for the application is financial, it will be denied.  I verified with another pharmacy, [facility], that they provide financial assistance upon request for generic Revlimid.  I asked them to submit a test claim with patient's insurance to verify they take it.  Waiting on response.      Carol Gill  Specialty Pharmacy Technician

## 2024-05-24 NOTE — PROGRESS NOTES
Subjective     CHIEF COMPLAINT:      Chief Complaint   Patient presents with    Follow-up     DLBCL     HISTORY OF PRESENT ILLNESS:     Michelle Car is a 83 y.o. female patient who returns today for follow up on her lymphoma.  She completed chemotherapy April 2024.  Follow-up PET scan showed good disease improvement.  We did plan radiation to her left mandible.  This has not yet started.  Additionally, we planned maintenance Revlimid at a dose reduction though unfortunately she has not yet received this medication due to cost constraints.    Return to the office today, 5/24/2024 the patient reports she is unfortunately feeling poorly and continuing to decline.  She reports after chemotherapy she felt reasonably well though has continued to decline in regards to weakness and fatigue.  She has no appetite.  She has noted to have lost weight.  Despite weight loss, she has fairly significant volume overload with lower extremity swelling.  She denies lightheadedness or dizziness.  She has no appetite.  She has no new pain.  She has no nausea or diarrhea.    ROS:  Pertinent ROS is in the HPI.     Past medical, surgical, social and family history were reviewed.     MEDICATIONS:    Current Outpatient Medications:     acetaminophen (TYLENOL) 500 MG tablet, Take 1 tablet by mouth Every 6 (Six) Hours As Needed for Mild Pain., Disp: , Rfl:     acyclovir (Zovirax) 400 MG tablet, Take 1 tablet by mouth 2 (Two) Times a Day., Disp: 60 tablet, Rfl: 7    amiodarone (PACERONE) 200 MG tablet, Take 1 tablet by mouth Daily., Disp: 90 tablet, Rfl: 2    Calcium Carbonate (CALTRATE 600 PO), Take 1 tablet by mouth Daily., Disp: , Rfl:     carvedilol (COREG) 3.125 MG tablet, TAKE 1 TABLET BY MOUTH TWICE DAILY WITH MEALS, Disp: 180 tablet, Rfl: 1    Eliquis 5 MG tablet tablet, TAKE 1 TABLET BY MOUTH EVERY 12 HOURS FOR ATRIAL FIBRILLATION, Disp: 180 tablet, Rfl: 1    ferrous sulfate (FeroSul) 325 (65 FE) MG tablet, Take 1 tablet by mouth  "Every Other Day., Disp: 36 tablet, Rfl: 2    furosemide (LASIX) 40 MG tablet, Take 1 tablet by mouth Daily As Needed (Leg swelling)., Disp: 30 tablet, Rfl: 0    guaiFENesin (MUCINEX) 600 MG 12 hr tablet, Take 2 tablets by mouth 2 (Two) Times a Day., Disp: , Rfl:     Hydrocortisone, Perianal, (Anusol-HC) 2.5 % rectal cream, Apply to hemorrhoids 3 times daily for 7 days during hemorrhoid flare. Include applicator., Disp: 30 g, Rfl: 1    lenalidomide (REVLIMID) 10 MG capsule, Take 1 capsule by mouth Daily for 21 days. Take daily on Days 1-21, and off 7 days. Do not crush, break or chew., Disp: 21 capsule, Rfl: 0    loratadine (Claritin) 10 MG tablet, Take 1 tablet by mouth Daily., Disp: , Rfl:     Multiple Vitamins-Minerals (CENTRUM SILVER) tablet, Take 1 tablet by mouth Daily., Disp: , Rfl:     polyethylene glycol (MIRALAX) 17 GM/SCOOP powder, Take 17 g by mouth As Needed., Disp: , Rfl:     pravastatin (PRAVACHOL) 40 MG tablet, TAKE 1 TABLET BY MOUTH EVERY NIGHT AT BEDTIME, Disp: 90 tablet, Rfl: 1    prochlorperazine (COMPAZINE) 5 MG tablet, Take 1-2 tablets by mouth Every 6 (Six) Hours As Needed for Nausea or Vomiting., Disp: 60 tablet, Rfl: 1    sodium chloride 1 g tablet, TAKE 1 TABLET BY MOUTH DAILY, Disp: 90 tablet, Rfl: 1    spironolactone (ALDACTONE) 25 MG tablet, TAKE 1 TABLET BY MOUTH DAILY, Disp: 90 tablet, Rfl: 1    sulfamethoxazole-trimethoprim (BACTRIM DS,SEPTRA DS) 800-160 MG per tablet, Take 1 tablet by mouth 3 (Three) Times a Week., Disp: 12 tablet, Rfl: 7  Objective     VITAL SIGNS:     Vitals:    05/24/24 1200   BP: 90/51   Pulse: 73   Resp: 18   Temp: 97.9 °F (36.6 °C)   TempSrc: Oral   SpO2: 100%   Weight: 52.1 kg (114 lb 14.4 oz)   Height: 152.4 cm (60\")   PainSc: 0-No pain       Body mass index is 22.44 kg/m².     Wt Readings from Last 5 Encounters:   05/24/24 52.1 kg (114 lb 14.4 oz)   05/13/24 53.9 kg (118 lb 12.8 oz)   05/10/24 54.5 kg (120 lb 1.6 oz)   05/03/24 54.6 kg (120 lb 4.8 oz) "   04/02/24 62.1 kg (136 lb 12.8 oz)     PHYSICAL EXAMINATION:   GENERAL: Elderly, ill-appearing female, not in acute distress.   SKIN: No ecchymosis.  EYES: No jaundice. No Pallor.  CHEST: Normal respiratory effort.  Right cervical and submandibular adenopathy, no left mandibular adenopathy or mass  CVS: No edema.  ABDOMEN: Non-distended.  EXTREMITIES: Bilateral lower extremity edema    DIAGNOSTIC DATA:     Results from last 7 days   Lab Units 05/24/24  1150 05/20/24  1433   WBC 10*3/mm3 1.29* 1.75*   NEUTROS ABS 10*3/mm3 0.68* 0.94*   HEMOGLOBIN g/dL 9.3* 8.8*   HEMATOCRIT % 27.9* 26.0*   PLATELETS 10*3/mm3 240 196     Results from last 7 days   Lab Units 05/24/24  1150   SODIUM mmol/L 126*   POTASSIUM mmol/L 5.6*   CHLORIDE mmol/L 90*   CO2 mmol/L 22.4   BUN mg/dL 26*   CREATININE mg/dL 0.95   CALCIUM mg/dL 8.7   ALBUMIN g/dL 3.1*   BILIRUBIN mg/dL 0.3   ALK PHOS U/L 72   ALT (SGPT) U/L 42*   AST (SGOT) U/L 51*   GLUCOSE mg/dL 103*                    PET scan on 4/22/2024:  Head/neck: Prior hypermetabolic lytic left mandibular mass.  Hypermetabolism and extraosseous soft tissue component has resolved.     Prior hypermetabolic left  space soft tissue nodule is now  subcentimeter without associated hypermetabolism.     Chest: Resolution of prior hypermetabolic anterior mediastinal lymph  node.     Resolution of prior hypermetabolic anterior pericardial thickening.     More extensive mild tubular hypermetabolism throughout the esophagus.     Abdomen/pelvis: Resolution of prior hypermetabolic right mid back  subcutaneous nodule.     Resolution of prior hypermetabolic adrenal masses. Right adrenal gland  has persistent mild hypermetabolism with max SUV of 3.2 (series 4/image  182).     Resolution of few prior hypermetabolic soft tissue nodules about the  left kidney.     Spleen remains normal in size with uptake near the liver.     Moderate to large volume of fluid density throughout the upper  uterine  endometrial canal without associated hypermetabolism, new from prior FDG  PET/CT and increased from February CT abdomen/pelvis.     Bones: Mildly displaced right clavicular head fracture with mild  associated hypermetabolism is new from prior imaging. Nondisplaced  anterior lateral sixth seventh and eighth rib fractures with mild  associated hypermetabolism are also new from prior imaging. Linear  hypermetabolism subjacent to the left eighth rib has resolved. Prior  right-sided rib fractures are no longer hypermetabolic. New focal mild  hypermetabolism in the otherwise normal-appearing right upper back  musculature, likely muscle activation or posttraumatic.     IMPRESSION:  1. Of the prior disease, mild hypermetabolism near that of liver remains  at the right adrenal gland. Deauville score: 3.  2. Moderate to large volume of fluid throughout the upper uterine  endometrial canal, new from December 2023 FDG PET/CT and increased from  February 2024 CT abdomen/pelvis. Finding is of indeterminate etiology  but needs further work-up with pelvic ultrasound and gynecologic  consultation. Deauville score: X.  3. New mildly displaced right clavicular head fracture and new  nondisplaced left anterolateral sixth through eighth rib fractures.  Deauville score: X.  4. More extensive mild tubular hypermetabolism throughout the esophagus  suggesting esophagitis. Deauville score: X.     Assessment & Plan    *Diffuse large B-cell lymphoma.     Patient was found to have bilateral pleural effusions.    She underwent right thoracentesis on 2/21/2022 and the left thoracentesis on 2/22/2022.    Analysis of the fluid revealed involvement with diffuse large B-cell lymphoma.    FISH analysis for BCL6 rearrangement was positive.    FISH analysis for BCL 1, BCL-2 and MYC rearrangement was negative.    PET scan on 3/10/2022 revealed significant hypermetabolism in the left adrenal gland and the surrounding soft tissue with SUV up to  34.5. Hypermetabolism in the left pleural thickening with SUV of 7.1. there was less hypermetabolism in the right adrenal gland and in the right pleura.  She was considered to have stage III non-bulky disease.  This concurred with the pathology exam of the pleural fluid cytology that revealed diffuse large B-cell lymphoma.  R-IPI score of 3 associated with poor risk - associated with overall survival of 55%.   R-CHOP with Neulasta support was started on 3/28/2022.  Patient developed neutropenia and thrombocytopenia after cycle#1.  Patient developed neutropenic fever after cycle #2 and was hospitalized.  Her performance status decreased as a result.   Regimen was changed to mini-RCHOP starting cycle #3 on 5/16/2022.  CT scans on 6/23/2022 showed evidence of response.  There was resolution of the pericardial effusion and near complete resolution of the pleural effusions.   Patient received cycle #6 on 7/25/2022.  PET scan on 8/15/2022 revealed complete metabolic response.  Patient was found to have recurrence with development of a left mandibular mass in October/November 2023.  Biopsy of left mandible mass on 11/28/2023 showed recurrence of the lymphoma, diffuse large B cell, non-GCB phenotype.  Ki-67 was 90%.    It was positive for Bcl-6 but negative for Bcl-2 and MYC rearrangement.  PET scan on 12/11/2023 revealed multiple areas of involvement -prevascular space, left epicardial fat pad, pleural density posterior to the left fourth rib anterior aspect, lesion along the pericardium versus intramuscular with SUV of 9.8.  Hypermetabolic activity at both adrenal glands, uniformly increased in size and appeared hyperplastic.  There was a 2.7 x 1.1 cm soft tissue nodule lateral to the right erector spinae muscle posterior to the right posterior 11th rib with an SUV of 20.5 and there was a 1.3 cm nodule lateral to the posterior margin of the left psoas muscle with an SUV of 24.9.  Rituxan Polivy and Treanda with Treanda on  days 1 and 2 every 3 weeks for 6 cycles was started on 12/12/2023.  The jaw mass started to decrease in size after starting treatment.  CT scan on 2/8/2024 showed evidence of response to treatment.  Cycle #6 was given on 4/1/2024.  PET scan on 4/22/2024 revealed decrease in the hypermetabolism in the mediastinal and pericardial lymph nodes/lesions and in the lesions above the left kidney and adrenal gland.  The left mandibular lesion was no longer hypermetabolic.   She is at increased risk for relapse of the lymphoma.  Dr. Galo recommended Revlimid at a reduced dose of 10 mg daily for 21 days of a 28 day cycle (based on renal function).  Plans for radiation to the left mandible though this has not yet started  We have not yet obtained insurance approval for Revlimid, therefore this has not been initiated.  Certainly this must remain on hold in light of pancytopenia.     *Bilateral pleural effusions.   She is s/p right thoracentesis on 2/21/2022 and the effusion was found to be malignant attributed to the lymphoma.   CT scan on 5/1/2022 revealed decrease in the right pleural effusion and an increase in the left effusion.   The increase in the left effusion was suspected of being due to cardiac and fluid overload states (fluid appeared to be serous on CT scan).  CT on 6/23/2022 revealed near resolution of the pleural effusions.  PET scan on 8/15/2022 showed that the pleural effusions were trace and improved since June 2022.  CT chest on 11/7/2022 revealed minimal partially loculated right pleural effusion.  CT chest on 3/7/2023 showed no recurrence of the effusion.  CT on 8/24/2023 showed no evidence of recurrence of the pleural effusions.  CT on 11/16/2023 showed no evidence of recurrence of the pleural effusions.  CT scan on 2/8/2024 revealed no evidence of pleural effusion.   No evidence of effusion on PET scan on 4/22/2024.  Clinically she is not symptomatic of recurrent effusion without worsening shortness of  breath    *Iron deficiency anemia.  Patient also developed anemia secondary to lymphoma and secondary to chemotherapy.    Hemoglobin was 11.1 on 3/28/2022.  Hgb decreased to 7.9 on 4/25/2022 and she was transfused.   Hemoglobin decreased to 6.3 on 4/27/2022. She was transfused.  Hemoglobin improved to 8.8 on 5/16/2022.  Patient is on ferrous sulfate 325 mg 3 days a week.  11/21/2023: Hemoglobin 12.1.    Iron stores adequate with ferritin 141 and transferrin saturation 25%.  12/12/2023: Hemoglobin 12.2.  1/2/2024: Hemoglobin 12.5.  1/23/2024: Hemoglobin decreased to 11.4-secondary to anemia due to chemotherapy.  2/13/2024: Hemoglobin decreased to 10.8.  3/5/2024: Hemoglobin 11.1.  4/1/2024: Hemoglobin decreased to 9.6.  This is attributed to recent fall that resulted in bleeding from the left arm.  5/24/2024 hemoglobin is currently 9.3.  Nutritional labs are pending    *Hyponatremia.  Patient had problem with hyponatremia in the past.  Sodium is 127 on 3/14/2023.  She was advised to increase salt intake.  Sodium improved to 133 on repeat lab on 3/21/2023.  However, sodium decreased to 126 on 6/7/2023.  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  She was started on sodium chloride 1 g daily.  4/1/2024: Sodium decreased to 132.  Sodium is currently declined at 126 with fairly significant volume overload    *Chronic anticoagulation.  Patient is on Eliquis 5 mg twice daily.  No problem with bleeding.     *Prophylaxis.  Patient had shingles in April 2023.  She is at risk of developing shingles while on treatment.  She was placed on acyclovir 400 mg twice daily.  She is on Bactrim DS 3 days a week.  No recent infection.    *Low-density lesion in the uterus.    The radiologist recommended pelvic ultrasound.    The patient is asymptomatic.    PET scan on 4/22/2024 reported fluid in the lower abdomen.  She is asymptomatic.   We will reevaluate on her follow up scan.     *Multiple rib  fractures.  Patient had a fall in late March 2024.  She was found on PET scan on 4/22/2024 to have multiple fractures in the left ribs and a right clavicular fracture.     *13 x 9 mm nodular focus of consolidation in the right upper lobe.  Patient is reporting cough and congestion.  This is concerning for evolving pneumonia.  An underlying malignancy could not be excluded.  CT on 11/16/2023 showed no suspicious findings.  No hypermetabolism in this area.    *T12 compression deformity.  Patient is not complaining of pain in the area.  Bone density on 9/14/2023 showed osteoporosis.    We recommended starting treatment with Fosamax.    Due to the patient having problem with her teeth, Fosamax was not started.    *Choledocholithiasis with increase in the intra and extrahepatic biliary dilatation seen on CT on 6/23/2022.  Liver enzymes including bilirubin are normal today.   She is not having abdominal pain.  No redness no tenderness on exam of the right upper quadrant.  PET scan on 8/15/2022 showed persistence of the findings.  No symptoms or signs of infection.  Patient was seen by Dr. Ortega.  She underwent ERCP on 10/21/2022.  There was a calculus of the bile duct without cholecystitis and without obstruction.  She had sphincterotomy and balloon sweep.  Alkaline phosphatase improved afterwards.  CT on 3/7/2023 revealed stable findings.  CT scan on 8/24/2023 showed chronic biliary duct dilatation.    CT scan on 11/16/2023 showed no changes.    *Hyperkalemia  Potassium is currently increased to 5.6.  She is continue on spironolactone though with both elevated potassium and hyponatremia she will require evaluation by nephrology    *Leukopenia and neutropenia  Unclear etiology though this could be secondary to previous chemotherapy.  She is afebrile without signs or symptoms of infection  She will require prophylaxis with antibiotics during hospitalization    *Elevated liver function studies  Liver function studies  currently elevated with AST 51, ALT 42, previously normal    *Profound weakness  With hypotension, profound weakness, the patient is certainly a falls risk.  I would not expect a hemoglobin of 9.3 to cause the patient to feel so poorly.  Is unclear if this is secondary to previous chemotherapy though it has been over 7 weeks since she received treatment.  She would likely benefit from physical therapy    PLAN:    The patient has not been on Revlimid due to delays in insurance constraints, this will certainly need to remain on hold in light of neutropenia  Of note, we were planning radiation to the left mandible, this has not yet started.    I have spoken with Dr. Pelaez, [facility], we will proceed with direct admission to [facility] for further management of hyponatremia, hyperkalemia, neutropenia and elevated liver function studies.  This plan of care was also discussed reviewed with Dr. Galo who is in agreement    I spent 50 minutes caring for Michelle on this date of service. This time includes time spent by me in the following activities: preparing for the visit, reviewing tests, obtaining and/or reviewing a separately obtained history, performing a medically appropriate examination and/or evaluation, counseling and educating the patient/family/caregiver, ordering medications, tests, or procedures, referring and communicating with other health care professionals, documenting information in the medical record, and care coordination.     Radha Oneill, APRN  05/24/24

## 2024-05-25 ENCOUNTER — APPOINTMENT (OUTPATIENT)
Dept: GENERAL RADIOLOGY | Facility: HOSPITAL | Age: 84
DRG: 808 | End: 2024-05-25
Payer: MEDICARE

## 2024-05-25 PROBLEM — E43 SEVERE MALNUTRITION: Status: ACTIVE | Noted: 2024-05-25

## 2024-05-25 LAB
ALBUMIN SERPL-MCNC: 2.5 G/DL (ref 3.5–5.2)
ALBUMIN/GLOB SERPL: 1.2 G/DL
ALP SERPL-CCNC: 62 U/L (ref 39–117)
ALT SERPL W P-5'-P-CCNC: 32 U/L (ref 1–33)
ANION GAP SERPL CALCULATED.3IONS-SCNC: 8.8 MMOL/L (ref 5–15)
ANISOCYTOSIS BLD QL: ABNORMAL
AST SERPL-CCNC: 34 U/L (ref 1–32)
BASOPHILS # BLD MANUAL: 0.01 10*3/MM3 (ref 0–0.2)
BASOPHILS NFR BLD MANUAL: 1.2 % (ref 0–1.5)
BILIRUB SERPL-MCNC: 0.3 MG/DL (ref 0–1.2)
BUN SERPL-MCNC: 18 MG/DL (ref 8–23)
BUN/CREAT SERPL: 24 (ref 7–25)
CALCIUM SPEC-SCNC: 8.2 MG/DL (ref 8.6–10.5)
CHLORIDE SERPL-SCNC: 97 MMOL/L (ref 98–107)
CK SERPL-CCNC: 16 U/L (ref 20–180)
CO2 SERPL-SCNC: 22.2 MMOL/L (ref 22–29)
CREAT SERPL-MCNC: 0.75 MG/DL (ref 0.57–1)
D-LACTATE SERPL-SCNC: 0.7 MMOL/L (ref 0.5–2)
DEPRECATED RDW RBC AUTO: 52.2 FL (ref 37–54)
EGFRCR SERPLBLD CKD-EPI 2021: 79.6 ML/MIN/1.73
EOSINOPHIL # BLD MANUAL: 0.01 10*3/MM3 (ref 0–0.4)
EOSINOPHIL NFR BLD MANUAL: 1.2 % (ref 0.3–6.2)
ERYTHROCYTE [DISTWIDTH] IN BLOOD BY AUTOMATED COUNT: 14.3 % (ref 12.3–15.4)
GLOBULIN UR ELPH-MCNC: 2.1 GM/DL
GLUCOSE SERPL-MCNC: 90 MG/DL (ref 65–99)
HAPTOGLOB SERPL-MCNC: 237 MG/DL (ref 30–200)
HCT VFR BLD AUTO: 23.4 % (ref 34–46.6)
HGB BLD-MCNC: 7.8 G/DL (ref 12–15.9)
LDH SERPL-CCNC: 190 U/L (ref 135–214)
LYMPHOCYTES # BLD MANUAL: 0.14 10*3/MM3 (ref 0.7–3.1)
LYMPHOCYTES NFR BLD MANUAL: 19.5 % (ref 5–12)
MAGNESIUM SERPL-MCNC: 1.9 MG/DL (ref 1.6–2.4)
MCH RBC QN AUTO: 33.6 PG (ref 26.6–33)
MCHC RBC AUTO-ENTMCNC: 33.3 G/DL (ref 31.5–35.7)
MCV RBC AUTO: 100.9 FL (ref 79–97)
MONOCYTES # BLD: 0.17 10*3/MM3 (ref 0.1–0.9)
NEUTROPHILS # BLD AUTO: 0.53 10*3/MM3 (ref 1.7–7)
NEUTROPHILS NFR BLD MANUAL: 62.2 % (ref 42.7–76)
PLAT MORPH BLD: NORMAL
PLATELET # BLD AUTO: 170 10*3/MM3 (ref 140–450)
PMV BLD AUTO: 9.9 FL (ref 6–12)
POTASSIUM SERPL-SCNC: 4.2 MMOL/L (ref 3.5–5.2)
PROCALCITONIN SERPL-MCNC: 0.1 NG/ML (ref 0–0.25)
PROT SERPL-MCNC: 4.6 G/DL (ref 6–8.5)
RBC # BLD AUTO: 2.32 10*6/MM3 (ref 3.77–5.28)
RETICS # AUTO: 0.08 10*6/MM3 (ref 0.02–0.13)
RETICS/RBC NFR AUTO: 2.97 % (ref 0.7–1.9)
SMUDGE CELLS BLD QL SMEAR: ABNORMAL
SODIUM SERPL-SCNC: 128 MMOL/L (ref 136–145)
TSH SERPL DL<=0.05 MIU/L-ACNC: 3.22 UIU/ML (ref 0.27–4.2)
VARIANT LYMPHS NFR BLD MANUAL: 15.9 % (ref 19.6–45.3)
WBC NRBC COR # BLD AUTO: 0.86 10*3/MM3 (ref 3.4–10.8)

## 2024-05-25 PROCEDURE — 83735 ASSAY OF MAGNESIUM: CPT | Performed by: INTERNAL MEDICINE

## 2024-05-25 PROCEDURE — 85025 COMPLETE CBC W/AUTO DIFF WBC: CPT | Performed by: INTERNAL MEDICINE

## 2024-05-25 PROCEDURE — 25010000002 CEFEPIME PER 500 MG: Performed by: INTERNAL MEDICINE

## 2024-05-25 PROCEDURE — 99222 1ST HOSP IP/OBS MODERATE 55: CPT | Performed by: INTERNAL MEDICINE

## 2024-05-25 PROCEDURE — 85007 BL SMEAR W/DIFF WBC COUNT: CPT | Performed by: INTERNAL MEDICINE

## 2024-05-25 PROCEDURE — 80053 COMPREHEN METABOLIC PANEL: CPT | Performed by: INTERNAL MEDICINE

## 2024-05-25 PROCEDURE — 25810000003 SODIUM CHLORIDE 0.9 % SOLUTION: Performed by: INTERNAL MEDICINE

## 2024-05-25 PROCEDURE — 82550 ASSAY OF CK (CPK): CPT | Performed by: INTERNAL MEDICINE

## 2024-05-25 PROCEDURE — 84145 PROCALCITONIN (PCT): CPT | Performed by: INTERNAL MEDICINE

## 2024-05-25 PROCEDURE — 85045 AUTOMATED RETICULOCYTE COUNT: CPT | Performed by: INTERNAL MEDICINE

## 2024-05-25 PROCEDURE — 84443 ASSAY THYROID STIM HORMONE: CPT | Performed by: INTERNAL MEDICINE

## 2024-05-25 PROCEDURE — 83010 ASSAY OF HAPTOGLOBIN QUANT: CPT | Performed by: INTERNAL MEDICINE

## 2024-05-25 PROCEDURE — 83605 ASSAY OF LACTIC ACID: CPT | Performed by: INTERNAL MEDICINE

## 2024-05-25 PROCEDURE — 83615 LACTATE (LD) (LDH) ENZYME: CPT | Performed by: INTERNAL MEDICINE

## 2024-05-25 PROCEDURE — 71045 X-RAY EXAM CHEST 1 VIEW: CPT

## 2024-05-25 RX ORDER — CARVEDILOL 3.12 MG/1
3.12 TABLET ORAL DAILY
Status: DISCONTINUED | OUTPATIENT
Start: 2024-05-26 | End: 2024-05-26

## 2024-05-25 RX ADMIN — SODIUM CHLORIDE 75 ML/HR: 9 INJECTION, SOLUTION INTRAVENOUS at 00:38

## 2024-05-25 RX ADMIN — CETIRIZINE HYDROCHLORIDE 10 MG: 10 TABLET ORAL at 08:18

## 2024-05-25 RX ADMIN — ACETAMINOPHEN 325MG 650 MG: 325 TABLET ORAL at 10:32

## 2024-05-25 RX ADMIN — CEFEPIME 2000 MG: 2 INJECTION, POWDER, FOR SOLUTION INTRAVENOUS at 08:18

## 2024-05-25 RX ADMIN — AMIODARONE HYDROCHLORIDE 200 MG: 200 TABLET ORAL at 08:18

## 2024-05-25 RX ADMIN — Medication 1 TABLET: at 08:18

## 2024-05-25 RX ADMIN — APIXABAN 2.5 MG: 2.5 TABLET, FILM COATED ORAL at 22:17

## 2024-05-25 RX ADMIN — Medication 10 ML: at 22:17

## 2024-05-25 RX ADMIN — APIXABAN 2.5 MG: 2.5 TABLET, FILM COATED ORAL at 08:18

## 2024-05-25 RX ADMIN — Medication 10 ML: at 08:21

## 2024-05-25 RX ADMIN — CEFEPIME 2000 MG: 2 INJECTION, POWDER, FOR SOLUTION INTRAVENOUS at 22:17

## 2024-05-25 RX ADMIN — SODIUM CHLORIDE TAB 1 GM 1 G: 1 TAB at 08:18

## 2024-05-25 NOTE — PROGRESS NOTES
Name: Michelle Car ADMIT: 2024   : 1940  PCP: Olayinka Pimentel MD    MRN: [id number] LOS: 1 days   AGE/SEX: 83 y.o. female  ROOM: [facility]/     Subjective   Subjective   Patient continues with weakness and fatigue.  No myalgia.  No fever or chills.  No chest pain.  No shortness of breath.  No cough.  No bleeding diathesis.    Review of Systems  GI.  No abdominal pain or nausea or vomiting.  .  No dysuria or hematuria.  CNS.  Positive dizziness but no loss of consciousness and no focal neurological symptoms       Objective   Objective   Vital Signs  Temp:  [97.7 °F (36.5 °C)-99 °F (37.2 °C)] 99 °F (37.2 °C)  Heart Rate:  [64-73] 64  Resp:  [16-18] 16  BP: ()/(51-63) 93/55  SpO2:  [98 %-100 %] 98 %  on   ;   Device (Oxygen Therapy): room air    Intake/Output Summary (Last 24 hours) at 2024 1154  Last data filed at 2024 0900  Gross per 24 hour   Intake 240 ml   Output 700 ml   Net -460 ml     Body mass index is 23.34 kg/m².      24  1701 24  1849   Weight: 51.7 kg (114 lb) 54.2 kg (119 lb 7.8 oz)     Physical Exam  General.  Elderly female.  Alert and oriented x 4.  Frail and chronically ill-appearing.  Current pain/diaphoresis/distress.  Eyes.  Positive pallor.  No jaundice.  Pupils equal round and reactive.  Intact extraocular musculature  Oral cavity.  Mildly dry mucous membrane.  Neck.  Supple.  No JVD.  No lymphadenopathy or thyromegaly.  Neurovascular.  Regular rate and rhythm and grade 3 systolic murmur  Chest.  Clear to auscultation bilaterally with no added sounds.  Abdomen.  Soft lax.  No tenderness.  No organomegaly.  No guarding or rebound  Extremities.  No clubbing/cyanosis/edema.  Interphalangeal joint deformities.  CNS.  No acute focal neurological deficits    Results Review:      Results from last 7 days   Lab Units 24  0311 24  1150   SODIUM mmol/L 128* 126*   POTASSIUM mmol/L 4.2 5.6*   CHLORIDE mmol/L 97* 90*   CO2 mmol/L 22.2 22.4   BUN  "mg/dL 18 26*   CREATININE mg/dL 0.75 0.95   GLUCOSE mg/dL 90 103*   CALCIUM mg/dL 8.2* 8.7   AST (SGOT) U/L 34* 51*   ALT (SGPT) U/L 32 42*     Estimated Creatinine Clearance: 49.5 mL/min (by C-G formula based on SCr of 0.75 mg/dL).                  Results from last 7 days   Lab Units 05/25/24  1018   TSH uIU/mL 3.220     Results from last 7 days   Lab Units 05/25/24  0311   MAGNESIUM mg/dL 1.9           Invalid input(s): \"LDLCALC\"  Results from last 7 days   Lab Units 05/25/24  0311 05/24/24  1150 05/20/24  1433   WBC 10*3/mm3 0.86* 1.29* 1.75*   HEMOGLOBIN g/dL 7.8* 9.3* 8.8*   HEMATOCRIT % 23.4* 27.9* 26.0*   PLATELETS 10*3/mm3 170 240 196   MCV fL 100.9* 100.7* 100.8*   MCH pg 33.6* 33.6* 34.1*   MCHC g/dL 33.3 33.3 33.8   RDW % 14.3 14.3 14.3   RDW-SD fl 52.2 52.2 52.3   MPV fL 9.9 9.8 9.8   NEUTROPHIL % %  --  52.7 53.8   LYMPHOCYTE % %  --  24.0 13.7*   MONOCYTES % %  --  14.7* 19.4*   EOSINOPHIL % %  --  0.8 0.0*   BASOPHIL % %  --  0.8 1.1   IMM GRAN % %  --  7.0* 12.0*   NEUTROS ABS 10*3/mm3 0.53* 0.68* 0.94*   LYMPHS ABS 10*3/mm3  --  0.31* 0.24*   MONOS ABS 10*3/mm3  --  0.19 0.34   EOS ABS 10*3/mm3 0.01 0.01 0.00   BASOS ABS 10*3/mm3 0.01 0.01 0.02   IMMATURE GRANS (ABS) 10*3/mm3  --  0.09* 0.21*   NRBC /100 WBC  --  0.0 0.0             Results from last 7 days   Lab Units 05/25/24  0311 05/24/24  1914   LACTATE mmol/L 0.7 2.2*                         Results from last 7 days   Lab Units 05/24/24  1150   URIC ACID mg/dL 3.5       Imaging:  Imaging Results (Last 24 Hours)       Procedure Component Value Units Date/Time    XR Chest 1 View [[id number]] Collected: 05/25/24 0926     Updated: 05/25/24 0931    Narrative:      XR CHEST 1 VW-        INDICATION: Fluid overload, left,     COMPARISON: Chest radiograph April 26, 2022     TECHNIQUE: 1 view chest     FINDINGS:      No focal opacity. No effusions. Normal mediastinal contour. Mitral  annulus calcifications. Cholecystectomy clips.       Impression:      " No acute cardiopulmonary process     This report was finalized on 5/25/2024 9:28 AM by Dr. Erick Maurer M.D on Workstation: [id number]                  I reviewed the patient's new clinical results / labs / tests / procedures      Assessment/Plan     Active Hospital Problems    Diagnosis  POA    **Leukopenia [D72.819]  Unknown    Severe malnutrition [E43]  Yes    Anemia [D64.9]  Unknown    Hyperkalemia [E87.5]  Unknown    Adrenal insufficiency [E27.40]  Unknown    Weight loss [R63.4]  Unknown    Chronic diastolic CHF (congestive heart failure) [I50.32]  Yes    Paroxysmal atrial fibrillation with rapid ventricular response [I48.0]  Yes    Hyponatremia [E87.1]  Yes    Diffuse large B-cell lymphoma of lymph nodes of multiple regions [C83.38]  Yes    CRI (chronic renal insufficiency), stage 2 (mild) [N18.2]  Yes      Resolved Hospital Problems   No resolved problems to display.           Weakness/fatigue secondary to #2, #3, #4.  Patient with normal volume status.  TSH is normal.  Magnesium is normal.  K is now normal.  Will check CK and cortisol level.  Ask PT and OT to evaluate.  Look below for further management.  Hyponatremia/hyperkalemia.  Hyperkalemia resolved with Lokelma.  Hyponatremia improved after initial IV fluid.  Uric acid is normal.  Normal TSH.  Will check urine electrolytes and cortisol level.  Nephrology appears to believe that this is inappropriate antidiuretic hormone secretion and has stopped IV fluid and placed her on fluid restriction with continuation of the sodium tablets.  Differential diagnosis of the above includes diuretic use, Bactrim use, poor p.o. intake Aldactone/diuretic/Bactrim held  Anemia/leukopenia in a patient with a history of large B-cell lymphoma on chemotherapy.  This is mostly anemia of chronic disease secondary to cancer.  Leukopenia is most likely secondary to chemotherapy.  Other differential diagnoses include bone marrow replacement/Bactrim side effect on the bone  marrow.  Hold chemo.  Hold Bactrim.   No neutropenic fever.  Will workup anemia.  Will most likely need a blood transfusion and Neupogen but we will defer that to hematology oncology.  There is no clinical focus of infection.  Chest x-ray without infiltrate.  Will check UA and procalcitonin.  Cefepime.  Hypertension/A-fib/chronic diastolic congestive heart failure.  There is no evidence of angina or congestive heart failure.  The blood pressure is on the low side.  I will continue amiodarone and decrease Coreg and continue Eliquis.  Low blood pressure can be contributing to the weakness  Elevated lactic acid.  Resolved.  Malnutrition.  Nutritional supplements  VTE prophylaxis.  Sequential compression device.        Discussed my findings and plan of treatment with the patient.        Saulo Smith MD  [facility]  05/25/24  11:54 EDT

## 2024-05-25 NOTE — CONSULTS
[facility]                                                                                             Nephrology Initial Consult Note    Patient Identification:  Name: Michelle Car MRN: [id number]  Age: 83 y.o. : 1940  Sex: female  Date:2024    Requesting Physician: As per consult order.  Reason for Consultation: hyponatremia and hyperkalemia   Information from:patient/ family/ chart      History of Present Illness: This is a 83 y.o. year old female  with lymphoma admitted for weakness, ,hyponatremia and edema.   She completed chemo in 2024.  She has had ongoing issues with hyponatremia.  She was on salt tabs at home Upon arrival her Na was 126 with K at 5.6.   She was given IV fluids of NS as bp was low upon arrival.  Na this am is 128.  She is eating very little due to poor dentures and food not tasting well.          The following medical history and medications personally reviewed by me:    Problem List:   Patient Active Problem List    Diagnosis     *Leukopenia [D72.819]     Anemia [D64.9]     Hyperkalemia [E87.5]     Adrenal insufficiency [E27.40]     Weight loss [R63.4]     Iron deficiency anemia [D50.9]     Diverticulosis [K57.90]     Calculus of bile duct without cholecystitis and without obstruction [K80.50]     Sepsis due to Escherichia coli (E. coli) [A41.51]     Bacteremia due to Escherichia coli [R78.81, B96.20]     Chronic diastolic CHF (congestive heart failure) [I50.32]     Vascular catheter fitting or adjustment [Z45.2]     Edema of lower extremity [R60.0]     Acute on chronic diastolic heart failure [I50.33]     Hypoxia [R09.02]     Paroxysmal atrial fibrillation with rapid ventricular response [I48.0]     Adrenal nodule [E27.8]     Dyspnea [R06.00]     Hyponatremia [E87.1]     Diffuse large B-cell lymphoma of lymph nodes of multiple regions [C83.38]     CRI (chronic renal insufficiency), stage 2 (mild) [N18.2]     Seasonal allergic rhinitis due to  pollen [J30.1]     Generalized edema [R60.1]     Fatigue due to excessive exertion [T73.3XXA]     Hypertension [I10]     Hyperlipidemia [E78.5]     Abdominal aortic atherosclerosis [I70.0]        Past Medical History:  Past Medical History:   Diagnosis Date    Abdominal aortic atherosclerosis     Adrenal nodule 03/2022    Allergic rhinitis     Anemia due to chemotherapy     Bacteremia due to Escherichia coli 04/26/2022    ADMITTED TO [facility]    Bug bite 10/24/2015    WITH CELLULITIS, LEFT LEG    CAD (coronary artery disease)     Calculus of bile duct without cholangitis or cholecystitis 09/2022    Chemotherapy induced neutropenia     Chronic anticoagulation     Chronic diastolic (congestive) heart failure     Colon polyps     CRI (chronic renal insufficiency), stage 2 (mild) 2021    Diffuse large B cell lymphoma 02/2022    MULTIPLE LYMPH NODE INVOLVEMENT, FOLLOWED BY DR. JEN PATTERSON    Drug induced constipation     Hearing loss     Hemorrhoids     History of blood transfusion 04/2022    History of chemotherapy 2022    FOLLOWED BY DR. JEN PATTERSON    Hypercalcemia 2016    resolved as of 2019    Hypercholesterolemia     Hyperkalemia 03/2022    Hypertension     Hyponatremia 02/17/2022    Leg swelling 10/2021    Mixed hyperlipidemia     Neutropenic fever 05/2022    Nonrheumatic mitral valve regurgitation 03/2022    PAF (paroxysmal atrial fibrillation)     FOLLOWED BY DR. YOSI LANGLEY    Pancytopenia     Pleural effusion, bilateral 06/2022    Pulmonary nodule     Seasonal allergies     Thrombocytopenia 08/2022    Venous insufficiency     Vitamin D deficiency        Past Surgical History:  Past Surgical History:   Procedure Laterality Date    CHOLECYSTECTOMY N/A 10/12/2011    LAPAROSCOPIC, DR. CHANCE KLINE AT [facility]    COLONOSCOPY N/A 2000    1 or 2 polyps, otherwise normal per patient    COLONOSCOPY W/ POLYPECTOMY N/A 01/24/2012    3 MM TUBULAR ADENOMA POLYP IN CECUM, DIVERTICULOSIS IN RECTO-SIGMOID, RESCOPE IN 3 YRS,   CHANCE KLINE AT [facility]    CYST REMOVAL Left 10/12/2011    LEFT SCALP, PATH: BENIGN ADNEXAL NEOPLASM FAVORING ECCRINE SPIRADENOMA, DR. CHANCE KLINE AT [facility]    ERCP N/A 10/21/2022    Procedure: ENDOSCOPIC RETROGRADE CHOLANGIOPANCREATOGRAPHY with sphincterotomy and balloon sweep;  Surgeon: Peyman Ortega MD;  Location: [facility] ENDOSCOPY;  Service: Gastroenterology;  Laterality: N/A;  PRE: Common Duct Stones  POST: Common Duct Stones    VENOUS ACCESS DEVICE (PORT) INSERTION Left 03/23/2022    Procedure: INSERTION VENOUS ACCESS DEVICE;  Surgeon: Alin Delgado MD;  Location: [facility] [place] [facility];  Service: General;  Laterality: Left;        Home Meds:   Medications Prior to Admission   Medication Sig Dispense Refill Last Dose    acetaminophen (TYLENOL) 500 MG tablet Take 1 tablet by mouth Every 6 (Six) Hours As Needed for Mild Pain.       acyclovir (Zovirax) 400 MG tablet Take 1 tablet by mouth 2 (Two) Times a Day. 60 tablet 7     amiodarone (PACERONE) 200 MG tablet Take 1 tablet by mouth Daily. 90 tablet 2     Calcium Carbonate (CALTRATE 600 PO) Take 1 tablet by mouth Daily.       carvedilol (COREG) 3.125 MG tablet TAKE 1 TABLET BY MOUTH TWICE DAILY WITH MEALS 180 tablet 1     Eliquis 5 MG tablet tablet TAKE 1 TABLET BY MOUTH EVERY 12 HOURS FOR ATRIAL FIBRILLATION 180 tablet 1     ferrous sulfate (FeroSul) 325 (65 FE) MG tablet Take 1 tablet by mouth Every Other Day. 36 tablet 2     furosemide (LASIX) 40 MG tablet Take 1 tablet by mouth Daily As Needed (Leg swelling). 30 tablet 0     guaiFENesin (MUCINEX) 600 MG 12 hr tablet Take 2 tablets by mouth 2 (Two) Times a Day.       Hydrocortisone, Perianal, (Anusol-HC) 2.5 % rectal cream Apply to hemorrhoids 3 times daily for 7 days during hemorrhoid flare. Include applicator. 30 g 1     loratadine (Claritin) 10 MG tablet Take 1 tablet by mouth Daily.       Multiple Vitamins-Minerals (CENTRUM SILVER) tablet Take 1 tablet by mouth Daily.       polyethylene glycol (MIRALAX) 17  GM/SCOOP powder Take 17 g by mouth As Needed.       prochlorperazine (COMPAZINE) 5 MG tablet Take 1-2 tablets by mouth Every 6 (Six) Hours As Needed for Nausea or Vomiting. 60 tablet 1     spironolactone (ALDACTONE) 25 MG tablet TAKE 1 TABLET BY MOUTH DAILY 90 tablet 1     sulfamethoxazole-trimethoprim (BACTRIM DS,SEPTRA DS) 800-160 MG per tablet Take 1 tablet by mouth 3 (Three) Times a Week. 12 tablet 7     lenalidomide (REVLIMID) 10 MG capsule Take 1 capsule by mouth Daily for 21 days. Take daily on Days 1-21, and off 7 days. Do not crush, break or chew. 21 capsule 0        Current Meds:   Current Facility-Administered Medications   Medication Dose Route Frequency Provider Last Rate Last Admin    acetaminophen (TYLENOL) tablet 650 mg  650 mg Oral Q4H PRN Abel Pelaez MD   325 mg at 05/24/24 2336    Or    acetaminophen (TYLENOL) 160 MG/5ML oral solution 650 mg  650 mg Oral Q4H PRN Abel Pelaez MD        Or    acetaminophen (TYLENOL) suppository 650 mg  650 mg Rectal Q4H PRN Abel Pelaez MD        albuterol (PROVENTIL) nebulizer solution 0.083% 2.5 mg/3mL  2.5 mg Nebulization Q4H PRN Abel Pelaez MD        amiodarone (PACERONE) tablet 200 mg  200 mg Oral Q24H Abel Pelaez MD   200 mg at 05/25/24 0818    apixaban (ELIQUIS) tablet 2.5 mg  2.5 mg Oral Q12H Abel Pelaez MD   2.5 mg at 05/25/24 0818    sennosides-docusate (PERICOLACE) 8.6-50 MG per tablet 2 tablet  2 tablet Oral BID PRN Abel Pelaez MD        And    polyethylene glycol (MIRALAX) packet 17 g  17 g Oral Daily PRN Abel Pelaez MD        And    bisacodyl (DULCOLAX) EC tablet 5 mg  5 mg Oral Daily PRN Abel Pelaez MD        And    bisacodyl (DULCOLAX) suppository 10 mg  10 mg Rectal Daily PRN Abel Pelaez MD        carvedilol (COREG) tablet 3.125 mg  3.125 mg Oral BID With Meals Abel Pelaez MD        cefepime 2000 mg IVPB in 100 mL NS (MBP)  2,000 mg Intravenous Q12H Abel Pelaez MD   2,000 mg at 05/25/24 0818    cetirizine (zyrTEC) tablet 10 mg   10 mg Oral Daily Abel Pelaez MD   10 mg at 05/25/24 0818    ferrous sulfate tablet 325 mg  325 mg Oral Every Other Day Abel Pelaez MD        Hydrocortisone (Perianal) (ANUSOL-HC) 2.5 % rectal cream   Rectal Daily Abel Pelaez MD        multivitamin with minerals 1 tablet  1 tablet Oral Daily Abel Pelaez MD   1 tablet at 05/25/24 0818    nitroglycerin (NITROSTAT) SL tablet 0.4 mg  0.4 mg Sublingual Q5 Min PRN Abel Pelaez MD        ondansetron (ZOFRAN) injection 4 mg  4 mg Intravenous Q6H PRN Abel Pelaez MD        sodium chloride 0.9 % flush 10 mL  10 mL Intravenous Q12H Abel Pelaez MD   10 mL at 05/25/24 0821    sodium chloride 0.9 % flush 10 mL  10 mL Intravenous PRN Abel Pelaez MD        sodium chloride 0.9 % infusion 40 mL  40 mL Intravenous PRN Abel Pelaez MD        sodium chloride 0.9 % infusion  75 mL/hr Intravenous Continuous Abel Pelaez MD 75 mL/hr at 05/25/24 0038 75 mL/hr at 05/25/24 0038    sodium chloride tablet 1 g  1 g Oral Daily Abel Pelaez MD   1 g at 05/25/24 0818       Allergies:  Allergies   Allergen Reactions    Factive [Gemifloxacin] Rash       Social History:   Social History     Socioeconomic History    Marital status:    Tobacco Use    Smoking status: Never    Smokeless tobacco: Never   Vaping Use    Vaping status: Never Used   Substance and Sexual Activity    Alcohol use: No    Drug use: No    Sexual activity: Not Currently     Birth control/protection: Post-menopausal        Family History:  Family History   Problem Relation Age of Onset    Malig Hyperthermia Neg Hx         Review of Systems: as per HPI, in addition:    General:      + weakness / fatigue,                       No fevers / chills                   + weight loss  HEENT:       no dysphagia / odynophagia  Neck:           normal range of motion, no swelling  Respiratory: no cough / congestion                      No shortness of air                       No wheezing  CV:              No chest pain       "                 No palpitations  Abdomen/GI: no nausea / vomiting                      No diarrhea / constipation                      No abdominal pain  :             no dysuria / urinary frequency                       No urgency, normal output  Endocrine:   no polyuria / polydipsia,                      No heat or cold intolerance  Skin:           no rashes or skin breakdown   Vascular:   + edema                     No claudication  Psych:        no depression/ anxiety  Neuro:        no focal weakness, no seizures  Musculoskeletal: no joint pain or deformities      Physical Exam:  Vitals:   Temp (24hrs), Av.4 °F (36.9 °C), Min:97.7 °F (36.5 °C), Max:99 °F (37.2 °C)    BP 93/55 (BP Location: Left arm, Patient Position: Lying)   Pulse 64   Temp 99 °F (37.2 °C) (Oral)   Resp 16   Ht 152.4 cm (60\")   Wt 54.2 kg (119 lb 7.8 oz)   SpO2 98%   BMI 23.34 kg/m²   Intake/Output:     Intake/Output Summary (Last 24 hours) at 2024 0921  Last data filed at 2024 0512  Gross per 24 hour   Intake 240 ml   Output 700 ml   Net -460 ml        Wt Readings from Last 1 Encounters:   24 1849 54.2 kg (119 lb 7.8 oz)   24 1701 51.7 kg (114 lb)       Exam:    General Appearance:  Awake, alert, oriented x3, no acute distress  Frail -appearing   Head and Face:  Normocephalic, atraumatic, mucus membranes moist, oropharynx clear   Eyes:  No icterus, pupils equal round and reactive to light, extraocular movements intact    ENMT: Moist mucosa, tongue symmetric    Neck: Supple  no jugular venous distention  no thyromegaly   Pulmonary:  Respiratory effort: Normal  Auscultation of lungs: Clear bilaterally  No wheezes  No rhonchi  Good air movement, good expansion   Chest wall:  No tenderness or deformity   Cardiovascular:  Auscultation of the heart: Normal rhythm, no murmurs  + edema of extremities    Abdomen:  Abdomen: soft, non-tender, normal bowel sounds all four quadrants, no masses   Liver and spleen: no " hepatosplenomegaly   Musculoskeletal: Digits and nails: normal  Normal range of motion  No joint swelling or gross deformities    Skin: Skin inspection: color normal, no visible rashes or lesions  Skin palpation: texture, turgor normal, no palpable lesions   Lymphatic:  no cervical lymphadenopathy    Psychiatric: Judgement and insight: normal  Orientation to person place and time: normal  Mood and affect: normal       DATA:  Radiology and Labs:  The following labs and radiology results independently reviewed by me              Labs:   Recent Results (from the past 24 hour(s))   Comprehensive Metabolic Panel    Collection Time: 05/24/24 11:50 AM    Specimen: Blood   Result Value Ref Range    Glucose 103 (H) 65 - 99 mg/dL    BUN 26 (H) 8 - 23 mg/dL    Creatinine 0.95 0.57 - 1.00 mg/dL    Sodium 126 (C) 136 - 145 mmol/L    Potassium 5.6 (C) 3.5 - 5.2 mmol/L    Chloride 90 (L) 98 - 107 mmol/L    CO2 22.4 22.0 - 29.0 mmol/L    Calcium 8.7 8.6 - 10.5 mg/dL    Total Protein 5.6 (L) 6.0 - 8.5 g/dL    Albumin 3.1 (L) 3.5 - 5.2 g/dL    ALT (SGPT) 42 (H) 1 - 33 U/L    AST (SGOT) 51 (H) 1 - 32 U/L    Alkaline Phosphatase 72 39 - 117 U/L    Total Bilirubin 0.3 0.0 - 1.2 mg/dL    Globulin 2.5 gm/dL    A/G Ratio 1.2 g/dL    BUN/Creatinine Ratio 27.4 (H) 7.0 - 25.0    Anion Gap 13.6 5.0 - 15.0 mmol/L    eGFR 59.9 (L) >60.0 mL/min/1.73   Lactate Dehydrogenase    Collection Time: 05/24/24 11:50 AM    Specimen: Blood   Result Value Ref Range     (H) 135 - 214 U/L   Uric Acid    Collection Time: 05/24/24 11:50 AM    Specimen: Blood   Result Value Ref Range    Uric Acid 3.5 2.4 - 5.7 mg/dL   CBC Auto Differential    Collection Time: 05/24/24 11:50 AM    Specimen: Blood   Result Value Ref Range    WBC 1.29 (L) 3.40 - 10.80 10*3/mm3    RBC 2.77 (L) 3.77 - 5.28 10*6/mm3    Hemoglobin 9.3 (L) 12.0 - 15.9 g/dL    Hematocrit 27.9 (L) 34.0 - 46.6 %    .7 (H) 79.0 - 97.0 fL    MCH 33.6 (H) 26.6 - 33.0 pg    MCHC 33.3 31.5 - 35.7  g/dL    RDW 14.3 12.3 - 15.4 %    RDW-SD 52.2 37.0 - 54.0 fl    MPV 9.8 6.0 - 12.0 fL    Platelets 240 140 - 450 10*3/mm3    Neutrophil % 52.7 42.7 - 76.0 %    Lymphocyte % 24.0 19.6 - 45.3 %    Monocyte % 14.7 (H) 5.0 - 12.0 %    Eosinophil % 0.8 0.3 - 6.2 %    Basophil % 0.8 0.0 - 1.5 %    Immature Grans % 7.0 (H) 0.0 - 0.5 %    Neutrophils, Absolute 0.68 (L) 1.70 - 7.00 10*3/mm3    Lymphocytes, Absolute 0.31 (L) 0.70 - 3.10 10*3/mm3    Monocytes, Absolute 0.19 0.10 - 0.90 10*3/mm3    Eosinophils, Absolute 0.01 0.00 - 0.40 10*3/mm3    Basophils, Absolute 0.01 0.00 - 0.20 10*3/mm3    Immature Grans, Absolute 0.09 (H) 0.00 - 0.05 10*3/mm3    nRBC 0.0 0.0 - 0.2 /100 WBC   Iron Profile    Collection Time: 05/24/24 11:50 AM    Specimen: Blood   Result Value Ref Range    Iron 27 (L) 37 - 145 mcg/dL    Iron Saturation (TSAT) 11 (L) 20 - 50 %    Transferrin 159 (L) 200 - 360 mg/dL    TIBC 237 (L) 298 - 536 mcg/dL   Ferritin    Collection Time: 05/24/24 11:50 AM    Specimen: Blood   Result Value Ref Range    Ferritin 1,953.00 (H) 13.00 - 150.00 ng/mL   MRSA Screen, PCR (Inpatient) - Swab, Nares    Collection Time: 05/24/24  5:36 PM    Specimen: Nares; Swab   Result Value Ref Range    MRSA PCR No MRSA Detected No MRSA Detected   Lactic Acid, Plasma    Collection Time: 05/24/24  7:14 PM    Specimen: Blood   Result Value Ref Range    Lactate 2.2 (C) 0.5 - 2.0 mmol/L   STAT Lactic Acid, Reflex    Collection Time: 05/25/24  3:11 AM    Specimen: Blood   Result Value Ref Range    Lactate 0.7 0.5 - 2.0 mmol/L   Comprehensive Metabolic Panel    Collection Time: 05/25/24  3:11 AM    Specimen: Blood   Result Value Ref Range    Glucose 90 65 - 99 mg/dL    BUN 18 8 - 23 mg/dL    Creatinine 0.75 0.57 - 1.00 mg/dL    Sodium 128 (L) 136 - 145 mmol/L    Potassium 4.2 3.5 - 5.2 mmol/L    Chloride 97 (L) 98 - 107 mmol/L    CO2 22.2 22.0 - 29.0 mmol/L    Calcium 8.2 (L) 8.6 - 10.5 mg/dL    Total Protein 4.6 (L) 6.0 - 8.5 g/dL    Albumin 2.5  (L) 3.5 - 5.2 g/dL    ALT (SGPT) 32 1 - 33 U/L    AST (SGOT) 34 (H) 1 - 32 U/L    Alkaline Phosphatase 62 39 - 117 U/L    Total Bilirubin 0.3 0.0 - 1.2 mg/dL    Globulin 2.1 gm/dL    A/G Ratio 1.2 g/dL    BUN/Creatinine Ratio 24.0 7.0 - 25.0    Anion Gap 8.8 5.0 - 15.0 mmol/L    eGFR 79.6 >60.0 mL/min/1.73   CBC Auto Differential    Collection Time: 05/25/24  3:11 AM    Specimen: Blood   Result Value Ref Range    WBC 0.86 (C) 3.40 - 10.80 10*3/mm3    RBC 2.32 (L) 3.77 - 5.28 10*6/mm3    Hemoglobin 7.8 (L) 12.0 - 15.9 g/dL    Hematocrit 23.4 (L) 34.0 - 46.6 %    .9 (H) 79.0 - 97.0 fL    MCH 33.6 (H) 26.6 - 33.0 pg    MCHC 33.3 31.5 - 35.7 g/dL    RDW 14.3 12.3 - 15.4 %    RDW-SD 52.2 37.0 - 54.0 fl    MPV 9.9 6.0 - 12.0 fL    Platelets 170 140 - 450 10*3/mm3   Magnesium    Collection Time: 05/25/24  3:11 AM    Specimen: Blood   Result Value Ref Range    Magnesium 1.9 1.6 - 2.4 mg/dL   Manual Differential    Collection Time: 05/25/24  3:11 AM    Specimen: Blood   Result Value Ref Range    Neutrophil % 62.2 42.7 - 76.0 %    Lymphocyte % 15.9 (L) 19.6 - 45.3 %    Monocyte % 19.5 (H) 5.0 - 12.0 %    Eosinophil % 1.2 0.3 - 6.2 %    Basophil % 1.2 0.0 - 1.5 %    Neutrophils Absolute 0.53 (L) 1.70 - 7.00 10*3/mm3    Lymphocytes Absolute 0.14 (L) 0.70 - 3.10 10*3/mm3    Monocytes Absolute 0.17 0.10 - 0.90 10*3/mm3    Eosinophils Absolute 0.01 0.00 - 0.40 10*3/mm3    Basophils Absolute 0.01 0.00 - 0.20 10*3/mm3    Anisocytosis Slight/1+ None Seen    Smudge Cells Slight/1+ None Seen    Platelet Morphology Normal Normal       Radiology:  Imaging Results (Last 24 Hours)       Procedure Component Value Units Date/Time    XR Chest 1 View [[id number]] Resulted: 05/25/24 0855     Updated: 05/25/24 0920                 ASSESSMENT:   Problem List:   Lymphoma   Hypotension   Hyponatremia   Hyperkalemia   Chronic diastolic heart failure   Adrenal insufficiency - per chart, but doesn't follow with endo   Hypoalbuminemia   Edema        PLAN:   Will dc NS now that bp is near goal   Add boost clear per pt preference   Continue on Na tabs   Check tsh and free t4   If K rising again will consider florinef       Monitor electrolytes and volume closely   Dose all medications for GFR >50-   Limit IV dye, NSAIDS and nephrotoxic medications   I appreciate the opportunity to participate in this patient's care.  Please call with any questions or concerns.     Radha Le M.D  [facility]  Office phone number: [phone]  Answering service phone number: [phone]        5/25/2024

## 2024-05-25 NOTE — PLAN OF CARE
Goal Outcome Evaluation:  Nutrition assessmnet and MSA complete. On supplements. RD following           Problem: Malnutrition  Goal: Improved Nutritional Intake  Outcome: Ongoing, Progressing  Intervention: Promote and Optimize Oral Intake  Flowsheets (Taken 5/25/2024 1103)  Oral Nutrition Promotion:   calorie-dense foods provided   nutritional therapy counseling provided   calorie-dense liquids provided

## 2024-05-25 NOTE — CONSULTS
Subjective     REASON FOR CONSULTATION:  Provide an opinion on any further workup or treatment on:    Lymphoma  Anemia  Neutropenia                       REQUESTING PHYSICIAN: Abel Pelaez MD    HISTORY OF PRESENT ILLNESS:      Michelle Car is a 83 y.o. patient who was admitted on 5/24/2024.  Patient is well-known to me and has diffuse large B-cell lymphoma.  She recently completed second line chemotherapy treatment with Rituxan Polivy and Treanda.  Last treatment was on 4/1/2024-4/2/2024.  PET scan revealed excellent response to treatment and we recommended at that point starting Revlimid.  She did not start this medication.  She was referred to radiation oncology for radiation therapy to the left jaw which was the site of significant lymphoma involvement resulting in pain and disfigurement.    Patient reported to the radiation oncologist that she was feeling weak.  Labs revealed anemia and neutropenia.  Hemoglobin was 8.8.  WBC was 1750 with 940 neutrophils.    On 5/24/2024, she was seen by our nurse practitioner at our office and was noted to have worsening neutropenia with neutrophil count decreasing to 680.  In addition, she was found to have hyponatremia and hyperkalemia.  She was feeling poorly.  She reported feeling fatigued.  She had poor oral intake.  She was therefore admitted for further evaluation and management.    Past Medical History:   Diagnosis Date    Abdominal aortic atherosclerosis     Adrenal nodule 03/2022    Allergic rhinitis     Anemia due to chemotherapy     Bacteremia due to Escherichia coli 04/26/2022    ADMITTED TO [facility]    Bug bite 10/24/2015    WITH CELLULITIS, LEFT LEG    CAD (coronary artery disease)     Calculus of bile duct without cholangitis or cholecystitis 09/2022    Chemotherapy induced neutropenia     Chronic anticoagulation     Chronic diastolic (congestive) heart failure     Colon polyps     CRI (chronic renal insufficiency), stage 2 (mild) 2021    Diffuse large B cell  lymphoma 02/2022    MULTIPLE LYMPH NODE INVOLVEMENT, FOLLOWED BY DR. JEN PATTERSON    Drug induced constipation     Hearing loss     Hemorrhoids     History of blood transfusion 04/2022    History of chemotherapy 2022    FOLLOWED BY DR. JEN PATTERSON    Hypercalcemia 2016    resolved as of 2019    Hypercholesterolemia     Hyperkalemia 03/2022    Hypertension     Hyponatremia 02/17/2022    Leg swelling 10/2021    Mixed hyperlipidemia     Neutropenic fever 05/2022    Nonrheumatic mitral valve regurgitation 03/2022    PAF (paroxysmal atrial fibrillation)     FOLLOWED BY DR. YOSI LANGLEY    Pancytopenia     Pleural effusion, bilateral 06/2022    Pulmonary nodule     Seasonal allergies     Thrombocytopenia 08/2022    Venous insufficiency     Vitamin D deficiency      Past Surgical History:   Procedure Laterality Date    CHOLECYSTECTOMY N/A 10/12/2011    LAPAROSCOPIC, DR. CHANCE KLINE AT [facility]    COLONOSCOPY N/A 2000    1 or 2 polyps, otherwise normal per patient    COLONOSCOPY W/ POLYPECTOMY N/A 01/24/2012    3 MM TUBULAR ADENOMA POLYP IN CECUM, DIVERTICULOSIS IN RECTO-SIGMOID, RESCOPE IN 3 YRS, DR. CHANCE KLINE AT [facility]    CYST REMOVAL Left 10/12/2011    LEFT SCALP, PATH: BENIGN ADNEXAL NEOPLASM FAVORING ECCRINE SPIRADENOMA, DR. CHANCE KLINE AT [facility]    ERCP N/A 10/21/2022    Procedure: ENDOSCOPIC RETROGRADE CHOLANGIOPANCREATOGRAPHY with sphincterotomy and balloon sweep;  Surgeon: Peyman Ortega MD;  Location: [facility] ENDOSCOPY;  Service: Gastroenterology;  Laterality: N/A;  PRE: Common Duct Stones  POST: Common Duct Stones    VENOUS ACCESS DEVICE (PORT) INSERTION Left 03/23/2022    Procedure: INSERTION VENOUS ACCESS DEVICE;  Surgeon: Alin Delgado MD;  Location: [facility] OR [facility];  Service: General;  Laterality: Left;     SCHEDULED MEDS:  amiodarone, 200 mg, Oral, Q24H  apixaban, 2.5 mg, Oral, Q12H  carvedilol, 3.125 mg, Oral, BID With Meals  cefepime, 2,000 mg, Intravenous, Q12H  cetirizine, 10 mg, Oral, Daily  ferrous  sulfate, 325 mg, Oral, Every Other Day  Hydrocortisone (Perianal), , Rectal, Daily  multivitamin with minerals, 1 tablet, Oral, Daily  sodium chloride, 10 mL, Intravenous, Q12H  sodium chloride, 1 g, Oral, Daily      INFUSIONS:  sodium chloride, 75 mL/hr, Last Rate: 75 mL/hr (05/25/24 0038)      ALLERGIES:  Allergies   Allergen Reactions    Factive [Gemifloxacin] Rash      Social History     Socioeconomic History    Marital status:    Tobacco Use    Smoking status: Never    Smokeless tobacco: Never   Vaping Use    Vaping status: Never Used   Substance and Sexual Activity    Alcohol use: No    Drug use: No    Sexual activity: Not Currently     Birth control/protection: Post-menopausal     Family History   Problem Relation Age of Onset    Malig Hyperthermia Neg Hx       REVIEW OF SYSTEMS:   GENERAL: Generalized weakness  SKIN: Negative.  HEME/LYMPH: Anemia.  RESPIRATORY:  Negative.   CVS:  Negative.   GI: Poor appetite.   :  Negative.   MUSCULOSKELETAL:  Negative.  NEUROLOGICAL:  Negative.    Objective   VITAL SIGNS:  Temp:  [97.7 °F (36.5 °C)-99 °F (37.2 °C)] 99 °F (37.2 °C)  Heart Rate:  [64-73] 64  Resp:  [16-18] 16  BP: ()/(51-63) 93/55     Wt Readings from Last 3 Encounters:   05/24/24 54.2 kg (119 lb 7.8 oz)   05/24/24 52.1 kg (114 lb 14.4 oz)   05/13/24 53.9 kg (118 lb 12.8 oz)     PHYSICAL EXAMINATION:   GENERAL:  The patient appears weak, not in acute distress.  SKIN: No skin rash. No ecchymosis.  HEAD:  Normocephalic.  Exam revealed no enlargement of the left mandibular mass.  EYES:  No Jaundice. Pallor.   NECK:  Supple. No Masses.  LYMPHATICS:  No cervical or supraclavicular lymphadenopathy.  CHEST: Normal respiratory effort. Lungs clear to auscultation.   CARDIAC:  Normal S1 & S2.  Grade 3 systolic murmur.   ABDOMEN:  Soft. No tenderness. No Hepatomegaly. No Splenomegaly. No masses.  EXTREMITIES:  No noted deformities.   NEUROLOGICAL:  No Focal neurological deficits.     RESULT REVIEW:    Results from last 7 days   Lab Units 05/25/24  0311 05/24/24  1150 05/20/24  1433   WBC 10*3/mm3 0.86* 1.29* 1.75*   NEUTROS ABS 10*3/mm3 0.53* 0.68* 0.94*   LYMPHS ABS 10*3/mm3 0.14*  --   --    HEMOGLOBIN g/dL 7.8* 9.3* 8.8*   HEMATOCRIT % 23.4* 27.9* 26.0*   PLATELETS 10*3/mm3 170 240 196     Results from last 7 days   Lab Units 05/25/24  0311 05/24/24  1150   SODIUM mmol/L 128* 126*   POTASSIUM mmol/L 4.2 5.6*   CHLORIDE mmol/L 97* 90*   CO2 mmol/L 22.2 22.4   BUN mg/dL 18 26*   CREATININE mg/dL 0.75 0.95   CALCIUM mg/dL 8.2* 8.7   ALBUMIN g/dL 2.5* 3.1*   BILIRUBIN mg/dL 0.3 0.3   ALK PHOS U/L 62 72   ALT (SGPT) U/L 32 42*   AST (SGOT) U/L 34* 51*   MAGNESIUM mg/dL 1.9  --      Lab Results   Component Value Date    FERRITIN 1,953.00 (H) 05/24/2024    FERRITIN 141.00 11/21/2023    FERRITIN 79.70 08/29/2023    IRON 27 (L) 05/24/2024    IRON 102 11/21/2023    IRON 84 08/29/2023    TIBC 237 (L) 05/24/2024    TIBC 405 11/21/2023    TIBC 414 08/29/2023     Lab Results   Component Value Date    FOLATE >20.00 11/14/2022    FOLATE >20.00 11/10/2022    FOLATE >20.00 04/18/2022     Lab Results   Component Value Date    NRBGIRXH33 453 11/14/2022    CPNCJOXO50 487 11/10/2022    MLSNJFUC21 1,614 (H) 04/18/2022     Lab Results   Component Value Date    OCCULTBLD Negative 04/28/2022     Assessment & Plan   *Relapsed diffuse large B-cell lymphoma.  Patient was found to have bilateral pleural effusions.    She underwent right thoracentesis on 2/21/2022 and the left thoracentesis on 2/22/2022.    Analysis of the fluid revealed involvement with diffuse large B-cell lymphoma.    FISH analysis for BCL6 rearrangement was positive but was negative for BCL-2 and MYC rearrangement.    PET scan on 3/10/2022 revealed significant hypermetabolism in the left adrenal gland and the surrounding soft tissue with SUV up to 34.5. Hypermetabolism in the left pleural thickening with SUV of 7.1. there was less hypermetabolism in the right adrenal  gland and in the right pleura.  She was considered to have stage III non-bulky disease.  R-IPI score of 3 associated with poor risk - associated with overall survival of 55%.   R-CHOP with Neulasta support was started on 3/28/2022.  Due to development of neutropenic fever following cycles 1 and 2, treatment was changed to mini-RCHOP starting cycle #3 on 5/16/2022.  Patient received cycle #6 on 7/25/2022.  PET scan on 8/15/2022 revealed complete metabolic response.  Patient was found to have recurrence with development of a left mandibular mass in October/November 2023.  Biopsy of left mandible mass on 11/28/2023 showed recurrence of the lymphoma, diffuse large B cell, non-GCB phenotype.  Ki-67 was 90%.    It was positive for Bcl-6 but negative for Bcl-2 and MYC rearrangement.  PET scan on 12/11/2023 revealed multiple areas of involvement -prevascular space, left epicardial fat pad, pleural density posterior to the left fourth rib anterior aspect, lesion along the pericardium versus intramuscular with SUV of 9.8.  Hypermetabolic activity at both adrenal glands, uniformly increased in size and appeared hyperplastic.  There was a 2.7 x 1.1 cm soft tissue nodule lateral to the right erector spinae muscle posterior to the right posterior 11th rib with an SUV of 20.5 and there was a 1.3 cm nodule lateral to the posterior margin of the left psoas muscle with an SUV of 24.9.  Rituxan Polivy and Treanda with Treanda every 3 weeks for 6 cycles was started on 12/12/2023.  The jaw mass started to decrease in size after starting treatment.  CT scan on 2/8/2024 showed evidence of response to treatment.  Cycle #6 was given on 4/1/2024.  PET scan on 4/22/2024 revealed decrease in the hypermetabolism in the mediastinal and pericardial lymph nodes/lesions and in the lesions above the left kidney and adrenal gland.  The left mandibular lesion was no longer hypermetabolic.   She is at increased risk for relapse of the lymphoma.  The  plan was to start treatment with Revlimid 10 mg daily.  However, the patient did not start the treatment yet.  LDH became elevated and this was concerning for relapse of the lymphoma.    *Neutropenia  5/20/2024: WBC decreased to 1750.  Neutrophils decreased to 940.  5/24/2024: WBC decreased to 1290.  Neutrophils decreased to 680.  5/25/2024: WBC decreased to 860.  Neutrophils decreased to 530.  Worsening neutropenia is also concerning for relapse of the lymphoma.     *Macrocytic anemia.  Patient also developed anemia secondary to lymphoma and secondary to chemotherapy.    Hemoglobin was 11.1 on 3/28/2022.  Hgb decreased to 7.9 on 4/25/2022 and she was transfused.   Hemoglobin decreased to 6.3 on 4/27/2022. She was transfused.  Hemoglobin improved to 8.8 on 5/16/2022.  Patient is on ferrous sulfate 325 mg 3 days a week.  11/21/2023: Hemoglobin 12.1.    Iron stores adequate with ferritin 141 and transferrin saturation 25%.  12/12/2023: Hemoglobin 12.2.  1/2/2024: Hemoglobin 12.5.  1/23/2024: Hemoglobin decreased to 11.4-secondary to anemia due to chemotherapy.  2/13/2024: Hemoglobin decreased to 10.8.  3/5/2024: Hemoglobin 11.1.  4/1/2024: Hemoglobin decreased to 9.6.  This is attributed to recent fall that resulted in bleeding from the left arm.  5/3/2024: Hemoglobin increased to 10.2.  5/25/2024: Hemoglobin decreased to 7.8.  I discussed with the patient PRBC transfusion to see if this would help with her fatigue.  She reports that she had chills when she had a transfusion before.  We will therefore hold off on transfusion today and reassess tomorrow.     *Hyponatremia.  Patient had problem with hyponatremia in the past.  Sodium is 127 on 3/14/2023.  She was advised to increase salt intake.  Sodium improved to 133 on repeat lab on 3/21/2023.  However, sodium decreased to 126 on 6/7/2023.  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  She was started on sodium chloride 1 g daily.  4/1/2024:  Sodium decreased to 132.  Leg swelling improved with Lasix.  5/3/2024: Sodium 130.  5/24/2024: Sodium decreased to 126.    Patient was having significant weakness.  5/25/2024: Sodium 128.  Nephrology service was consulted.    *Hyperkalemia.  5/24/2024: Potassium increased to 5.6.     *Chronic anticoagulation.  Patient is on Eliquis 5 mg twice daily.  No problem with bleeding.      *Prophylaxis.  Patient had shingles in April 2023.  She is at risk of developing shingles while on treatment.  She was placed on acyclovir 400 mg twice daily.  She is on Bactrim DS 3 days a week.     *Low-density lesion in the uterus.    The radiologist recommended pelvic ultrasound.    The patient is asymptomatic.    PET scan on 4/22/2024 reported fluid in the lower abdomen.  Patient reported intermittent vaginal discharge.     PLAN:    1.  Obtain LDH level.  2.  Repeat vitamin B12, folate, MMA and copper levels.  3.  Repeat CBC in a.m. if hemoglobin remains low, we will discuss again PRBC transfusion.        Renate Galo MD  05/25/24

## 2024-05-25 NOTE — PLAN OF CARE
Goal Outcome Evaluation:  Plan of Care Reviewed With: patient        Progress: no change  Outcome Evaluation: A&O. RA. SR. IVF and IV abx per order. Purewick in place. Tylenol given PRN per order. 1000 cc/day fluid restriction. BLE 2+ edema.

## 2024-05-25 NOTE — CONSULTS
Nutrition Services    Patient Name:  Michelle Car  YOB: 1940  MRN: [id number]  Admit Date:  5/24/2024    Assessment Date:  05/25/24    Summary: Nutrition assessment initiated due to nursing admission screen. Pt was admitted with leukopenia. Anemia, large cell lymphoma, hyponatremia, CHF. Pt reports declining po intake and confirms weight loss. Used to be 150lb and now 119lb with edema. She has lost 15% in the past 3 months. She wants to try boost breeze (likes coppola flavors), MD has already ordered it..  Labs reviewed, K+ now 4.2. Na+ 128 - On 1000mL fluid restriction.    Patient meets ASPEN/AND criteria for nutrition diagnosis of severe malnutrition of chronic illness based on: 15% wt loss in 3 months, inadequate po intake, NFPE results below.    Plan/Recommendations:  Supplement of pt's choice  Encourage po intake  Fluid restriction per MD   Change to modified texture if she has a hard time chewing her food.    Will follow clinical course, nutrition needs.        CLINICAL NUTRITION ASSESSMENT      Reason for Assessment Nurse Admission Screen     Diagnosis/Problem   Leukopenia. Anemia, large cell lymphoma, hyponatremia, CHF.   Medical/Surgical History Past Medical History:   Diagnosis Date    Abdominal aortic atherosclerosis     Adrenal nodule 03/2022    Allergic rhinitis     Anemia due to chemotherapy     Bacteremia due to Escherichia coli 04/26/2022    ADMITTED TO [facility]    Bug bite 10/24/2015    WITH CELLULITIS, LEFT LEG    CAD (coronary artery disease)     Calculus of bile duct without cholangitis or cholecystitis 09/2022    Chemotherapy induced neutropenia     Chronic anticoagulation     Chronic diastolic (congestive) heart failure     Colon polyps     CRI (chronic renal insufficiency), stage 2 (mild) 2021    Diffuse large B cell lymphoma 02/2022    MULTIPLE LYMPH NODE INVOLVEMENT, FOLLOWED BY DR. JEN PATTERSON    Drug induced constipation     Hearing loss     Hemorrhoids     History of  "blood transfusion 04/2022    History of chemotherapy 2022    FOLLOWED BY DR. JEN PATTERSON    Hypercalcemia 2016    resolved as of 2019    Hypercholesterolemia     Hyperkalemia 03/2022    Hypertension     Hyponatremia 02/17/2022    Leg swelling 10/2021    Mixed hyperlipidemia     Neutropenic fever 05/2022    Nonrheumatic mitral valve regurgitation 03/2022    PAF (paroxysmal atrial fibrillation)     FOLLOWED BY DR. YOSI LANGLEY    Pancytopenia     Pleural effusion, bilateral 06/2022    Pulmonary nodule     Seasonal allergies     Thrombocytopenia 08/2022    Venous insufficiency     Vitamin D deficiency        Past Surgical History:   Procedure Laterality Date    CHOLECYSTECTOMY N/A 10/12/2011    LAPAROSCOPIC, DR. CHANCE KLINE AT [facility]    COLONOSCOPY N/A 2000    1 or 2 polyps, otherwise normal per patient    COLONOSCOPY W/ POLYPECTOMY N/A 01/24/2012    3 MM TUBULAR ADENOMA POLYP IN CECUM, DIVERTICULOSIS IN RECTO-SIGMOID, RESCOPE IN 3 YRS, DR. CHANCE KLINE AT [facility]    CYST REMOVAL Left 10/12/2011    LEFT SCALP, PATH: BENIGN ADNEXAL NEOPLASM FAVORING ECCRINE SPIRADENOMA, DR. CHANCE KLINE AT [facility]    ERCP N/A 10/21/2022    Procedure: ENDOSCOPIC RETROGRADE CHOLANGIOPANCREATOGRAPHY with sphincterotomy and balloon sweep;  Surgeon: Peyman Ortega MD;  Location: [facility] ENDOSCOPY;  Service: Gastroenterology;  Laterality: N/A;  PRE: Common Duct Stones  POST: Common Duct Stones    VENOUS ACCESS DEVICE (PORT) INSERTION Left 03/23/2022    Procedure: INSERTION VENOUS ACCESS DEVICE;  Surgeon: Alin Delgado MD;  Location: [facility] OR [facility];  Service: General;  Laterality: Left;        Anthropometrics        Current Height  Current Weight  BMI kg/m2 Height: 152.4 cm (60\")  Weight: 54.2 kg (119 lb 7.8 oz) (05/24/24 1849)  Body mass index is 23.34 kg/m².   Adjusted BMI (if applicable)    BMI Category Normal/Healthy (18.4 - 24.9)   Ideal Body Weight (IBW) 100lb   Usual Body Weight (UBW) 150lb   Weight Trend Loss 15% x 3 months   Weight " History Wt Readings from Last 30 Encounters:   05/24/24 1849 54.2 kg (119 lb 7.8 oz)   05/24/24 1701 51.7 kg (114 lb)   05/24/24 1200 52.1 kg (114 lb 14.4 oz)   05/13/24 1353 53.9 kg (118 lb 12.8 oz)   05/10/24 1258 54.5 kg (120 lb 1.6 oz)   05/03/24 1037 54.6 kg (120 lb 4.8 oz)   04/02/24 1318 62.1 kg (136 lb 12.8 oz)   04/01/24 1027 60.8 kg (134 lb)   03/06/24 1341 62.2 kg (137 lb 3.2 oz)   03/05/24 0840 60.1 kg (132 lb 9.6 oz)   02/14/24 1300 64 kg (141 lb)   02/13/24 0833 62.3 kg (137 lb 6.4 oz)   01/24/24 1309 64.7 kg (142 lb 9.6 oz)   01/23/24 0903 62.7 kg (138 lb 4.8 oz)   01/03/24 1309 65.2 kg (143 lb 12.8 oz)   01/02/24 0846 63.9 kg (140 lb 14.4 oz)   12/27/23 1103 63.4 kg (139 lb 11.2 oz)   12/13/23 1431 67.6 kg (149 lb)   12/12/23 0841 67.1 kg (147 lb 14.4 oz)   12/11/23 1149 67.3 kg (148 lb 4.8 oz)   12/06/23 1152 67.2 kg (148 lb 1.6 oz)   11/21/23 1514 68.9 kg (152 lb)   11/10/23 1520 68.9 kg (152 lb)   10/23/23 1301 68.9 kg (152 lb)   08/29/23 1608 69.4 kg (153 lb)   08/07/23 1513 68.9 kg (152 lb)   07/06/23 1143 68.7 kg (151 lb 6.4 oz)   06/06/23 1612 68 kg (150 lb)   05/15/23 1001 68.9 kg (152 lb)   03/21/23 1348 69.1 kg (152 lb 6.4 oz)   03/14/23 1529 67.1 kg (148 lb)        Estimated/Assessed Needs        Current Weight  Weight: 54.2 kg (119 lb 7.8 oz) (05/24/24 1849)       Energy Requirements    Weight for Calculation 54.2 kg   Method for Estimation  30 kcal/kg   EST Needs (kcal/day) 1626       Protein Requirements    Weight for Calculation 54.2 kg   EST Protein Needs (g/kg) 1.2 gm/kg   EST Daily Needs (g/day) 65       Fluid Requirements     Method for Estimation Per fluid restriction    EST Needs (mL/day) 1000     Labs       Pertinent Labs    Results from last 7 days   Lab Units 05/25/24  0311 05/24/24  1150   SODIUM mmol/L 128* 126*   POTASSIUM mmol/L 4.2 5.6*   CHLORIDE mmol/L 97* 90*   CO2 mmol/L 22.2 22.4   BUN mg/dL 18 26*   CREATININE mg/dL 0.75 0.95   CALCIUM mg/dL 8.2* 8.7   BILIRUBIN  "mg/dL 0.3 0.3   ALK PHOS U/L 62 72   ALT (SGPT) U/L 32 42*   AST (SGOT) U/L 34* 51*   GLUCOSE mg/dL 90 103*     Results from last 7 days   Lab Units 05/25/24  0311   MAGNESIUM mg/dL 1.9   HEMOGLOBIN g/dL 7.8*   HEMATOCRIT % 23.4*   WBC 10*3/mm3 0.86*   ALBUMIN g/dL 2.5*     Results from last 7 days   Lab Units 05/25/24  0311 05/24/24  1150 05/20/24  1433   PLATELETS 10*3/mm3 170 240 196     COVID19   Date Value Ref Range Status   04/26/2022 Not Detected Not Detected - Ref. Range Final     No results found for: \"HGBA1C\"       Medications           Scheduled Medications amiodarone, 200 mg, Oral, Q24H  apixaban, 2.5 mg, Oral, Q12H  carvedilol, 3.125 mg, Oral, BID With Meals  cefepime, 2,000 mg, Intravenous, Q12H  cetirizine, 10 mg, Oral, Daily  ferrous sulfate, 325 mg, Oral, Every Other Day  Hydrocortisone (Perianal), , Rectal, Daily  multivitamin with minerals, 1 tablet, Oral, Daily  sodium chloride, 10 mL, Intravenous, Q12H  sodium chloride, 1 g, Oral, Daily       Infusions     PRN Medications   acetaminophen **OR** acetaminophen **OR** acetaminophen    albuterol    senna-docusate sodium **AND** polyethylene glycol **AND** bisacodyl **AND** bisacodyl    nitroglycerin    ondansetron    sodium chloride    sodium chloride     Physical Findings          General Findings alert, hard of hearing, loss of muscle mass, loss of subcutaneous fat, oriented   Oral/Mouth Cavity dentures - ill fitting   Edema  2+ (mild)   Gastrointestinal WDL, last bowel movement: 5/23   Skin  skin tear   Tubes/Drains/Lines none   NFPE See Malnutrition Severity Assessment, Date Completed: 5/25     Malnutrition Severity Assessment      Patient meets criteria for : Severe Malnutrition  Malnutrition Type (Last 8 Hours)       Malnutrition Severity Assessment       Row Name 05/25/24 1052       Malnutrition Severity Assessment    Malnutrition Type Chronic Disease - Related Malnutrition      Row Name 05/25/24 1052       Insufficient Energy Intake     " Insufficient Energy Intake Findings Severe    Insufficient Energy Intake  <75% of est. energy requirement for > or equal to 1 month      Row Name 05/25/24 1052       Unintentional Weight Loss     Unintentional Weight Loss Findings Severe  15%    Unintentional Weight Loss  Weight loss greater than 10% in six months      Row Name 05/25/24 1052       Muscle Loss    Loss of Muscle Mass Findings Severe    [place] Region Severe - deep hollowing/scooping, lack of muscle to touch, facial bones well defined    Clavicle Bone Region Moderate - some protrusion in females, visible in males    Acromion Bone Region Moderate - acromion may slightly protrude    Dorsal Hand Region Moderate - slight depression      Row Name 05/25/24 1052       Fat Loss    Subcutaneous Fat Loss Findings Moderate    Orbital Region  Moderate -  somewhat hollowness, slightly dark circles    Upper Arm Region Severe - mostly skin, very little space between folds, fingers touch      Row Name 05/25/24 1052       Criteria Met (Must meet criteria for severity in at least 2 of these categories: M Wasting, Fat Loss, Fluid, Secondary Signs, Wt. Status, Intake)    Patient meets criteria for  Severe Malnutrition                       Current Nutrition Orders & Evaluation of Intake       Oral Nutrition     Food Allergies NKFA   Current PO Diet Diet: Renal, Fluid Restriction (240 mL/tray); Low Sodium (2-3g), Low Potassium, Low Phosphorus; 1000 mL/day; Fluid Consistency: Thin (IDDSI 0)   Supplement Boost Breeze, BID   PO Evaluation     % PO Intake 25% per pt    Factors Affecting Intake: chewing difficulty, decreased appetite, dislikes hospital food   --  PES STATEMENT / NUTRITION DIAGNOSIS      Nutrition Dx Problem  Problem: Malnutrition (severe)  Etiology: Medical Diagnosis - Leukopenia. Anemia, large cell lymphoma, hyponatremia, CHF.    Signs/Symptoms: Report of Minimal PO Intake, NFPE Results, and Unintended Weight Change     NUTRITION INTERVENTION / PLAN OF CARE       Intervention Goal(s) Maintain nutrition status, Reduce/improve symptoms, Meet estimated needs, Disease management/therapy, Tolerate PO , Increase intake, and No significant weight loss         RD Intervention/Action Interview for preferences, Advise alternative selection, Advise available snack, Encourage intake, Continue to monitor, and Care plan reviewed   --      Prescription/Orders:       PO Diet       Supplements       Enteral Nutrition       Parenteral Nutrition    New Prescription Ordered? Continue same per protocol, No changes at this time   --      Monitor/Evaluation Per protocol   Discharge Plan/Needs Pending clinical course   --    RD to follow per protocol.      Electronically signed by:  Alka Shirley RD  05/25/24 10:49 EDT

## 2024-05-25 NOTE — PLAN OF CARE
Goal Outcome Evaluation:           Progress: no change  Outcome Evaluation: VSS, c/o HA x1 PRN given, turning encouraged, IVFs stopped, IV abx, boost supplement added  to meals- pt appetite very good today

## 2024-05-25 NOTE — PAYOR COMM NOTE
"Josep Brian NATALIE (83 y.o. Female)     ATTN: NURSE REVIEWER  RE: INITIAL INPT AUTH CLINICALS   REF#: [id number]  PLS REPLY TO WESTLEY GARCIA [phone] OR FAX# [phone]      Date of Birth   1940    Social Security Number       Address   [address] [place], [place] [address]    Home Phone   [phone]    MRN   [id number]       Adventism   Patient Refused    Marital Status                               Admission Date   5/24/24    Admission Type   Urgent    Admitting Provider   Abel Pelaez MD    Attending Provider   Sauol Smith MD    Department, Room/Bed   [address], E457/1       Discharge Date       Discharge Disposition       Discharge Destination                                 Attending Provider: Saulo Smith MD    Allergies: Factive [Gemifloxacin]    Isolation: None   Infection: None   Code Status: CPR    Ht: 152.4 cm (60\")   Wt: 54.2 kg (119 lb 7.8 oz)    Admission Cmt: None   Principal Problem: Leukopenia [D72.819]                   Active Insurance as of 5/24/2024       Primary Coverage       Payor Plan Insurance Group Employer/Plan Group    ANTHEM MEDICARE REPLACEMENT ANTHEM MEDICARE ADVANTAGE [id number]       Payor Plan Address Payor Plan Phone Number Payor Plan Fax Number Effective Dates    [address] [phone]  1/1/2016 - None Entered    [facility] [address]         Subscriber Name Subscriber Birth Date Member ID       BRIAN HAYWARD 1940 [id number]                     Emergency Contacts        (Rel.) Home Phone Work Phone Mobile Phone    Helder Hayward (Son) [phone] --    ERLINDA HAYWARD (Daughter) [phone] -- --    anderson hayward (Son) [phone] -- [phone]    Keely Lozano (Sister) -- -- [phone]    ZBIGNIEW PÉREZ (Brother) [phone] -- --                 History & Physical        Abel Pelaez MD at 05/24/24 1709          Internal medicine history and " physical  INTERNAL MEDICINE   [facility]       Patient Identification:  Name: Michelle Car  Age: 83 y.o.  Sex: female  :  1940  MRN: [id number]                   Primary Care Physician: Olayinka Pimentel MD                               Date of admission:2024    Chief Complaint: Sent from oncology office for evaluation of worsening neutropenia and progressive decline and worsening sense of wellbeing.    History of Present Illness:   Patient is 83-year-old female who was treated at oncology office for diffuse large B-cell lymphoma and received her last chemotherapy in 2024.  According to her she used to get sick after chemotherapy and usually used to bounce back but this time she never improved and continues to feel poorly with continued sense of ill health chills fatigue decreased appetite and starting to lose weight.  With these complaints patient presented to the oncology office today for routine follow-up and mentioned the symptoms resulting in routine blood work at the oncology office which showed evidence of hyponatremia hyperkalemia and worsening white blood cell count and hemoglobin.  His numbers are worse compared to 4 days ago.  Patient has prior history of choledocholithiasis which required ERCP and sphincterotomy.  Patient also takes oral Bactrim for chemoprophylaxis as well as acyclovir for prevention of shingles which she had in .  She was also noted to be hypertensive with the weakness at the office.  Patient herself denies any specific localizing symptoms such as burning in urination frequency urgency cough or shortness of breath.  Patient is being admitted for further care.      Past Medical History:  Past Medical History:   Diagnosis Date    Abdominal aortic atherosclerosis     Adrenal nodule 2022    Allergic rhinitis     Anemia due to chemotherapy     Bacteremia due to Escherichia coli 2022    ADMITTED TO [facility]    Bug bite 10/24/2015    WITH  CELLULITIS, LEFT LEG    CAD (coronary artery disease)     Calculus of bile duct without cholangitis or cholecystitis 09/2022    Chemotherapy induced neutropenia     Chronic anticoagulation     Chronic diastolic (congestive) heart failure     Colon polyps     CRI (chronic renal insufficiency), stage 2 (mild) 2021    Diffuse large B cell lymphoma 02/2022    MULTIPLE LYMPH NODE INVOLVEMENT, FOLLOWED BY DR. JEN PATTERSON    Drug induced constipation     Hearing loss     Hemorrhoids     History of blood transfusion 04/2022    History of chemotherapy 2022    FOLLOWED BY DR. JEN PATTERSON    Hypercalcemia 2016    resolved as of 2019    Hypercholesterolemia     Hyperkalemia 03/2022    Hypertension     Hyponatremia 02/17/2022    Leg swelling 10/2021    Mixed hyperlipidemia     Neutropenic fever 05/2022    Nonrheumatic mitral valve regurgitation 03/2022    PAF (paroxysmal atrial fibrillation)     FOLLOWED BY DR. YOSI LANGLEY    Pancytopenia     Pleural effusion, bilateral 06/2022    Pulmonary nodule     Seasonal allergies     Thrombocytopenia 08/2022    Venous insufficiency     Vitamin D deficiency      Past Surgical History:  Past Surgical History:   Procedure Laterality Date    CHOLECYSTECTOMY N/A 10/12/2011    LAPAROSCOPIC, DR. CHANCE KLINE AT [facility]    COLONOSCOPY N/A 2000    1 or 2 polyps, otherwise normal per patient    COLONOSCOPY W/ POLYPECTOMY N/A 01/24/2012    3 MM TUBULAR ADENOMA POLYP IN CECUM, DIVERTICULOSIS IN RECTO-SIGMOID, RESCOPE IN 3 YRS, DR. CHANCE KLINE AT [facility]    CYST REMOVAL Left 10/12/2011    LEFT SCALP, PATH: BENIGN ADNEXAL NEOPLASM FAVORING ECCRINE SPIRADENOMA, DR. CHANCE KLINE AT [facility]    ERCP N/A 10/21/2022    Procedure: ENDOSCOPIC RETROGRADE CHOLANGIOPANCREATOGRAPHY with sphincterotomy and balloon sweep;  Surgeon: Peyman Ortega MD;  Location: [facility] ENDOSCOPY;  Service: Gastroenterology;  Laterality: N/A;  PRE: Common Duct Stones  POST: Common Duct Stones    VENOUS ACCESS DEVICE (PORT) INSERTION  Left 03/23/2022    Procedure: INSERTION VENOUS ACCESS DEVICE;  Surgeon: Alin Delgado MD;  [place] [facility] [place] [facility] [place]  Service: General;  Laterality: Left;      Home Meds:  Medications Prior to Admission   Medication Sig Dispense Refill Last Dose    acetaminophen (TYLENOL) 500 MG tablet Take 1 tablet by mouth Every 6 (Six) Hours As Needed for Mild Pain.       acyclovir (Zovirax) 400 MG tablet Take 1 tablet by mouth 2 (Two) Times a Day. 60 tablet 7     amiodarone (PACERONE) 200 MG tablet Take 1 tablet by mouth Daily. 90 tablet 2     Calcium Carbonate (CALTRATE 600 PO) Take 1 tablet by mouth Daily.       carvedilol (COREG) 3.125 MG tablet TAKE 1 TABLET BY MOUTH TWICE DAILY WITH MEALS 180 tablet 1     Eliquis 5 MG tablet tablet TAKE 1 TABLET BY MOUTH EVERY 12 HOURS FOR ATRIAL FIBRILLATION 180 tablet 1     ferrous sulfate (FeroSul) 325 (65 FE) MG tablet Take 1 tablet by mouth Every Other Day. 36 tablet 2     furosemide (LASIX) 40 MG tablet Take 1 tablet by mouth Daily As Needed (Leg swelling). 30 tablet 0     guaiFENesin (MUCINEX) 600 MG 12 hr tablet Take 2 tablets by mouth 2 (Two) Times a Day.       Hydrocortisone, Perianal, (Anusol-HC) 2.5 % rectal cream Apply to hemorrhoids 3 times daily for 7 days during hemorrhoid flare. Include applicator. 30 g 1     lenalidomide (REVLIMID) 10 MG capsule Take 1 capsule by mouth Daily for 21 days. Take daily on Days 1-21, and off 7 days. Do not crush, break or chew. 21 capsule 0     loratadine (Claritin) 10 MG tablet Take 1 tablet by mouth Daily.       Multiple Vitamins-Minerals (CENTRUM SILVER) tablet Take 1 tablet by mouth Daily.       polyethylene glycol (MIRALAX) 17 GM/SCOOP powder Take 17 g by mouth As Needed.       pravastatin (PRAVACHOL) 40 MG tablet TAKE 1 TABLET BY MOUTH EVERY NIGHT AT BEDTIME 90 tablet 1     prochlorperazine (COMPAZINE) 5 MG tablet Take 1-2 tablets by mouth Every 6 (Six) Hours As Needed for Nausea or Vomiting. 60 tablet 1     sodium chloride 1 g tablet  TAKE 1 TABLET BY MOUTH DAILY 90 tablet 1     spironolactone (ALDACTONE) 25 MG tablet TAKE 1 TABLET BY MOUTH DAILY 90 tablet 1     sulfamethoxazole-trimethoprim (BACTRIM DS,SEPTRA DS) 800-160 MG per tablet Take 1 tablet by mouth 3 (Three) Times a Week. 12 tablet 7      Current Meds:   No current facility-administered medications for this encounter.  Allergies:  Allergies   Allergen Reactions    Factive [Gemifloxacin] Rash     Social History:   Social History     Tobacco Use    Smoking status: Never    Smokeless tobacco: Never   Substance Use Topics    Alcohol use: No      Family History:  Family History   Problem Relation Age of Onset    Malig Hyperthermia Neg Hx           Review of Systems  See history of present illness and past medical history.    As described in history of presenting illness.    Vitals:   /63 (BP Location: Left arm, Patient Position: Lying)   Pulse 67   Temp 97.7 °F (36.5 °C) (Oral)   Resp 16   SpO2 100%   I/O: No intake or output data in the 24 hours ending 05/24/24 1705  Exam:  Patient is examined using the personal protective equipment as per guidelines from infection control for this particular patient as enacted.  Hand washing was performed before and after patient interaction.  General Appearance:  Elderly ill-appearing female who does not appear to be in any acute distress   Head:    Normocephalic, without obvious abnormality, atraumatic   Eyes:  Pale conjunctiva   Ears:    Normal external ear canals, both ears   Nose:   Nares normal, septum midline, mucosa normal, no drainage    or sinus tenderness   Throat:   Lips, tongue, gums normal; oral mucosa pink and moist   Neck: Supple and submandibular and cervical adenopathy on the right side.   Back:     Symmetric, no curvature, ROM normal, no CVA tenderness   Lungs:     Clear to auscultation bilaterally, respirations unlabored   Chest Wall:    No tenderness or deformity    Heart:  S1-S2 regular   Abdomen:     Soft, non-tender, bowel  sounds active all four quadrants,     no masses, no hepatomegaly, no splenomegaly   Extremities: Chronic lower extremity edema noted   Pulses:   Pulses palpable in all extremities; symmetric all extremities   Skin: No rash noted but has bruising and ecchymotic changes   Neurologic: Alert and oriented and grossly nonfocal       Data Review:      I reviewed the patient's new clinical results.  Results from last 7 days   Lab Units 05/24/24  1150 05/20/24  1433   WBC 10*3/mm3 1.29* 1.75*   HEMOGLOBIN g/dL 9.3* 8.8*   PLATELETS 10*3/mm3 240 196     Results from last 7 days   Lab Units 05/24/24  1150   SODIUM mmol/L 126*   POTASSIUM mmol/L 5.6*   CHLORIDE mmol/L 90*   CO2 mmol/L 22.4   BUN mg/dL 26*   CREATININE mg/dL 0.95   CALCIUM mg/dL 8.7   GLUCOSE mg/dL 103*     No radiology results for the last 30 days.  Microbiology Results (last 10 days)       Procedure Component Value - Date/Time    MRSA Screen, PCR (Inpatient) - Swab, Nares [[id number]]  (Normal) Collected: 05/24/24 1736    Lab Status: Final result Specimen: Swab from Nares Updated: 05/24/24 1942     MRSA PCR No MRSA Detected    Narrative:      The negative predictive value of this diagnostic test is high and should only be used to consider de-escalating anti-MRSA therapy. A positive result may indicate colonization with MRSA and must be correlated clinically.          Brief Urine Lab Results       None              Assessment:  Active Hospital Problems    Diagnosis  POA    Leukopenia [D72.819]  Unknown    Anemia [D64.9]  Unknown    Hyperkalemia [E87.5]  Unknown    Adrenal insufficiency [E27.40]  Unknown    Weight loss [R63.4]  Unknown    Chronic diastolic CHF (congestive heart failure) [I50.32]  Yes    Paroxysmal atrial fibrillation with rapid ventricular response [I48.0]  Yes    Hyponatremia [E87.1]  Yes    Diffuse large B-cell lymphoma of lymph nodes of multiple regions [C83.38]  Yes    CRI (chronic renal insufficiency), stage 2 (mild) [N18.2]  Yes        Medical decision making/care plan: See admitting orders  Progressive leukopenia and anemia in the setting of underlying diffuse large cell B-cell lymphoma and latest chemotherapy in April with evidence of resolution of disease based on the PET scan with associated signs of ill health and weight loss-etiology unclear and it could very well be combination of factors contributing to it such as:   - primary bone marrow disorder due to chemotherapy and underlying lymphoma and   - transitioning her into myelofibrosis versus   - effect of medication such as Bactrim etc.  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  Hold her Bactrim.  Hyponatremia and hypokalemia with hypertension and elevated BUN to creatinine ratio-multifactorial including ongoing use of spironolactone and other diuretics along with ongoing use of Bactrim.  Plan is to provide her with hydration, hold her Bactrim and spironolactone, nephrology consultation, Lokelma and repeat BMP.  Patient is at risk of possible adrenal insufficiency given the associated hypertension though it could very well be due to volume depletion from ongoing diuretic use.  May need workup for underlying adrenal insufficiency if her electrolytes and hypertension are not corrected with above interventions.  Continue her salt supplementation.  Provide her with fluid restriction in her diet.  Atrial fibrillation on chronic anticoagulation therapy with prior history of rapid ventricular response-continue with amiodarone and apixaban and carvedilol.  Hypertension currently hypotensive-continue holding diuretics and continue with IV fluids  Diffuse large cell B-cell lymphoma-status post chemotherapy-consult hematology oncology service.  Chronic diastolic congestive heart failure  currently appears to be volume depleted-hold her diuretics start her on IV fluids while awaiting nephrology consultation and closely monitoring her volume status to decide the need for IV fluids.  Abel Pelaez MD   5/24/2024  17:05 EDT    Parts of this note may be an electronic transcription/translation of spoken language to printed text using the Dragon dictation system.      Electronically signed by Abel Pelaez MD at 05/25/24 0524       Facility-Administered Medications as of 5/25/2024   Medication Dose Route Frequency Provider Last Rate Last Admin    acetaminophen (TYLENOL) tablet 650 mg  650 mg Oral Q4H PRN Abel Pelaez MD   650 mg at 05/25/24 1032    Or    acetaminophen (TYLENOL) 160 MG/5ML oral solution 650 mg  650 mg Oral Q4H PRN Abel Pelaez MD        Or    acetaminophen (TYLENOL) suppository 650 mg  650 mg Rectal Q4H PRN Abel Pelaez MD        albuterol (PROVENTIL) nebulizer solution 0.083% 2.5 mg/3mL  2.5 mg Nebulization Q4H PRN Abel Pelaez MD        amiodarone (PACERONE) tablet 200 mg  200 mg Oral Q24H Abel Pelaez MD   200 mg at 05/25/24 0818    apixaban (ELIQUIS) tablet 2.5 mg  2.5 mg Oral Q12H Abel Pelaez MD   2.5 mg at 05/25/24 0818    sennosides-docusate (PERICOLACE) 8.6-50 MG per tablet 2 tablet  2 tablet Oral BID PRN Abel Pelaez MD        And    polyethylene glycol (MIRALAX) packet 17 g  17 g Oral Daily PRN Abel Pelaez MD        And    bisacodyl (DULCOLAX) EC tablet 5 mg  5 mg Oral Daily PRN Abel Pelaez MD        And    bisacodyl (DULCOLAX) suppository 10 mg  10 mg Rectal Daily PRN Abel Pelaez MD        carvedilol (COREG) tablet 3.125 mg  3.125 mg Oral BID With Meals Abel Pelaez MD        [COMPLETED] cefepime 2000 mg IVPB in 100 mL NS (MBP)  2,000 mg Intravenous Once Abel Pelaez MD   2,000 mg at 05/24/24 2135    cefepime 2000 mg IVPB in 100 mL NS (MBP)  2,000 mg Intravenous Q12H Abel Pelaez MD   2,000 mg at 05/25/24 0818    cetirizine (zyrTEC) tablet 10 mg  10 mg Oral Daily  Abel Pelaez MD   10 mg at 05/25/24 0818    ferrous sulfate tablet 325 mg  325 mg Oral Every Other Day Abel Pelaez MD        Hydrocortisone (Perianal) (ANUSOL-HC) 2.5 % rectal cream   Rectal Daily Abel Pelaez MD        multivitamin with minerals 1 tablet  1 tablet Oral Daily Abel Pelaez MD   1 tablet at 05/25/24 0818    nitroglycerin (NITROSTAT) SL tablet 0.4 mg  0.4 mg Sublingual Q5 Min PRN Abel Pelaez MD        ondansetron (ZOFRAN) injection 4 mg  4 mg Intravenous Q6H PRN bAel Pelaez MD        [COMPLETED] sodium chloride 0.9 % bolus 500 mL  500 mL Intravenous Once Radha Osullivan APRN 250 mL/hr at 05/24/24 2212 500 mL at 05/24/24 2212    sodium chloride 0.9 % flush 10 mL  10 mL Intravenous Q12H Abel Pelaez MD   10 mL at 05/25/24 0821    sodium chloride 0.9 % flush 10 mL  10 mL Intravenous PRN Abel Pelaez MD        sodium chloride 0.9 % infusion 40 mL  40 mL Intravenous PRN Abel Pelaez MD        sodium chloride tablet 1 g  1 g Oral Daily Abel Pelaez MD   1 g at 05/25/24 0818    [COMPLETED] sodium zirconium cyclosilicate (LOKELMA) packet 5 g  5 g Oral Once Abel Pelaez MD   5 g at 05/24/24 2133     Lab Results (last 24 hours)       Procedure Component Value Units Date/Time    TSH Rfx On Abnormal To Free T4 [[id number]]  (Normal) Collected: 05/25/24 1018    Specimen: Blood Updated: 05/25/24 1057     TSH 3.220 uIU/mL     Lactate Dehydrogenase [[id number]]  (Normal) Collected: 05/25/24 0311    Specimen: Blood Updated: 05/25/24 1009      U/L     Manual Differential [[id number]]  (Abnormal) Collected: 05/25/24 0311    Specimen: Blood Updated: 05/25/24 0436     Neutrophil % 62.2 %      Lymphocyte % 15.9 %      Monocyte % 19.5 %      Eosinophil % 1.2 %      Basophil % 1.2 %      Neutrophils Absolute 0.53 10*3/mm3      Lymphocytes Absolute 0.14 10*3/mm3      Monocytes Absolute 0.17 10*3/mm3      Eosinophils Absolute 0.01 10*3/mm3      Basophils Absolute 0.01 10*3/mm3      Anisocytosis Slight/1+      Smudge Cells Slight/1+     Platelet Morphology Normal    Comprehensive Metabolic Panel [[id number]]  (Abnormal) Collected: 05/25/24 0311    Specimen: Blood Updated: 05/25/24 0436     Glucose 90 mg/dL      BUN 18 mg/dL      Creatinine 0.75 mg/dL      Sodium 128 mmol/L      Potassium 4.2 mmol/L      Chloride 97 mmol/L      CO2 22.2 mmol/L      Calcium 8.2 mg/dL      Total Protein 4.6 g/dL      Albumin 2.5 g/dL      ALT (SGPT) 32 U/L      AST (SGOT) 34 U/L      Alkaline Phosphatase 62 U/L      Total Bilirubin 0.3 mg/dL      Globulin 2.1 gm/dL      A/G Ratio 1.2 g/dL      BUN/Creatinine Ratio 24.0     Anion Gap 8.8 mmol/L      eGFR 79.6 mL/min/1.73     Narrative:      GFR Normal >60  Chronic Kidney Disease <60  Kidney Failure <15    The GFR formula is only valid for adults with stable renal function between ages 18 and 70.    CBC Auto Differential [[id number]]  (Abnormal) Collected: 05/25/24 0311    Specimen: Blood Updated: 05/25/24 0425     WBC 0.86 10*3/mm3      RBC 2.32 10*6/mm3      Hemoglobin 7.8 g/dL      Hematocrit 23.4 %      .9 fL      MCH 33.6 pg      MCHC 33.3 g/dL      RDW 14.3 %      RDW-SD 52.2 fl      MPV 9.9 fL      Platelets 170 10*3/mm3     Magnesium [[id number]]  (Normal) Collected: 05/25/24 0311    Specimen: Blood Updated: 05/25/24 0407     Magnesium 1.9 mg/dL     STAT Lactic Acid, Reflex [[id number]]  (Normal) Collected: 05/25/24 0311    Specimen: Blood Updated: 05/25/24 0406     Lactate 0.7 mmol/L     Lactic Acid, Plasma [[id number]]  (Abnormal) Collected: 05/24/24 1914    Specimen: Blood Updated: 05/24/24 2057     Lactate 2.2 mmol/L     MRSA Screen, PCR (Inpatient) - Swab, Nares [[id number]]  (Normal) Collected: 05/24/24 1736    Specimen: Swab from Nares Updated: 05/24/24 1942     MRSA PCR No MRSA Detected    Narrative:      The negative predictive value of this diagnostic test is high and should only be used to consider de-escalating anti-MRSA therapy. A positive result may indicate  colonization with MRSA and must be correlated clinically.    Blood Culture - Blood, Arm, Left [[id number]] Collected: 05/24/24 1914    Specimen: Blood from Arm, Left Updated: 05/24/24 1934          Imaging Results (Last 24 Hours)       Procedure Component Value Units Date/Time    XR Chest 1 View [[id number]] Collected: 05/25/24 0926     Updated: 05/25/24 0931    Narrative:      XR CHEST 1 VW-        INDICATION: Fluid overload, left,     COMPARISON: Chest radiograph April 26, 2022     TECHNIQUE: 1 view chest     FINDINGS:      No focal opacity. No effusions. Normal mediastinal contour. Mitral  annulus calcifications. Cholecystectomy clips.       Impression:      No acute cardiopulmonary process     This report was finalized on 5/25/2024 9:28 AM by Dr. Erick Maurer M.D on Workstation: [id number]             ECG/EMG Results (last 24 hours)       Procedure Component Value Units Date/Time    Telemetry Scan [[id number]] Resulted: 05/24/24     Updated: 05/24/24 1808          Orders (last 24 hrs)        Start     Ordered    05/26/24 0600  CBC & Differential  Daily       05/25/24 0943    05/26/24 0600  Vitamin B12  Morning Draw         05/25/24 0943    05/26/24 0600  Folate  Morning Draw         05/25/24 0943    05/26/24 0600  Methylmalonic Acid, Serum  Morning Draw         05/25/24 0943    05/26/24 0600  Copper, Serum  Morning Draw         05/25/24 0944    05/25/24 1800  Dietary Nutrition Supplements Boost Breeze (Ensure Clear); mixed berry  2 Times Daily,   Status:  Canceled       05/25/24 0929    05/25/24 1200  Dietary Nutrition Supplements Boost Breeze (Ensure Clear); mixed berry  Daily With Lunch & Dinner       05/25/24 1101    05/25/24 0944  Lactate Dehydrogenase  Once         05/25/24 0943    05/25/24 0929  TSH Rfx On Abnormal To Free T4  Once         05/25/24 0929    05/25/24 0900  cefepime 2000 mg IVPB in 100 mL NS (MBP)  Every 12 Hours         05/24/24 1712    05/25/24 0845  XR Chest 1 View  1 Time Imaging          05/25/24 0844    05/25/24 0600  Comprehensive Metabolic Panel  Morning Draw         05/24/24 1710    05/25/24 0600  CBC Auto Differential  Morning Draw         05/24/24 1710 05/25/24 0600  Magnesium  Morning Draw         05/24/24 1710 05/25/24 0526  PT Consult: Eval & Treat Functional Mobility Below Baseline  Once        Comments: Reason Why PT Needed: weakness    05/25/24 0525    05/25/24 0525  Inpatient Nephrology Consult  Once        Specialty:  Nephrology  Provider:  Jr Le MD    05/25/24 0524    05/25/24 0350  Manual Differential  Once         05/25/24 0349    05/25/24 0200  cefepime 1000 mg IVPB in 100 mL NS (MBP)  Every 12 Hours,   Status:  Discontinued         05/24/24 1710 05/25/24 0045  sodium chloride 0.9 % infusion  Continuous,   Status:  Discontinued         05/24/24 2349    05/24/24 2245  sodium chloride 0.9 % bolus 500 mL  Once         05/24/24 2151 05/24/24 2214  STAT Lactic Acid, Reflex  PROCEDURE ONCE         05/24/24 2057 05/24/24 2100  apixaban (ELIQUIS) tablet 2.5 mg  Every 12 Hours Scheduled         05/24/24 1710 05/24/24 2100  sodium chloride 0.9 % flush 10 mL  Every 12 Hours Scheduled         05/24/24 1710 05/24/24 2000  Vital Signs  Every 4 Hours       05/24/24 1710 05/24/24 1855  Inpatient Case Management  Consult  Once        Provider:  (Not yet assigned)    05/24/24 1854 05/24/24 1855  Inpatient Nutrition Consult  Once        Provider:  (Not yet assigned)    05/24/24 1855 05/24/24 1800  sodium chloride tablet 1 g  Daily         05/24/24 1710 05/24/24 1800  multivitamin with minerals 1 tablet  Daily         05/24/24 1710 05/24/24 1800  cetirizine (zyrTEC) tablet 10 mg  Daily         05/24/24 1710 05/24/24 1800  Hydrocortisone (Perianal) (ANUSOL-HC) 2.5 % rectal cream  Daily         05/24/24 1710 05/24/24 1800  ferrous sulfate tablet 325 mg  Every Other Day         05/24/24 1710 05/24/24 1800  carvedilol (COREG)  "tablet 3.125 mg  2 Times Daily With Meals         05/24/24 1710 05/24/24 1800  amiodarone (PACERONE) tablet 200 mg  Every 24 Hours Scheduled         05/24/24 1710 05/24/24 1800  Oral Care  2 Times Daily       05/24/24 1710 05/24/24 1800  sodium zirconium cyclosilicate (LOKELMA) packet 5 g  Once         05/24/24 1710    05/24/24 1800  cefepime 1000 mg IVPB in 100 mL NS (MBP)  Once,   Status:  Discontinued         05/24/24 1710    05/24/24 1800  cefepime 2000 mg IVPB in 100 mL NS (MBP)  Once         05/24/24 1712    05/24/24 1726  Transfer Patient  Once         05/24/24 1726    05/24/24 1723  Transfer Patient  Once,   Status:  Canceled         05/24/24 1723    05/24/24 1711  MRSA Screen, PCR (Inpatient) - Swab, Nares  Once         05/24/24 1710 05/24/24 1711  Lactic Acid, Plasma  Once         05/24/24 1710 05/24/24 1710  Blood Culture - Blood, Arm, Left  Once         05/24/24 1710 05/24/24 1709  ondansetron (ZOFRAN) injection 4 mg  Every 6 Hours PRN         05/24/24 1710 05/24/24 1709  acetaminophen (TYLENOL) tablet 650 mg  Every 4 Hours PRN        Placed in \"Or\" Linked Group    05/24/24 1710 05/24/24 1709  acetaminophen (TYLENOL) 160 MG/5ML oral solution 650 mg  Every 4 Hours PRN        Placed in \"Or\" Linked Group    05/24/24 1710 05/24/24 1709  acetaminophen (TYLENOL) suppository 650 mg  Every 4 Hours PRN        Placed in \"Or\" Linked Group    05/24/24 1710    05/24/24 1709  sennosides-docusate (PERICOLACE) 8.6-50 MG per tablet 2 tablet  2 Times Daily PRN        Placed in \"And\" Linked Group    05/24/24 1710    05/24/24 1709  polyethylene glycol (MIRALAX) packet 17 g  Daily PRN        Placed in \"And\" Linked Group    05/24/24 1710 05/24/24 1709  bisacodyl (DULCOLAX) EC tablet 5 mg  Daily PRN        Placed in \"And\" Linked Group    05/24/24 1710    05/24/24 1709  bisacodyl (DULCOLAX) suppository 10 mg  Daily PRN        Placed in \"And\" Linked Group    05/24/24 1710    05/24/24 1709  albuterol " (PROVENTIL) nebulizer solution 0.083% 2.5 mg/3mL  Every 4 Hours PRN         05/24/24 1710    05/24/24 1708  Code Status and Medical Interventions:  Continuous         05/24/24 1710    05/24/24 1708  Place Sequential Compression Device  Once         05/24/24 1710    05/24/24 1708  Maintain Sequential Compression Device  Continuous         05/24/24 1710    05/24/24 1708  Continuous Cardiac Monitoring  Continuous        Comments: Follow Standing Orders As Outlined in Process Instructions (Open Order Report to View Full Instructions)    05/24/24 1710    05/24/24 1708  Maintain IV Access  Continuous         05/24/24 1710    05/24/24 1708  Telemetry - Place Orders & Notify Provider of Results When Patient Experiences Acute Chest Pain, Dysrhythmia or Respiratory Distress  Continuous        Comments: Open Order Report to View Parameters Requiring Provider Notification    05/24/24 1710    05/24/24 1708  May Be Off Telemetry for Tests  Continuous         05/24/24 1710    05/24/24 1708  Diet: Renal, Fluid Restriction (240 mL/tray); Low Sodium (2-3g), Low Potassium, Low Phosphorus; 1000 mL/day; Fluid Consistency: Thin (IDDSI 0)  Diet Effective Now         05/24/24 1710    05/24/24 1708  Inpatient Hematology & Oncology Consult  Once        Specialty:  Hematology and Oncology  Provider:  Renate Galo MD    05/24/24 1710    05/24/24 1707  nitroglycerin (NITROSTAT) SL tablet 0.4 mg  Every 5 Minutes PRN         05/24/24 1710    05/24/24 1707  Intake & Output  Every Shift       05/24/24 1710    05/24/24 1707  Weigh Patient  Once         05/24/24 1710    05/24/24 1707  Insert Peripheral IV  Once         05/24/24 1710    05/24/24 1707  Saline Lock & Maintain IV Access  Continuous,   Status:  Canceled         05/24/24 1710    05/24/24 1707  Inpatient Admission  Once         05/24/24 1710    05/24/24 1706  sodium chloride 0.9 % flush 10 mL  As Needed         05/24/24 1710    05/24/24 1706  sodium chloride 0.9 % infusion 40 mL  As  Needed         24 1710                  Operative/Procedure Notes (last 24 hours)  Notes from 24 1129 through 24 1129   No notes of this type exist for this encounter.       Physician Progress Notes (last 24 hours)  Notes from 24 1129 through 24 112   No notes of this type exist for this encounter.          Consult Notes (last 24 hours)        Radha Le MD at 24 0921        Consult Orders    1. Inpatient Nephrology Consult [[id number]] ordered by Abel Pelaez MD at 24 0524                         [facility]                                                                                             Nephrology Initial Consult Note    Patient Identification:  Name: Michelle Car MRN: [id number]  Age: 83 y.o. : 1940  Sex: female  Date:2024    Requesting Physician: As per consult order.  Reason for Consultation: hyponatremia and hyperkalemia   Information from:patient/ family/ chart      History of Present Illness: This is a 83 y.o. year old female  with lymphoma admitted for weakness, ,hyponatremia and edema.   She completed chemo in 2024.  She has had ongoing issues with hyponatremia.  She was on salt tabs at home Upon arrival her Na was 126 with K at 5.6.   She was given IV fluids of NS as bp was low upon arrival.  Na this am is 128.  She is eating very little due to poor dentures and food not tasting well.          The following medical history and medications personally reviewed by me:    Problem List:   Patient Active Problem List    Diagnosis     *Leukopenia [D72.819]     Anemia [D64.9]     Hyperkalemia [E87.5]     Adrenal insufficiency [E27.40]     Weight loss [R63.4]     Iron deficiency anemia [D50.9]     Diverticulosis [K57.90]     Calculus of bile duct without cholecystitis and without obstruction [K80.50]     Sepsis due to Escherichia coli (E. coli) [A41.51]     Bacteremia due to Escherichia coli [R78.81, B96.20]      Chronic diastolic CHF (congestive heart failure) [I50.32]     Vascular catheter fitting or adjustment [Z45.2]     Edema of lower extremity [R60.0]     Acute on chronic diastolic heart failure [I50.33]     Hypoxia [R09.02]     Paroxysmal atrial fibrillation with rapid ventricular response [I48.0]     Adrenal nodule [E27.8]     Dyspnea [R06.00]     Hyponatremia [E87.1]     Diffuse large B-cell lymphoma of lymph nodes of multiple regions [C83.38]     CRI (chronic renal insufficiency), stage 2 (mild) [N18.2]     Seasonal allergic rhinitis due to pollen [J30.1]     Generalized edema [R60.1]     Fatigue due to excessive exertion [T73.3XXA]     Hypertension [I10]     Hyperlipidemia [E78.5]     Abdominal aortic atherosclerosis [I70.0]        Past Medical History:  Past Medical History:   Diagnosis Date    Abdominal aortic atherosclerosis     Adrenal nodule 03/2022    Allergic rhinitis     Anemia due to chemotherapy     Bacteremia due to Escherichia coli 04/26/2022    ADMITTED TO [facility]    Bug bite 10/24/2015    WITH CELLULITIS, LEFT LEG    CAD (coronary artery disease)     Calculus of bile duct without cholangitis or cholecystitis 09/2022    Chemotherapy induced neutropenia     Chronic anticoagulation     Chronic diastolic (congestive) heart failure     Colon polyps     CRI (chronic renal insufficiency), stage 2 (mild) 2021    Diffuse large B cell lymphoma 02/2022    MULTIPLE LYMPH NODE INVOLVEMENT, FOLLOWED BY DR. JEN PATTERSON    Drug induced constipation     Hearing loss     Hemorrhoids     History of blood transfusion 04/2022    History of chemotherapy 2022    FOLLOWED BY DR. JEN PATTERSON    Hypercalcemia 2016    resolved as of 2019    Hypercholesterolemia     Hyperkalemia 03/2022    Hypertension     Hyponatremia 02/17/2022    Leg swelling 10/2021    Mixed hyperlipidemia     Neutropenic fever 05/2022    Nonrheumatic mitral valve regurgitation 03/2022    PAF (paroxysmal atrial fibrillation)     FOLLOWED BY DR. YOSI LANGLEY     Pancytopenia     Pleural effusion, bilateral 06/2022    Pulmonary nodule     Seasonal allergies     Thrombocytopenia 08/2022    Venous insufficiency     Vitamin D deficiency        Past Surgical History:  Past Surgical History:   Procedure Laterality Date    CHOLECYSTECTOMY N/A 10/12/2011    LAPAROSCOPIC, DR. CHANCE KLINE AT [facility]    COLONOSCOPY N/A 2000    1 or 2 polyps, otherwise normal per patient    COLONOSCOPY W/ POLYPECTOMY N/A 01/24/2012    3 MM TUBULAR ADENOMA POLYP IN CECUM, DIVERTICULOSIS IN RECTO-SIGMOID, RESCOPE IN 3 YRS, DR. CHANCE KLINE AT [facility]    CYST REMOVAL Left 10/12/2011    LEFT SCALP, PATH: BENIGN ADNEXAL NEOPLASM FAVORING ECCRINE SPIRADENOMA, DR. CHANCE KLINE AT [facility]    ERCP N/A 10/21/2022    Procedure: ENDOSCOPIC RETROGRADE CHOLANGIOPANCREATOGRAPHY with sphincterotomy and balloon sweep;  Surgeon: Peyman Ortega MD;  Location: [facility] ENDOSCOPY;  Service: Gastroenterology;  Laterality: N/A;  PRE: Common Duct Stones  POST: Common Duct Stones    VENOUS ACCESS DEVICE (PORT) INSERTION Left 03/23/2022    Procedure: INSERTION VENOUS ACCESS DEVICE;  Surgeon: Alin Delgado MD;  Location: [facility] [place];  Service: General;  Laterality: Left;        Home Meds:   Medications Prior to Admission   Medication Sig Dispense Refill Last Dose    acetaminophen (TYLENOL) 500 MG tablet Take 1 tablet by mouth Every 6 (Six) Hours As Needed for Mild Pain.       acyclovir (Zovirax) 400 MG tablet Take 1 tablet by mouth 2 (Two) Times a Day. 60 tablet 7     amiodarone (PACERONE) 200 MG tablet Take 1 tablet by mouth Daily. 90 tablet 2     Calcium Carbonate (CALTRATE 600 PO) Take 1 tablet by mouth Daily.       carvedilol (COREG) 3.125 MG tablet TAKE 1 TABLET BY MOUTH TWICE DAILY WITH MEALS 180 tablet 1     Eliquis 5 MG tablet tablet TAKE 1 TABLET BY MOUTH EVERY 12 HOURS FOR ATRIAL FIBRILLATION 180 tablet 1     ferrous sulfate (FeroSul) 325 (65 FE) MG tablet Take 1 tablet by mouth Every Other Day. 36 tablet 2      furosemide (LASIX) 40 MG tablet Take 1 tablet by mouth Daily As Needed (Leg swelling). 30 tablet 0     guaiFENesin (MUCINEX) 600 MG 12 hr tablet Take 2 tablets by mouth 2 (Two) Times a Day.       Hydrocortisone, Perianal, (Anusol-HC) 2.5 % rectal cream Apply to hemorrhoids 3 times daily for 7 days during hemorrhoid flare. Include applicator. 30 g 1     loratadine (Claritin) 10 MG tablet Take 1 tablet by mouth Daily.       Multiple Vitamins-Minerals (CENTRUM SILVER) tablet Take 1 tablet by mouth Daily.       polyethylene glycol (MIRALAX) 17 GM/SCOOP powder Take 17 g by mouth As Needed.       prochlorperazine (COMPAZINE) 5 MG tablet Take 1-2 tablets by mouth Every 6 (Six) Hours As Needed for Nausea or Vomiting. 60 tablet 1     spironolactone (ALDACTONE) 25 MG tablet TAKE 1 TABLET BY MOUTH DAILY 90 tablet 1     sulfamethoxazole-trimethoprim (BACTRIM DS,SEPTRA DS) 800-160 MG per tablet Take 1 tablet by mouth 3 (Three) Times a Week. 12 tablet 7     lenalidomide (REVLIMID) 10 MG capsule Take 1 capsule by mouth Daily for 21 days. Take daily on Days 1-21, and off 7 days. Do not crush, break or chew. 21 capsule 0        Current Meds:   Current Facility-Administered Medications   Medication Dose Route Frequency Provider Last Rate Last Admin    acetaminophen (TYLENOL) tablet 650 mg  650 mg Oral Q4H PRN Abel Pelaez MD   325 mg at 05/24/24 2336    Or    acetaminophen (TYLENOL) 160 MG/5ML oral solution 650 mg  650 mg Oral Q4H PRN Abel Pelaez MD        Or    acetaminophen (TYLENOL) suppository 650 mg  650 mg Rectal Q4H PRN Abel Pelaez MD        albuterol (PROVENTIL) nebulizer solution 0.083% 2.5 mg/3mL  2.5 mg Nebulization Q4H PRN Abel Pelaez MD        amiodarone (PACERONE) tablet 200 mg  200 mg Oral Q24H Abel Pelaez MD   200 mg at 05/25/24 0818    apixaban (ELIQUIS) tablet 2.5 mg  2.5 mg Oral Q12H Abel Pelaez MD   2.5 mg at 05/25/24 0818    sennosides-docusate (PERICOLACE) 8.6-50 MG per tablet 2 tablet  2 tablet Oral  BID PRN Abel Pelaez MD        And    polyethylene glycol (MIRALAX) packet 17 g  17 g Oral Daily PRN Abel Pelaez MD        And    bisacodyl (DULCOLAX) EC tablet 5 mg  5 mg Oral Daily PRN Abel Pelaez MD        And    bisacodyl (DULCOLAX) suppository 10 mg  10 mg Rectal Daily PRN Abel Pelaez MD        carvedilol (COREG) tablet 3.125 mg  3.125 mg Oral BID With Meals Abel Pelaez MD        cefepime 2000 mg IVPB in 100 mL NS (MBP)  2,000 mg Intravenous Q12H Abel Pelaez MD   2,000 mg at 05/25/24 0818    cetirizine (zyrTEC) tablet 10 mg  10 mg Oral Daily Abel Pelaez MD   10 mg at 05/25/24 0818    ferrous sulfate tablet 325 mg  325 mg Oral Every Other Day Abel Pelaez MD        Hydrocortisone (Perianal) (ANUSOL-HC) 2.5 % rectal cream   Rectal Daily Abel Pelaez MD        multivitamin with minerals 1 tablet  1 tablet Oral Daily Abel Pelaez MD   1 tablet at 05/25/24 0818    nitroglycerin (NITROSTAT) SL tablet 0.4 mg  0.4 mg Sublingual Q5 Min PRN Abel Pelaez MD        ondansetron (ZOFRAN) injection 4 mg  4 mg Intravenous Q6H PRN Abel Pelaez MD        sodium chloride 0.9 % flush 10 mL  10 mL Intravenous Q12H Abel Pelaez MD   10 mL at 05/25/24 0821    sodium chloride 0.9 % flush 10 mL  10 mL Intravenous PRN Abel Pelaez MD        sodium chloride 0.9 % infusion 40 mL  40 mL Intravenous PRN Abel Pelaez MD        sodium chloride 0.9 % infusion  75 mL/hr Intravenous Continuous Abel Pelaez MD 75 mL/hr at 05/25/24 0038 75 mL/hr at 05/25/24 0038    sodium chloride tablet 1 g  1 g Oral Daily Abel Pelaez MD   1 g at 05/25/24 0818       Allergies:  Allergies   Allergen Reactions    Factive [Gemifloxacin] Rash       Social History:   Social History     Socioeconomic History    Marital status:    Tobacco Use    Smoking status: Never    Smokeless tobacco: Never   Vaping Use    Vaping status: Never Used   Substance and Sexual Activity    Alcohol use: No    Drug use: No    Sexual activity: Not Currently      "Birth control/protection: Post-menopausal        Family History:  Family History   Problem Relation Age of Onset    Malig Hyperthermia Neg Hx         Review of Systems: as per HPI, in addition:    General:      + weakness / fatigue,                       No fevers / chills                   + weight loss  HEENT:       no dysphagia / odynophagia  Neck:           normal range of motion, no swelling  Respiratory: no cough / congestion                      No shortness of air                       No wheezing  CV:              No chest pain                       No palpitations  Abdomen/GI: no nausea / vomiting                      No diarrhea / constipation                      No abdominal pain  :             no dysuria / urinary frequency                       No urgency, normal output  Endocrine:   no polyuria / polydipsia,                      No heat or cold intolerance  Skin:           no rashes or skin breakdown   Vascular:   + edema                     No claudication  Psych:        no depression/ anxiety  Neuro:        no focal weakness, no seizures  Musculoskeletal: no joint pain or deformities      Physical Exam:  Vitals:   Temp (24hrs), Av.4 °F (36.9 °C), Min:97.7 °F (36.5 °C), Max:99 °F (37.2 °C)    BP 93/55 (BP Location: Left arm, Patient Position: Lying)   Pulse 64   Temp 99 °F (37.2 °C) (Oral)   Resp 16   Ht 152.4 cm (60\")   Wt 54.2 kg (119 lb 7.8 oz)   SpO2 98%   BMI 23.34 kg/m²   Intake/Output:     Intake/Output Summary (Last 24 hours) at 2024 0921  Last data filed at 2024 0512  Gross per 24 hour   Intake 240 ml   Output 700 ml   Net -460 ml        Wt Readings from Last 1 Encounters:   24 1849 54.2 kg (119 lb 7.8 oz)   24 1701 51.7 kg (114 lb)       Exam:    General Appearance:  Awake, alert, oriented x3, no acute distress  Frail -appearing   Head and Face:  Normocephalic, atraumatic, mucus membranes moist, oropharynx clear   Eyes:  No icterus, pupils equal round and " reactive to light, extraocular movements intact    ENMT: Moist mucosa, tongue symmetric    Neck: Supple  no jugular venous distention  no thyromegaly   Pulmonary:  Respiratory effort: Normal  Auscultation of lungs: Clear bilaterally  No wheezes  No rhonchi  Good air movement, good expansion   Chest wall:  No tenderness or deformity   Cardiovascular:  Auscultation of the heart: Normal rhythm, no murmurs  + edema of extremities    Abdomen:  Abdomen: soft, non-tender, normal bowel sounds all four quadrants, no masses   Liver and spleen: no hepatosplenomegaly   Musculoskeletal: Digits and nails: normal  Normal range of motion  No joint swelling or gross deformities    Skin: Skin inspection: color normal, no visible rashes or lesions  Skin palpation: texture, turgor normal, no palpable lesions   Lymphatic:  no cervical lymphadenopathy    Psychiatric: Judgement and insight: normal  Orientation to person place and time: normal  Mood and affect: normal       DATA:  Radiology and Labs:  The following labs and radiology results independently reviewed by me              Labs:   Recent Results (from the past 24 hour(s))   Comprehensive Metabolic Panel    Collection Time: 05/24/24 11:50 AM    Specimen: Blood   Result Value Ref Range    Glucose 103 (H) 65 - 99 mg/dL    BUN 26 (H) 8 - 23 mg/dL    Creatinine 0.95 0.57 - 1.00 mg/dL    Sodium 126 (C) 136 - 145 mmol/L    Potassium 5.6 (C) 3.5 - 5.2 mmol/L    Chloride 90 (L) 98 - 107 mmol/L    CO2 22.4 22.0 - 29.0 mmol/L    Calcium 8.7 8.6 - 10.5 mg/dL    Total Protein 5.6 (L) 6.0 - 8.5 g/dL    Albumin 3.1 (L) 3.5 - 5.2 g/dL    ALT (SGPT) 42 (H) 1 - 33 U/L    AST (SGOT) 51 (H) 1 - 32 U/L    Alkaline Phosphatase 72 39 - 117 U/L    Total Bilirubin 0.3 0.0 - 1.2 mg/dL    Globulin 2.5 gm/dL    A/G Ratio 1.2 g/dL    BUN/Creatinine Ratio 27.4 (H) 7.0 - 25.0    Anion Gap 13.6 5.0 - 15.0 mmol/L    eGFR 59.9 (L) >60.0 mL/min/1.73   Lactate Dehydrogenase    Collection Time: 05/24/24 11:50 AM     Specimen: Blood   Result Value Ref Range     (H) 135 - 214 U/L   Uric Acid    Collection Time: 05/24/24 11:50 AM    Specimen: Blood   Result Value Ref Range    Uric Acid 3.5 2.4 - 5.7 mg/dL   CBC Auto Differential    Collection Time: 05/24/24 11:50 AM    Specimen: Blood   Result Value Ref Range    WBC 1.29 (L) 3.40 - 10.80 10*3/mm3    RBC 2.77 (L) 3.77 - 5.28 10*6/mm3    Hemoglobin 9.3 (L) 12.0 - 15.9 g/dL    Hematocrit 27.9 (L) 34.0 - 46.6 %    .7 (H) 79.0 - 97.0 fL    MCH 33.6 (H) 26.6 - 33.0 pg    MCHC 33.3 31.5 - 35.7 g/dL    RDW 14.3 12.3 - 15.4 %    RDW-SD 52.2 37.0 - 54.0 fl    MPV 9.8 6.0 - 12.0 fL    Platelets 240 140 - 450 10*3/mm3    Neutrophil % 52.7 42.7 - 76.0 %    Lymphocyte % 24.0 19.6 - 45.3 %    Monocyte % 14.7 (H) 5.0 - 12.0 %    Eosinophil % 0.8 0.3 - 6.2 %    Basophil % 0.8 0.0 - 1.5 %    Immature Grans % 7.0 (H) 0.0 - 0.5 %    Neutrophils, Absolute 0.68 (L) 1.70 - 7.00 10*3/mm3    Lymphocytes, Absolute 0.31 (L) 0.70 - 3.10 10*3/mm3    Monocytes, Absolute 0.19 0.10 - 0.90 10*3/mm3    Eosinophils, Absolute 0.01 0.00 - 0.40 10*3/mm3    Basophils, Absolute 0.01 0.00 - 0.20 10*3/mm3    Immature Grans, Absolute 0.09 (H) 0.00 - 0.05 10*3/mm3    nRBC 0.0 0.0 - 0.2 /100 WBC   Iron Profile    Collection Time: 05/24/24 11:50 AM    Specimen: Blood   Result Value Ref Range    Iron 27 (L) 37 - 145 mcg/dL    Iron Saturation (TSAT) 11 (L) 20 - 50 %    Transferrin 159 (L) 200 - 360 mg/dL    TIBC 237 (L) 298 - 536 mcg/dL   Ferritin    Collection Time: 05/24/24 11:50 AM    Specimen: Blood   Result Value Ref Range    Ferritin 1,953.00 (H) 13.00 - 150.00 ng/mL   MRSA Screen, PCR (Inpatient) - Swab, Nares    Collection Time: 05/24/24  5:36 PM    Specimen: Nares; Swab   Result Value Ref Range    MRSA PCR No MRSA Detected No MRSA Detected   Lactic Acid, Plasma    Collection Time: 05/24/24  7:14 PM    Specimen: Blood   Result Value Ref Range    Lactate 2.2 (C) 0.5 - 2.0 mmol/L   STAT Lactic Acid, Reflex     Collection Time: 05/25/24  3:11 AM    Specimen: Blood   Result Value Ref Range    Lactate 0.7 0.5 - 2.0 mmol/L   Comprehensive Metabolic Panel    Collection Time: 05/25/24  3:11 AM    Specimen: Blood   Result Value Ref Range    Glucose 90 65 - 99 mg/dL    BUN 18 8 - 23 mg/dL    Creatinine 0.75 0.57 - 1.00 mg/dL    Sodium 128 (L) 136 - 145 mmol/L    Potassium 4.2 3.5 - 5.2 mmol/L    Chloride 97 (L) 98 - 107 mmol/L    CO2 22.2 22.0 - 29.0 mmol/L    Calcium 8.2 (L) 8.6 - 10.5 mg/dL    Total Protein 4.6 (L) 6.0 - 8.5 g/dL    Albumin 2.5 (L) 3.5 - 5.2 g/dL    ALT (SGPT) 32 1 - 33 U/L    AST (SGOT) 34 (H) 1 - 32 U/L    Alkaline Phosphatase 62 39 - 117 U/L    Total Bilirubin 0.3 0.0 - 1.2 mg/dL    Globulin 2.1 gm/dL    A/G Ratio 1.2 g/dL    BUN/Creatinine Ratio 24.0 7.0 - 25.0    Anion Gap 8.8 5.0 - 15.0 mmol/L    eGFR 79.6 >60.0 mL/min/1.73   CBC Auto Differential    Collection Time: 05/25/24  3:11 AM    Specimen: Blood   Result Value Ref Range    WBC 0.86 (C) 3.40 - 10.80 10*3/mm3    RBC 2.32 (L) 3.77 - 5.28 10*6/mm3    Hemoglobin 7.8 (L) 12.0 - 15.9 g/dL    Hematocrit 23.4 (L) 34.0 - 46.6 %    .9 (H) 79.0 - 97.0 fL    MCH 33.6 (H) 26.6 - 33.0 pg    MCHC 33.3 31.5 - 35.7 g/dL    RDW 14.3 12.3 - 15.4 %    RDW-SD 52.2 37.0 - 54.0 fl    MPV 9.9 6.0 - 12.0 fL    Platelets 170 140 - 450 10*3/mm3   Magnesium    Collection Time: 05/25/24  3:11 AM    Specimen: Blood   Result Value Ref Range    Magnesium 1.9 1.6 - 2.4 mg/dL   Manual Differential    Collection Time: 05/25/24  3:11 AM    Specimen: Blood   Result Value Ref Range    Neutrophil % 62.2 42.7 - 76.0 %    Lymphocyte % 15.9 (L) 19.6 - 45.3 %    Monocyte % 19.5 (H) 5.0 - 12.0 %    Eosinophil % 1.2 0.3 - 6.2 %    Basophil % 1.2 0.0 - 1.5 %    Neutrophils Absolute 0.53 (L) 1.70 - 7.00 10*3/mm3    Lymphocytes Absolute 0.14 (L) 0.70 - 3.10 10*3/mm3    Monocytes Absolute 0.17 0.10 - 0.90 10*3/mm3    Eosinophils Absolute 0.01 0.00 - 0.40 10*3/mm3    Basophils Absolute  0.01 0.00 - 0.20 10*3/mm3    Anisocytosis Slight/1+ None Seen    Smudge Cells Slight/1+ None Seen    Platelet Morphology Normal Normal       Radiology:  Imaging Results (Last 24 Hours)       Procedure Component Value Units Date/Time    XR Chest 1 View [[id number]] Resulted: 05/25/24 0855     Updated: 05/25/24 0920                 ASSESSMENT:   Problem List:   Lymphoma   Hypotension   Hyponatremia   Hyperkalemia   Chronic diastolic heart failure   Adrenal insufficiency - per chart, but doesn't follow with endo   Hypoalbuminemia   Edema       PLAN:   Will dc NS now that bp is near goal   Add boost clear per pt preference   Continue on Na tabs   Check tsh and free t4   If K rising again will consider florinef       Monitor electrolytes and volume closely   Dose all medications for GFR >50-   Limit IV dye, NSAIDS and nephrotoxic medications   I appreciate the opportunity to participate in this patient's care.  Please call with any questions or concerns.     Radha Le M.D  [facility]  Office phone number: [phone]  Answering service phone number: [phone]        5/25/2024            Electronically signed by Radha Le MD at 05/25/24 0135

## 2024-05-26 LAB
ABO GROUP BLD: NORMAL
ANION GAP SERPL CALCULATED.3IONS-SCNC: 8 MMOL/L (ref 5–15)
BASOPHILS # BLD MANUAL: 0.02 10*3/MM3 (ref 0–0.2)
BASOPHILS NFR BLD MANUAL: 2 % (ref 0–1.5)
BILIRUB UR QL STRIP: NEGATIVE
BLD GP AB SCN SERPL QL: NEGATIVE
BUN SERPL-MCNC: 15 MG/DL (ref 8–23)
BUN/CREAT SERPL: 20 (ref 7–25)
CALCIUM SPEC-SCNC: 7.9 MG/DL (ref 8.6–10.5)
CHLORIDE SERPL-SCNC: 99 MMOL/L (ref 98–107)
CLARITY UR: CLEAR
CO2 SERPL-SCNC: 25 MMOL/L (ref 22–29)
COLOR UR: YELLOW
CORTIS SERPL-MCNC: 21.5 MCG/DL
CREAT SERPL-MCNC: 0.75 MG/DL (ref 0.57–1)
CREAT UR-MCNC: 38.8 MG/DL
DEPRECATED RDW RBC AUTO: 49.6 FL (ref 37–54)
EGFRCR SERPLBLD CKD-EPI 2021: 79.6 ML/MIN/1.73
EOSINOPHIL # BLD MANUAL: 0 10*3/MM3 (ref 0–0.4)
EOSINOPHIL NFR BLD MANUAL: 0 % (ref 0.3–6.2)
ERYTHROCYTE [DISTWIDTH] IN BLOOD BY AUTOMATED COUNT: 14 % (ref 12.3–15.4)
FOLATE SERPL-MCNC: 11.4 NG/ML (ref 4.78–24.2)
GLUCOSE SERPL-MCNC: 99 MG/DL (ref 65–99)
GLUCOSE UR STRIP-MCNC: NEGATIVE MG/DL
HCT VFR BLD AUTO: 23.9 % (ref 34–46.6)
HGB BLD-MCNC: 8.2 G/DL (ref 12–15.9)
HGB UR QL STRIP.AUTO: NEGATIVE
KETONES UR QL STRIP: NEGATIVE
LEUKOCYTE ESTERASE UR QL STRIP.AUTO: NEGATIVE
LYMPHOCYTES # BLD MANUAL: 0.22 10*3/MM3 (ref 0.7–3.1)
LYMPHOCYTES NFR BLD MANUAL: 14 % (ref 5–12)
MCH RBC QN AUTO: 33.7 PG (ref 26.6–33)
MCHC RBC AUTO-ENTMCNC: 34.3 G/DL (ref 31.5–35.7)
MCV RBC AUTO: 98.4 FL (ref 79–97)
METAMYELOCYTES NFR BLD MANUAL: 2 % (ref 0–0)
MONOCYTES # BLD: 0.14 10*3/MM3 (ref 0.1–0.9)
NEUTROPHILS # BLD AUTO: 0.61 10*3/MM3 (ref 1.7–7)
NEUTROPHILS NFR BLD MANUAL: 60 % (ref 42.7–76)
NITRITE UR QL STRIP: NEGATIVE
OSMOLALITY UR: 367 MOSM/KG
OVALOCYTES BLD QL SMEAR: ABNORMAL
PH UR STRIP.AUTO: 6 [PH] (ref 5–8)
PLAT MORPH BLD: NORMAL
PLATELET # BLD AUTO: 174 10*3/MM3 (ref 140–450)
PMV BLD AUTO: 9 FL (ref 6–12)
POIKILOCYTOSIS BLD QL SMEAR: ABNORMAL
POLYCHROMASIA BLD QL SMEAR: ABNORMAL
POTASSIUM SERPL-SCNC: 3.9 MMOL/L (ref 3.5–5.2)
PROT UR QL STRIP: NEGATIVE
RBC # BLD AUTO: 2.43 10*6/MM3 (ref 3.77–5.28)
RH BLD: NEGATIVE
SMUDGE CELLS BLD QL SMEAR: ABNORMAL
SODIUM SERPL-SCNC: 132 MMOL/L (ref 136–145)
SODIUM UR-SCNC: 92 MMOL/L
SP GR UR STRIP: 1.01 (ref 1–1.03)
T&S EXPIRATION DATE: NORMAL
UROBILINOGEN UR QL STRIP: NORMAL
VARIANT LYMPHS NFR BLD MANUAL: 22 % (ref 19.6–45.3)
VIT B12 BLD-MCNC: >2000 PG/ML (ref 211–946)
WBC NRBC COR # BLD AUTO: 1.01 10*3/MM3 (ref 3.4–10.8)

## 2024-05-26 PROCEDURE — 85007 BL SMEAR W/DIFF WBC COUNT: CPT | Performed by: INTERNAL MEDICINE

## 2024-05-26 PROCEDURE — P9016 RBC LEUKOCYTES REDUCED: HCPCS

## 2024-05-26 PROCEDURE — 80048 BASIC METABOLIC PNL TOTAL CA: CPT | Performed by: INTERNAL MEDICINE

## 2024-05-26 PROCEDURE — 86850 RBC ANTIBODY SCREEN: CPT | Performed by: INTERNAL MEDICINE

## 2024-05-26 PROCEDURE — 86900 BLOOD TYPING SEROLOGIC ABO: CPT | Performed by: INTERNAL MEDICINE

## 2024-05-26 PROCEDURE — 86901 BLOOD TYPING SEROLOGIC RH(D): CPT | Performed by: INTERNAL MEDICINE

## 2024-05-26 PROCEDURE — 86923 COMPATIBILITY TEST ELECTRIC: CPT

## 2024-05-26 PROCEDURE — 85025 COMPLETE CBC W/AUTO DIFF WBC: CPT | Performed by: INTERNAL MEDICINE

## 2024-05-26 PROCEDURE — 36430 TRANSFUSION BLD/BLD COMPNT: CPT

## 2024-05-26 PROCEDURE — 82570 ASSAY OF URINE CREATININE: CPT | Performed by: INTERNAL MEDICINE

## 2024-05-26 PROCEDURE — 82533 TOTAL CORTISOL: CPT | Performed by: INTERNAL MEDICINE

## 2024-05-26 PROCEDURE — 83935 ASSAY OF URINE OSMOLALITY: CPT | Performed by: INTERNAL MEDICINE

## 2024-05-26 PROCEDURE — 84300 ASSAY OF URINE SODIUM: CPT | Performed by: INTERNAL MEDICINE

## 2024-05-26 PROCEDURE — 81003 URINALYSIS AUTO W/O SCOPE: CPT | Performed by: INTERNAL MEDICINE

## 2024-05-26 PROCEDURE — 25010000002 HYDROCORTISONE SOD SUC (PF) 100 MG RECONSTITUTED SOLUTION: Performed by: INTERNAL MEDICINE

## 2024-05-26 PROCEDURE — 99232 SBSQ HOSP IP/OBS MODERATE 35: CPT | Performed by: INTERNAL MEDICINE

## 2024-05-26 PROCEDURE — 83921 ORGANIC ACID SINGLE QUANT: CPT | Performed by: INTERNAL MEDICINE

## 2024-05-26 PROCEDURE — 82607 VITAMIN B-12: CPT | Performed by: INTERNAL MEDICINE

## 2024-05-26 PROCEDURE — 25010000002 CEFEPIME PER 500 MG: Performed by: INTERNAL MEDICINE

## 2024-05-26 PROCEDURE — 97162 PT EVAL MOD COMPLEX 30 MIN: CPT

## 2024-05-26 PROCEDURE — 82746 ASSAY OF FOLIC ACID SERUM: CPT | Performed by: INTERNAL MEDICINE

## 2024-05-26 PROCEDURE — 63710000001 DIPHENHYDRAMINE PER 50 MG: Performed by: INTERNAL MEDICINE

## 2024-05-26 PROCEDURE — 97530 THERAPEUTIC ACTIVITIES: CPT

## 2024-05-26 PROCEDURE — 86900 BLOOD TYPING SEROLOGIC ABO: CPT

## 2024-05-26 RX ORDER — MIDODRINE HYDROCHLORIDE 5 MG/1
5 TABLET ORAL
Status: DISCONTINUED | OUTPATIENT
Start: 2024-05-26 | End: 2024-06-01

## 2024-05-26 RX ORDER — ACETAMINOPHEN 325 MG/1
650 TABLET ORAL ONCE
Status: COMPLETED | OUTPATIENT
Start: 2024-05-26 | End: 2024-05-26

## 2024-05-26 RX ORDER — DIPHENHYDRAMINE HCL 25 MG
25 CAPSULE ORAL ONCE
Status: COMPLETED | OUTPATIENT
Start: 2024-05-26 | End: 2024-05-26

## 2024-05-26 RX ADMIN — FERROUS SULFATE TAB 325 MG (65 MG ELEMENTAL FE) 325 MG: 325 (65 FE) TAB at 08:38

## 2024-05-26 RX ADMIN — Medication 10 ML: at 09:14

## 2024-05-26 RX ADMIN — AMIODARONE HYDROCHLORIDE 200 MG: 200 TABLET ORAL at 09:14

## 2024-05-26 RX ADMIN — DIPHENHYDRAMINE HYDROCHLORIDE 25 MG: 25 CAPSULE ORAL at 17:23

## 2024-05-26 RX ADMIN — APIXABAN 2.5 MG: 2.5 TABLET, FILM COATED ORAL at 22:34

## 2024-05-26 RX ADMIN — MIDODRINE HYDROCHLORIDE 5 MG: 5 TABLET ORAL at 12:52

## 2024-05-26 RX ADMIN — CETIRIZINE HYDROCHLORIDE 10 MG: 10 TABLET ORAL at 08:38

## 2024-05-26 RX ADMIN — Medication 10 ML: at 22:34

## 2024-05-26 RX ADMIN — CEFEPIME 2000 MG: 2 INJECTION, POWDER, FOR SOLUTION INTRAVENOUS at 09:14

## 2024-05-26 RX ADMIN — APIXABAN 2.5 MG: 2.5 TABLET, FILM COATED ORAL at 09:14

## 2024-05-26 RX ADMIN — MIDODRINE HYDROCHLORIDE 5 MG: 5 TABLET ORAL at 17:23

## 2024-05-26 RX ADMIN — ACETAMINOPHEN 650 MG: 325 TABLET, FILM COATED ORAL at 15:55

## 2024-05-26 RX ADMIN — SODIUM CHLORIDE TAB 1 GM 1 G: 1 TAB at 08:42

## 2024-05-26 RX ADMIN — HYDROCORTISONE SODIUM SUCCINATE 100 MG: 100 INJECTION, POWDER, FOR SOLUTION INTRAMUSCULAR; INTRAVENOUS at 17:24

## 2024-05-26 RX ADMIN — Medication 1 TABLET: at 08:38

## 2024-05-26 NOTE — PROGRESS NOTES
Nephrology Follow Up Note    Patient Identification:  Name: Michelle Car MRN: [id number]  Age: 83 y.o. : 1940  Sex: female  Date:2024    Requesting Physician: As per consult order.  Reason for Consultation: hyponatremia   Information from:patient/ family/ chart      History of Present Illness: This is a 83 y.o. year old female  with lymphoma admitted for weakness, ,hyponatremia and edema.   She completed chemo in 2024.  She has had ongoing issues with hyponatremia.  She was on salt tabs at home Upon arrival her Na was 126 with K at 5.6.   She was given IV fluids of NS as bp was low upon arrival.       feels better, Na now at 132, bp near goal        The following medical history and medications personally reviewed by me:    Problem List:   Patient Active Problem List    Diagnosis     *Leukopenia [D72.819]     Severe malnutrition [E43]     Anemia [D64.9]     Hyperkalemia [E87.5]     Adrenal insufficiency [E27.40]     Weight loss [R63.4]     Iron deficiency anemia [D50.9]     Diverticulosis [K57.90]     Calculus of bile duct without cholecystitis and without obstruction [K80.50]     Sepsis due to Escherichia coli (E. coli) [A41.51]     Bacteremia due to Escherichia coli [R78.81, B96.20]     Chronic diastolic CHF (congestive heart failure) [I50.32]     Vascular catheter fitting or adjustment [Z45.2]     Edema of lower extremity [R60.0]     Acute on chronic diastolic heart failure [I50.33]     Hypoxia [R09.02]     Paroxysmal atrial fibrillation with rapid ventricular response [I48.0]     Adrenal nodule [E27.8]     Dyspnea [R06.00]     Hyponatremia [E87.1]     Diffuse large B-cell lymphoma of lymph nodes of multiple regions [C83.38]     CRI (chronic renal insufficiency), stage 2 (mild) [N18.2]     Seasonal allergic rhinitis due to pollen [J30.1]     Generalized edema [R60.1]     Fatigue due to excessive exertion [T73.3XXA]     Hypertension [I10]     Hyperlipidemia [E78.5]     Abdominal  aortic atherosclerosis [I70.0]        Past Medical History:  Past Medical History:   Diagnosis Date    Abdominal aortic atherosclerosis     Adrenal nodule 03/2022    Allergic rhinitis     Anemia due to chemotherapy     Bacteremia due to Escherichia coli 04/26/2022    ADMITTED TO [facility]    Bug bite 10/24/2015    WITH CELLULITIS, LEFT LEG    CAD (coronary artery disease)     Calculus of bile duct without cholangitis or cholecystitis 09/2022    Chemotherapy induced neutropenia     Chronic anticoagulation     Chronic diastolic (congestive) heart failure     Colon polyps     CRI (chronic renal insufficiency), stage 2 (mild) 2021    Diffuse large B cell lymphoma 02/2022    MULTIPLE LYMPH NODE INVOLVEMENT, FOLLOWED BY DR. JEN PATTERSON    Drug induced constipation     Hearing loss     Hemorrhoids     History of blood transfusion 04/2022    History of chemotherapy 2022    FOLLOWED BY DR. JEN PATTERSON    Hypercalcemia 2016    resolved as of 2019    Hypercholesterolemia     Hyperkalemia 03/2022    Hypertension     Hyponatremia 02/17/2022    Leg swelling 10/2021    Mixed hyperlipidemia     Neutropenic fever 05/2022    Nonrheumatic mitral valve regurgitation 03/2022    PAF (paroxysmal atrial fibrillation)     FOLLOWED BY DR. YOSI LANGLEY    Pancytopenia     Pleural effusion, bilateral 06/2022    Pulmonary nodule     Seasonal allergies     Thrombocytopenia 08/2022    Venous insufficiency     Vitamin D deficiency        Past Surgical History:  Past Surgical History:   Procedure Laterality Date    CHOLECYSTECTOMY N/A 10/12/2011    LAPAROSCOPIC, DR. CHANCE KLINE AT [facility]    COLONOSCOPY N/A 2000    1 or 2 polyps, otherwise normal per patient    COLONOSCOPY W/ POLYPECTOMY N/A 01/24/2012    3 MM TUBULAR ADENOMA POLYP IN CECUM, DIVERTICULOSIS IN RECTO-SIGMOID, RESCOPE IN 3 YRS, DR. CHANCE KLINE AT [facility]    CYST REMOVAL Left 10/12/2011    LEFT SCALP, PATH: BENIGN ADNEXAL NEOPLASM FAVORING ECCRINE SPIRADENOMA, DR. CHANCE KLINE AT [facility]    ERCP N/A  10/21/2022    Procedure: ENDOSCOPIC RETROGRADE CHOLANGIOPANCREATOGRAPHY with sphincterotomy and balloon sweep;  Surgeon: Peyman Ortega MD;  Location: [facility] ENDOSCOPY;  Service: Gastroenterology;  Laterality: N/A;  PRE: Common Duct Stones  POST: Common Duct Stones    VENOUS ACCESS DEVICE (PORT) INSERTION Left 03/23/2022    Procedure: INSERTION VENOUS ACCESS DEVICE;  Surgeon: Alin Delgado MD;  Location: [facility] [place] [facility] [place];  Service: General;  Laterality: Left;        Home Meds:   Medications Prior to Admission   Medication Sig Dispense Refill Last Dose    acetaminophen (TYLENOL) 500 MG tablet Take 1 tablet by mouth Every 6 (Six) Hours As Needed for Mild Pain.       acyclovir (Zovirax) 400 MG tablet Take 1 tablet by mouth 2 (Two) Times a Day. 60 tablet 7     amiodarone (PACERONE) 200 MG tablet Take 1 tablet by mouth Daily. 90 tablet 2     Calcium Carbonate (CALTRATE 600 PO) Take 1 tablet by mouth Daily.       carvedilol (COREG) 3.125 MG tablet TAKE 1 TABLET BY MOUTH TWICE DAILY WITH MEALS 180 tablet 1     Eliquis 5 MG tablet tablet TAKE 1 TABLET BY MOUTH EVERY 12 HOURS FOR ATRIAL FIBRILLATION 180 tablet 1     ferrous sulfate (FeroSul) 325 (65 FE) MG tablet Take 1 tablet by mouth Every Other Day. 36 tablet 2     furosemide (LASIX) 40 MG tablet Take 1 tablet by mouth Daily As Needed (Leg swelling). 30 tablet 0     guaiFENesin (MUCINEX) 600 MG 12 hr tablet Take 2 tablets by mouth 2 (Two) Times a Day.       Hydrocortisone, Perianal, (Anusol-HC) 2.5 % rectal cream Apply to hemorrhoids 3 times daily for 7 days during hemorrhoid flare. Include applicator. 30 g 1     loratadine (Claritin) 10 MG tablet Take 1 tablet by mouth Daily.       Multiple Vitamins-Minerals (CENTRUM SILVER) tablet Take 1 tablet by mouth Daily.       polyethylene glycol (MIRALAX) 17 GM/SCOOP powder Take 17 g by mouth As Needed.       prochlorperazine (COMPAZINE) 5 MG tablet Take 1-2 tablets by mouth Every 6 (Six) Hours As Needed for Nausea or  Vomiting. 60 tablet 1     spironolactone (ALDACTONE) 25 MG tablet TAKE 1 TABLET BY MOUTH DAILY 90 tablet 1     lenalidomide (REVLIMID) 10 MG capsule Take 1 capsule by mouth Daily for 21 days. Take daily on Days 1-21, and off 7 days. Do not crush, break or chew. 21 capsule 0        Current Meds:   Current Facility-Administered Medications   Medication Dose Route Frequency Provider Last Rate Last Admin    acetaminophen (TYLENOL) tablet 650 mg  650 mg Oral Q4H PRN Abel Pelaez MD   650 mg at 05/25/24 1032    Or    acetaminophen (TYLENOL) 160 MG/5ML oral solution 650 mg  650 mg Oral Q4H PRN Abel Pelaez MD        Or    acetaminophen (TYLENOL) suppository 650 mg  650 mg Rectal Q4H PRN Abel Pelaez MD        albuterol (PROVENTIL) nebulizer solution 0.083% 2.5 mg/3mL  2.5 mg Nebulization Q4H PRN Abel Pelaez MD        amiodarone (PACERONE) tablet 200 mg  200 mg Oral Q24H Abel Pelaez MD   200 mg at 05/26/24 0914    apixaban (ELIQUIS) tablet 2.5 mg  2.5 mg Oral Q12H Abel Pelaez MD   2.5 mg at 05/26/24 0914    sennosides-docusate (PERICOLACE) 8.6-50 MG per tablet 2 tablet  2 tablet Oral BID PRN Abel Pelaez MD        And    polyethylene glycol (MIRALAX) packet 17 g  17 g Oral Daily PRN Abel Pelaez MD        And    bisacodyl (DULCOLAX) EC tablet 5 mg  5 mg Oral Daily PRN Abel Pelaez MD        And    bisacodyl (DULCOLAX) suppository 10 mg  10 mg Rectal Daily PRN Abel Pelaez MD        carvedilol (COREG) tablet 3.125 mg  3.125 mg Oral Daily Saulo Smith MD        cefepime 2000 mg IVPB in 100 mL NS (MBP)  2,000 mg Intravenous Q12H Abel Pelaez MD   2,000 mg at 05/26/24 0914    cetirizine (zyrTEC) tablet 10 mg  10 mg Oral Daily Abel Pelaez MD   10 mg at 05/26/24 0838    ferrous sulfate tablet 325 mg  325 mg Oral Every Other Day Abel Pelaez MD   325 mg at 05/26/24 0838    Hydrocortisone (Perianal) (ANUSOL-HC) 2.5 % rectal cream   Rectal Daily Abel Pelaez MD        multivitamin with minerals 1 tablet  1  "tablet Oral Daily Abel Pelaez MD   1 tablet at 24 0838    nitroglycerin (NITROSTAT) SL tablet 0.4 mg  0.4 mg Sublingual Q5 Min PRN Abel Pelaez MD        ondansetron (ZOFRAN) injection 4 mg  4 mg Intravenous Q6H PRN Abel Pelaez MD        sodium chloride 0.9 % flush 10 mL  10 mL Intravenous Q12H Abel Pelaez MD   10 mL at 24 0914    sodium chloride 0.9 % flush 10 mL  10 mL Intravenous PRN Abel Pelaez MD        sodium chloride 0.9 % infusion 40 mL  40 mL Intravenous PRN Abel Pelaez MD        sodium chloride tablet 1 g  1 g Oral Daily Abel Pelaez MD   1 g at 24 0842       Allergies:  Allergies   Allergen Reactions    Factive [Gemifloxacin] Rash       Social History:   Social History     Socioeconomic History    Marital status:    Tobacco Use    Smoking status: Never    Smokeless tobacco: Never   Vaping Use    Vaping status: Never Used   Substance and Sexual Activity    Alcohol use: No    Drug use: No    Sexual activity: Not Currently     Birth control/protection: Post-menopausal        Family History:  Family History   Problem Relation Age of Onset    Malig Hyperthermia Neg Hx         Review of Systems: as per HPI, in addition:    ROS   No chest pain/palpataions   No cough congestion   No syncope or seizure   No rash   No diarrhea/vomiting/melena               Physical Exam:  Vitals:   Temp (24hrs), Av.6 °F (37 °C), Min:97.9 °F (36.6 °C), Max:99.3 °F (37.4 °C)    BP 91/56 (BP Location: Left arm, Patient Position: Lying) Comment: checked twice  Pulse 82   Temp 98.8 °F (37.1 °C) (Oral)   Resp 16   Ht 152.4 cm (60\")   Wt 54.2 kg (119 lb 7.8 oz)   SpO2 96%   BMI 23.34 kg/m²   Intake/Output:     Intake/Output Summary (Last 24 hours) at 2024 0959  Last data filed at 2024 0926  Gross per 24 hour   Intake 100 ml   Output 1000 ml   Net -900 ml        Wt Readings from Last 1 Encounters:   24 1849 54.2 kg (119 lb 7.8 oz)   24 1701 51.7 kg (114 lb) "       Exam:  Alert and oriented NAD   eomi no icterus   Moist mucus membranes   No jvd reg s1s2 no murmur   Clear bilaterally no rales/rhonchi/wheeze   Soft NTND + bs   5/5 strength bilaterally  No edema   Warm dry no rash   Normal mood and judgement           DATA:  Radiology and Labs:  The following labs and radiology results independently reviewed by me              Labs:   Recent Results (from the past 24 hour(s))   TSH Rfx On Abnormal To Free T4    Collection Time: 05/25/24 10:18 AM    Specimen: Blood   Result Value Ref Range    TSH 3.220 0.270 - 4.200 uIU/mL   Haptoglobin    Collection Time: 05/25/24 10:18 AM    Specimen: Blood   Result Value Ref Range    Haptoglobin 237 (H) 30 - 200 mg/dL   CK    Collection Time: 05/25/24 10:18 AM    Specimen: Blood   Result Value Ref Range    Creatine Kinase 16 (L) 20 - 180 U/L   Procalcitonin    Collection Time: 05/25/24 10:18 AM    Specimen: Blood   Result Value Ref Range    Procalcitonin 0.10 0.00 - 0.25 ng/mL   Reticulocytes    Collection Time: 05/25/24 12:45 PM    Specimen: Blood   Result Value Ref Range    Reticulocyte % 2.97 (H) 0.70 - 1.90 %    Reticulocyte Absolute 0.0808 0.0200 - 0.1300 10*6/mm3   Vitamin B12    Collection Time: 05/26/24  5:25 AM    Specimen: Blood   Result Value Ref Range    Vitamin B-12 >2,000 (H) 211 - 946 pg/mL   Folate    Collection Time: 05/26/24  5:25 AM    Specimen: Blood   Result Value Ref Range    Folate 11.40 4.78 - 24.20 ng/mL   Basic Metabolic Panel    Collection Time: 05/26/24  5:25 AM    Specimen: Blood   Result Value Ref Range    Glucose 99 65 - 99 mg/dL    BUN 15 8 - 23 mg/dL    Creatinine 0.75 0.57 - 1.00 mg/dL    Sodium 132 (L) 136 - 145 mmol/L    Potassium 3.9 3.5 - 5.2 mmol/L    Chloride 99 98 - 107 mmol/L    CO2 25.0 22.0 - 29.0 mmol/L    Calcium 7.9 (L) 8.6 - 10.5 mg/dL    BUN/Creatinine Ratio 20.0 7.0 - 25.0    Anion Gap 8.0 5.0 - 15.0 mmol/L    eGFR 79.6 >60.0 mL/min/1.73   Cortisol    Collection Time: 05/26/24  5:25 AM     Specimen: Blood   Result Value Ref Range    Cortisol 21.50   mcg/dL   CBC Auto Differential    Collection Time: 05/26/24  5:25 AM    Specimen: Blood   Result Value Ref Range    WBC 1.01 (C) 3.40 - 10.80 10*3/mm3    RBC 2.43 (L) 3.77 - 5.28 10*6/mm3    Hemoglobin 8.2 (L) 12.0 - 15.9 g/dL    Hematocrit 23.9 (L) 34.0 - 46.6 %    MCV 98.4 (H) 79.0 - 97.0 fL    MCH 33.7 (H) 26.6 - 33.0 pg    MCHC 34.3 31.5 - 35.7 g/dL    RDW 14.0 12.3 - 15.4 %    RDW-SD 49.6 37.0 - 54.0 fl    MPV 9.0 6.0 - 12.0 fL    Platelets 174 140 - 450 10*3/mm3   Manual Differential    Collection Time: 05/26/24  5:25 AM    Specimen: Blood   Result Value Ref Range    Neutrophil % 60.0 42.7 - 76.0 %    Lymphocyte % 22.0 19.6 - 45.3 %    Monocyte % 14.0 (H) 5.0 - 12.0 %    Eosinophil % 0.0 (L) 0.3 - 6.2 %    Basophil % 2.0 (H) 0.0 - 1.5 %    Metamyelocyte % 2.0 (H) 0.0 - 0.0 %    Neutrophils Absolute 0.61 (L) 1.70 - 7.00 10*3/mm3    Lymphocytes Absolute 0.22 (L) 0.70 - 3.10 10*3/mm3    Monocytes Absolute 0.14 0.10 - 0.90 10*3/mm3    Eosinophils Absolute 0.00 0.00 - 0.40 10*3/mm3    Basophils Absolute 0.02 0.00 - 0.20 10*3/mm3    Ovalocytes Slight/1+ None Seen    Poikilocytes Slight/1+ None Seen    Polychromasia Slight/1+ None Seen    Smudge Cells Mod/2+ None Seen    Platelet Morphology Normal Normal       Radiology:  Imaging Results (Last 24 Hours)       ** No results found for the last 24 hours. **                   ASSESSMENT:   Problem List:   Lymphoma   Hypotension   Hyponatremia   Hyperkalemia   Chronic diastolic heart failure   Adrenal insufficiency - per chart, but doesn't follow with endo   Hypoalbuminemia   Edema   Parox afib       PLAN:   Na now 132   Cortisol and TSH are WNL   Change to regular diet   Continue salt tabs   Off IV fluids   B-blocker as bp allows     Monitor electrolytes and volume closely   Dose all medications for GFR >60   Limit IV dye, NSAIDS and nephrotoxic medications   I appreciate the opportunity to participate in this  patient's care.  Please call with any questions or concerns.     Radha Le M.D  [facility]  Office phone number: [phone]  Answering service phone number: [phone]

## 2024-05-26 NOTE — PROGRESS NOTES
Subjective     CHIEF COMPLAINT:     Diffuse large B cell lymphoma  Neutropenia   Anemia  Hyponatremia    INTERVAL HISTORY:     Patient reports feeling cold today. She continues to feel cold.   Lips are dry. No leg swelling.     REVIEW OF SYSTEMS:  Pertinent ROS as in the interval history. Otherwise, negative.    SCHEDULED MEDS:  amiodarone, 200 mg, Oral, Q24H  apixaban, 2.5 mg, Oral, Q12H  carvedilol, 3.125 mg, Oral, Daily  cefepime, 2,000 mg, Intravenous, Q12H  cetirizine, 10 mg, Oral, Daily  ferrous sulfate, 325 mg, Oral, Every Other Day  Hydrocortisone (Perianal), , Rectal, Daily  multivitamin with minerals, 1 tablet, Oral, Daily  sodium chloride, 10 mL, Intravenous, Q12H  sodium chloride, 1 g, Oral, Daily      Objective   VITAL SIGNS:  Temp:  [97.9 °F (36.6 °C)-99.3 °F (37.4 °C)] 98.8 °F (37.1 °C)  Heart Rate:  [79-82] 82  Resp:  [16-18] 16  BP: (86-95)/(49-68) 91/56     PHYSICAL EXAMINATION:  GENERAL:  The patient appears weak, not in acute distress.  SKIN: No skin rash.   EYES:  No Jaundice. No Pallor.   CHEST: Normal respiratory effort. Lungs clear to auscultation.   CARDIAC:  Normal S1 & S2. Grade III systolic murmur.   ABDOMEN:  Non-distended.   EXTREMITIES:  No edema.    RESULT REVIEW:   Results from last 7 days   Lab Units 05/26/24  0525 05/25/24  0311 05/24/24  1150 05/20/24  1433   WBC 10*3/mm3 1.01* 0.86* 1.29* 1.75*   NEUTROS ABS 10*3/mm3 0.61* 0.53* 0.68* 0.94*   LYMPHS ABS 10*3/mm3 0.22* 0.14*  --   --    HEMOGLOBIN g/dL 8.2* 7.8* 9.3* 8.8*   HEMATOCRIT % 23.9* 23.4* 27.9* 26.0*   PLATELETS 10*3/mm3 174 170 240 196     Results from last 7 days   Lab Units 05/26/24  0525 05/25/24  0311 05/24/24  1150   SODIUM mmol/L 132* 128* 126*   POTASSIUM mmol/L 3.9 4.2 5.6*   CHLORIDE mmol/L 99 97* 90*   CO2 mmol/L 25.0 22.2 22.4   BUN mg/dL 15 18 26*   CREATININE mg/dL 0.75 0.75 0.95   CALCIUM mg/dL 7.9* 8.2* 8.7   ALBUMIN g/dL  --  2.5* 3.1*   BILIRUBIN mg/dL  --  0.3 0.3   ALK PHOS U/L  --  62 72   ALT (SGPT)  U/L  --  32 42*   AST (SGOT) U/L  --  34* 51*   MAGNESIUM mg/dL  --  1.9  --          Lab 05/26/24  0525 05/24/24  1150   IRON  --  27*   IRON SATURATION (TSAT)  --  11*   TIBC  --  237*   TRANSFERRIN  --  159*   FERRITIN  --  1,953.00*   FOLATE 11.40  --    VITAMIN B 12 >2,000*  --       Component      Latest Ref Rng 5/25/2024   Reticulocyte %      0.70 - 1.90 % 2.97 (H)       Component      Latest Ref Rng 5/25/2024   Haptoglobin      30 - 200 mg/dL 237 (H)      Component      Latest Ref Rng 5/3/2024 5/24/2024 5/25/2024   LDH      135 - 214 U/L 164  245 (H)  190    Uric Acid      2.4 - 5.7 mg/dL 3.5  3.5        Assessment & Plan   *Relapsed diffuse large B-cell lymphoma.  Patient was found to have bilateral pleural effusions.    She underwent right thoracentesis on 2/21/2022 and the left thoracentesis on 2/22/2022.    Analysis of the fluid revealed involvement with diffuse large B-cell lymphoma.    FISH analysis for BCL6 rearrangement was positive but was negative for BCL-2 and MYC rearrangement.    PET scan on 3/10/2022 revealed significant hypermetabolism in the left adrenal gland and the surrounding soft tissue with SUV up to 34.5. Hypermetabolism in the left pleural thickening with SUV of 7.1. there was less hypermetabolism in the right adrenal gland and in the right pleura.  She was considered to have stage III non-bulky disease.  R-IPI score of 3 associated with poor risk - associated with overall survival of 55%.   R-CHOP with Neulasta support was started on 3/28/2022.  Due to development of neutropenic fever following cycles 1 and 2, treatment was changed to mini-RCHOP starting cycle #3 on 5/16/2022.  Patient received cycle #6 on 7/25/2022.  PET scan on 8/15/2022 revealed complete metabolic response.  Patient was found to have recurrence with development of a left mandibular mass in October/November 2023.  Biopsy of left mandible mass on 11/28/2023 showed recurrence of the lymphoma, diffuse large B cell,  non-GCB phenotype.  Ki-67 was 90%.    It was positive for Bcl-6 but negative for Bcl-2 and MYC rearrangement.  PET scan on 12/11/2023 revealed multiple areas of involvement -prevascular space, left epicardial fat pad, pleural density posterior to the left fourth rib anterior aspect, lesion along the pericardium versus intramuscular with SUV of 9.8.  Hypermetabolic activity at both adrenal glands, uniformly increased in size and appeared hyperplastic.  There was a 2.7 x 1.1 cm soft tissue nodule lateral to the right erector spinae muscle posterior to the right posterior 11th rib with an SUV of 20.5 and there was a 1.3 cm nodule lateral to the posterior margin of the left psoas muscle with an SUV of 24.9.  Rituxan Polivy and Treanda with Treanda every 3 weeks for 6 cycles was started on 12/12/2023.  The jaw mass started to decrease in size after starting treatment.  CT scan on 2/8/2024 showed evidence of response to treatment.  Cycle #6 was given on 4/1/2024.  PET scan on 4/22/2024 revealed decrease in the hypermetabolism in the mediastinal and pericardial lymph nodes/lesions and in the lesions above the left kidney and adrenal gland.  The left mandibular lesion was no longer hypermetabolic.   She is at increased risk for relapse of the lymphoma.  The plan was to start treatment with Revlimid 10 mg daily.  However, the patient did not start the treatment yet.  LDH was previously elevated but improved to 190 on 5/25/2024.     *Neutropenia  5/20/2024: WBC decreased to 1750.  Neutrophils decreased to 940.  5/24/2024: WBC decreased to 1290.  Neutrophils decreased to 680.  5/25/2024: WBC decreased to 860.  Neutrophils decreased to 530.  Worsening neutropenia is also concerning for relapse of the lymphoma with involvement of the bone marrow.   5/26/2024: Neutrophil count 610.     *Macrocytic anemia.  Patient also developed anemia secondary to lymphoma and secondary to chemotherapy.    Hgb decreased to 7.9 on 4/25/2022 and  she was transfused.   Hemoglobin decreased to 6.3 on 4/27/2022. She was transfused.  Hemoglobin level improved subsequently.  Patient was placed ferrous sulfate 325 mg 3 days a week.  5/3/2024: Hemoglobin increased to 10.2.  5/25/2024: Hemoglobin decreased to 7.8.  No evidence of vitamin B12 or folate deficiency.    Ferritin 1953.  Transferrin saturation 11%.  Patient decided to hold off on PRBC transfusion.  5/26/2024: Hemoglobin 8.2.  Patient continues to be fatigued and feels cold.   She decided to proceed with PRBC Tx.   Due to her reporting chills when she received PRBC Tx in the past, we will give Tylenol, Benadryl and Solumedrol prior to the transfusion.      *Hyponatremia.  Patient had problem with hyponatremia in the past.  Sodium is 127 on 3/14/2023.  She was advised to increase salt intake.  Sodium improved to 133 on repeat lab on 3/21/2023.  However, sodium decreased to 126 on 6/7/2023.  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  She was started on sodium chloride 1 g daily.  4/1/2024: Sodium decreased to 132.  Leg swelling improved with Lasix.  5/3/2024: Sodium 130.  5/24/2024: Sodium decreased to 126.    Patient was having significant weakness.  5/25/2024: Sodium 128.  Nephrology service was consulted.  5/26/2024: Sodium 132.     *Hyperkalemia.  5/24/2024: Potassium increased to 5.6.  5/26/2024: Potassium improved to 3.9.     *Chronic anticoagulation.  Patient is on Eliquis 5 mg twice daily.  No problem with bleeding.      *Prophylaxis.  Patient had shingles in April 2023.  She is at risk of developing shingles while on treatment.  She was placed on acyclovir 400 mg twice daily.  She was on Bactrim DS 3 days a week.  Bactrim was discontinued due to the electrolyte abnormalities.     *Low-density lesion in the uterus.    The radiologist recommended pelvic ultrasound.    The patient is asymptomatic.    PET scan on 4/22/2024 reported fluid in the lower abdomen.  Patient reported  intermittent vaginal discharge.     PLAN:     1.  We will give 1 unit of PRBCs.  2.  Await MMA and copper levels.   3.  Repeat CBC in AM.  4.  If patient fatigue does not improve with transfusion, may need to ask her Cardiologist to evaluate.         Renate Galo MD  05/26/24

## 2024-05-26 NOTE — PLAN OF CARE
Goal Outcome Evaluation:  Plan of Care Reviewed With: patient        Progress: no change  Outcome Evaluation: VSS. No c/o pain. IV Cefepime continued. NSR. [place] to monitor I/O. Need urine and stool specimen. No complaints or concerns per patient. Rested well throughout the night. Encouraged to turn. Skin care provided. Safety maintained. Needs met. Plan of care ongoing.  Critical WBC 1.01 up from yesterday result 0.86. Treatment in progress.

## 2024-05-26 NOTE — PLAN OF CARE
Goal Outcome Evaluation:  Plan of Care Reviewed With: patient        Progress: improving  Outcome Evaluation: Pt seen for PT evaluation this AM.  Prior to admission, she reports independence with ADLs and gait, but used walker for community distances, lives alone.  Today, she presents with weakness, decreased postural awareness, gait deviations, and decreased endurance.  She requires SBA for bed mobility and CGA for STS and gait with and without AD, ambulates 20 feet with RW and 10 feet without RW without overt LOB.  She fatigues quickly.  Pt will benefit from skilled physical therapy while inpatient to improve functional mobility and strength and endurance.  Recommending SNF at discharge.      Anticipated Discharge Disposition (PT): skilled nursing facility

## 2024-05-26 NOTE — THERAPY EVALUATION
Patient Name: Michelle Car  : 1940    MRN: [id number]                              Today's Date: 2024       Admit Date: 2024    Visit Dx: No diagnosis found.  Patient Active Problem List   Diagnosis    Hypertension    Hyperlipidemia    Abdominal aortic atherosclerosis    Generalized edema    Fatigue due to excessive exertion    Seasonal allergic rhinitis due to pollen    Dyspnea    Acute on chronic diastolic heart failure    Hypoxia    Paroxysmal atrial fibrillation with rapid ventricular response    Adrenal nodule    Edema of lower extremity    Hyponatremia    CRI (chronic renal insufficiency), stage 2 (mild)    Diffuse large B-cell lymphoma of lymph nodes of multiple regions    Vascular catheter fitting or adjustment    Chronic diastolic CHF (congestive heart failure)    Sepsis due to Escherichia coli (E. coli)    Bacteremia due to Escherichia coli    Calculus of bile duct without cholecystitis and without obstruction    Diverticulosis    Iron deficiency anemia    Leukopenia    Anemia    Hyperkalemia    Adrenal insufficiency    Weight loss    Severe malnutrition     Past Medical History:   Diagnosis Date    Abdominal aortic atherosclerosis     Adrenal nodule 2022    Allergic rhinitis     Anemia due to chemotherapy     Bacteremia due to Escherichia coli 2022    ADMITTED TO [facility]    Bug bite 10/24/2015    WITH CELLULITIS, LEFT LEG    CAD (coronary artery disease)     Calculus of bile duct without cholangitis or cholecystitis 2022    Chemotherapy induced neutropenia     Chronic anticoagulation     Chronic diastolic (congestive) heart failure     Colon polyps     CRI (chronic renal insufficiency), stage 2 (mild)     Diffuse large B cell lymphoma 2022    MULTIPLE LYMPH NODE INVOLVEMENT, FOLLOWED BY DR. JEN PATTERSON    Drug induced constipation     Hearing loss     Hemorrhoids     History of blood transfusion 2022    History of chemotherapy     FOLLOWED BY DR. LI  LEONARDO    Hypercalcemia 2016    resolved as of 2019    Hypercholesterolemia     Hyperkalemia 03/2022    Hypertension     Hyponatremia 02/17/2022    Leg swelling 10/2021    Mixed hyperlipidemia     Neutropenic fever 05/2022    Nonrheumatic mitral valve regurgitation 03/2022    PAF (paroxysmal atrial fibrillation)     FOLLOWED BY DR. YOSI LANGLEY    Pancytopenia     Pleural effusion, bilateral 06/2022    Pulmonary nodule     Seasonal allergies     Thrombocytopenia 08/2022    Venous insufficiency     Vitamin D deficiency      Past Surgical History:   Procedure Laterality Date    CHOLECYSTECTOMY N/A 10/12/2011    LAPAROSCOPIC, DR. CHANCE KLINE AT [facility]    COLONOSCOPY N/A 2000    1 or 2 polyps, otherwise normal per patient    COLONOSCOPY W/ POLYPECTOMY N/A 01/24/2012    3 MM TUBULAR ADENOMA POLYP IN CECUM, DIVERTICULOSIS IN RECTO-SIGMOID, RESCOPE IN 3 YRS, DR. CHANCE KLINE AT [facility]    CYST REMOVAL Left 10/12/2011    LEFT SCALP, PATH: BENIGN ADNEXAL NEOPLASM FAVORING ECCRINE SPIRADENOMA, DR. CHANCE KLINE AT [facility]    ERCP N/A 10/21/2022    Procedure: ENDOSCOPIC RETROGRADE CHOLANGIOPANCREATOGRAPHY with sphincterotomy and balloon sweep;  Surgeon: Peyman Ortega MD;  Location: [facility] ENDOSCOPY;  Service: Gastroenterology;  Laterality: N/A;  PRE: Common Duct Stones  POST: Common Duct Stones    VENOUS ACCESS DEVICE (PORT) INSERTION Left 03/23/2022    Procedure: INSERTION VENOUS ACCESS DEVICE;  Surgeon: Alin Delgado MD;  Location: [facility] OR [facility];  Service: General;  Laterality: Left;      General Information       Row Name 05/26/24 1234          Physical Therapy Time and Intention    Document Type evaluation  -KA     Mode of Treatment individual therapy;physical therapy  -       Row Name 05/26/24 1234          General Information    Patient Profile Reviewed yes  -KA     Prior Level of Function independent:;ADL's;gait  Uses walker for community distances  -KA     Existing Precautions/Restrictions fall  -KA     Barriers to  Rehab medically complex  -       Row Name 05/26/24 1234          Living Environment    People in Home alone  -       Row Name 05/26/24 1234          Home Main Entrance    Number of Stairs, Main Entrance one  -[facility] Name 05/26/24 1234          Stairs Within Home, Primary    Number of Stairs, Within Home, Primary none  -[facility] Name 05/26/24 1234          Cognition    Orientation Status (Cognition) oriented x 3  -       Row Name 05/26/24 1234          Safety Issues, Functional Mobility    Impairments Affecting Function (Mobility) balance;endurance/activity tolerance;strength  -               User Key  (r) = Recorded By, (t) = Taken By, (c) = Cosigned By      Initials Name Provider Type    Anastacia Melendez, BLAKE Physical Therapist                   Mobility       Row Name 05/26/24 1244          Bed Mobility    Bed Mobility supine-sit  -     Supine-Sit [place] (Bed Mobility) standby assist  -     Assistive Device (Bed Mobility) head of bed elevated;bed rails  -[facility] Name 05/26/24 1244          Bed-Chair Transfer    Bed-Chair [place] (Transfers) 1 person assist;minimum assist (75% patient effort)  -[facility] Name 05/26/24 1244          Sit-Stand Transfer    Sit-Stand [place] (Transfers) contact guard;1 person assist  -     Assistive Device (Sit-Stand Transfers) walker, front-wheeled  -[facility] Name 05/26/24 1244          Gait/Stairs (Locomotion)    [place] Level (Gait) contact guard;1 person assist  -     Assistive Device (Gait) walker, front-wheeled  -     Patient was able to Ambulate yes  -KA     Distance in Feet (Gait) 30  -KA     Deviations/Abnormal Patterns (Gait) gait speed decreased;festinating/shuffling;edgard decreased;base of support, narrow  -KA     Bilateral Gait Deviations forward flexed posture  -KA     [place] Level (Stairs) not tested  -KA     Comment, (Gait/Stairs) Pt ambulates first 20 feet with RW due to fear of falling/weakness,  but is able to ambulate last 10 feet without AD to navigate tight spaces in room, reaches out with hands for furniture.  No overt LOB, takes short shuffling steps, flexed posture requires cues but unable to fully correct  -               User Key  (r) = Recorded By, (t) = Taken By, (c) = Cosigned By      Initials Name Provider Type    Anastacia Melendez, BLAKE Physical Therapist                   Obj/Interventions       Row Name 05/26/24 1246          Range of Motion Comprehensive    Comment, General Range of Motion BLE ROM grossly WFLs  -KA       Row Name 05/26/24 1246          Strength Comprehensive (MMT)    Comment, General Manual Muscle Testing (MMT) Assessment BLE strength grossly 3/5  -KA       Row Name 05/26/24 1246          Balance    Balance Assessment sitting static balance;sitting dynamic balance;standing static balance;standing dynamic balance  -KA     Static Sitting Balance standby assist  -KA     Dynamic Sitting Balance standby assist  -KA     Position, Sitting Balance unsupported;sitting edge of bed  -KA     Static Standing Balance contact guard;1-person assist  -KA     Dynamic Standing Balance contact guard;1-person assist  -KA     Position/Device Used, Standing Balance supported;unsupported;walker, front-wheeled  -               User Key  (r) = Recorded By, (t) = Taken By, (c) = Cosigned By      Initials Name Provider Type    Anastacia Melendez, PT Physical Therapist                   Goals/Plan       Row Name 05/26/24 1251          Bed Mobility Goal 1 (PT)    Activity/Assistive Device (Bed Mobility Goal 1, PT) bed mobility activities, all  -KA     [place] Level/Cues Needed (Bed Mobility Goal 1, PT) independent  -KA     Time Frame (Bed Mobility Goal 1, PT) 1 week  -       Row Name 05/26/24 1251          Transfer Goal 1 (PT)    Activity/Assistive Device (Transfer Goal 1, PT) transfers, all  -KA     [place] Level/Cues Needed (Transfer Goal 1, PT) standby assist  -KA     Time Frame  (Transfer Goal 1, PT) 1 week  -       Row Name 05/26/24 1251          Gait Training Goal 1 (PT)    Activity/Assistive Device (Gait Training Goal 1, PT) gait (walking locomotion);assistive device use;improve balance and speed;increase endurance/gait distance;decrease fall risk;walker, rolling  -     [place] Level (Gait Training Goal 1, PT) standby assist  -KA     Distance (Gait Training Goal 1, PT) 150 ft  -       Row Name 05/26/24 1251          Therapy Assessment/Plan (PT)    Planned Therapy Interventions (PT) balance training;bed mobility training;gait training;home exercise program;manual therapy techniques;neuromuscular re-education;patient/family education;postural re-education;stair training;strengthening;transfer training  -               User Key  (r) = Recorded By, (t) = Taken By, (c) = Cosigned By      Initials Name Provider Type    Anastacia Melendez, PT Physical Therapist                   Clinical Impression       Row Name 05/26/24 1240          Pain    Pretreatment Pain Rating 0/10 - no pain  -     Posttreatment Pain Rating 0/10 - no pain  -     Pain Intervention(s) Ambulation/increased activity;Repositioned  -       Row Name 05/26/24 1249          Plan of Care Review    Plan of Care Reviewed With patient  -     Progress improving  -     Outcome Evaluation Pt seen for PT evaluation this AM.  Prior to admission, she reports independence with ADLs and gait, but used walker for community distances, lives alone.  Today, she presents with weakness, decreased postural awareness, gait deviations, and decreased endurance.  She requires SBA for bed mobility and CGA for STS and gait with and without AD, ambulates 20 feet with RW and 10 feet without RW without overt LOB.  She fatigues quickly.  Pt will benefit from skilled physical therapy while inpatient to improve functional mobility and strength and endurance.  Recommending SNF at discharge.  -       Row Name 05/26/24 2068           Therapy Assessment/Plan (PT)    Patient/Family Therapy Goals Statement (PT) Improve strength to be able to get back to normal  -     Rehab Potential (PT) good, to achieve stated therapy goals  -     Criteria for Skilled Interventions Met (PT) yes;meets criteria;skilled treatment is necessary  -KA     Therapy Frequency (PT) 6 times/wk  -KA     Predicted Duration of Therapy Intervention (PT) 1 week  -KA       Row Name 05/26/24 1247          Vital Signs    O2 Delivery Pre Treatment room air  -KA     O2 Delivery Intra Treatment room air  -KA     O2 Delivery Post Treatment room air  -KA     Pre Patient Position Supine  -KA     Intra Patient Position Standing  -KA     Post Patient Position Sitting  -KA       Row Name 05/26/24 1247          Positioning and Restraints    Pre-Treatment Position in bed  -KA     Post Treatment Position chair  -KA     In Chair notified nsg;reclined;sitting;call light within reach;encouraged to call for assist  -               User Key  (r) = Recorded By, (t) = Taken By, (c) = Cosigned By      Initials Name Provider Type    Anastacia Melendez, PT Physical Therapist                   Outcome Measures       Row Name 05/26/24 1252 05/26/24 3901       How much help from another person do you currently need...    Turning from your back to your side while in flat bed without using bedrails? 3  -KA 3  -LD    Moving from lying on back to sitting on the side of a flat bed without bedrails? 3  -KA 3  -LD    Moving to and from a bed to a chair (including a wheelchair)? 3  -KA 2  -LD    Standing up from a chair using your arms (e.g., wheelchair, bedside chair)? 3  -KA 2  -LD    Climbing 3-5 steps with a railing? 3  -KA 2  -LD    To walk in hospital room? 3  -KA 2  -LD    AM-PAC 6 Clicks Score (PT) 18  -KA 14  -LD    Highest Level of Mobility Goal 6 --> Walk 10 steps or more  -KA 4 --> Transfer to chair/commode  -LD      Row Name 05/26/24 1252          Functional Assessment    Outcome Measure Options  AM-PAC 6 Clicks Basic Mobility (PT)  -               User Key  (r) = Recorded By, (t) = Taken By, (c) = Cosigned By      Initials Name Provider Type    Arlene Alves, RN Registered Nurse    Anastacia Melendez, BLAKE Physical Therapist                                 Physical Therapy Education       Title: PT OT SLP Therapies (Done)       Topic: Physical Therapy (Done)       Point: Mobility training (Done)       Learning Progress Summary             Patient Acceptance, E, VU by BRIANDA at 5/26/2024 1253                                         User Key       Initials Effective Dates Name Provider Type Discipline     08/24/21 -  Anastacia Portillo, PT Physical Therapist PT                  PT Recommendation and Plan  Planned Therapy Interventions (PT): balance training, bed mobility training, gait training, home exercise program, manual therapy techniques, neuromuscular re-education, patient/family education, postural re-education, stair training, strengthening, transfer training  Plan of Care Reviewed With: patient  Progress: improving  Outcome Evaluation: Pt seen for PT evaluation this AM.  Prior to admission, she reports independence with ADLs and gait, but used walker for community distances, lives alone.  Today, she presents with weakness, decreased postural awareness, gait deviations, and decreased endurance.  She requires SBA for bed mobility and CGA for STS and gait with and without AD, ambulates 20 feet with RW and 10 feet without RW without overt LOB.  She fatigues quickly.  Pt will benefit from skilled physical therapy while inpatient to improve functional mobility and strength and endurance.  Recommending SNF at discharge.     Time Calculation:         PT Charges       Row Name 05/26/24 1250             Time Calculation    Start Time 0925  -      Stop Time 0953  -      Time Calculation (min) 28 min  -      PT Received On 05/26/24  -      PT - Next Appointment 05/28/24  -      PT Goal Re-Cert Due  Date 06/02/24  -KA         Time Calculation- PT    Total Timed Code Minutes- PT 24 minute(s)  -KA         Timed Charges    17759 - PT Therapeutic Activity Minutes 24  -KA         Total Minutes    Timed Charges Total Minutes 24  -KA       Total Minutes 24  -KA                User Key  (r) = Recorded By, (t) = Taken By, (c) = Cosigned By      Initials Name Provider Type    Anastacia Melendez, PT Physical Therapist                  Therapy Charges for Today       Code Description Service Date Service Provider Modifiers Qty    48621201184 HC PT THERAPEUTIC ACT EA 15 MIN 5/26/2024 Anastacia Portillo, PT GP 2    53623027649 HC PT EVAL MOD COMPLEXITY 2 5/26/2024 Anastacia Portillo, PT GP 1            PT G-Codes  Outcome Measure Options: AM-PAC 6 Clicks Basic Mobility (PT)  AM-PAC 6 Clicks Score (PT): 18  PT Discharge Summary  Anticipated Discharge Disposition (PT): skilled nursing facility    Anastacia Portillo PT  5/26/2024

## 2024-05-26 NOTE — PROGRESS NOTES
Name: Michelle Car ADMIT: 2024   : 1940  PCP: Olayinka Pimentel MD    MRN: [id number] LOS: 2 days   AGE/SEX: 83 y.o. female  ROOM: [facility]/     Subjective   Subjective   Patient continues with weakness and fatigue.  No myalgia.  No fever or chills.  No chest pain.  No shortness of breath.  No cough.  No bleeding diathesis.    Review of Systems  GI.  No abdominal pain or nausea or vomiting.  .  No dysuria or hematuria.  CNS.  No dizziness.  No focal neurological symptoms       Objective   Objective   Vital Signs  Temp:  [97.9 °F (36.6 °C)-99.3 °F (37.4 °C)] 98.8 °F (37.1 °C)  Heart Rate:  [79-82] 82  Resp:  [16-18] 16  BP: (86-95)/(49-68) 91/56  SpO2:  [95 %-97 %] 96 %  on   ;   Device (Oxygen Therapy): room air    Intake/Output Summary (Last 24 hours) at 2024 1119  Last data filed at 2024 0926  Gross per 24 hour   Intake 100 ml   Output 1000 ml   Net -900 ml     Body mass index is 23.34 kg/m².      24  1701 24  1849   Weight: 51.7 kg (114 lb) 54.2 kg (119 lb 7.8 oz)     Physical Exam  General.  Elderly female.  Alert and oriented x 4.  Frail and chronically ill-appearing.  Current pain/diaphoresis/distress.  Eyes.  Positive pallor.  No jaundice.  Pupils equal round and reactive.  Intact extraocular musculature  Oral cavity.  Moist mucous membrane.  Neck.  Supple.  No JVD.  No lymphadenopathy or thyromegaly.  Neurovascular.  Regular rate and rhythm and grade 3 systolic murmur  Chest.  Clear to auscultation bilaterally with no added sounds.  Abdomen.  Soft lax.  No tenderness.  No organomegaly.  No guarding or rebound  Extremities.  No clubbing/cyanosis/edema.  Interphalangeal joint deformities.  CNS.  No acute focal neurological deficits    Results Review:      Results from last 7 days   Lab Units 24  0525 24  0311 24  1150   SODIUM mmol/L 132* 128* 126*   POTASSIUM mmol/L 3.9 4.2 5.6*   CHLORIDE mmol/L 99 97* 90*   CO2 mmol/L 25.0 22.2 22.4   BUN mg/dL  "15 18 26*   CREATININE mg/dL 0.75 0.75 0.95   GLUCOSE mg/dL 99 90 103*   CALCIUM mg/dL 7.9* 8.2* 8.7   AST (SGOT) U/L  --  34* 51*   ALT (SGPT) U/L  --  32 42*     Estimated Creatinine Clearance: 49.5 mL/min (by C-G formula based on SCr of 0.75 mg/dL).          Results from last 7 days   Lab Units 05/25/24  1018   CK TOTAL U/L 16*         Results from last 7 days   Lab Units 05/25/24  1018   TSH uIU/mL 3.220     Results from last 7 days   Lab Units 05/25/24  0311   MAGNESIUM mg/dL 1.9           Invalid input(s): \"LDLCALC\"  Results from last 7 days   Lab Units 05/26/24  0525 05/25/24  0311 05/24/24  1150 05/24/24  1150 05/20/24  1433   WBC 10*3/mm3 1.01* 0.86*  --  1.29* 1.75*   HEMOGLOBIN g/dL 8.2* 7.8*  --  9.3* 8.8*   HEMATOCRIT % 23.9* 23.4*  --  27.9* 26.0*   PLATELETS 10*3/mm3 174 170  --  240 196   MCV fL 98.4* 100.9*  --  100.7* 100.8*   MCH pg 33.7* 33.6*  --  33.6* 34.1*   MCHC g/dL 34.3 33.3  --  33.3 33.8   RDW % 14.0 14.3  --  14.3 14.3   RDW-SD fl 49.6 52.2  --  52.2 52.3   MPV fL 9.0 9.9  --  9.8 9.8   NEUTROPHIL % %  --   --   --  52.7 53.8   LYMPHOCYTE % %  --   --   --  24.0 13.7*   MONOCYTES % %  --   --   --  14.7* 19.4*   EOSINOPHIL % %  --   --   --  0.8 0.0*   BASOPHIL % %  --   --   --  0.8 1.1   IMM GRAN % %  --   --   --  7.0* 12.0*   NEUTROS ABS 10*3/mm3 0.61* 0.53*   < > 0.68* 0.94*   LYMPHS ABS 10*3/mm3  --   --   --  0.31* 0.24*   MONOS ABS 10*3/mm3  --   --   --  0.19 0.34   EOS ABS 10*3/mm3 0.00 0.01   < > 0.01 0.00   BASOS ABS 10*3/mm3 0.02 0.01   < > 0.01 0.02   IMMATURE GRANS (ABS) 10*3/mm3  --   --   --  0.09* 0.21*   NRBC /100 WBC  --   --   --  0.0 0.0    < > = values in this interval not displayed.             Results from last 7 days   Lab Units 05/25/24  1018 05/25/24  0311 05/24/24 1914   PROCALCITONIN ng/mL 0.10  --   --    LACTATE mmol/L  --  0.7 2.2*             Results from last 7 days   Lab Units 05/24/24 1914   BLOODCX  No growth at 24 hours             Results from " last 7 days   Lab Units 05/24/24  1150   URIC ACID mg/dL 3.5       Imaging:  Imaging Results (Last 24 Hours)       ** No results found for the last 24 hours. **               I reviewed the patient's new clinical results / labs / tests / procedures      Assessment/Plan     Active Hospital Problems    Diagnosis  POA    **Leukopenia [D72.819]  Unknown    Severe malnutrition [E43]  Yes    Anemia [D64.9]  Unknown    Hyperkalemia [E87.5]  Unknown    Adrenal insufficiency [E27.40]  Unknown    Weight loss [R63.4]  Unknown    Chronic diastolic CHF (congestive heart failure) [I50.32]  Yes    Paroxysmal atrial fibrillation with rapid ventricular response [I48.0]  Yes    Hyponatremia [E87.1]  Yes    Diffuse large B-cell lymphoma of lymph nodes of multiple regions [C83.38]  Yes    CRI (chronic renal insufficiency), stage 2 (mild) [N18.2]  Yes      Resolved Hospital Problems   No resolved problems to display.           Weakness/fatigue secondary to #2, #3, #4.  Patient with normal volume status.  TSH is normal.  Magnesium is normal.  K is now normal.  CK is normal.  Cortisol level is normal.  Continue PT and OT .  Look below for further management.  Hyponatremia/hyperkalemia.  Hyperkalemia resolved with Lokelma.  Hyponatremia improved after initial IV fluid.  Uric acid is normal.  Normal TSH.  Normal cortisol.  Awaiting urine electrolytes   Nephrology appears to believe that this is inappropriate antidiuretic hormone secretion and has stopped IV fluid and placed her on fluid restriction with continuation of the sodium tablets.  Differential diagnosis of the above includes diuretic use, Bactrim use, poor p.o. intake Aldactone/diuretic/Bactrim held  Anemia/leukopenia in a patient with a history of large B-cell lymphoma on chemotherapy.  Anemia workup noted.  This is mostly anemia of chronic disease secondary to cancer.  Leukopenia is most likely secondary to chemotherapy.  Other differential diagnoses include bone marrow  replacement/Bactrim side effect on the bone marrow.  Hold chemo.  Hold Bactrim.   No neutropenic fever. Will most likely need a blood transfusion and Neupogen but we will defer that to hematology oncology.  There is no clinical focus of infection.  Chest x-ray without infiltrate.  Awaiting UA.  Procalcitonin is normal.  Blood cultures are negative.  Will stop cefepime.    Hypertension/A-fib/chronic diastolic congestive heart failure.  There is no evidence of angina or congestive heart failure.  The blood pressure is on the low side and her Coreg was held today.  I will stop Coreg and initiate midodrine.  If her rate of A-fib becomes uncontrolled will depend on digoxin.  Will continue amiodarone/Eliquis.  Normal TSH and cortisol.   Elevated lactic acid.  Resolved.  Malnutrition.  Nutritional supplements  VTE prophylaxis.  Sequential compression device.        Discussed my findings and plan of treatment with the patient.  Disposition.  Depends on clinical course but might need SNF.      Saulo Smith MD  [facility]  05/26/24  11:19 EDT

## 2024-05-26 NOTE — PLAN OF CARE
Goal Outcome Evaluation:              Outcome Evaluation: 83/F. VSS. Voiding per purewick. Several days since BM. Refused PRN laxatives. 1 unit blood ordered. Up with assist of 1-2. Urine sample done, Unable to collect stool sample this shift. Will continue to monitor.

## 2024-05-27 ENCOUNTER — APPOINTMENT (OUTPATIENT)
Dept: CARDIOLOGY | Facility: HOSPITAL | Age: 84
DRG: 808 | End: 2024-05-27
Payer: MEDICARE

## 2024-05-27 LAB
ANION GAP SERPL CALCULATED.3IONS-SCNC: 7 MMOL/L (ref 5–15)
BASOPHILS # BLD MANUAL: 0.07 10*3/MM3 (ref 0–0.2)
BASOPHILS NFR BLD MANUAL: 5.2 % (ref 0–1.5)
BH BB BLOOD EXPIRATION DATE: NORMAL
BH BB BLOOD TYPE BARCODE: 600
BH BB DISPENSE STATUS: NORMAL
BH BB PRODUCT CODE: NORMAL
BH BB UNIT NUMBER: NORMAL
BUN SERPL-MCNC: 16 MG/DL (ref 8–23)
BUN/CREAT SERPL: 26.7 (ref 7–25)
C3 FRG RBC-MCNC: ABNORMAL
CALCIUM SPEC-SCNC: 8.1 MG/DL (ref 8.6–10.5)
CHLORIDE SERPL-SCNC: 101 MMOL/L (ref 98–107)
CO2 SERPL-SCNC: 25 MMOL/L (ref 22–29)
CREAT SERPL-MCNC: 0.6 MG/DL (ref 0.57–1)
CROSSMATCH INTERPRETATION: NORMAL
DEPRECATED RDW RBC AUTO: 48.8 FL (ref 37–54)
DEPRECATED RDW RBC AUTO: 49 FL (ref 37–54)
EGFRCR SERPLBLD CKD-EPI 2021: 89.7 ML/MIN/1.73
EOSINOPHIL # BLD MANUAL: 0 10*3/MM3 (ref 0–0.4)
EOSINOPHIL NFR BLD MANUAL: 0 % (ref 0.3–6.2)
ERYTHROCYTE [DISTWIDTH] IN BLOOD BY AUTOMATED COUNT: 14 % (ref 12.3–15.4)
ERYTHROCYTE [DISTWIDTH] IN BLOOD BY AUTOMATED COUNT: 14 % (ref 12.3–15.4)
GLUCOSE SERPL-MCNC: 130 MG/DL (ref 65–99)
HCT VFR BLD AUTO: 31 % (ref 34–46.6)
HCT VFR BLD AUTO: 31.4 % (ref 34–46.6)
HGB BLD-MCNC: 10.6 G/DL (ref 12–15.9)
HGB BLD-MCNC: 10.9 G/DL (ref 12–15.9)
LYMPHOCYTES # BLD MANUAL: 0.32 10*3/MM3 (ref 0.7–3.1)
LYMPHOCYTES # BLD MANUAL: 0.36 10*3/MM3 (ref 0.7–3.1)
LYMPHOCYTES # BLD MANUAL: 0.4 10*3/MM3 (ref 0.7–3.1)
LYMPHOCYTES NFR BLD MANUAL: 20 % (ref 5–12)
LYMPHOCYTES NFR BLD MANUAL: 21.6 % (ref 5–12)
LYMPHOCYTES NFR BLD MANUAL: 4 % (ref 5–12)
MCH RBC QN AUTO: 33 PG (ref 26.6–33)
MCH RBC QN AUTO: 33.3 PG (ref 26.6–33)
MCHC RBC AUTO-ENTMCNC: 34.2 G/DL (ref 31.5–35.7)
MCHC RBC AUTO-ENTMCNC: 34.7 G/DL (ref 31.5–35.7)
MCV RBC AUTO: 96 FL (ref 79–97)
MCV RBC AUTO: 96.6 FL (ref 79–97)
MONOCYTES # BLD: 0.05 10*3/MM3 (ref 0.1–0.9)
MONOCYTES # BLD: 0.28 10*3/MM3 (ref 0.1–0.9)
MONOCYTES # BLD: 0.29 10*3/MM3 (ref 0.1–0.9)
NEUTROPHILS # BLD AUTO: 0.59 10*3/MM3 (ref 1.7–7)
NEUTROPHILS # BLD AUTO: 0.85 10*3/MM3 (ref 1.7–7)
NEUTROPHILS # BLD AUTO: 0.85 10*3/MM3 (ref 1.7–7)
NEUTROPHILS NFR BLD MANUAL: 45.4 % (ref 42.7–76)
NEUTROPHILS NFR BLD MANUAL: 58 % (ref 42.7–76)
NEUTROPHILS NFR BLD MANUAL: 65 % (ref 42.7–76)
NRBC BLD AUTO-RTO: 0 /100 WBC (ref 0–0.2)
OVALOCYTES BLD QL SMEAR: ABNORMAL
PLAT MORPH BLD: NORMAL
PLATELET # BLD AUTO: 190 10*3/MM3 (ref 140–450)
PLATELET # BLD AUTO: 210 10*3/MM3 (ref 140–450)
PMV BLD AUTO: 9.3 FL (ref 6–12)
PMV BLD AUTO: 9.3 FL (ref 6–12)
POIKILOCYTOSIS BLD QL SMEAR: ABNORMAL
POTASSIUM SERPL-SCNC: 4 MMOL/L (ref 3.5–5.2)
RBC # BLD AUTO: 3.21 10*6/MM3 (ref 3.77–5.28)
RBC # BLD AUTO: 3.27 10*6/MM3 (ref 3.77–5.28)
RBC MORPH BLD: NORMAL
SMUDGE CELLS BLD QL SMEAR: ABNORMAL
SODIUM SERPL-SCNC: 133 MMOL/L (ref 136–145)
UNIT  ABO: NORMAL
UNIT  RH: NORMAL
VARIANT LYMPHS NFR BLD MANUAL: 22 % (ref 19.6–45.3)
VARIANT LYMPHS NFR BLD MANUAL: 27.8 % (ref 19.6–45.3)
VARIANT LYMPHS NFR BLD MANUAL: 31 % (ref 19.6–45.3)
WBC MORPH BLD: NORMAL
WBC MORPH BLD: NORMAL
WBC NRBC COR # BLD AUTO: 1.3 10*3/MM3 (ref 3.4–10.8)
WBC NRBC COR # BLD AUTO: 1.46 10*3/MM3 (ref 3.4–10.8)

## 2024-05-27 PROCEDURE — 99222 1ST HOSP IP/OBS MODERATE 55: CPT | Performed by: INTERNAL MEDICINE

## 2024-05-27 PROCEDURE — 93356 MYOCRD STRAIN IMG SPCKL TRCK: CPT | Performed by: INTERNAL MEDICINE

## 2024-05-27 PROCEDURE — 85007 BL SMEAR W/DIFF WBC COUNT: CPT | Performed by: INTERNAL MEDICINE

## 2024-05-27 PROCEDURE — 93306 TTE W/DOPPLER COMPLETE: CPT

## 2024-05-27 PROCEDURE — 93356 MYOCRD STRAIN IMG SPCKL TRCK: CPT

## 2024-05-27 PROCEDURE — 99232 SBSQ HOSP IP/OBS MODERATE 35: CPT | Performed by: INTERNAL MEDICINE

## 2024-05-27 PROCEDURE — 85025 COMPLETE CBC W/AUTO DIFF WBC: CPT | Performed by: INTERNAL MEDICINE

## 2024-05-27 PROCEDURE — 80048 BASIC METABOLIC PNL TOTAL CA: CPT | Performed by: INTERNAL MEDICINE

## 2024-05-27 PROCEDURE — 97535 SELF CARE MNGMENT TRAINING: CPT

## 2024-05-27 PROCEDURE — 93005 ELECTROCARDIOGRAM TRACING: CPT | Performed by: INTERNAL MEDICINE

## 2024-05-27 PROCEDURE — 97166 OT EVAL MOD COMPLEX 45 MIN: CPT

## 2024-05-27 PROCEDURE — 82525 ASSAY OF COPPER: CPT | Performed by: INTERNAL MEDICINE

## 2024-05-27 PROCEDURE — 93010 ELECTROCARDIOGRAM REPORT: CPT | Performed by: INTERNAL MEDICINE

## 2024-05-27 PROCEDURE — 25510000001 PERFLUTREN (DEFINITY) 8.476 MG IN SODIUM CHLORIDE (PF) 0.9 % 10 ML INJECTION: Performed by: INTERNAL MEDICINE

## 2024-05-27 PROCEDURE — 93306 TTE W/DOPPLER COMPLETE: CPT | Performed by: INTERNAL MEDICINE

## 2024-05-27 RX ORDER — MIRTAZAPINE 15 MG/1
15 TABLET, FILM COATED ORAL NIGHTLY
Status: DISCONTINUED | OUTPATIENT
Start: 2024-05-27 | End: 2024-06-04

## 2024-05-27 RX ADMIN — Medication 10 ML: at 10:08

## 2024-05-27 RX ADMIN — CETIRIZINE HYDROCHLORIDE 10 MG: 10 TABLET ORAL at 10:07

## 2024-05-27 RX ADMIN — ACETAMINOPHEN 325MG 650 MG: 325 TABLET ORAL at 12:59

## 2024-05-27 RX ADMIN — MIDODRINE HYDROCHLORIDE 5 MG: 5 TABLET ORAL at 16:30

## 2024-05-27 RX ADMIN — MIRTAZAPINE 15 MG: 15 TABLET, FILM COATED ORAL at 21:18

## 2024-05-27 RX ADMIN — SODIUM CHLORIDE TAB 1 GM 1 G: 1 TAB at 10:07

## 2024-05-27 RX ADMIN — PERFLUTREN 2 ML: 6.52 INJECTION, SUSPENSION INTRAVENOUS at 15:20

## 2024-05-27 RX ADMIN — ACETAMINOPHEN 325MG 650 MG: 325 TABLET ORAL at 22:10

## 2024-05-27 RX ADMIN — AMIODARONE HYDROCHLORIDE 200 MG: 200 TABLET ORAL at 10:07

## 2024-05-27 RX ADMIN — Medication 1 TABLET: at 10:07

## 2024-05-27 RX ADMIN — Medication 10 ML: at 21:16

## 2024-05-27 RX ADMIN — HYDROCORTISONE: 25 CREAM TOPICAL at 12:59

## 2024-05-27 RX ADMIN — APIXABAN 2.5 MG: 2.5 TABLET, FILM COATED ORAL at 21:16

## 2024-05-27 RX ADMIN — MIDODRINE HYDROCHLORIDE 5 MG: 5 TABLET ORAL at 10:07

## 2024-05-27 RX ADMIN — APIXABAN 2.5 MG: 2.5 TABLET, FILM COATED ORAL at 10:07

## 2024-05-27 RX ADMIN — MIDODRINE HYDROCHLORIDE 5 MG: 5 TABLET ORAL at 12:59

## 2024-05-27 NOTE — THERAPY EVALUATION
Patient Name: Michelle Car  : 1940    MRN: [id number]                              Today's Date: 2024       Admit Date: 2024    Visit Dx: No diagnosis found.  Patient Active Problem List   Diagnosis    Hypertension    Hyperlipidemia    Abdominal aortic atherosclerosis    Generalized edema    Fatigue due to excessive exertion    Seasonal allergic rhinitis due to pollen    Dyspnea    Acute on chronic diastolic heart failure    Hypoxia    Paroxysmal atrial fibrillation with rapid ventricular response    Adrenal nodule    Edema of lower extremity    Hyponatremia    CRI (chronic renal insufficiency), stage 2 (mild)    Diffuse large B-cell lymphoma of lymph nodes of multiple regions    Vascular catheter fitting or adjustment    Chronic diastolic CHF (congestive heart failure)    Sepsis due to Escherichia coli (E. coli)    Bacteremia due to Escherichia coli    Calculus of bile duct without cholecystitis and without obstruction    Diverticulosis    Iron deficiency anemia    Leukopenia    Anemia    Hyperkalemia    Adrenal insufficiency    Weight loss    Severe malnutrition     Past Medical History:   Diagnosis Date    Abdominal aortic atherosclerosis     Adrenal nodule 2022    Allergic rhinitis     Anemia due to chemotherapy     Bacteremia due to Escherichia coli 2022    ADMITTED TO [facility]    Bug bite 10/24/2015    WITH CELLULITIS, LEFT LEG    CAD (coronary artery disease)     Calculus of bile duct without cholangitis or cholecystitis 2022    Chemotherapy induced neutropenia     Chronic anticoagulation     Chronic diastolic (congestive) heart failure     Colon polyps     CRI (chronic renal insufficiency), stage 2 (mild)     Diffuse large B cell lymphoma 2022    MULTIPLE LYMPH NODE INVOLVEMENT, FOLLOWED BY DR. JEN PATTERSON    Drug induced constipation     Hearing loss     Hemorrhoids     History of blood transfusion 2022    History of chemotherapy     FOLLOWED BY DR. LI  LEONARDO    Hypercalcemia 2016    resolved as of 2019    Hypercholesterolemia     Hyperkalemia 03/2022    Hypertension     Hyponatremia 02/17/2022    Leg swelling 10/2021    Mixed hyperlipidemia     Neutropenic fever 05/2022    Nonrheumatic mitral valve regurgitation 03/2022    PAF (paroxysmal atrial fibrillation)     FOLLOWED BY DR. YOSI LANGLEY    Pancytopenia     Pleural effusion, bilateral 06/2022    Pulmonary nodule     Seasonal allergies     Thrombocytopenia 08/2022    Venous insufficiency     Vitamin D deficiency      Past Surgical History:   Procedure Laterality Date    CHOLECYSTECTOMY N/A 10/12/2011    LAPAROSCOPIC, DR. CHANCE KLINE AT [facility]    COLONOSCOPY N/A 2000    1 or 2 polyps, otherwise normal per patient    COLONOSCOPY W/ POLYPECTOMY N/A 01/24/2012    3 MM TUBULAR ADENOMA POLYP IN CECUM, DIVERTICULOSIS IN RECTO-SIGMOID, RESCOPE IN 3 YRS, DR. CHANCE KLINE AT [facility]    CYST REMOVAL Left 10/12/2011    LEFT SCALP, PATH: BENIGN ADNEXAL NEOPLASM FAVORING ECCRINE SPIRADENOMA, DR. CHANCE KLINE AT [facility]    ERCP N/A 10/21/2022    Procedure: ENDOSCOPIC RETROGRADE CHOLANGIOPANCREATOGRAPHY with sphincterotomy and balloon sweep;  Surgeon: Peyman Ortega MD;  Location: [facility] ENDOSCOPY;  Service: Gastroenterology;  Laterality: N/A;  PRE: Common Duct Stones  POST: Common Duct Stones    VENOUS ACCESS DEVICE (PORT) INSERTION Left 03/23/2022    Procedure: INSERTION VENOUS ACCESS DEVICE;  Surgeon: Alin Delgado MD;  Location: [facility] OR [facility];  Service: General;  Laterality: Left;      General Information       Row Name 05/27/24 1240          OT Time and Intention    Document Type evaluation  -AG     Mode of Treatment individual therapy;occupational therapy  -AG       Row Name 05/27/24 1240          General Information    Patient Profile Reviewed yes  -AG     Prior Level of Function independent:;ADL's  -AG     Existing Precautions/Restrictions fall  -AG     Barriers to Rehab medically complex  -AG       Row Name 05/27/24  1240          Living Environment    People in Home alone  -AG       Row Name 05/27/24 1240          Home Main Entrance    Number of Stairs, Main Entrance one  -AG       Row Name 05/27/24 1240          Stairs Within Home, Primary    Number of Stairs, Within Home, Primary none  -AG       Row Name 05/27/24 1240          Cognition    Orientation Status (Cognition) oriented x 3  -AG       Row Name 05/27/24 1240          Safety Issues, Functional Mobility    Impairments Affecting Function (Mobility) balance;endurance/activity tolerance;strength  -AG               User Key  (r) = Recorded By, (t) = Taken By, (c) = Cosigned By      Initials Name Provider Type    AG Josue Villanueva, OT Occupational Therapist                     Mobility/ADL's       Row Name 05/27/24 1241          Bed Mobility    Bed Mobility supine-sit;sit-supine  -AG     Supine-Sit [place] (Bed Mobility) minimum assist (75% patient effort);verbal cues  -AG     Sit-Supine [place] (Bed Mobility) minimum assist (75% patient effort);1 person assist;verbal cues  -AG     Assistive Device (Bed Mobility) head of bed elevated;bed rails  -AG       Row Name 05/27/24 1241          Transfers    Transfers sit-stand transfer  -AG       Row Name 05/27/24 1241          Sit-Stand Transfer    Sit-Stand [place] (Transfers) minimum assist (75% patient effort);1 person assist;verbal cues  -AG     Assistive Device (Sit-Stand Transfers) walker, front-wheeled  -AG     Comment, (Sit-Stand Transfer) cues for hand placement  -AG       Row Name 05/27/24 1241          Functional Mobility    Functional Mobility- Comment side stepping bedside, deferred walking to recliner  -AG       Row Name 05/27/24 1241          Activities of Daily Living    BADL Assessment/Intervention feeding;toileting;grooming;lower body dressing  -AG       Row Name 05/27/24 1241          Self-Feeding Assessment/Training    [place] Level (Feeding) liquids to mouth;set up  -AG     Position  (Self-Feeding) sitting up in bed  -AG       Row Name 05/27/24 1241          Toileting Assessment/Training    [place] Level (Toileting) dependent (less than 25% patient effort)  -AG     Comment, (Toileting) purewick suction  -AG       Row Name 05/27/24 1241          Grooming Assessment/Training    [place] Level (Grooming) grooming skills;wash face, hands;set up  -AG     Position (Grooming) edge of bed sitting  -AG       Row Name 05/27/24 1241          Lower Body Dressing Assessment/Training    [place] Level (Lower Body Dressing) lower body dressing skills;don;socks;maximum assist (25% patient effort)  -AG     Position (Lower Body Dressing) edge of bed sitting  -AG               User Key  (r) = Recorded By, (t) = Taken By, (c) = Cosigned By      Initials Name Provider Type    AG Josue Villanueva OT Occupational Therapist                   Obj/Interventions       [facility] Name 05/27/24 1252          Sensory Assessment (Somatosensory)    Sensory Assessment (Somatosensory) sensation intact  -AG       Row Name 05/27/24 1252          Vision Assessment/Intervention    Visual Impairment/Limitations WFL  -AG       Row Name 05/27/24 1252          Range of Motion Comprehensive    General Range of Motion no range of motion deficits identified  -AG       Row Name 05/27/24 1252          Strength Comprehensive (MMT)    Comment, General Manual Muscle Testing (MMT) Assessment generalized weakness  -AG       Row Name 05/27/24 1252          Motor Skills    Motor Skills functional endurance  -AG     Functional Endurance poor  -AG       Row Name 05/27/24 1252          Balance    Balance Assessment sitting static balance;sitting dynamic balance;sit to stand dynamic balance;standing static balance;standing dynamic balance  -AG     Static Sitting Balance standby assist  -AG     Dynamic Sitting Balance standby assist  -AG     Position, Sitting Balance unsupported;sitting edge of bed  -AG     Static Standing Balance contact guard   -AG     Dynamic Standing Balance contact guard  -AG     Position/Device Used, Standing Balance supported;walker, front-wheeled  -AG     Balance Interventions sitting;standing;occupation based/functional task  -AG     Comment, Balance no LOB  -AG               User Key  (r) = Recorded By, (t) = Taken By, (c) = Cosigned By      Initials Name Provider Type    AG Josue Villanueva, OT Occupational Therapist                   Goals/Plan       Row Name 05/27/24 1306          Bed Mobility Goal 1 (OT)    Activity/Assistive Device (Bed Mobility Goal 1, OT) bed mobility activities, all  -AG     [place] Level/Cues Needed (Bed Mobility Goal 1, OT) modified independence  -AG     Time Frame (Bed Mobility Goal 1, OT) short term goal (STG);2 weeks  -AG     Progress/Outcomes (Bed Mobility Goal 1, OT) goal ongoing  -AG       Row Name 05/27/24 1306          Transfer Goal 1 (OT)    Activity/Assistive Device (Transfer Goal 1, OT) transfers, all  -AG     [place] Level/Cues Needed (Transfer Goal 1, OT) modified independence  -AG     Time Frame (Transfer Goal 1, OT) short term goal (STG);2 weeks  -AG     Progress/Outcome (Transfer Goal 1, OT) goal ongoing  -AG       Row Name 05/27/24 1306          Dressing Goal 1 (OT)    Activity/Device (Dressing Goal 1, OT) dressing skills, all  -AG     [place]/Cues Needed (Dressing Goal 1, OT) modified independence  -AG     Time Frame (Dressing Goal 1, OT) short term goal (STG);2 weeks  -AG     Progress/Outcome (Dressing Goal 1, OT) goal ongoing  -AG       Row Name 05/27/24 1306          Toileting Goal 1 (OT)    Activity/Device (Toileting Goal 1, OT) toileting skills, all  -AG     [place] Level/Cues Needed (Toileting Goal 1, OT) modified independence  -AG     Time Frame (Toileting Goal 1, OT) short term goal (STG);2 weeks  -AG     Progress/Outcome (Toileting Goal 1, OT) goal ongoing  -AG       Row Name 05/27/24 1306          Grooming Goal 1 (OT)    Activity/Device (Grooming Goal 1,  OT) grooming skills, all  -AG     [place] (Grooming Goal 1, OT) modified independence  -AG     Time Frame (Grooming Goal 1, OT) short term goal (STG);2 weeks  -AG     Progress/Outcome (Grooming Goal 1, OT) goal ongoing  -AG       Row Name 05/27/24 1306          Therapy Assessment/Plan (OT)    Planned Therapy Interventions (OT) activity tolerance training;functional balance retraining;occupation/activity based interventions;ROM/therapeutic exercise;IADL retraining;adaptive equipment training;BADL retraining;neuromuscular control/coordination retraining;patient/caregiver education/training;transfer/mobility retraining;strengthening exercise  -AG               User Key  (r) = Recorded By, (t) = Taken By, (c) = Cosigned By      Initials Name Provider Type    Josue Burger, ELVI Occupational Therapist                   Clinical Impression       Row Name 05/27/24 1250          Pain Assessment    Pretreatment Pain Rating 0/10 - no pain  -AG     Posttreatment Pain Rating 0/10 - no pain  -AG       Row Name 05/27/24 1253          Plan of Care Review    Plan of Care Reviewed With patient  -AG     Progress no change  -AG     Outcome Evaluation 82 y/o F admitted to [facility] for weakness. Prior to admission, she reports independence with ADLs living alone in a single story home with a walk in shower.  Presently she demo's decreased strength, act evie and balance limiting her independence with self care. Pt. required verbal encouragement to participate and min A for bed mob, STS and CGA for side stepping bedside. Pt. deferred further mobility 2/2 fatigue and returned to supine. Pt. will require skilled OT services to address above mentioned deficits. OT rec DC to SNF vs Home with HH pending progress a support available  -AG       Row Name 05/27/24 1255          Therapy Assessment/Plan (OT)    Criteria for Skilled Therapeutic Interventions Met (OT) yes;meets criteria;skilled treatment is necessary  -AG     Therapy Frequency  (OT) 5 times/wk  -AG       Row Name 05/27/24 1253          Therapy Plan Review/Discharge Plan (OT)    Anticipated Discharge Disposition (OT) skilled nursing facility;home with home health  -AG       Row Name 05/27/24 1253          Vital Signs    O2 Delivery Pre Treatment room air  -AG       Row Name 05/27/24 1253          Positioning and Restraints    Pre-Treatment Position in bed  -AG     Post Treatment Position bed  -AG     In Bed notified nsg;encouraged to call for assist;exit alarm on;fowlers;call light within reach  -               User Key  (r) = Recorded By, (t) = Taken By, (c) = Cosigned By      Initials Name Provider Type    Josue Burger OT Occupational Therapist                   Outcome Measures       Row Name 05/27/24 1307          How much help from another is currently needed...    Putting on and taking off regular lower body clothing? 2  -AG     Bathing (including washing, rinsing, and drying) 2  -AG     Toileting (which includes using toilet bed pan or urinal) 1  -AG     Putting on and taking off regular upper body clothing 3  -AG     Taking care of personal grooming (such as brushing teeth) 3  -AG     Eating meals 4  -AG     AM-PAC 6 Clicks Score (OT) 15  -AG       Row Name 05/27/24 1307          Functional Assessment    Outcome Measure Options AM-PAC 6 Clicks Daily Activity (OT)  -AG               User Key  (r) = Recorded By, (t) = Taken By, (c) = Cosigned By      Initials Name Provider Type    Josue Burger OT Occupational Therapist                    Occupational Therapy Education       Title: PT OT SLP Therapies (Done)       Topic: Occupational Therapy (Done)       Point: ADL training (Done)       Description:   Instruct learner(s) on proper safety adaptation and remediation techniques during self care or transfers.   Instruct in proper use of assistive devices.                  Learning Progress Summary             Patient Acceptance, E,TB, VU by  at 5/27/2024 1306                          Point: Home exercise program (Done)       Description:   Instruct learner(s) on appropriate technique for monitoring, assisting and/or progressing therapeutic exercises/activities.                  Learning Progress Summary             Patient Acceptance, E,TB, VU by  at 5/27/2024 1307                         Point: Precautions (Done)       Description:   Instruct learner(s) on prescribed precautions during self-care and functional transfers.                  Learning Progress Summary             Patient Acceptance, E,TB, VU by  at 5/27/2024 1307                         Point: Body mechanics (Done)       Description:   Instruct learner(s) on proper positioning and spine alignment during self-care, functional mobility activities and/or exercises.                  Learning Progress Summary             Patient Acceptance, E,TB, VU by  at 5/27/2024 1307                                         User Key       Initials Effective Dates Name Provider Type Discipline     01/23/24 -  Josue Villanueva OT Occupational Therapist OT                  OT Recommendation and Plan  Planned Therapy Interventions (OT): activity tolerance training, functional balance retraining, occupation/activity based interventions, ROM/therapeutic exercise, IADL retraining, adaptive equipment training, BADL retraining, neuromuscular control/coordination retraining, patient/caregiver education/training, transfer/mobility retraining, strengthening exercise  Therapy Frequency (OT): 5 times/wk  Plan of Care Review  Plan of Care Reviewed With: patient  Progress: no change  Outcome Evaluation: 84 y/o F admitted to [facility] for weakness. Prior to admission, she reports independence with ADLs living alone in a single story home with a walk in shower.  Presently she demo's decreased strength, act evie and balance limiting her independence with self care. Pt. required verbal encouragement to participate and min A for bed mob, STS and CGA for  side stepping bedside. Pt. deferred further mobility 2/2 fatigue and returned to supine. Pt. will require skilled OT services to address above mentioned deficits. OT rec DC to SNF vs Home with HH pending progress a support available     Time Calculation:   Evaluation Complexity (OT)  Review Occupational Profile/Medical/Therapy History Complexity: expanded/moderate complexity  Assessment, Occupational Performance/Identification of Deficit Complexity: 3-5 performance deficits  Clinical Decision Making Complexity (OT): detailed assessment/moderate complexity  Overall Complexity of Evaluation (OT): moderate complexity     Time Calculation- OT       Row Name 05/27/24 1308             Time Calculation- OT    OT Start Time 0855  -AG      OT Stop Time 0917  -AG      OT Time Calculation (min) 22 min  -AG      Total Timed Code Minutes- OT 15 minute(s)  -AG      OT Received On 05/27/24  -AG      OT - Next Appointment 05/28/24  -AG      OT Goal Re-Cert Due Date 06/10/24  -AG         Timed Charges    35689 - OT Self Care/Mgmt Minutes 15  -AG         Untimed Charges    OT Eval/Re-eval Minutes 7  -AG         Total Minutes    Timed Charges Total Minutes 15  -AG      Untimed Charges Total Minutes 7  -AG       Total Minutes 22  -AG                User Key  (r) = Recorded By, (t) = Taken By, (c) = Cosigned By      Initials Name Provider Type    AG Josue Villanueva OT Occupational Therapist                  Therapy Charges for Today       Code Description Service Date Service Provider Modifiers Qty    [id number]  OT SELF CARE/MGMT/TRAIN EA 15 MIN 5/27/2024 Josue Villanueva OT GO 1    [id number] HC OT EVAL MOD COMPLEXITY 2 5/27/2024 Josue Villanueva OT GO 1                 Josue Villanueva OT  5/27/2024

## 2024-05-27 NOTE — PLAN OF CARE
Goal Outcome Evaluation:              Outcome Evaluation: VSS. Pt still feeling weak and tired this shift. Cardiology consulted. Echo completed. No BM this shift. Stool sample still needed. No other complaints. Needs met at this time.

## 2024-05-27 NOTE — PROGRESS NOTES
Name: Michelle Car ADMIT: 2024   : 1940  PCP: lOayinka Pimentel MD    MRN: [id number] LOS: 3 days   AGE/SEX: 83 y.o. female  ROOM: [facility]/     Subjective   Subjective   Patient continues with severe weakness and low endurance.  She is depressed and frustrated that she is not getting better.  No fever or chills.  No myalgia.  No bleeding diathesis    Review of Systems  Cardiovascular/respiratory.  No chest pain/no shortness of breath/no cough/no palpitation  .  No dysuria or hematuria.  CNS.  No dizziness or loss of consciousness.  No focal neurological symptoms  GI.  Poor appetite.  No nausea or vomiting.  No abdominal pain.     Objective   Objective   Vital Signs  Temp:  [97.5 °F (36.4 °C)-98.8 °F (37.1 °C)] 97.9 °F (36.6 °C)  Heart Rate:  [58-76] 72  Resp:  [16] 16  BP: ()/(48-71) 102/71  SpO2:  [95 %-100 %] 96 %  on   ;   Device (Oxygen Therapy): room air    Intake/Output Summary (Last 24 hours) at 2024 1042  Last data filed at 2024 0500  Gross per 24 hour   Intake 420 ml   Output --   Net 420 ml     Body mass index is 23.34 kg/m².      24  1701 24  1849   Weight: 51.7 kg (114 lb) 54.2 kg (119 lb 7.8 oz)     Physical Exam  General.  Elderly female.  Alert and oriented x 4.  Frail and chronically ill-appearing.  Current pain/diaphoresis/distress.  Eyes.  Positive pallor.  No jaundice.  Pupils equal round and reactive.  Intact extraocular musculature  Oral cavity.  Moist mucous membrane.  Neck.  Supple.  No JVD.  No lymphadenopathy or thyromegaly.  Neurovascular.  Regular rate and rhythm and grade 3 systolic murmur  Chest.  Clear to auscultation bilaterally with scattered bilateral rhonchi.  Abdomen.  Soft lax.  No tenderness.  No organomegaly.  No guarding or rebound  Extremities.  No clubbing/cyanosis/edema.  Interphalangeal joint deformities.  CNS.  No acute focal neurological deficits      Results Review:      Results from last 7 days   Lab Units  "05/27/24  0336 05/26/24  0525 05/25/24  0311 05/24/24  1150   SODIUM mmol/L 133* 132* 128* 126*   POTASSIUM mmol/L 4.0 3.9 4.2 5.6*   CHLORIDE mmol/L 101 99 97* 90*   CO2 mmol/L 25.0 25.0 22.2 22.4   BUN mg/dL 16 15 18 26*   CREATININE mg/dL 0.60 0.75 0.75 0.95   GLUCOSE mg/dL 130* 99 90 103*   CALCIUM mg/dL 8.1* 7.9* 8.2* 8.7   AST (SGOT) U/L  --   --  34* 51*   ALT (SGPT) U/L  --   --  32 42*     Estimated Creatinine Clearance: 61.9 mL/min (by C-G formula based on SCr of 0.6 mg/dL).          Results from last 7 days   Lab Units 05/25/24  1018   CK TOTAL U/L 16*         Results from last 7 days   Lab Units 05/25/24  1018   TSH uIU/mL 3.220     Results from last 7 days   Lab Units 05/25/24  0311   MAGNESIUM mg/dL 1.9           Invalid input(s): \"LDLCALC\"  Results from last 7 days   Lab Units 05/27/24  0336 05/26/24  0525 05/25/24  0311 05/24/24  1150 05/24/24  1150 05/20/24  1433   WBC 10*3/mm3 1.46* 1.01* 0.86*  --  1.29* 1.75*   HEMOGLOBIN g/dL 10.6* 8.2* 7.8*  --  9.3* 8.8*   HEMATOCRIT % 31.0* 23.9* 23.4*  --  27.9* 26.0*   PLATELETS 10*3/mm3 190 174 170  --  240 196   MCV fL 96.6 98.4* 100.9*  --  100.7* 100.8*   MCH pg 33.0 33.7* 33.6*  --  33.6* 34.1*   MCHC g/dL 34.2 34.3 33.3  --  33.3 33.8   RDW % 14.0 14.0 14.3  --  14.3 14.3   RDW-SD fl 48.8 49.6 52.2  --  52.2 52.3   MPV fL 9.3 9.0 9.9  --  9.8 9.8   NEUTROPHIL % %  --   --   --   --  52.7 53.8   LYMPHOCYTE % %  --   --   --   --  24.0 13.7*   MONOCYTES % %  --   --   --   --  14.7* 19.4*   EOSINOPHIL % %  --   --   --   --  0.8 0.0*   BASOPHIL % %  --   --   --   --  0.8 1.1   IMM GRAN % %  --   --   --   --  7.0* 12.0*   NEUTROS ABS 10*3/mm3  --  0.61* 0.53*   < > 0.68* 0.94*   LYMPHS ABS 10*3/mm3  --   --   --   --  0.31* 0.24*   MONOS ABS 10*3/mm3  --   --   --   --  0.19 0.34   EOS ABS 10*3/mm3  --  0.00 0.01   < > 0.01 0.00   BASOS ABS 10*3/mm3  --  0.02 0.01   < > 0.01 0.02   IMMATURE GRANS (ABS) 10*3/mm3  --   --   --   --  0.09* 0.21*   NRBC " /100 WBC  --   --   --   --  0.0 0.0    < > = values in this interval not displayed.             Results from last 7 days   Lab Units 05/25/24  1018 05/25/24  0311 05/24/24  1914   PROCALCITONIN ng/mL 0.10  --   --    LACTATE mmol/L  --  0.7 2.2*             Results from last 7 days   Lab Units 05/24/24  1914   BLOODCX  No growth at 2 days         Results from last 7 days   Lab Units 05/26/24  1228   NITRITE UA  Negative     Results from last 7 days   Lab Units 05/26/24  1232 05/24/24  1150   SODIUM UR mmol/L 92  --    CREATININE UR mg/dL 38.8  --    OSMOLALITY UR mOsm/kg 367  --    URIC ACID mg/dL  --  3.5       Imaging:  Imaging Results (Last 24 Hours)       ** No results found for the last 24 hours. **               I reviewed the patient's new clinical results / labs / tests / procedures      Assessment/Plan     Active Hospital Problems    Diagnosis  POA    **Leukopenia [D72.819]  Unknown    Severe malnutrition [E43]  Yes    Anemia [D64.9]  Unknown    Hyperkalemia [E87.5]  Unknown    Adrenal insufficiency [E27.40]  Unknown    Weight loss [R63.4]  Unknown    Chronic diastolic CHF (congestive heart failure) [I50.32]  Yes    Paroxysmal atrial fibrillation with rapid ventricular response [I48.0]  Yes    Hyponatremia [E87.1]  Yes    Diffuse large B-cell lymphoma of lymph nodes of multiple regions [C83.38]  Yes    CRI (chronic renal insufficiency), stage 2 (mild) [N18.2]  Yes      Resolved Hospital Problems   No resolved problems to display.         Weakness/fatigue secondary to #2, #3, #4.  Patient with normal volume status.  TSH is normal.  Magnesium is normal.  K is now normal.  CK is normal.  Cortisol level is normal.  Continue PT and OT .  PT recommends SNF.  Look below for further management.  Hyponatremia/hyperkalemia.  Hyperkalemia resolved with Lokelma.  Hyponatremia improved after initial IV fluid.  Uric acid is normal.  Normal TSH.  Normal cortisol.  Noted urine electrolytes   Nephrology appears to believe  that this is inappropriate antidiuretic hormone secretion and has stopped IV fluid and placed her on fluid restriction with continuation of the sodium tablets.  Improved hyponatremia.  Differential diagnosis of the above includes diuretic use, Bactrim use, poor p.o. intake.  Aldactone/diuretic/Bactrim held  Anemia/leukopenia in a patient with a history of large B-cell lymphoma on chemotherapy.  Anemia workup noted.  This is mostly anemia of chronic disease secondary to cancer.  Leukopenia is most likely secondary to chemotherapy.  Other differential diagnoses include bone marrow replacement/Bactrim side effect on the bone marrow.  Hold chemo.  Hold Bactrim.   No neutropenic fever.  Status post packed RBC transfusion with appropriate hemoglobin response.  There is no clinical focus of infection.  Chest x-ray without infiltrate.  UA negative for UTI.  Procalcitonin is normal.  Blood cultures are negative.  Cefepime DC'd.  Hypertension/A-fib/chronic diastolic congestive heart failure.  There is no evidence of angina or congestive heart failure.  The blood pressure is on the low side and her Coreg has been DC'd and she was started on midodrine.  Hypotension improved.  If her rate of A-fib becomes uncontrolled will depend on digoxin.  Will continue amiodarone/Eliquis.  Normal TSH and cortisol.  Last echo was on 11/23 revealing a normal ejection fraction, left atrial volume elevation and right atrial dilatation with trace AR and mild AS and severe MR.  Hematology oncology recommends cardiology consult  Elevated lactic acid.  Resolved.  Malnutrition.  Nutritional supplements.  Add Remeron for appetite.  Depression.  Remeron will be added.  VTE prophylaxis.  Sequential compression device.           Discussed my findings and plan of treatment with the patient.  Disposition.  To SNF once bed is available and okay with consultants.            Saulo Smith MD  [facility]  05/27/24  10:42 EDT

## 2024-05-27 NOTE — NURSING NOTE
VSS. No c/o pain. PRBC currently infusing and pt tolerating well. No complaints or concerns. Needs met. Report to Levar LEMUS to resume care.

## 2024-05-27 NOTE — PROGRESS NOTES
Subjective     CHIEF COMPLAINT:     Diffuse large B cell lymphoma  Neutropenia   Anemia  Hyponatremia    INTERVAL HISTORY:   Received 1 unit of PRBC on 5/26/2024.  Hemoglobin has improved to 9.6.  Vital signs overall stable with a somewhat low blood pressure.  Copper and MMA level still pending.  WBC count is uptrending with a total count of 1.46, differential pending  Patient continues to report ongoing weakness and requesting cardiology evaluation as per her discussion with Dr. Contreras.  She remains constipated and has not had a bowel movement since admission.    REVIEW OF SYSTEMS:  Pertinent ROS as in the interval history. Otherwise, negative.    SCHEDULED MEDS:  amiodarone, 200 mg, Oral, Q24H  apixaban, 2.5 mg, Oral, Q12H  cetirizine, 10 mg, Oral, Daily  ferrous sulfate, 325 mg, Oral, Every Other Day  Hydrocortisone (Perianal), , Rectal, Daily  midodrine, 5 mg, Oral, TID AC  multivitamin with minerals, 1 tablet, Oral, Daily  sodium chloride, 10 mL, Intravenous, Q12H  sodium chloride, 1 g, Oral, Daily      Objective   VITAL SIGNS:  Temp:  [97.5 °F (36.4 °C)-98.8 °F (37.1 °C)] 97.9 °F (36.6 °C)  Heart Rate:  [58-76] 64  Resp:  [16] 16  BP: ()/(48-69) 96/59     PHYSICAL EXAMINATION:  GENERAL:  The patient is ill-appearing.  SKIN: No skin rash.   EYES:  No Jaundice. No Pallor.   CHEST: Normal respiratory effort. Lungs clear to auscultation.   CARDIAC:  Normal S1 & S2. Grade III systolic murmur.   ABDOMEN:  Non-distended.   EXTREMITIES:  No edema.    I have reexamined the patient and the results are consistent with the previously documented exam. Mallory Del Castillo MD      RESULT REVIEW:   Results from last 7 days   Lab Units 05/27/24  0336 05/26/24  0525 05/25/24  0311 05/24/24  1150 05/20/24  1433   WBC 10*3/mm3 1.46* 1.01* 0.86* 1.29* 1.75*   NEUTROS ABS 10*3/mm3  --  0.61* 0.53* 0.68* 0.94*   LYMPHS ABS 10*3/mm3  --  0.22* 0.14*  --   --    HEMOGLOBIN g/dL 10.6* 8.2* 7.8* 9.3* 8.8*   HEMATOCRIT % 31.0* 23.9*  23.4* 27.9* 26.0*   PLATELETS 10*3/mm3 190 174 170 240 196     Results from last 7 days   Lab Units 05/27/24  0336 05/26/24  0525 05/25/24  0311 05/24/24  1150   SODIUM mmol/L 133* 132* 128* 126*   POTASSIUM mmol/L 4.0 3.9 4.2 5.6*   CHLORIDE mmol/L 101 99 97* 90*   CO2 mmol/L 25.0 25.0 22.2 22.4   BUN mg/dL 16 15 18 26*   CREATININE mg/dL 0.60 0.75 0.75 0.95   CALCIUM mg/dL 8.1* 7.9* 8.2* 8.7   ALBUMIN g/dL  --   --  2.5* 3.1*   BILIRUBIN mg/dL  --   --  0.3 0.3   ALK PHOS U/L  --   --  62 72   ALT (SGPT) U/L  --   --  32 42*   AST (SGOT) U/L  --   --  34* 51*   MAGNESIUM mg/dL  --   --  1.9  --          Lab 05/26/24  0525 05/24/24  1150   IRON  --  27*   IRON SATURATION (TSAT)  --  11*   TIBC  --  237*   TRANSFERRIN  --  159*   FERRITIN  --  1,953.00*   FOLATE 11.40  --    VITAMIN B 12 >2,000*  --       Component      Latest Ref Rn 5/25/2024   Reticulocyte %      0.70 - 1.90 % 2.97 (H)       Component      Latest Ref Rn 5/25/2024   Haptoglobin      30 - 200 mg/dL 237 (H)      Component      Latest Ref Rn 5/3/2024 5/24/2024 5/25/2024   LDH      135 - 214 U/L 164  245 (H)  190    Uric Acid      2.4 - 5.7 mg/dL 3.5  3.5        Assessment & Plan   *Relapsed diffuse large B-cell lymphoma.  Patient was found to have bilateral pleural effusions.    She underwent right thoracentesis on 2/21/2022 and the left thoracentesis on 2/22/2022.    Analysis of the fluid revealed involvement with diffuse large B-cell lymphoma.    FISH analysis for BCL6 rearrangement was positive but was negative for BCL-2 and MYC rearrangement.    PET scan on 3/10/2022 revealed significant hypermetabolism in the left adrenal gland and the surrounding soft tissue with SUV up to 34.5. Hypermetabolism in the left pleural thickening with SUV of 7.1. there was less hypermetabolism in the right adrenal gland and in the right pleura.  She was considered to have stage III non-bulky disease.  R-IPI score of 3 associated with poor risk - associated with  overall survival of 55%.   R-CHOP with Neulasta support was started on 3/28/2022.  Due to development of neutropenic fever following cycles 1 and 2, treatment was changed to mini-RCHOP starting cycle #3 on 5/16/2022.  Patient received cycle #6 on 7/25/2022.  PET scan on 8/15/2022 revealed complete metabolic response.  Patient was found to have recurrence with development of a left mandibular mass in October/November 2023.  Biopsy of left mandible mass on 11/28/2023 showed recurrence of the lymphoma, diffuse large B cell, non-GCB phenotype.  Ki-67 was 90%.    It was positive for Bcl-6 but negative for Bcl-2 and MYC rearrangement.  PET scan on 12/11/2023 revealed multiple areas of involvement -prevascular space, left epicardial fat pad, pleural density posterior to the left fourth rib anterior aspect, lesion along the pericardium versus intramuscular with SUV of 9.8.  Hypermetabolic activity at both adrenal glands, uniformly increased in size and appeared hyperplastic.  There was a 2.7 x 1.1 cm soft tissue nodule lateral to the right erector spinae muscle posterior to the right posterior 11th rib with an SUV of 20.5 and there was a 1.3 cm nodule lateral to the posterior margin of the left psoas muscle with an SUV of 24.9.  Rituxan Polivy and Treanda with Treanda every 3 weeks for 6 cycles was started on 12/12/2023.  The jaw mass started to decrease in size after starting treatment.  CT scan on 2/8/2024 showed evidence of response to treatment.  Cycle #6 was given on 4/1/2024.  PET scan on 4/22/2024 revealed decrease in the hypermetabolism in the mediastinal and pericardial lymph nodes/lesions and in the lesions above the left kidney and adrenal gland.  The left mandibular lesion was no longer hypermetabolic.   She is at increased risk for relapse of the lymphoma.  The plan was to start treatment with Revlimid 10 mg daily.  However, the patient did not start the treatment yet.  LDH was previously elevated but improved  to 190 on 5/25/2024.     *Neutropenia  5/20/2024: WBC decreased to 1750.  Neutrophils decreased to 940.  5/24/2024: WBC decreased to 1290.  Neutrophils decreased to 680.  5/25/2024: WBC decreased to 860.  Neutrophils decreased to 530.  Worsening neutropenia is also concerning for relapse of the lymphoma with involvement of the bone marrow.   5/26/2024: Neutrophil count 610.  5/27/2024-total WBC count has increased to 1.46.  Absolute neutrophil count pending      *Macrocytic anemia.  Patient also developed anemia secondary to lymphoma and secondary to chemotherapy.    Hgb decreased to 7.9 on 4/25/2022 and she was transfused.   Hemoglobin decreased to 6.3 on 4/27/2022. She was transfused.  Hemoglobin level improved subsequently.  Patient was placed ferrous sulfate 325 mg 3 days a week.  5/3/2024: Hemoglobin increased to 10.2.  5/25/2024: Hemoglobin decreased to 7.8.  No evidence of vitamin B12 or folate deficiency.    Ferritin 1953.  Transferrin saturation 11%.  Patient decided to hold off on PRBC transfusion.  5/26/2024: Hemoglobin 8.2.  Due to her reporting chills when she received PRBC Tx in the past, we will give Tylenol, Benadryl and Solumedrol prior to the transfusion.   526 2024-1 unit of PRBC administered.  Patient tolerated the transfusion well.  Hemoglobin has improved to 10.2  Copper and MMA levels pending     *Hyponatremia.  Patient had problem with hyponatremia in the past.  Sodium is 127 on 3/14/2023.  She was advised to increase salt intake.  Sodium improved to 133 on repeat lab on 3/21/2023.  However, sodium decreased to 126 on 6/7/2023.  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  She was started on sodium chloride 1 g daily.  4/1/2024: Sodium decreased to 132.  Leg swelling improved with Lasix.  5/3/2024: Sodium 130.  5/24/2024: Sodium decreased to 126.    Patient was having significant weakness.  5/25/2024: Sodium 128.  Nephrology service was consulted.  5/26/2024: Sodium  132.  5/27/2024-sodium 133     *Hyperkalemia.  5/24/2024: Potassium increased to 5.6.  5/27/2024-potassium normal at 4.0     *Chronic anticoagulation.  Patient is on Eliquis 5 mg twice daily.  No excessive bleeding     *Prophylaxis.  Patient had shingles in April 2023.  She is at risk of developing shingles while on treatment.  She was placed on acyclovir 400 mg twice daily.  She was on Bactrim DS 3 days a week.  Bactrim was discontinued due to the electrolyte abnormalities.     *Low-density lesion in the uterus.    The radiologist recommended pelvic ultrasound.    The patient is asymptomatic.    PET scan on 4/22/2024 reported fluid in the lower abdomen.  Patient reported intermittent vaginal discharge.     PLAN:     1.  Appropriate response in the hemoglobin after 1 unit of PRBC  2.  Await MMA and copper levels.   3.  Await differential  4.  If patient fatigue does not improve with transfusion, may need to ask her Cardiologist to evaluate.   5.if neutropenia does not improve then we would have to consider relapse of lymphoma is a possibility and further evaluation would be required.        Mallory Del Castillo MD  05/27/24

## 2024-05-27 NOTE — CASE MANAGEMENT/SOCIAL WORK
Discharge Planning Assessment  [facility]     Patient Name: Michelle Car  MRN: [id number]  Today's Date: 5/27/2024    Admit Date: 5/24/2024    Plan: SNF, awaiting facilities from pt to make a referral.   Discharge Needs Assessment       Row Name 05/27/24 1127       Living Environment    People in Home alone    Current Living Arrangements home    Family Caregiver if Needed child(mike), adult    Quality of Family Relationships helpful;involved;supportive    Able to Return to Prior Arrangements other (see comments)       Transition Planning    Patient/Family Anticipates Transition to inpatient rehabilitation facility    Patient/Family Anticipated Services at Transition     Transportation Anticipated family or friend will provide;health plan transportation       Discharge Needs Assessment    Readmission Within the Last 30 Days no previous admission in last 30 days    Equipment Currently Used at Home none    Concerns to be Addressed discharge planning    Equipment Needed After Discharge none    Outpatient/Agency/Support Group Needs skilled nursing facility    Discharge Facility/Level of Care Needs nursing facility, skilled                   Discharge Plan       Row Name 05/27/24 1127       Plan    Plan SNF, awaiting facilities from pt to make a referral.    Patient/Family in Agreement with Plan yes    Plan Comments CCP met with pt at bedside, introduced self and role of CCP. Face sheet information and pharmacy verified. Pt lives alone in a single-story home with 1STE. Pt still drives, IADL's until recently when her son has been assisting her and denies DME at home. Pt has a living will. Pt is declines meds to meds and denies trouble affording or managing her medications. Pt denies HH or SNF history. CCP discussed PT eval and recs for SNF. Pt unsure of DC plan at this time. CCP left SNF list and [facility] at the bedside for pt to review. [facility]                  Continued Care and Services  - Admitted Since 5/24/2024    No active coordination exists for this encounter.       Selected Continued Care - Episodes Includes continued care and service providers with selected services from the active episodes listed below      Oncology- External Fill Episode start date: 5/3/2024   There are no active outsourced providers for this episode.                    Demographic Summary    No documentation.                  Functional Status       Row Name 05/27/24 1127       Functional Status    Usual Activity Tolerance moderate    Current Activity Tolerance fair       Assessment of Health Literacy    How often do you have someone help you read hospital materials? Never    How often do you have problems learning about your medical condition because of difficulty understanding written information? Never    How often do you have a problem understanding what is told to you about your medical condition? Never    How confident are you filling out medical forms by yourself? Quite a bit    Health Literacy Good       Functional Status, IADL    Medications independent;assistive person    Meal Preparation independent;assistive person    Housekeeping independent;assistive person    Laundry independent;assistive person    Shopping independent;assistive person                               Nieves Mcguire RN

## 2024-05-27 NOTE — PLAN OF CARE
Goal Outcome Evaluation:  Plan of Care Reviewed With: patient        Progress: no change  Outcome Evaluation: 84 y/o F admitted to [facility] for weakness. Prior to admission, she reports independence with ADLs living alone in a single story home with a walk in shower.  Presently she demo's decreased strength, act evie and balance limiting her independence with self care. Pt. required verbal encouragement to participate and min A for bed mob, STS and CGA for side stepping bedside. Pt. deferred further mobility 2/2 fatigue and returned to supine. Pt. will require skilled OT services to address above mentioned deficits. OT rec DC to SNF vs Home with  pending progress a support available      Anticipated Discharge Disposition (OT): skilled nursing facility, home with home health

## 2024-05-27 NOTE — PLAN OF CARE
Goal Outcome Evaluation:            1 unit of PRBC given yesterday,Hgb up to 10.6 with Am labs.BP still soft,pt asymptomatic.Rested well this shift.Pending stool specimen,Pt yet to have a BM.Offered stool softener/Miralax,refused at this time,prefers to take it with AM meds.WCTM

## 2024-05-27 NOTE — PROGRESS NOTES
Nephrology Follow Up Note    Patient Identification:  Name: Michelle Car MRN: [id number]  Age: 83 y.o. : 1940  Sex: female  Date:2024    Requesting Physician: As per consult order.  Reason for Consultation: hyponatremia   Information from:patient/ family/ chart      History of Present Illness: This is a 83 y.o. year old female  with lymphoma admitted for weakness, ,hyponatremia and edema.   She completed chemo in 2024.  She has had ongoing issues with hyponatremia.  She was on salt tabs at home Upon arrival her Na was 126 with K at 5.6.   She was given IV fluids of NS as bp was low upon arrival.       feels better, Na now at 132, bp near goal      : Complaining of weakness.  Wants to look into nursing/rehab placement  Otherwise no new complaints      The following medical history and medications personally reviewed by me:        Current Meds:   Current Facility-Administered Medications   Medication Dose Route Frequency Provider Last Rate Last Admin    acetaminophen (TYLENOL) tablet 650 mg  650 mg Oral Q4H PRN Abel Pelaez MD   650 mg at 24 1032    Or    acetaminophen (TYLENOL) 160 MG/5ML oral solution 650 mg  650 mg Oral Q4H PRN Abel Pelaez MD        Or    acetaminophen (TYLENOL) suppository 650 mg  650 mg Rectal Q4H PRN Abel Pelaez MD        albuterol (PROVENTIL) nebulizer solution 0.083% 2.5 mg/3mL  2.5 mg Nebulization Q4H PRN Abel Pelaez MD        amiodarone (PACERONE) tablet 200 mg  200 mg Oral Q24H Abel Pelaez MD   200 mg at 24 1007    apixaban (ELIQUIS) tablet 2.5 mg  2.5 mg Oral Q12H Abel Pelaez MD   2.5 mg at 24 1007    sennosides-docusate (PERICOLACE) 8.6-50 MG per tablet 2 tablet  2 tablet Oral BID PRN Abel Pelaez MD        And    polyethylene glycol (MIRALAX) packet 17 g  17 g Oral Daily PRN Abel Pelaez MD        And    bisacodyl (DULCOLAX) EC tablet 5 mg  5 mg Oral Daily PRN Abel Pelaez MD        And    bisacodyl (DULCOLAX)  "suppository 10 mg  10 mg Rectal Daily PRN Abel Pelaez MD        cetirizine (zyrTEC) tablet 10 mg  10 mg Oral Daily Abel Pelaez MD   10 mg at 24    ferrous sulfate tablet 325 mg  325 mg Oral Every Other Day Abel Pelaez MD   325 mg at 24 0838    Hydrocortisone (Perianal) (ANUSOL-HC) 2.5 % rectal cream   Rectal Daily Abel Pelaez MD        midodrine (PROAMATINE) tablet 5 mg  5 mg Oral TID AC Saulo Smith MD   5 mg at 24 1007    mirtazapine (REMERON) tablet 15 mg  15 mg Oral Nightly Saulo Smith MD        multivitamin with minerals 1 tablet  1 tablet Oral Daily Abel Pelaez MD   1 tablet at 24 1007    nitroglycerin (NITROSTAT) SL tablet 0.4 mg  0.4 mg Sublingual Q5 Min PRN Abel Pelaez MD        ondansetron (ZOFRAN) injection 4 mg  4 mg Intravenous Q6H PRN Abel Pelaez MD        sodium chloride 0.9 % flush 10 mL  10 mL Intravenous Q12H Abel Pelaez MD   10 mL at 24 1008    sodium chloride 0.9 % flush 10 mL  10 mL Intravenous PRN Abel Pelaez MD        sodium chloride 0.9 % infusion 40 mL  40 mL Intravenous PRN Abel Pelaez MD        sodium chloride tablet 1 g  1 g Oral Daily Abel Pelaez MD   1 g at 24 1007         Physical Exam:  Vitals:   Temp (24hrs), Av.1 °F (36.7 °C), Min:97.5 °F (36.4 °C), Max:98.8 °F (37.1 °C)    /71   Pulse 72   Temp 97.9 °F (36.6 °C) (Oral)   Resp 16   Ht 152.4 cm (60\")   Wt 54.2 kg (119 lb 7.8 oz)   SpO2 96%   BMI 23.34 kg/m²   Intake/Output:     Intake/Output Summary (Last 24 hours) at 2024 1144  Last data filed at 2024 0500  Gross per 24 hour   Intake 420 ml   Output --   Net 420 ml        Wt Readings from Last 1 Encounters:   24 1849 54.2 kg (119 lb 7.8 oz)   24 1701 51.7 kg (114 lb)       Exam:  Alert and oriented NAD   eomi no icterus   Moist mucus membranes   No jvd reg s1s2 no murmur   Clear bilaterally no rales/rhonchi/wheeze   Soft NTND + bs   5/5 strength bilaterally  No edema "   Warm dry no rash   Normal mood and judgement         DATA:  Radiology and Labs:  The following labs and radiology results independently reviewed by me    Labs:   Recent Results (from the past 24 hour(s))   Urinalysis With Culture If Indicated - Urine, Clean Catch    Collection Time: 05/26/24 12:28 PM    Specimen: Urine, Clean Catch   Result Value Ref Range    Color, UA Yellow Yellow, Straw    Appearance, UA Clear Clear    pH, UA 6.0 5.0 - 8.0    Specific Gravity, UA 1.011 1.005 - 1.030    Glucose, UA Negative Negative    Ketones, UA Negative Negative    Bilirubin, UA Negative Negative    Blood, UA Negative Negative    Protein, UA Negative Negative    Leuk Esterase, UA Negative Negative    Nitrite, UA Negative Negative    Urobilinogen, UA 0.2 E.U./dL 0.2 - 1.0 E.U./dL   Sodium, Urine, Random - Urine, Clean Catch    Collection Time: 05/26/24 12:32 PM    Specimen: Urine, Clean Catch   Result Value Ref Range    Sodium, Urine 92 mmol/L   Osmolality, Urine - Urine, Clean Catch    Collection Time: 05/26/24 12:32 PM    Specimen: Urine, Clean Catch   Result Value Ref Range    Osmolality, Urine 367 mOsm/kg   Creatinine Urine Random (kidney function) GFR component - Urine, Clean Catch    Collection Time: 05/26/24 12:32 PM    Specimen: Urine, Clean Catch   Result Value Ref Range    Creatinine, Urine 38.8 mg/dL   Type & Screen    Collection Time: 05/26/24  4:13 PM    Specimen: Blood   Result Value Ref Range    ABO Type A     RH type Negative     Antibody Screen Negative     T&S Expiration Date 5/29/2024 11:59:59 PM    Basic Metabolic Panel    Collection Time: 05/27/24  3:36 AM    Specimen: Blood   Result Value Ref Range    Glucose 130 (H) 65 - 99 mg/dL    BUN 16 8 - 23 mg/dL    Creatinine 0.60 0.57 - 1.00 mg/dL    Sodium 133 (L) 136 - 145 mmol/L    Potassium 4.0 3.5 - 5.2 mmol/L    Chloride 101 98 - 107 mmol/L    CO2 25.0 22.0 - 29.0 mmol/L    Calcium 8.1 (L) 8.6 - 10.5 mg/dL    BUN/Creatinine Ratio 26.7 (H) 7.0 - 25.0     Anion Gap 7.0 5.0 - 15.0 mmol/L    eGFR 89.7 >60.0 mL/min/1.73   CBC Auto Differential    Collection Time: 05/27/24  3:36 AM    Specimen: Blood   Result Value Ref Range    WBC 1.46 (C) 3.40 - 10.80 10*3/mm3    RBC 3.21 (L) 3.77 - 5.28 10*6/mm3    Hemoglobin 10.6 (L) 12.0 - 15.9 g/dL    Hematocrit 31.0 (L) 34.0 - 46.6 %    MCV 96.6 79.0 - 97.0 fL    MCH 33.0 26.6 - 33.0 pg    MCHC 34.2 31.5 - 35.7 g/dL    RDW 14.0 12.3 - 15.4 %    RDW-SD 48.8 37.0 - 54.0 fl    MPV 9.3 6.0 - 12.0 fL    Platelets 190 140 - 450 10*3/mm3    nRBC 0.0 0.0 - 0.2 /100 WBC   Manual Differential    Collection Time: 05/27/24  3:36 AM    Specimen: Blood   Result Value Ref Range    Neutrophil % 58.0 42.7 - 76.0 %    Lymphocyte % 22.0 19.6 - 45.3 %    Monocyte % 20.0 (H) 5.0 - 12.0 %    Neutrophils Absolute 0.85 (L) 1.70 - 7.00 10*3/mm3    Lymphocytes Absolute 0.32 (L) 0.70 - 3.10 10*3/mm3    Monocytes Absolute 0.29 0.10 - 0.90 10*3/mm3    Ovalocytes Slight/1+ None Seen    RBC Fragments Slight/1+ None Seen    WBC Morphology Normal Normal    Platelet Morphology Normal Normal   Prepare RBC, 1 Units    Collection Time: 05/27/24  6:00 AM   Result Value Ref Range    Product Code D3042S68     Unit Number [id number]     UNIT  ABO A     UNIT  RH NEG     Crossmatch Interpretation Compatible     Dispense Status PT     Blood Expiration Date [id number]     Blood Type Barcode 0600        Radiology:  Imaging Results (Last 24 Hours)       ** No results found for the last 24 hours. **             ASSESSMENT:   Lymphoma   Hypotension   Hyponatremia   Hyperkalemia   Chronic diastolic heart failure   Adrenal insufficiency - per chart, but doesn't follow with endo   Hypoalbuminemia   Edema   Parox afib       PLAN:   Na now up to 133  I think it is reasonable to leave her on the low-dose sodium chloride tablet once daily as BP remains borderline  Cortisol and TSH are WNL    regular diet   Continue salt tabs at present dosage  Off IV fluids   B-blocker as bp  allows     Monitor electrolytes and volume closely   Dose all medications for GFR >60   Please call with any questions or concerns.     Jr Le M.D  [facility]  Office phone number: [phone]  Answering service phone number: [phone]

## 2024-05-27 NOTE — CONSULTS
Date of Hospital Visit: 2024  Encounter Provider: Philip Michel Jr, MD  Place of Service: [facility] CARDIOLOGY  Patient Name: Michelle Car  :1940  Referral Provider: Abel Pelaez MD    Chief complaint: Generalized weakness    History of Present Illness: 83-year-old female past medical history of diffuse large B-cell lymphoma status post recent completion of second line chemotherapy with Rituxan and Treanda who follows with Dr. Nichole for nonrheumatic severe mitral regurgitation and cardio-oncology issues given her lymphoma diagnosis.  Patient did receive anthracycline at the end of last year.  Last visit with Dr. Nichole was in October at which time an echocardiogram was performed showing normal EF with severe nonrheumatic mitral regurgitation.  Patient has a history of paroxysmal atrial fibrillation and when she gets tachycardic she has severe decompensation.  Given this fact she was placed on amiodarone and responded well to therapy and the goal has been to maintain sinus rhythm given her severe valvular heart disease.  Patient denies angina.  No orthopnea, PND or edema.  No palpitations, dizziness or syncope.  She has severe weakness, barely get out of bed for the last several weeks.  On 2024 she was seen by the APRN at [facility] and was noted to have severe worsening neutropenia with hyponatremia and hyperkalemia.  Given her complaints she was directed to the emergency room for evaluation and admission.  No cardiac complaints at bedside interview.      Past Medical History:   Diagnosis Date    Abdominal aortic atherosclerosis     Adrenal nodule 2022    Allergic rhinitis     Anemia due to chemotherapy     Bacteremia due to Escherichia coli 2022    ADMITTED TO [facility]    Bug bite 10/24/2015    WITH CELLULITIS, LEFT LEG    CAD (coronary artery disease)     Calculus of bile duct without cholangitis or cholecystitis 2022    Chemotherapy induced neutropenia     Chronic  anticoagulation     Chronic diastolic (congestive) heart failure     Colon polyps     CRI (chronic renal insufficiency), stage 2 (mild) 2021    Diffuse large B cell lymphoma 02/2022    MULTIPLE LYMPH NODE INVOLVEMENT, FOLLOWED BY DR. JEN PATTERSON    Drug induced constipation     Hearing loss     Hemorrhoids     History of blood transfusion 04/2022    History of chemotherapy 2022    FOLLOWED BY DR. JEN PATTERSON    Hypercalcemia 2016    resolved as of 2019    Hypercholesterolemia     Hyperkalemia 03/2022    Hypertension     Hyponatremia 02/17/2022    Leg swelling 10/2021    Mixed hyperlipidemia     Neutropenic fever 05/2022    Nonrheumatic mitral valve regurgitation 03/2022    PAF (paroxysmal atrial fibrillation)     FOLLOWED BY DR. YOSI LANGLEY    Pancytopenia     Pleural effusion, bilateral 06/2022    Pulmonary nodule     Seasonal allergies     Thrombocytopenia 08/2022    Venous insufficiency     Vitamin D deficiency        Past Surgical History:   Procedure Laterality Date    CHOLECYSTECTOMY N/A 10/12/2011    LAPAROSCOPIC, DR. CHANCE KLINE AT [facility]    COLONOSCOPY N/A 2000    1 or 2 polyps, otherwise normal per patient    COLONOSCOPY W/ POLYPECTOMY N/A 01/24/2012    3 MM TUBULAR ADENOMA POLYP IN CECUM, DIVERTICULOSIS IN RECTO-SIGMOID, RESCOPE IN 3 YRS, DR. CHANCE KLINE AT [facility]    CYST REMOVAL Left 10/12/2011    LEFT SCALP, PATH: BENIGN ADNEXAL NEOPLASM FAVORING ECCRINE SPIRADENOMA, DR. CHANCE KLINE AT [facility]    ERCP N/A 10/21/2022    Procedure: ENDOSCOPIC RETROGRADE CHOLANGIOPANCREATOGRAPHY with sphincterotomy and balloon sweep;  Surgeon: Peyman Ortega MD;  Location: [facility] ENDOSCOPY;  Service: Gastroenterology;  Laterality: N/A;  PRE: Common Duct Stones  POST: Common Duct Stones    VENOUS ACCESS DEVICE (PORT) INSERTION Left 03/23/2022    Procedure: INSERTION VENOUS ACCESS DEVICE;  Surgeon: Alin Delgado MD;  [place] [facility] [place] [facility] [place]  Service: General;  Laterality: Left;       Medications Prior to Admission    Medication Sig Dispense Refill Last Dose    acetaminophen (TYLENOL) 500 MG tablet Take 1 tablet by mouth Every 6 (Six) Hours As Needed for Mild Pain.       acyclovir (Zovirax) 400 MG tablet Take 1 tablet by mouth 2 (Two) Times a Day. 60 tablet 7     amiodarone (PACERONE) 200 MG tablet Take 1 tablet by mouth Daily. 90 tablet 2     Calcium Carbonate (CALTRATE 600 PO) Take 1 tablet by mouth Daily.       carvedilol (COREG) 3.125 MG tablet TAKE 1 TABLET BY MOUTH TWICE DAILY WITH MEALS 180 tablet 1     Eliquis 5 MG tablet tablet TAKE 1 TABLET BY MOUTH EVERY 12 HOURS FOR ATRIAL FIBRILLATION 180 tablet 1     ferrous sulfate (FeroSul) 325 (65 FE) MG tablet Take 1 tablet by mouth Every Other Day. 36 tablet 2     furosemide (LASIX) 40 MG tablet Take 1 tablet by mouth Daily As Needed (Leg swelling). 30 tablet 0     guaiFENesin (MUCINEX) 600 MG 12 hr tablet Take 2 tablets by mouth 2 (Two) Times a Day.       Hydrocortisone, Perianal, (Anusol-HC) 2.5 % rectal cream Apply to hemorrhoids 3 times daily for 7 days during hemorrhoid flare. Include applicator. 30 g 1     loratadine (Claritin) 10 MG tablet Take 1 tablet by mouth Daily.       Multiple Vitamins-Minerals (CENTRUM SILVER) tablet Take 1 tablet by mouth Daily.       polyethylene glycol (MIRALAX) 17 GM/SCOOP powder Take 17 g by mouth As Needed.       prochlorperazine (COMPAZINE) 5 MG tablet Take 1-2 tablets by mouth Every 6 (Six) Hours As Needed for Nausea or Vomiting. 60 tablet 1     spironolactone (ALDACTONE) 25 MG tablet TAKE 1 TABLET BY MOUTH DAILY 90 tablet 1     lenalidomide (REVLIMID) 10 MG capsule Take 1 capsule by mouth Daily for 21 days. Take daily on Days 1-21, and off 7 days. Do not crush, break or chew. 21 capsule 0        Current Meds  Scheduled Meds:amiodarone, 200 mg, Oral, Q24H  apixaban, 2.5 mg, Oral, Q12H  cetirizine, 10 mg, Oral, Daily  ferrous sulfate, 325 mg, Oral, Every Other Day  Hydrocortisone (Perianal), , Rectal, Daily  midodrine, 5 mg, Oral, TID  AC  mirtazapine, 15 mg, Oral, Nightly  multivitamin with minerals, 1 tablet, Oral, Daily  sodium chloride, 10 mL, Intravenous, Q12H  sodium chloride, 1 g, Oral, Daily      Continuous Infusions:   PRN Meds:.  acetaminophen **OR** acetaminophen **OR** acetaminophen    albuterol    senna-docusate sodium **AND** polyethylene glycol **AND** bisacodyl **AND** bisacodyl    nitroglycerin    ondansetron    sodium chloride    sodium chloride    Allergies as of 05/24/2024 - Reviewed 05/24/2024   Allergen Reaction Noted    Factive [gemifloxacin] Rash 03/30/2016       Social History     Socioeconomic History    Marital status:    Tobacco Use    Smoking status: Never    Smokeless tobacco: Never   Vaping Use    Vaping status: Never Used   Substance and Sexual Activity    Alcohol use: No    Drug use: No    Sexual activity: Not Currently     Birth control/protection: Post-menopausal       Family History   Problem Relation Age of Onset    Malig Hyperthermia Neg Hx        Review of Systems   Constitutional: Positive for malaise/fatigue. Negative for chills and fever.   HENT:  Negative for hoarse voice and sore throat.    Eyes:  Negative for double vision and photophobia.   Cardiovascular:  Negative for chest pain, leg swelling, near-syncope, orthopnea, palpitations, paroxysmal nocturnal dyspnea and syncope.   Respiratory:  Negative for cough and wheezing.    Skin:  Negative for poor wound healing and rash.   Musculoskeletal:  Negative for arthritis and joint swelling.   Gastrointestinal:  Negative for bloating, abdominal pain, hematemesis and hematochezia.   Neurological:  Positive for weakness. Negative for dizziness and focal weakness.   Psychiatric/Behavioral:  Negative for depression and suicidal ideas.             Objective:   Temp:  [97.5 °F (36.4 °C)-98.8 °F (37.1 °C)] 98.1 °F (36.7 °C)  Heart Rate:  [58-82] 77  Resp:  [16] 16  BP: ()/(48-71) 103/59  Body mass index is 23.24 kg/m².  Flowsheet Rows      Flowsheet  "Row First Filed Value   Admission Height 152.4 cm (60\") Documented at 05/24/2024 1701   Admission Weight 51.7 kg (114 lb) Documented at 05/24/2024 1701          Vitals:    05/27/24 1433   BP: 103/59   Pulse: 77   Resp:    Temp:    SpO2:        Vitals reviewed.   Constitutional:       Appearance: Healthy appearance. Not in distress.   Neck:      Vascular: No JVR. JVD normal.   Pulmonary:      Effort: Pulmonary effort is normal.      Breath sounds: No wheezing. No rhonchi. No rales.   Chest:      Chest wall: Not tender to palpatation.   Cardiovascular:      PMI at left midclavicular line. Normal rate. Regular rhythm. Normal S1. Normal S2.       Murmurs: There is no murmur.      No gallop.  No click. No rub.   Pulses:     Intact distal pulses.   Edema:     Peripheral edema absent.   Abdominal:      General: Bowel sounds are normal.      Palpations: Abdomen is soft.      Tenderness: There is no abdominal tenderness.   Musculoskeletal: Normal range of motion.         General: No tenderness. Skin:     General: Skin is warm and dry.   Neurological:      General: No focal deficit present.      Mental Status: Alert and oriented to person, place and time.                 Lab Review:      Results from last 7 days   Lab Units 05/27/24  0336 05/26/24  0525 05/25/24  0311   SODIUM mmol/L 133*   < > 128*   POTASSIUM mmol/L 4.0   < > 4.2   CHLORIDE mmol/L 101   < > 97*   CO2 mmol/L 25.0   < > 22.2   BUN mg/dL 16   < > 18   CREATININE mg/dL 0.60   < > 0.75   CALCIUM mg/dL 8.1*   < > 8.2*   BILIRUBIN mg/dL  --   --  0.3   ALK PHOS U/L  --   --  62   ALT (SGPT) U/L  --   --  32   AST (SGOT) U/L  --   --  34*   GLUCOSE mg/dL 130*   < > 90    < > = values in this interval not displayed.     Results from last 7 days   Lab Units 05/25/24  1018   CK TOTAL U/L 16*     @LABRCNTbnp@  Results from last 7 days   Lab Units 05/27/24  0336 05/26/24  0525 05/25/24  0311   WBC 10*3/mm3 1.46* 1.01* 0.86*   HEMOGLOBIN g/dL 10.6* 8.2* 7.8*   HEMATOCRIT " % 31.0* 23.9* 23.4*   PLATELETS 10*3/mm3 190 174 170         Results from last 7 days   Lab Units 05/25/24  0311   MAGNESIUM mg/dL 1.9     @LABRCNTIP(chol,trig,hdl,ldl)    I personally viewed and interpreted the patient's EKG/Telemetry data    Leukopenia    Paroxysmal atrial fibrillation with rapid ventricular response    Hyponatremia    CRI (chronic renal insufficiency), stage 2 (mild)    Diffuse large B-cell lymphoma of lymph nodes of multiple regions    Chronic diastolic CHF (congestive heart failure)    Anemia    Hyperkalemia    Adrenal insufficiency    Weight loss    Severe malnutrition    Assessment and Plan:    Generalized weakness -patient with history of recurrent diffuse large B-cell lymphoma on anthracycline with history of severe mitral regurgitation.  She is showing no signs and having no complaints consistent with heart failure.  I have low suspicion that there is cardiac provocation of current complaints.  Likely multifactorial given her protein calorie malnutrition and recurrence of lymphoma.  Will check an echocardiogram.  Follow diagnostic testing results for further treatment recommendations.  Nonrheumatic mitral regurgitation -severe MR on last echo.  Plan was for echocardiogram tomorrow as an outpatient but we will get this done since she will be in the hospital.  As stated above but no evidence for heart failure on exam or with clinical story.  Large B-cell lymphoma  Paroxysmal atrial fibrillation -rhythm control strategy given her severe decompensation with tachycardia in the past.  Previous concern for balance issues associated with amiodarone and chemotherapy but given her demonstration of severe mitral regurgitation on echocardiogram in October it was decided to continue amiodarone.  QTc is mildly elevated at 511 ms which is a change from October EKG.  Monitor closely.  We will continue amiodarone at this time.  Chronic diastolic heart failure -euvolemic on exam.  Blood pressure has been  low normal during this hospital stay.  Bilineage hypoplasia -leukopenia and anemia.  Platelet count normal.  Oncology following.    Very guarded prognosis.  Patient has very realistic expectations and is clearly ready to realign goals of care should her therapeutic options be diminished.  As stated above low suspicion for cardiac cause of current complaints.  Follow echo results.    Philip Michel Jr, MD  05/27/24  15:09 EDT.  Time spent in reviewing chart, discussion and examination:

## 2024-05-28 ENCOUNTER — SPECIALTY PHARMACY (OUTPATIENT)
Dept: PHARMACY | Facility: HOSPITAL | Age: 84
End: 2024-05-28
Payer: MEDICARE

## 2024-05-28 DIAGNOSIS — C83.38 DIFFUSE LARGE B-CELL LYMPHOMA OF LYMPH NODES OF MULTIPLE REGIONS: ICD-10-CM

## 2024-05-28 LAB
ANION GAP SERPL CALCULATED.3IONS-SCNC: 10 MMOL/L (ref 5–15)
ANISOCYTOSIS BLD QL: ABNORMAL
BASOPHILS # BLD MANUAL: 0.04 10*3/MM3 (ref 0–0.2)
BASOPHILS NFR BLD MANUAL: 4 % (ref 0–1.5)
BUN SERPL-MCNC: 20 MG/DL (ref 8–23)
BUN/CREAT SERPL: 28.2 (ref 7–25)
CALCIUM SPEC-SCNC: 8.1 MG/DL (ref 8.6–10.5)
CHLORIDE SERPL-SCNC: 99 MMOL/L (ref 98–107)
CO2 SERPL-SCNC: 23 MMOL/L (ref 22–29)
CREAT SERPL-MCNC: 0.71 MG/DL (ref 0.57–1)
DEPRECATED RDW RBC AUTO: 50 FL (ref 37–54)
EGFRCR SERPLBLD CKD-EPI 2021: 85 ML/MIN/1.73
EOSINOPHIL # BLD MANUAL: 0.02 10*3/MM3 (ref 0–0.4)
EOSINOPHIL NFR BLD MANUAL: 2 % (ref 0.3–6.2)
ERYTHROCYTE [DISTWIDTH] IN BLOOD BY AUTOMATED COUNT: 14.2 % (ref 12.3–15.4)
GLUCOSE SERPL-MCNC: 95 MG/DL (ref 65–99)
HCT VFR BLD AUTO: 29.5 % (ref 34–46.6)
HGB BLD-MCNC: 10.1 G/DL (ref 12–15.9)
LYMPHOCYTES # BLD MANUAL: 0.29 10*3/MM3 (ref 0.7–3.1)
LYMPHOCYTES NFR BLD MANUAL: 10 % (ref 5–12)
MCH RBC QN AUTO: 33.1 PG (ref 26.6–33)
MCHC RBC AUTO-ENTMCNC: 34.2 G/DL (ref 31.5–35.7)
MCV RBC AUTO: 96.7 FL (ref 79–97)
METAMYELOCYTES NFR BLD MANUAL: 2 % (ref 0–0)
MONOCYTES # BLD: 0.11 10*3/MM3 (ref 0.1–0.9)
NEUTROPHILS # BLD AUTO: 0.57 10*3/MM3 (ref 1.7–7)
NEUTROPHILS NFR BLD MANUAL: 54 % (ref 42.7–76)
PLAT MORPH BLD: NORMAL
PLATELET # BLD AUTO: 206 10*3/MM3 (ref 140–450)
PMV BLD AUTO: 9.6 FL (ref 6–12)
POIKILOCYTOSIS BLD QL SMEAR: ABNORMAL
POTASSIUM SERPL-SCNC: 3.7 MMOL/L (ref 3.5–5.2)
QT INTERVAL: 457 MS
QT INTERVAL: 484 MS
QTC INTERVAL: 511 MS
QTC INTERVAL: 545 MS
RBC # BLD AUTO: 3.05 10*6/MM3 (ref 3.77–5.28)
SMUDGE CELLS IN BLOOD BY LIGHT MICROSCOPY: ABNORMAL
SODIUM SERPL-SCNC: 132 MMOL/L (ref 136–145)
TOXIC GRANULATION: ABNORMAL
VARIANT LYMPHS NFR BLD MANUAL: 2 % (ref 0–5)
VARIANT LYMPHS NFR BLD MANUAL: 26 % (ref 19.6–45.3)
WBC NRBC COR # BLD AUTO: 1.05 10*3/MM3 (ref 3.4–10.8)

## 2024-05-28 PROCEDURE — 77338 DESIGN MLC DEVICE FOR IMRT: CPT | Performed by: RADIOLOGY

## 2024-05-28 PROCEDURE — 88182 CELL MARKER STUDY: CPT | Performed by: INTERNAL MEDICINE

## 2024-05-28 PROCEDURE — 99232 SBSQ HOSP IP/OBS MODERATE 35: CPT | Performed by: INTERNAL MEDICINE

## 2024-05-28 PROCEDURE — 77301 RADIOTHERAPY DOSE PLAN IMRT: CPT | Performed by: RADIOLOGY

## 2024-05-28 PROCEDURE — 93010 ELECTROCARDIOGRAM REPORT: CPT | Performed by: INTERNAL MEDICINE

## 2024-05-28 PROCEDURE — 85007 BL SMEAR W/DIFF WBC COUNT: CPT | Performed by: INTERNAL MEDICINE

## 2024-05-28 PROCEDURE — 77300 RADIATION THERAPY DOSE PLAN: CPT | Performed by: RADIOLOGY

## 2024-05-28 PROCEDURE — 88185 FLOWCYTOMETRY/TC ADD-ON: CPT

## 2024-05-28 PROCEDURE — 88184 FLOWCYTOMETRY/ TC 1 MARKER: CPT

## 2024-05-28 PROCEDURE — 99232 SBSQ HOSP IP/OBS MODERATE 35: CPT | Performed by: NURSE PRACTITIONER

## 2024-05-28 PROCEDURE — 80048 BASIC METABOLIC PNL TOTAL CA: CPT | Performed by: INTERNAL MEDICINE

## 2024-05-28 PROCEDURE — 97530 THERAPEUTIC ACTIVITIES: CPT

## 2024-05-28 PROCEDURE — 93005 ELECTROCARDIOGRAM TRACING: CPT | Performed by: NURSE PRACTITIONER

## 2024-05-28 PROCEDURE — 85025 COMPLETE CBC W/AUTO DIFF WBC: CPT | Performed by: INTERNAL MEDICINE

## 2024-05-28 RX ORDER — LENALIDOMIDE 10 MG/1
10 CAPSULE ORAL DAILY
Qty: 21 CAPSULE | Refills: 0 | Status: SHIPPED | OUTPATIENT
Start: 2024-05-28 | End: 2024-06-18

## 2024-05-28 RX ADMIN — MIDODRINE HYDROCHLORIDE 5 MG: 5 TABLET ORAL at 08:04

## 2024-05-28 RX ADMIN — Medication 1 TABLET: at 08:04

## 2024-05-28 RX ADMIN — APIXABAN 2.5 MG: 2.5 TABLET, FILM COATED ORAL at 08:04

## 2024-05-28 RX ADMIN — MIRTAZAPINE 15 MG: 15 TABLET, FILM COATED ORAL at 20:16

## 2024-05-28 RX ADMIN — CETIRIZINE HYDROCHLORIDE 10 MG: 10 TABLET ORAL at 08:04

## 2024-05-28 RX ADMIN — AMIODARONE HYDROCHLORIDE 200 MG: 200 TABLET ORAL at 08:04

## 2024-05-28 RX ADMIN — FERROUS SULFATE TAB 325 MG (65 MG ELEMENTAL FE) 325 MG: 325 (65 FE) TAB at 08:04

## 2024-05-28 RX ADMIN — SODIUM CHLORIDE TAB 1 GM 1 G: 1 TAB at 08:04

## 2024-05-28 RX ADMIN — MIDODRINE HYDROCHLORIDE 5 MG: 5 TABLET ORAL at 17:46

## 2024-05-28 RX ADMIN — HYDROCORTISONE: 25 CREAM TOPICAL at 08:07

## 2024-05-28 RX ADMIN — Medication 10 ML: at 20:16

## 2024-05-28 RX ADMIN — Medication 10 ML: at 08:05

## 2024-05-28 RX ADMIN — MIDODRINE HYDROCHLORIDE 5 MG: 5 TABLET ORAL at 11:33

## 2024-05-28 RX ADMIN — ACETAMINOPHEN 325MG 650 MG: 325 TABLET ORAL at 14:26

## 2024-05-28 NOTE — PROGRESS NOTES
Subjective     CHIEF COMPLAINT:     Diffuse large B cell lymphoma  Neutropenia   Anemia  Hyponatremia    INTERVAL HISTORY:   Patient continues to report generalized bodyaches.  She was evaluated by cardiology yesterday due to complaints of fatigue and an echocardiogram was obtained which shows normal ejection fraction.  No evidence of heart failure noted.  Recommend continuing amiodarone for atrial fibrillation.  Patient parminder afebrile and vital signs are overall stable.    REVIEW OF SYSTEMS:  Pertinent ROS as in the interval history. Otherwise, negative.    SCHEDULED MEDS:  amiodarone, 200 mg, Oral, Q24H  apixaban, 2.5 mg, Oral, Q12H  cetirizine, 10 mg, Oral, Daily  ferrous sulfate, 325 mg, Oral, Every Other Day  Hydrocortisone (Perianal), , Rectal, Daily  midodrine, 5 mg, Oral, TID AC  mirtazapine, 15 mg, Oral, Nightly  multivitamin with minerals, 1 tablet, Oral, Daily  sodium chloride, 10 mL, Intravenous, Q12H  sodium chloride, 1 g, Oral, Daily      Objective   VITAL SIGNS:  Temp:  [98.1 °F (36.7 °C)-99 °F (37.2 °C)] 99 °F (37.2 °C)  Heart Rate:  [65-82] 65  Resp:  [16-18] 18  BP: (100-112)/(59-70) 109/65     PHYSICAL EXAMINATION:  GENERAL:  The patient is ill-appearing.  SKIN: No skin rash.   EYES:  No Jaundice. No Pallor.   CHEST: Normal respiratory effort. Lungs clear to auscultation.   CARDIAC:  Normal S1 & S2. Grade III systolic murmur.   ABDOMEN:  Non-distended.   EXTREMITIES:  No edema.    I have reexamined the patient and the results are consistent with the previously documented exam. Mallory Del Castillo MD      RESULT REVIEW:   Results from last 7 days   Lab Units 05/28/24  0307 05/27/24  1812 05/27/24  0336 05/26/24  0525 05/25/24  0311   WBC 10*3/mm3 1.05* 1.30* 1.46* 1.01* 0.86*   NEUTROS ABS 10*3/mm3 0.57* 0.85*  0.59* 0.85* 0.61* 0.53*   LYMPHS ABS 10*3/mm3 0.29* 0.40*  0.36* 0.32* 0.22* 0.14*   HEMOGLOBIN g/dL 10.1* 10.9* 10.6* 8.2* 7.8*   HEMATOCRIT % 29.5* 31.4* 31.0* 23.9* 23.4*   PLATELETS  10*3/mm3 206 210 190 174 170     Results from last 7 days   Lab Units 05/28/24  0307 05/27/24  0336 05/26/24  0525 05/25/24  0311 05/24/24  1150   SODIUM mmol/L 132* 133* 132* 128* 126*   POTASSIUM mmol/L 3.7 4.0 3.9 4.2 5.6*   CHLORIDE mmol/L 99 101 99 97* 90*   CO2 mmol/L 23.0 25.0 25.0 22.2 22.4   BUN mg/dL 20 16 15 18 26*   CREATININE mg/dL 0.71 0.60 0.75 0.75 0.95   CALCIUM mg/dL 8.1* 8.1* 7.9* 8.2* 8.7   ALBUMIN g/dL  --   --   --  2.5* 3.1*   BILIRUBIN mg/dL  --   --   --  0.3 0.3   ALK PHOS U/L  --   --   --  62 72   ALT (SGPT) U/L  --   --   --  32 42*   AST (SGOT) U/L  --   --   --  34* 51*   MAGNESIUM mg/dL  --   --   --  1.9  --          Lab 05/26/24  0525 05/24/24  1150   IRON  --  27*   IRON SATURATION (TSAT)  --  11*   TIBC  --  237*   TRANSFERRIN  --  159*   FERRITIN  --  1,953.00*   FOLATE 11.40  --    VITAMIN B 12 >2,000*  --       Component      Latest Ref Rng 5/25/2024   Reticulocyte %      0.70 - 1.90 % 2.97 (H)       Component      Latest Ref Rng 5/25/2024   Haptoglobin      30 - 200 mg/dL 237 (H)      Component      Latest Ref Rng 5/3/2024 5/24/2024 5/25/2024   LDH      135 - 214 U/L 164  245 (H)  190    Uric Acid      2.4 - 5.7 mg/dL 3.5  3.5        Assessment & Plan   *Relapsed diffuse large B-cell lymphoma.  Patient was found to have bilateral pleural effusions.    She underwent right thoracentesis on 2/21/2022 and the left thoracentesis on 2/22/2022.    Analysis of the fluid revealed involvement with diffuse large B-cell lymphoma.    FISH analysis for BCL6 rearrangement was positive but was negative for BCL-2 and MYC rearrangement.    PET scan on 3/10/2022 revealed significant hypermetabolism in the left adrenal gland and the surrounding soft tissue with SUV up to 34.5. Hypermetabolism in the left pleural thickening with SUV of 7.1. there was less hypermetabolism in the right adrenal gland and in the right pleura.  She was considered to have stage III non-bulky disease.  R-IPI score of 3  associated with poor risk - associated with overall survival of 55%.   R-CHOP with Neulasta support was started on 3/28/2022.  Due to development of neutropenic fever following cycles 1 and 2, treatment was changed to mini-RCHOP starting cycle #3 on 5/16/2022.  Patient received cycle #6 on 7/25/2022.  PET scan on 8/15/2022 revealed complete metabolic response.  Patient was found to have recurrence with development of a left mandibular mass in October/November 2023.  Biopsy of left mandible mass on 11/28/2023 showed recurrence of the lymphoma, diffuse large B cell, non-GCB phenotype.  Ki-67 was 90%.    It was positive for Bcl-6 but negative for Bcl-2 and MYC rearrangement.  PET scan on 12/11/2023 revealed multiple areas of involvement -prevascular space, left epicardial fat pad, pleural density posterior to the left fourth rib anterior aspect, lesion along the pericardium versus intramuscular with SUV of 9.8.  Hypermetabolic activity at both adrenal glands, uniformly increased in size and appeared hyperplastic.  There was a 2.7 x 1.1 cm soft tissue nodule lateral to the right erector spinae muscle posterior to the right posterior 11th rib with an SUV of 20.5 and there was a 1.3 cm nodule lateral to the posterior margin of the left psoas muscle with an SUV of 24.9.  Rituxan Polivy and Treanda with Treanda every 3 weeks for 6 cycles was started on 12/12/2023.  The jaw mass started to decrease in size after starting treatment.  CT scan on 2/8/2024 showed evidence of response to treatment.  Cycle #6 was given on 4/1/2024.  PET scan on 4/22/2024 revealed decrease in the hypermetabolism in the mediastinal and pericardial lymph nodes/lesions and in the lesions above the left kidney and adrenal gland.  The left mandibular lesion was no longer hypermetabolic.   She is at increased risk for relapse of the lymphoma.  The plan was to start treatment with Revlimid 10 mg daily.  However, the patient did not start the treatment  yet.  LDH was previously elevated but improved to 190 on 5/25/2024.     *Neutropenia  5/20/2024: WBC decreased to 1750.  Neutrophils decreased to 940.  5/24/2024: WBC decreased to 1290.  Neutrophils decreased to 680.  5/25/2024: WBC decreased to 860.  Neutrophils decreased to 530.  Worsening neutropenia is also concerning for relapse of the lymphoma with involvement of the bone marrow.   5/26/2024: Neutrophil count 610.  5/27/2024-total WBC count has increased to 1.46.  Absolute neutrophil count pending   5/28/2024-WBC count lower at 1.05 with an absolute neutrophil count of 0.57  Increase in metamyelocytes as well as basophil percentage.     *Macrocytic anemia.  Patient also developed anemia secondary to lymphoma and secondary to chemotherapy.    Hgb decreased to 7.9 on 4/25/2022 and she was transfused.   Hemoglobin decreased to 6.3 on 4/27/2022. She was transfused.  Hemoglobin level improved subsequently.  Patient was placed ferrous sulfate 325 mg 3 days a week.  5/3/2024: Hemoglobin increased to 10.2.  5/25/2024: Hemoglobin decreased to 7.8.  No evidence of vitamin B12 or folate deficiency.    Ferritin 1953.  Transferrin saturation 11%.  Patient decided to hold off on PRBC transfusion.  5/26/2024: Hemoglobin 8.2.  Due to her reporting chills when she received PRBC Tx in the past, we will give Tylenol, Benadryl and Solumedrol prior to the transfusion.   526 2024-1 unit of PRBC administered.  Patient tolerated the transfusion well.  Hemoglobin remained stable at 10.1  Copper and MMA levels still pending     *Hyponatremia.  Patient had problem with hyponatremia in the past.  Sodium is 127 on 3/14/2023.  She was advised to increase salt intake.  Sodium improved to 133 on repeat lab on 3/21/2023.  However, sodium decreased to 126 on 6/7/2023.  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  She was started on sodium chloride 1 g daily.  4/1/2024: Sodium decreased to 132.  Leg swelling improved  with Lasix.  5/3/2024: Sodium 130.  5/24/2024: Sodium decreased to 126.    Patient was having significant weakness.  5/25/2024: Sodium 128.  Nephrology service was consulted.  5/28/2024-sodium stable at 132     *Hyperkalemia.  5/24/2024: Potassium increased to 5.6.  5/28/2024-potassium 3.7     *Chronic anticoagulation.  Patient is on Eliquis 5 mg twice daily.  No excessive bleeding  Platelet count is normal.     *Prophylaxis.  Patient had shingles in April 2023.  She is at risk of developing shingles while on treatment.  She was placed on acyclovir 400 mg twice daily.  She was on Bactrim DS 3 days a week.  Bactrim was discontinued due to the electrolyte abnormalities.     *Low-density lesion in the uterus.    The radiologist recommended pelvic ultrasound.    The patient is asymptomatic.    PET scan on 4/22/2024 reported fluid in the lower abdomen.  Patient reported intermittent vaginal discharge.     PLAN:     1.  Neutropenia continues with elevated basophils and metamyelocytes.  We may have to proceed with a bone marrow biopsy if patient wants further evaluation of this.  2.Will obtain peripheral blood flow cytometry.  3.follow-up on copper and MMA levels      Discussed with Dr. Galo her primary oncologist who agrees with the plan.  We will hold Eliquis in anticipation of a bone marrow biopsy in 2 days.      Mallory Del Castillo MD  05/28/24

## 2024-05-28 NOTE — PLAN OF CARE
Goal Outcome Evaluation:      VS WNL.WBC down to 1.05 with AM labs. Prn given for generalized body aches.Pt rested well this shift.WCTM

## 2024-05-28 NOTE — PROGRESS NOTES
Drug: Revlimid (lenalidomide)  Strength: 10 mg  Directions: Take 1 capsule by mouth daily on days 1-21 of each 28 day cycle  Quantity: 21  Refills: 0  Pharmacy prescription sent to: [id number]    Completed independent double check on medication order/RX.  Suellen Storey, ArashD, BCPS

## 2024-05-28 NOTE — PROGRESS NOTES
Name: Michelle Car ADMIT: 2024   : 1940  PCP: Olayinka Pimentel MD    MRN: [id number] LOS: 4 days   AGE/SEX: 83 y.o. female  ROOM: [facility]     Subjective   Subjective   No change in weakness and low endurance.  She is depressed and frustrated that she is not getting better.  No fever or chills.  No myalgia.  No bleeding diathesis    Review of Systems  Cardiovascular/respiratory.  No chest pain/no shortness of breath/no cough/no palpitation  .  No dysuria or hematuria.  CNS.  No dizziness or loss of consciousness.  No focal neurological symptoms  GI.  Poor appetite.  No nausea or vomiting.  No abdominal pain.     Objective   Objective   Vital Signs  Temp:  [98.4 °F (36.9 °C)-99 °F (37.2 °C)] 99 °F (37.2 °C)  Heart Rate:  [65-77] 75  Resp:  [18] 18  BP: (100-109)/(59-69) 109/69  SpO2:  [98 %-99 %] 99 %  on   ;   Device (Oxygen Therapy): room air    Intake/Output Summary (Last 24 hours) at 2024 1309  Last data filed at 2024 0849  Gross per 24 hour   Intake 360 ml   Output 400 ml   Net -40 ml     Body mass index is 23.24 kg/m².      24  1701 24  1849 24  1433   Weight: 51.7 kg (114 lb) 54.2 kg (119 lb 7.8 oz) 54 kg (119 lb)     Physical Exam  General.  Elderly female.  Alert and oriented x 4.  Frail and chronically ill-appearing.  Current pain/diaphoresis/distress.  Eyes.  Positive pallor.  No jaundice.  Pupils equal round and reactive.  Intact extraocular musculature  Oral cavity.  Moist mucous membrane.  Neck.  Supple.  No JVD.  No lymphadenopathy or thyromegaly.  Neurovascular.  Regular rate and rhythm and grade 3 systolic murmur  Chest.  Clear to auscultation bilaterally with scattered bilateral rhonchi.  Abdomen.  Soft lax.  No tenderness.  No organomegaly.  No guarding or rebound  Extremities.  No clubbing/cyanosis/edema.  Interphalangeal joint deformities.  CNS.  No acute focal neurological deficits      Results Review:      Results from last 7 days   Lab Units  "05/28/24  0307 05/27/24  0336 05/26/24  0525 05/25/24  0311 05/24/24  1150   SODIUM mmol/L 132* 133* 132* 128* 126*   POTASSIUM mmol/L 3.7 4.0 3.9 4.2 5.6*   CHLORIDE mmol/L 99 101 99 97* 90*   CO2 mmol/L 23.0 25.0 25.0 22.2 22.4   BUN mg/dL 20 16 15 18 26*   CREATININE mg/dL 0.71 0.60 0.75 0.75 0.95   GLUCOSE mg/dL 95 130* 99 90 103*   CALCIUM mg/dL 8.1* 8.1* 7.9* 8.2* 8.7   AST (SGOT) U/L  --   --   --  34* 51*   ALT (SGPT) U/L  --   --   --  32 42*     Estimated Creatinine Clearance: 52.1 mL/min (by C-G formula based on SCr of 0.71 mg/dL).          Results from last 7 days   Lab Units 05/25/24  1018   CK TOTAL U/L 16*         Results from last 7 days   Lab Units 05/25/24  1018   TSH uIU/mL 3.220     Results from last 7 days   Lab Units 05/25/24  0311   MAGNESIUM mg/dL 1.9           Invalid input(s): \"LDLCALC\"  Results from last 7 days   Lab Units 05/28/24  0307 05/27/24  1812 05/27/24  0336 05/26/24  0525 05/25/24  0311 05/24/24  1150 05/24/24  1150   WBC 10*3/mm3 1.05* 1.30* 1.46* 1.01* 0.86*  --  1.29*   HEMOGLOBIN g/dL 10.1* 10.9* 10.6* 8.2* 7.8*  --  9.3*   HEMATOCRIT % 29.5* 31.4* 31.0* 23.9* 23.4*  --  27.9*   PLATELETS 10*3/mm3 206 210 190 174 170  --  240   MCV fL 96.7 96.0 96.6 98.4* 100.9*  --  100.7*   MCH pg 33.1* 33.3* 33.0 33.7* 33.6*  --  33.6*   MCHC g/dL 34.2 34.7 34.2 34.3 33.3  --  33.3   RDW % 14.2 14.0 14.0 14.0 14.3  --  14.3   RDW-SD fl 50.0 49.0 48.8 49.6 52.2  --  52.2   MPV fL 9.6 9.3 9.3 9.0 9.9  --  9.8   NEUTROPHIL % %  --   --   --   --   --   --  52.7   LYMPHOCYTE % %  --   --   --   --   --   --  24.0   MONOCYTES % %  --   --   --   --   --   --  14.7*   EOSINOPHIL % %  --   --   --   --   --   --  0.8   BASOPHIL % %  --   --   --   --   --   --  0.8   IMM GRAN % %  --   --   --   --   --   --  7.0*   NEUTROS ABS 10*3/mm3 0.57* 0.85*  0.59* 0.85* 0.61* 0.53*   < > 0.68*   LYMPHS ABS 10*3/mm3  --   --   --   --   --   --  0.31*   MONOS ABS 10*3/mm3  --   --   --   --   --   --  " 0.19   EOS ABS 10*3/mm3 0.02 0.00  --  0.00 0.01   < > 0.01   BASOS ABS 10*3/mm3 0.04 0.07  --  0.02 0.01   < > 0.01   IMMATURE GRANS (ABS) 10*3/mm3  --   --   --   --   --   --  0.09*   NRBC /100 WBC  --   --  0.0  --   --   --  0.0    < > = values in this interval not displayed.             Results from last 7 days   Lab Units 05/25/24  1018 05/25/24  0311 05/24/24 1914   PROCALCITONIN ng/mL 0.10  --   --    LACTATE mmol/L  --  0.7 2.2*             Results from last 7 days   Lab Units 05/24/24  1914   BLOODCX  No growth at 3 days         Results from last 7 days   Lab Units 05/26/24  1228   NITRITE UA  Negative     Results from last 7 days   Lab Units 05/26/24  1232 05/24/24  1150   SODIUM UR mmol/L 92  --    CREATININE UR mg/dL 38.8  --    OSMOLALITY UR mOsm/kg 367  --    URIC ACID mg/dL  --  3.5       Imaging:  Imaging Results (Last 24 Hours)       ** No results found for the last 24 hours. **               I reviewed the patient's new clinical results / labs / tests / procedures      Assessment/Plan     Active Hospital Problems    Diagnosis  POA    **Leukopenia [D72.819]  Unknown    Severe malnutrition [E43]  Yes    Anemia [D64.9]  Unknown    Hyperkalemia [E87.5]  Unknown    Adrenal insufficiency [E27.40]  Unknown    Weight loss [R63.4]  Unknown    Chronic diastolic CHF (congestive heart failure) [I50.32]  Yes    Paroxysmal atrial fibrillation with rapid ventricular response [I48.0]  Yes    Hyponatremia [E87.1]  Yes    Diffuse large B-cell lymphoma of lymph nodes of multiple regions [C83.38]  Yes    CRI (chronic renal insufficiency), stage 2 (mild) [N18.2]  Yes      Resolved Hospital Problems   No resolved problems to display.         Weakness/fatigue secondary to #2, #3, #4.  Patient with normal volume status.  TSH is normal.  Magnesium is normal.  K is now normal.  CK is normal.  Cortisol level is normal.  Continue PT and OT .  PT recommends SNF.  Look below for further  management.  Hyponatremia/hyperkalemia.  Hyperkalemia resolved with Lokelma.  Hyponatremia improved after initial IV fluid.  Uric acid is normal.  Normal TSH.  Normal cortisol.  Noted urine electrolytes   Nephrology appears to believe that this is inappropriate antidiuretic hormone secretion and has stopped IV fluid and placed her on fluid restriction with continuation of the sodium tablets.  Improved hyponatremia.  Differential diagnosis of the above includes diuretic use, Bactrim use, poor p.o. intake.  Aldactone/diuretic/Bactrim held  Anemia/leukopenia in a patient with a history of large B-cell lymphoma on chemotherapy.  Anemia workup noted.  This is mostly anemia of chronic disease secondary to cancer.  Leukopenia is most likely secondary to chemotherapy.  Other differential diagnoses include bone marrow replacement/Bactrim side effect on the bone marrow.  Hold chemo.  Hold Bactrim.   No neutropenic fever.  Status post packed RBC transfusion with appropriate hemoglobin response.  There is no clinical focus of infection.  Chest x-ray without infiltrate.  UA negative for UTI.  Procalcitonin is normal.  Blood cultures are negative.  Cefepime DC'd.  Hypertension/A-fib/chronic diastolic congestive heart failure/severe mitral regurgitation.  There is no evidence of angina or congestive heart failure.  The blood pressure is on the low side and her Coreg has been DC'd and she was started on midodrine with improvement of the hypotension.    If her rate of A-fib becomes uncontrolled will depend on digoxin.  Will continue amiodarone/Eliquis.  Normal TSH and cortisol.  Last echo was on 11/23 revealing a normal ejection fraction, left atrial volume elevation and right atrial dilatation with trace AR and mild AS and severe MR. Cardiology consult doubts that there is a cardiac cause of the weakness.  I personally doubt that the patient could be a candidate for any surgical intervention regarding her severe specially that she  has no evidence of angina or active congestive Pentostam   elevated lactic acid.  Resolved.  Malnutrition.  Nutritional supplements.  Add Remeron for appetite.  Depression.  Remeron will be added.  VTE prophylaxis.  Sequential compression device.        Will ask palliative care to see for goals of treatment  Discussed my findings and plan of treatment with the patient.  Disposition.  To SNF once bed is available and okay with consultants.            Saulo Smith MD  [facility]  05/28/24  13:09 EDT

## 2024-05-28 NOTE — PLAN OF CARE
Goal Outcome Evaluation:  Plan of Care Reviewed With: patient        Progress: no change  Outcome Evaluation: Vitals stable. Pt worked w PT this am. Echo completed this afternoon. prn tylenol given x1. pt needs met and questions answered. Will continue to monitor.

## 2024-05-28 NOTE — PROGRESS NOTES
"[facility]    Patient Name: Michelle Car  :1940  83 y.o.  LOS: 4  Encounter Provider: MAGGI Edouard      Patient Care Team:  Olayinka Pimentel MD as PCP - General (Internal Medicine)  Renate Galo MD as Consulting Physician (Hematology and Oncology)  Nasrin Nichole MD as Consulting Physician (Cardiology)  Alin Delgado MD as Consulting Physician (General Surgery)  Dave Ramsey MD as Referring Physician (Cardiology)    Chief Complaint: Generalized weakness    Interval History: Denies dyspnea.  Remains with generalized weakness.       Objective   Vital Signs  Temp:  [98.1 °F (36.7 °C)-99 °F (37.2 °C)] 99 °F (37.2 °C)  Heart Rate:  [65-82] 65  Resp:  [16-18] 18  BP: (100-112)/(59-70) 109/65    Intake/Output Summary (Last 24 hours) at 2024 1016  Last data filed at 2024 0849  Gross per 24 hour   Intake 360 ml   Output 400 ml   Net -40 ml     Flowsheet Rows      Flowsheet Row First Filed Value   Admission Height 152.4 cm (60\") Documented at 2024 1701   Admission Weight 51.7 kg (114 lb) Documented at 2024 1701              Vitals and nursing note reviewed.   Constitutional:       Appearance: Normal appearance. Well-developed.   Eyes:      Conjunctiva/sclera: Conjunctivae normal.   Neck:      Vascular: No carotid bruit.   Pulmonary:      Breath sounds: Normal breath sounds.   Cardiovascular:      Normal rate. Regular rhythm. Normal S1 with normal intensity. Normal S2 with normal intensity.       Murmurs: There is no murmur.      No gallop.  No click. No rub.   Edema:     Peripheral edema absent.   Musculoskeletal: Normal range of motion. Skin:     General: Skin is warm and dry.   Neurological:      Mental Status: Alert and oriented to person, place, and time.      GCS: GCS eye subscore is 4. GCS verbal subscore is 5. GCS motor subscore is 6.   Psychiatric:         Speech: Speech normal.         Behavior: Behavior normal.         Thought Content: " "Thought content normal.         Judgment: Judgment normal.           Pertinent Test Results:  Results from last 7 days   Lab Units 05/28/24  0307 05/27/24  0336 05/26/24  0525 05/25/24 0311 05/24/24  1150   SODIUM mmol/L 132* 133* 132* 128* 126*   POTASSIUM mmol/L 3.7 4.0 3.9 4.2 5.6*   CHLORIDE mmol/L 99 101 99 97* 90*   CO2 mmol/L 23.0 25.0 25.0 22.2 22.4   BUN mg/dL 20 16 15 18 26*   CREATININE mg/dL 0.71 0.60 0.75 0.75 0.95   GLUCOSE mg/dL 95 130* 99 90 103*   CALCIUM mg/dL 8.1* 8.1* 7.9* 8.2* 8.7   AST (SGOT) U/L  --   --   --  34* 51*   ALT (SGPT) U/L  --   --   --  32 42*     Results from last 7 days   Lab Units 05/25/24  1018   CK TOTAL U/L 16*     Results from last 7 days   Lab Units 05/28/24  0307 05/27/24  1812 05/27/24  0336 05/26/24  0525 05/25/24 0311 05/24/24  1150   WBC 10*3/mm3 1.05* 1.30* 1.46* 1.01* 0.86* 1.29*   HEMOGLOBIN g/dL 10.1* 10.9* 10.6* 8.2* 7.8* 9.3*   HEMATOCRIT % 29.5* 31.4* 31.0* 23.9* 23.4* 27.9*   PLATELETS 10*3/mm3 206 210 190 174 170 240         Results from last 7 days   Lab Units 05/25/24  0311   MAGNESIUM mg/dL 1.9           Invalid input(s): \"LDLCALC\"      Results from last 7 days   Lab Units 05/25/24  1018   TSH uIU/mL 3.220           Medication Review:   amiodarone, 200 mg, Oral, Q24H  apixaban, 2.5 mg, Oral, Q12H  cetirizine, 10 mg, Oral, Daily  ferrous sulfate, 325 mg, Oral, Every Other Day  Hydrocortisone (Perianal), , Rectal, Daily  midodrine, 5 mg, Oral, TID AC  mirtazapine, 15 mg, Oral, Nightly  multivitamin with minerals, 1 tablet, Oral, Daily  sodium chloride, 10 mL, Intravenous, Q12H  sodium chloride, 1 g, Oral, Daily              Assessment & Plan     Active Hospital Problems    Diagnosis  POA    **Leukopenia [D72.819]  Unknown    Severe malnutrition [E43]  Yes    Anemia [D64.9]  Unknown    Hyperkalemia [E87.5]  Unknown    Adrenal insufficiency [E27.40]  Unknown    Weight loss [R63.4]  Unknown    Chronic diastolic CHF (congestive heart failure) [I50.32]  Yes    " Paroxysmal atrial fibrillation with rapid ventricular response [I48.0]  Yes    Hyponatremia [E87.1]  Yes    Diffuse large B-cell lymphoma of lymph nodes of multiple regions [C83.38]  Yes    CRI (chronic renal insufficiency), stage 2 (mild) [N18.2]  Yes      Resolved Hospital Problems   No resolved problems to display.        Generalized weakness  Likely multifactorial  Noted protein calorie malnutrition  Recurrence of lymphoma  Recurrent diffuse large B-cell lymphoma  Appreciate oncology management  Severe mitral valve regurgitation  Repeat echocardiogram this admission reveals eccentric jet with large proximal convergence consistent with severe mitral valve regurgitation.  No mitral valve stenosis.  Paroxysmal atrial fibrillation  Known history of decompensation with tachycardia in the past  Previous concern for balance issues associated with amiodarone and chemotherapy but given demonstration of severe mitral valve regurgitation was decided to continue amiodarone.  Monitor QT closely, will get repeat ECG today.  Anticoagulated with apixaban.  HFpEF, chronic  No signs of volume overload  Echocardiogram reveals LVEF 66-70%.  Strain images are not obtained.  Will ask echocardiogram to add these images as patient was due for an outpatient echocardiogram for cardio oncology purposes.  Leukopenia and anemia  Appreciate oncology management  Patient has received blood transfusions during this admission.         MAGGI Edouard  [facility]   [facility]  [address] [place] [address] [address] [place] [id number]  Office: [phone]    05/28/24  10:16 EDT

## 2024-05-28 NOTE — PROGRESS NOTES
Re: Refills of Oral Specialty Medication - Revlimid (lenalidomide)    Drug-Drug Interactions: The current medication list was reviewed and there are no relevant drug-drug interactions with the specialty medication.  Medication Allergies: The patient has no relevant allergies as it relates to their oral specialty medication  Review of Labs/Dose Adjustments: NO DOSE CHANGE - I reviewed the most recent note and labs and the patient will continue without any dose changes.  I sent refills as described below.    Drug: Revlimid (lenalidomide)  Strength: 10 mg  Directions: Take 1 capsule by mouth daily on days 1-21 of each 28 day cycle  Quantity: 21  Refills: 0  Pharmacy prescription sent to: [id number]    Name/Credentials: Urbano Medrano, [place], [facility]  Clinical Oncology Pharmacist    5/28/2024  11:51 EDT

## 2024-05-28 NOTE — PROGRESS NOTES
Nephrology Follow Up Note    Patient Identification:  Name: Michelle Car MRN: [id number]  Age: 83 y.o. : 1940  Sex: female  Date:2024    Requesting Physician: As per consult order.  Reason for Consultation: hyponatremia   Information from:patient/ family/ chart      History of Present Illness: This is a 83 y.o. year old female  with lymphoma admitted for weakness, ,hyponatremia and edema.   She completed chemo in 2024.  She has had ongoing issues with hyponatremia.  She was on salt tabs at home Upon arrival her Na was 126 with K at 5.6.   She was given IV fluids of NS as bp was low upon arrival.       feels better, Na now at 132, bp near goal      : Complaining of weakness.  Wants to look into nursing/rehab placement  Otherwise no new complaints     no acute events. She is feeling ok today      The following medical history and medications personally reviewed by me:        Current Meds:   Current Facility-Administered Medications   Medication Dose Route Frequency Provider Last Rate Last Admin    acetaminophen (TYLENOL) tablet 650 mg  650 mg Oral Q4H PRN Abel Pelaez MD   650 mg at 24 2210    Or    acetaminophen (TYLENOL) 160 MG/5ML oral solution 650 mg  650 mg Oral Q4H PRN Abel Pelaez MD        Or    acetaminophen (TYLENOL) suppository 650 mg  650 mg Rectal Q4H PRN Abel Pelaez MD        albuterol (PROVENTIL) nebulizer solution 0.083% 2.5 mg/3mL  2.5 mg Nebulization Q4H PRN Abel Pelaez MD        amiodarone (PACERONE) tablet 200 mg  200 mg Oral Q24H Abel Pelaez MD   200 mg at 24 0804    apixaban (ELIQUIS) tablet 2.5 mg  2.5 mg Oral Q12H Abel Pelaez MD   2.5 mg at 24 0804    sennosides-docusate (PERICOLACE) 8.6-50 MG per tablet 2 tablet  2 tablet Oral BID PRN Abel Pelaez MD        And    polyethylene glycol (MIRALAX) packet 17 g  17 g Oral Daily PRN Abel Pelaez MD        And    bisacodyl (DULCOLAX) EC tablet 5 mg  5 mg Oral Daily PRN Benigno, Jawed,  "MD        And    bisacodyl (DULCOLAX) suppository 10 mg  10 mg Rectal Daily PRN Abel Pelaez MD        cetirizine (zyrTEC) tablet 10 mg  10 mg Oral Daily Abel Pelaez MD   10 mg at 24 0804    ferrous sulfate tablet 325 mg  325 mg Oral Every Other Day Abel Pelaez MD   325 mg at 24 0804    Hydrocortisone (Perianal) (ANUSOL-HC) 2.5 % rectal cream   Rectal Daily Abel Pelaez MD   Given at 24 0807    midodrine (PROAMATINE) tablet 5 mg  5 mg Oral TID AC Saulo Smith MD   5 mg at 24 1133    mirtazapine (REMERON) tablet 15 mg  15 mg Oral Nightly Saulo Smith MD   15 mg at 24 211    multivitamin with minerals 1 tablet  1 tablet Oral Daily Abel Pelaez MD   1 tablet at 24 0804    nitroglycerin (NITROSTAT) SL tablet 0.4 mg  0.4 mg Sublingual Q5 Min PRN Abel Pelaez MD        ondansetron (ZOFRAN) injection 4 mg  4 mg Intravenous Q6H PRN Abel Pelaez MD        sodium chloride 0.9 % flush 10 mL  10 mL Intravenous Q12H Abel Pelaez MD   10 mL at 24 0805    sodium chloride 0.9 % flush 10 mL  10 mL Intravenous PRN Abel Pelaez MD        sodium chloride 0.9 % infusion 40 mL  40 mL Intravenous PRN Abel Pelaez MD        sodium chloride tablet 1 g  1 g Oral Daily Abel Pelaez MD   1 g at 24 0804         Physical Exam:  Vitals:   Temp (24hrs), Av.6 °F (37 °C), Min:98.1 °F (36.7 °C), Max:99 °F (37.2 °C)    /69   Pulse 75   Temp 99 °F (37.2 °C) (Oral)   Resp 18   Ht 152.4 cm (60\")   Wt 54 kg (119 lb)   SpO2 99%   BMI 23.24 kg/m²   Intake/Output:     Intake/Output Summary (Last 24 hours) at 2024 1225  Last data filed at 2024 0849  Gross per 24 hour   Intake 360 ml   Output 400 ml   Net -40 ml        Wt Readings from Last 1 Encounters:   24 1433 54 kg (119 lb)   24 1849 54.2 kg (119 lb 7.8 oz)   24 1701 51.7 kg (114 lb)       Exam:  Alert and oriented NAD   eomi no icterus   Moist mucus membranes   No jvd reg s1s2 no murmur "   Clear bilaterally no rales/rhonchi/wheeze   Soft NTND + bs   5/5 strength bilaterally  No edema   Warm dry no rash   Normal mood and judgement         DATA:  Radiology and Labs:  The following labs and radiology results independently reviewed by me    Labs:   Recent Results (from the past 24 hour(s))   ECG 12 Lead Rhythm Change    Collection Time: 05/27/24 12:31 PM   Result Value Ref Range    QT Interval 457 ms    QTC Interval 511 ms   Adult Transthoracic Echo Complete W/ Cont if Necessary Per Protocol    Collection Time: 05/27/24  3:20 PM   Result Value Ref Range    EF(MOD-bp) 66.1 %    LVIDd 4.7 cm    LVIDs 2.8 cm    IVSd 1.21 cm    LVPWd 0.96 cm    FS 38.9 %    IVS/LVPW 1.27 cm    ESV(cubed) 23.0 ml    LV Sys Vol (BSA corrected) 16.7 cm2    EDV(cubed) 101.1 ml    LV Jama Vol (BSA corrected) 48.1 cm2    LV mass(C)d 181.4 grams    LVOT area 3.0 cm2    LVOT diam 1.97 cm    EDV(MOD-sp2) 34.0 ml    EDV(MOD-sp4) 72.0 ml    ESV(MOD-sp2) 12.0 ml    ESV(MOD-sp4) 25.0 ml    SV(MOD-sp2) 22.0 ml    SV(MOD-sp4) 47.0 ml    SVi(MOD-SP2) 14.7 ml/m2    SVi(MOD-SP4) 31.4 ml/m2    SVi (LVOT) 32.1 ml/m2    EF(MOD-sp2) 64.7 %    EF(MOD-sp4) 65.3 %    MV E max vinicius 78.8 cm/sec    MV A max vinicius 86.1 cm/sec    MV dec time 331 sec    MV E/A 0.91     MV A dur 0.17 sec    LA ESV Index (BP) 26.3 ml/m2    Med Peak E' Vinicius 6.6 cm/sec    Lat Peak E' Vinicius 12.0 cm/sec    TR max vinicius 233.5 cm/sec    Avg E/e' ratio 8.47     SV(LVOT) 48.0 ml    SV(RVOT) 78.4 ml    Qp/Qs 1.63     RV S' 18.8 cm/sec    LA dimension (2D)  4.0 cm    LV V1 max 80.6 cm/sec    LV V1 max PG 2.6 mmHg    LV V1 mean PG 1.33 mmHg    LV V1 VTI 15.8 cm    Ao pk vinicius 209.8 cm/sec    Ao max PG 17.6 mmHg    Ao mean PG 9.5 mmHg    Ao V2 VTI 31.3 cm    MITCHELL(I,D) 1.53 cm2    MV max PG 5.6 mmHg    MV mean PG 2.6 mmHg    MV V2 VTI 36.9 cm    MV P1/2t 115.5 msec    MVA(P1/2t) 1.91 cm2    MVA(VTI) 1.30 cm2    MV dec slope 331.8 cm/sec2    MR max vinicius 592.3 cm/sec    MR max .3 mmHg    MR  mean bernarda 472.2 cm/sec    MR mean PG 98.6 mmHg    MR .4 cm    TR max PG 21.8 mmHg    RVSP(TR) 24.8 mmHg    RAP systole 3.0 mmHg    RVOT diam 2.7 cm    RV V1 max PG 2.8 mmHg    RV V1 max 84.0 cm/sec    RV V1 VTI 14.1 cm    PA V2 max 154.7 cm/sec    PA acc time 0.12 sec    Ao root diam 2.6 cm    ACS 1.51 cm    Sinus 2.7 cm    STJ 2.33 cm    Dimensionless Index 0.50 (DI)    Ascending aorta 3.7 cm    Aortic arch 2.4 cm   CBC Auto Differential    Collection Time: 05/27/24  6:12 PM    Specimen: Blood   Result Value Ref Range    WBC 1.30 (C) 3.40 - 10.80 10*3/mm3    RBC 3.27 (L) 3.77 - 5.28 10*6/mm3    Hemoglobin 10.9 (L) 12.0 - 15.9 g/dL    Hematocrit 31.4 (L) 34.0 - 46.6 %    MCV 96.0 79.0 - 97.0 fL    MCH 33.3 (H) 26.6 - 33.0 pg    MCHC 34.7 31.5 - 35.7 g/dL    RDW 14.0 12.3 - 15.4 %    RDW-SD 49.0 37.0 - 54.0 fl    MPV 9.3 6.0 - 12.0 fL    Platelets 210 140 - 450 10*3/mm3   Manual Differential    Collection Time: 05/27/24  6:12 PM    Specimen: Blood   Result Value Ref Range    Neutrophil % 45.4 42.7 - 76.0 %    Lymphocyte % 27.8 19.6 - 45.3 %    Monocyte % 21.6 (H) 5.0 - 12.0 %    Eosinophil % 0.0 (L) 0.3 - 6.2 %    Basophil % 5.2 (H) 0.0 - 1.5 %    Neutrophils Absolute 0.59 (L) 1.70 - 7.00 10*3/mm3    Lymphocytes Absolute 0.36 (L) 0.70 - 3.10 10*3/mm3    Monocytes Absolute 0.28 0.10 - 0.90 10*3/mm3    Eosinophils Absolute 0.00 0.00 - 0.40 10*3/mm3    Basophils Absolute 0.07 0.00 - 0.20 10*3/mm3    Poikilocytes Mod/2+ None Seen    Smudge Cells Slight/1+ None Seen    Platelet Morphology Normal Normal   Manual Differential    Collection Time: 05/27/24  6:12 PM    Specimen: Blood   Result Value Ref Range    Neutrophil % 65.0 42.7 - 76.0 %    Lymphocyte % 31.0 19.6 - 45.3 %    Monocyte % 4.0 (L) 5.0 - 12.0 %    Neutrophils Absolute 0.85 (L) 1.70 - 7.00 10*3/mm3    Lymphocytes Absolute 0.40 (L) 0.70 - 3.10 10*3/mm3    Monocytes Absolute 0.05 (L) 0.10 - 0.90 10*3/mm3    RBC Morphology Normal Normal    WBC Morphology  Normal Normal    Platelet Morphology Normal Normal   Basic Metabolic Panel    Collection Time: 05/28/24  3:07 AM    Specimen: Blood   Result Value Ref Range    Glucose 95 65 - 99 mg/dL    BUN 20 8 - 23 mg/dL    Creatinine 0.71 0.57 - 1.00 mg/dL    Sodium 132 (L) 136 - 145 mmol/L    Potassium 3.7 3.5 - 5.2 mmol/L    Chloride 99 98 - 107 mmol/L    CO2 23.0 22.0 - 29.0 mmol/L    Calcium 8.1 (L) 8.6 - 10.5 mg/dL    BUN/Creatinine Ratio 28.2 (H) 7.0 - 25.0    Anion Gap 10.0 5.0 - 15.0 mmol/L    eGFR 85.0 >60.0 mL/min/1.73   CBC Auto Differential    Collection Time: 05/28/24  3:07 AM    Specimen: Blood   Result Value Ref Range    WBC 1.05 (C) 3.40 - 10.80 10*3/mm3    RBC 3.05 (L) 3.77 - 5.28 10*6/mm3    Hemoglobin 10.1 (L) 12.0 - 15.9 g/dL    Hematocrit 29.5 (L) 34.0 - 46.6 %    MCV 96.7 79.0 - 97.0 fL    MCH 33.1 (H) 26.6 - 33.0 pg    MCHC 34.2 31.5 - 35.7 g/dL    RDW 14.2 12.3 - 15.4 %    RDW-SD 50.0 37.0 - 54.0 fl    MPV 9.6 6.0 - 12.0 fL    Platelets 206 140 - 450 10*3/mm3   Manual Differential    Collection Time: 05/28/24  3:07 AM    Specimen: Blood   Result Value Ref Range    Neutrophil % 54.0 42.7 - 76.0 %    Lymphocyte % 26.0 19.6 - 45.3 %    Monocyte % 10.0 5.0 - 12.0 %    Eosinophil % 2.0 0.3 - 6.2 %    Basophil % 4.0 (H) 0.0 - 1.5 %    Metamyelocyte % 2.0 (H) 0.0 - 0.0 %    Atypical Lymphocyte % 2.0 0.0 - 5.0 %    Neutrophils Absolute 0.57 (L) 1.70 - 7.00 10*3/mm3    Lymphocytes Absolute 0.29 (L) 0.70 - 3.10 10*3/mm3    Monocytes Absolute 0.11 0.10 - 0.90 10*3/mm3    Eosinophils Absolute 0.02 0.00 - 0.40 10*3/mm3    Basophils Absolute 0.04 0.00 - 0.20 10*3/mm3    Smudge Cells      Anisocytosis Slight/1+ None Seen    Poikilocytes Slight/1+ None Seen    Toxic Granulation Slight/1+ None Seen    Platelet Morphology Normal Normal       Radiology:  Imaging Results (Last 24 Hours)       ** No results found for the last 24 hours. **             ASSESSMENT:   Lymphoma   Hypotension   Hyponatremia   Hyperkalemia    Chronic diastolic heart failure   Adrenal insufficiency - per chart, but doesn't follow with endo   Hypoalbuminemia   Edema   Parox afib       PLAN:   Na stable at 132  Continue low-dose sodium chloride tablet once daily as BP remains borderline  Cortisol and TSH are WNL    regular diet   Continue salt tabs at present dosage  Off IV fluids   B-blocker as bp allows     Monitor electrolytes and volume closely   Dose all medications for GFR >60   Please call with any questions or concerns.     Aarti Reynolds MD  [facility]  Office phone number: [phone]  Answering service phone number: [phone]

## 2024-05-28 NOTE — PLAN OF CARE
Goal Outcome Evaluation:  Plan of Care Reviewed With: patient        Progress: improving  Outcome Evaluation: Pt seen for PT tx this AM and tolerated the session well. Today, pt was Regan for bed mobility, Regan for STS to RW and CGA for ambulation of 25' with a RW. While seated EOB prior to mobility pt performed LE ther-ex for 10 reps. Pt politely declined further gait bouts or distance due to fatigue and FOF due to increased LE weakness. Encouraged pt to sit in the chair for all meals for at least 1-2 hrs w/ nsg assist and ambulate to the bathroom w/ nsg as opposed to using the PW. Pt reports wanting to keep the PW on due to urgency/frequency and fear she will not be able to hold it in time for nsg to arrive; also discussed BSC. FRANK w/ RN following session. PT will cont to follow.      Anticipated Discharge Disposition (PT): skilled nursing facility

## 2024-05-28 NOTE — DISCHARGE PLACEMENT REQUEST
"Brian Hayward (83 y.o. Female)       Date of Birth   1940    Social Security Number       Address   [address] [place], [place] [id number]    Home Phone   [phone]    MRN   [id number]       Buddhism   Patient Refused    Marital Status                               Admission Date   5/24/24    Admission Type   Urgent    Admitting Provider   Abel Pelaez MD    Attending Provider   Saulo Smith MD    Department, Room/Bed   [address], [address]/1       Discharge Date       Discharge Disposition       Discharge Destination                                 Attending Provider: Saulo Smith MD    Allergies: Factive [Gemifloxacin]    Isolation: None   Infection: None   Code Status: CPR    Ht: 152.4 cm (60\")   Wt: 54 kg (119 lb)    Admission Cmt: None   Principal Problem: Leukopenia [D72.819]                   Active Insurance as of 5/24/2024       Primary Coverage       Payor Plan Insurance Group Employer/Plan Group    ANTHEM MEDICARE REPLACEMENT ANTHEM MEDICARE ADVANTAGE [id number]       Payor Plan Address Payor Plan Phone Number Payor Plan Fax Number Effective Dates    [address] [phone]  1/1/2016 - None Entered    [facility] [address]         Subscriber Name Subscriber Birth Date Member ID       BRIAN HAYWARD 1940 [id number]                     Emergency Contacts        (Rel.) Home Phone Work Phone Mobile Phone    Helder Hayward (Son) [phone] --    ERLINDA HAYWARD (Daughter) [phone] -- --    anderson hayward (Son) [phone] -- [phone]    Keely Lozano (Sister) -- -- [phone]    ZBIGNIEW PÉREZ (Brother) [phone] -- --                "

## 2024-05-28 NOTE — PROGRESS NOTES
[facility] can fill the Revlimid script if sent over generically.  It needs to have a DAW8 attached to script.  They have a financial assistance team that can help with the $3100 co-pay if patient specifically asks for financial help from the pharmacy.   Carol Gill  Specialty Pharmacy Technician

## 2024-05-28 NOTE — THERAPY TREATMENT NOTE
Patient Name: Michelle Car  : 1940    MRN: [id number]                              Today's Date: 2024       Admit Date: 2024    Visit Dx: No diagnosis found.  Patient Active Problem List   Diagnosis    Hypertension    Hyperlipidemia    Abdominal aortic atherosclerosis    Generalized edema    Fatigue due to excessive exertion    Seasonal allergic rhinitis due to pollen    Dyspnea    Acute on chronic diastolic heart failure    Hypoxia    Paroxysmal atrial fibrillation with rapid ventricular response    Adrenal nodule    Edema of lower extremity    Hyponatremia    CRI (chronic renal insufficiency), stage 2 (mild)    Diffuse large B-cell lymphoma of lymph nodes of multiple regions    Vascular catheter fitting or adjustment    Chronic diastolic CHF (congestive heart failure)    Sepsis due to Escherichia coli (E. coli)    Bacteremia due to Escherichia coli    Calculus of bile duct without cholecystitis and without obstruction    Diverticulosis    Iron deficiency anemia    Leukopenia    Anemia    Hyperkalemia    Adrenal insufficiency    Weight loss    Severe malnutrition     Past Medical History:   Diagnosis Date    Abdominal aortic atherosclerosis     Adrenal nodule 2022    Allergic rhinitis     Anemia due to chemotherapy     Bacteremia due to Escherichia coli 2022    ADMITTED TO [facility]    Bug bite 10/24/2015    WITH CELLULITIS, LEFT LEG    CAD (coronary artery disease)     Calculus of bile duct without cholangitis or cholecystitis 2022    Chemotherapy induced neutropenia     Chronic anticoagulation     Chronic diastolic (congestive) heart failure     Colon polyps     CRI (chronic renal insufficiency), stage 2 (mild)     Diffuse large B cell lymphoma 2022    MULTIPLE LYMPH NODE INVOLVEMENT, FOLLOWED BY DR. JEN PATTERSON    Drug induced constipation     Hearing loss     Hemorrhoids     History of blood transfusion 2022    History of chemotherapy     FOLLOWED BY DR. LI  LEONARDO    Hypercalcemia 2016    resolved as of 2019    Hypercholesterolemia     Hyperkalemia 03/2022    Hypertension     Hyponatremia 02/17/2022    Leg swelling 10/2021    Mixed hyperlipidemia     Neutropenic fever 05/2022    Nonrheumatic mitral valve regurgitation 03/2022    PAF (paroxysmal atrial fibrillation)     FOLLOWED BY DR. YOSI LANGLEY    Pancytopenia     Pleural effusion, bilateral 06/2022    Pulmonary nodule     Seasonal allergies     Thrombocytopenia 08/2022    Venous insufficiency     Vitamin D deficiency      Past Surgical History:   Procedure Laterality Date    CHOLECYSTECTOMY N/A 10/12/2011    LAPAROSCOPIC, DR. CHANCE KLINE AT [facility]    COLONOSCOPY N/A 2000    1 or 2 polyps, otherwise normal per patient    COLONOSCOPY W/ POLYPECTOMY N/A 01/24/2012    3 MM TUBULAR ADENOMA POLYP IN CECUM, DIVERTICULOSIS IN RECTO-SIGMOID, RESCOPE IN 3 YRS, DR. CHANCE KLINE AT [facility]    CYST REMOVAL Left 10/12/2011    LEFT SCALP, PATH: BENIGN ADNEXAL NEOPLASM FAVORING ECCRINE SPIRADENOMA, DR. CHANCE KLINE AT [facility]    ERCP N/A 10/21/2022    Procedure: ENDOSCOPIC RETROGRADE CHOLANGIOPANCREATOGRAPHY with sphincterotomy and balloon sweep;  Surgeon: Peyman Ortega MD;  Location: [facility] ENDOSCOPY;  Service: Gastroenterology;  Laterality: N/A;  PRE: Common Duct Stones  POST: Common Duct Stones    VENOUS ACCESS DEVICE (PORT) INSERTION Left 03/23/2022    Procedure: INSERTION VENOUS ACCESS DEVICE;  Surgeon: Alin Delgado MD;  Location: [facility] OR [facility];  Service: General;  Laterality: Left;      General Information       Row Name 05/28/24 1131          Physical Therapy Time and Intention    Document Type therapy note (daily note)  -MG     Mode of Treatment individual therapy;physical therapy  -MG       Row Name 05/28/24 1131          General Information    Patient Profile Reviewed yes  -MG     Existing Precautions/Restrictions fall  -MG     Barriers to Rehab medically complex  -MG       Row Name 05/28/24 1131          Cognition     Orientation Status (Cognition) oriented x 3  -MG       Row Name 05/28/24 1131          Safety Issues, Functional Mobility    Impairments Affecting Function (Mobility) balance;endurance/activity tolerance;strength  -MG     Comment, Safety Issues/Impairments (Mobility) Gait belt and non-skid socks donned.  -MG               User Key  (r) = Recorded By, (t) = Taken By, (c) = Cosigned By      Initials Name Provider Type    MG Connie Phan PT Physical Therapist                   Mobility       Row Name 05/28/24 1132          Bed Mobility    Supine-Sit [place] (Bed Mobility) minimum assist (75% patient effort);verbal cues  -MG     Assistive Device (Bed Mobility) head of bed elevated;bed rails  -MG     Comment, (Bed Mobility) Increased time to complete.  -MG       Row Name 05/28/24 1132          Sit-Stand Transfer    Sit-Stand [place] (Transfers) minimum assist (75% patient effort);verbal cues  -MG     Assistive Device (Sit-Stand Transfers) walker, front-wheeled  -MG     Comment, (Sit-Stand Transfer) Cues for hand placement.  -MG       Row Name 05/28/24 1132          Gait/Stairs (Locomotion)    [place] Level (Gait) contact guard;verbal cues  -MG     Assistive Device (Gait) walker, front-wheeled  -MG     Distance in Feet (Gait) 25  -MG     Deviations/Abnormal Patterns (Gait) gait speed decreased;festinating/shuffling;base of support, narrow;stride length decreased  -MG     Bilateral Gait Deviations forward flexed posture;heel strike decreased  -MG     Comment, (Gait/Stairs) Pt amb to just outside doorway and to recliner. Demos slow, short and shuffled steps. Cues for upright posture and to look ahead vs the floor. No knee buckling or overt LOB. Declined further distance or gait bouts d/t fatigue and FOF.  -MG               User Key  (r) = Recorded By, (t) = Taken By, (c) = Cosigned By      Initials Name Provider Type    Connie Cedeño PT Physical Therapist                   Obj/Interventions       Row  Name 05/28/24 1133          Motor Skills    Therapeutic Exercise other (see comments)  AP, LAQ, HF x10. Ed to perform AP, HS, GS while reclined/bed and AP, LAQ, HF when sitting straight up.  -MG       Row Name 05/28/24 1133          Balance    Static Sitting Balance standby assist  -MG     Dynamic Sitting Balance standby assist  -MG     Position, Sitting Balance sitting edge of bed;unsupported  -MG     Static Standing Balance contact guard  -MG     Dynamic Standing Balance contact guard  -MG     Position/Device Used, Standing Balance supported;walker, front-wheeled  -MG     Comment, Balance No knee buckling or LOB.  -MG               User Key  (r) = Recorded By, (t) = Taken By, (c) = Cosigned By      Initials Name Provider Type    MG Connie Phan, PT Physical Therapist                   Goals/Plan    No documentation.                  Clinical Impression       Row Name 05/28/24 1134          Pain    Pretreatment Pain Rating 0/10 - no pain  -MG     Posttreatment Pain Rating 0/10 - no pain  -MG     Pain Intervention(s) Medication (See MAR);Ambulation/increased activity;Repositioned;Rest  -MG       Row Name 05/28/24 1134          Plan of Care Review    Plan of Care Reviewed With patient  -MG     Progress improving  -MG     Outcome Evaluation Pt seen for PT tx this AM and tolerated the session well. Today, pt was Regan for bed mobility, Regan for STS to RW and CGA for ambulation of 25' with a RW. While seated EOB prior to mobility pt performed LE ther-ex for 10 reps. Pt politely declined further gait bouts or distance due to fatigue and FOF due to increased LE weakness. Encouraged pt to sit in the chair for all meals for at least 1-2 hrs w/ nsg assist and ambulate to the bathroom w/ nsg as opposed to using the PW. Pt reports wanting to keep the PW on due to urgency/frequency and fear she will not be able to hold it in time for nsg to arrive; also discussed BSC. VELVETAR w/ RN following session. PT will cont to follow.  -MG        Row Name 05/28/24 1134          Therapy Assessment/Plan (PT)    Rehab Potential (PT) good, to achieve stated therapy goals  -MG     Criteria for Skilled Interventions Met (PT) yes;meets criteria;skilled treatment is necessary  -MG     Therapy Frequency (PT) 5 times/wk  -MG       Row Name 05/28/24 1134          Vital Signs    O2 Delivery Pre Treatment room air  -MG     O2 Delivery Intra Treatment room air  -MG     O2 Delivery Post Treatment room air  -MG     Pre Patient Position Supine  -MG     Intra Patient Position Standing  -MG     Post Patient Position Sitting  -MG       Row Name 05/28/24 1134          Positioning and Restraints    Pre-Treatment Position in bed  -MG     Post Treatment Position chair  -MG     In Chair notified nsg;reclined;call light within reach;encouraged to call for assist;exit alarm on;with family/caregiver;legs elevated  -MG               User Key  (r) = Recorded By, (t) = Taken By, (c) = Cosigned By      Initials Name Provider Type    Connie Cedeño PT Physical Therapist                   Outcome Measures       Row Name 05/28/24 1137          How much help from another person do you currently need...    Turning from your back to your side while in flat bed without using bedrails? 3  -MG     Moving from lying on back to sitting on the side of a flat bed without bedrails? 3  -MG     Moving to and from a bed to a chair (including a wheelchair)? 3  -MG     Standing up from a chair using your arms (e.g., wheelchair, bedside chair)? 3  -MG     Climbing 3-5 steps with a railing? 2  -MG     To walk in hospital room? 3  -MG     AM-PAC 6 Clicks Score (PT) 17  -MG     Highest Level of Mobility Goal 5 --> Static standing  -MG               User Key  (r) = Recorded By, (t) = Taken By, (c) = Cosigned By      Initials Name Provider Type    Connie Cedeño PT Physical Therapist                                 Physical Therapy Education       Title: PT OT SLP Therapies (Done)       Topic:  Physical Therapy (Done)       Point: Mobility training (Done)       Learning Progress Summary             Patient Acceptance, E,D, VU,NR by MG at 5/28/2024 1138    Acceptance, E, VU by BRIANDA at 5/26/2024 1253                         Point: Home exercise program (Done)       Learning Progress Summary             Patient Acceptance, E,D, VU,NR by MG at 5/28/2024 1138                         Point: Body mechanics (Done)       Learning Progress Summary             Patient Acceptance, E,D, VU,NR by MG at 5/28/2024 1138                         Point: Precautions (Done)       Learning Progress Summary             Patient Acceptance, E,D, VU,NR by MG at 5/28/2024 1138                                         User Key       Initials Effective Dates Name Provider Type Discipline     05/24/22 -  Connie Phan, PT Physical Therapist PT    BRIANDA 08/24/21 -  Anastacia Portillo, PT Physical Therapist PT                  PT Recommendation and Plan     Plan of Care Reviewed With: patient  Progress: improving  Outcome Evaluation: Pt seen for PT tx this AM and tolerated the session well. Today, pt was Regan for bed mobility, Regan for STS to RW and CGA for ambulation of 25' with a RW. While seated EOB prior to mobility pt performed LE ther-ex for 10 reps. Pt politely declined further gait bouts or distance due to fatigue and FOF due to increased LE weakness. Encouraged pt to sit in the chair for all meals for at least 1-2 hrs w/ nsg assist and ambulate to the bathroom w/ nsg as opposed to using the PW. Pt reports wanting to keep the PW on due to urgency/frequency and fear she will not be able to hold it in time for nsg to arrive; also discussed BSC. SBAR w/ RN following session. PT will cont to follow.     Time Calculation:         PT Charges       Row Name 05/28/24 1138             Time Calculation    Start Time 0954  -MG      Stop Time 1014  -MG      Time Calculation (min) 20 min  -MG      PT Received On 05/28/24  -MG      PT - Next  Appointment 05/29/24  -                User Key  (r) = Recorded By, (t) = Taken By, (c) = Cosigned By      Initials Name Provider Type    Connie Cedeño, PT Physical Therapist                  Therapy Charges for Today       Code Description Service Date Service Provider Modifiers Qty    [id number]  PT THERAPEUTIC ACT EA 15 MIN 5/28/2024 Connie Phan, PT GP 1            PT G-Codes  Outcome Measure Options: AM-PAC 6 Clicks Daily Activity (OT)  AM-PAC 6 Clicks Score (PT): 17  AM-PAC 6 Clicks Score (OT): 15  PT Discharge Summary  Anticipated Discharge Disposition (PT): skilled nursing facility    Connie Phan, PT  5/28/2024

## 2024-05-29 ENCOUNTER — SPECIALTY PHARMACY (OUTPATIENT)
Dept: PHARMACY | Facility: HOSPITAL | Age: 84
End: 2024-05-29
Payer: MEDICARE

## 2024-05-29 LAB
ANION GAP SERPL CALCULATED.3IONS-SCNC: 11 MMOL/L (ref 5–15)
AORTIC ARCH: 2.4 CM
AORTIC DIMENSIONLESS INDEX: 0.5 (DI)
ASCENDING AORTA: 3.7 CM
BACTERIA SPEC AEROBE CULT: NORMAL
BASOPHILS # BLD AUTO: 0.02 10*3/MM3 (ref 0–0.2)
BASOPHILS NFR BLD AUTO: 2.3 % (ref 0–1.5)
BH CV ECHO LEFT VENTRICLE GLOBAL LONGITUDINAL STRAIN: -23.4 %
BH CV ECHO MEAS - ACS: 1.51 CM
BH CV ECHO MEAS - AO MAX PG: 17.6 MMHG
BH CV ECHO MEAS - AO MEAN PG: 9.5 MMHG
BH CV ECHO MEAS - AO ROOT DIAM: 2.6 CM
BH CV ECHO MEAS - AO V2 MAX: 209.8 CM/SEC
BH CV ECHO MEAS - AO V2 VTI: 31.3 CM
BH CV ECHO MEAS - AVA(I,D): 1.53 CM2
BH CV ECHO MEAS - EDV(CUBED): 101.1 ML
BH CV ECHO MEAS - EDV(MOD-SP2): 34 ML
BH CV ECHO MEAS - EDV(MOD-SP4): 72 ML
BH CV ECHO MEAS - EF(MOD-BP): 66.1 %
BH CV ECHO MEAS - EF(MOD-SP2): 64.7 %
BH CV ECHO MEAS - EF(MOD-SP4): 65.3 %
BH CV ECHO MEAS - ESV(CUBED): 23 ML
BH CV ECHO MEAS - ESV(MOD-SP2): 12 ML
BH CV ECHO MEAS - ESV(MOD-SP4): 25 ML
BH CV ECHO MEAS - FS: 38.9 %
BH CV ECHO MEAS - IVS/LVPW: 1.27 CM
BH CV ECHO MEAS - IVSD: 1.21 CM
BH CV ECHO MEAS - LA DIMENSION: 4 CM
BH CV ECHO MEAS - LAT PEAK E' VEL: 12 CM/SEC
BH CV ECHO MEAS - LV DIASTOLIC VOL/BSA (35-75): 48.1 CM2
BH CV ECHO MEAS - LV MASS(C)D: 181.4 GRAMS
BH CV ECHO MEAS - LV MAX PG: 2.6 MMHG
BH CV ECHO MEAS - LV MEAN PG: 1.33 MMHG
BH CV ECHO MEAS - LV SYSTOLIC VOL/BSA (12-30): 16.7 CM2
BH CV ECHO MEAS - LV V1 MAX: 80.6 CM/SEC
BH CV ECHO MEAS - LV V1 VTI: 15.8 CM
BH CV ECHO MEAS - LVIDD: 4.7 CM
BH CV ECHO MEAS - LVIDS: 2.8 CM
BH CV ECHO MEAS - LVOT AREA: 3 CM2
BH CV ECHO MEAS - LVOT DIAM: 1.97 CM
BH CV ECHO MEAS - LVPWD: 0.96 CM
BH CV ECHO MEAS - MED PEAK E' VEL: 6.6 CM/SEC
BH CV ECHO MEAS - MR MAX PG: 140.3 MMHG
BH CV ECHO MEAS - MR MAX VEL: 592.3 CM/SEC
BH CV ECHO MEAS - MR MEAN PG: 98.6 MMHG
BH CV ECHO MEAS - MR MEAN VEL: 472.2 CM/SEC
BH CV ECHO MEAS - MR VTI: 163.4 CM
BH CV ECHO MEAS - MV A DUR: 0.17 SEC
BH CV ECHO MEAS - MV A MAX VEL: 86.1 CM/SEC
BH CV ECHO MEAS - MV DEC SLOPE: 331.8 CM/SEC2
BH CV ECHO MEAS - MV DEC TIME: 0.33 SEC
BH CV ECHO MEAS - MV E MAX VEL: 78.8 CM/SEC
BH CV ECHO MEAS - MV E/A: 0.91
BH CV ECHO MEAS - MV MAX PG: 5.6 MMHG
BH CV ECHO MEAS - MV MEAN PG: 2.6 MMHG
BH CV ECHO MEAS - MV P1/2T: 115.5 MSEC
BH CV ECHO MEAS - MV V2 VTI: 36.9 CM
BH CV ECHO MEAS - MVA(P1/2T): 1.91 CM2
BH CV ECHO MEAS - MVA(VTI): 1.3 CM2
BH CV ECHO MEAS - PA ACC TIME: 0.12 SEC
BH CV ECHO MEAS - PA V2 MAX: 154.7 CM/SEC
BH CV ECHO MEAS - QP/QS: 1.63
BH CV ECHO MEAS - RAP SYSTOLE: 3 MMHG
BH CV ECHO MEAS - RV MAX PG: 2.8 MMHG
BH CV ECHO MEAS - RV V1 MAX: 84 CM/SEC
BH CV ECHO MEAS - RV V1 VTI: 14.1 CM
BH CV ECHO MEAS - RVOT DIAM: 2.7 CM
BH CV ECHO MEAS - RVSP: 24.8 MMHG
BH CV ECHO MEAS - SV(LVOT): 48 ML
BH CV ECHO MEAS - SV(MOD-SP2): 22 ML
BH CV ECHO MEAS - SV(MOD-SP4): 47 ML
BH CV ECHO MEAS - SV(RVOT): 78.4 ML
BH CV ECHO MEAS - SVI(LVOT): 32.1 ML/M2
BH CV ECHO MEAS - SVI(MOD-SP2): 14.7 ML/M2
BH CV ECHO MEAS - SVI(MOD-SP4): 31.4 ML/M2
BH CV ECHO MEAS - TR MAX PG: 21.8 MMHG
BH CV ECHO MEAS - TR MAX VEL: 233.5 CM/SEC
BH CV ECHO MEASUREMENTS AVERAGE E/E' RATIO: 8.47
BH CV XLRA - TDI S': 18.8 CM/SEC
BUN SERPL-MCNC: 16 MG/DL (ref 8–23)
BUN/CREAT SERPL: 27.1 (ref 7–25)
CALCIUM SPEC-SCNC: 7.8 MG/DL (ref 8.6–10.5)
CHLORIDE SERPL-SCNC: 97 MMOL/L (ref 98–107)
CO2 SERPL-SCNC: 22 MMOL/L (ref 22–29)
CREAT SERPL-MCNC: 0.59 MG/DL (ref 0.57–1)
DEPRECATED RDW RBC AUTO: 49.9 FL (ref 37–54)
EGFRCR SERPLBLD CKD-EPI 2021: 90.1 ML/MIN/1.73
EOSINOPHIL # BLD AUTO: 0 10*3/MM3 (ref 0–0.4)
EOSINOPHIL NFR BLD AUTO: 0 % (ref 0.3–6.2)
ERYTHROCYTE [DISTWIDTH] IN BLOOD BY AUTOMATED COUNT: 14.2 % (ref 12.3–15.4)
GLUCOSE SERPL-MCNC: 96 MG/DL (ref 65–99)
HCT VFR BLD AUTO: 29.2 % (ref 34–46.6)
HEMOCCULT STL QL: NEGATIVE
HGB BLD-MCNC: 9.9 G/DL (ref 12–15.9)
IMM GRANULOCYTES # BLD AUTO: 0.01 10*3/MM3 (ref 0–0.05)
IMM GRANULOCYTES NFR BLD AUTO: 1.1 % (ref 0–0.5)
LEFT ATRIUM VOLUME INDEX: 26.3 ML/M2
LYMPHOCYTES # BLD AUTO: 0.33 10*3/MM3 (ref 0.7–3.1)
LYMPHOCYTES NFR BLD AUTO: 37.9 % (ref 19.6–45.3)
MCH RBC QN AUTO: 32.8 PG (ref 26.6–33)
MCHC RBC AUTO-ENTMCNC: 33.9 G/DL (ref 31.5–35.7)
MCV RBC AUTO: 96.7 FL (ref 79–97)
MONOCYTES # BLD AUTO: 0.1 10*3/MM3 (ref 0.1–0.9)
MONOCYTES NFR BLD AUTO: 11.5 % (ref 5–12)
NEUTROPHILS NFR BLD AUTO: 0.41 10*3/MM3 (ref 1.7–7)
NEUTROPHILS NFR BLD AUTO: 47.2 % (ref 42.7–76)
PLATELET # BLD AUTO: 184 10*3/MM3 (ref 140–450)
PMV BLD AUTO: 9.5 FL (ref 6–12)
POTASSIUM SERPL-SCNC: 3.2 MMOL/L (ref 3.5–5.2)
POTASSIUM SERPL-SCNC: 4.4 MMOL/L (ref 3.5–5.2)
RBC # BLD AUTO: 3.02 10*6/MM3 (ref 3.77–5.28)
SINUS: 2.7 CM
SODIUM SERPL-SCNC: 130 MMOL/L (ref 136–145)
STJ: 2.33 CM
WBC NRBC COR # BLD AUTO: 0.87 10*3/MM3 (ref 3.4–10.8)

## 2024-05-29 PROCEDURE — 99497 ADVNCD CARE PLAN 30 MIN: CPT | Performed by: NURSE PRACTITIONER

## 2024-05-29 PROCEDURE — 99223 1ST HOSP IP/OBS HIGH 75: CPT | Performed by: NURSE PRACTITIONER

## 2024-05-29 PROCEDURE — 99223 1ST HOSP IP/OBS HIGH 75: CPT | Performed by: INTERNAL MEDICINE

## 2024-05-29 PROCEDURE — 85025 COMPLETE CBC W/AUTO DIFF WBC: CPT | Performed by: INTERNAL MEDICINE

## 2024-05-29 PROCEDURE — 99232 SBSQ HOSP IP/OBS MODERATE 35: CPT | Performed by: NURSE PRACTITIONER

## 2024-05-29 PROCEDURE — 82272 OCCULT BLD FECES 1-3 TESTS: CPT | Performed by: INTERNAL MEDICINE

## 2024-05-29 PROCEDURE — 84132 ASSAY OF SERUM POTASSIUM: CPT | Performed by: INTERNAL MEDICINE

## 2024-05-29 PROCEDURE — 25010000002 CEFEPIME PER 500 MG: Performed by: HOSPITALIST

## 2024-05-29 PROCEDURE — 99232 SBSQ HOSP IP/OBS MODERATE 35: CPT | Performed by: INTERNAL MEDICINE

## 2024-05-29 PROCEDURE — 80048 BASIC METABOLIC PNL TOTAL CA: CPT | Performed by: INTERNAL MEDICINE

## 2024-05-29 PROCEDURE — 87040 BLOOD CULTURE FOR BACTERIA: CPT | Performed by: HOSPITALIST

## 2024-05-29 RX ORDER — SODIUM CHLORIDE 1 G/1
1 TABLET ORAL 2 TIMES DAILY WITH MEALS
Status: DISCONTINUED | OUTPATIENT
Start: 2024-05-29 | End: 2024-06-11 | Stop reason: HOSPADM

## 2024-05-29 RX ORDER — POTASSIUM CHLORIDE 750 MG/1
40 TABLET, FILM COATED, EXTENDED RELEASE ORAL EVERY 4 HOURS
Status: COMPLETED | OUTPATIENT
Start: 2024-05-29 | End: 2024-05-29

## 2024-05-29 RX ADMIN — HYDROCORTISONE: 25 CREAM TOPICAL at 08:04

## 2024-05-29 RX ADMIN — MIDODRINE HYDROCHLORIDE 5 MG: 5 TABLET ORAL at 17:31

## 2024-05-29 RX ADMIN — ACETAMINOPHEN 325MG 650 MG: 325 TABLET ORAL at 20:37

## 2024-05-29 RX ADMIN — POTASSIUM CHLORIDE 40 MEQ: 750 TABLET, EXTENDED RELEASE ORAL at 11:38

## 2024-05-29 RX ADMIN — MIDODRINE HYDROCHLORIDE 5 MG: 5 TABLET ORAL at 08:03

## 2024-05-29 RX ADMIN — AMIODARONE HYDROCHLORIDE 200 MG: 200 TABLET ORAL at 08:03

## 2024-05-29 RX ADMIN — CEFEPIME 2000 MG: 2 INJECTION, POWDER, FOR SOLUTION INTRAVENOUS at 11:38

## 2024-05-29 RX ADMIN — POTASSIUM CHLORIDE 40 MEQ: 750 TABLET, EXTENDED RELEASE ORAL at 08:03

## 2024-05-29 RX ADMIN — MIDODRINE HYDROCHLORIDE 5 MG: 5 TABLET ORAL at 11:38

## 2024-05-29 RX ADMIN — Medication 1 TABLET: at 08:03

## 2024-05-29 RX ADMIN — ACETAMINOPHEN 325MG 650 MG: 325 TABLET ORAL at 16:25

## 2024-05-29 RX ADMIN — SODIUM CHLORIDE TAB 1 GM 1 G: 1 TAB at 17:31

## 2024-05-29 RX ADMIN — CETIRIZINE HYDROCHLORIDE 10 MG: 10 TABLET ORAL at 08:03

## 2024-05-29 RX ADMIN — SODIUM CHLORIDE TAB 1 GM 1 G: 1 TAB at 08:03

## 2024-05-29 RX ADMIN — CEFEPIME 2000 MG: 2 INJECTION, POWDER, FOR SOLUTION INTRAVENOUS at 18:22

## 2024-05-29 RX ADMIN — Medication 10 ML: at 08:04

## 2024-05-29 RX ADMIN — MIRTAZAPINE 15 MG: 15 TABLET, FILM COATED ORAL at 20:37

## 2024-05-29 NOTE — CONSULTS
"Referring Provider: Dr Dhillon    Reason for Consultation: Neutropenic fever     History of present illness:  Michelle Car is a 83 y.o. with past medical history diffuse large B-cell lymphoma who I am asked to evaluate and give opinion for \"Neutropenic fever.\" History is obtained from the patient, her daughter, and review of the old medical records which I summarize/synthesize as follows: She recently completed second line chemotherapy treatment with Rituxan Polivy and Treanda per my review of the oncology note. She was seen in oncology clinic on 5/24/2024 and was noted to have worsening neutropenia with an  as well as other lab normalities including hyponatremia and hyperkalemia.  She had also not been eating well and reported early satiety and this is led to generalized weakness.  For all of these reasons she was admitted for further evaluation and management.  There were concerns for relapse of the lymphoma.  The plan was to treat with Revlimid and to consider radiation therapy to the mandible.    At the time of admission, there were concerns about Bactrim causing some of her electrolyte changes as well as possibly her spironolactone so these were held and nephrology was consulted.  She was given about 4 doses of renally dosed cefepime with the last dose being 5/26 at 0914.  She actually has not had any true fever during her hospital stay.  She did have a Tmax of 100 last night so her primary team has ordered blood cultures, resume cefepime and placed an ID consult.  She has a stable blood pressure, normal heart rate, and is on room air.  She had admission blood cultures that are negative to date and her admission chest x-ray did not show any evidence of pneumonia.    Her daughter says that really has not been much improvement in terms of her weakness or appetite since admission.  However, she says that she and the family have not been concerned about any infections.  The patient has not had any " cough, congestion, dysuria, diarrhea, or any other symptoms suggestive of infection.  She previously had a port in place but it is since been removed.            Past Medical History:   Diagnosis Date    Abdominal aortic atherosclerosis     Adrenal nodule 03/2022    Allergic rhinitis     Anemia due to chemotherapy     Bacteremia due to Escherichia coli 04/26/2022    ADMITTED TO [facility]    Bug bite 10/24/2015    WITH CELLULITIS, LEFT LEG    CAD (coronary artery disease)     Calculus of bile duct without cholangitis or cholecystitis 09/2022    Chemotherapy induced neutropenia     Chronic anticoagulation     Chronic diastolic (congestive) heart failure     Colon polyps     CRI (chronic renal insufficiency), stage 2 (mild) 2021    Diffuse large B cell lymphoma 02/2022    MULTIPLE LYMPH NODE INVOLVEMENT, FOLLOWED BY DR. JEN PATTERSON    Drug induced constipation     Hearing loss     Hemorrhoids     History of blood transfusion 04/2022    History of chemotherapy 2022    FOLLOWED BY DR. JEN PATTERSON    Hypercalcemia 2016    resolved as of 2019    Hypercholesterolemia     Hyperkalemia 03/2022    Hypertension     Hyponatremia 02/17/2022    Leg swelling 10/2021    Mixed hyperlipidemia     Neutropenic fever 05/2022    Nonrheumatic mitral valve regurgitation 03/2022    PAF (paroxysmal atrial fibrillation)     FOLLOWED BY DR. YOSI LANGLEY    Pancytopenia     Pleural effusion, bilateral 06/2022    Pulmonary nodule     Seasonal allergies     Thrombocytopenia 08/2022    Venous insufficiency     Vitamin D deficiency        Past Surgical History:   Procedure Laterality Date    CHOLECYSTECTOMY N/A 10/12/2011    LAPAROSCOPIC, DR. CHANCE KLINE AT [facility]    COLONOSCOPY N/A 2000    1 or 2 polyps, otherwise normal per patient    COLONOSCOPY W/ POLYPECTOMY N/A 01/24/2012    3 MM TUBULAR ADENOMA POLYP IN CECUM, DIVERTICULOSIS IN RECTO-SIGMOID, RESCOPE IN 3 YRS, DR. CHANCE KLINE AT [facility]    CYST REMOVAL Left 10/12/2011    LEFT SCALP, PATH: BENIGN  ADNEXAL NEOPLASM FAVORING ECCRINE SPIRADENOMA, DR. CHANCE KLINE AT [facility]    ERCP N/A 10/21/2022    Procedure: ENDOSCOPIC RETROGRADE CHOLANGIOPANCREATOGRAPHY with sphincterotomy and balloon sweep;  Surgeon: Peyman Ortega MD;  Location: [facility] ENDOSCOPY;  Service: Gastroenterology;  Laterality: N/A;  PRE: Common Duct Stones  POST: Common Duct Stones    VENOUS ACCESS DEVICE (PORT) INSERTION Left 03/23/2022    Procedure: INSERTION VENOUS ACCESS DEVICE;  Surgeon: Alin Delgado MD;  Location: [facility] [place] [facility];  Service: General;  Laterality: Left;       Social History:  Worked in [employer]  Lives in [place], [place]    Antibiotic allergies and intolerances:  None    Medications:    Current Facility-Administered Medications:     acetaminophen (TYLENOL) tablet 650 mg, 650 mg, Oral, Q4H PRN, 650 mg at 05/28/24 1426 **OR** acetaminophen (TYLENOL) 160 MG/5ML oral solution 650 mg, 650 mg, Oral, Q4H PRN **OR** acetaminophen (TYLENOL) suppository 650 mg, 650 mg, Rectal, Q4H PRN, Abel Pelaez MD    albuterol (PROVENTIL) nebulizer solution 0.083% 2.5 mg/3mL, 2.5 mg, Nebulization, Q4H PRN, Abel Pelaez MD    amiodarone (PACERONE) tablet 200 mg, 200 mg, Oral, Q24H, BenignoAbel ortiz MD, 200 mg at 05/29/24 0803    sennosides-docusate (PERICOLACE) 8.6-50 MG per tablet 2 tablet, 2 tablet, Oral, BID PRN **AND** polyethylene glycol (MIRALAX) packet 17 g, 17 g, Oral, Daily PRN **AND** bisacodyl (DULCOLAX) EC tablet 5 mg, 5 mg, Oral, Daily PRN **AND** bisacodyl (DULCOLAX) suppository 10 mg, 10 mg, Rectal, Daily PRN, Abel Pelaez MD    cefepime 2000 mg IVPB in 100 mL NS (MBP), 2,000 mg, Intravenous, Q8H, Renard Dhillon MD    cetirizine (zyrTEC) tablet 10 mg, 10 mg, Oral, Daily, Abel Pelaez MD, 10 mg at 05/29/24 0803    ferrous sulfate tablet 325 mg, 325 mg, Oral, Every Other Day, Abel Pelaez MD, 325 mg at 05/28/24 0804    Hydrocortisone (Perianal) (ANUSOL-HC) 2.5 % rectal cream, , Rectal, Daily, Abel Pelaez MD, Given at  05/29/24 0804    midodrine (PROAMATINE) tablet 5 mg, 5 mg, Oral, TID AC, Saluo Smith MD, 5 mg at 05/29/24 0803    mirtazapine (REMERON) tablet 15 mg, 15 mg, Oral, Nightly, Saulo Smith MD, 15 mg at 05/28/24 2016    multivitamin with minerals 1 tablet, 1 tablet, Oral, Daily, Abel Pelaez MD, 1 tablet at 05/29/24 0803    nitroglycerin (NITROSTAT) SL tablet 0.4 mg, 0.4 mg, Sublingual, Q5 Min PRN, Abel Pelaez MD    ondansetron (ZOFRAN) injection 4 mg, 4 mg, Intravenous, Q6H PRN, Abel Pelaez MD    potassium chloride (K-DUR,KLOR-CON) ER tablet 40 mEq, 40 mEq, Oral, Q4H, Morales Farrell MD, 40 mEq at 05/29/24 0803    Potassium Replacement - Follow Nurse / BPA Driven Protocol, , Does not apply, PRN, Morales Farrell MD    sodium chloride 0.9 % flush 10 mL, 10 mL, Intravenous, Q12H, Abel Pelaez MD, 10 mL at 05/29/24 0804    sodium chloride 0.9 % flush 10 mL, 10 mL, Intravenous, PRN, Abel Pelaez MD    sodium chloride 0.9 % infusion 40 mL, 40 mL, Intravenous, PRN, Abel Pelaez MD    sodium chloride tablet 1 g, 1 g, Oral, BID With Meals, Aarti Reynolds MD      Objective   Vital Signs   Temp:  [98.5 °F (36.9 °C)-100 °F (37.8 °C)] 98.5 °F (36.9 °C)  Heart Rate:  [66-78] 69  Resp:  [18] 18  BP: ()/(54-69) 90/54    Physical Exam:   General: Sleeping when I came in but slowly awakens and is able to answer some questions, very nice, no acute distress but chronically ill-appearing  Eyes: no scleral icterus  ENT: no thrush, dentures present  Cardiovascular: Normal rate  Respiratory: normal work of breathing without wheezing  GI: Abdomen is soft, not tender or distended  Skin: No rashes; many bruises  Neurological: Alert and oriented x 3  Psychiatric: Normal mood and affect   Vasc: PIV w/o erythema; scar over left chest at prior port site has healed    Labs:     Lab Results   Component Value Date    WBC 0.87 (C) 05/29/2024    HGB 9.9 (L) 05/29/2024    HCT 29.2 (L) 05/29/2024    MCV 96.7 05/29/2024      05/29/2024       Lab Results   Component Value Date    GLUCOSE 96 05/29/2024    BUN 16 05/29/2024    CREATININE 0.59 05/29/2024    EGFRIFNONA 62 02/23/2022    BCR 27.1 (H) 05/29/2024    CO2 22.0 05/29/2024    CALCIUM 7.8 (L) 05/29/2024    PROTENTOTREF 6.2 11/21/2023    ALBUMIN 2.5 (L) 05/25/2024    LABIL2 1.3 11/21/2023    AST 34 (H) 05/25/2024    ALT 32 05/25/2024     UA clear  Procalcitonin 0.10  Lactate 2.2---->0.7    Microbiology:  5/24 MRSA PCR nares: Negative  5/24 BCx: Negative to date    Radiology:  CXR personally reviewed and is negative for pneumonia      ASSESSMENT/PLAN:  Neutropenia  Diffuse large B-cell lymphoma  Hyponatremia  Generalized weakness  Poor appetite    She had a Tmax of 100 F overnight which did not quite reach the threshold for true fever.  We will continue to monitor her temperature curve.  Going through her exam and history, she does not have any new symptoms consistent with infection.  Primary team has ordered blood cultures and started empiric cefepime.  I will follow-up those results.    I agree with holding the Bactrim for now.  Her outpatient oncology team and oncology pharmacist will make decisions regarding prophylaxis.    ID will follow.

## 2024-05-29 NOTE — PROGRESS NOTES
"    DAILY PROGRESS NOTE  [facility]    Patient Identification:  Name: Michelle Car  Age: 83 y.o.  Sex: female  :  1940  MRN: [id number]         Primary Care Physician: Olayinka Pimentel MD    Subjective:  Interval History: Still feels quite weak overall.  Perhaps a little bit better than yesterday.  Still quite fatigued and that is frustrating.  Fevers noted overnight with a max of 100.  Denies any empiric bleeding.  Denies any altered mentation nausea or vomiting.  Admits to decreased appetite and p.o. intake    Objective: Elderly and quite frail.  Resting comfortably upon entering room though easily awakened and conversational.  No family present.  Case discussed in multidisciplinary rounds    Scheduled Meds:amiodarone, 200 mg, Oral, Q24H  cefepime, 2,000 mg, Intravenous, Q8H  cetirizine, 10 mg, Oral, Daily  ferrous sulfate, 325 mg, Oral, Every Other Day  Hydrocortisone (Perianal), , Rectal, Daily  midodrine, 5 mg, Oral, TID AC  mirtazapine, 15 mg, Oral, Nightly  multivitamin with minerals, 1 tablet, Oral, Daily  potassium chloride ER, 40 mEq, Oral, Q4H  sodium chloride, 10 mL, Intravenous, Q12H  sodium chloride, 1 g, Oral, BID With Meals      Continuous Infusions:     Vital signs in last 24 hours:  Temp:  [98.5 °F (36.9 °C)-100 °F (37.8 °C)] 98.5 °F (36.9 °C)  Heart Rate:  [66-78] 69  Resp:  [18] 18  BP: ()/(54-69) 90/54    Intake/Output:    Intake/Output Summary (Last 24 hours) at 2024 1034  Last data filed at 2024 0803  Gross per 24 hour   Intake 360 ml   Output 850 ml   Net -490 ml       Exam:  BP 90/54 (BP Location: Left arm, Patient Position: Lying)   Pulse 69   Temp 98.5 °F (36.9 °C) (Oral)   Resp 18   Ht 152.4 cm (60\")   Wt 54 kg (119 lb)   SpO2 95%   BMI 23.24 kg/m²     General Appearance:    Alert, cooperative, frail/sickly, AAOx3                          Head:    Normocephalic, without obvious abnormality, atraumatic                           Eyes:    " PERRL, swollen periorbital, EOM's intact, both eyes                         Throat: Dentures, palate otherwise clear; oral mucosa pink and moist                           Neck:   Supple, no rigidity or meningismus or JVD                         Lungs:    Decreased bases otherwise clear to auscultation bilaterally, respirations unlabored                 Chest Wall:    No tenderness or deformity                          Heart:    Regular rate and rhythm, S1 and S2 normal                  Abdomen:     Soft, nontender, bowel sounds active                 Extremities: Generalized weakness though moving all, no cyanosis or edema                        Pulses:   Pulses palpable in all extremities                            Skin:   Skin is warm and dry, some bruising around IV site but no confluent ecchymosis                  Neurologic:   CNII-XII intact       Data Review:  Labs in chart were reviewed.    Assessment:  Active Hospital Problems    Diagnosis  POA    **Leukopenia [D72.819]  Unknown    Severe malnutrition [E43]  Yes    Anemia [D64.9]  Unknown    Hyperkalemia [E87.5]  Unknown    Adrenal insufficiency [E27.40]  Unknown    Weight loss [R63.4]  Unknown    Chronic diastolic CHF (congestive heart failure) [I50.32]  Yes    Paroxysmal atrial fibrillation with rapid ventricular response [I48.0]  Yes    Hyponatremia [E87.1]  Yes    Diffuse large B-cell lymphoma of lymph nodes of multiple regions [C83.38]  Yes    CRI (chronic renal insufficiency), stage 2 (mild) [N18.2]  Yes      Resolved Hospital Problems   No resolved problems to display.       Plan:    Worsening leukocytopenia with a Tmax of 100   -Blood cultures ordered x 2 and empiric cefepime added back.  This was recently empirically started on 5/25/2024 and it appears the patient got about 4 doses and now having return of low-grade fever   -5/24/2024 blood culture x 1 negative.  MRSA screen negative   -Consult ID for further antibiotic guidance and  recommendations.  Patient will likely need additional prophylactic antibiotics but needs adjustment given current concerns for Bactrim making sodium worse   -ACD -Hgb 10.1-9.9 on iron p.o.   -Chemo on hold   -Chest x-ray and previous UA unremarkable as well as procalcitonin      Hyponatremia/SIADH per renal now on fluid restriction/salt p.o.   -Bactrim held   -Normal/cortisol TSH   -Defer diuretic management to renal   -Replace K per protocol -previous hyperkalemia resolved    Chronic diastolic CHF/PAF/HTN   -Hypotension resolving-off Coreg though remains on midodrine 5 mg 3 times daily-HR fine 60s-70s   -Remains on amiodarone/Eliquis    Malnutrition multifactorial with Remeron added for sleep and appetite/depression    SCDs for additional prophylaxis    Renard Dhillon MD  5/29/2024  10:34 EDT

## 2024-05-29 NOTE — PROGRESS NOTES
Subjective     CHIEF COMPLAINT:     Diffuse large B cell lymphoma  Neutropenia   Anemia  Hyponatremia    INTERVAL HISTORY:   Patient with a Tmax of 100.  Blood pressure on the low side.  Oxygen saturations well-maintained.  She reports no new complaints this morning.    REVIEW OF SYSTEMS:  Pertinent ROS as in the interval history. Otherwise, negative.    SCHEDULED MEDS:  amiodarone, 200 mg, Oral, Q24H  cetirizine, 10 mg, Oral, Daily  ferrous sulfate, 325 mg, Oral, Every Other Day  Hydrocortisone (Perianal), , Rectal, Daily  midodrine, 5 mg, Oral, TID AC  mirtazapine, 15 mg, Oral, Nightly  multivitamin with minerals, 1 tablet, Oral, Daily  potassium chloride ER, 40 mEq, Oral, Q4H  sodium chloride, 10 mL, Intravenous, Q12H  sodium chloride, 1 g, Oral, BID With Meals      Objective   VITAL SIGNS:  Temp:  [98.5 °F (36.9 °C)-100 °F (37.8 °C)] 98.5 °F (36.9 °C)  Heart Rate:  [66-78] 69  Resp:  [18] 18  BP: ()/(54-69) 90/54     PHYSICAL EXAMINATION:  GENERAL:  The patient is ill-appearing.  SKIN: No skin rash.   EYES:  No Jaundice. No Pallor.   CHEST: Normal respiratory effort. Lungs clear to auscultation.   CARDIAC:  Normal S1 & S2. Grade III systolic murmur.   ABDOMEN:  Non-distended.   EXTREMITIES:  No edema.    I have reexamined the patient and the results are consistent with the previously documented exam. Mallory Del Castillo MD      RESULT REVIEW:   Results from last 7 days   Lab Units 05/29/24  0315 05/28/24  0307 05/27/24  1812 05/27/24  0336 05/26/24  0525 05/25/24  0311   WBC 10*3/mm3 0.87* 1.05* 1.30* 1.46* 1.01* 0.86*   NEUTROS ABS 10*3/mm3 0.41* 0.57* 0.85*  0.59* 0.85* 0.61* 0.53*   LYMPHS ABS 10*3/mm3  --  0.29* 0.40*  0.36* 0.32* 0.22* 0.14*   HEMOGLOBIN g/dL 9.9* 10.1* 10.9* 10.6* 8.2* 7.8*   HEMATOCRIT % 29.2* 29.5* 31.4* 31.0* 23.9* 23.4*   PLATELETS 10*3/mm3 184 206 210 190 174 170     Results from last 7 days   Lab Units 05/29/24  0315 05/28/24  0307 05/27/24  0336 05/26/24  0525 05/25/24  0311  05/24/24  1150   SODIUM mmol/L 130* 132* 133* 132* 128* 126*   POTASSIUM mmol/L 3.2* 3.7 4.0 3.9 4.2 5.6*   CHLORIDE mmol/L 97* 99 101 99 97* 90*   CO2 mmol/L 22.0 23.0 25.0 25.0 22.2 22.4   BUN mg/dL 16 20 16 15 18 26*   CREATININE mg/dL 0.59 0.71 0.60 0.75 0.75 0.95   CALCIUM mg/dL 7.8* 8.1* 8.1* 7.9* 8.2* 8.7   ALBUMIN g/dL  --   --   --   --  2.5* 3.1*   BILIRUBIN mg/dL  --   --   --   --  0.3 0.3   ALK PHOS U/L  --   --   --   --  62 72   ALT (SGPT) U/L  --   --   --   --  32 42*   AST (SGOT) U/L  --   --   --   --  34* 51*   MAGNESIUM mg/dL  --   --   --   --  1.9  --          Lab 05/26/24  0525 05/24/24  1150   IRON  --  27*   IRON SATURATION (TSAT)  --  11*   TIBC  --  237*   TRANSFERRIN  --  159*   FERRITIN  --  1,953.00*   FOLATE 11.40  --    VITAMIN B 12 >2,000*  --       Component      Latest Ref [facility] 5/25/2024   Reticulocyte %      0.70 - 1.90 % 2.97 (H)       Component      Latest Ref Rn 5/25/2024   Haptoglobin      30 - 200 mg/dL 237 (H)      Component      Latest Ref [facility] 5/3/2024 5/24/2024 5/25/2024   LDH      135 - 214 U/L 164  245 (H)  190    Uric Acid      2.4 - 5.7 mg/dL 3.5  3.5        Assessment & Plan   *Relapsed diffuse large B-cell lymphoma.  Patient was found to have bilateral pleural effusions.    She underwent right thoracentesis on 2/21/2022 and the left thoracentesis on 2/22/2022.    Analysis of the fluid revealed involvement with diffuse large B-cell lymphoma.    FISH analysis for BCL6 rearrangement was positive but was negative for BCL-2 and MYC rearrangement.    PET scan on 3/10/2022 revealed significant hypermetabolism in the left adrenal gland and the surrounding soft tissue with SUV up to 34.5. Hypermetabolism in the left pleural thickening with SUV of 7.1. there was less hypermetabolism in the right adrenal gland and in the right pleura.  She was considered to have stage III non-bulky disease.  R-IPI score of 3 associated with poor risk - associated with overall survival of 55%.    R-CHOP with Neulasta support was started on 3/28/2022.  Due to development of neutropenic fever following cycles 1 and 2, treatment was changed to mini-RCHOP starting cycle #3 on 5/16/2022.  Patient received cycle #6 on 7/25/2022.  PET scan on 8/15/2022 revealed complete metabolic response.  Patient was found to have recurrence with development of a left mandibular mass in October/November 2023.  Biopsy of left mandible mass on 11/28/2023 showed recurrence of the lymphoma, diffuse large B cell, non-GCB phenotype.  Ki-67 was 90%.    It was positive for Bcl-6 but negative for Bcl-2 and MYC rearrangement.  PET scan on 12/11/2023 revealed multiple areas of involvement -prevascular space, left epicardial fat pad, pleural density posterior to the left fourth rib anterior aspect, lesion along the pericardium versus intramuscular with SUV of 9.8.  Hypermetabolic activity at both adrenal glands, uniformly increased in size and appeared hyperplastic.  There was a 2.7 x 1.1 cm soft tissue nodule lateral to the right erector spinae muscle posterior to the right posterior 11th rib with an SUV of 20.5 and there was a 1.3 cm nodule lateral to the posterior margin of the left psoas muscle with an SUV of 24.9.  Rituxan Polivy and Treanda with Treanda every 3 weeks for 6 cycles was started on 12/12/2023.  The jaw mass started to decrease in size after starting treatment.  CT scan on 2/8/2024 showed evidence of response to treatment.  Cycle #6 was given on 4/1/2024.  PET scan on 4/22/2024 revealed decrease in the hypermetabolism in the mediastinal and pericardial lymph nodes/lesions and in the lesions above the left kidney and adrenal gland.  The left mandibular lesion was no longer hypermetabolic.   She is at increased risk for relapse of the lymphoma.  The plan was to start treatment with Revlimid 10 mg daily.  However, the patient did not start the treatment yet.  LDH was previously elevated but improved to 190 on 5/25/2024.      *Neutropenia  5/20/2024: WBC decreased to 1750.  Neutrophils decreased to 940.  5/24/2024: WBC decreased to 1290.  Neutrophils decreased to 680.  5/25/2024: WBC decreased to 860.  Neutrophils decreased to 530.  Worsening neutropenia is also concerning for relapse of the lymphoma with involvement of the bone marrow.   5/26/2024: Neutrophil count 610.  5/27/2024-total WBC count has increased to 1.46.  Absolute neutrophil count pending   5/28/2024-WBC count lower at 1.05 with an absolute neutrophil count of 0.57  Increase in metamyelocytes as well as basophil percentage.  Flow cytometry 5/28/2024-results pending  ANC lower today at 0.41  Copper level pending, MMA level pending      *Low-grade fevers  Tmax of 100.0  Obtain blood cultures  Patient at high risk for infections given neutropenia  Started on cefepime.  ID consulted.     *Macrocytic anemia.  Patient also developed anemia secondary to lymphoma and secondary to chemotherapy.    Hgb decreased to 7.9 on 4/25/2022 and she was transfused.   Hemoglobin decreased to 6.3 on 4/27/2022. She was transfused.  Hemoglobin level improved subsequently.  Patient was placed ferrous sulfate 325 mg 3 days a week.  5/3/2024: Hemoglobin increased to 10.2.  5/25/2024: Hemoglobin decreased to 7.8.  No evidence of vitamin B12 or folate deficiency.    Ferritin 1953.  Transferrin saturation 11%.  Patient decided to hold off on PRBC transfusion.  5/26/2024: Hemoglobin 8.2.  Due to her reporting chills when she received PRBC Tx in the past, we will give Tylenol, Benadryl and Solumedrol prior to the transfusion.   526 2024-1 unit of PRBC administered.  Patient tolerated the transfusion well.  Hemoglobin remained stable at 10.1  Copper and MMA levels still pending     *Hyponatremia.  Patient had problem with hyponatremia in the past.  Sodium is 127 on 3/14/2023.  She was advised to increase salt intake.  Sodium improved to 133 on repeat lab on 3/21/2023.  However, sodium decreased to 126  on 6/7/2023.  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  She was started on sodium chloride 1 g daily.  4/1/2024: Sodium decreased to 132.  Leg swelling improved with Lasix.  5/3/2024: Sodium 130.  5/24/2024: Sodium decreased to 126.    Patient was having significant weakness.  5/25/2024: Sodium 128.  Nephrology service was consulted.  5/28/2024-sodium stable at 132     *Hyperkalemia.  5/24/2024: Potassium increased to 5.6.  Potassium low at 3.2  Replace per protocol     *Chronic anticoagulation.  Patient is on Eliquis 5 mg twice daily.  No excessive bleeding  Platelet count remains normal  Eliquis being held for possible bone marrow biopsy later in the week     *Prophylaxis.  Patient had shingles in April 2023.  She is at risk of developing shingles while on treatment.  She was placed on acyclovir 400 mg twice daily.  She was on Bactrim DS 3 days a week.  Bactrim was discontinued due to the electrolyte abnormalities.     *Low-density lesion in the uterus.    The radiologist recommended pelvic ultrasound.    The patient is asymptomatic.    PET scan on 4/22/2024 reported fluid in the lower abdomen.  Patient reported intermittent vaginal discharge.     PLAN:     1.  Neutropenia continues with elevated basophils and metamyelocytes.  We may have to proceed with a bone marrow biopsy if patient wants further evaluation of this.  Plan for bone marrow biopsy Friday if no obvious abnormalities on flow cytometry  2.Will obtain peripheral blood flow cytometry.  Results pending.  3.follow-up on copper and MMA levels  4.obtain blood cultures  Continue cefepime, ID consulted.              Mallory Del Castillo MD  05/29/24

## 2024-05-29 NOTE — SIGNIFICANT NOTE
05/29/24 1439   OTHER   Discipline physical therapist   Rehab Time/Intention   Session Not Performed patient/family declined treatment  (Pt adamantly refused PT this PM stating she was sitting the chair and that was all the therapy she was going to do today. Notified RN. PT will follow up tomorrow.)   Recommendation   PT - Next Appointment 05/30/24

## 2024-05-29 NOTE — CONSULTS
.            [facility] Palliative Care Services    Palliative Care Initial Consult   Attending Physician: Renard Dhillon MD  Referring Provider: Dr Smith     Reason for Referral: assistance with clarification of goals of care  Family/Support: children     Code Status and Medical Interventions:   Ordered at: 05/24/24 1710     Code Status (Patient has no pulse and is not breathing):    CPR (Attempt to Resuscitate)     Medical Interventions (Patient has pulse or is breathing):    Full Support     Goals of Care: To continue with current level of care and consideration of bone marrow biopsy to help guide further decision making.     HPI:   83 y.o. female with history of  diffuse large B-cell lymphoma and received her last chemotherapy (Rituxan, Polivy and Treanda ) in April 2024,  nonrheumatic severe mitral regurgitation, paroxysmal atrial fibrillation, and congestive heart failure. She resided at home prior to admission.   Patient presented to [facility] on 5/24/2024 related to worsening neutropenia and progressive decline and worsening sense of wellbeing from oncology outpatient office. Consults were initially placed for oncology, nephrology (hyponatremia and hyperkalemia). Her hospital course has been complicated with malnutrition, progressive leukopenia, neutropenia, and low grade fever. ID saw her this morning. She has been empirically started on Cefepime and blood cultures obtained. Will assess findings. She has has pending blood work-flow cytometry and consideration has been given to possibly obtaining bone marrow biopsy on Friday to assess lymphoma. She has been evaluated by cardiology as well due to general weakness and chronic heart conditions. They didn't feel cardiac was the cause of reported symptoms. The palliative care team was consulted for support with goals of care conversation due to acute and chronic conditions.   Palliative Care Spoke With: patient, family, and  HCS  Quality of life: poor  Functional Status: Today, pt was Regan for bed mobility, Regan for STS to RW and CGA for ambulation of 25' with a RW. While seated EOB prior to mobility pt performed LE ther-ex for 10 reps. Pt politely declined further gait bouts or distance due to fatigue and FOF due to increased LE weakness. Encouraged pt to sit in the chair for all meals for at least 1-2 hrs w/ nsg assist and ambulate to the bathroom w/ nsg as opposed to using the PW. Pt reports wanting to keep the PW on due to urgency/frequency and fear she will not be able to hold it in time for nsg to arrive; also discussed BSC. FRANK w/ RN following session per PT notes on 5/28/2024  Due to the Palliative Care Topics Discussed: palliative care, goals of care, care options, resuscitation status, and discharge options we will establish an advance care plan.   Advance Care Planning   Advance Care Planning Discussion: see below          Review of Systems   Constitutional: Positive for decreased appetite and malaise/fatigue.   Cardiovascular:  Negative for chest pain.   Respiratory:  Negative for shortness of breath.    Gastrointestinal:  Negative for abdominal pain and nausea.   Neurological:  Positive for weakness.   Psychiatric/Behavioral:  Positive for depression. The patient is not nervous/anxious.        1- Pain Assessment  Nonverbal Indicators of Pain: nonverbal indicators absent  Pain Location: neck  Pain Description: aching    Past Medical History:   Diagnosis Date    Abdominal aortic atherosclerosis     Adrenal nodule 03/2022    Allergic rhinitis     Anemia due to chemotherapy     Bacteremia due to Escherichia coli 04/26/2022    ADMITTED TO [facility]    Bug bite 10/24/2015    WITH CELLULITIS, LEFT LEG    CAD (coronary artery disease)     Calculus of bile duct without cholangitis or cholecystitis 09/2022    Chemotherapy induced neutropenia     Chronic anticoagulation     Chronic diastolic (congestive) heart failure     Colon polyps      CRI (chronic renal insufficiency), stage 2 (mild) 2021    Diffuse large B cell lymphoma 02/2022    MULTIPLE LYMPH NODE INVOLVEMENT, FOLLOWED BY DR. JEN PATTERSON    Drug induced constipation     Hearing loss     Hemorrhoids     History of blood transfusion 04/2022    History of chemotherapy 2022    FOLLOWED BY DR. JEN PATTERSON    Hypercalcemia 2016    resolved as of 2019    Hypercholesterolemia     Hyperkalemia 03/2022    Hypertension     Hyponatremia 02/17/2022    Leg swelling 10/2021    Mixed hyperlipidemia     Neutropenic fever 05/2022    Nonrheumatic mitral valve regurgitation 03/2022    PAF (paroxysmal atrial fibrillation)     FOLLOWED BY DR. YOSI LANGLEY    Pancytopenia     Pleural effusion, bilateral 06/2022    Pulmonary nodule     Seasonal allergies     Thrombocytopenia 08/2022    Venous insufficiency     Vitamin D deficiency      Past Surgical History:   Procedure Laterality Date    CHOLECYSTECTOMY N/A 10/12/2011    LAPAROSCOPIC, DR. CHANCE KLINE AT [facility]    COLONOSCOPY N/A 2000    1 or 2 polyps, otherwise normal per patient    COLONOSCOPY W/ POLYPECTOMY N/A 01/24/2012    3 MM TUBULAR ADENOMA POLYP IN CECUM, DIVERTICULOSIS IN RECTO-SIGMOID, RESCOPE IN 3 YRS, DR. CHANCE KLINE AT [facility]    CYST REMOVAL Left 10/12/2011    LEFT SCALP, PATH: BENIGN ADNEXAL NEOPLASM FAVORING ECCRINE SPIRADENOMA, DR. CHANCE KLINE AT [facility]    ERCP N/A 10/21/2022    Procedure: ENDOSCOPIC RETROGRADE CHOLANGIOPANCREATOGRAPHY with sphincterotomy and balloon sweep;  Surgeon: Peyman Ortega MD;  Location: [facility] ENDOSCOPY;  Service: Gastroenterology;  Laterality: N/A;  PRE: Common Duct Stones  POST: Common Duct Stones    VENOUS ACCESS DEVICE (PORT) INSERTION Left 03/23/2022    Procedure: INSERTION VENOUS ACCESS DEVICE;  Surgeon: Alin Delgado MD;  Location: [facility] OR [facility];  Service: General;  Laterality: Left;     Social History     Socioeconomic History    Marital status:    Tobacco Use    Smoking status: Never    Smokeless  tobacco: Never   Vaping Use    Vaping status: Never Used   Substance and Sexual Activity    Alcohol use: No    Drug use: No    Sexual activity: Not Currently     Birth control/protection: Post-menopausal       Current Facility-Administered Medications   Medication Dose Route Frequency Provider Last Rate Last Admin    acetaminophen (TYLENOL) tablet 650 mg  650 mg Oral Q4H PRN Abel Pelaez MD   650 mg at 05/28/24 1426    Or    acetaminophen (TYLENOL) 160 MG/5ML oral solution 650 mg  650 mg Oral Q4H PRN Abel Pelaez MD        Or    acetaminophen (TYLENOL) suppository 650 mg  650 mg Rectal Q4H PRN Abel Pelaez MD        albuterol (PROVENTIL) nebulizer solution 0.083% 2.5 mg/3mL  2.5 mg Nebulization Q4H PRN Abel Pelaez MD        amiodarone (PACERONE) tablet 200 mg  200 mg Oral Q24H Abel Pelaez MD   200 mg at 05/29/24 0803    sennosides-docusate (PERICOLACE) 8.6-50 MG per tablet 2 tablet  2 tablet Oral BID PRN Abel Pelaez MD        And    polyethylene glycol (MIRALAX) packet 17 g  17 g Oral Daily PRN Abel Pelaez MD        And    bisacodyl (DULCOLAX) EC tablet 5 mg  5 mg Oral Daily PRN Abel Pelaez MD        And    bisacodyl (DULCOLAX) suppository 10 mg  10 mg Rectal Daily PRN Abel Pelaez MD        cefepime 2000 mg IVPB in 100 mL NS (MBP)  2,000 mg Intravenous Q8H Renard Dhillon MD   2,000 mg at 05/29/24 1138    cetirizine (zyrTEC) tablet 10 mg  10 mg Oral Daily Abel Pelaez MD   10 mg at 05/29/24 0803    ferrous sulfate tablet 325 mg  325 mg Oral Every Other Day Abel Pelaez MD   325 mg at 05/28/24 0804    Hydrocortisone (Perianal) (ANUSOL-HC) 2.5 % rectal cream   Rectal Daily Abel Pelaez MD   Given at 05/29/24 0804    midodrine (PROAMATINE) tablet 5 mg  5 mg Oral TID AC Saulo Smith MD   5 mg at 05/29/24 1138    mirtazapine (REMERON) tablet 15 mg  15 mg Oral Nightly Saulo Smith MD   15 mg at 05/28/24 2016    multivitamin with minerals 1 tablet  1 tablet Oral Daily Abel Pelaez MD   1  "tablet at 05/29/24 0803    nitroglycerin (NITROSTAT) SL tablet 0.4 mg  0.4 mg Sublingual Q5 Min PRN Abel Pelaez MD        ondansetron (ZOFRAN) injection 4 mg  4 mg Intravenous Q6H PRN Abel Pleaez MD        Potassium Replacement - Follow Nurse / BPA Driven Protocol   Does not apply PRN Morales Farrell MD        sodium chloride 0.9 % flush 10 mL  10 mL Intravenous Q12H Abel Pelaez MD   10 mL at 05/29/24 0804    sodium chloride 0.9 % flush 10 mL  10 mL Intravenous PRN Abel Pelaez MD        sodium chloride 0.9 % infusion 40 mL  40 mL Intravenous PRN Abel Pelaez MD        sodium chloride tablet 1 g  1 g Oral BID With Meals Aarti Reynolds MD              acetaminophen **OR** acetaminophen **OR** acetaminophen    albuterol    senna-docusate sodium **AND** polyethylene glycol **AND** bisacodyl **AND** bisacodyl    nitroglycerin    ondansetron    Potassium Replacement - Follow Nurse / BPA Driven Protocol    sodium chloride    sodium chloride    Allergies   Allergen Reactions    Factive [Gemifloxacin] Rash     Attest that current medications reviewed  including but not limited to prescriptions, over-the counter, herbals and vitamin/mineral/dietary (nutritional) supplements for name, route of administration, type, dose and frequency and are current using all immediate resources available at time of dictation.      Intake/Output Summary (Last 24 hours) at 5/29/2024 1328  Last data filed at 5/29/2024 1230  Gross per 24 hour   Intake 360 ml   Output 850 ml   Net -490 ml       Physical Exam:    Diagnostics: Reviewed  BP 93/54   Pulse 66   Temp 98.5 °F (36.9 °C) (Oral)   Resp 18   Ht 152.4 cm (60\")   Wt 54 kg (119 lb)   SpO2 95%   BMI 23.24 kg/m²     Constitutional:       Appearance: Not in distress. Frail. Chronically ill-appearing.   Pulmonary:      Effort: Pulmonary effort is normal.   Cardiovascular:      PMI at left midclavicular line.   Edema:     Peripheral edema absent.   Abdominal:      Palpations: " Abdomen is soft.   Skin:     Coloration: Skin is pale.   Genitourinary:     Comments: Purewick with lidia urine   Neurological:      Mental Status: Alert and oriented to person, place, and time.   Psychiatric:         Mood and Affect: Mood and affect normal.         Speech: Speech normal.         Behavior: Behavior is cooperative.         Thought Content: Thought content normal.         Cognition and Memory: Cognition normal.         Judgment: Judgment normal.       Patient status: Disease state: Controlled with current treatments.  Functional status: Palliative Performance Scale Score: Performance 50% based on the following measures: Ambulation: Mainly sit or lie down, Activity and Evidence of Disease: Unable to do any work, extensive evidence of disease, Self-Care: Considerable assistance required,  Intake: Normal or reduced, LOC: Full or confusion   ECOG Status(2) Ambulatory and capable of self care, unable to carry out work activity, up and about > 50% or waking hours.  Nutritional status: Albumin 2.5 on 5/25/2024 Body mass index is 23.24 kg/m².  Family support: The patient receives support from her children..  Advance Directives: Advance Directive Status: Patient has advance directive, copy in chart   POA/Healthcare surrogate-sonHelder.       Impression/Problem List:    Leukopenia   Diffuse large b cell lymphoma   Chronic diastolic heart failure   Paroxysmal atrial fibrillation   Hyponatremia     Malnutrition           Recommendations/Plan:  1. Provide support with goals of care  2. DNR/ DNI, no cardioversion, no artifical nutrition      2.  Palliative care encounter  I spoke with patient at bedside regarding goals of care. She has a very good understanding of her medical conditions, which we reviewed in detail. She has the support of three children. She resided alone prior to admission. At this time, she recognizes and/or feels she is in a bad situation and uncertain if she will bounce back from everything.  "She relies heavily on Dr Galo and his recommendations. She gomez tell me if she is informed her cancer is worst she will not resume treatment and will just have to \"throw in the towel.\" She would only consider treatment if it would give her more years of life that are enjoyable and good quality. She does have a living will on file that designates her son, Helder. We reviewed her wishes regarding CPR and medical interventions and she wishes for the following:  DNR/ DNI, no cardioversion, no artifical nutrition. She tells me her children know her wishes and I asked to contact Helder to confirm and she was agreeable. At this time, her goal is to continue with current level of treatment and consider bone marrow biopsy to determine disease progression to determine what is next for her. She would like to discuss with Clover as she trust him tremendously and he has been taking care of her the last 2-3 years. She is [place] and attends [employer] in [place]. She does acknowledge general fatigue, weakness, and poor appetite. No pain or shortness of breath. I did provide her with information regarding palliative care and will plan to discuss further. Of note, I did attempt to discuss discharge options (home, rehab etc) and she tells me she is ready to discuss that just yet. I called her son/HCS, Helder at 1427 to discuss goals of care. I provided him with an update of my conversation I had with his mother. We discussed her wishes to be DNR/DNI, NO cardioversion and NO artifical nutrition and he is agreeable and supports his mother decision. I will update her chart to reflect their wishes.  He feels she is very knowledgeable of her situation and he will support her. I did give him information regarding palliative care. I will plan to support the patient and family for the next couple days. I spoke with ALLAN Gatica.       Thank you for this consult and allowing us to participate in patient's plan of care. " Palliative Care Team will continue to follow patient.     Time spent:75 minutes spent reviewing medical and medication records, assessing and examining patient, discussing with family, answering questions, providing some guidance about a plan and documentation of care, and coordinating care with other healthcare members, with > 50% time spent face to face.   30 minutes spent on advance care planning.    Heather Martell, APRN  5/29/2024  13:28 EDT

## 2024-05-29 NOTE — PROGRESS NOTES
Nephrology Follow Up Note    Patient Identification:  Name: Michelle Car MRN: [id number]  Age: 83 y.o. : 1940  Sex: female  Date:2024    Requesting Physician: As per consult order.  Reason for Consultation: hyponatremia   Information from:patient/ family/ chart      History of Present Illness: This is a 83 y.o. year old female  with lymphoma admitted for weakness, ,hyponatremia and edema.   She completed chemo in 2024.  She has had ongoing issues with hyponatremia.  She was on salt tabs at home Upon arrival her Na was 126 with K at 5.6.   She was given IV fluids of NS as bp was low upon arrival.       feels better, Na now at 132, bp near goal      : Complaining of weakness.  Wants to look into nursing/rehab placement  Otherwise no new complaints     no acute events. She is feeling ok today     no acute events. Currently sleeping.     The following medical history and medications personally reviewed by me:        Current Meds:   Current Facility-Administered Medications   Medication Dose Route Frequency Provider Last Rate Last Admin    acetaminophen (TYLENOL) tablet 650 mg  650 mg Oral Q4H PRN Abel Pelaez MD   650 mg at 24 1426    Or    acetaminophen (TYLENOL) 160 MG/5ML oral solution 650 mg  650 mg Oral Q4H PRN Abel Pelaez MD        Or    acetaminophen (TYLENOL) suppository 650 mg  650 mg Rectal Q4H PRN Abel Pelaze MD        albuterol (PROVENTIL) nebulizer solution 0.083% 2.5 mg/3mL  2.5 mg Nebulization Q4H PRN Abel Pelaez MD        amiodarone (PACERONE) tablet 200 mg  200 mg Oral Q24H Abel Pelaez MD   200 mg at 24 0804    sennosides-docusate (PERICOLACE) 8.6-50 MG per tablet 2 tablet  2 tablet Oral BID PRN Abel Pelaez MD        And    polyethylene glycol (MIRALAX) packet 17 g  17 g Oral Daily PRN Abel Pelaez MD        And    bisacodyl (DULCOLAX) EC tablet 5 mg  5 mg Oral Daily PRN Abel Pelaez MD        And    bisacodyl (DULCOLAX) suppository 10  "mg  10 mg Rectal Daily PRN Abel Pelaez MD        cetirizine (zyrTEC) tablet 10 mg  10 mg Oral Daily Abel Pelaez MD   10 mg at 24 0804    ferrous sulfate tablet 325 mg  325 mg Oral Every Other Day Abel Pelaez MD   325 mg at 24 0804    Hydrocortisone (Perianal) (ANUSOL-HC) 2.5 % rectal cream   Rectal Daily Abel Pelaez MD   Given at 24 0807    midodrine (PROAMATINE) tablet 5 mg  5 mg Oral TID AC Saulo Smith MD   5 mg at 24 174    mirtazapine (REMERON) tablet 15 mg  15 mg Oral Nightly Saulo Smith MD   15 mg at 24    multivitamin with minerals 1 tablet  1 tablet Oral Daily Abel Pelaez MD   1 tablet at 24 08    nitroglycerin (NITROSTAT) SL tablet 0.4 mg  0.4 mg Sublingual Q5 Min PRN Abel Pelaez MD        ondansetron (ZOFRAN) injection 4 mg  4 mg Intravenous Q6H PRN Abel Pelaez MD        potassium chloride (K-DUR,KLOR-CON) ER tablet 40 mEq  40 mEq Oral Q4H Morales Farrell MD        Potassium Replacement - Follow Nurse / BPA Driven Protocol   Does not apply PRN Morales Farrell MD        sodium chloride 0.9 % flush 10 mL  10 mL Intravenous Q12H Abel Pelaez MD   10 mL at 24    sodium chloride 0.9 % flush 10 mL  10 mL Intravenous PRN Abel Pelaez MD        sodium chloride 0.9 % infusion 40 mL  40 mL Intravenous PRN Abel Pelaez MD        sodium chloride tablet 1 g  1 g Oral Daily Abel Pelaez MD   1 g at 24 0804         Physical Exam:  Vitals:   Temp (24hrs), Av.3 °F (37.4 °C), Min:99 °F (37.2 °C), Max:100 °F (37.8 °C)    BP 90/54 (BP Location: Left arm, Patient Position: Lying)   Pulse 66   Temp 99.3 °F (37.4 °C) (Oral)   Resp 18   Ht 152.4 cm (60\")   Wt 54 kg (119 lb)   SpO2 96%   BMI 23.24 kg/m²   Intake/Output:     Intake/Output Summary (Last 24 hours) at 2024 0801  Last data filed at 2024 0500  Gross per 24 hour   Intake 480 ml   Output 850 ml   Net -370 ml        Wt Readings from Last 1 Encounters: "   05/27/24 1433 54 kg (119 lb)   05/24/24 1849 54.2 kg (119 lb 7.8 oz)   05/24/24 1701 51.7 kg (114 lb)       Exam:  Sleeping comfortably      No jvd reg s1s2 no murmur   Clear bilaterally no rales/rhonchi/wheeze   Soft NTND + bs     No edema   Warm dry no rash           DATA:  Radiology and Labs:  The following labs and radiology results independently reviewed by me    Labs:   Recent Results (from the past 24 hour(s))   ECG 12 Lead QT Measurement    Collection Time: 05/28/24 12:44 PM   Result Value Ref Range    QT Interval 484 ms    QTC Interval 545 ms   Basic Metabolic Panel    Collection Time: 05/29/24  3:15 AM    Specimen: Blood   Result Value Ref Range    Glucose 96 65 - 99 mg/dL    BUN 16 8 - 23 mg/dL    Creatinine 0.59 0.57 - 1.00 mg/dL    Sodium 130 (L) 136 - 145 mmol/L    Potassium 3.2 (L) 3.5 - 5.2 mmol/L    Chloride 97 (L) 98 - 107 mmol/L    CO2 22.0 22.0 - 29.0 mmol/L    Calcium 7.8 (L) 8.6 - 10.5 mg/dL    BUN/Creatinine Ratio 27.1 (H) 7.0 - 25.0    Anion Gap 11.0 5.0 - 15.0 mmol/L    eGFR 90.1 >60.0 mL/min/1.73   CBC Auto Differential    Collection Time: 05/29/24  3:15 AM    Specimen: Blood   Result Value Ref Range    WBC 0.87 (C) 3.40 - 10.80 10*3/mm3    RBC 3.02 (L) 3.77 - 5.28 10*6/mm3    Hemoglobin 9.9 (L) 12.0 - 15.9 g/dL    Hematocrit 29.2 (L) 34.0 - 46.6 %    MCV 96.7 79.0 - 97.0 fL    MCH 32.8 26.6 - 33.0 pg    MCHC 33.9 31.5 - 35.7 g/dL    RDW 14.2 12.3 - 15.4 %    RDW-SD 49.9 37.0 - 54.0 fl    MPV 9.5 6.0 - 12.0 fL    Platelets 184 140 - 450 10*3/mm3    Neutrophil % 47.2 42.7 - 76.0 %    Lymphocyte % 37.9 19.6 - 45.3 %    Monocyte % 11.5 5.0 - 12.0 %    Eosinophil % 0.0 (L) 0.3 - 6.2 %    Basophil % 2.3 (H) 0.0 - 1.5 %    Immature Grans % 1.1 (H) 0.0 - 0.5 %    Neutrophils, Absolute 0.41 (L) 1.70 - 7.00 10*3/mm3    Lymphocytes, Absolute 0.33 (L) 0.70 - 3.10 10*3/mm3    Monocytes, Absolute 0.10 0.10 - 0.90 10*3/mm3    Eosinophils, Absolute 0.00 0.00 - 0.40 10*3/mm3    Basophils, Absolute 0.02  0.00 - 0.20 10*3/mm3    Immature Grans, Absolute 0.01 0.00 - 0.05 10*3/mm3       Radiology:  Imaging Results (Last 24 Hours)       ** No results found for the last 24 hours. **             ASSESSMENT:   Lymphoma   Hypotension   Hyponatremia   Hyperkalemia   Chronic diastolic heart failure   Adrenal insufficiency - per chart, but doesn't follow with endo   Hypoalbuminemia   Edema   Parox afib       PLAN:   Na trending back down to 130.   She does not seem to be drinking much, so not sure how much we can fluid restrict. But will place 1 liter/24 hour restriction and see how she does.  Increase NaCl for now. Would give lasix, but concerned about her BP on the low side.   Cortisol and TSH are WNL    regular diet   Replace K per protocol   Off IV fluids   B-blocker as bp allows     Monitor electrolytes and volume closely   Dose all medications for GFR >60   Please call with any questions or concerns.     Aarti Reynolds MD  [facility]  Office phone number: [phone]  Answering service phone number: [phone]

## 2024-05-29 NOTE — PROGRESS NOTES
Patient's son has been updated on the process for [employer] financial assistance program for the [employer].  I have sent Blane at [employer] an email stating that patient's son is the person to contact regarding this patient.      Carol Gill  Specialty Pharmacy Technician

## 2024-05-29 NOTE — PLAN OF CARE
Goal Outcome Evaluation:  Plan of Care Reviewed With: patient        Progress: no change  Outcome Evaluation: Vitals stable. Palliative met w pt, discussed code status. IV abx started. K replaced. Pt needs met and questions answered. Will continue to monitor.                                Pt has been ill for the past 3 weeks. He's been having a cough and runny nose. Pt's dad noticed some eye drainage as well.  He's been having low grade fevers. He had a temp of 102 this morning. He is appetite has decreased, but he has been tolerating the bottle. Denies N/V. He did have some diarrhea yesterday. His last dose of tylenol was at 11:20 today

## 2024-05-29 NOTE — PROGRESS NOTES
"[facility]    Patient Name: Michelle Car  :1940  83 y.o.  LOS: 5  Encounter Provider: MAGGI Edouard      Patient Care Team:  Olayinka Pimentel MD as PCP - General (Internal Medicine)  Renate Galo MD as Consulting Physician (Hematology and Oncology)  Nasrin Nichole MD as Consulting Physician (Cardiology)  Alin Delgado MD as Consulting Physician (General Surgery)  Dave Ramsey MD as Referring Physician (Cardiology)    Chief Complaint: Generalized weakness    Interval History: Reports generalized weakness.       Objective   Vital Signs  Temp:  [98.5 °F (36.9 °C)-100 °F (37.8 °C)] 98.5 °F (36.9 °C)  Heart Rate:  [66-78] 66  Resp:  [18] 18  BP: ()/(54-62) 93/54    Intake/Output Summary (Last 24 hours) at 2024 1154  Last data filed at 2024 0803  Gross per 24 hour   Intake 360 ml   Output 850 ml   Net -490 ml     Flowsheet Rows      Flowsheet Row First Filed Value   Admission Height 152.4 cm (60\") Documented at 2024 1701   Admission Weight 51.7 kg (114 lb) Documented at 2024 1701              Vitals and nursing note reviewed.   Constitutional:       Appearance: Normal appearance. Well-developed.   Eyes:      Conjunctiva/sclera: Conjunctivae normal.   Neck:      Vascular: No carotid bruit.   Pulmonary:      Breath sounds: Normal breath sounds.   Cardiovascular:      Normal rate. Regular rhythm. Normal S1 with normal intensity. Normal S2 with normal intensity.       Murmurs: There is no murmur.      No gallop.  No click. No rub.   Edema:     Peripheral edema absent.   Musculoskeletal: Normal range of motion. Skin:     General: Skin is warm and dry.   Neurological:      Mental Status: Alert and oriented to person, place, and time.      GCS: GCS eye subscore is 4. GCS verbal subscore is 5. GCS motor subscore is 6.   Psychiatric:         Speech: Speech normal.         Behavior: Behavior normal.         Thought Content: Thought content " "normal.         Judgment: Judgment normal.           Pertinent Test Results:  Results from last 7 days   Lab Units 05/29/24 0315 05/28/24  0307 05/27/24  0336 05/26/24 0525 05/25/24 0311 05/24/24  1150   SODIUM mmol/L 130* 132* 133* 132* 128* 126*   POTASSIUM mmol/L 3.2* 3.7 4.0 3.9 4.2 5.6*   CHLORIDE mmol/L 97* 99 101 99 97* 90*   CO2 mmol/L 22.0 23.0 25.0 25.0 22.2 22.4   BUN mg/dL 16 20 16 15 18 26*   CREATININE mg/dL 0.59 0.71 0.60 0.75 0.75 0.95   GLUCOSE mg/dL 96 95 130* 99 90 103*   CALCIUM mg/dL 7.8* 8.1* 8.1* 7.9* 8.2* 8.7   AST (SGOT) U/L  --   --   --   --  34* 51*   ALT (SGPT) U/L  --   --   --   --  32 42*     Results from last 7 days   Lab Units 05/25/24  1018   CK TOTAL U/L 16*     Results from last 7 days   Lab Units 05/29/24 0315 05/28/24 0307 05/27/24  1812 05/27/24 0336 05/26/24 0525 05/25/24 0311 05/24/24  1150   WBC 10*3/mm3 0.87* 1.05* 1.30* 1.46* 1.01* 0.86* 1.29*   HEMOGLOBIN g/dL 9.9* 10.1* 10.9* 10.6* 8.2* 7.8* 9.3*   HEMATOCRIT % 29.2* 29.5* 31.4* 31.0* 23.9* 23.4* 27.9*   PLATELETS 10*3/mm3 184 206 210 190 174 170 240         Results from last 7 days   Lab Units 05/25/24  0311   MAGNESIUM mg/dL 1.9           Invalid input(s): \"LDLCALC\"      Results from last 7 days   Lab Units 05/25/24  1018   TSH uIU/mL 3.220           Medication Review:   amiodarone, 200 mg, Oral, Q24H  cefepime, 2,000 mg, Intravenous, Q8H  cetirizine, 10 mg, Oral, Daily  ferrous sulfate, 325 mg, Oral, Every Other Day  Hydrocortisone (Perianal), , Rectal, Daily  midodrine, 5 mg, Oral, TID AC  mirtazapine, 15 mg, Oral, Nightly  multivitamin with minerals, 1 tablet, Oral, Daily  sodium chloride, 10 mL, Intravenous, Q12H  sodium chloride, 1 g, Oral, BID With Meals              Assessment & Plan     Active Hospital Problems    Diagnosis  POA    **Leukopenia [D72.819]  Unknown    Severe malnutrition [E43]  Yes    Anemia [D64.9]  Unknown    Hyperkalemia [E87.5]  Unknown    Adrenal insufficiency [E27.40]  Unknown    " Weight loss [R63.4]  Unknown    Chronic diastolic CHF (congestive heart failure) [I50.32]  Yes    Paroxysmal atrial fibrillation with rapid ventricular response [I48.0]  Yes    Hyponatremia [E87.1]  Yes    Diffuse large B-cell lymphoma of lymph nodes of multiple regions [C83.38]  Yes    CRI (chronic renal insufficiency), stage 2 (mild) [N18.2]  Yes      Resolved Hospital Problems   No resolved problems to display.        Generalized weakness  Likely multifactorial  Noted protein calorie malnutrition  Recurrence of lymphoma  Recurrent diffuse large B-cell lymphoma  Appreciate oncology management  Severe mitral valve regurgitation  Repeat echocardiogram this admission reveals eccentric jet with large proximal convergence consistent with severe mitral valve regurgitation.  No mitral valve stenosis.  Paroxysmal atrial fibrillation  Known history of decompensation with tachycardia in the past  Previous concern for balance issues associated with amiodarone and chemotherapy but given demonstration of severe mitral valve regurgitation was decided to continue amiodarone.    QTc 484 on ECG performed yesterday  Anticoagulated with apixaban.  HFpEF, chronic  No signs of volume overload  Echocardiogram reveals LVEF 66-70%.  Strain images added yesterday GLS -23.4%.  Leukopenia and anemia  Appreciate oncology management-chemotherapy currently on hold  ID now following  Patient has received blood transfusions during this admission.    No changes from cardiovascular standpoint.  Palliative consult has been complete but notes and plan are pending.  Patient was scheduled to see Dr. Nichole yesterday however she has not evaluated as she is hospitalized.  I will ensure that patient has appropriate follow-up with our clinic.         MAGGI Edouard  [facility]   [facility]  [address] [place] [address]  Office: [phone]    05/29/24  11:54 EDT

## 2024-05-29 NOTE — CASE MANAGEMENT/SOCIAL WORK
"Continued Stay Note  [facility]     Patient Name: Michelle Car  MRN: [id number]  Today's Date: 5/29/2024    Admit Date: 5/24/2024    Plan: Referral to [facility] [place] pending. Will need pre cert   Discharge Plan       Row Name 05/29/24 0957       Plan    Plan Referral to [facility] [place] pending. Will need pre cert    Patient/Family in Agreement with Plan yes    Plan Comments Met with pt at bedside to discuss SNF choices. Pt states if she goes to SNF then she wants [facility]. Referral placed in [facility] and pending. Pt states, \"if her cancer is worse then she wants to use her Living Will and stop everything.\"  Ptr states she is waiting to have some testing done and once she finds out results she will decide whta she wants to do. Discussed that CCP would follow up after testing. Vipin Cleveland RN-BC                   Discharge Codes    No documentation.                 Expected Discharge Date and Time       Expected Discharge Date Expected Discharge Time    May 29, 2024               Vipin Cleveland RN    "

## 2024-05-29 NOTE — PLAN OF CARE
Goal Outcome Evaluation:  Plan of Care Reviewed With: patient        Progress: improving  Outcome Evaluation: +BM, incontinent care done, breif changed. VSS, no c/o pain. Call light within reach. All needs met.

## 2024-05-30 ENCOUNTER — SPECIALTY PHARMACY (OUTPATIENT)
Dept: PHARMACY | Facility: HOSPITAL | Age: 84
End: 2024-05-30
Payer: MEDICARE

## 2024-05-30 ENCOUNTER — APPOINTMENT (OUTPATIENT)
Dept: CT IMAGING | Facility: HOSPITAL | Age: 84
DRG: 808 | End: 2024-05-30
Payer: MEDICARE

## 2024-05-30 LAB
ANION GAP SERPL CALCULATED.3IONS-SCNC: 7 MMOL/L (ref 5–15)
BUN SERPL-MCNC: 15 MG/DL (ref 8–23)
BUN/CREAT SERPL: 29.4 (ref 7–25)
CALCIUM SPEC-SCNC: 8.2 MG/DL (ref 8.6–10.5)
CHLORIDE SERPL-SCNC: 104 MMOL/L (ref 98–107)
CO2 SERPL-SCNC: 22 MMOL/L (ref 22–29)
COPPER SERPL-MCNC: 145 UG/DL (ref 80–158)
CREAT SERPL-MCNC: 0.51 MG/DL (ref 0.57–1)
DEPRECATED RDW RBC AUTO: 48.3 FL (ref 37–54)
EGFRCR SERPLBLD CKD-EPI 2021: 93.3 ML/MIN/1.73
ERYTHROCYTE [DISTWIDTH] IN BLOOD BY AUTOMATED COUNT: 13.7 % (ref 12.3–15.4)
GLUCOSE SERPL-MCNC: 93 MG/DL (ref 65–99)
HCT VFR BLD AUTO: 30 % (ref 34–46.6)
HGB BLD-MCNC: 10.2 G/DL (ref 12–15.9)
MCH RBC QN AUTO: 32.8 PG (ref 26.6–33)
MCHC RBC AUTO-ENTMCNC: 34 G/DL (ref 31.5–35.7)
MCV RBC AUTO: 96.5 FL (ref 79–97)
METHYLMALONATE SERPL-SCNC: 245 NMOL/L (ref 0–378)
PLATELET # BLD AUTO: 161 10*3/MM3 (ref 140–450)
PMV BLD AUTO: 8.9 FL (ref 6–12)
POTASSIUM SERPL-SCNC: 4.2 MMOL/L (ref 3.5–5.2)
RBC # BLD AUTO: 3.11 10*6/MM3 (ref 3.77–5.28)
REF LAB TEST METHOD: NORMAL
SODIUM SERPL-SCNC: 133 MMOL/L (ref 136–145)
WBC NRBC COR # BLD AUTO: 0.83 10*3/MM3 (ref 3.4–10.8)

## 2024-05-30 PROCEDURE — 77012 CT SCAN FOR NEEDLE BIOPSY: CPT

## 2024-05-30 PROCEDURE — 25010000002 CEFEPIME PER 500 MG: Performed by: HOSPITALIST

## 2024-05-30 PROCEDURE — 88185 FLOWCYTOMETRY/TC ADD-ON: CPT

## 2024-05-30 PROCEDURE — 25010000002 LIDOCAINE 1 % SOLUTION: Performed by: HOSPITALIST

## 2024-05-30 PROCEDURE — 80048 BASIC METABOLIC PNL TOTAL CA: CPT | Performed by: HOSPITALIST

## 2024-05-30 PROCEDURE — 88264 CHROMOSOME ANALYSIS 20-25: CPT

## 2024-05-30 PROCEDURE — 97530 THERAPEUTIC ACTIVITIES: CPT

## 2024-05-30 PROCEDURE — 88313 SPECIAL STAINS GROUP 2: CPT | Performed by: INTERNAL MEDICINE

## 2024-05-30 PROCEDURE — 88305 TISSUE EXAM BY PATHOLOGIST: CPT | Performed by: INTERNAL MEDICINE

## 2024-05-30 PROCEDURE — 07DR3ZX EXTRACTION OF ILIAC BONE MARROW, PERCUTANEOUS APPROACH, DIAGNOSTIC: ICD-10-PCS | Performed by: RADIOLOGY

## 2024-05-30 PROCEDURE — 88237 TISSUE CULTURE BONE MARROW: CPT

## 2024-05-30 PROCEDURE — 079T3ZX DRAINAGE OF BONE MARROW, PERCUTANEOUS APPROACH, DIAGNOSTIC: ICD-10-PCS | Performed by: RADIOLOGY

## 2024-05-30 PROCEDURE — C1830 POWER BONE MARROW BX NEEDLE: HCPCS

## 2024-05-30 PROCEDURE — 88341 IMHCHEM/IMCYTCHM EA ADD ANTB: CPT | Performed by: INTERNAL MEDICINE

## 2024-05-30 PROCEDURE — 25010000002 FENTANYL CITRATE (PF) 50 MCG/ML SOLUTION: Performed by: RADIOLOGY

## 2024-05-30 PROCEDURE — 88311 DECALCIFY TISSUE: CPT | Performed by: INTERNAL MEDICINE

## 2024-05-30 PROCEDURE — 25010000002 MIDAZOLAM PER 1 MG: Performed by: RADIOLOGY

## 2024-05-30 PROCEDURE — 85025 COMPLETE CBC W/AUTO DIFF WBC: CPT | Performed by: HOSPITALIST

## 2024-05-30 PROCEDURE — 88342 IMHCHEM/IMCYTCHM 1ST ANTB: CPT | Performed by: INTERNAL MEDICINE

## 2024-05-30 PROCEDURE — 88182 CELL MARKER STUDY: CPT

## 2024-05-30 PROCEDURE — 88184 FLOWCYTOMETRY/ TC 1 MARKER: CPT

## 2024-05-30 PROCEDURE — 99232 SBSQ HOSP IP/OBS MODERATE 35: CPT | Performed by: NURSE PRACTITIONER

## 2024-05-30 PROCEDURE — 99232 SBSQ HOSP IP/OBS MODERATE 35: CPT | Performed by: INTERNAL MEDICINE

## 2024-05-30 RX ORDER — MIDAZOLAM HYDROCHLORIDE 1 MG/ML
INJECTION INTRAMUSCULAR; INTRAVENOUS AS NEEDED
Status: COMPLETED | OUTPATIENT
Start: 2024-05-30 | End: 2024-05-30

## 2024-05-30 RX ORDER — LIDOCAINE HYDROCHLORIDE 10 MG/ML
20 INJECTION, SOLUTION INFILTRATION; PERINEURAL ONCE
Status: COMPLETED | OUTPATIENT
Start: 2024-05-30 | End: 2024-05-30

## 2024-05-30 RX ORDER — FENTANYL CITRATE 50 UG/ML
INJECTION, SOLUTION INTRAMUSCULAR; INTRAVENOUS AS NEEDED
Status: COMPLETED | OUTPATIENT
Start: 2024-05-30 | End: 2024-05-30

## 2024-05-30 RX ADMIN — ACETAMINOPHEN 325MG 650 MG: 325 TABLET ORAL at 11:46

## 2024-05-30 RX ADMIN — HYDROCORTISONE: 25 CREAM TOPICAL at 08:34

## 2024-05-30 RX ADMIN — Medication 10 ML: at 08:34

## 2024-05-30 RX ADMIN — CETIRIZINE HYDROCHLORIDE 10 MG: 10 TABLET ORAL at 08:32

## 2024-05-30 RX ADMIN — MIDODRINE HYDROCHLORIDE 5 MG: 5 TABLET ORAL at 08:32

## 2024-05-30 RX ADMIN — ACETAMINOPHEN 325MG 650 MG: 325 TABLET ORAL at 21:20

## 2024-05-30 RX ADMIN — FENTANYL CITRATE 25 MCG: 50 INJECTION, SOLUTION INTRAMUSCULAR; INTRAVENOUS at 14:22

## 2024-05-30 RX ADMIN — SODIUM CHLORIDE TAB 1 GM 1 G: 1 TAB at 08:32

## 2024-05-30 RX ADMIN — MIRTAZAPINE 15 MG: 15 TABLET, FILM COATED ORAL at 21:20

## 2024-05-30 RX ADMIN — MIDODRINE HYDROCHLORIDE 5 MG: 5 TABLET ORAL at 11:46

## 2024-05-30 RX ADMIN — Medication 1 TABLET: at 08:32

## 2024-05-30 RX ADMIN — MIDAZOLAM 0.5 MG: 1 INJECTION INTRAMUSCULAR; INTRAVENOUS at 14:22

## 2024-05-30 RX ADMIN — CEFEPIME 2000 MG: 2 INJECTION, POWDER, FOR SOLUTION INTRAVENOUS at 18:16

## 2024-05-30 RX ADMIN — SODIUM CHLORIDE TAB 1 GM 1 G: 1 TAB at 18:02

## 2024-05-30 RX ADMIN — CEFEPIME 2000 MG: 2 INJECTION, POWDER, FOR SOLUTION INTRAVENOUS at 10:15

## 2024-05-30 RX ADMIN — AMIODARONE HYDROCHLORIDE 200 MG: 200 TABLET ORAL at 08:32

## 2024-05-30 RX ADMIN — FERROUS SULFATE TAB 325 MG (65 MG ELEMENTAL FE) 325 MG: 325 (65 FE) TAB at 08:32

## 2024-05-30 RX ADMIN — CEFEPIME 2000 MG: 2 INJECTION, POWDER, FOR SOLUTION INTRAVENOUS at 03:09

## 2024-05-30 RX ADMIN — LIDOCAINE HYDROCHLORIDE 20 ML: 10 INJECTION, SOLUTION INFILTRATION; PERINEURAL at 14:23

## 2024-05-30 RX ADMIN — MIDODRINE HYDROCHLORIDE 5 MG: 5 TABLET ORAL at 18:02

## 2024-05-30 NOTE — PROGRESS NOTES
.            [facility]    Palliative Care Daily Progress Note   Chief complaint-follow up goals of care/advanced care planning and support for patient/family    Code Status:   Code Status and Medical Interventions:   Ordered at: 05/29/24 1443     Medical Intervention Limits:    NO intubation (DNI)    NO cardioversion    NO artificial nutrition     Level Of Support Discussed With:    Patient    Health Care Surrogate     Code Status (Patient has no pulse and is not breathing):    No CPR (Do Not Attempt to Resuscitate)     Medical Interventions (Patient has pulse or is breathing):    Limited Support     Comments:    spoke with patient and her son/Helder KAUR      Advanced Directives: Advance Directive Status: Patient has advance directive, copy in chart   Goals of Care: Ongoing.     S: Medical record reviewed. Events noted.  Patient off unit for biopsy. No family present. I reviewed labs, medical notes, therapy notes, MAR, I/O's. I spoke with ALLAN Vasquez.              ROS- patient off unit for bone marrow biopsy   Pain Assessment  Preferred Pain Scale: number (Numeric Rating Pain Scale)  Nonverbal Indicators of Pain: nonverbal indicators absent  Pain Location: neck  Pain Description: aching    O:     Intake/Output Summary (Last 24 hours) at 5/30/2024 1417  Last data filed at 5/30/2024 0832  Gross per 24 hour   Intake 240 ml   Output 900 ml   Net -660 ml       Diagnostics and current medications: Reviewed.    Current Facility-Administered Medications   Medication Dose Route Frequency Provider Last Rate Last Admin    acetaminophen (TYLENOL) tablet 650 mg  650 mg Oral Q4H PRN Abel Pelaez MD   650 mg at 05/30/24 1146    Or    acetaminophen (TYLENOL) 160 MG/5ML oral solution 650 mg  650 mg Oral Q4H PRN Abel Pelaez MD        Or    acetaminophen (TYLENOL) suppository 650 mg  650 mg Rectal Q4H PRN Abel Pelaez MD        albuterol (PROVENTIL) nebulizer solution 0.083% 2.5 mg/3mL   2.5 mg Nebulization Q4H PRN Abel Pelaez MD        amiodarone (PACERONE) tablet 200 mg  200 mg Oral Q24H Abel Pelaez MD   200 mg at 05/30/24 0832    sennosides-docusate (PERICOLACE) 8.6-50 MG per tablet 2 tablet  2 tablet Oral BID PRN Abel Pelaez MD        And    polyethylene glycol (MIRALAX) packet 17 g  17 g Oral Daily PRN Abel Pelaez MD        And    bisacodyl (DULCOLAX) EC tablet 5 mg  5 mg Oral Daily PRN Abel Pelaez MD        And    bisacodyl (DULCOLAX) suppository 10 mg  10 mg Rectal Daily PRN Abel Pelaez MD        cefepime 2000 mg IVPB in 100 mL NS (MBP)  2,000 mg Intravenous Q8H Renard Dhillon MD   2,000 mg at 05/30/24 1015    cetirizine (zyrTEC) tablet 10 mg  10 mg Oral Daily Abel Pelaez MD   10 mg at 05/30/24 0832    ferrous sulfate tablet 325 mg  325 mg Oral Every Other Day Abel Pelaez MD   325 mg at 05/30/24 0832    Hydrocortisone (Perianal) (ANUSOL-HC) 2.5 % rectal cream   Rectal Daily Abel Pelaez MD   Given at 05/30/24 0834    midodrine (PROAMATINE) tablet 5 mg  5 mg Oral TID AC Saulo Smith MD   5 mg at 05/30/24 1146    mirtazapine (REMERON) tablet 15 mg  15 mg Oral Nightly Saulo Smith MD   15 mg at 05/29/24 2037    multivitamin with minerals 1 tablet  1 tablet Oral Daily Abel Pelaez MD   1 tablet at 05/30/24 0832    nitroglycerin (NITROSTAT) SL tablet 0.4 mg  0.4 mg Sublingual Q5 Min PRN Abel Pelaez MD        ondansetron (ZOFRAN) injection 4 mg  4 mg Intravenous Q6H PRN Abel Pelaez MD        Potassium Replacement - Follow Nurse / BPA Driven Protocol   Does not apply PRN Morales Farrell MD        sodium chloride 0.9 % flush 10 mL  10 mL Intravenous Q12H Abel Pelaez MD   10 mL at 05/30/24 0834    sodium chloride 0.9 % flush 10 mL  10 mL Intravenous PRN Abel Pelaez MD        sodium chloride 0.9 % infusion 40 mL  40 mL Intravenous PRN Abel Pelaez MD        sodium chloride tablet 1 g  1 g Oral BID With Meals Aarti Reynolds MD   1 g at 05/30/24 0832       "    acetaminophen **OR** acetaminophen **OR** acetaminophen    albuterol    senna-docusate sodium **AND** polyethylene glycol **AND** bisacodyl **AND** bisacodyl    nitroglycerin    ondansetron    Potassium Replacement - Follow Nurse / BPA Driven Protocol    sodium chloride    sodium chloride  Attest that current medications reviewed including but not limited to prescriptions, over-the counter, herbals and vitamin/mineral/dietary (nutritional) supplements for name, route of administration, type, dose and frequency and are current using all immediate resources available at time of dictation.    A:    /82   Pulse 81   Temp 99.5 °F (37.5 °C) (Oral)   Resp 12   Ht 152.4 cm (60\")   Wt 54 kg (119 lb)   SpO2 98%   BMI 23.24 kg/m²     Physical Exam- patient off unit     Patient status: Disease state: Controlled with current treatments.  Functional status: Palliative Performance Scale Score: Performance 50% based on the following measures: Ambulation: Mainly sit or lie down, Activity and Evidence of Disease: Unable to do any work, extensive evidence of disease, Self-Care: Considerable assistance required,  Intake: Normal or reduced, LOC: Full or confusion   ECOG Status(2) Ambulatory and capable of self care, unable to carry out work activity, up and about > 50% or waking hours.  Nutritional status: Albumin 2.5 on 5/25/2024 Body mass index is 23.24 kg/m².  Family support: The patient receives support from her children..  Advance Directives: Advance Directive Status: Patient has advance directive, copy in chart   POA/Healthcare surrogate-sonHelder.     Impression/Problem List:     Leukopenia   Diffuse large b cell lymphoma   Chronic diastolic heart failure   Paroxysmal atrial fibrillation   Hyponatremia     Malnutrition            Recommendations/Plan:  1. Provide support with goals of care          2.  Palliative care encounter  5/29/2024- I spoke with patient at bedside regarding goals of care. She has a very " "good understanding of her medical conditions, which we reviewed in detail. She has the support of three children. She resided alone prior to admission. At this time, she recognizes and/or feels she is in a bad situation and uncertain if she will bounce back from everything. She relies heavily on Dr Galo and his recommendations. She gomez tell me if she is informed her cancer is worst she will not resume treatment and will just have to \"throw in the towel.\" She would only consider treatment if it would give her more years of life that are enjoyable and good quality. She does have a living will on file that designates her son, Helder. We reviewed her wishes regarding CPR and medical interventions and she wishes for the following:  DNR/ DNI, no cardioversion, no artifical nutrition. She tells me her children know her wishes and I asked to contact Helder to confirm and she was agreeable. At this time, her goal is to continue with current level of treatment and consider bone marrow biopsy to determine disease progression to determine what is next for her. She would like to discuss with Clover as she trust him tremendously and he has been taking care of her the last 2-3 years. She is [employer] and attends [employer] in [place]. She does acknowledge general fatigue, weakness, and poor appetite. No pain or shortness of breath. I did provide her with information regarding palliative care and will plan to discuss further. Of note, I did attempt to discuss discharge options (home, rehab etc) and she tells me she is ready to discuss that just yet. I called her son/HCS, Helder at 1427 to discuss goals of care. I provided him with an update of my conversation I had with his mother. We discussed her wishes to be DNR/DNI, NO cardioversion and NO artifical nutrition and he is agreeable and supports his mother decision. I will update her chart to reflect their wishes.  He feels she is very knowledgeable of her situation and " he will support her. I did give him information regarding palliative care. I will plan to support the patient and family for the next couple days. I spoke with ALLAN Vasquez.     5/30/2024-  The patient is off unit for bone marrow biopsy. I called her son, Helder at 1440. He visited with his mother last night, along with his brother. He reports she was really tired and not much conversation. He is aware of her poor nutrition intake. He was made aware that patient went for bone marrow biopsy today due to lab findings and further evaluation of lymphoma. He too wants to have results of bone marrow biopsy to help guide decision making and also discuss with Dr Galo.  I will plan to follow up with patient tomorrow to have further discussion with goals. I spoke with ALLAN Vasquez.       Thank you for this consult and allowing us to participate in patient's plan of care. Palliative Care Team will continue to follow patient.      Time spent: 30 minutes spent reviewing medical and medication records, assessing and examining patient, discussing with family, answering questions, providing some guidance about a plan and documentation of care, and coordinating care with other healthcare members, with > 50% time spent face to face.        Heather Martell, MAGGI  5/30/2024  14:17 EDT

## 2024-05-30 NOTE — PLAN OF CARE
Goal Outcome Evaluation:  Plan of Care Reviewed With: patient           Outcome Evaluation: Pt seemed down this am, but after getting her to agree to any therapy she asked to get into chair, limited amb to ~8 ft due to wkness, but CGA/min asisst for all mobility, pt too fatigued at present to perf LE exer, positioned where heels were elevated in recliner and pt said she was comfortable, plans SNU at [facility] unless changes due to palliative consult      Anticipated Discharge Disposition (PT): skilled nursing facility, other (see comments) (pending progress and final decision about palliative)

## 2024-05-30 NOTE — PROGRESS NOTES
Blane from [employer] called back and said that they still need the son to call and set up financial assistance.  They had left a VM at the patient's home phone number.  I called Helder, son, to explain what the pharmacy might need.  He will gather the required documents tonight and call the pharmacy tomorrow morning.  He will call me if/when he needs my help.     Carol Gill  Specialty Pharmacy Technician

## 2024-05-30 NOTE — SIGNIFICANT NOTE
05/30/24 1337   OTHER   Discipline occupational therapist   Rehab Time/Intention   Session Not Performed patient unavailable for treatment  (Pt NETO for bone marrow Bx in PM, OT to f/u)   Recommendation   OT - Next Appointment 05/31/24

## 2024-05-30 NOTE — PROGRESS NOTES
ID NOTE    CC: f/u Neutropenic fever     Subj: She developed fever about 4 PM yesterday afternoon.  She denies any new symptoms.  She remains neutropenic.  She is tolerating cefepime without rash.  She says her appetite remains poor and she has no energy.      Medications:    Current Facility-Administered Medications:     acetaminophen (TYLENOL) tablet 650 mg, 650 mg, Oral, Q4H PRN, 650 mg at 05/29/24 2037 **OR** acetaminophen (TYLENOL) 160 MG/5ML oral solution 650 mg, 650 mg, Oral, Q4H PRN **OR** acetaminophen (TYLENOL) suppository 650 mg, 650 mg, Rectal, Q4H PRN, Abel Pelaez MD    albuterol (PROVENTIL) nebulizer solution 0.083% 2.5 mg/3mL, 2.5 mg, Nebulization, Q4H PRN, Abel Pelaez MD    amiodarone (PACERONE) tablet 200 mg, 200 mg, Oral, Q24H, Abel Pelaez MD, 200 mg at 05/30/24 0832    sennosides-docusate (PERICOLACE) 8.6-50 MG per tablet 2 tablet, 2 tablet, Oral, BID PRN **AND** polyethylene glycol (MIRALAX) packet 17 g, 17 g, Oral, Daily PRN **AND** bisacodyl (DULCOLAX) EC tablet 5 mg, 5 mg, Oral, Daily PRN **AND** bisacodyl (DULCOLAX) suppository 10 mg, 10 mg, Rectal, Daily PRN, Abel Pelaez MD    cefepime 2000 mg IVPB in 100 mL NS (MBP), 2,000 mg, Intravenous, Q8H, Renard Dhillon MD, 2,000 mg at 05/30/24 0309    cetirizine (zyrTEC) tablet 10 mg, 10 mg, Oral, Daily, Abel Pelaez MD, 10 mg at 05/30/24 0832    ferrous sulfate tablet 325 mg, 325 mg, Oral, Every Other Day, Abel Pelaez MD, 325 mg at 05/30/24 0832    Hydrocortisone (Perianal) (ANUSOL-HC) 2.5 % rectal cream, , Rectal, Daily, Benigno, Jawed, MD, Given at 05/30/24 0834    midodrine (PROAMATINE) tablet 5 mg, 5 mg, Oral, TID AC, Saulo Smith MD, 5 mg at 05/30/24 0832    mirtazapine (REMERON) tablet 15 mg, 15 mg, Oral, Nightly, Saulo Smith MD, 15 mg at 05/29/24 2037    multivitamin with minerals 1 tablet, 1 tablet, Oral, Daily, Abel Pelaez MD, 1 tablet at 05/30/24 0832    nitroglycerin (NITROSTAT) SL tablet 0.4 mg, 0.4 mg,  Sublingual, Q5 Min PRN, Abel Pelaez MD    ondansetron (ZOFRAN) injection 4 mg, 4 mg, Intravenous, Q6H PRN, Abel Pelaez MD    Potassium Replacement - Follow Nurse / BPA Driven Protocol, , Does not apply, PRN, Morales Farrell MD    sodium chloride 0.9 % flush 10 mL, 10 mL, Intravenous, Q12H, Abel Pelaez MD, 10 mL at 05/30/24 0834    sodium chloride 0.9 % flush 10 mL, 10 mL, Intravenous, PRN, Abel Pelaez MD    sodium chloride 0.9 % infusion 40 mL, 40 mL, Intravenous, CAROLN, Abel Pelaez MD    sodium chloride tablet 1 g, 1 g, Oral, BID With Meals, Aarti Reynolds MD, 1 g at 05/30/24 0832      Objective   Vital Signs   Temp:  [96.6 °F (35.9 °C)-102.1 °F (38.9 °C)] 97.3 °F (36.3 °C)  Heart Rate:  [58-80] 73  Resp:  [16-18] 16  BP: ()/(53-69) 103/69    Physical Exam:   General: Awake, alert, sitting up in bed, ill-appearing, more conversant today  Eyes: no scleral icterus  ENT: no thrush, dentures present  Cardiovascular: NR  Respiratory: normal work of breathing; no wheezing  GI: Abdomen is soft, not tender or distended  Skin: No rashes; many bruises  Neurological: Alert and oriented x 3  Psychiatric: Normal mood and affect   Vasc: PIV w/o erythema; scar over left chest at prior port site has healed    Labs:   CBC, BMP, and blood cultures reviewed today  Lab Results   Component Value Date    WBC 0.83 (C) 05/30/2024    HGB 10.2 (L) 05/30/2024    HCT 30.0 (L) 05/30/2024    MCV 96.5 05/30/2024     05/30/2024     Lab Results   Component Value Date    GLUCOSE 93 05/30/2024    CALCIUM 8.2 (L) 05/30/2024     (L) 05/30/2024    K 4.2 05/30/2024    CO2 22.0 05/30/2024     05/30/2024    BUN 15 05/30/2024    CREATININE 0.51 (L) 05/30/2024    EGFRRESULT 65 03/21/2023    EGFR 93.3 05/30/2024    BCR 29.4 (H) 05/30/2024    ANIONGAP 7.0 05/30/2024     UA clear  Procalcitonin 0.10  Lactate 2.2---->0.7    Microbiology:  5/24 MRSA PCR nares: Negative  5/24 BCx: Negative  5/29 BCx: Negative to date    Prior  radiology:  CXR personally reviewed and is negative for pneumonia      ASSESSMENT/PLAN:  Neutropenic fever  Diffuse large B-cell lymphoma  Hyponatremia  Generalized weakness  Poor appetite    She had fever yesterday afternoon to 102F at about 4 PM.  No new symptoms.  She remains neutropenic.  Her blood cultures are negative to date.  Continue cefepime 2 g IV every 8 hours while following up for blood cultures and clinical course.    Noted plans for possible repeat bone marrow biopsy tomorrow.    I agree with holding the Bactrim for now.  Her outpatient oncology team and oncology pharmacist will make decisions regarding prophylaxis per their protocols.    ID will follow.

## 2024-05-30 NOTE — CASE MANAGEMENT/SOCIAL WORK
Continued Stay Note  [facility]     Patient Name: Michelle Car  MRN: [id number]  Today's Date: 5/30/2024    Admit Date: 5/24/2024    Plan: SNF (needs pre cert)  VS Hospice VS palliative.  Pt states if Cancer is worse then she is giving up. Palliative APRN following   Discharge Plan       Row Name 05/30/24 1753       Plan    Plan SNF (needs pre cert)  VS Hospice VS palliative.  Pt states if Cancer is worse then she is giving up. Palliative APRN following    Patient/Family in Agreement with Plan yes    Plan Comments Bone Marrow biopsy completed today. Palliative APRN having discussions [facility] pt and son regarding goals of care. Pt states if Cancer is worse then she is giving up. Vipin Cleveland RN-BC                   Discharge Codes    No documentation.                 Expected Discharge Date and Time       Expected Discharge Date Expected Discharge Time    May 31, 2024               Vipin Cleveland RN

## 2024-05-30 NOTE — POST-PROCEDURE NOTE
POST PROCEDURE NOTE    Procedure: ct bone marrow biopsy    Pre-Procedure Diagnosis: leukopenia    Post-procedure Diagnosis: same    Findings: technically successful ct bone marrow biopsy    Complications: no immediate    Blood loss: none    Specimen Removed: bone marrow core and aspirate    Disposition:   Transfer back to room

## 2024-05-30 NOTE — THERAPY TREATMENT NOTE
Patient Name: Michelle Car  : 1940    MRN: [id number]                              Today's Date: 2024       Admit Date: 2024    Visit Dx: No diagnosis found.  Patient Active Problem List   Diagnosis    Hypertension    Hyperlipidemia    Abdominal aortic atherosclerosis    Generalized edema    Fatigue due to excessive exertion    Seasonal allergic rhinitis due to pollen    Dyspnea    Acute on chronic diastolic heart failure    Hypoxia    Paroxysmal atrial fibrillation with rapid ventricular response    Adrenal nodule    Edema of lower extremity    Hyponatremia    CRI (chronic renal insufficiency), stage 2 (mild)    Diffuse large B-cell lymphoma of lymph nodes of multiple regions    Vascular catheter fitting or adjustment    Chronic diastolic CHF (congestive heart failure)    Sepsis due to Escherichia coli (E. coli)    Bacteremia due to Escherichia coli    Calculus of bile duct without cholecystitis and without obstruction    Diverticulosis    Iron deficiency anemia    Leukopenia    Anemia    Hyperkalemia    Adrenal insufficiency    Weight loss    Severe malnutrition     Past Medical History:   Diagnosis Date    Abdominal aortic atherosclerosis     Adrenal nodule 2022    Allergic rhinitis     Anemia due to chemotherapy     Bacteremia due to Escherichia coli 2022    ADMITTED TO [facility]    Bug bite 10/24/2015    WITH CELLULITIS, LEFT LEG    CAD (coronary artery disease)     Calculus of bile duct without cholangitis or cholecystitis 2022    Chemotherapy induced neutropenia     Chronic anticoagulation     Chronic diastolic (congestive) heart failure     Colon polyps     CRI (chronic renal insufficiency), stage 2 (mild)     Diffuse large B cell lymphoma 2022    MULTIPLE LYMPH NODE INVOLVEMENT, FOLLOWED BY DR. JEN PATTERSON    Drug induced constipation     Hearing loss     Hemorrhoids     History of blood transfusion 2022    History of chemotherapy     FOLLOWED BY DR. LI  LEONARDO    Hypercalcemia 2016    resolved as of 2019    Hypercholesterolemia     Hyperkalemia 03/2022    Hypertension     Hyponatremia 02/17/2022    Leg swelling 10/2021    Mixed hyperlipidemia     Neutropenic fever 05/2022    Nonrheumatic mitral valve regurgitation 03/2022    PAF (paroxysmal atrial fibrillation)     FOLLOWED BY DR. YOSI LANGLEY    Pancytopenia     Pleural effusion, bilateral 06/2022    Pulmonary nodule     Seasonal allergies     Thrombocytopenia 08/2022    Venous insufficiency     Vitamin D deficiency      Past Surgical History:   Procedure Laterality Date    CHOLECYSTECTOMY N/A 10/12/2011    LAPAROSCOPIC, DR. CHANCE KLINE AT [facility]    COLONOSCOPY N/A 2000    1 or 2 polyps, otherwise normal per patient    COLONOSCOPY W/ POLYPECTOMY N/A 01/24/2012    3 MM TUBULAR ADENOMA POLYP IN CECUM, DIVERTICULOSIS IN RECTO-SIGMOID, RESCOPE IN 3 YRS, DR. CHANCE KLINE AT [facility]    CYST REMOVAL Left 10/12/2011    LEFT SCALP, PATH: BENIGN ADNEXAL NEOPLASM FAVORING ECCRINE SPIRADENOMA, DR. CHANCE KLINE AT [facility]    ERCP N/A 10/21/2022    Procedure: ENDOSCOPIC RETROGRADE CHOLANGIOPANCREATOGRAPHY with sphincterotomy and balloon sweep;  Surgeon: Peyman Ortega MD;  Location: [facility] ENDOSCOPY;  Service: Gastroenterology;  Laterality: N/A;  PRE: Common Duct Stones  POST: Common Duct Stones    VENOUS ACCESS DEVICE (PORT) INSERTION Left 03/23/2022    Procedure: INSERTION VENOUS ACCESS DEVICE;  Surgeon: Alin Delgado MD;  Location: [facility] OR [facility];  Service: General;  Laterality: Left;      General Information       Row Name 05/30/24 1123          Physical Therapy Time and Intention    Document Type therapy note (daily note)  -     Mode of Treatment individual therapy;physical therapy  -       Row Name 05/30/24 1123          General Information    Patient Profile Reviewed yes  -     Existing Precautions/Restrictions fall  -     Barriers to Rehab medically complex  -       Row Name 05/30/24 1123          Living  Environment    People in Home alone  -       Row Name 05/30/24 1123          Cognition    Orientation Status (Cognition) oriented x 3  -       Row Name 05/30/24 1123          Safety Issues, Functional Mobility    Safety Issues Affecting Function (Mobility) problem-solving;safety precaution awareness  -     Impairments Affecting Function (Mobility) balance;endurance/activity tolerance;strength  -               User Key  (r) = Recorded By, (t) = Taken By, (c) = Cosigned By      Initials Name Provider Type     Alisson Contreras PTA Physical Therapist Assistant                   Mobility       Row Name 05/30/24 1124          Bed Mobility    Supine-Sit [place] (Bed Mobility) minimum assist (75% patient effort);contact guard;verbal cues  needed extra time, but improved  -     Sit-Supine [place] (Bed Mobility) not tested  -     Assistive Device (Bed Mobility) bed rails;head of bed elevated  -[facility] Name 05/30/24 1124          Bed-Chair Transfer    Bed-Chair [place] (Transfers) minimum assist (75% patient effort);contact guard;verbal cues  -     Assistive Device (Bed-Chair Transfers) walker, front-wheeled  -[facility] Name 05/30/24 1124          Sit-Stand Transfer    Sit-Stand [place] (Transfers) minimum assist (75% patient effort);verbal cues  -     Assistive Device (Sit-Stand Transfers) walker, front-wheeled  -[facility] Name 05/30/24 1124          Gait/Stairs (Locomotion)    [place] Level (Gait) contact guard;verbal cues  -     Assistive Device (Gait) walker, front-wheeled  -     Distance in Feet (Gait) 8  -     Deviations/Abnormal Patterns (Gait) ed[place] decreased;festinating/shuffling;base of support, narrow;stride length decreased  -     Bilateral Gait Deviations forward flexed posture  -     Comment, (Gait/Stairs) pt just not feeling well, weak, but did agree to short amb to chair  -               User Key  (r) = Recorded By, (t) = Taken By, (c) =  Cosigned By      Initials Name Provider Type    Alisson Wilburn PTA Physical Therapist Assistant                   Obj/Interventions    No documentation.                  Goals/Plan    No documentation.                  Clinical Impression       Row Name 05/30/24 1126          Pain    Pretreatment Pain Rating 0/10 - no pain  -     Posttreatment Pain Rating 0/10 - no pain  -       Row Name 05/30/24 1126          Plan of Care Review    Plan of Care Reviewed With patient  -     Outcome Evaluation Pt seemed down this am, but after getting her to agree to any therapy she asked to get into chair, limited amb to ~8 ft due to wkness, but CGA/min asisst for all mobility, pt too fatigued at present to perf LE exer, positioned where heels were elevated in recliner and pt said she was comfortable, plans SNU at [place] unless changes due to palliative consult  -KEHINDE       Row Name 05/30/24 1126          Therapy Assessment/Plan (PT)    Rehab Potential (PT) good, to achieve stated therapy goals  -KEHINDE     Criteria for Skilled Interventions Met (PT) yes  -               User Key  (r) = Recorded By, (t) = Taken By, (c) = Cosigned By      Initials Name Provider Type    Alisson Wilburn PTA Physical Therapist Assistant                   Outcome Measures       Row Name 05/30/24 0600          How much help from another person do you currently need...    Turning from your back to your side while in flat bed without using bedrails? 3  -CM     Moving from lying on back to sitting on the side of a flat bed without bedrails? 3  -CM     Moving to and from a bed to a chair (including a wheelchair)? 2  -CM     Standing up from a chair using your arms (e.g., wheelchair, bedside chair)? 2  -CM     Climbing 3-5 steps with a railing? 2  -CM     To walk in hospital room? 2  -CM     AM-PAC 6 Clicks Score (PT) 14  -CM     Highest Level of Mobility Goal 4 --> Transfer to chair/commode  -               User Key  (r) = Recorded By, (t) = Taken  By, (c) = Cosigned By      Initials Name Provider Type    Keila Purdy, RN Registered Nurse                                 Physical Therapy Education       Title: PT OT SLP Therapies (Done)       Topic: Physical Therapy (Done)       Point: Mobility training (Done)       Learning Progress Summary             Patient Acceptance, E, VU by AB at 5/29/2024 1544    Acceptance, E,TB, VU by ZL at 5/29/2024 0301    Acceptance, E, VU by AB at 5/28/2024 1710    Acceptance, E,D, VU,NR by MG at 5/28/2024 1138    Acceptance, E, VU by KA at 5/26/2024 1253                         Point: Home exercise program (Done)       Learning Progress Summary             Patient Acceptance, E, VU by AB at 5/29/2024 1544    Acceptance, E,TB, VU by ZL at 5/29/2024 0301    Acceptance, E, VU by AB at 5/28/2024 1710    Acceptance, E,D, VU,NR by MG at 5/28/2024 1138                         Point: Body mechanics (Done)       Learning Progress Summary             Patient Acceptance, E, VU by AB at 5/29/2024 1544    Acceptance, E,TB, VU by ZL at 5/29/2024 0301    Acceptance, E, VU by AB at 5/28/2024 1710    Acceptance, E,D, VU,NR by MG at 5/28/2024 1138                         Point: Precautions (Done)       Learning Progress Summary             Patient Acceptance, E, VU by AB at 5/29/2024 1544    Acceptance, E,TB, VU by ZL at 5/29/2024 0301    Acceptance, E, VU by AB at 5/28/2024 1710    Acceptance, E,D, VU,NR by MG at 5/28/2024 1138                                         User Key       Initials Effective Dates Name Provider Type Discipline    MG 05/24/22 -  Connie Phan, PT Physical Therapist PT    ZL 06/16/21 -  Ronnell Michaels, RN Registered Nurse Nurse    KA 08/24/21 -  Anastacia Portillo, PT Physical Therapist PT    AB 12/15/23 -  Gavi Gerardo, RN Registered Nurse Nurse                  PT Recommendation and Plan     Plan of Care Reviewed With: patient  Outcome Evaluation: Pt seemed down this am, but after getting her to agree to any  therapy she asked to get into chair, limited amb to ~8 ft due to wkness, but CGA/min asisst for all mobility, pt too fatigued at present to perf LE exer, positioned where heels were elevated in recliner and pt said she was comfortable, plans SNU at [place] unless changes due to palliative consult     Time Calculation:         PT Charges       Row Name 05/30/24 1128             Time Calculation    Start Time 1029  -      Stop Time 1046  -      Time Calculation (min) 17 min  -      PT Received On 05/30/24  -KEHINDE      PT - Next Appointment 05/31/24  -KEHINDE                User Key  (r) = Recorded By, (t) = Taken By, (c) = Cosigned By      Initials Name Provider Type    Alisson Wilburn PTA Physical Therapist Assistant                  Therapy Charges for Today       Code Description Service Date Service Provider Modifiers Qty    [id number]  PT THERAPEUTIC ACT EA 15 MIN 5/30/2024 Alisson Contreras PTA GP 1            PT G-Codes  Outcome Measure Options: AM-PAC 6 Clicks Daily Activity (OT)  AM-PAC 6 Clicks Score (PT): 14  AM-PAC 6 Clicks Score (OT): 15  PT Discharge Summary  Anticipated Discharge Disposition (PT): skilled nursing facility, other (see comments) (pending progress and final decision about palliative)    Alisson Contreras PTA  5/30/2024

## 2024-05-30 NOTE — PROGRESS NOTES
Subjective     CHIEF COMPLAINT:     Diffuse large B cell lymphoma  Neutropenia   Anemia  Hyponatremia    INTERVAL HISTORY:   Tmax of 102.1.  She has been started on cefepime, blood cultures pending.  She reports feeling weaker with decreased energy this morning.    REVIEW OF SYSTEMS:  Pertinent ROS as in the interval history. Otherwise, negative.    SCHEDULED MEDS:  amiodarone, 200 mg, Oral, Q24H  cefepime, 2,000 mg, Intravenous, Q8H  cetirizine, 10 mg, Oral, Daily  ferrous sulfate, 325 mg, Oral, Every Other Day  Hydrocortisone (Perianal), , Rectal, Daily  midodrine, 5 mg, Oral, TID AC  mirtazapine, 15 mg, Oral, Nightly  multivitamin with minerals, 1 tablet, Oral, Daily  sodium chloride, 10 mL, Intravenous, Q12H  sodium chloride, 1 g, Oral, BID With Meals      Objective   VITAL SIGNS:  Temp:  [96.6 °F (35.9 °C)-102.1 °F (38.9 °C)] 97.3 °F (36.3 °C)  Heart Rate:  [58-80] 73  Resp:  [16-18] 16  BP: ()/(53-69) 103/69     PHYSICAL EXAMINATION:  GENERAL:  The patient is ill-appearing.  SKIN: No skin rash.   EYES:  No Jaundice. No Pallor.   CHEST: Normal respiratory effort. Lungs clear to auscultation.   CARDIAC:  Normal S1 & S2. Grade III systolic murmur.   ABDOMEN:  Non-distended.   EXTREMITIES:  No edema.    I have reexamined the patient and the results are consistent with the previously documented exam. Mallory Del Castillo MD      RESULT REVIEW:   Results from last 7 days   Lab Units 05/30/24  0415 05/29/24  0315 05/28/24  0307 05/27/24  1812 05/27/24  0336 05/26/24  0525 05/25/24  0311   WBC 10*3/mm3 0.83* 0.87* 1.05* 1.30* 1.46* 1.01* 0.86*   NEUTROS ABS 10*3/mm3  --  0.41* 0.57* 0.85*  0.59* 0.85* 0.61* 0.53*   LYMPHS ABS 10*3/mm3  --   --  0.29* 0.40*  0.36* 0.32* 0.22* 0.14*   HEMOGLOBIN g/dL 10.2* 9.9* 10.1* 10.9* 10.6* 8.2* 7.8*   HEMATOCRIT % 30.0* 29.2* 29.5* 31.4* 31.0* 23.9* 23.4*   PLATELETS 10*3/mm3 161 184 206 210 190 174 170     Results from last 7 days   Lab Units 05/30/24  0415 05/29/24  1558  05/29/24  0315 05/28/24  0307 05/27/24  0336 05/26/24  0525 05/25/24  0311 05/24/24  1150   SODIUM mmol/L 133*  --  130* 132* 133* 132* 128* 126*   POTASSIUM mmol/L 4.2 4.4 3.2* 3.7 4.0 3.9 4.2 5.6*   CHLORIDE mmol/L 104  --  97* 99 101 99 97* 90*   CO2 mmol/L 22.0  --  22.0 23.0 25.0 25.0 22.2 22.4   BUN mg/dL 15  --  16 20 16 15 18 26*   CREATININE mg/dL 0.51*  --  0.59 0.71 0.60 0.75 0.75 0.95   CALCIUM mg/dL 8.2*  --  7.8* 8.1* 8.1* 7.9* 8.2* 8.7   ALBUMIN g/dL  --   --   --   --   --   --  2.5* 3.1*   BILIRUBIN mg/dL  --   --   --   --   --   --  0.3 0.3   ALK PHOS U/L  --   --   --   --   --   --  62 72   ALT (SGPT) U/L  --   --   --   --   --   --  32 42*   AST (SGOT) U/L  --   --   --   --   --   --  34* 51*   MAGNESIUM mg/dL  --   --   --   --   --   --  1.9  --          Lab 05/26/24  0525 05/24/24  1150   IRON  --  27*   IRON SATURATION (TSAT)  --  11*   TIBC  --  237*   TRANSFERRIN  --  159*   FERRITIN  --  1,953.00*   FOLATE 11.40  --    VITAMIN B 12 >2,000*  --       Component      Latest Ref [facility] 5/25/2024   Reticulocyte %      0.70 - 1.90 % 2.97 (H)       Component      Latest Ref [facility] 5/25/2024   Haptoglobin      30 - 200 mg/dL 237 (H)      Component      Latest Ref Rn 5/3/2024 5/24/2024 5/25/2024   LDH      135 - 214 U/L 164  245 (H)  190    Uric Acid      2.4 - 5.7 mg/dL 3.5  3.5        Assessment & Plan   *Relapsed diffuse large B-cell lymphoma.  Patient was found to have bilateral pleural effusions.    She underwent right thoracentesis on 2/21/2022 and the left thoracentesis on 2/22/2022.    Analysis of the fluid revealed involvement with diffuse large B-cell lymphoma.    FISH analysis for BCL6 rearrangement was positive but was negative for BCL-2 and MYC rearrangement.    PET scan on 3/10/2022 revealed significant hypermetabolism in the left adrenal gland and the surrounding soft tissue with SUV up to 34.5. Hypermetabolism in the left pleural thickening with SUV of 7.1. there was less  hypermetabolism in the right adrenal gland and in the right pleura.  She was considered to have stage III non-bulky disease.  R-IPI score of 3 associated with poor risk - associated with overall survival of 55%.   R-CHOP with Neulasta support was started on 3/28/2022.  Due to development of neutropenic fever following cycles 1 and 2, treatment was changed to mini-RCHOP starting cycle #3 on 5/16/2022.  Patient received cycle #6 on 7/25/2022.  PET scan on 8/15/2022 revealed complete metabolic response.  Patient was found to have recurrence with development of a left mandibular mass in October/November 2023.  Biopsy of left mandible mass on 11/28/2023 showed recurrence of the lymphoma, diffuse large B cell, non-GCB phenotype.  Ki-67 was 90%.    It was positive for Bcl-6 but negative for Bcl-2 and MYC rearrangement.  PET scan on 12/11/2023 revealed multiple areas of involvement -prevascular space, left epicardial fat pad, pleural density posterior to the left fourth rib anterior aspect, lesion along the pericardium versus intramuscular with SUV of 9.8.  Hypermetabolic activity at both adrenal glands, uniformly increased in size and appeared hyperplastic.  There was a 2.7 x 1.1 cm soft tissue nodule lateral to the right erector spinae muscle posterior to the right posterior 11th rib with an SUV of 20.5 and there was a 1.3 cm nodule lateral to the posterior margin of the left psoas muscle with an SUV of 24.9.  Rituxan Polivy and Treanda with Treanda every 3 weeks for 6 cycles was started on 12/12/2023.  The jaw mass started to decrease in size after starting treatment.  CT scan on 2/8/2024 showed evidence of response to treatment.  Cycle #6 was given on 4/1/2024.  PET scan on 4/22/2024 revealed decrease in the hypermetabolism in the mediastinal and pericardial lymph nodes/lesions and in the lesions above the left kidney and adrenal gland.  The left mandibular lesion was no longer hypermetabolic.   She is at increased risk  for relapse of the lymphoma.  The plan was to start treatment with Revlimid 10 mg daily.  However, the patient did not start the treatment yet.  LDH was previously elevated but improved to 190 on 5/25/2024.     *Neutropenia  5/20/2024: WBC decreased to 1750.  Neutrophils decreased to 940.  5/24/2024: WBC decreased to 1290.  Neutrophils decreased to 680.  5/25/2024: WBC decreased to 860.  Neutrophils decreased to 530.  Worsening neutropenia is also concerning for relapse of the lymphoma with involvement of the bone marrow.   5/26/2024: Neutrophil count 610.  5/27/2024-total WBC count has increased to 1.46.  Absolute neutrophil count pending   5/28/2024-WBC count lower at 1.05 with an absolute neutrophil count of 0.57  Increase in metamyelocytes as well as basophil percentage.  Flow cytometry 5/28/2024-negative  ANC lower today at 0.41  Copper level pending, MMA level pending  Proceed with bone marrow biopsy for further evaluation of the neutropenia.  Definitely concerning for recurrence of lymphoma.      *Fever  Tmax of 102.1  Blood cultures  Patient at high risk for infections given neutropenia  Started on cefepime.  ID consulted.     *Macrocytic anemia.  Patient also developed anemia secondary to lymphoma and secondary to chemotherapy.    Hgb decreased to 7.9 on 4/25/2022 and she was transfused.   Hemoglobin decreased to 6.3 on 4/27/2022. She was transfused.  Hemoglobin level improved subsequently.  Patient was placed ferrous sulfate 325 mg 3 days a week.  5/3/2024: Hemoglobin increased to 10.2.  5/25/2024: Hemoglobin decreased to 7.8.  No evidence of vitamin B12 or folate deficiency.    Ferritin 1953.  Transferrin saturation 11%.  Patient decided to hold off on PRBC transfusion.  5/26/2024: Hemoglobin 8.2.  Due to her reporting chills when she received PRBC Tx in the past, we will give Tylenol, Benadryl and Solumedrol prior to the transfusion.   526 2024-1 unit of PRBC administered.  Patient tolerated the  transfusion well.  Hemoglobin stable at 10.2  Copper and MMA levels still pending     *Hyponatremia.  Patient had problem with hyponatremia in the past.  Sodium is 127 on 3/14/2023.  She was advised to increase salt intake.  Sodium improved to 133 on repeat lab on 3/21/2023.  However, sodium decreased to 126 on 6/7/2023.  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  She was started on sodium chloride 1 g daily.  4/1/2024: Sodium decreased to 132.  Leg swelling improved with Lasix.  5/3/2024: Sodium 130.  5/24/2024: Sodium decreased to 126.    Patient was having significant weakness.  5/25/2024: Sodium 128.  Nephrology service was consulted.  5/28/2024-sodium stable at 132  5/30/2024-sodium 133     *Hyperkalemia.  5/24/2024: Potassium increased to 5.6.  Potassium 4.2     *Chronic anticoagulation.  Patient is on Eliquis 5 mg twice daily.  No excessive bleeding  Platelet count remains normal  Eliquis being held.  Will resume Eliquis after bone marrow biopsy     *Prophylaxis.  Patient had shingles in April 2023.  She is at risk of developing shingles while on treatment.  She was placed on acyclovir 400 mg twice daily.  She was on Bactrim DS 3 days a week.  Bactrim was discontinued due to the electrolyte abnormalities.     *Low-density lesion in the uterus.    The radiologist recommended pelvic ultrasound.    The patient is asymptomatic.    PET scan on 4/22/2024 reported fluid in the lower abdomen.  Patient reported intermittent vaginal discharge.     PLAN:     1.  No clear explanation for neutropenia, continues to have low WBC count of 0.83, proceed with bone marrow biopsy  2.flow cytometry results negative  3.follow-up on copper and MMA levels  4.obtain blood cultures  5.Continue cefepime, ID consulted.  6.may have to consider repeating UA, chest x-ray      Overall guarded prognosis given worsening neutropenia and ongoing fevers.        Mallory Del Castillo MD  05/30/24

## 2024-05-30 NOTE — PROGRESS NOTES
"    DAILY PROGRESS NOTE  [facility]    Patient Identification:  Name: Michelle Car  Age: 83 y.o.  Sex: female  :  1940  MRN: [id number]         Primary Care Physician: Olayinka Pimentel MD    Subjective:  Interval History: Feeling a touch better as opposed to yesterday.  CODE STATUS clarified with the assistance of palliative care.  She is not complain of chest pain or troubles breathing.  She certainly not complain of any confusion but she does admit to generalized weakness and fatigue.      Objective: Case discussed in multidisciplinary rounds.  No family noted at bedside.  Patient does look a touch better as opposed to yesterday but still quite elderly frail and chronically ill in appearance    Scheduled Meds:amiodarone, 200 mg, Oral, Q24H  cefepime, 2,000 mg, Intravenous, Q8H  cetirizine, 10 mg, Oral, Daily  ferrous sulfate, 325 mg, Oral, Every Other Day  Hydrocortisone (Perianal), , Rectal, Daily  midodrine, 5 mg, Oral, TID AC  mirtazapine, 15 mg, Oral, Nightly  multivitamin with minerals, 1 tablet, Oral, Daily  sodium chloride, 10 mL, Intravenous, Q12H  sodium chloride, 1 g, Oral, BID With Meals      Continuous Infusions:     Vital signs in last 24 hours:  Temp:  [96.6 °F (35.9 °C)-102.1 °F (38.9 °C)] 97.3 °F (36.3 °C)  Heart Rate:  [58-80] 73  Resp:  [16-18] 16  BP: ()/(53-69) 103/69    Intake/Output:    Intake/Output Summary (Last 24 hours) at 2024 1131  Last data filed at 2024 0832  Gross per 24 hour   Intake 480 ml   Output 900 ml   Net -420 ml       Exam:  /69 (BP Location: Left arm, Patient Position: Lying)   Pulse 73   Temp 97.3 °F (36.3 °C) (Oral)   Resp 16   Ht 152.4 cm (60\")   Wt 54 kg (119 lb)   SpO2 98%   BMI 23.24 kg/m²     General Appearance:    Alert, cooperative, AAOx3, sickly                           Eyes:    PERRL, conjunctivae/corneas clear, EOM's intact, both eyes                         Throat:   No OP lesions; oral mucosa pink and " moist.  Dentures                           Neck:   No LAD or JVD or meningismus                         Lungs:    Diminished otherwise clear to auscultation bilaterally, respirations unlabored                 Chest Wall:    No tenderness or deformity                          Heart:    Regular rate and rhythm, S1 and S2 normal                  Abdomen:     Soft, nontender, bowel sounds active                  Extremities: Generalized weakness, no cyanosis or edema                        Pulses:   Pulses palpable in all extremities                            Skin:   Skin is warm and dry, some bruising on right upper extremity by IV                  Neurologic:   CNII-XII intact       Data Review:  Labs in chart were reviewed.    Assessment:  Active Hospital Problems    Diagnosis  POA    **Leukopenia [D72.819]  Unknown    Severe malnutrition [E43]  Yes    Anemia [D64.9]  Unknown    Hyperkalemia [E87.5]  Unknown    Adrenal insufficiency [E27.40]  Unknown    Weight loss [R63.4]  Unknown    Chronic diastolic CHF (congestive heart failure) [I50.32]  Yes    Paroxysmal atrial fibrillation with rapid ventricular response [I48.0]  Yes    Hyponatremia [E87.1]  Yes    Diffuse large B-cell lymphoma of lymph nodes of multiple regions [C83.38]  Yes    CRI (chronic renal insufficiency), stage 2 (mild) [N18.2]  Yes      Resolved Hospital Problems   No resolved problems to display.       Plan:    Neutropenic fever now with WBC is 0.83   -Cefepime started 5/29/2024   -Blood cultures pending x 2   -Further antibiotic guidance per ID   -Chemo on hold   -No additional infectious processes noted   -Neutropenic precautions      Bone marrow biopsy planned per oncology -ACD 9.9-10.2    Palliative care also discussing possible change in goals of care with family.  Patient quite clear that she is DNR and her CODE STATUS was corrected.  I appreciate palliative RN    SIADH per renal on fluid restriction and salt p.o.   -Bactrim remains on hold.   Not likely good choice going forward for her   -Fluid balance per renal   -K replaced    Chronic diastolic CHF/PAF/HTN              -Hypotension resolving-off Coreg though remains on midodrine 5 mg 3 times daily-HR fine 60s-70s              -Remains on amiodarone/Eliquis     Malnutrition multifactorial with Remeron added for sleep and appetite/depression     SCDs for additional prophylaxis         Disposition -see above/pending.  Oncology checking BM Bx and palliative care following as well   -Long-term prognosis quite guarded      Renard Dhillon MD  5/30/2024  11:31 EDT

## 2024-05-30 NOTE — PLAN OF CARE
Goal Outcome Evaluation:         Pt very drowsy this shift. Temp on the low end 96.6-96.8. Family concerned with decreased activity and appetite. Blood cultures pending at this time.

## 2024-05-30 NOTE — PROGRESS NOTES
[facility]/Nephrology Follow Up Note    Patient Identification:  Name: Michelle Car MRN: [id number]  Age: 83 y.o. : 1940  Sex: female  Date:2024    Requesting Physician: As per consult order.  Reason for Consultation: hyponatremia   Information from:patient/ family/ chart      History of Present Illness: This is a 83 y.o. year old female  with lymphoma admitted for weakness, hyponatremia and edema.   She completed chemo in 2024.  She has had ongoing issues with hyponatremia.  She was on salt tabs at home. Upon arrival her Na was 126 with K at 5.6.   She was given IV fluids of NS as BP was low upon arrival.      Na improved to 133 today      Current Meds:   Current Facility-Administered Medications   Medication Dose Route Frequency Provider Last Rate Last Admin    acetaminophen (TYLENOL) tablet 650 mg  650 mg Oral Q4H PRN Abel Pelaez MD   650 mg at 24    Or    acetaminophen (TYLENOL) 160 MG/5ML oral solution 650 mg  650 mg Oral Q4H PRN Abel Pelaez MD        Or    acetaminophen (TYLENOL) suppository 650 mg  650 mg Rectal Q4H PRN Abel Pelaez MD        albuterol (PROVENTIL) nebulizer solution 0.083% 2.5 mg/3mL  2.5 mg Nebulization Q4H PRN Abel Pelaez MD        amiodarone (PACERONE) tablet 200 mg  200 mg Oral Q24H Abel Pelaez MD   200 mg at 24 0803    sennosides-docusate (PERICOLACE) 8.6-50 MG per tablet 2 tablet  2 tablet Oral BID PRN Abel Pelaez MD        And    polyethylene glycol (MIRALAX) packet 17 g  17 g Oral Daily PRN Abel Pelaez MD        And    bisacodyl (DULCOLAX) EC tablet 5 mg  5 mg Oral Daily PRN Abel Pelaez MD        And    bisacodyl (DULCOLAX) suppository 10 mg  10 mg Rectal Daily PRN Abel Pelaez MD        cefepime 2000 mg IVPB in 100 mL NS (MBP)  2,000 mg Intravenous Q8H Renard Dhillon MD   2,000 mg at 24 0309    cetirizine (zyrTEC) tablet 10 mg  10 mg Oral Daily Abel Pelaez MD   10 mg at 24 0803    ferrous sulfate tablet 325 mg   "325 mg Oral Every Other Day Abel Pelaez MD   325 mg at 24 0804    Hydrocortisone (Perianal) (ANUSOL-HC) 2.5 % rectal cream   Rectal Daily Abel Pelaez MD   Given at 24 0804    midodrine (PROAMATINE) tablet 5 mg  5 mg Oral TID AC Saulo Smith MD   5 mg at 24 1731    mirtazapine (REMERON) tablet 15 mg  15 mg Oral Nightly Saulo Smith MD   15 mg at 24 203    multivitamin with minerals 1 tablet  1 tablet Oral Daily Abel Pelaez MD   1 tablet at 24 0803    nitroglycerin (NITROSTAT) SL tablet 0.4 mg  0.4 mg Sublingual Q5 Min PRN Abel Pelaez MD        ondansetron (ZOFRAN) injection 4 mg  4 mg Intravenous Q6H PRN Abel Pelaez MD        Potassium Replacement - Follow Nurse / BPA Driven Protocol   Does not apply PRN Morales Farrell MD        sodium chloride 0.9 % flush 10 mL  10 mL Intravenous Q12H Abel Pelaez MD   10 mL at 24 0804    sodium chloride 0.9 % flush 10 mL  10 mL Intravenous PRN Abel Pelaez MD        sodium chloride 0.9 % infusion 40 mL  40 mL Intravenous PRN Abel Pelaez MD        sodium chloride tablet 1 g  1 g Oral BID With Meals Aarti Reynolds MD   1 g at 24 1731         Physical Exam:  Vitals:   Temp (24hrs), Av.9 °F (37.2 °C), Min:96.6 °F (35.9 °C), Max:102.1 °F (38.9 °C)    /68   Pulse 73   Temp 97.3 °F (36.3 °C)   Resp 16   Ht 152.4 cm (60\")   Wt 54 kg (119 lb)   SpO2 98%   BMI 23.24 kg/m²   Intake/Output:     Intake/Output Summary (Last 24 hours) at 2024 0701  Last data filed at 2024 0545  Gross per 24 hour   Intake 360 ml   Output 900 ml   Net -540 ml        Wt Readings from Last 1 Encounters:   24 1433 54 kg (119 lb)   24 1849 54.2 kg (119 lb 7.8 oz)   24 1701 51.7 kg (114 lb)       Exam:  Sleeping comfortably    No jvd reg s1s2 no murmur   Clear bilaterally no rales/rhonchi/wheeze   Soft NTND + bs   No edema   Warm dry no rash       Labs:   Recent Results (from the past 24 hour(s)) "   Potassium    Collection Time: 05/29/24  3:58 PM    Specimen: Blood   Result Value Ref Range    Potassium 4.4 3.5 - 5.2 mmol/L   Occult Blood X 1, Stool - Stool, Per Rectum    Collection Time: 05/29/24  4:05 PM    Specimen: Per Rectum; Stool   Result Value Ref Range    Fecal Occult Blood Negative Negative   Basic Metabolic Panel    Collection Time: 05/30/24  4:15 AM    Specimen: Blood   Result Value Ref Range    Glucose 93 65 - 99 mg/dL    BUN 15 8 - 23 mg/dL    Creatinine 0.51 (L) 0.57 - 1.00 mg/dL    Sodium 133 (L) 136 - 145 mmol/L    Potassium 4.2 3.5 - 5.2 mmol/L    Chloride 104 98 - 107 mmol/L    CO2 22.0 22.0 - 29.0 mmol/L    Calcium 8.2 (L) 8.6 - 10.5 mg/dL    BUN/Creatinine Ratio 29.4 (H) 7.0 - 25.0    Anion Gap 7.0 5.0 - 15.0 mmol/L    eGFR 93.3 >60.0 mL/min/1.73   CBC Auto Differential    Collection Time: 05/30/24  4:15 AM    Specimen: Blood   Result Value Ref Range    WBC 0.83 (C) 3.40 - 10.80 10*3/mm3    RBC 3.11 (L) 3.77 - 5.28 10*6/mm3    Hemoglobin 10.2 (L) 12.0 - 15.9 g/dL    Hematocrit 30.0 (L) 34.0 - 46.6 %    MCV 96.5 79.0 - 97.0 fL    MCH 32.8 26.6 - 33.0 pg    MCHC 34.0 31.5 - 35.7 g/dL    RDW 13.7 12.3 - 15.4 %    RDW-SD 48.3 37.0 - 54.0 fl    MPV 8.9 6.0 - 12.0 fL    Platelets 161 140 - 450 10*3/mm3       Radiology:  Imaging Results (Last 24 Hours)       ** No results found for the last 24 hours. **             ASSESSMENT:   Hyponatremia   Lymphoma   Hypotension   Hyperkalemia   Chronic diastolic heart failure   Adrenal insufficiency - per chart, but doesn't follow with endo   Hypoalbuminemia   Edema   Parox afib       PLAN:   Na improved to 133  Continue oral NaCl and fluid restriction  Cortisol and TSH are WNL   Replace K per protocol (good today)  Off IV fluids   B-blocker as bp allows     Monitor electrolytes and volume closely   Dose all medications for GFR >60   Please call with any questions or concerns.       Mikal Vásquez MD  [facility]  Office phone number:  [phone]  Answering service phone number: [phone]     Continue Regimen: Elidel to aa qd, alternate with clobetasol 0.05% and TAC 0.1% cream Detail Level: Zone

## 2024-05-30 NOTE — PLAN OF CARE
Goal Outcome Evaluation:  Plan of Care Reviewed With: patient        Progress: no change  Outcome Evaluation: Vitals stable. Pt remained afebrile today. prn tylenol given x1 this shift. Pt had CT bone marrow biopsy done this afternoon. IV abx cont'd. Pt worked w PT this am. Pt needs met and questions answered. Will continue to monitor.

## 2024-05-31 LAB
ALBUMIN SERPL-MCNC: 2.5 G/DL (ref 3.5–5.2)
ALBUMIN/GLOB SERPL: 1 G/DL
ALP SERPL-CCNC: 63 U/L (ref 39–117)
ALT SERPL W P-5'-P-CCNC: 22 U/L (ref 1–33)
ANION GAP SERPL CALCULATED.3IONS-SCNC: 6 MMOL/L (ref 5–15)
ANISOCYTOSIS BLD QL: ABNORMAL
AST SERPL-CCNC: 18 U/L (ref 1–32)
BASOPHILS # BLD MANUAL: 0.02 10*3/MM3 (ref 0–0.2)
BASOPHILS NFR BLD MANUAL: 2.6 % (ref 0–1.5)
BILIRUB SERPL-MCNC: 0.4 MG/DL (ref 0–1.2)
BUN SERPL-MCNC: 18 MG/DL (ref 8–23)
BUN/CREAT SERPL: 28.1 (ref 7–25)
BURR CELLS BLD QL SMEAR: ABNORMAL
CALCIUM SPEC-SCNC: 8.4 MG/DL (ref 8.6–10.5)
CHLORIDE SERPL-SCNC: 104 MMOL/L (ref 98–107)
CO2 SERPL-SCNC: 24 MMOL/L (ref 22–29)
CREAT SERPL-MCNC: 0.64 MG/DL (ref 0.57–1)
DEPRECATED RDW RBC AUTO: 48.6 FL (ref 37–54)
EGFRCR SERPLBLD CKD-EPI 2021: 88.4 ML/MIN/1.73
EOSINOPHIL # BLD MANUAL: 0.01 10*3/MM3 (ref 0–0.4)
EOSINOPHIL NFR BLD MANUAL: 1.3 % (ref 0.3–6.2)
ERYTHROCYTE [DISTWIDTH] IN BLOOD BY AUTOMATED COUNT: 13.7 % (ref 12.3–15.4)
GLOBULIN UR ELPH-MCNC: 2.4 GM/DL
GLUCOSE SERPL-MCNC: 87 MG/DL (ref 65–99)
HCT VFR BLD AUTO: 29.4 % (ref 34–46.6)
HGB BLD-MCNC: 10 G/DL (ref 12–15.9)
LYMPHOCYTES # BLD MANUAL: 0.37 10*3/MM3 (ref 0.7–3.1)
LYMPHOCYTES NFR BLD MANUAL: 14.5 % (ref 5–12)
MCH RBC QN AUTO: 33 PG (ref 26.6–33)
MCHC RBC AUTO-ENTMCNC: 34 G/DL (ref 31.5–35.7)
MCV RBC AUTO: 97 FL (ref 79–97)
MONOCYTES # BLD: 0.13 10*3/MM3 (ref 0.1–0.9)
NEUTROPHILS # BLD AUTO: 0.37 10*3/MM3 (ref 1.7–7)
NEUTROPHILS NFR BLD MANUAL: 40.8 % (ref 42.7–76)
OVALOCYTES BLD QL SMEAR: ABNORMAL
PLAT MORPH BLD: NORMAL
PLATELET # BLD AUTO: 160 10*3/MM3 (ref 140–450)
PMV BLD AUTO: 9.6 FL (ref 6–12)
POIKILOCYTOSIS BLD QL SMEAR: ABNORMAL
POLYCHROMASIA BLD QL SMEAR: ABNORMAL
POTASSIUM SERPL-SCNC: 4 MMOL/L (ref 3.5–5.2)
PROT SERPL-MCNC: 4.9 G/DL (ref 6–8.5)
RBC # BLD AUTO: 3.03 10*6/MM3 (ref 3.77–5.28)
SMUDGE CELLS BLD QL SMEAR: ABNORMAL
SODIUM SERPL-SCNC: 134 MMOL/L (ref 136–145)
VARIANT LYMPHS NFR BLD MANUAL: 40.8 % (ref 19.6–45.3)
WBC NRBC COR # BLD AUTO: 0.9 10*3/MM3 (ref 3.4–10.8)

## 2024-05-31 PROCEDURE — 25010000002 CEFEPIME PER 500 MG: Performed by: INTERNAL MEDICINE

## 2024-05-31 PROCEDURE — 97530 THERAPEUTIC ACTIVITIES: CPT

## 2024-05-31 PROCEDURE — 99232 SBSQ HOSP IP/OBS MODERATE 35: CPT | Performed by: INTERNAL MEDICINE

## 2024-05-31 PROCEDURE — 99231 SBSQ HOSP IP/OBS SF/LOW 25: CPT | Performed by: NURSE PRACTITIONER

## 2024-05-31 PROCEDURE — 85025 COMPLETE CBC W/AUTO DIFF WBC: CPT | Performed by: INTERNAL MEDICINE

## 2024-05-31 PROCEDURE — 80053 COMPREHEN METABOLIC PANEL: CPT | Performed by: INTERNAL MEDICINE

## 2024-05-31 PROCEDURE — 85007 BL SMEAR W/DIFF WBC COUNT: CPT | Performed by: INTERNAL MEDICINE

## 2024-05-31 PROCEDURE — 25010000002 CEFEPIME PER 500 MG: Performed by: HOSPITALIST

## 2024-05-31 RX ADMIN — SODIUM CHLORIDE TAB 1 GM 1 G: 1 TAB at 08:24

## 2024-05-31 RX ADMIN — MIRTAZAPINE 15 MG: 15 TABLET, FILM COATED ORAL at 21:22

## 2024-05-31 RX ADMIN — CEFEPIME 2000 MG: 2 INJECTION, POWDER, FOR SOLUTION INTRAVENOUS at 15:17

## 2024-05-31 RX ADMIN — Medication 1 TABLET: at 08:23

## 2024-05-31 RX ADMIN — MIDODRINE HYDROCHLORIDE 5 MG: 5 TABLET ORAL at 08:34

## 2024-05-31 RX ADMIN — CETIRIZINE HYDROCHLORIDE 10 MG: 10 TABLET ORAL at 08:23

## 2024-05-31 RX ADMIN — APIXABAN 2.5 MG: 2.5 TABLET, FILM COATED ORAL at 08:34

## 2024-05-31 RX ADMIN — Medication 10 ML: at 08:24

## 2024-05-31 RX ADMIN — AMIODARONE HYDROCHLORIDE 200 MG: 200 TABLET ORAL at 08:34

## 2024-05-31 RX ADMIN — MIDODRINE HYDROCHLORIDE 5 MG: 5 TABLET ORAL at 18:34

## 2024-05-31 RX ADMIN — CEFEPIME 2000 MG: 2 INJECTION, POWDER, FOR SOLUTION INTRAVENOUS at 02:45

## 2024-05-31 RX ADMIN — MIDODRINE HYDROCHLORIDE 5 MG: 5 TABLET ORAL at 11:44

## 2024-05-31 RX ADMIN — ACETAMINOPHEN 325MG 650 MG: 325 TABLET ORAL at 21:38

## 2024-05-31 RX ADMIN — SODIUM CHLORIDE TAB 1 GM 1 G: 1 TAB at 18:35

## 2024-05-31 RX ADMIN — APIXABAN 2.5 MG: 2.5 TABLET, FILM COATED ORAL at 21:22

## 2024-05-31 RX ADMIN — Medication 10 ML: at 21:23

## 2024-05-31 NOTE — PROGRESS NOTES
.            [facility]    Palliative Care Daily Progress Note   Chief complaint-follow up goals of care/advanced care planning and support for patient/family    Code Status:   Code Status and Medical Interventions:   Ordered at: 05/29/24 1443     Medical Intervention Limits:    NO intubation (DNI)    NO cardioversion    NO artificial nutrition     Level Of Support Discussed With:    Patient    Health Care Surrogate     Code Status (Patient has no pulse and is not breathing):    No CPR (Do Not Attempt to Resuscitate)     Medical Interventions (Patient has pulse or is breathing):    Limited Support     Comments:    spoke with patient and her son/Helder KAUR      Advanced Directives: Advance Directive Status: Patient has advance directive, copy in chart   Goals of Care: Ongoing.     S: Medical record reviewed. Events noted.  Patient resting in bed, no new complaints. Remains tired. She didn't eat much for breakfast.  No family present. I reviewed labs (bone marrow biopsy results pending), medical notes, therapy notes, MAR, and I/O's. Spoke with ALLAN Wang.              Review of Systems   Constitutional: Positive for decreased appetite and malaise/fatigue.   Cardiovascular:  Negative for chest pain.   Respiratory:  Negative for shortness of breath.    Neurological:  Positive for weakness.     Pain Assessment  Preferred Pain Scale: number (Numeric Rating Pain Scale)  Nonverbal Indicators of Pain: nonverbal indicators absent  Pain Location: back  Pain Description: aching    O:     Intake/Output Summary (Last 24 hours) at 5/31/2024 0908  Last data filed at 5/30/2024 1802  Gross per 24 hour   Intake 110 ml   Output 550 ml   Net -440 ml       Diagnostics and current medications: Reviewed.    Current Facility-Administered Medications   Medication Dose Route Frequency Provider Last Rate Last Admin    acetaminophen (TYLENOL) tablet 650 mg  650 mg Oral Q4H PRN Abel Pelaez MD   650 mg at  05/30/24 2120    Or    acetaminophen (TYLENOL) 160 MG/5ML oral solution 650 mg  650 mg Oral Q4H PRN Abel Pelaez MD        Or    acetaminophen (TYLENOL) suppository 650 mg  650 mg Rectal Q4H PRN Abel Pelaez MD        albuterol (PROVENTIL) nebulizer solution 0.083% 2.5 mg/3mL  2.5 mg Nebulization Q4H PRN Abel Pelaez MD        amiodarone (PACERONE) tablet 200 mg  200 mg Oral Q24H Abel Pelaez MD   200 mg at 05/31/24 0834    apixaban (ELIQUIS) tablet 2.5 mg  2.5 mg Oral Q12H Mallory Del Castillo MD   2.5 mg at 05/31/24 0834    sennosides-docusate (PERICOLACE) 8.6-50 MG per tablet 2 tablet  2 tablet Oral BID PRN Abel Pelaez MD        And    polyethylene glycol (MIRALAX) packet 17 g  17 g Oral Daily PRN Abel Pelaez MD        And    bisacodyl (DULCOLAX) EC tablet 5 mg  5 mg Oral Daily PRN Abel Pelaez MD        And    bisacodyl (DULCOLAX) suppository 10 mg  10 mg Rectal Daily PRN Abel Pelaez MD        cefepime 2000 mg IVPB in 100 mL NS (MBP)  2,000 mg Intravenous Q12H Renard Dhillon MD        cetirizine (zyrTEC) tablet 10 mg  10 mg Oral Daily Abel Pelaez MD   10 mg at 05/31/24 0823    ferrous sulfate tablet 325 mg  325 mg Oral Every Other Day Abel Pelaez MD   325 mg at 05/30/24 0832    Hydrocortisone (Perianal) (ANUSOL-HC) 2.5 % rectal cream   Rectal Daily Abel Pelaez MD   Given at 05/30/24 0834    midodrine (PROAMATINE) tablet 5 mg  5 mg Oral TID AC Saulo Smith MD   5 mg at 05/31/24 0834    mirtazapine (REMERON) tablet 15 mg  15 mg Oral Nightly Saulo Smith MD   15 mg at 05/30/24 2120    multivitamin with minerals 1 tablet  1 tablet Oral Daily Abel Pelaez MD   1 tablet at 05/31/24 0823    nitroglycerin (NITROSTAT) SL tablet 0.4 mg  0.4 mg Sublingual Q5 Min PRN Abel Pelaez MD        ondansetron (ZOFRAN) injection 4 mg  4 mg Intravenous Q6H PRN Abel Pelaez MD        Potassium Replacement - Follow Nurse / BPA Driven Protocol   Does not apply PRN Morales Farrell MD        sodium  "chloride 0.9 % flush 10 mL  10 mL Intravenous Q12H Abel Pelaez MD   10 mL at 05/31/24 0824    sodium chloride 0.9 % flush 10 mL  10 mL Intravenous PRN Abel Pelaez MD        sodium chloride 0.9 % infusion 40 mL  40 mL Intravenous PRN Abel Pelaez MD        sodium chloride tablet 1 g  1 g Oral BID With Meals Aarti Reynolds MD   1 g at 05/31/24 0824          acetaminophen **OR** acetaminophen **OR** acetaminophen    albuterol    senna-docusate sodium **AND** polyethylene glycol **AND** bisacodyl **AND** bisacodyl    nitroglycerin    ondansetron    Potassium Replacement - Follow Nurse / BPA Driven Protocol    sodium chloride    sodium chloride  Attest that current medications reviewed including but not limited to prescriptions, over-the counter, herbals and vitamin/mineral/dietary (nutritional) supplements for name, route of administration, type, dose and frequency and are current using all immediate resources available at time of dictation.    A:    /65 (BP Location: Left arm, Patient Position: Lying)   Pulse 71   Temp 97.9 °F (36.6 °C) (Oral)   Resp 20   Ht 152.4 cm (60\")   Wt 54 kg (119 lb)   SpO2 99%   BMI 23.24 kg/m²     Constitutional:       Appearance: Not in distress. Frail. Chronically ill-appearing.   Pulmonary:      Effort: Pulmonary effort is normal.   Cardiovascular:      PMI at left midclavicular line.   Edema:     Peripheral edema absent.   Neurological:      Mental Status: Alert and oriented to person, place, and time.   Psychiatric:         Speech: Speech normal.         Behavior: Behavior is cooperative.         Thought Content: Thought content normal.         Cognition and Memory: Cognition normal.         Judgment: Judgment normal.      Comments: Irritable          Patient status: Disease state: Controlled with current treatments.  Functional status: Palliative Performance Scale Score: Performance 50% based on the following measures: Ambulation: Mainly sit or lie down, Activity " "and Evidence of Disease: Unable to do any work, extensive evidence of disease, Self-Care: Considerable assistance required,  Intake: Normal or reduced, LOC: Full or confusion   ECOG Status(2) Ambulatory and capable of self care, unable to carry out work activity, up and about > 50% or waking hours.  Nutritional status: Albumin 2.5 on 5/25/2024 Body mass index is 23.24 kg/m².  Family support: The patient receives support from her children..  Advance Directives: Advance Directive Status: Patient has advance directive, copy in chart   POA/Healthcare surrogate-son, Helder.     Impression/Problem List:     Leukopenia   Diffuse large b cell lymphoma   Chronic diastolic heart failure   Paroxysmal atrial fibrillation   Hyponatremia     Malnutrition            Recommendations/Plan:  1. Provide support with goals of care           2.  Palliative care encounter  5/29/2024- I spoke with patient at bedside regarding goals of care. She has a very good understanding of her medical conditions, which we reviewed in detail. She has the support of three children. She resided alone prior to admission. At this time, she recognizes and/or feels she is in a bad situation and uncertain if she will bounce back from everything. She relies heavily on Dr Galo and his recommendations. She gomez tell me if she is informed her cancer is worst she will not resume treatment and will just have to \"throw in the towel.\" She would only consider treatment if it would give her more years of life that are enjoyable and good quality. She does have a living will on file that designates her son, Helder. We reviewed her wishes regarding CPR and medical interventions and she wishes for the following:  DNR/ DNI, no cardioversion, no artifical nutrition. She tells me her children know her wishes and I asked to contact Helder to confirm and she was agreeable. At this time, her goal is to continue with current level of treatment and consider bone marrow biopsy to " determine disease progression to determine what is next for her. She would like to discuss with [facility] as she trust him tremendously and he has been taking care of her the last 2-3 years. She is [employer] and attends [facility] in [place]. She does acknowledge general fatigue, weakness, and poor appetite. No pain or shortness of breath. I did provide her with information regarding palliative care and will plan to discuss further. Of note, I did attempt to discuss discharge options (home, rehab etc) and she tells me she is ready to discuss that just yet. I called her son/HCS, Helder at 1427 to discuss goals of care. I provided him with an update of my conversation I had with his mother. We discussed her wishes to be DNR/DNI, NO cardioversion and NO artifical nutrition and he is agreeable and supports his mother decision. I will update her chart to reflect their wishes.  He feels she is very knowledgeable of her situation and he will support her. I did give him information regarding palliative care. I will plan to support the patient and family for the next couple days. I spoke with ALLAN Vasquez.      5/30/2024-  The patient is off unit for bone marrow biopsy. I called her son, Helder at 1440. He visited with his mother last night, along with his brother. He reports she was really tired and not much conversation. He is aware of her poor nutrition intake. He was made aware that patient went for bone marrow biopsy today due to lab findings and further evaluation of lymphoma. He too wants to have results of bone marrow biopsy to help guide decision making and also discuss with Dr Galo.  I will plan to follow up with patient tomorrow to have further discussion with goals. I spoke with ALLAN Vasquez.     5/31/2024- I attempted to have further conversation with patient regarding goals of care and palliative care and hospice care and she is not wanting to engage in further discussion. She tells me she wants to  discuss things further once her results have come back from her bone marrow biopsy. I did leave some brochures regarding [facility] and [facility] to provide additional information for the weekend and will inform her son, Helder, when I call him later this afternoon. I will plan to follow up Monday. I spoke with ALLAN Wang.     I called her son, Helder at 1515. I did provide him with information regarding [facility] vs [facility]. He is aware of written information left at bedside for him and the family to review. I will plan to follow up Monday with hopes that they have received results of bone marrow biopsy to support them with further decision making.         Thank you for this consult and allowing us to participate in patient's plan of care. Palliative Care Team will continue to follow patient.     Time spent:30 minutes spent reviewing medical and medication records, assessing and examining patient, discussing with family, answering questions, providing some guidance about a plan and documentation of care, and coordinating care with other healthcare members, with > 50% time spent face to face.        MAGGI Segura  5/31/2024  09:08 EDT

## 2024-05-31 NOTE — PLAN OF CARE
Goal Outcome Evaluation:  Plan of Care Reviewed With: patient        Progress: improving  Outcome Evaluation: Pt. seen this PM for OT session, required verbal cues for encouragement to participate intially deferring however agreeable to transfer into recliner and complete BUE ther ex. Pt. was able to perform sup > sit CGA with cues for use of bed rails. Once seated encouraged to attempt LBD. Unable to don socks requiring max A. Pt. completed STS min A and min-CGA to recliner. BUE ther ex performed. Pt. demos continued deficits with strength and balance limiting independence with ADLS. OT to continue to follow.      Anticipated Discharge Disposition (OT): skilled nursing facility, home with home health

## 2024-05-31 NOTE — PROGRESS NOTES
Subjective     CHIEF COMPLAINT:     Diffuse large B cell lymphoma  Neutropenia   Anemia  Hyponatremia    INTERVAL HISTORY:   Tmax of 99.5  Bone marrow biopsy performed yesterday.   No new complaints at this time.     REVIEW OF SYSTEMS:  Pertinent ROS as in the interval history. Otherwise, negative.    SCHEDULED MEDS:  amiodarone, 200 mg, Oral, Q24H  cefepime, 2,000 mg, Intravenous, Q8H  cetirizine, 10 mg, Oral, Daily  ferrous sulfate, 325 mg, Oral, Every Other Day  Hydrocortisone (Perianal), , Rectal, Daily  midodrine, 5 mg, Oral, TID AC  mirtazapine, 15 mg, Oral, Nightly  multivitamin with minerals, 1 tablet, Oral, Daily  sodium chloride, 10 mL, Intravenous, Q12H  sodium chloride, 1 g, Oral, BID With Meals      Objective   VITAL SIGNS:  Temp:  [97.3 °F (36.3 °C)-99.5 °F (37.5 °C)] 99.5 °F (37.5 °C)  Heart Rate:  [66-81] 75  Resp:  [12-17] 16  BP: ()/(57-82) 123/68     PHYSICAL EXAMINATION:  GENERAL:  The patient is ill-appearing.  SKIN: No skin rash.   EYES:  No Jaundice. No Pallor.   CHEST: Normal respiratory effort. Lungs clear to auscultation.   CARDIAC:  Normal S1 & S2. Grade III systolic murmur.   ABDOMEN:  Non-distended.   EXTREMITIES:  No edema.    I have reexamined the patient and the results are consistent with the previously documented exam. Mallory Del Castillo MD      RESULT REVIEW:   Results from last 7 days   Lab Units 05/31/24  0315 05/30/24  0415 05/29/24  0315 05/28/24  0307 05/27/24  1812 05/27/24  0336 05/26/24  0525   WBC 10*3/mm3 0.90* 0.83* 0.87* 1.05* 1.30* 1.46* 1.01*   NEUTROS ABS 10*3/mm3 0.37*  --  0.41* 0.57* 0.85*  0.59* 0.85* 0.61*   LYMPHS ABS 10*3/mm3 0.37*  --   --  0.29* 0.40*  0.36* 0.32* 0.22*   HEMOGLOBIN g/dL 10.0* 10.2* 9.9* 10.1* 10.9* 10.6* 8.2*   HEMATOCRIT % 29.4* 30.0* 29.2* 29.5* 31.4* 31.0* 23.9*   PLATELETS 10*3/mm3 160 161 184 206 210 190 174     Results from last 7 days   Lab Units 05/31/24  0315 05/30/24  0415 05/29/24  1558 05/29/24  0315 05/28/24  0307  05/27/24  0336 05/26/24  0525 05/25/24  0311 05/24/24  1150   SODIUM mmol/L 134* 133*  --  130* 132* 133*   < > 128* 126*   POTASSIUM mmol/L 4.0 4.2 4.4 3.2* 3.7 4.0   < > 4.2 5.6*   CHLORIDE mmol/L 104 104  --  97* 99 101   < > 97* 90*   CO2 mmol/L 24.0 22.0  --  22.0 23.0 25.0   < > 22.2 22.4   BUN mg/dL 18 15  --  16 20 16   < > 18 26*   CREATININE mg/dL 0.64 0.51*  --  0.59 0.71 0.60   < > 0.75 0.95   CALCIUM mg/dL 8.4* 8.2*  --  7.8* 8.1* 8.1*   < > 8.2* 8.7   ALBUMIN g/dL 2.5*  --   --   --   --   --   --  2.5* 3.1*   BILIRUBIN mg/dL 0.4  --   --   --   --   --   --  0.3 0.3   ALK PHOS U/L 63  --   --   --   --   --   --  62 72   ALT (SGPT) U/L 22  --   --   --   --   --   --  32 42*   AST (SGOT) U/L 18  --   --   --   --   --   --  34* 51*   MAGNESIUM mg/dL  --   --   --   --   --   --   --  1.9  --     < > = values in this interval not displayed.         Lab 05/26/24  0525 05/24/24  1150   IRON  --  27*   IRON SATURATION (TSAT)  --  11*   TIBC  --  237*   TRANSFERRIN  --  159*   FERRITIN  --  1,953.00*   FOLATE 11.40  --    VITAMIN B 12 >2,000*  --       Component      Latest Ref [facility] 5/25/2024   Reticulocyte %      0.70 - 1.90 % 2.97 (H)       Component      Latest Ref Rn 5/25/2024   Haptoglobin      30 - 200 mg/dL 237 (H)      Component      Latest Ref Rn 5/3/2024 5/24/2024 5/25/2024   LDH      135 - 214 U/L 164  245 (H)  190    Uric Acid      2.4 - 5.7 mg/dL 3.5  3.5        Assessment & Plan   *Relapsed diffuse large B-cell lymphoma.  Patient was found to have bilateral pleural effusions.    She underwent right thoracentesis on 2/21/2022 and the left thoracentesis on 2/22/2022.    Analysis of the fluid revealed involvement with diffuse large B-cell lymphoma.    FISH analysis for BCL6 rearrangement was positive but was negative for BCL-2 and MYC rearrangement.    PET scan on 3/10/2022 revealed significant hypermetabolism in the left adrenal gland and the surrounding soft tissue with SUV up to 34.5.  Hypermetabolism in the left pleural thickening with SUV of 7.1. there was less hypermetabolism in the right adrenal gland and in the right pleura.  She was considered to have stage III non-bulky disease.  R-IPI score of 3 associated with poor risk - associated with overall survival of 55%.   R-CHOP with Neulasta support was started on 3/28/2022.  Due to development of neutropenic fever following cycles 1 and 2, treatment was changed to mini-RCHOP starting cycle #3 on 5/16/2022.  Patient received cycle #6 on 7/25/2022.  PET scan on 8/15/2022 revealed complete metabolic response.  Patient was found to have recurrence with development of a left mandibular mass in October/November 2023.  Biopsy of left mandible mass on 11/28/2023 showed recurrence of the lymphoma, diffuse large B cell, non-GCB phenotype.  Ki-67 was 90%.    It was positive for Bcl-6 but negative for Bcl-2 and MYC rearrangement.  PET scan on 12/11/2023 revealed multiple areas of involvement -prevascular space, left epicardial fat pad, pleural density posterior to the left fourth rib anterior aspect, lesion along the pericardium versus intramuscular with SUV of 9.8.  Hypermetabolic activity at both adrenal glands, uniformly increased in size and appeared hyperplastic.  There was a 2.7 x 1.1 cm soft tissue nodule lateral to the right erector spinae muscle posterior to the right posterior 11th rib with an SUV of 20.5 and there was a 1.3 cm nodule lateral to the posterior margin of the left psoas muscle with an SUV of 24.9.  Rituxan Polivy and Treanda with Treanda every 3 weeks for 6 cycles was started on 12/12/2023.  The jaw mass started to decrease in size after starting treatment.  CT scan on 2/8/2024 showed evidence of response to treatment.  Cycle #6 was given on 4/1/2024.  PET scan on 4/22/2024 revealed decrease in the hypermetabolism in the mediastinal and pericardial lymph nodes/lesions and in the lesions above the left kidney and adrenal gland.  The  left mandibular lesion was no longer hypermetabolic.   She is at increased risk for relapse of the lymphoma.  The plan was to start treatment with Revlimid 10 mg daily.  However, the patient did not start the treatment yet.  LDH was previously elevated but improved to 190 on 5/25/2024.     *Neutropenia  5/20/2024: WBC decreased to 1750.  Neutrophils decreased to 940.  5/24/2024: WBC decreased to 1290.  Neutrophils decreased to 680.  5/25/2024: WBC decreased to 860.  Neutrophils decreased to 530.  Worsening neutropenia is also concerning for relapse of the lymphoma with involvement of the bone marrow.   5/26/2024: Neutrophil count 610.  5/27/2024-total WBC count has increased to 1.46.  Absolute neutrophil count pending   5/28/2024-WBC count lower at 1.05 with an absolute neutrophil count of 0.57  Increase in metamyelocytes as well as basophil percentage.  Flow cytometry 5/28/2024-negative  ANC lower today at 0.41  Copper level normal at 145  MMA normal at 245  5/30/24 - bone marrow biopsy, results pending   ANC- 0.37 on 5/31/24      *Fever  Tmax of 99.5  Blood cultures negative  Patient at high risk for infections given neutropenia  Continue cefepime      *Macrocytic anemia.  Patient also developed anemia secondary to lymphoma and secondary to chemotherapy.    Hgb decreased to 7.9 on 4/25/2022 and she was transfused.   Hemoglobin decreased to 6.3 on 4/27/2022. She was transfused.  Hemoglobin level improved subsequently.  Patient was placed ferrous sulfate 325 mg 3 days a week.  5/3/2024: Hemoglobin increased to 10.2.  5/25/2024: Hemoglobin decreased to 7.8.  No evidence of vitamin B12 or folate deficiency.    Ferritin 1953.  Transferrin saturation 11%.  Patient decided to hold off on PRBC transfusion.  5/26/2024: Hemoglobin 8.2.  Due to her reporting chills when she received PRBC Tx in the past, we will give Tylenol, Benadryl and Solumedrol prior to the transfusion.   526 2024-1 unit of PRBC administered.  Patient  tolerated the transfusion well.  Hemoglobin stable at 10.2  Copper and MMA levels still pending     *Hyponatremia.  Patient had problem with hyponatremia in the past.  Sodium is 127 on 3/14/2023.  She was advised to increase salt intake.  Sodium improved to 133 on repeat lab on 3/21/2023.  However, sodium decreased to 126 on 6/7/2023.  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  She was started on sodium chloride 1 g daily.  4/1/2024: Sodium decreased to 132.  Leg swelling improved with Lasix.  5/3/2024: Sodium 130.  5/24/2024: Sodium decreased to 126.    Patient was having significant weakness.  5/25/2024: Sodium 128.  Nephrology service was consulted.  5/28/2024-sodium stable at 132  5/30/2024-sodium 133  5/31/24 - sodium 134     *Hyperkalemia.  5/24/2024: Potassium increased to 5.6.  Potassium 4.0     *Chronic anticoagulation.  Patient is on Eliquis 5 mg twice daily.  No excessive bleeding  Platelet count remains normal  Eliquis resumed      *Prophylaxis.  Patient had shingles in April 2023.  She is at risk of developing shingles while on treatment.  She was placed on acyclovir 400 mg twice daily.  She was on Bactrim DS 3 days a week.  Bactrim was discontinued due to the electrolyte abnormalities.     *Low-density lesion in the uterus.    The radiologist recommended pelvic ultrasound.    The patient is asymptomatic.    PET scan on 4/22/2024 reported fluid in the lower abdomen.  Patient reported intermittent vaginal discharge.     PLAN:     1.  No clear explanation for neutropenia, continues to have low WBC , BMB results pending   Discussed with pt and if this is lymphoma recurrence she would like to opt for comfort measures  Palliative care consulted   2.flow cytometry results negative  3.follow-up on copper and MMA levels  4.Blood cx negative   5.Continue cefepime, ID consulted.    Overall guarded prognosis given worsening neutropenia and ongoing fevers.        Mallory Del Castillo,  MD  05/31/24

## 2024-05-31 NOTE — PLAN OF CARE
Problem: Adult Inpatient Plan of Care  Goal: Plan of Care Review  Outcome: Ongoing, Progressing   Goal Outcome Evaluation:      Vss.No c/o pain or n/v.Up to chair, tolerating well.Labs in am.Remains on 1000 ml fluid restriction.

## 2024-05-31 NOTE — PROGRESS NOTES
[facility]/Nephrology Follow Up Note    Patient Identification:  Name: Michelle Car MRN: [id number]  Age: 83 y.o. : 1940  Sex: female  Date:2024    Requesting Physician: As per consult order.  Reason for Consultation: hyponatremia   Information from:patient/ family/ chart      History of Present Illness: This is a 83 y.o. year old female  with lymphoma admitted for weakness, hyponatremia and edema.   She completed chemo in 2024.  She has had ongoing issues with hyponatremia.  She was on salt tabs at home. Upon arrival her Na was 126 with K at 5.6.   She was given IV fluids of NS as BP was low upon arrival.      Na improved to 134 today      Current Meds:   Current Facility-Administered Medications   Medication Dose Route Frequency Provider Last Rate Last Admin    acetaminophen (TYLENOL) tablet 650 mg  650 mg Oral Q4H PRN Abel Pelaez MD   650 mg at 24 2120    Or    acetaminophen (TYLENOL) 160 MG/5ML oral solution 650 mg  650 mg Oral Q4H PRN Abel Pelaez MD        Or    acetaminophen (TYLENOL) suppository 650 mg  650 mg Rectal Q4H PRN Abel Pelaez MD        albuterol (PROVENTIL) nebulizer solution 0.083% 2.5 mg/3mL  2.5 mg Nebulization Q4H PRN Abel Pelaez MD        amiodarone (PACERONE) tablet 200 mg  200 mg Oral Q24H Abel Pelaez MD   200 mg at 24 0832    sennosides-docusate (PERICOLACE) 8.6-50 MG per tablet 2 tablet  2 tablet Oral BID PRN Abel Pelaez MD        And    polyethylene glycol (MIRALAX) packet 17 g  17 g Oral Daily PRN Abel Pelaez MD        And    bisacodyl (DULCOLAX) EC tablet 5 mg  5 mg Oral Daily PRN Abel Pelaez MD        And    bisacodyl (DULCOLAX) suppository 10 mg  10 mg Rectal Daily PRN Abel Pelaez MD        cefepime 2000 mg IVPB in 100 mL NS (MBP)  2,000 mg Intravenous Q8H Renard Dhillon MD   2,000 mg at 24 0245    cetirizine (zyrTEC) tablet 10 mg  10 mg Oral Daily Abel Pelaez MD   10 mg at 24 0832    ferrous sulfate tablet 325 mg   "325 mg Oral Every Other Day Abel Pelaez MD   325 mg at 24 0832    Hydrocortisone (Perianal) (ANUSOL-HC) 2.5 % rectal cream   Rectal Daily Abel Pelaez MD   Given at 24 0834    midodrine (PROAMATINE) tablet 5 mg  5 mg Oral TID AC Saulo Smith MD   5 mg at 24 1802    mirtazapine (REMERON) tablet 15 mg  15 mg Oral Nightly Saulo Smith MD   15 mg at 240    multivitamin with minerals 1 tablet  1 tablet Oral Daily Abel Pelaez MD   1 tablet at 24 0832    nitroglycerin (NITROSTAT) SL tablet 0.4 mg  0.4 mg Sublingual Q5 Min PRN Abel Pelaez MD        ondansetron (ZOFRAN) injection 4 mg  4 mg Intravenous Q6H PRN Abel Pelaez MD        Potassium Replacement - Follow Nurse / BPA Driven Protocol   Does not apply PRN Mroales Farrell MD        sodium chloride 0.9 % flush 10 mL  10 mL Intravenous Q12H Abel Pelaez MD   10 mL at 24 0834    sodium chloride 0.9 % flush 10 mL  10 mL Intravenous PRN Abel Pelaez MD        sodium chloride 0.9 % infusion 40 mL  40 mL Intravenous PRN Abel Pelaez MD        sodium chloride tablet 1 g  1 g Oral BID With Meals Aarti Reynolds MD   1 g at 24 1802         Physical Exam:  Vitals:   Temp (24hrs), Av.3 °F (36.8 °C), Min:97.3 °F (36.3 °C), Max:99.5 °F (37.5 °C)    /68 (BP Location: Left arm, Patient Position: Lying)   Pulse 75   Temp 99.5 °F (37.5 °C) (Oral)   Resp 16   Ht 152.4 cm (60\")   Wt 54 kg (119 lb)   SpO2 95%   BMI 23.24 kg/m²   Intake/Output:     Intake/Output Summary (Last 24 hours) at 2024 0647  Last data filed at 2024 1802  Gross per 24 hour   Intake 350 ml   Output 550 ml   Net -200 ml        Wt Readings from Last 1 Encounters:   24 1433 54 kg (119 lb)   24 1849 54.2 kg (119 lb 7.8 oz)   24 1701 51.7 kg (114 lb)       Exam:  Sleeping comfortably    No jvd reg s1s2 no murmur   Clear bilaterally no rales/rhonchi/wheeze   Soft NTND + bs   No edema   Warm dry no rash "       Labs:   Recent Results (from the past 24 hour(s))   Comprehensive Metabolic Panel    Collection Time: 05/31/24  3:15 AM    Specimen: Blood   Result Value Ref Range    Glucose 87 65 - 99 mg/dL    BUN 18 8 - 23 mg/dL    Creatinine 0.64 0.57 - 1.00 mg/dL    Sodium 134 (L) 136 - 145 mmol/L    Potassium 4.0 3.5 - 5.2 mmol/L    Chloride 104 98 - 107 mmol/L    CO2 24.0 22.0 - 29.0 mmol/L    Calcium 8.4 (L) 8.6 - 10.5 mg/dL    Total Protein 4.9 (L) 6.0 - 8.5 g/dL    Albumin 2.5 (L) 3.5 - 5.2 g/dL    ALT (SGPT) 22 1 - 33 U/L    AST (SGOT) 18 1 - 32 U/L    Alkaline Phosphatase 63 39 - 117 U/L    Total Bilirubin 0.4 0.0 - 1.2 mg/dL    Globulin 2.4 gm/dL    A/G Ratio 1.0 g/dL    BUN/Creatinine Ratio 28.1 (H) 7.0 - 25.0    Anion Gap 6.0 5.0 - 15.0 mmol/L    eGFR 88.4 >60.0 mL/min/1.73   CBC Auto Differential    Collection Time: 05/31/24  3:15 AM    Specimen: Blood   Result Value Ref Range    WBC 0.90 (C) 3.40 - 10.80 10*3/mm3    RBC 3.03 (L) 3.77 - 5.28 10*6/mm3    Hemoglobin 10.0 (L) 12.0 - 15.9 g/dL    Hematocrit 29.4 (L) 34.0 - 46.6 %    MCV 97.0 79.0 - 97.0 fL    MCH 33.0 26.6 - 33.0 pg    MCHC 34.0 31.5 - 35.7 g/dL    RDW 13.7 12.3 - 15.4 %    RDW-SD 48.6 37.0 - 54.0 fl    MPV 9.6 6.0 - 12.0 fL    Platelets 160 140 - 450 10*3/mm3   Manual Differential    Collection Time: 05/31/24  3:15 AM    Specimen: Blood   Result Value Ref Range    Neutrophil % 40.8 (L) 42.7 - 76.0 %    Lymphocyte % 40.8 19.6 - 45.3 %    Monocyte % 14.5 (H) 5.0 - 12.0 %    Eosinophil % 1.3 0.3 - 6.2 %    Basophil % 2.6 (H) 0.0 - 1.5 %    Neutrophils Absolute 0.37 (L) 1.70 - 7.00 10*3/mm3    Lymphocytes Absolute 0.37 (L) 0.70 - 3.10 10*3/mm3    Monocytes Absolute 0.13 0.10 - 0.90 10*3/mm3    Eosinophils Absolute 0.01 0.00 - 0.40 10*3/mm3    Basophils Absolute 0.02 0.00 - 0.20 10*3/mm3    Anisocytosis Slight/1+ None Seen    Zaheer Cells Slight/1+ None Seen    Ovalocytes Slight/1+ None Seen    Poikilocytes Slight/1+ None Seen    Polychromasia Mod/2+  None Seen    Smudge Cells Slight/1+ None Seen    Platelet Morphology Normal Normal       Radiology:  Imaging Results (Last 24 Hours)       Procedure Component Value Units Date/Time    CT Bone marrow biopsy and aspiration [[id number]] Collected: 05/30/24 1452     Updated: 05/30/24 1455    Narrative:      PROCEDURE: CT guided bone marrow biopsy     HISTORY: neutropenia, h/o lymphoma     TECHNIQUE:  Radiation dose reduction techniques were utilized, including automated  exposure control and exposure modulation based on body size.     Informed consent was obtained. In the procedure room a timeout was  performed confirming correct patient and procedure.     The patient was placed in the prone position on the CT scanner. CT scan  was performed to localize the posterior right iliac crest. The overlying  skin was prepped and draped in the usual sterile fashion with 2%  chlorhexidine. 1% lidocaine was used for local anesthesia.     Next, an 11 gauge bone access needle was advanced into the posterior  right iliac crest under CT guidance. Bone marrow aspirate followed by  core biopsy was then obtained and sent to pathology. The needle was  removed and sterile dressing applied. No immediate complications.       Impression:      Technically successful CT guided bone marrow biopsy.     Moderate sedation was provided under my direct supervision using 0.5 mg  IV Versed and 25 mcg IV Fentanyl. The patient was independently  monitored by a trained Department of Radiology RN using automated blood  pressure, EKG, and pulse oximetry. My total intra-service time was 4  minutes.                    Radiation dose reduction techniques were utilized, including automated  exposure control and exposure modulation based on body size.        This report was finalized on 5/30/2024 2:52 PM by Dr. Jr Deshpande M.D on Workstation: [id number]                ASSESSMENT:   Hyponatremia   Lymphoma   Hypotension   Hyperkalemia   Chronic diastolic  heart failure   Adrenal insufficiency - per chart, but doesn't follow with endo   Hypoalbuminemia   Edema   Parox afib       PLAN:   Na improved to 134  Continue oral NaCl and fluid restriction  Cortisol and TSH are WNL   Replace K per protocol (good today)  Off IV fluids   On Midodrine for BP support     Monitor electrolytes and volume closely   Dose all medications for GFR >60   Please call with any questions or concerns.       Mikal Vásquez MD  [facility]  Office phone number: [phone]  Answering service phone number: [phone]

## 2024-05-31 NOTE — CASE MANAGEMENT/SOCIAL WORK
Continued Stay Note  [facility]     Patient Name: Michelle Car  MRN: [id number]  Today's Date: 5/31/2024    Admit Date: 5/24/2024    Plan: SNF (needs pre cert)  VS Hospice VS palliative.  Pt lives alone and will not be able to care for herself as she is now. Will need support if she goes Hospice or Palliative. Pt states if Cancer is worse then she is giving up. Heather/Thuy will follow up Monday   Discharge Plan       Row Name 05/31/24 1728       Plan    Plan SNF (needs pre cert)  VS Hospice VS palliative.  Pt lives alone and will not be able to care for herself as she is now. Will need support if she goes Hospice or Palliative. Pt states if Cancer is worse then she is giving up. Heather/Thuy will follow up Monday    Patient/Family in Agreement with Plan yes    Plan Comments Pt waiting for bone marrow biopsy results before making D/C plans. If pt goes home with Hospice she will need have support at home (She lives alone) or placement, Monica has been following and will follow up with pt Monday. Vipin Cleveland RN-BC                   Discharge Codes    No documentation.                 Expected Discharge Date and Time       Expected Discharge Date Expected Discharge Time    Jaya 3, 2024               Vipin Cleveland RN

## 2024-05-31 NOTE — PROGRESS NOTES
ID NOTE    CC: f/u Neutropenic fever     Subj: Fever curve improved.  She remains neutropenic.  She went for bone marrow biopsy yesterday, and the results are pending.  She is tolerating cefepime without rash.      Medications:    Current Facility-Administered Medications:     acetaminophen (TYLENOL) tablet 650 mg, 650 mg, Oral, Q4H PRN, 650 mg at 05/30/24 2120 **OR** acetaminophen (TYLENOL) 160 MG/5ML oral solution 650 mg, 650 mg, Oral, Q4H PRN **OR** acetaminophen (TYLENOL) suppository 650 mg, 650 mg, Rectal, Q4H PRN, Abel Pelaez MD    albuterol (PROVENTIL) nebulizer solution 0.083% 2.5 mg/3mL, 2.5 mg, Nebulization, Q4H PRN, Abel Pelaez MD    amiodarone (PACERONE) tablet 200 mg, 200 mg, Oral, Q24H, Abel Pelaez MD, 200 mg at 05/31/24 0834    apixaban (ELIQUIS) tablet 2.5 mg, 2.5 mg, Oral, Q12H, Mallory Del Castillo MD, 2.5 mg at 05/31/24 0834    sennosides-docusate (PERICOLACE) 8.6-50 MG per tablet 2 tablet, 2 tablet, Oral, BID PRN **AND** polyethylene glycol (MIRALAX) packet 17 g, 17 g, Oral, Daily PRN **AND** bisacodyl (DULCOLAX) EC tablet 5 mg, 5 mg, Oral, Daily PRN **AND** bisacodyl (DULCOLAX) suppository 10 mg, 10 mg, Rectal, Daily PRN, Abel Pelaez MD    cefepime 2000 mg IVPB in 100 mL NS (MBP), 2,000 mg, Intravenous, Q12H, Renard Dhillon MD    cetirizine (zyrTEC) tablet 10 mg, 10 mg, Oral, Daily, Abel Pelaez MD, 10 mg at 05/31/24 0823    ferrous sulfate tablet 325 mg, 325 mg, Oral, Every Other Day, Abel Pelaez MD, 325 mg at 05/30/24 0832    Hydrocortisone (Perianal) (ANUSOL-HC) 2.5 % rectal cream, , Rectal, Daily, BenignoAbel ortiz MD, Given at 05/30/24 0834    midodrine (PROAMATINE) tablet 5 mg, 5 mg, Oral, TID AC, Saulo Smith MD, 5 mg at 05/31/24 0834    mirtazapine (REMERON) tablet 15 mg, 15 mg, Oral, Nightly, Saulo Smith MD, 15 mg at 05/30/24 2120    multivitamin with minerals 1 tablet, 1 tablet, Oral, Daily, Abel Pelaez MD, 1 tablet at 05/31/24 0823    nitroglycerin (NITROSTAT)  SL tablet 0.4 mg, 0.4 mg, Sublingual, Q5 Min PRN, Abel Pelaez MD    ondansetron (ZOFRAN) injection 4 mg, 4 mg, Intravenous, Q6H PRN, Abel Pelaez MD    Potassium Replacement - Follow Nurse / BPA Driven Protocol, , Does not apply, PRN, Morales Farrell MD    sodium chloride 0.9 % flush 10 mL, 10 mL, Intravenous, Q12H, Abel Pelaez MD, 10 mL at 05/31/24 0824    sodium chloride 0.9 % flush 10 mL, 10 mL, Intravenous, PRN, Abel Pelaez MD    sodium chloride 0.9 % infusion 40 mL, 40 mL, Intravenous, PRN, Abel Pelaez MD    sodium chloride tablet 1 g, 1 g, Oral, BID With Meals, Aarti Reynolds MD, 1 g at 05/31/24 0824      Objective   Vital Signs   Temp:  [97.3 °F (36.3 °C)-99.5 °F (37.5 °C)] 97.9 °F (36.6 °C)  Heart Rate:  [66-81] 71  Resp:  [12-20] 20  BP: ()/(57-82) 111/65    Physical Exam:   General: No acute distress  Eyes: no scleral icterus  Cardiovascular: Normal rate  Respiratory: normal work of breathing without wheezing  GI: Abdomen is soft, not tender or distended  Skin: No rashes; many bruises  Vasc: scar over left chest at prior port site has healed    Labs:   CBC, BMP, and blood cultures reviewed today  Lab Results   Component Value Date    WBC 0.90 (C) 05/31/2024    HGB 10.0 (L) 05/31/2024    HCT 29.4 (L) 05/31/2024    MCV 97.0 05/31/2024     05/31/2024     Lab Results   Component Value Date    GLUCOSE 87 05/31/2024    CALCIUM 8.4 (L) 05/31/2024     (L) 05/31/2024    K 4.0 05/31/2024    CO2 24.0 05/31/2024     05/31/2024    BUN 18 05/31/2024    CREATININE 0.64 05/31/2024    EGFRRESULT 65 03/21/2023    EGFR 88.4 05/31/2024    BCR 28.1 (H) 05/31/2024    ANIONGAP 6.0 05/31/2024     UA clear  Procalcitonin 0.10  Lactate 2.2---->0.7    Microbiology:  5/24 MRSA PCR nares: Negative  5/24 BCx: Negative  5/29 BCx: Negative to date    Prior radiology:  CXR personally reviewed and is negative for pneumonia      ASSESSMENT/PLAN:  Neutropenic fever  Diffuse large B-cell  lymphoma  Hyponatremia  Generalized weakness  Poor appetite    Fever resolved though neutropenia persists.  Her blood cultures are negative to date.  I recommend that she continue cefepime 2 g IV every 8 hours through 6/4/2024.  If she still remains neutropenic at that time then would recommend placing her on cefdinir 300 mg p.o. twice daily until her ANC is greater than 1000.  I am avoiding quinolones since she is on amiodarone.      I agree with holding the Bactrim for now.  Her outpatient oncology team and oncology pharmacist will make decisions regarding prophylaxis per their protocols.  Bone marrow biopsy results are pending.    Thank you for allowing me to be involved in the care of this patient. Infectious diseases will sign off at this time with antibiotics plan in place, but please call me at [phone] if any further ID questions or new ID concerns.

## 2024-05-31 NOTE — CONSULTS
Nutrition Services    Patient Name:  Michelle Car  YOB: 1940  MRN: [id number]  Admit Date:  5/24/2024    Assessment Date:  05/31/24    Summary: Follow Up  Pt laying in bed when I visited. Pt reported she has not been eating very much as she is not hungry and she gets full very quickly. Pt reported she has not been drinking her Boost breeze as she is currently on a 1000 ml fluid restriction and she would rather drink diet coke. RD educated pt that supplements do not count towards her fluid restriction. Pt now agreeable to drinking supplements. Pt denied any n/v or chew/swallow issues. Per EMR, pt eating 0-50% of meals since 5/25. Weight appear stable.     Plan/Recommendations:  Continue Boost Breeze TID  Encourage po intake  Fluid restriction per MD     Will follow clinical course, nutrition needs.    CLINICAL NUTRITION ASSESSMENT      Reason for Assessment Follow-up Protocol     Diagnosis/Problem   Leukopenia. Anemia, large cell lymphoma, hyponatremia, CHF.   Medical/Surgical History Past Medical History:   Diagnosis Date    Abdominal aortic atherosclerosis     Adrenal nodule 03/2022    Allergic rhinitis     Anemia due to chemotherapy     Bacteremia due to Escherichia coli 04/26/2022    ADMITTED TO [facility]    Bug bite 10/24/2015    WITH CELLULITIS, LEFT LEG    CAD (coronary artery disease)     Calculus of bile duct without cholangitis or cholecystitis 09/2022    Chemotherapy induced neutropenia     Chronic anticoagulation     Chronic diastolic (congestive) heart failure     Colon polyps     CRI (chronic renal insufficiency), stage 2 (mild) 2021    Diffuse large B cell lymphoma 02/2022    MULTIPLE LYMPH NODE INVOLVEMENT, FOLLOWED BY DR. JEN PATTERSON    Drug induced constipation     Hearing loss     Hemorrhoids     History of blood transfusion 04/2022    History of chemotherapy 2022    FOLLOWED BY DR. JEN PATTERSON    Hypercalcemia 2016    resolved as of 2019    Hypercholesterolemia      "Hyperkalemia 03/2022    Hypertension     Hyponatremia 02/17/2022    Leg swelling 10/2021    Mixed hyperlipidemia     Neutropenic fever 05/2022    Nonrheumatic mitral valve regurgitation 03/2022    PAF (paroxysmal atrial fibrillation)     FOLLOWED BY DR. YOSI LANGLEY    Pancytopenia     Pleural effusion, bilateral 06/2022    Pulmonary nodule     Seasonal allergies     Thrombocytopenia 08/2022    Venous insufficiency     Vitamin D deficiency        Past Surgical History:   Procedure Laterality Date    CHOLECYSTECTOMY N/A 10/12/2011    LAPAROSCOPIC, DR. CHANCE KLINE AT [facility]    COLONOSCOPY N/A 2000    1 or 2 polyps, otherwise normal per patient    COLONOSCOPY W/ POLYPECTOMY N/A 01/24/2012    3 MM TUBULAR ADENOMA POLYP IN CECUM, DIVERTICULOSIS IN RECTO-SIGMOID, RESCOPE IN 3 YRS, DR. CHANCE KLINE AT [facility]    CYST REMOVAL Left 10/12/2011    LEFT SCALP, PATH: BENIGN ADNEXAL NEOPLASM FAVORING ECCRINE SPIRADENOMA, DR. CHANCE KLINE AT [facility]    ERCP N/A 10/21/2022    Procedure: ENDOSCOPIC RETROGRADE CHOLANGIOPANCREATOGRAPHY with sphincterotomy and balloon sweep;  Surgeon: Peyman Ortega MD;  Location: [facility] ENDOSCOPY;  Service: Gastroenterology;  Laterality: N/A;  PRE: Common Duct Stones  POST: Common Duct Stones    VENOUS ACCESS DEVICE (PORT) INSERTION Left 03/23/2022    Procedure: INSERTION VENOUS ACCESS DEVICE;  Surgeon: Alin Delgado MD;  Location: [facility] OR [facility];  Service: General;  Laterality: Left;        Anthropometrics        Current Height  Current Weight  BMI kg/m2 Height: 152.4 cm (60\")  Weight: 54 kg (119 lb) (05/27/24 1433)  Body mass index is 23.24 kg/m².   Adjusted BMI (if applicable)    BMI Category Normal/Healthy (18.4 - 24.9)   Ideal Body Weight (IBW) 100lb   Usual Body Weight (UBW) 150lb   Weight Trend Loss 15% x 3 months   Weight History Wt Readings from Last 30 Encounters:   05/27/24 1433 54 kg (119 lb)   05/24/24 1849 54.2 kg (119 lb 7.8 oz)   05/24/24 1701 51.7 kg (114 lb)   05/24/24 1200 52.1 kg (114 " lb 14.4 oz)   05/13/24 1353 53.9 kg (118 lb 12.8 oz)   05/10/24 1258 54.5 kg (120 lb 1.6 oz)   05/03/24 1037 54.6 kg (120 lb 4.8 oz)   04/02/24 1318 62.1 kg (136 lb 12.8 oz)   04/01/24 1027 60.8 kg (134 lb)   03/06/24 1341 62.2 kg (137 lb 3.2 oz)   03/05/24 0840 60.1 kg (132 lb 9.6 oz)   02/14/24 1300 64 kg (141 lb)   02/13/24 0833 62.3 kg (137 lb 6.4 oz)   01/24/24 1309 64.7 kg (142 lb 9.6 oz)   01/23/24 0903 62.7 kg (138 lb 4.8 oz)   01/03/24 1309 65.2 kg (143 lb 12.8 oz)   01/02/24 0846 63.9 kg (140 lb 14.4 oz)   12/27/23 1103 63.4 kg (139 lb 11.2 oz)   12/13/23 1431 67.6 kg (149 lb)   12/12/23 0841 67.1 kg (147 lb 14.4 oz)   12/11/23 1149 67.3 kg (148 lb 4.8 oz)   12/06/23 1152 67.2 kg (148 lb 1.6 oz)   11/21/23 1514 68.9 kg (152 lb)   11/10/23 1520 68.9 kg (152 lb)   10/23/23 1301 68.9 kg (152 lb)   08/29/23 1608 69.4 kg (153 lb)   08/07/23 1513 68.9 kg (152 lb)   07/06/23 1143 68.7 kg (151 lb 6.4 oz)   06/06/23 1612 68 kg (150 lb)   05/15/23 1001 68.9 kg (152 lb)   03/21/23 1348 69.1 kg (152 lb 6.4 oz)   03/14/23 1529 67.1 kg (148 lb)        Estimated/Assessed Needs        Current Weight  Weight: 54 kg (119 lb) (05/27/24 1433)       Energy Requirements    Weight for Calculation 54.2 kg   Method for Estimation  30 kcal/kg   EST Needs (kcal/day) 1626       Protein Requirements    Weight for Calculation 54.2 kg   EST Protein Needs (g/kg) 1.2 gm/kg   EST Daily Needs (g/day) 65       Fluid Requirements     Method for Estimation Per fluid restriction    EST Needs (mL/day) 1000     Labs       Pertinent Labs    Results from last 7 days   Lab Units 05/31/24  0315 05/30/24  0415 05/29/24  1558 05/29/24  0315 05/26/24  0525 05/25/24  0311   SODIUM mmol/L 134* 133*  --  130*   < > 128*   POTASSIUM mmol/L 4.0 4.2 4.4 3.2*   < > 4.2   CHLORIDE mmol/L 104 104  --  97*   < > 97*   CO2 mmol/L 24.0 22.0  --  22.0   < > 22.2   BUN mg/dL 18 15  --  16   < > 18   CREATININE mg/dL 0.64 0.51*  --  0.59   < > 0.75   CALCIUM mg/dL  "8.4* 8.2*  --  7.8*   < > 8.2*   BILIRUBIN mg/dL 0.4  --   --   --   --  0.3   ALK PHOS U/L 63  --   --   --   --  62   ALT (SGPT) U/L 22  --   --   --   --  32   AST (SGOT) U/L 18  --   --   --   --  34*   GLUCOSE mg/dL 87 93  --  96   < > 90    < > = values in this interval not displayed.     Results from last 7 days   Lab Units 05/31/24  0315 05/26/24  0525 05/25/24  0311   MAGNESIUM mg/dL  --   --  1.9   HEMOGLOBIN g/dL 10.0*   < > 7.8*   HEMATOCRIT % 29.4*   < > 23.4*   WBC 10*3/mm3 0.90*   < > 0.86*   ALBUMIN g/dL 2.5*  --  2.5*    < > = values in this interval not displayed.     Results from last 7 days   Lab Units 05/31/24 0315 05/30/24  0415 05/29/24  0315 05/28/24  0307 05/27/24  1812   PLATELETS 10*3/mm3 160 161 184 206 210     COVID19   Date Value Ref Range Status   04/26/2022 Not Detected Not Detected - Ref. Range Final     No results found for: \"HGBA1C\"       Medications           Scheduled Medications amiodarone, 200 mg, Oral, Q24H  apixaban, 2.5 mg, Oral, Q12H  cefepime, 2,000 mg, Intravenous, Q12H  cetirizine, 10 mg, Oral, Daily  ferrous sulfate, 325 mg, Oral, Every Other Day  Hydrocortisone (Perianal), , Rectal, Daily  midodrine, 5 mg, Oral, TID AC  mirtazapine, 15 mg, Oral, Nightly  multivitamin with minerals, 1 tablet, Oral, Daily  sodium chloride, 10 mL, Intravenous, Q12H  sodium chloride, 1 g, Oral, BID With Meals       Infusions     PRN Medications   acetaminophen **OR** acetaminophen **OR** acetaminophen    albuterol    senna-docusate sodium **AND** polyethylene glycol **AND** bisacodyl **AND** bisacodyl    nitroglycerin    ondansetron    Potassium Replacement - Follow Nurse / BPA Driven Protocol    sodium chloride    sodium chloride     Physical Findings          General Findings alert, hard of hearing, loss of muscle mass, loss of subcutaneous fat, oriented   Oral/Mouth Cavity dentures - ill fitting   Edema  lower extremity , 1+ (trace)   Gastrointestinal WDL, last bowel movement: 5/29 "   Skin  skin tear   Tubes/Drains/Lines none   NFPE See Malnutrition Severity Assessment, Date Completed: 5/25     Malnutrition Severity Assessment      Patient meets criteria for : Severe Malnutrition           Current Nutrition Orders & Evaluation of Intake       Oral Nutrition     Food Allergies NKFA   Current PO Diet Diet: Regular/House, Fluid Restriction (240 mL/tray); 1000 mL/day; Fluid Consistency: Thin (IDDSI 0)   Supplement Boost Breeze, BID   PO Evaluation     % PO Intake 0-50%    Factors Affecting Intake: chewing difficulty, decreased appetite, dislikes hospital food   --  PES STATEMENT / NUTRITION DIAGNOSIS      Nutrition Dx Problem  Problem: Malnutrition (severe)  Etiology: Medical Diagnosis - Leukopenia. Anemia, large cell lymphoma, hyponatremia, CHF.    Signs/Symptoms: Report of Minimal PO Intake, NFPE Results, and Unintended Weight Change     NUTRITION INTERVENTION / PLAN OF CARE      Intervention Goal(s) Maintain nutrition status, Reduce/improve symptoms, Meet estimated needs, Disease management/therapy, Tolerate PO , Increase intake, and No significant weight loss         RD Intervention/Action Interview for preferences, Advise alternative selection, Advise available snack, Encourage intake, Continue to monitor, and Care plan reviewed   --      Prescription/Orders:       PO Diet       Supplements       Enteral Nutrition       Parenteral Nutrition    New Prescription Ordered? Continue same per protocol, No changes at this time   --      Monitor/Evaluation Per protocol   Discharge Plan/Needs Pending clinical course   --    RD to follow per protocol.      Electronically signed by:  Paul Kyle  05/31/24 13:54 EDT

## 2024-05-31 NOTE — THERAPY TREATMENT NOTE
Patient Name: Michelle Car  : 1940    MRN: [id number]                              Today's Date: 2024       Admit Date: 2024    Visit Dx: No diagnosis found.  Patient Active Problem List   Diagnosis    Hypertension    Hyperlipidemia    Abdominal aortic atherosclerosis    Generalized edema    Fatigue due to excessive exertion    Seasonal allergic rhinitis due to pollen    Dyspnea    Acute on chronic diastolic heart failure    Hypoxia    Paroxysmal atrial fibrillation with rapid ventricular response    Adrenal nodule    Edema of lower extremity    Hyponatremia    CRI (chronic renal insufficiency), stage 2 (mild)    Diffuse large B-cell lymphoma of lymph nodes of multiple regions    Vascular catheter fitting or adjustment    Chronic diastolic CHF (congestive heart failure)    Sepsis due to Escherichia coli (E. coli)    Bacteremia due to Escherichia coli    Calculus of bile duct without cholecystitis and without obstruction    Diverticulosis    Iron deficiency anemia    Leukopenia    Anemia    Hyperkalemia    Adrenal insufficiency    Weight loss    Severe malnutrition     Past Medical History:   Diagnosis Date    Abdominal aortic atherosclerosis     Adrenal nodule 2022    Allergic rhinitis     Anemia due to chemotherapy     Bacteremia due to Escherichia coli 2022    ADMITTED TO [facility]    Bug bite 10/24/2015    WITH CELLULITIS, LEFT LEG    CAD (coronary artery disease)     Calculus of bile duct without cholangitis or cholecystitis 2022    Chemotherapy induced neutropenia     Chronic anticoagulation     Chronic diastolic (congestive) heart failure     Colon polyps     CRI (chronic renal insufficiency), stage 2 (mild)     Diffuse large B cell lymphoma 2022    MULTIPLE LYMPH NODE INVOLVEMENT, FOLLOWED BY DR. JEN PATTERSON    Drug induced constipation     Hearing loss     Hemorrhoids     History of blood transfusion 2022    History of chemotherapy     FOLLOWED BY DR. LI  LEONARDO    Hypercalcemia 2016    resolved as of 2019    Hypercholesterolemia     Hyperkalemia 03/2022    Hypertension     Hyponatremia 02/17/2022    Leg swelling 10/2021    Mixed hyperlipidemia     Neutropenic fever 05/2022    Nonrheumatic mitral valve regurgitation 03/2022    PAF (paroxysmal atrial fibrillation)     FOLLOWED BY DR. YOSI LANGLEY    Pancytopenia     Pleural effusion, bilateral 06/2022    Pulmonary nodule     Seasonal allergies     Thrombocytopenia 08/2022    Venous insufficiency     Vitamin D deficiency      Past Surgical History:   Procedure Laterality Date    CHOLECYSTECTOMY N/A 10/12/2011    LAPAROSCOPIC, DR. CHANCE KLINE AT [facility]    COLONOSCOPY N/A 2000    1 or 2 polyps, otherwise normal per patient    COLONOSCOPY W/ POLYPECTOMY N/A 01/24/2012    3 MM TUBULAR ADENOMA POLYP IN CECUM, DIVERTICULOSIS IN RECTO-SIGMOID, RESCOPE IN 3 YRS, DR. CHANCE KLINE AT [facility]    CYST REMOVAL Left 10/12/2011    LEFT SCALP, PATH: BENIGN ADNEXAL NEOPLASM FAVORING ECCRINE SPIRADENOMA, DR. CHANCE KLINE AT [facility]    ERCP N/A 10/21/2022    Procedure: ENDOSCOPIC RETROGRADE CHOLANGIOPANCREATOGRAPHY with sphincterotomy and balloon sweep;  Surgeon: Peyman Ortega MD;  Location: [facility] ENDOSCOPY;  Service: Gastroenterology;  Laterality: N/A;  PRE: Common Duct Stones  POST: Common Duct Stones    VENOUS ACCESS DEVICE (PORT) INSERTION Left 03/23/2022    Procedure: INSERTION VENOUS ACCESS DEVICE;  Surgeon: Alin Delgado MD;  Location: [facility] OR [facility];  Service: General;  Laterality: Left;      General Information       Row Name 05/31/24 1505          OT Time and Intention    Document Type therapy note (daily note)  -AG     Mode of Treatment individual therapy;occupational therapy  -AG       Row Name 05/31/24 1505          General Information    Existing Precautions/Restrictions fall  -AG       Row Name 05/31/24 1505          Cognition    Orientation Status (Cognition) oriented x 3  -AG       Row Name 05/31/24 1504          Safety  Issues, Functional Mobility    Safety Issues Affecting Function (Mobility) safety precaution awareness;judgment  -AG     Impairments Affecting Function (Mobility) balance;endurance/activity tolerance;strength  -AG               User Key  (r) = Recorded By, (t) = Taken By, (c) = Cosigned By      Initials Name Provider Type    AG Josue Villanueva OT Occupational Therapist                     Mobility/ADL's       Row Name 05/31/24 1505          Bed Mobility    Bed Mobility supine-sit  -AG     Supine-Sit [place] (Bed Mobility) contact guard;1 person assist;verbal cues  -AG     Comment, (Bed Mobility) UIC at end of session  -AG       Row Name 05/31/24 1505          Transfers    Transfers sit-stand transfer;bed-chair transfer  -AG       Row Name 05/31/24 1505          Bed-Chair Transfer    Bed-Chair [place] (Transfers) minimum assist (75% patient effort);contact guard;verbal cues  -AG     Assistive Device (Bed-Chair Transfers) walker, front-wheeled  -AG       Row Name 05/31/24 1505          Sit-Stand Transfer    Sit-Stand [place] (Transfers) minimum assist (75% patient effort);verbal cues  -AG     Assistive Device (Sit-Stand Transfers) walker, front-wheeled  -AG       Row Name 05/31/24 1505          Functional Mobility    Functional Mobility- Comment bed > recliner transfer w RW  -AG       Row Name 05/31/24 1505          Activities of Daily Living    BADL Assessment/Intervention grooming;lower body dressing  -AG       Row Name 05/31/24 1505          Toileting Assessment/Training    [place] Level (Toileting) dependent (less than 25% patient effort)  -AG     Comment, (Toileting) purewick suction  -AG       Row Name 05/31/24 1505          Grooming Assessment/Training    [place] Level (Grooming) grooming skills;wash face, hands;set up  -AG     Position (Grooming) edge of bed sitting  -AG       Row Name 05/31/24 1505          Lower Body Dressing Assessment/Training    [place] Level (Lower Body  Dressing) lower body dressing skills;don;socks;maximum assist (25% patient effort)  -AG     Position (Lower Body Dressing) edge of bed sitting  -AG               User Key  (r) = Recorded By, (t) = Taken By, (c) = Cosigned By      Initials Name Provider Type    AG Josue Villanueva OT Occupational Therapist                   Obj/Interventions       Row Name 05/31/24 1506          Shoulder (Therapeutic Exercise)    Shoulder (Therapeutic Exercise) AROM (active range of motion)  -AG     Shoulder AROM (Therapeutic Exercise) bilateral;flexion;scapular protraction;scapular retraction;sitting;10 repetitions  -AG       Row Name 05/31/24 1506          Elbow/Forearm (Therapeutic Exercise)    Elbow/Forearm (Therapeutic Exercise) AROM (active range of motion)  -AG     Elbow/Forearm AROM (Therapeutic Exercise) bilateral;flexion;extension;10 repetitions;sitting  -AG       Row Name 05/31/24 1506          Motor Skills    Motor Skills functional endurance  -AG     Functional Endurance poor  -AG     Therapeutic Exercise shoulder;elbow/forearm  -AG       Row Name 05/31/24 1506          Balance    Balance Assessment sitting static balance;sitting dynamic balance;sit to stand dynamic balance;standing static balance;standing dynamic balance  -AG     Static Sitting Balance standby assist  -AG     Dynamic Sitting Balance standby assist  -AG     Position, Sitting Balance unsupported;sitting edge of bed  -AG     Static Standing Balance contact guard  -AG     Dynamic Standing Balance contact guard;minimal assist  -AG     Position/Device Used, Standing Balance supported;walker, front-wheeled  -AG     Balance Interventions sitting;standing;occupation based/functional task  -AG     Comment, Balance no LOB  -AG               User Key  (r) = Recorded By, (t) = Taken By, (c) = Cosigned By      Initials Name Provider Type    Josue Burger OT Occupational Therapist                   Goals/Plan    No documentation.                  Clinical  Impression       Row Name 05/31/24 1507          Pain Assessment    Pretreatment Pain Rating 0/10 - no pain  -AG     Posttreatment Pain Rating 0/10 - no pain  -AG       Row Name 05/31/24 1507          Plan of Care Review    Plan of Care Reviewed With patient  -AG     Progress improving  -AG     Outcome Evaluation Pt. seen this PM for OT session, required verbal cues for encouragement to participate intially deferring however agreeable to transfer into recliner and complete BUE ther ex. Pt. was able to perform sup > sit CGA with cues for use of bed rails. Once seated encouraged to attempt LBD. Unable to don socks requiring max A. Pt. completed STS min A and min-CGA to recliner. BUE ther ex performed. Pt. demos continued deficits with strength and balance limiting independence with ADLS. OT to continue to follow.  -AG       Row Name 05/31/24 1507          Therapy Assessment/Plan (OT)    Criteria for Skilled Therapeutic Interventions Met (OT) yes;meets criteria;skilled treatment is necessary  -     Therapy Frequency (OT) 5 times/wk  -AG       Row Name 05/31/24 1507          Therapy Plan Review/Discharge Plan (OT)    Anticipated Discharge Disposition (OT) skilled nursing facility;home with home health  -       Row Name 05/31/24 1507          Vital Signs    O2 Delivery Pre Treatment room air  -AG       [facility] Name 05/31/24 1507          Positioning and Restraints    Pre-Treatment Position in bed  -AG     Post Treatment Position chair  -AG     In Chair notified nsg;reclined;call light within reach;encouraged to call for assist;exit alarm on  -AG               User Key  (r) = Recorded By, (t) = Taken By, (c) = Cosigned By      Initials Name Provider Type    AG Josue Villanueva, OT Occupational Therapist                   Outcome Measures       Row Name 05/31/24 1510          How much help from another is currently needed...    Putting on and taking off regular lower body clothing? 2  -AG     Bathing (including washing,  rinsing, and drying) 2  -AG     Toileting (which includes using toilet bed pan or urinal) 1  -AG     Putting on and taking off regular upper body clothing 3  -AG     Taking care of personal grooming (such as brushing teeth) 3  -AG     Eating meals 4  -AG     AM-PAC 6 Clicks Score (OT) 15  -AG       Row Name 05/31/24 0429          How much help from another person do you currently need...    Turning from your back to your side while in flat bed without using bedrails? 3  -CM     Moving from lying on back to sitting on the side of a flat bed without bedrails? 3  -CM     Moving to and from a bed to a chair (including a wheelchair)? 3  -CM     Standing up from a chair using your arms (e.g., wheelchair, bedside chair)? 3  -CM     Climbing 3-5 steps with a railing? 2  -CM     To walk in hospital room? 3  -CM     AM-PAC 6 Clicks Score (PT) 17  -CM     Highest Level of Mobility Goal 5 --> Static standing  -CM       Row Name 05/31/24 1510          Functional Assessment    Outcome Measure Options AM-PAC 6 Clicks Daily Activity (OT)  -AG               User Key  (r) = Recorded By, (t) = Taken By, (c) = Cosigned By      Initials Name Provider Type    Keila Purdy RN Registered Nurse    Josue Burger OT Occupational Therapist                    Occupational Therapy Education       Title: PT OT SLP Therapies (Done)       Topic: Occupational Therapy (Done)       Point: ADL training (Done)       Description:   Instruct learner(s) on proper safety adaptation and remediation techniques during self care or transfers.   Instruct in proper use of assistive devices.                  Learning Progress Summary             Patient Acceptance, E, VU by AB at 5/30/2024 1653    Acceptance, E, VU by AB at 5/29/2024 1544    Acceptance, E,TB, VU by ZL at 5/29/2024 0301    Acceptance, E, VU by AB at 5/28/2024 1710    Acceptance, E,TB, VU by  at 5/27/2024 1307                         Point: Home exercise program (Done)        Description:   Instruct learner(s) on appropriate technique for monitoring, assisting and/or progressing therapeutic exercises/activities.                  Learning Progress Summary             Patient Acceptance, E, VU by AB at 5/30/2024 1653    Acceptance, E, VU by AB at 5/29/2024 1544    Acceptance, E,TB, VU by ZL at 5/29/2024 0301    Acceptance, E, VU by AB at 5/28/2024 1710    Acceptance, E,TB, VU by AG at 5/27/2024 1307                         Point: Precautions (Done)       Description:   Instruct learner(s) on prescribed precautions during self-care and functional transfers.                  Learning Progress Summary             Patient Acceptance, E, VU by AB at 5/30/2024 1653    Acceptance, E, VU by AB at 5/29/2024 1544    Acceptance, E,TB, VU by ZL at 5/29/2024 0301    Acceptance, E, VU by AB at 5/28/2024 1710    Acceptance, E,TB, VU by AG at 5/27/2024 1307                         Point: Body mechanics (Done)       Description:   Instruct learner(s) on proper positioning and spine alignment during self-care, functional mobility activities and/or exercises.                  Learning Progress Summary             Patient Acceptance, E, VU by AB at 5/30/2024 1653    Acceptance, E, VU by AB at 5/29/2024 1544    Acceptance, E,TB, VU by ZL at 5/29/2024 0301    Acceptance, E, VU by AB at 5/28/2024 1710    Acceptance, E,TB, VU by AG at 5/27/2024 1307                                         User Key       Initials Effective Dates Name Provider Type Discipline    ZL 06/16/21 -  Ronnell Michaels, RN Registered Nurse Nurse    AB 12/15/23 -  Gavi Gerardo RN Registered Nurse Nurse     01/23/24 -  Josue Villanueva OT Occupational Therapist OT                  OT Recommendation and Plan  Planned Therapy Interventions (OT): activity tolerance training, functional balance retraining, occupation/activity based interventions, ROM/therapeutic exercise, IADL retraining, adaptive equipment training, BADL retraining,  neuromuscular control/coordination retraining, patient/caregiver education/training, transfer/mobility retraining, strengthening exercise  Therapy Frequency (OT): 5 times/wk  Plan of Care Review  Plan of Care Reviewed With: patient  Progress: improving  Outcome Evaluation: Pt. seen this PM for OT session, required verbal cues for encouragement to participate intially deferring however agreeable to transfer into recliner and complete BUE ther ex. Pt. was able to perform sup > sit CGA with cues for use of bed rails. Once seated encouraged to attempt LBD. Unable to don socks requiring max A. Pt. completed STS min A and min-CGA to recliner. BUE ther ex performed. Pt. demos continued deficits with strength and balance limiting independence with ADLS. OT to continue to follow.     Time Calculation:   Evaluation Complexity (OT)  Review Occupational Profile/Medical/Therapy History Complexity: expanded/moderate complexity  Assessment, Occupational Performance/Identification of Deficit Complexity: 3-5 performance deficits  Clinical Decision Making Complexity (OT): detailed assessment/moderate complexity  Overall Complexity of Evaluation (OT): moderate complexity     Time Calculation- OT       Row Name 05/31/24 1510             Time Calculation- OT    OT Start Time 1408  -AG      OT Stop Time 1425  -AG      OT Time Calculation (min) 17 min  -AG      Total Timed Code Minutes- OT 17 minute(s)  -AG      OT Received On 05/31/24  -AG      OT - Next Appointment 06/03/24  -AG      OT Goal Re-Cert Due Date 06/10/24  -AG         Timed Charges    21038 - OT Therapeutic Activity Minutes 9  -AG      06177 - OT Self Care/Mgmt Minutes 8  -AG         Total Minutes    Timed Charges Total Minutes 17  -AG       Total Minutes 17  -AG                User Key  (r) = Recorded By, (t) = Taken By, (c) = Cosigned By      Initials Name Provider Type    Josue Burger OT Occupational Therapist                  Therapy Charges for Today       Code  Description Service Date Service Provider Modifiers Qty    [id number]  OT THERAPEUTIC ACT EA 15 MIN 5/31/2024 Josue Villanueva OT GO 1                 Josue Villanueva OT  5/31/2024

## 2024-05-31 NOTE — PLAN OF CARE
Goal Outcome Evaluation:            Pt remains afebrile this shift. Continues on IV antibiotics and pending bone marrow biopsy results. Denies any pain or discomfort.

## 2024-05-31 NOTE — PROGRESS NOTES
"    DAILY PROGRESS NOTE  [facility]    Patient Identification:  Name: Michelle Car  Age: 83 y.o.  Sex: female  :  1940  MRN: [id number]         Primary Care Physician: Olayinka Pimentel MD    Subjective:  Interval History: Clinically looks better to me today, more alert and better color but chronically quite ill.  At times I can notice some confusion that seems a little more apparent today as opposed to yesterday but nothing too major.  No family noted at bedside.  Speech is fluent.  She is not voicing anything new to me today but did reiterate her CODE STATUS and stated that if her cancer doctors tell her her cancer is winning then she is not sure how much longer she has to fight or how much drive she has left to put forth the fight as well    Objective:    Scheduled Meds:amiodarone, 200 mg, Oral, Q24H  apixaban, 2.5 mg, Oral, Q12H  cefepime, 2,000 mg, Intravenous, Q12H  cetirizine, 10 mg, Oral, Daily  ferrous sulfate, 325 mg, Oral, Every Other Day  Hydrocortisone (Perianal), , Rectal, Daily  midodrine, 5 mg, Oral, TID AC  mirtazapine, 15 mg, Oral, Nightly  multivitamin with minerals, 1 tablet, Oral, Daily  sodium chloride, 10 mL, Intravenous, Q12H  sodium chloride, 1 g, Oral, BID With Meals      Continuous Infusions:     Vital signs in last 24 hours:  Temp:  [97.3 °F (36.3 °C)-99.5 °F (37.5 °C)] 97.9 °F (36.6 °C)  Heart Rate:  [66-81] 71  Resp:  [12-20] 20  BP: ()/(57-82) 111/65    Intake/Output:    Intake/Output Summary (Last 24 hours) at 2024 1111  Last data filed at 2024 1050  Gross per 24 hour   Intake 110 ml   Output 550 ml   Net -440 ml       Exam:  /65 (BP Location: Left arm, Patient Position: Lying)   Pulse 71   Temp 97.9 °F (36.6 °C) (Oral)   Resp 20   Ht 152.4 cm (60\")   Wt 54 kg (119 lb)   SpO2 99%   BMI 23.24 kg/m²     General Appearance:    Alert, cooperative, elderly and quite frail, AAOx3                          Head:    Normocephalic, " without obvious abnormality, atraumatic                         Throat:   Oral mucosa pink and moist                           Neck:   Supple, no rigidity or JVD                         Lungs:    Diminished bases/otherwise clear to auscultation bilaterally, respirations unlabored                 Chest Wall:    No tenderness or deformity                          Heart:    Regular rate and rhythm, S1 and S2 normal                  Abdomen:     Soft, nontender, bowel sounds active                 Extremities:   No cyanosis or edema with generalized weakness                  Neurologic:   CNII-XII intact       Data Review:  Labs in chart were reviewed.    Assessment:  Active Hospital Problems    Diagnosis  POA    **Leukopenia [D72.819]  Unknown    Severe malnutrition [E43]  Yes    Anemia [D64.9]  Unknown    Hyperkalemia [E87.5]  Unknown    Adrenal insufficiency [E27.40]  Unknown    Weight loss [R63.4]  Unknown    Chronic diastolic CHF (congestive heart failure) [I50.32]  Yes    Paroxysmal atrial fibrillation with rapid ventricular response [I48.0]  Yes    Hyponatremia [E87.1]  Yes    Diffuse large B-cell lymphoma of lymph nodes of multiple regions [C83.38]  Yes    CRI (chronic renal insufficiency), stage 2 (mild) [N18.2]  Yes      Resolved Hospital Problems   No resolved problems to display.       Plan:    Neutropenic fever now with WBC is 0.83              -Cefepime started 5/29/2024 through 6/4/2024              -Blood cultures NGTD              -Further antibiotic guidance per ID -avoiding quinolones since on amnio              -Chemo on hold              -No additional infectious processes noted              -Neutropenic precautions   -No further plans to resume Bactrim/prophylaxis        Bone marrow biopsy planned per oncology -ACD 9.9-10.2-10.0     Palliative has evaluated.  Patient quite clear that she is DNR and her CODE STATUS was corrected.  I appreciate palliative RN.  Patient today has made other statements  that if she is not going to beat this cancer, then she would be amenable to changing goals of care.  I would appreciate oncological input and assistance with this going forward     SIADH per renal on fluid restriction and salt p.o.              -Bactrim remains on hold.  Not likely good choice going forward for her              -Fluid balance per renal              -K replaced   -Cortisol and TSH are within normal limits     Chronic diastolic CHF/PAF/HTN              -Hypotension resolving-off Coreg though remains on midodrine 5 mg 3 times daily              -Remains on amiodarone/Eliquis     Malnutrition multifactorial with Remeron added for sleep and appetite/depression     SCDs for additional prophylaxis           Disposition -see above/pending.  Oncology checking BM Bx and palliative care following as well   -Seems to be making some clinical improvement but again long-term prognosis is quite guarded   -CODE STATUS DNR          Renard Dhillon MD  5/31/2024  11:11 EDT

## 2024-05-31 NOTE — SIGNIFICANT NOTE
05/31/24 0848   OTHER   Discipline physical therapist   Rehab Time/Intention   Session Not Performed patient/family declined treatment  (Patient declined getting OOB in AM. PT will f/u in PM if time allows or next service date. Encouraged patient to get up to chair with nsg as able.)   Recommendation   PT - Next Appointment 06/03/24

## 2024-06-01 PROBLEM — E87.5 HYPERKALEMIA: Status: RESOLVED | Noted: 2024-05-24 | Resolved: 2024-06-01

## 2024-06-01 LAB
ANION GAP SERPL CALCULATED.3IONS-SCNC: 10.5 MMOL/L (ref 5–15)
BASOPHILS # BLD AUTO: 0.02 10*3/MM3 (ref 0–0.2)
BASOPHILS NFR BLD AUTO: 2.4 % (ref 0–1.5)
BUN SERPL-MCNC: 18 MG/DL (ref 8–23)
BUN/CREAT SERPL: 31.6 (ref 7–25)
CALCIUM SPEC-SCNC: 8.4 MG/DL (ref 8.6–10.5)
CHLORIDE SERPL-SCNC: 103 MMOL/L (ref 98–107)
CO2 SERPL-SCNC: 19.5 MMOL/L (ref 22–29)
CREAT SERPL-MCNC: 0.57 MG/DL (ref 0.57–1)
DEPRECATED RDW RBC AUTO: 50.4 FL (ref 37–54)
EGFRCR SERPLBLD CKD-EPI 2021: 90.9 ML/MIN/1.73
EOSINOPHIL # BLD AUTO: 0 10*3/MM3 (ref 0–0.4)
EOSINOPHIL NFR BLD AUTO: 0 % (ref 0.3–6.2)
ERYTHROCYTE [DISTWIDTH] IN BLOOD BY AUTOMATED COUNT: 14.1 % (ref 12.3–15.4)
GLUCOSE SERPL-MCNC: 95 MG/DL (ref 65–99)
HCT VFR BLD AUTO: 31.2 % (ref 34–46.6)
HGB BLD-MCNC: 10.7 G/DL (ref 12–15.9)
IMM GRANULOCYTES # BLD AUTO: 0.07 10*3/MM3 (ref 0–0.05)
IMM GRANULOCYTES NFR BLD AUTO: 8.5 % (ref 0–0.5)
LYMPHOCYTES # BLD AUTO: 0.37 10*3/MM3 (ref 0.7–3.1)
LYMPHOCYTES NFR BLD AUTO: 45.1 % (ref 19.6–45.3)
MCH RBC QN AUTO: 33.4 PG (ref 26.6–33)
MCHC RBC AUTO-ENTMCNC: 34.3 G/DL (ref 31.5–35.7)
MCV RBC AUTO: 97.5 FL (ref 79–97)
MONOCYTES # BLD AUTO: 0.13 10*3/MM3 (ref 0.1–0.9)
MONOCYTES NFR BLD AUTO: 15.9 % (ref 5–12)
NEUTROPHILS NFR BLD AUTO: 0.23 10*3/MM3 (ref 1.7–7)
NEUTROPHILS NFR BLD AUTO: 28.1 % (ref 42.7–76)
PLATELET # BLD AUTO: 157 10*3/MM3 (ref 140–450)
PMV BLD AUTO: 9.3 FL (ref 6–12)
POTASSIUM SERPL-SCNC: 3.5 MMOL/L (ref 3.5–5.2)
RBC # BLD AUTO: 3.2 10*6/MM3 (ref 3.77–5.28)
SODIUM SERPL-SCNC: 133 MMOL/L (ref 136–145)
WBC NRBC COR # BLD AUTO: 0.82 10*3/MM3 (ref 3.4–10.8)

## 2024-06-01 PROCEDURE — 80048 BASIC METABOLIC PNL TOTAL CA: CPT | Performed by: INTERNAL MEDICINE

## 2024-06-01 PROCEDURE — 25010000002 CEFEPIME PER 500 MG: Performed by: INTERNAL MEDICINE

## 2024-06-01 PROCEDURE — 99232 SBSQ HOSP IP/OBS MODERATE 35: CPT | Performed by: INTERNAL MEDICINE

## 2024-06-01 PROCEDURE — 85025 COMPLETE CBC W/AUTO DIFF WBC: CPT | Performed by: HOSPITALIST

## 2024-06-01 RX ORDER — POTASSIUM CHLORIDE 750 MG/1
40 TABLET, FILM COATED, EXTENDED RELEASE ORAL ONCE
Status: COMPLETED | OUTPATIENT
Start: 2024-06-01 | End: 2024-06-01

## 2024-06-01 RX ORDER — MIDODRINE HYDROCHLORIDE 5 MG/1
10 TABLET ORAL
Status: DISCONTINUED | OUTPATIENT
Start: 2024-06-01 | End: 2024-06-11 | Stop reason: HOSPADM

## 2024-06-01 RX ADMIN — Medication 10 ML: at 08:18

## 2024-06-01 RX ADMIN — ACETAMINOPHEN 325MG 650 MG: 325 TABLET ORAL at 23:39

## 2024-06-01 RX ADMIN — APIXABAN 2.5 MG: 2.5 TABLET, FILM COATED ORAL at 08:18

## 2024-06-01 RX ADMIN — Medication 10 ML: at 20:41

## 2024-06-01 RX ADMIN — MIDODRINE HYDROCHLORIDE 5 MG: 5 TABLET ORAL at 10:59

## 2024-06-01 RX ADMIN — CEFEPIME 2000 MG: 2 INJECTION, POWDER, FOR SOLUTION INTRAVENOUS at 03:29

## 2024-06-01 RX ADMIN — SODIUM CHLORIDE TAB 1 GM 1 G: 1 TAB at 08:17

## 2024-06-01 RX ADMIN — POTASSIUM CHLORIDE 40 MEQ: 750 TABLET, EXTENDED RELEASE ORAL at 08:18

## 2024-06-01 RX ADMIN — AMIODARONE HYDROCHLORIDE 200 MG: 200 TABLET ORAL at 11:00

## 2024-06-01 RX ADMIN — FERROUS SULFATE TAB 325 MG (65 MG ELEMENTAL FE) 325 MG: 325 (65 FE) TAB at 08:17

## 2024-06-01 RX ADMIN — HYDROCORTISONE: 25 CREAM TOPICAL at 08:18

## 2024-06-01 RX ADMIN — MIDODRINE HYDROCHLORIDE 10 MG: 5 TABLET ORAL at 18:34

## 2024-06-01 RX ADMIN — CETIRIZINE HYDROCHLORIDE 10 MG: 10 TABLET ORAL at 08:17

## 2024-06-01 RX ADMIN — Medication 1 TABLET: at 08:17

## 2024-06-01 RX ADMIN — APIXABAN 2.5 MG: 2.5 TABLET, FILM COATED ORAL at 20:40

## 2024-06-01 RX ADMIN — CEFEPIME 2000 MG: 2 INJECTION, POWDER, FOR SOLUTION INTRAVENOUS at 16:07

## 2024-06-01 RX ADMIN — MIDODRINE HYDROCHLORIDE 5 MG: 5 TABLET ORAL at 08:17

## 2024-06-01 RX ADMIN — SODIUM CHLORIDE TAB 1 GM 1 G: 1 TAB at 18:34

## 2024-06-01 RX ADMIN — MIRTAZAPINE 15 MG: 15 TABLET, FILM COATED ORAL at 20:40

## 2024-06-01 NOTE — PROGRESS NOTES
Name: Michelle Car ADMIT: 2024   : 1940  PCP: Olayinka Pimentel MD    MRN: [id number] LOS: 8 days   AGE/SEX: 83 y.o. female  ROOM: [facility]     Subjective   Subjective   Patient complaining of some discomfort on her backside due to pressure ulcer and not liking being repositioned.       Objective   Objective   Vital Signs  Temp:  [97.3 °F (36.3 °C)-97.7 °F (36.5 °C)] 97.7 °F (36.5 °C)  Heart Rate:  [] 63  Resp:  [17-22] 20  BP: ()/(50-69) 95/62  SpO2:  [97 %-98 %] 98 %  on   ;   Device (Oxygen Therapy): room air  Body mass index is 23.24 kg/m².  Physical Exam  Vitals reviewed.   Constitutional:       Comments: Very frail appearing   Cardiovascular:      Rate and Rhythm: Normal rate and regular rhythm.   Pulmonary:      Effort: No respiratory distress.      Breath sounds: Normal breath sounds.   Abdominal:      General: There is no distension.      Palpations: Abdomen is soft.      Tenderness: There is no abdominal tenderness.   Musculoskeletal:      Right lower leg: No edema.      Left lower leg: No edema.   Skin:     Coloration: Skin is pale.       Results Review     I reviewed the patient's new clinical results.  Results from last 7 days   Lab Units 24  04324   WBC 10*3/mm3 0.82* 0.90* 0.83* 0.87*   HEMOGLOBIN g/dL 10.7* 10.0* 10.2* 9.9*   PLATELETS 10*3/mm3 157 160 161 184     Results from last 7 days   Lab Units 24  0438 24  0315 245 24  1558 245   SODIUM mmol/L 133* 134* 133*  --  130*   POTASSIUM mmol/L 3.5 4.0 4.2 4.4 3.2*   CHLORIDE mmol/L 103 104 104  --  97*   CO2 mmol/L 19.5* 24.0 22.0  --  22.0   BUN mg/dL 18 18 15  --  16   CREATININE mg/dL 0.57 0.64 0.51*  --  0.59   GLUCOSE mg/dL 95 87 93  --  96   EGFR mL/min/1.73 90.9 88.4 93.3  --  90.1     Results from last 7 days   Lab Units 24  0315   ALBUMIN g/dL 2.5*   BILIRUBIN mg/dL 0.4   ALK PHOS U/L 63   AST (SGOT) U/L 18   ALT  "(SGPT) U/L 22     Results from last 7 days   Lab Units 06/01/24  0438 05/31/24  0315 05/30/24  0415 05/29/24  0315   CALCIUM mg/dL 8.4* 8.4* 8.2* 7.8*   ALBUMIN g/dL  --  2.5*  --   --        No results found for: \"HGBA1C\", \"POCGLU\"    No radiology results for the last day    I have personally reviewed all medications:  Scheduled Medications  amiodarone, 200 mg, Oral, Q24H  apixaban, 2.5 mg, Oral, Q12H  cefepime, 2,000 mg, Intravenous, Q12H  cetirizine, 10 mg, Oral, Daily  ferrous sulfate, 325 mg, Oral, Every Other Day  Hydrocortisone (Perianal), , Rectal, Daily  midodrine, 10 mg, Oral, TID AC  mirtazapine, 15 mg, Oral, Nightly  multivitamin with minerals, 1 tablet, Oral, Daily  sodium chloride, 10 mL, Intravenous, Q12H  sodium chloride, 1 g, Oral, BID With Meals    Infusions   Diet  Diet: Regular/House, Fluid Restriction (240 mL/tray); 1000 mL/day; Fluid Consistency: Thin (IDDSI 0)    I have personally reviewed:  [x]  Laboratory   [x]  Microbiology   [x]  Radiology   []  EKG/Telemetry  []  Cardiology/Vascular   []  Pathology    []  Records       Assessment/Plan     Active Hospital Problems    Diagnosis  POA    **Leukopenia [D72.819]  Unknown    Severe malnutrition [E43]  Yes    Anemia [D64.9]  Unknown    Weight loss [R63.4]  Unknown    Chronic diastolic CHF (congestive heart failure) [I50.32]  Yes    Paroxysmal atrial fibrillation with rapid ventricular response [I48.0]  Yes    Hyponatremia [E87.1]  Yes    Diffuse large B-cell lymphoma of lymph nodes of multiple regions [C83.38]  Yes    CRI (chronic renal insufficiency), stage 2 (mild) [N18.2]  Yes      Resolved Hospital Problems    Diagnosis Date Resolved POA    Hyperkalemia [E87.5] 06/01/2024 Unknown       83 y.o. female with history of diffuse large B cell lymphoma admitted with Leukopenia.    Patient remains neutropenic.  In fact her numbers are little worse today.  Await results of bone marrow biopsy  - Continue cefepime per ID recommendations  - Monitoring " other cell lines but thus far have remained stable    Hypotension worse this morning.  Increase midodrine.    Hyponatremia stable.  Remains on salt tabs with nephrology following.  Fluid restriction in place.  She is eating very little per family    A-fib: Remains on amiodarone and apixaban.  Appears relatively stable      Overall prognosis looks fairly guarded.  She is quite frail appearing and has a multitude of issues.  If bone marrow biopsy shows ongoing malignancy I suspect she is going to move toward full comfort measures.      Morales Hardwick MD  [facility]  06/01/24  17:24 EDT

## 2024-06-01 NOTE — PROGRESS NOTES
[facility]/Nephrology Follow Up Note    Patient Identification:  Name: Michelle Car MRN: [id number]  Age: 83 y.o. : 1940  Sex: female  Date:2024    Requesting Physician: As per consult order.  Reason for Consultation: hyponatremia   Information from:patient/ family/ chart      History of Present Illness: This is a 83 y.o. year old female  with lymphoma admitted for weakness, hyponatremia and edema.   She completed chemo in 2024.  She has had ongoing issues with hyponatremia.  She was on salt tabs at home. Upon arrival her Na was 126 with K at 5.6.   She was given IV fluids of NS as BP was low upon arrival.      Na stable at 133 today      Current Meds:   Current Facility-Administered Medications   Medication Dose Route Frequency Provider Last Rate Last Admin    acetaminophen (TYLENOL) tablet 650 mg  650 mg Oral Q4H PRN Abel Pelaez MD   650 mg at 248    Or    acetaminophen (TYLENOL) 160 MG/5ML oral solution 650 mg  650 mg Oral Q4H PRN Abel Pelaez MD        Or    acetaminophen (TYLENOL) suppository 650 mg  650 mg Rectal Q4H PRN Abel Pelaez MD        albuterol (PROVENTIL) nebulizer solution 0.083% 2.5 mg/3mL  2.5 mg Nebulization Q4H PRN Abel Pelaez MD        amiodarone (PACERONE) tablet 200 mg  200 mg Oral Q24H Abel Pelaez MD   200 mg at 24 0834    apixaban (ELIQUIS) tablet 2.5 mg  2.5 mg Oral Q12H Mallory Del Castillo MD   2.5 mg at 24 2122    sennosides-docusate (PERICOLACE) 8.6-50 MG per tablet 2 tablet  2 tablet Oral BID PRN Abel Pelaez MD        And    polyethylene glycol (MIRALAX) packet 17 g  17 g Oral Daily PRN Abel Pelaez MD        And    bisacodyl (DULCOLAX) EC tablet 5 mg  5 mg Oral Daily PRN Abel Pelaez MD        And    bisacodyl (DULCOLAX) suppository 10 mg  10 mg Rectal Daily PRN Abel Pelaez MD        cefepime 2000 mg IVPB in 100 mL NS (MBP)  2,000 mg Intravenous Q12H Mick Brown MD   2,000 mg at 24 0329    cetirizine (zyrTEC)  "tablet 10 mg  10 mg Oral Daily Abel Pelaez MD   10 mg at 24 0823    ferrous sulfate tablet 325 mg  325 mg Oral Every Other Day Abel Pelaez MD   325 mg at 24 0832    Hydrocortisone (Perianal) (ANUSOL-HC) 2.5 % rectal cream   Rectal Daily Abel Pelaez MD   Given at 24 0834    midodrine (PROAMATINE) tablet 5 mg  5 mg Oral TID AC Saulo Smith MD   5 mg at 24 1834    mirtazapine (REMERON) tablet 15 mg  15 mg Oral Nightly Saulo Smith MD   15 mg at 24    multivitamin with minerals 1 tablet  1 tablet Oral Daily Abel Pelaez MD   1 tablet at 24    nitroglycerin (NITROSTAT) SL tablet 0.4 mg  0.4 mg Sublingual Q5 Min PRN Abel Pelaez MD        ondansetron (ZOFRAN) injection 4 mg  4 mg Intravenous Q6H PRN Abel Pelaez MD        Potassium Replacement - Follow Nurse / BPA Driven Protocol   Does not apply PRN Morales Farrell MD        sodium chloride 0.9 % flush 10 mL  10 mL Intravenous Q12H Abel Pelaez MD   10 mL at 24    sodium chloride 0.9 % flush 10 mL  10 mL Intravenous PRN Abel Pelaez MD        sodium chloride 0.9 % infusion 40 mL  40 mL Intravenous PRN Abel Pelaez MD        sodium chloride tablet 1 g  1 g Oral BID With Meals Aarti Reynolds MD   1 g at 24 1835         Physical Exam:  Vitals:   Temp (24hrs), Av °F (36.7 °C), Min:97.3 °F (36.3 °C), Max:98.8 °F (37.1 °C)    /56 (BP Location: Left arm, Patient Position: Lying)   Pulse 73   Temp 97.3 °F (36.3 °C) (Oral)   Resp 17   Ht 152.4 cm (60\")   Wt 54 kg (119 lb)   SpO2 97%   BMI 23.24 kg/m²   Intake/Output:     Intake/Output Summary (Last 24 hours) at 2024 0620  Last data filed at 2024 0329  Gross per 24 hour   Intake 560 ml   Output 400 ml   Net 160 ml        Wt Readings from Last 1 Encounters:   24 1433 54 kg (119 lb)   24 1849 54.2 kg (119 lb 7.8 oz)   24 1701 51.7 kg (114 lb)       Exam:  Sleeping comfortably    No jvd reg s1s2 no " murmur   Clear bilaterally no rales/rhonchi/wheeze   Soft NTND + bs   No edema   Warm dry no rash       Labs:   Recent Results (from the past 24 hour(s))   Basic Metabolic Panel    Collection Time: 06/01/24  4:38 AM    Specimen: Blood   Result Value Ref Range    Glucose 95 65 - 99 mg/dL    BUN 18 8 - 23 mg/dL    Creatinine 0.57 0.57 - 1.00 mg/dL    Sodium 133 (L) 136 - 145 mmol/L    Potassium 3.5 3.5 - 5.2 mmol/L    Chloride 103 98 - 107 mmol/L    CO2 19.5 (L) 22.0 - 29.0 mmol/L    Calcium 8.4 (L) 8.6 - 10.5 mg/dL    BUN/Creatinine Ratio 31.6 (H) 7.0 - 25.0    Anion Gap 10.5 5.0 - 15.0 mmol/L    eGFR 90.9 >60.0 mL/min/1.73   CBC Auto Differential    Collection Time: 06/01/24  4:39 AM    Specimen: Blood   Result Value Ref Range    WBC 0.82 (C) 3.40 - 10.80 10*3/mm3    RBC 3.20 (L) 3.77 - 5.28 10*6/mm3    Hemoglobin 10.7 (L) 12.0 - 15.9 g/dL    Hematocrit 31.2 (L) 34.0 - 46.6 %    MCV 97.5 (H) 79.0 - 97.0 fL    MCH 33.4 (H) 26.6 - 33.0 pg    MCHC 34.3 31.5 - 35.7 g/dL    RDW 14.1 12.3 - 15.4 %    RDW-SD 50.4 37.0 - 54.0 fl    MPV 9.3 6.0 - 12.0 fL    Platelets 157 140 - 450 10*3/mm3    Neutrophil % 28.1 (L) 42.7 - 76.0 %    Lymphocyte % 45.1 19.6 - 45.3 %    Monocyte % 15.9 (H) 5.0 - 12.0 %    Eosinophil % 0.0 (L) 0.3 - 6.2 %    Basophil % 2.4 (H) 0.0 - 1.5 %    Immature Grans % 8.5 (H) 0.0 - 0.5 %    Neutrophils, Absolute 0.23 (L) 1.70 - 7.00 10*3/mm3    Lymphocytes, Absolute 0.37 (L) 0.70 - 3.10 10*3/mm3    Monocytes, Absolute 0.13 0.10 - 0.90 10*3/mm3    Eosinophils, Absolute 0.00 0.00 - 0.40 10*3/mm3    Basophils, Absolute 0.02 0.00 - 0.20 10*3/mm3    Immature Grans, Absolute 0.07 (H) 0.00 - 0.05 10*3/mm3       Radiology:  Imaging Results (Last 24 Hours)       ** No results found for the last 24 hours. **             ASSESSMENT:   Hyponatremia   Lymphoma   Hypotension   Hyperkalemia   Chronic diastolic heart failure   Adrenal insufficiency - per chart, but doesn't follow with endo   Hypoalbuminemia   Edema   Parox  afib       PLAN:   Na stable at 133  Continue oral NaCl and fluid restriction  Cortisol and TSH are WNL   Replace K prn  Off IV fluids   On Midodrine for BP support     Awaiting BM bx, Heme following    Monitor electrolytes and volume closely   Dose all medications for GFR >60   Please call with any questions or concerns.       Mikal Vásquez MD  [facility]  Office phone number: [phone]  Answering service phone number: [phone]

## 2024-06-01 NOTE — PROGRESS NOTES
Subjective     CHIEF COMPLAINT:     Diffuse large B cell lymphoma  Neutropenia   Anemia  Hyponatremia    INTERVAL HISTORY:   Patient remains afebrile with a Tmax of 99.5.  Somewhat hypotensive this morning with a blood pressure of 82/50.  No other new complaints other than feeling weak and uncomfortable.    REVIEW OF SYSTEMS:  Pertinent ROS as in the interval history. Otherwise, negative.    SCHEDULED MEDS:  amiodarone, 200 mg, Oral, Q24H  apixaban, 2.5 mg, Oral, Q12H  cefepime, 2,000 mg, Intravenous, Q12H  cetirizine, 10 mg, Oral, Daily  ferrous sulfate, 325 mg, Oral, Every Other Day  Hydrocortisone (Perianal), , Rectal, Daily  midodrine, 10 mg, Oral, TID AC  mirtazapine, 15 mg, Oral, Nightly  multivitamin with minerals, 1 tablet, Oral, Daily  sodium chloride, 10 mL, Intravenous, Q12H  sodium chloride, 1 g, Oral, BID With Meals      Objective   VITAL SIGNS:  Temp:  [97.3 °F (36.3 °C)-98.8 °F (37.1 °C)] 97.5 °F (36.4 °C)  Heart Rate:  [] 124  Resp:  [17-22] 22  BP: ()/(50-69) 82/50     PHYSICAL EXAMINATION:  GENERAL:  The patient is ill-appearing.  SKIN: No skin rash.   EYES:  No Jaundice. No Pallor.   CHEST: Normal respiratory effort. Lungs clear to auscultation.   CARDIAC:  Normal S1 & S2. Grade III systolic murmur.   ABDOMEN:  Non-distended.   EXTREMITIES:  No edema.    I have reexamined the patient and the results are consistent with the previously documented exam. Mallory Del Castillo MD      RESULT REVIEW:   Results from last 7 days   Lab Units 06/01/24  0439 05/31/24  0315 05/30/24  0415 05/29/24  0315 05/28/24  0307 05/27/24  1812 05/27/24  0336 05/26/24  0525   WBC 10*3/mm3 0.82* 0.90* 0.83* 0.87* 1.05* 1.30* 1.46* 1.01*   NEUTROS ABS 10*3/mm3 0.23* 0.37*  --  0.41* 0.57* 0.85*  0.59* 0.85* 0.61*   LYMPHS ABS 10*3/mm3  --  0.37*  --   --  0.29* 0.40*  0.36* 0.32* 0.22*   HEMOGLOBIN g/dL 10.7* 10.0* 10.2* 9.9* 10.1* 10.9* 10.6* 8.2*   HEMATOCRIT % 31.2* 29.4* 30.0* 29.2* 29.5* 31.4* 31.0* 23.9*    PLATELETS 10*3/mm3 157 160 161 184 206 210 190 174     Results from last 7 days   Lab Units 06/01/24  0438 05/31/24  0315 05/30/24  0415 05/29/24  1558 05/29/24  0315 05/28/24  0307   SODIUM mmol/L 133* 134* 133*  --  130* 132*   POTASSIUM mmol/L 3.5 4.0 4.2 4.4 3.2* 3.7   CHLORIDE mmol/L 103 104 104  --  97* 99   CO2 mmol/L 19.5* 24.0 22.0  --  22.0 23.0   BUN mg/dL 18 18 15  --  16 20   CREATININE mg/dL 0.57 0.64 0.51*  --  0.59 0.71   CALCIUM mg/dL 8.4* 8.4* 8.2*  --  7.8* 8.1*   ALBUMIN g/dL  --  2.5*  --   --   --   --    BILIRUBIN mg/dL  --  0.4  --   --   --   --    ALK PHOS U/L  --  63  --   --   --   --    ALT (SGPT) U/L  --  22  --   --   --   --    AST (SGOT) U/L  --  18  --   --   --   --          Lab 05/26/24  0525   FOLATE 11.40   VITAMIN B 12 >2,000*      Component      Latest Ref Rng 5/25/2024   Reticulocyte %      0.70 - 1.90 % 2.97 (H)       Component      Latest Ref Rng 5/25/2024   Haptoglobin      30 - 200 mg/dL 237 (H)      Component      Latest Ref Rn 5/3/2024 5/24/2024 5/25/2024   LDH      135 - 214 U/L 164  245 (H)  190    Uric Acid      2.4 - 5.7 mg/dL 3.5  3.5        Assessment & Plan   *Relapsed diffuse large B-cell lymphoma.  Patient was found to have bilateral pleural effusions.    She underwent right thoracentesis on 2/21/2022 and the left thoracentesis on 2/22/2022.    Analysis of the fluid revealed involvement with diffuse large B-cell lymphoma.    FISH analysis for BCL6 rearrangement was positive but was negative for BCL-2 and MYC rearrangement.    PET scan on 3/10/2022 revealed significant hypermetabolism in the left adrenal gland and the surrounding soft tissue with SUV up to 34.5. Hypermetabolism in the left pleural thickening with SUV of 7.1. there was less hypermetabolism in the right adrenal gland and in the right pleura.  She was considered to have stage III non-bulky disease.  R-IPI score of 3 associated with poor risk - associated with overall survival of 55%.   R-CHOP  with Neulasta support was started on 3/28/2022.  Due to development of neutropenic fever following cycles 1 and 2, treatment was changed to mini-RCHOP starting cycle #3 on 5/16/2022.  Patient received cycle #6 on 7/25/2022.  PET scan on 8/15/2022 revealed complete metabolic response.  Patient was found to have recurrence with development of a left mandibular mass in October/November 2023.  Biopsy of left mandible mass on 11/28/2023 showed recurrence of the lymphoma, diffuse large B cell, non-GCB phenotype.  Ki-67 was 90%.    It was positive for Bcl-6 but negative for Bcl-2 and MYC rearrangement.  PET scan on 12/11/2023 revealed multiple areas of involvement -prevascular space, left epicardial fat pad, pleural density posterior to the left fourth rib anterior aspect, lesion along the pericardium versus intramuscular with SUV of 9.8.  Hypermetabolic activity at both adrenal glands, uniformly increased in size and appeared hyperplastic.  There was a 2.7 x 1.1 cm soft tissue nodule lateral to the right erector spinae muscle posterior to the right posterior 11th rib with an SUV of 20.5 and there was a 1.3 cm nodule lateral to the posterior margin of the left psoas muscle with an SUV of 24.9.  Rituxan Polivy and Treanda with Treanda every 3 weeks for 6 cycles was started on 12/12/2023.  The jaw mass started to decrease in size after starting treatment.  CT scan on 2/8/2024 showed evidence of response to treatment.  Cycle #6 was given on 4/1/2024.  PET scan on 4/22/2024 revealed decrease in the hypermetabolism in the mediastinal and pericardial lymph nodes/lesions and in the lesions above the left kidney and adrenal gland.  The left mandibular lesion was no longer hypermetabolic.   She is at increased risk for relapse of the lymphoma.  The plan was to start treatment with Revlimid 10 mg daily.  However, the patient did not start the treatment yet.  LDH was previously elevated but improved to 190 on 5/25/2024.      *Neutropenia  5/20/2024: WBC decreased to 1750.  Neutrophils decreased to 940.  5/24/2024: WBC decreased to 1290.  Neutrophils decreased to 680.  5/25/2024: WBC decreased to 860.  Neutrophils decreased to 530.  Worsening neutropenia is also concerning for relapse of the lymphoma with involvement of the bone marrow.   5/26/2024: Neutrophil count 610.  5/27/2024-total WBC count has increased to 1.46.  Absolute neutrophil count pending   5/28/2024-WBC count lower at 1.05 with an absolute neutrophil count of 0.57  Increase in metamyelocytes as well as basophil percentage.  Flow cytometry 5/28/2024-negative  ANC lower today at 0.41  Copper level normal at 145  MMA normal at 245  5/30/24 - bone marrow biopsy, results pending   6/1/2024-WBC count 0.82, absolute neutrophil count 0.23.  Bone marrow biopsy results pending.      *Fever  Afebrile, Tmax of 99.5  Blood cultures negative  Patient at high risk for infections given neutropenia  Continue cefepime per ID recommendations.  After discontinuation of cefepime if patient remains neutropenic then they are recommending cefdinir     *Macrocytic anemia.  Patient also developed anemia secondary to lymphoma and secondary to chemotherapy.    Hgb decreased to 7.9 on 4/25/2022 and she was transfused.   Hemoglobin decreased to 6.3 on 4/27/2022. She was transfused.  Hemoglobin level improved subsequently.  Patient was placed ferrous sulfate 325 mg 3 days a week.  5/3/2024: Hemoglobin increased to 10.2.  5/25/2024: Hemoglobin decreased to 7.8.  No evidence of vitamin B12 or folate deficiency.    Ferritin 1953.  Transferrin saturation 11%.  Patient decided to hold off on PRBC transfusion.  5/26/2024: Hemoglobin 8.2.  Due to her reporting chills when she received PRBC Tx in the past, we will give Tylenol, Benadryl and Solumedrol prior to the transfusion.   526 2024-1 unit of PRBC administered.  Patient tolerated the transfusion well.  Hemoglobin stable at 10.2  Copper and MMA levels  normal     *Hyponatremia.  Patient had problem with hyponatremia in the past.  Sodium is 127 on 3/14/2023.  She was advised to increase salt intake.  Sodium improved to 133 on repeat lab on 3/21/2023.  However, sodium decreased to 126 on 6/7/2023.  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  She was started on sodium chloride 1 g daily.  4/1/2024: Sodium decreased to 132.  Leg swelling improved with Lasix.  5/3/2024: Sodium 130.  5/24/2024: Sodium decreased to 126.    Patient was having significant weakness.  5/25/2024: Sodium 128.  Nephrology service was consulted.  5/28/2024-sodium stable at 132  5/30/2024-sodium 133  5/31/24 - sodium 134  6/1/2024-sodium 133  Management per nephrology     *Hyperkalemia.  5/24/2024: Potassium increased to 5.6.  Potassium 3.5     *Chronic anticoagulation.  Patient is on Eliquis 5 mg twice daily.  No excessive bleeding  Platelet count remains normal  Eliquis resumed      *Prophylaxis.  Patient had shingles in April 2023.  She is at risk of developing shingles while on treatment.  She was placed on acyclovir 400 mg twice daily.  She was on Bactrim DS 3 days a week.  Bactrim was discontinued due to the electrolyte abnormalities.     *Low-density lesion in the uterus.    The radiologist recommended pelvic ultrasound.    The patient is asymptomatic.    PET scan on 4/22/2024 reported fluid in the lower abdomen.  Patient reported intermittent vaginal discharge.     PLAN:     1.  No clear explanation for neutropenia, continues to have low WBC , BMB results pending   Discussed with pt and if this is lymphoma recurrence she would like to opt for comfort measures  Palliative care consulted   2.flow cytometry results negative  3.copper and MMA levels negative  4.Blood cx negative   5.Continue cefepime, ID managing antibiotics.  6 Bactrim and acyclovir are currently being held.     Overall guarded prognosis given worsening neutropenia and ongoing fevers.  Discussed with  patient's daughter at bedside.        Mallory Del Castillo MD  06/01/24

## 2024-06-01 NOTE — PLAN OF CARE
Goal Outcome Evaluation:              Outcome Evaluation: Pt hypotensive this morning. C/o being tired. MD aware. Midodrine increased with appropriate response. Waiting on biopsy results. Needs met at this time.

## 2024-06-01 NOTE — PLAN OF CARE
Goal Outcome Evaluation:  Plan of Care Reviewed With: patient           Outcome Evaluation: No acute changes. Remains on RA. Waiting on biopsy results. VSS.

## 2024-06-02 LAB
ANION GAP SERPL CALCULATED.3IONS-SCNC: 11 MMOL/L (ref 5–15)
BUN SERPL-MCNC: 22 MG/DL (ref 8–23)
BUN/CREAT SERPL: 32.8 (ref 7–25)
CALCIUM SPEC-SCNC: 8.5 MG/DL (ref 8.6–10.5)
CHLORIDE SERPL-SCNC: 102 MMOL/L (ref 98–107)
CO2 SERPL-SCNC: 22 MMOL/L (ref 22–29)
CREAT SERPL-MCNC: 0.67 MG/DL (ref 0.57–1)
DEPRECATED RDW RBC AUTO: 48.5 FL (ref 37–54)
EGFRCR SERPLBLD CKD-EPI 2021: 87.4 ML/MIN/1.73
ERYTHROCYTE [DISTWIDTH] IN BLOOD BY AUTOMATED COUNT: 13.7 % (ref 12.3–15.4)
GLUCOSE SERPL-MCNC: 91 MG/DL (ref 65–99)
HCT VFR BLD AUTO: 28.1 % (ref 34–46.6)
HGB BLD-MCNC: 9.5 G/DL (ref 12–15.9)
LYMPHOCYTES # BLD MANUAL: 0.36 10*3/MM3 (ref 0.7–3.1)
LYMPHOCYTES NFR BLD MANUAL: 12 % (ref 5–12)
MCH RBC QN AUTO: 32.8 PG (ref 26.6–33)
MCHC RBC AUTO-ENTMCNC: 33.8 G/DL (ref 31.5–35.7)
MCV RBC AUTO: 96.9 FL (ref 79–97)
MONOCYTES # BLD: 0.12 10*3/MM3 (ref 0.1–0.9)
NEUTROPHILS # BLD AUTO: 0.51 10*3/MM3 (ref 1.7–7)
NEUTROPHILS NFR BLD MANUAL: 52 % (ref 42.7–76)
PLAT MORPH BLD: NORMAL
PLATELET # BLD AUTO: 147 10*3/MM3 (ref 140–450)
PMV BLD AUTO: 9.4 FL (ref 6–12)
POTASSIUM SERPL-SCNC: 4 MMOL/L (ref 3.5–5.2)
RBC # BLD AUTO: 2.9 10*6/MM3 (ref 3.77–5.28)
RBC MORPH BLD: NORMAL
SODIUM SERPL-SCNC: 135 MMOL/L (ref 136–145)
VARIANT LYMPHS NFR BLD MANUAL: 36 % (ref 19.6–45.3)
WBC MORPH BLD: NORMAL
WBC NRBC COR # BLD AUTO: 0.99 10*3/MM3 (ref 3.4–10.8)

## 2024-06-02 PROCEDURE — 85025 COMPLETE CBC W/AUTO DIFF WBC: CPT | Performed by: INTERNAL MEDICINE

## 2024-06-02 PROCEDURE — 85007 BL SMEAR W/DIFF WBC COUNT: CPT | Performed by: INTERNAL MEDICINE

## 2024-06-02 PROCEDURE — 99232 SBSQ HOSP IP/OBS MODERATE 35: CPT | Performed by: INTERNAL MEDICINE

## 2024-06-02 PROCEDURE — 80048 BASIC METABOLIC PNL TOTAL CA: CPT | Performed by: INTERNAL MEDICINE

## 2024-06-02 PROCEDURE — 25010000002 CEFEPIME PER 500 MG: Performed by: INTERNAL MEDICINE

## 2024-06-02 RX ADMIN — Medication 10 ML: at 21:08

## 2024-06-02 RX ADMIN — MIDODRINE HYDROCHLORIDE 10 MG: 5 TABLET ORAL at 17:15

## 2024-06-02 RX ADMIN — CEFEPIME 2000 MG: 2 INJECTION, POWDER, FOR SOLUTION INTRAVENOUS at 03:30

## 2024-06-02 RX ADMIN — SODIUM CHLORIDE TAB 1 GM 1 G: 1 TAB at 10:17

## 2024-06-02 RX ADMIN — HYDROCORTISONE: 25 CREAM TOPICAL at 10:18

## 2024-06-02 RX ADMIN — CEFEPIME 2000 MG: 2 INJECTION, POWDER, FOR SOLUTION INTRAVENOUS at 17:15

## 2024-06-02 RX ADMIN — CETIRIZINE HYDROCHLORIDE 10 MG: 10 TABLET ORAL at 10:17

## 2024-06-02 RX ADMIN — MIRTAZAPINE 15 MG: 15 TABLET, FILM COATED ORAL at 21:08

## 2024-06-02 RX ADMIN — MIDODRINE HYDROCHLORIDE 10 MG: 5 TABLET ORAL at 10:17

## 2024-06-02 RX ADMIN — APIXABAN 2.5 MG: 2.5 TABLET, FILM COATED ORAL at 21:08

## 2024-06-02 RX ADMIN — APIXABAN 2.5 MG: 2.5 TABLET, FILM COATED ORAL at 10:17

## 2024-06-02 RX ADMIN — SODIUM CHLORIDE TAB 1 GM 1 G: 1 TAB at 17:15

## 2024-06-02 RX ADMIN — Medication 10 ML: at 10:18

## 2024-06-02 RX ADMIN — AMIODARONE HYDROCHLORIDE 200 MG: 200 TABLET ORAL at 10:17

## 2024-06-02 RX ADMIN — Medication 1 TABLET: at 10:17

## 2024-06-02 NOTE — PROGRESS NOTES
Subjective     CHIEF COMPLAINT:     Diffuse large B cell lymphoma  Neutropenia   Anemia  Hyponatremia    INTERVAL HISTORY:   Blood pressure improved, continues on midodrine.  Continues to feel weak and tired.  No other complaints at this time.    REVIEW OF SYSTEMS:  Pertinent ROS as in the interval history. Otherwise, negative.    SCHEDULED MEDS:  amiodarone, 200 mg, Oral, Q24H  apixaban, 2.5 mg, Oral, Q12H  cefepime, 2,000 mg, Intravenous, Q12H  cetirizine, 10 mg, Oral, Daily  ferrous sulfate, 325 mg, Oral, Every Other Day  Hydrocortisone (Perianal), , Rectal, Daily  midodrine, 10 mg, Oral, TID AC  mirtazapine, 15 mg, Oral, Nightly  multivitamin with minerals, 1 tablet, Oral, Daily  sodium chloride, 10 mL, Intravenous, Q12H  sodium chloride, 1 g, Oral, BID With Meals      Objective   VITAL SIGNS:  Temp:  [97.7 °F (36.5 °C)-98.6 °F (37 °C)] 97.7 °F (36.5 °C)  Heart Rate:  [63-82] 64  Resp:  [16-20] 16  BP: ()/(62-75) 112/67     PHYSICAL EXAMINATION:  GENERAL:  The patient is ill-appearing.  SKIN: No skin rash.   EYES:  No Jaundice. No Pallor.   CHEST: Normal respiratory effort. Lungs clear to auscultation.   CARDIAC:  Normal S1 & S2. Grade III systolic murmur.   ABDOMEN:  Non-distended.   EXTREMITIES:  No edema.    I have reexamined the patient and the results are consistent with the previously documented exam. Mallory Del Castillo MD      RESULT REVIEW:   Results from last 7 days   Lab Units 06/02/24  0343 06/01/24  0439 05/31/24  0315 05/30/24  0415 05/29/24  0315 05/28/24  0307 05/27/24  1812 05/27/24  0336   WBC 10*3/mm3 0.99* 0.82* 0.90* 0.83* 0.87* 1.05* 1.30* 1.46*   NEUTROS ABS 10*3/mm3 0.51* 0.23* 0.37*  --  0.41* 0.57* 0.85*  0.59* 0.85*   LYMPHS ABS 10*3/mm3 0.36*  --  0.37*  --   --  0.29* 0.40*  0.36* 0.32*   HEMOGLOBIN g/dL 9.5* 10.7* 10.0* 10.2* 9.9* 10.1* 10.9* 10.6*   HEMATOCRIT % 28.1* 31.2* 29.4* 30.0* 29.2* 29.5* 31.4* 31.0*   PLATELETS 10*3/mm3 147 157 160 161 184 206 210 190     Results  from last 7 days   Lab Units 06/02/24  0343 06/01/24  0438 05/31/24  0315 05/30/24  0415 05/29/24  1558 05/29/24  0315   SODIUM mmol/L 135* 133* 134* 133*  --  130*   POTASSIUM mmol/L 4.0 3.5 4.0 4.2 4.4 3.2*   CHLORIDE mmol/L 102 103 104 104  --  97*   CO2 mmol/L 22.0 19.5* 24.0 22.0  --  22.0   BUN mg/dL 22 18 18 15  --  16   CREATININE mg/dL 0.67 0.57 0.64 0.51*  --  0.59   CALCIUM mg/dL 8.5* 8.4* 8.4* 8.2*  --  7.8*   ALBUMIN g/dL  --   --  2.5*  --   --   --    BILIRUBIN mg/dL  --   --  0.4  --   --   --    ALK PHOS U/L  --   --  63  --   --   --    ALT (SGPT) U/L  --   --  22  --   --   --    AST (SGOT) U/L  --   --  18  --   --   --             Component      Latest Ref Rng 5/25/2024   Reticulocyte %      0.70 - 1.90 % 2.97 (H)       Component      Latest Ref Rng 5/25/2024   Haptoglobin      30 - 200 mg/dL 237 (H)      Component      Latest Ref Rng 5/3/2024 5/24/2024 5/25/2024   LDH      135 - 214 U/L 164  245 (H)  190    Uric Acid      2.4 - 5.7 mg/dL 3.5  3.5        Assessment & Plan   *Relapsed diffuse large B-cell lymphoma.  Patient was found to have bilateral pleural effusions.    She underwent right thoracentesis on 2/21/2022 and the left thoracentesis on 2/22/2022.    Analysis of the fluid revealed involvement with diffuse large B-cell lymphoma.    FISH analysis for BCL6 rearrangement was positive but was negative for BCL-2 and MYC rearrangement.    PET scan on 3/10/2022 revealed significant hypermetabolism in the left adrenal gland and the surrounding soft tissue with SUV up to 34.5. Hypermetabolism in the left pleural thickening with SUV of 7.1. there was less hypermetabolism in the right adrenal gland and in the right pleura.  She was considered to have stage III non-bulky disease.  R-IPI score of 3 associated with poor risk - associated with overall survival of 55%.   R-CHOP with Neulasta support was started on 3/28/2022.  Due to development of neutropenic fever following cycles 1 and 2, treatment  was changed to mini-RCHOP starting cycle #3 on 5/16/2022.  Patient received cycle #6 on 7/25/2022.  PET scan on 8/15/2022 revealed complete metabolic response.  Patient was found to have recurrence with development of a left mandibular mass in October/November 2023.  Biopsy of left mandible mass on 11/28/2023 showed recurrence of the lymphoma, diffuse large B cell, non-GCB phenotype.  Ki-67 was 90%.    It was positive for Bcl-6 but negative for Bcl-2 and MYC rearrangement.  PET scan on 12/11/2023 revealed multiple areas of involvement -prevascular space, left epicardial fat pad, pleural density posterior to the left fourth rib anterior aspect, lesion along the pericardium versus intramuscular with SUV of 9.8.  Hypermetabolic activity at both adrenal glands, uniformly increased in size and appeared hyperplastic.  There was a 2.7 x 1.1 cm soft tissue nodule lateral to the right erector spinae muscle posterior to the right posterior 11th rib with an SUV of 20.5 and there was a 1.3 cm nodule lateral to the posterior margin of the left psoas muscle with an SUV of 24.9.  Rituxan Polivy and Treanda with Treanda every 3 weeks for 6 cycles was started on 12/12/2023.  The jaw mass started to decrease in size after starting treatment.  CT scan on 2/8/2024 showed evidence of response to treatment.  Cycle #6 was given on 4/1/2024.  PET scan on 4/22/2024 revealed decrease in the hypermetabolism in the mediastinal and pericardial lymph nodes/lesions and in the lesions above the left kidney and adrenal gland.  The left mandibular lesion was no longer hypermetabolic.   She is at increased risk for relapse of the lymphoma.  The plan was to start treatment with Revlimid 10 mg daily.  However, the patient did not start the treatment yet.  LDH was previously elevated but improved to 190 on 5/25/2024.     *Neutropenia  5/20/2024: WBC decreased to 1750.  Neutrophils decreased to 940.  5/24/2024: WBC decreased to 1290.  Neutrophils  decreased to 680.  5/25/2024: WBC decreased to 860.  Neutrophils decreased to 530.  Worsening neutropenia is also concerning for relapse of the lymphoma with involvement of the bone marrow.   5/26/2024: Neutrophil count 610.  5/27/2024-total WBC count has increased to 1.46.  Absolute neutrophil count pending   5/28/2024-WBC count lower at 1.05 with an absolute neutrophil count of 0.57  Increase in metamyelocytes as well as basophil percentage.  Flow cytometry 5/28/2024-negative  ANC lower today at 0.41  Copper level normal at 145  MMA normal at 245  5/30/24 - bone marrow biopsy, results pending   6/1/2024-WBC count 0.82, absolute neutrophil count 0.23.  Awaiting bone marrow biopsy results.      *Fever  Afebrile, Tmax of 99.5  Blood cultures negative  Patient at high risk for infections given neutropenia  Continue cefepime per ID recommendations.  After discontinuation of cefepime if patient remains neutropenic then they are recommending cefdinir     *Macrocytic anemia.  Patient also developed anemia secondary to lymphoma and secondary to chemotherapy.    Hgb decreased to 7.9 on 4/25/2022 and she was transfused.   Hemoglobin decreased to 6.3 on 4/27/2022. She was transfused.  Hemoglobin level improved subsequently.  Patient was placed ferrous sulfate 325 mg 3 days a week.  5/3/2024: Hemoglobin increased to 10.2.  5/25/2024: Hemoglobin decreased to 7.8.  No evidence of vitamin B12 or folate deficiency.    Ferritin 1953.  Transferrin saturation 11%.  Patient decided to hold off on PRBC transfusion.  5/26/2024: Hemoglobin 8.2.  Due to her reporting chills when she received PRBC Tx in the past, we will give Tylenol, Benadryl and Solumedrol prior to the transfusion.   526 2024-1 unit of PRBC administered.  Patient tolerated the transfusion well.  Hemoglobin 9.5 on 6/2/2024  Copper and MMA levels normal     *Hyponatremia.  Patient had problem with hyponatremia in the past.  Sodium is 127 on 3/14/2023.  She was advised to  increase salt intake.  Sodium improved to 133 on repeat lab on 3/21/2023.  However, sodium decreased to 126 on 6/7/2023.  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  She was started on sodium chloride 1 g daily.  4/1/2024: Sodium decreased to 132.  Leg swelling improved with Lasix.  5/3/2024: Sodium 130.  5/24/2024: Sodium decreased to 126.    Patient was having significant weakness.  5/25/2024: Sodium 128.  Nephrology service was consulted.  5/28/2024-sodium stable at 132  5/30/2024-sodium 133  5/31/24 - sodium 134  6/1/2024-sodium 133  6/2/2024-sodium stable at 135  Continues on fluid restriction     *Hyperkalemia.  5/24/2024: Potassium increased to 5.6.  Potassium 4.0     *Chronic anticoagulation.  Patient is on Eliquis 5 mg twice daily.  No excessive bleeding  Platelet count remains normal  Eliquis resumed      *Prophylaxis.  Patient had shingles in April 2023.  She is at risk of developing shingles while on treatment.  She was placed on acyclovir 400 mg twice daily.  She was on Bactrim DS 3 days a week.  Bactrim was discontinued due to the electrolyte abnormalities.     *Low-density lesion in the uterus.    The radiologist recommended pelvic ultrasound.    The patient is asymptomatic.    PET scan on 4/22/2024 reported fluid in the lower abdomen.  Patient reported intermittent vaginal discharge.    *Hypertension  Patient started on midodrine  Blood pressure has improved with midodrine.  On 10 mg 3 times a day     PLAN:     1.  No clear explanation for neutropenia, continues to have low WBC , BMB results pending   Discussed with pt and if this is lymphoma recurrence she would like to opt for comfort measures  Palliative care consulted   2.flow cytometry results negative  3.copper and MMA levels normal  4.Blood cx negative   5.Continue cefepime, ID managing antibiotics.  6 Bactrim and acyclovir are currently being held.     Overall guarded prognosis given worsening neutropenia and  weakness.  Awaiting bone marrow biopsy results.  Patient has informed us that if bone marrow biopsy confirms recurrent lymphoma then she would elect for comfort measures/hospice.          Mallory Del Castillo MD  06/02/24

## 2024-06-02 NOTE — PROGRESS NOTES
[facility]/Nephrology Follow Up Note    Patient Identification:  Name: Michelle Car MRN: [id number]  Age: 83 y.o. : 1940  Sex: female  Date:2024    Requesting Physician: As per consult order.  Reason for Consultation: hyponatremia   Information from:patient/ family/ chart      History of Present Illness: This is a 83 y.o. year old female  with lymphoma admitted for weakness, hyponatremia and edema.   She completed chemo in 2024.  She has had ongoing issues with hyponatremia.  She was on salt tabs at home. Upon arrival her Na was 126 with K at 5.6.   She was given IV fluids of NS as BP was low upon arrival.      Na 135 today      Current Meds:   Current Facility-Administered Medications   Medication Dose Route Frequency Provider Last Rate Last Admin    acetaminophen (TYLENOL) tablet 650 mg  650 mg Oral Q4H PRN Abel Pelaez MD   650 mg at 24 2339    Or    acetaminophen (TYLENOL) 160 MG/5ML oral solution 650 mg  650 mg Oral Q4H PRN Abel Pelaez MD        Or    acetaminophen (TYLENOL) suppository 650 mg  650 mg Rectal Q4H PRN Abel Pelaez MD        albuterol (PROVENTIL) nebulizer solution 0.083% 2.5 mg/3mL  2.5 mg Nebulization Q4H PRN Abel Pelaez MD        amiodarone (PACERONE) tablet 200 mg  200 mg Oral Q24H Abel Pelaez MD   200 mg at 24 1100    apixaban (ELIQUIS) tablet 2.5 mg  2.5 mg Oral Q12H Mallory Del Castillo MD   2.5 mg at 24 2040    sennosides-docusate (PERICOLACE) 8.6-50 MG per tablet 2 tablet  2 tablet Oral BID PRN Abel Pelaez MD        And    polyethylene glycol (MIRALAX) packet 17 g  17 g Oral Daily PRN Abel Pelaez MD        And    bisacodyl (DULCOLAX) EC tablet 5 mg  5 mg Oral Daily PRN Abel Pelaez MD        And    bisacodyl (DULCOLAX) suppository 10 mg  10 mg Rectal Daily PRN Abel Pelaez MD        cefepime 2000 mg IVPB in 100 mL NS (MBP)  2,000 mg Intravenous Q12H Mick Brown MD   2,000 mg at 24 0330    cetirizine (zyrTEC) tablet 10 mg   "10 mg Oral Daily Abel Pelaez MD   10 mg at 24 0817    ferrous sulfate tablet 325 mg  325 mg Oral Every Other Day Abel Pelaez MD   325 mg at 24 08    Hydrocortisone (Perianal) (ANUSOL-HC) 2.5 % rectal cream   Rectal Daily Abel Pelaez MD   Given at 24 0818    midodrine (PROAMATINE) tablet 10 mg  10 mg Oral TID AC Morales Hardwick MD   10 mg at 24    mirtazapine (REMERON) tablet 15 mg  15 mg Oral Nightly Sualo Smith MD   15 mg at 24    multivitamin with minerals 1 tablet  1 tablet Oral Daily Abel Pelaez MD   1 tablet at 24    nitroglycerin (NITROSTAT) SL tablet 0.4 mg  0.4 mg Sublingual Q5 Min PRN Abel Pelaez MD        ondansetron (ZOFRAN) injection 4 mg  4 mg Intravenous Q6H PRN Abel Pelaez MD        Potassium Replacement - Follow Nurse / BPA Driven Protocol   Does not apply PRN Morales Farrell MD        sodium chloride 0.9 % flush 10 mL  10 mL Intravenous Q12H Abel Pelaez MD   10 mL at 24    sodium chloride 0.9 % flush 10 mL  10 mL Intravenous PRN Abel Pelaez MD        sodium chloride 0.9 % infusion 40 mL  40 mL Intravenous PRN Abel Pelaez MD        sodium chloride tablet 1 g  1 g Oral BID With Meals Aarti Reynolds MD   1 g at 24         Physical Exam:  Vitals:   Temp (24hrs), Av °F (36.7 °C), Min:97.5 °F (36.4 °C), Max:98.6 °F (37 °C)    /75 (BP Location: Left arm, Patient Position: Lying)   Pulse 76   Temp 98.6 °F (37 °C) (Oral)   Resp 16   Ht 152.4 cm (60\")   Wt 54 kg (119 lb)   SpO2 96%   BMI 23.24 kg/m²   Intake/Output:     Intake/Output Summary (Last 24 hours) at 2024 0603  Last data filed at 2024  Gross per 24 hour   Intake 460 ml   Output 975 ml   Net -515 ml        Wt Readings from Last 1 Encounters:   24 1433 54 kg (119 lb)   24 1849 54.2 kg (119 lb 7.8 oz)   24 1701 51.7 kg (114 lb)       Exam:  Sleeping comfortably    No jvd reg s1s2 no murmur "   Clear bilaterally no rales/rhonchi/wheeze   Soft NTND + bs   No edema   Warm dry no rash       Labs:   Recent Results (from the past 24 hour(s))   Basic Metabolic Panel    Collection Time: 06/02/24  3:43 AM    Specimen: Blood   Result Value Ref Range    Glucose 91 65 - 99 mg/dL    BUN 22 8 - 23 mg/dL    Creatinine 0.67 0.57 - 1.00 mg/dL    Sodium 135 (L) 136 - 145 mmol/L    Potassium 4.0 3.5 - 5.2 mmol/L    Chloride 102 98 - 107 mmol/L    CO2 22.0 22.0 - 29.0 mmol/L    Calcium 8.5 (L) 8.6 - 10.5 mg/dL    BUN/Creatinine Ratio 32.8 (H) 7.0 - 25.0    Anion Gap 11.0 5.0 - 15.0 mmol/L    eGFR 87.4 >60.0 mL/min/1.73   CBC Auto Differential    Collection Time: 06/02/24  3:43 AM    Specimen: Blood   Result Value Ref Range    WBC 0.99 (C) 3.40 - 10.80 10*3/mm3    RBC 2.90 (L) 3.77 - 5.28 10*6/mm3    Hemoglobin 9.5 (L) 12.0 - 15.9 g/dL    Hematocrit 28.1 (L) 34.0 - 46.6 %    MCV 96.9 79.0 - 97.0 fL    MCH 32.8 26.6 - 33.0 pg    MCHC 33.8 31.5 - 35.7 g/dL    RDW 13.7 12.3 - 15.4 %    RDW-SD 48.5 37.0 - 54.0 fl    MPV 9.4 6.0 - 12.0 fL    Platelets 147 140 - 450 10*3/mm3       Radiology:  Imaging Results (Last 24 Hours)       ** No results found for the last 24 hours. **             ASSESSMENT:   Hyponatremia   Lymphoma   Hypotension   Hyperkalemia   Chronic diastolic heart failure   Adrenal insufficiency - per chart, but doesn't follow with endo   Hypoalbuminemia   Edema   Parox afib       PLAN:   Na stable at 135  Continue oral NaCl and fluid restriction  Cortisol and TSH are WNL   Replace K prn  Off IV fluids   On Midodrine for BP support     Awaiting BM bx result, Heme following    Monitor electrolytes and volume closely   Dose all medications for GFR >60   Please call with any questions or concerns.       Mikal Vásquez MD  [facility]  Office phone number: [phone]  Answering service phone number: [phone]

## 2024-06-02 NOTE — PLAN OF CARE
Goal Outcome Evaluation:              Outcome Evaluation: VSS. Bp imrpving this shift. Pt more awake. PO intake encouraged. +Bm. Q2 turn. waiting on biopsy results. No other complaints. Needs met at this time.

## 2024-06-02 NOTE — PROGRESS NOTES
REASON FOR FOLLOWUP/CHIEF COMPLAINT:  DLBCL, anemia, neutropenia    HISTORY OF PRESENT ILLNESS:   No new events overnight.  No bleeding.    Past Medical History, Past Surgical History, Social History, Family History have been reviewed and are without significant changes except as mentioned.    Review of Systems   Review of Systems   Constitutional:  Negative for activity change.   HENT:  Negative for nosebleeds and trouble swallowing.    Respiratory:  Negative for shortness of breath and wheezing.    Cardiovascular:  Negative for chest pain and palpitations.   Gastrointestinal:  Negative for constipation, diarrhea and nausea.   Genitourinary:  Negative for dysuria and hematuria.   Musculoskeletal:  Negative for arthralgias and myalgias.   Skin:  Negative for rash and wound.   Neurological:  Negative for seizures and syncope.   Hematological:  Negative for adenopathy. Does not bruise/bleed easily.   Psychiatric/Behavioral:  Negative for confusion.        Medications:  The current medication list was reviewed in the EMR    ALLERGIES:    Allergies   Allergen Reactions    Factive [Gemifloxacin] Rash              Vitals:    06/01/24 1940 06/01/24 2338 06/02/24 0700 06/02/24 1300   BP: 107/63 108/75 112/67 119/76   BP Location: Left arm Left arm Left arm Left arm   Patient Position: Lying Lying Lying Lying   Pulse:  76 64 65   Resp: 16 16 16 16   Temp: 98.1 °F (36.7 °C) 98.6 °F (37 °C) 97.7 °F (36.5 °C) 98.4 °F (36.9 °C)   TempSrc: Oral Oral Oral Oral   SpO2:  96% 98% 98%   Weight:       Height:         Physical Exam    CONSTITUTIONAL:  Vital signs reviewed.  No distress, looks comfortable.  EYES:  Conjunctivae and lids unremarkable.  PERRLA  EARS, NOSE, MOUTH, THROAT:  Ears and nose appear unremarkable.  Lips, teeth, gums appear unremarkable.  RESPIRATORY:  Normal respiratory effort.  Lungs clear to auscultation bilaterally.  CARDIOVASCULAR:  Normal S1, S2.  No murmurs, rubs or gallops.  No significant lower  extremity edema.  GASTROINTESTINAL: Abdomen appears unremarkable.  Nontender.  No hepatomegaly.  No splenomegaly.  NEURO: Cranial nerves 2-12 grossly intact.  No focal deficits.  Appears to have equal strength all 4 extremities.  MUSCULOSKELETAL:  Unremarkable digits/nails.  No cyanosis or clubbing.  SKIN:  Warm.  No rashes.  PSYCHIATRIC:  Normal judgment and insight.  Normal mood and affect.      RECENT LABS:  WBC   Date Value Ref Range Status   06/02/2024 0.99 (C) 3.40 - 10.80 10*3/mm3 Final   06/01/2024 0.82 (C) 3.40 - 10.80 10*3/mm3 Final   05/31/2024 0.90 (C) 3.40 - 10.80 10*3/mm3 Final     Hemoglobin   Date Value Ref Range Status   06/02/2024 9.5 (L) 12.0 - 15.9 g/dL Final   06/01/2024 10.7 (L) 12.0 - 15.9 g/dL Final   05/31/2024 10.0 (L) 12.0 - 15.9 g/dL Final     Platelets   Date Value Ref Range Status   06/02/2024 147 140 - 450 10*3/mm3 Final   06/01/2024 157 140 - 450 10*3/mm3 Final   05/31/2024 160 140 - 450 10*3/mm3 Final       ASSESSMENT/PLAN:  Michelle Car [id number]     *Relapsed diffuse large B-cell lymphoma.  Patient was found to have bilateral pleural effusions.    She underwent right thoracentesis on 2/21/2022 and the left thoracentesis on 2/22/2022.    Analysis of the fluid revealed involvement with diffuse large B-cell lymphoma.    FISH analysis for BCL6 rearrangement was positive but was negative for BCL-2 and MYC rearrangement.    PET scan on 3/10/2022 revealed significant hypermetabolism in the left adrenal gland and the surrounding soft tissue with SUV up to 34.5. Hypermetabolism in the left pleural thickening with SUV of 7.1. there was less hypermetabolism in the right adrenal gland and in the right pleura.  She was considered to have stage III non-bulky disease.  R-IPI score of 3 associated with poor risk - associated with overall survival of 55%.   R-CHOP with Neulasta support was started on 3/28/2022.  Due to development of neutropenic fever following cycles 1 and 2, treatment was  changed to mini-RCHOP starting cycle #3 on 5/16/2022.  Patient received cycle #6 on 7/25/2022.  PET scan on 8/15/2022 revealed complete metabolic response.  Patient was found to have recurrence with development of a left mandibular mass in October/November 2023.  Biopsy of left mandible mass on 11/28/2023 showed recurrence of the lymphoma, diffuse large B cell, non-GCB phenotype.  Ki-67 was 90%.    It was positive for Bcl-6 but negative for Bcl-2 and MYC rearrangement.  PET scan on 12/11/2023 revealed multiple areas of involvement -prevascular space, left epicardial fat pad, pleural density posterior to the left fourth rib anterior aspect, lesion along the pericardium versus intramuscular with SUV of 9.8.  Hypermetabolic activity at both adrenal glands, uniformly increased in size and appeared hyperplastic.  There was a 2.7 x 1.1 cm soft tissue nodule lateral to the right erector spinae muscle posterior to the right posterior 11th rib with an SUV of 20.5 and there was a 1.3 cm nodule lateral to the posterior margin of the left psoas muscle with an SUV of 24.9.  Rituxan Polivy and Treanda with Treanda every 3 weeks for 6 cycles was started on 12/12/2023.  The jaw mass started to decrease in size after starting treatment.  CT scan on 2/8/2024 showed evidence of response to treatment.  Cycle #6 was given on 4/1/2024.  PET scan on 4/22/2024 revealed decrease in the hypermetabolism in the mediastinal and pericardial lymph nodes/lesions and in the lesions above the left kidney and adrenal gland.  The left mandibular lesion was no longer hypermetabolic.   She is at increased risk for relapse of the lymphoma.  The plan was to start treatment with Revlimid 10 mg daily.  However, the patient did not start the treatment yet.  LDH was previously elevated but improved to 190 on 5/25/2024.  Bone marrow 5/30/2024: Pending morphology review.  However, for cytometry was negative for lymphoma.  The 7.8% increased gamma delta T-cell  population of uncertain significance was found.  Pathologist noted that this can occur with reactive processes.     *Neutropenia  5/20/2024: WBC decreased to 1750.  Neutrophils decreased to 940.  5/24/2024: WBC decreased to 1290.  Neutrophils decreased to 680.  5/25/2024: WBC decreased to 860.  Neutrophils decreased to 530.  Worsening neutropenia is also concerning for relapse of the lymphoma with involvement of the bone marrow.   5/26/2024: Neutrophil count 610.  5/27/2024-total WBC count has increased to 1.46.  Absolute neutrophil count pending   5/28/2024-WBC count lower at 1.05 with an absolute neutrophil count of 0.57  Increase in metamyelocytes as well as basophil percentage.  Flow cytometry 5/28/2024-negative  ANC lower today at 0.41  Copper level normal at 145  MMA normal at 245  5/30/24 - bone marrow biopsy, results pending   6/1/2024-WBC count 0.82, absolute neutrophil count 0.23.  WBC was okay through 5/10/2024.  Await bone marrow biopsy results.        *Fever  Afebrile, Tmax of 99.5  Blood cultures negative  Patient at high risk for infections given neutropenia  Continue cefepime through 6/4/ 24 per ID recommendations.  After discontinuation of cefepime if patient remains neutropenic then they are recommending cefdinir     *Macrocytic anemia.  Patient also developed anemia secondary to lymphoma and secondary to chemotherapy.    Hgb decreased to 7.9 on 4/25/2022 and she was transfused.   Hemoglobin decreased to 6.3 on 4/27/2022. She was transfused.  Hemoglobin level improved subsequently.  Patient was placed ferrous sulfate 325 mg 3 days a week.  5/3/2024: Hemoglobin increased to 10.2.  5/25/2024: Hemoglobin decreased to 7.8.  No evidence of vitamin B12 or folate deficiency.    Ferritin 1953.  Transferrin saturation 11%.  Patient decided to hold off on PRBC transfusion.  5/26/2024: Hemoglobin 8.2.  Due to her reporting chills when she received PRBC Tx in the past, we will give Tylenol, Benadryl and  Solumedrol prior to the transfusion.   526 2024-1 unit of PRBC administered.  Patient tolerated the transfusion well.  Hemoglobin 9.5 on 6/2/2024  Copper and MMA levels normal  Hb 9.6     *Hyponatremia.  Patient had problem with hyponatremia in the past.  Sodium is 127 on 3/14/2023.  She was advised to increase salt intake.  Sodium improved to 133 on repeat lab on 3/21/2023.  However, sodium decreased to 126 on 6/7/2023.  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  She was started on sodium chloride 1 g daily.  4/1/2024: Sodium decreased to 132.  Leg swelling improved with Lasix.  5/3/2024: Sodium 130.  5/24/2024: Sodium decreased to 126.    Patient was having significant weakness.  5/25/2024: Sodium 128.  Nephrology service was consulted.  5/28/2024-sodium stable at 132  5/30/2024-sodium 133  5/31/24 - sodium 134  6/1/2024-sodium 133  6/2/2024-sodium stable at 135  Continues on fluid restriction  Sodium 133     *Hyperkalemia.  5/24/2024: Potassium increased to 5.6.  Potassium 3.9     *Chronic anticoagulation.  Patient is on Eliquis 5 mg twice daily.  No excessive bleeding  Platelet count remains normal  Eliquis resumed      *Prophylaxis.  Patient had shingles in April 2023.  She is at risk of developing shingles while on treatment.  She was placed on acyclovir 400 mg twice daily.  She was on Bactrim DS 3 days a week.  Bactrim was discontinued due to the electrolyte abnormalities.     *Low-density lesion in the uterus.    The radiologist recommended pelvic ultrasound.    The patient is asymptomatic.    PET scan on 4/22/2024 reported fluid in the lower abdomen.  Patient reported intermittent vaginal discharge.     *Hypertension  Patient started on midodrine  Blood pressure has improved with midodrine.  On 10 mg 3 times a day     PLAN:  She follows with Dr. Galo in our office.  Await recovery of ANC  Cefepime completes 6/4/ 24.  Await bone marrow results (flow is unremarkable)  Patient  states he has a sleep apnea patient has stated his lymphoma is found in the marrow she would choose comfort measures/hospice.  (Generally flow is positive if lymphoma is in the marrow.  Her micro flow is negative for lymphoma.).

## 2024-06-02 NOTE — PLAN OF CARE
Goal Outcome Evaluation:         Pt A&Ox4, R/A, Q2 hr turns, NSR-ST, no BM this shift, IVFs and IV abx as ordered.  Pt had mild aches and was given Tylenol PRN; improved.  No appetite.  Purewick for UOP.  All identified needs met at this time.          Problem: Malnutrition  Goal: Improved Nutritional Intake  Outcome: Ongoing, Not Progressing  Intervention: Promote and Optimize Oral Intake  Description: Assess need and provide assistance with meal set-up and feeding.  Adjust diet or meal schedule based on preferences and tolerance.  Minimize unnecessary dietary restrictions to increase oral intake.  Offer oral supplemental food or drinks to enhance calorie and protein intake.  Establish bowel elimination program to increase comfort and appetite.  Provide and encourage oral hygiene to enhance desire to eat.  Consider nutrition support if oral intake remains inadequate.  Recent Flowsheet Documentation  Taken 6/1/2024 2339 by Dejah Lui, RN  Oral Nutrition Promotion:   rest periods promoted   safe use of adaptive equipment encouraged  Taken 6/1/2024 1950 by Dejah Lui, RN  Oral Nutrition Promotion:   rest periods promoted   safe use of adaptive equipment encouraged     Problem: Adult Inpatient Plan of Care  Goal: Plan of Care Review  Outcome: Ongoing, Progressing  Goal: Optimal Comfort and Wellbeing  Outcome: Ongoing, Progressing  Intervention: Monitor Pain and Promote Comfort  Description: Assess pain level, treatment efficacy and patient response at regular intervals using a consistent pain scale.  Consider the presence and impact of preexisting chronic pain.  Encourage patient and caregiver involvement in pain assessment, interventions and safety measures.  Recent Flowsheet Documentation  Taken 6/1/2024 2339 by Dejah Lui, RN  Pain Management Interventions:   care clustered   diversional activity provided   position adjusted   pillow support provided   quiet environment facilitated   see  MAR  Intervention: Provide Person-Centered Care  Description: Use a family-focused approach to care.  Develop trust and rapport by proactively providing information, encouraging questions, addressing concerns and offering reassurance.  Acknowledge emotional response to hospitalization.  Recognize and utilize personal coping strategies.  Honor spiritual and cultural preferences.  Recent Flowsheet Documentation  Taken 6/1/2024 2339 by Dejah Lui RN  Trust Relationship/Rapport:   care explained   choices provided   questions encouraged   reassurance provided   thoughts/feelings acknowledged   questions answered   empathic listening provided  Taken 6/1/2024 1950 by Dejah Lui RN  Trust Relationship/Rapport:   care explained   choices provided   reassurance provided   thoughts/feelings acknowledged   empathic listening provided   questions answered   questions encouraged  Goal: Readiness for Transition of Care  Outcome: Ongoing, Progressing     Problem: Skin Injury Risk Increased  Goal: Skin Health and Integrity  Outcome: Ongoing, Progressing  Intervention: Promote and Optimize Oral Intake  Description: Perform a nutrition assessment; include a nutrition-focused physical exam.  Determine calorie, protein, vitamin, mineral and fluid requirements.  Assess for micronutrient deficiencies; supplement if depleted.  Assess need and assist with meal set-up and feeding.  Adjust diet or meal schedule based on preferences and tolerance.  Offer oral supplemental food or drinks to enhance calorie and protein intake.  Establish bowel elimination program to increase comfort and appetite.  Minimize unnecessary dietary restrictions to increase oral intake.  Provide and encourage oral hygiene to enhance desire to eat.  Consider enteral nutrition support if oral intake remains inadequate; provide parenteral nutrition if enteral is contraindicated.  Recent Flowsheet Documentation  Taken 6/1/2024 2339 by Dejah Lui  RN  Oral Nutrition Promotion:   rest periods promoted   safe use of adaptive equipment encouraged  Taken 6/1/2024 1950 by Dejah Lui RN  Oral Nutrition Promotion:   rest periods promoted   safe use of adaptive equipment encouraged  Intervention: Optimize Skin Protection  Description: Perform a full pressure injury risk assessment, as indicated by screening, upon admission to care unit.  Reassess skin (injury risk, full inspection) frequently (e.g., scheduled interval, with change in condition) to provide optimal early detection and prevention.  Maintain adequate tissue perfusion (e.g., encourage fluid balance; avoid crossing legs, constrictive clothing or devices) to promote tissue oxygenation.  Maintain head of bed at lowest degree of elevation tolerated, considering medical condition and other restrictions.  Avoid positioning onto an area that remains reddened.  Minimize incontinence and moisture (e.g., toileting schedule; moisture-wicking pad, diaper or incontinence collection device; skin moisture barrier).  Cleanse skin promptly and gently when soiled utilizing a pH-balanced cleanser.  Relieve and redistribute pressure (e.g., scheduled position changes, weight shifts, use of support surface, medical device repositioning, protective dressing application, use of positioning device, microclimate control, use of pressure-injury-monitor  Encourage increased activity, such as sitting in a chair at the bedside or early mobilization, when able to tolerate.  Recent Flowsheet Documentation  Taken 6/2/2024 0600 by Dejah Lui, RN  Head of Bed (HOB) Positioning: HOB at 30-45 degrees  Taken 6/2/2024 0400 by Dejah Lui, RN  Head of Bed (HOB) Positioning: HOB at 30 degrees  Taken 6/2/2024 0200 by Dejah Lui, RN  Head of Bed (HOB) Positioning: HOB at 20-30 degrees  Taken 6/1/2024 2339 by Dejah Lui RN  Pressure Reduction Techniques:   frequent weight shift encouraged   heels elevated off bed    weight shift assistance provided  Head of Bed (HOB) Positioning: HOB at 30 degrees  Pressure Reduction Devices:   positioning supports utilized   pressure-redistributing mattress utilized  Skin Protection:   adhesive use limited   tubing/devices free from skin contact   transparent dressing maintained   incontinence pads utilized  Taken 6/1/2024 2154 by Dejah Lui, RN  Head of Bed (HOB) Positioning: HOB at 30 degrees  Taken 6/1/2024 1950 by Dejah Lui, RN  Pressure Reduction Techniques:   frequent weight shift encouraged   heels elevated off bed   weight shift assistance provided  Head of Bed (HOB) Positioning: HOB at 30 degrees  Pressure Reduction Devices:   pressure-redistributing mattress utilized   positioning supports utilized  Skin Protection:   adhesive use limited   transparent dressing maintained   tubing/devices free from skin contact   incontinence pads utilized     Problem: Fall Injury Risk  Goal: Absence of Fall and Fall-Related Injury  Outcome: Ongoing, Progressing  Intervention: Identify and Manage Contributors  Description: Develop a fall prevention plan with the patient and caregiver/family.  Provide reorientation, appropriate sensory stimulation and routines with changes in mental status to decrease risk of fall.  Promote use of personal vision and auditory aids.  Assess assistance level required for safe and effective self-care; provide support as needed, such as toileting, mobilization. For age 65 and older, implement timed toileting with assistance.  Encourage physical activity, such as performance of mobility and self-care at highest level of patient ability, multicomponent exercise program and provision of appropriate assistive devices.  If fall occurs, assess the severity of injury; implement fall injury protocol. Determine the cause and revise fall injury prevention plan.  Regularly review medication contribution to fall risk; adjust medication administration times to minimize  risk of falling.  Consider risk related to polypharmacy and age.  Balance adequate pain management with potential for oversedation.  Recent Flowsheet Documentation  Taken 6/2/2024 0600 by Dejah Lui RN  Medication Review/Management:   high-risk medications identified   medications reviewed  Taken 6/2/2024 0400 by Dejah Lui RN  Medication Review/Management:   medications reviewed   high-risk medications identified  Taken 6/2/2024 0200 by Dejah Lui RN  Medication Review/Management:   medications reviewed   high-risk medications identified  Taken 6/1/2024 2339 by Dejah Lui RN  Medication Review/Management:   medications reviewed   high-risk medications identified  Self-Care Promotion:   BADL personal objects within reach   independence encouraged   safe use of adaptive equipment encouraged  Taken 6/1/2024 2154 by Dejah Lui RN  Medication Review/Management:   medications reviewed   high-risk medications identified  Taken 6/1/2024 1950 by Dejah Lui RN  Medication Review/Management:   medications reviewed   high-risk medications identified  Self-Care Promotion:   independence encouraged   BADL personal objects within reach   safe use of adaptive equipment encouraged  Intervention: Promote Injury-Free Environment  Description: Provide a safe, barrier-free environment that encourages independent activity.  Keep care area uncluttered and well-lighted.  Determine need for increased observation or monitoring.  Avoid use of devices that minimize mobility, such as restraints or indwelling urinary catheter.  Recent Flowsheet Documentation  Taken 6/2/2024 0600 by Dejah Lui RN  Safety Promotion/Fall Prevention: safety round/check completed  Taken 6/2/2024 0400 by Dejah Lui RN  Safety Promotion/Fall Prevention: safety round/check completed  Taken 6/2/2024 0200 by Dejah Lui RN  Safety Promotion/Fall Prevention: safety round/check completed  Taken 6/1/2024 2339 by  Dejah Lui, RN  Safety Promotion/Fall Prevention: safety round/check completed  Taken 6/1/2024 2154 by Dejah Lui, RN  Safety Promotion/Fall Prevention: safety round/check completed  Taken 6/1/2024 1950 by Dejah Lui, RN  Safety Promotion/Fall Prevention: safety round/check completed

## 2024-06-03 LAB
ANION GAP SERPL CALCULATED.3IONS-SCNC: 13 MMOL/L (ref 5–15)
BACTERIA SPEC AEROBE CULT: NORMAL
BACTERIA SPEC AEROBE CULT: NORMAL
BASOPHILS # BLD MANUAL: 0.01 10*3/MM3 (ref 0–0.2)
BASOPHILS NFR BLD MANUAL: 1 % (ref 0–1.5)
BUN SERPL-MCNC: 25 MG/DL (ref 8–23)
BUN/CREAT SERPL: 41.7 (ref 7–25)
CALCIUM SPEC-SCNC: 8.7 MG/DL (ref 8.6–10.5)
CHLORIDE SERPL-SCNC: 100 MMOL/L (ref 98–107)
CO2 SERPL-SCNC: 20 MMOL/L (ref 22–29)
CREAT SERPL-MCNC: 0.6 MG/DL (ref 0.57–1)
DEPRECATED RDW RBC AUTO: 47.2 FL (ref 37–54)
EGFRCR SERPLBLD CKD-EPI 2021: 89.7 ML/MIN/1.73
EOSINOPHIL # BLD MANUAL: 0 10*3/MM3 (ref 0–0.4)
EOSINOPHIL NFR BLD MANUAL: 0 % (ref 0.3–6.2)
ERYTHROCYTE [DISTWIDTH] IN BLOOD BY AUTOMATED COUNT: 13.4 % (ref 12.3–15.4)
GLUCOSE SERPL-MCNC: 76 MG/DL (ref 65–99)
HCT VFR BLD AUTO: 28.1 % (ref 34–46.6)
HGB BLD-MCNC: 9.6 G/DL (ref 12–15.9)
LYMPHOCYTES # BLD MANUAL: 0.39 10*3/MM3 (ref 0.7–3.1)
LYMPHOCYTES NFR BLD MANUAL: 19.2 % (ref 5–12)
MCH RBC QN AUTO: 32.8 PG (ref 26.6–33)
MCHC RBC AUTO-ENTMCNC: 34.2 G/DL (ref 31.5–35.7)
MCV RBC AUTO: 95.9 FL (ref 79–97)
MONOCYTES # BLD: 0.17 10*3/MM3 (ref 0.1–0.9)
NEUTROPHILS # BLD AUTO: 0.29 10*3/MM3 (ref 1.7–7)
NEUTROPHILS NFR BLD MANUAL: 34.3 % (ref 42.7–76)
OVALOCYTES BLD QL SMEAR: ABNORMAL
PLAT MORPH BLD: NORMAL
PLATELET # BLD AUTO: 139 10*3/MM3 (ref 140–450)
PMV BLD AUTO: 9.6 FL (ref 6–12)
POIKILOCYTOSIS BLD QL SMEAR: ABNORMAL
POTASSIUM SERPL-SCNC: 3.9 MMOL/L (ref 3.5–5.2)
RBC # BLD AUTO: 2.93 10*6/MM3 (ref 3.77–5.28)
SCHISTOCYTES BLD QL SMEAR: ABNORMAL
SMUDGE CELLS BLD QL SMEAR: ABNORMAL
SODIUM SERPL-SCNC: 133 MMOL/L (ref 136–145)
VARIANT LYMPHS NFR BLD MANUAL: 45.5 % (ref 19.6–45.3)
WBC NRBC COR # BLD AUTO: 0.86 10*3/MM3 (ref 3.4–10.8)

## 2024-06-03 PROCEDURE — 99232 SBSQ HOSP IP/OBS MODERATE 35: CPT | Performed by: INTERNAL MEDICINE

## 2024-06-03 PROCEDURE — 85007 BL SMEAR W/DIFF WBC COUNT: CPT | Performed by: HOSPITALIST

## 2024-06-03 PROCEDURE — 85025 COMPLETE CBC W/AUTO DIFF WBC: CPT | Performed by: HOSPITALIST

## 2024-06-03 PROCEDURE — 80048 BASIC METABOLIC PNL TOTAL CA: CPT | Performed by: INTERNAL MEDICINE

## 2024-06-03 PROCEDURE — 25010000002 CEFEPIME PER 500 MG: Performed by: INTERNAL MEDICINE

## 2024-06-03 RX ADMIN — Medication 10 ML: at 09:35

## 2024-06-03 RX ADMIN — SODIUM CHLORIDE TAB 1 GM 1 G: 1 TAB at 17:41

## 2024-06-03 RX ADMIN — AMIODARONE HYDROCHLORIDE 200 MG: 200 TABLET ORAL at 09:31

## 2024-06-03 RX ADMIN — FERROUS SULFATE TAB 325 MG (65 MG ELEMENTAL FE) 325 MG: 325 (65 FE) TAB at 09:31

## 2024-06-03 RX ADMIN — Medication 1 TABLET: at 09:31

## 2024-06-03 RX ADMIN — MIDODRINE HYDROCHLORIDE 10 MG: 5 TABLET ORAL at 09:31

## 2024-06-03 RX ADMIN — CEFEPIME 2000 MG: 2 INJECTION, POWDER, FOR SOLUTION INTRAVENOUS at 03:43

## 2024-06-03 RX ADMIN — HYDROCORTISONE: 25 CREAM TOPICAL at 09:35

## 2024-06-03 RX ADMIN — ACETAMINOPHEN 325MG 650 MG: 325 TABLET ORAL at 00:57

## 2024-06-03 RX ADMIN — APIXABAN 2.5 MG: 2.5 TABLET, FILM COATED ORAL at 09:31

## 2024-06-03 RX ADMIN — ACETAMINOPHEN 325MG 650 MG: 325 TABLET ORAL at 21:16

## 2024-06-03 RX ADMIN — CETIRIZINE HYDROCHLORIDE 10 MG: 10 TABLET ORAL at 09:31

## 2024-06-03 RX ADMIN — MIRTAZAPINE 15 MG: 15 TABLET, FILM COATED ORAL at 21:16

## 2024-06-03 RX ADMIN — APIXABAN 2.5 MG: 2.5 TABLET, FILM COATED ORAL at 21:16

## 2024-06-03 RX ADMIN — CEFEPIME 2000 MG: 2 INJECTION, POWDER, FOR SOLUTION INTRAVENOUS at 15:35

## 2024-06-03 RX ADMIN — SODIUM CHLORIDE TAB 1 GM 1 G: 1 TAB at 09:31

## 2024-06-03 RX ADMIN — Medication 10 ML: at 21:16

## 2024-06-03 RX ADMIN — MIDODRINE HYDROCHLORIDE 10 MG: 5 TABLET ORAL at 17:41

## 2024-06-03 RX ADMIN — MIDODRINE HYDROCHLORIDE 10 MG: 5 TABLET ORAL at 13:20

## 2024-06-03 NOTE — CASE MANAGEMENT/SOCIAL WORK
Continued Stay Note  [facility]     Patient Name: Michelle Car  MRN: [id number]  Today's Date: 6/3/2024    Admit Date: 5/24/2024    Plan: Pending; SNF, Hospice, or Palliative Care   Discharge Plan       Row Name 06/03/24 1542       Plan    Plan Pending; SNF, Hospice, or Palliative Care    Plan Comments Patient continues to wait for bone marrow results before she will decide on next steps of care. CCP to follow. CD, CSW.                   Discharge Codes    No documentation.                 Expected Discharge Date and Time       Expected Discharge Date Expected Discharge Time    Jun 4, 2024

## 2024-06-03 NOTE — SIGNIFICANT NOTE
06/03/24 1410   Rehab Time/Intention   Session Not Performed patient/family declined treatment;other (see comments)  (Pt declined PT this PM despite encouragement. Pt up in chair at arrival and reports that was her therapy for today, also eager to watch her TV show. Educated provided, pt continued to decline. PT will follow up 6/4)   Recommendation   PT - Next Appointment 06/04/24

## 2024-06-03 NOTE — PLAN OF CARE
Goal Outcome Evaluation:              Outcome Evaluation: VSS. Pt tired this shift. Got up to chair around lunch time. Pt declined PT this shift. Education provided on benefits of PT. Awaiting biopsy results. Needs met at this time.

## 2024-06-03 NOTE — PROGRESS NOTES
Name: Michelle Car ADMIT: 2024   : 1940  PCP: Olayinka Pimentel MD    MRN: [id number] LOS: 9 days   AGE/SEX: 83 y.o. female  ROOM: [facility]     Subjective   Subjective   Patient looks much better. Much more talkative and in good spirits       Objective   Objective   Vital Signs  Temp:  [97.7 °F (36.5 °C)-98.6 °F (37 °C)] 98.4 °F (36.9 °C)  Heart Rate:  [64-76] 65  Resp:  [16] 16  BP: (108-119)/(67-76) 119/76  SpO2:  [96 %-98 %] 98 %  on   ;      Body mass index is 23.24 kg/m².  Physical Exam  Vitals reviewed.   Constitutional:       Comments: Very frail appearing   Cardiovascular:      Rate and Rhythm: Normal rate and regular rhythm.   Pulmonary:      Effort: No respiratory distress.      Breath sounds: Normal breath sounds.   Abdominal:      General: There is no distension.      Palpations: Abdomen is soft.      Tenderness: There is no abdominal tenderness.   Musculoskeletal:      Right lower leg: No edema.      Left lower leg: No edema.   Skin:     Coloration: Skin is pale.   Neurological:      Mental Status: She is alert and oriented to person, place, and time.       Results Review     I reviewed the patient's new clinical results.  Results from last 7 days   Lab Units 24  0343 06/01/24  04324  0415   WBC 10*3/mm3 0.99* 0.82* 0.90* 0.83*   HEMOGLOBIN g/dL 9.5* 10.7* 10.0* 10.2*   PLATELETS 10*3/mm3 147 157 160 161     Results from last 7 days   Lab Units 24  0343 24  0438 24  0415   SODIUM mmol/L 135* 133* 134* 133*   POTASSIUM mmol/L 4.0 3.5 4.0 4.2   CHLORIDE mmol/L 102 103 104 104   CO2 mmol/L 22.0 19.5* 24.0 22.0   BUN mg/dL 22 18 18 15   CREATININE mg/dL 0.67 0.57 0.64 0.51*   GLUCOSE mg/dL 91 95 87 93   EGFR mL/min/1.73 87.4 90.9 88.4 93.3     Results from last 7 days   Lab Units 24  0315   ALBUMIN g/dL 2.5*   BILIRUBIN mg/dL 0.4   ALK PHOS U/L 63   AST (SGOT) U/L 18   ALT (SGPT) U/L 22     Results from last 7 days  "  Lab Units 06/02/24  0343 06/01/24  0438 05/31/24  0315 05/30/24  0415   CALCIUM mg/dL 8.5* 8.4* 8.4* 8.2*   ALBUMIN g/dL  --   --  2.5*  --        No results found for: \"HGBA1C\", \"POCGLU\"    No radiology results for the last day    I have personally reviewed all medications:  Scheduled Medications  amiodarone, 200 mg, Oral, Q24H  apixaban, 2.5 mg, Oral, Q12H  cefepime, 2,000 mg, Intravenous, Q12H  cetirizine, 10 mg, Oral, Daily  ferrous sulfate, 325 mg, Oral, Every Other Day  Hydrocortisone (Perianal), , Rectal, Daily  midodrine, 10 mg, Oral, TID AC  mirtazapine, 15 mg, Oral, Nightly  multivitamin with minerals, 1 tablet, Oral, Daily  sodium chloride, 10 mL, Intravenous, Q12H  sodium chloride, 1 g, Oral, BID With Meals    Infusions   Diet  Diet: Regular/House, Fluid Restriction (240 mL/tray); 1000 mL/day; Neutropenic/Low Microbial; Fluid Consistency: Thin (IDDSI 0)    I have personally reviewed:  [x]  Laboratory   [x]  Microbiology   [x]  Radiology   []  EKG/Telemetry  []  Cardiology/Vascular   []  Pathology    []  Records       Assessment/Plan     Active Hospital Problems    Diagnosis  POA    **Leukopenia [D72.819]  Unknown    Severe malnutrition [E43]  Yes    Anemia [D64.9]  Unknown    Weight loss [R63.4]  Unknown    Chronic diastolic CHF (congestive heart failure) [I50.32]  Yes    Paroxysmal atrial fibrillation with rapid ventricular response [I48.0]  Yes    Hyponatremia [E87.1]  Yes    Diffuse large B-cell lymphoma of lymph nodes of multiple regions [C83.38]  Yes    CRI (chronic renal insufficiency), stage 2 (mild) [N18.2]  Yes      Resolved Hospital Problems    Diagnosis Date Resolved POA    Hyperkalemia [E87.5] 06/01/2024 Unknown       83 y.o. female with history of diffuse large B cell lymphoma admitted with Leukopenia.    Patient remains neutropenic but ANC is some better today. Will follow trends. Await results of bone marrow biopsy but patient says she is at peace with findings, whatever they may be  - " Continue cefepime per ID recommendations  - Monitoring other cell lines but thus far have remained stable    Hypotension much better with higher dose midodrine.    Hyponatremia stable.  Remains on salt tabs with nephrology following.  Fluid restriction in place.  Encourage po intake    A-fib: Remains on amiodarone and apixaban.  Appears relatively stable      Overall prognosis looks fairly guarded.  She is quite frail appearing and has a multitude of issues.  If bone marrow biopsy shows ongoing malignancy I suspect she is going to move toward full comfort measures.    Dispo TBD pending above      Morales Hardwick MD  [facility]  06/02/24  20:24 EDT

## 2024-06-03 NOTE — PROGRESS NOTES
Nephrology Follow Up Note    Patient Identification:  Name: Michelle Car MRN: [id number]  Age: 83 y.o. : 1940  Sex: female  Date:6/3/2024    Requesting Physician: As per consult order.  Reason for Consultation: hyponatremia   Information from:patient/ family/ chart      History of Present Illness: This is a 83 y.o. year old female  with lymphoma admitted for weakness, ,hyponatremia and edema.   She completed chemo in 2024.  She has had ongoing issues with hyponatremia.  She was on salt tabs at home Upon arrival her Na was 126 with K at 5.6.   She was given IV fluids of NS as bp was low upon arrival.       feels better, Na now at 132, bp near goal      : Complaining of weakness.  Wants to look into nursing/rehab placement  Otherwise no new complaints    6/3: Interval events reviewed, no new complaints  Awaiting pathology    The following medical history and medications personally reviewed by me:        Current Meds:   Current Facility-Administered Medications   Medication Dose Route Frequency Provider Last Rate Last Admin    acetaminophen (TYLENOL) tablet 650 mg  650 mg Oral Q4H PRN Abel Pelaez MD   650 mg at 24 0057    Or    acetaminophen (TYLENOL) 160 MG/5ML oral solution 650 mg  650 mg Oral Q4H PRN Abel Pelaez MD        Or    acetaminophen (TYLENOL) suppository 650 mg  650 mg Rectal Q4H PRN Abel Pelaez MD        albuterol (PROVENTIL) nebulizer solution 0.083% 2.5 mg/3mL  2.5 mg Nebulization Q4H PRN Abel Pelaez MD        amiodarone (PACERONE) tablet 200 mg  200 mg Oral Q24H Abel Pelaez MD   200 mg at 24 0931    apixaban (ELIQUIS) tablet 2.5 mg  2.5 mg Oral Q12H Mallory Del Castillo MD   2.5 mg at 24 0931    sennosides-docusate (PERICOLACE) 8.6-50 MG per tablet 2 tablet  2 tablet Oral BID PRN Abel Pelaez MD        And    polyethylene glycol (MIRALAX) packet 17 g  17 g Oral Daily PRN Abel Pelaez MD        And    bisacodyl (DULCOLAX) EC tablet 5 mg  5 mg Oral  "Daily PRN Abel Pelaez MD        And    bisacodyl (DULCOLAX) suppository 10 mg  10 mg Rectal Daily PRN Abel Pelaez MD        cefepime 2000 mg IVPB in 100 mL NS (MBP)  2,000 mg Intravenous Q12H Mick Brown MD   2,000 mg at 24 0343    cetirizine (zyrTEC) tablet 10 mg  10 mg Oral Daily Abel Pelaez MD   10 mg at 24 0931    ferrous sulfate tablet 325 mg  325 mg Oral Every Other Day Abel Pelaez MD   325 mg at 24 0931    Hydrocortisone (Perianal) (ANUSOL-HC) 2.5 % rectal cream   Rectal Daily Abel Pelaez MD   Given at 24 0935    midodrine (PROAMATINE) tablet 10 mg  10 mg Oral TID AC Morales Hardwick MD   10 mg at 24 0931    mirtazapine (REMERON) tablet 15 mg  15 mg Oral Nightly Saulo Smith MD   15 mg at 24    multivitamin with minerals 1 tablet  1 tablet Oral Daily Abel Pelaez MD   1 tablet at 24 0931    nitroglycerin (NITROSTAT) SL tablet 0.4 mg  0.4 mg Sublingual Q5 Min PRN Abel Pelaez MD        ondansetron (ZOFRAN) injection 4 mg  4 mg Intravenous Q6H PRN Abel Pelaez MD        Potassium Replacement - Follow Nurse / BPA Driven Protocol   Does not apply PRN Morales Farrell MD        sodium chloride 0.9 % flush 10 mL  10 mL Intravenous Q12H Abel Pelaez MD   10 mL at 24 0935    sodium chloride 0.9 % flush 10 mL  10 mL Intravenous PRN Abel Pelaez MD        sodium chloride 0.9 % infusion 40 mL  40 mL Intravenous PRN Abel Pelaez MD        sodium chloride tablet 1 g  1 g Oral BID With Meals Aarti Reynolds MD   1 g at 24 0931         Physical Exam:  Vitals:   Temp (24hrs), Av.1 °F (36.7 °C), Min:97.3 °F (36.3 °C), Max:98.8 °F (37.1 °C)    /74 (BP Location: Left arm, Patient Position: Lying)   Pulse 68   Temp 98.8 °F (37.1 °C) (Oral)   Resp 16   Ht 152.4 cm (60\")   Wt 54 kg (119 lb)   SpO2 100%   BMI 23.24 kg/m²   Intake/Output:     Intake/Output Summary (Last 24 hours) at 6/3/2024 1233  Last data " filed at 6/3/2024 0930  Gross per 24 hour   Intake 740 ml   Output 500 ml   Net 240 ml        Wt Readings from Last 1 Encounters:   05/27/24 1433 54 kg (119 lb)   05/24/24 1849 54.2 kg (119 lb 7.8 oz)   05/24/24 1701 51.7 kg (114 lb)       Exam:  Alert and oriented NAD   eomi no icterus   Moist mucus membranes   No jvd reg s1s2 no murmur   Clear bilaterally no rales/rhonchi/wheeze   Soft NTND + bs   5/5 strength bilaterally  No edema   Warm dry no rash   Normal mood and judgement         DATA:  Radiology and Labs:  The following labs and radiology results independently reviewed by me    Labs:   Recent Results (from the past 24 hour(s))   Basic Metabolic Panel    Collection Time: 06/03/24  3:51 AM    Specimen: Blood   Result Value Ref Range    Glucose 76 65 - 99 mg/dL    BUN 25 (H) 8 - 23 mg/dL    Creatinine 0.60 0.57 - 1.00 mg/dL    Sodium 133 (L) 136 - 145 mmol/L    Potassium 3.9 3.5 - 5.2 mmol/L    Chloride 100 98 - 107 mmol/L    CO2 20.0 (L) 22.0 - 29.0 mmol/L    Calcium 8.7 8.6 - 10.5 mg/dL    BUN/Creatinine Ratio 41.7 (H) 7.0 - 25.0    Anion Gap 13.0 5.0 - 15.0 mmol/L    eGFR 89.7 >60.0 mL/min/1.73   CBC Auto Differential    Collection Time: 06/03/24  3:51 AM    Specimen: Blood   Result Value Ref Range    WBC 0.86 (C) 3.40 - 10.80 10*3/mm3    RBC 2.93 (L) 3.77 - 5.28 10*6/mm3    Hemoglobin 9.6 (L) 12.0 - 15.9 g/dL    Hematocrit 28.1 (L) 34.0 - 46.6 %    MCV 95.9 79.0 - 97.0 fL    MCH 32.8 26.6 - 33.0 pg    MCHC 34.2 31.5 - 35.7 g/dL    RDW 13.4 12.3 - 15.4 %    RDW-SD 47.2 37.0 - 54.0 fl    MPV 9.6 6.0 - 12.0 fL    Platelets 139 (L) 140 - 450 10*3/mm3   Manual Differential    Collection Time: 06/03/24  3:51 AM    Specimen: Blood   Result Value Ref Range    Neutrophil % 34.3 (L) 42.7 - 76.0 %    Lymphocyte % 45.5 (H) 19.6 - 45.3 %    Monocyte % 19.2 (H) 5.0 - 12.0 %    Eosinophil % 0.0 (L) 0.3 - 6.2 %    Basophil % 1.0 0.0 - 1.5 %    Neutrophils Absolute 0.29 (L) 1.70 - 7.00 10*3/mm3    Lymphocytes Absolute 0.39  (L) 0.70 - 3.10 10*3/mm3    Monocytes Absolute 0.17 0.10 - 0.90 10*3/mm3    Eosinophils Absolute 0.00 0.00 - 0.40 10*3/mm3    Basophils Absolute 0.01 0.00 - 0.20 10*3/mm3    Ovalocytes Slight/1+ None Seen    Poikilocytes Slight/1+ None Seen    Schistocytes Slight/1+ None Seen    Smudge Cells Slight/1+ None Seen    Platelet Morphology Normal Normal       Radiology:  Imaging Results (Last 24 Hours)       ** No results found for the last 24 hours. **             ASSESSMENT:   Lymphoma   Hypotension   Hyponatremia   Hyperkalemia   Chronic diastolic heart failure   Relapsing diffuse large B-cell lymphoma  Adrenal insufficiency - per chart, but doesn't follow with endo   Hypoalbuminemia   Edema   Parox afib       PLAN:   Sodium is remaining fairly stable in the mid 130s  She is euvolemic on exam  Will continue low-dose oral sodium chloride tablets   regular diet but her oral intake is very poor  Off IV fluids   Midodrine for hypotension  IV antibiotics, standard dosing  Awaiting bone marrow pathology    Monitor electrolytes and volume closely   Dose all medications for GFR >60   Please call with any questions or concerns.     Jr Le M.D  [facility]  Office phone number: [phone]  Answering service phone number: [phone]

## 2024-06-03 NOTE — PLAN OF CARE
Goal Outcome Evaluation:  Plan of Care Reviewed With: patient           Outcome Evaluation: Sat O2 dropped 80s during sleep. Applied 2L NC. Not in distress.VSS

## 2024-06-03 NOTE — PROGRESS NOTES
Name: Michelle Car ADMIT: 2024   : 1940  PCP: Olayinka Pimentel MD    MRN: [id number] LOS: 10 days   AGE/SEX: 83 y.o. female  ROOM: [facility]     Subjective   Subjective   Complains of being tired but no other complaints       Objective   Objective   Vital Signs  Temp:  [97.3 °F (36.3 °C)-98.8 °F (37.1 °C)] 97.5 °F (36.4 °C)  Heart Rate:  [68-79] 73  Resp:  [16] 16  BP: (106-116)/(66-74) 109/67  SpO2:  [97 %-100 %] 99 %  on   ;   Device (Oxygen Therapy): room air  Body mass index is 23.24 kg/m².  Physical Exam  Vitals reviewed.   Constitutional:       Comments: Very frail appearing   Cardiovascular:      Rate and Rhythm: Normal rate and regular rhythm.   Pulmonary:      Effort: No respiratory distress.      Breath sounds: Normal breath sounds.   Abdominal:      General: There is no distension.      Palpations: Abdomen is soft.      Tenderness: There is no abdominal tenderness.   Musculoskeletal:      Right lower leg: No edema.      Left lower leg: No edema.   Skin:     Coloration: Skin is pale.   Neurological:      Mental Status: She is alert and oriented to person, place, and time.       Results Review     I reviewed the patient's new clinical results.  Results from last 7 days   Lab Units 24  03524  0343 24  04324  0315   WBC 10*3/mm3 0.86* 0.99* 0.82* 0.90*   HEMOGLOBIN g/dL 9.6* 9.5* 10.7* 10.0*   PLATELETS 10*3/mm3 139* 147 157 160     Results from last 7 days   Lab Units 24  0351 24  0343 24  0438 24  0315   SODIUM mmol/L 133* 135* 133* 134*   POTASSIUM mmol/L 3.9 4.0 3.5 4.0   CHLORIDE mmol/L 100 102 103 104   CO2 mmol/L 20.0* 22.0 19.5* 24.0   BUN mg/dL 25* 22 18 18   CREATININE mg/dL 0.60 0.67 0.57 0.64   GLUCOSE mg/dL 76 91 95 87   EGFR mL/min/1.73 89.7 87.4 90.9 88.4     Results from last 7 days   Lab Units 24  0315   ALBUMIN g/dL 2.5*   BILIRUBIN mg/dL 0.4   ALK PHOS U/L 63   AST (SGOT) U/L 18   ALT (SGPT) U/L 22     Results  "from last 7 days   Lab Units 06/03/24  0351 06/02/24  0343 06/01/24  0438 05/31/24  0315   CALCIUM mg/dL 8.7 8.5* 8.4* 8.4*   ALBUMIN g/dL  --   --   --  2.5*       No results found for: \"HGBA1C\", \"POCGLU\"    No radiology results for the last day    I have personally reviewed all medications:  Scheduled Medications  amiodarone, 200 mg, Oral, Q24H  apixaban, 2.5 mg, Oral, Q12H  cefepime, 2,000 mg, Intravenous, Q12H  cetirizine, 10 mg, Oral, Daily  ferrous sulfate, 325 mg, Oral, Every Other Day  Hydrocortisone (Perianal), , Rectal, Daily  midodrine, 10 mg, Oral, TID AC  mirtazapine, 15 mg, Oral, Nightly  multivitamin with minerals, 1 tablet, Oral, Daily  sodium chloride, 10 mL, Intravenous, Q12H  sodium chloride, 1 g, Oral, BID With Meals    Infusions   Diet  Diet: Regular/House, Fluid Restriction (240 mL/tray); 1000 mL/day; Neutropenic/Low Microbial; Fluid Consistency: Thin (IDDSI 0)    I have personally reviewed:  [x]  Laboratory   [x]  Microbiology   [x]  Radiology   []  EKG/Telemetry  []  Cardiology/Vascular   []  Pathology    []  Records       Assessment/Plan     Active Hospital Problems    Diagnosis  POA    **Leukopenia [D72.819]  Unknown    Severe malnutrition [E43]  Yes    Anemia [D64.9]  Unknown    Weight loss [R63.4]  Unknown    Chronic diastolic CHF (congestive heart failure) [I50.32]  Yes    Paroxysmal atrial fibrillation with rapid ventricular response [I48.0]  Yes    Hyponatremia [E87.1]  Yes    Diffuse large B-cell lymphoma of lymph nodes of multiple regions [C83.38]  Yes    CRI (chronic renal insufficiency), stage 2 (mild) [N18.2]  Yes      Resolved Hospital Problems    Diagnosis Date Resolved POA    Hyperkalemia [E87.5] 06/01/2024 Unknown       83 y.o. female with history of diffuse large B cell lymphoma admitted with Leukopenia.    Patient remains neutropenic.  Reviewed bone marrow results (though they did not come back until I had already seen the patient).  Preliminary does not look to show active " lymphoma in the marrow though there was an abnormal cell population.  Will need to discuss this further with hematology.  While this is good news, not sure that this is overall is much different as far as her prognosis given that she remains severely neutropenic and not really improving clinically.    Continue cefepime per ID recommendations.  Finishing soon    Hypotension much better with higher dose midodrine.    Hyponatremia stable.  Remains on salt tabs with nephrology following.  Fluid restriction in place.  Encourage po intake    A-fib: Remains on amiodarone and apixaban.  Appears relatively stable      Dispo TBD pending above      Morales Hardwick MD  [facility]  06/03/24  17:02 EDT

## 2024-06-04 LAB
BASOPHILS # BLD AUTO: 0.01 10*3/MM3 (ref 0–0.2)
BASOPHILS NFR BLD AUTO: 1.2 % (ref 0–1.5)
DEPRECATED RDW RBC AUTO: 48 FL (ref 37–54)
EOSINOPHIL # BLD AUTO: 0.01 10*3/MM3 (ref 0–0.4)
EOSINOPHIL NFR BLD AUTO: 1.2 % (ref 0.3–6.2)
ERYTHROCYTE [DISTWIDTH] IN BLOOD BY AUTOMATED COUNT: 13.6 % (ref 12.3–15.4)
HCT VFR BLD AUTO: 28 % (ref 34–46.6)
HGB BLD-MCNC: 9.5 G/DL (ref 12–15.9)
IMM GRANULOCYTES # BLD AUTO: 0.01 10*3/MM3 (ref 0–0.05)
IMM GRANULOCYTES NFR BLD AUTO: 1.2 % (ref 0–0.5)
LYMPHOCYTES # BLD AUTO: 0.29 10*3/MM3 (ref 0.7–3.1)
LYMPHOCYTES NFR BLD AUTO: 34.9 % (ref 19.6–45.3)
MCH RBC QN AUTO: 32.9 PG (ref 26.6–33)
MCHC RBC AUTO-ENTMCNC: 33.9 G/DL (ref 31.5–35.7)
MCV RBC AUTO: 96.9 FL (ref 79–97)
MONOCYTES # BLD AUTO: 0.15 10*3/MM3 (ref 0.1–0.9)
MONOCYTES NFR BLD AUTO: 18.1 % (ref 5–12)
NEUTROPHILS NFR BLD AUTO: 0.36 10*3/MM3 (ref 1.7–7)
NEUTROPHILS NFR BLD AUTO: 43.4 % (ref 42.7–76)
PLATELET # BLD AUTO: 139 10*3/MM3 (ref 140–450)
PMV BLD AUTO: 10.1 FL (ref 6–12)
RBC # BLD AUTO: 2.89 10*6/MM3 (ref 3.77–5.28)
WBC NRBC COR # BLD AUTO: 0.83 10*3/MM3 (ref 3.4–10.8)

## 2024-06-04 PROCEDURE — 99231 SBSQ HOSP IP/OBS SF/LOW 25: CPT | Performed by: NURSE PRACTITIONER

## 2024-06-04 PROCEDURE — 25010000002 CEFEPIME PER 500 MG: Performed by: INTERNAL MEDICINE

## 2024-06-04 PROCEDURE — 99232 SBSQ HOSP IP/OBS MODERATE 35: CPT | Performed by: INTERNAL MEDICINE

## 2024-06-04 PROCEDURE — 85025 COMPLETE CBC W/AUTO DIFF WBC: CPT | Performed by: HOSPITALIST

## 2024-06-04 PROCEDURE — 97530 THERAPEUTIC ACTIVITIES: CPT

## 2024-06-04 RX ADMIN — AMIODARONE HYDROCHLORIDE 200 MG: 200 TABLET ORAL at 08:15

## 2024-06-04 RX ADMIN — MIDODRINE HYDROCHLORIDE 10 MG: 5 TABLET ORAL at 12:27

## 2024-06-04 RX ADMIN — MIDODRINE HYDROCHLORIDE 10 MG: 5 TABLET ORAL at 08:15

## 2024-06-04 RX ADMIN — APIXABAN 2.5 MG: 2.5 TABLET, FILM COATED ORAL at 08:15

## 2024-06-04 RX ADMIN — Medication 1 TABLET: at 08:15

## 2024-06-04 RX ADMIN — Medication 10 ML: at 21:07

## 2024-06-04 RX ADMIN — CETIRIZINE HYDROCHLORIDE 10 MG: 10 TABLET ORAL at 08:15

## 2024-06-04 RX ADMIN — Medication 10 ML: at 08:16

## 2024-06-04 RX ADMIN — APIXABAN 2.5 MG: 2.5 TABLET, FILM COATED ORAL at 21:07

## 2024-06-04 RX ADMIN — SODIUM CHLORIDE TAB 1 GM 1 G: 1 TAB at 17:05

## 2024-06-04 RX ADMIN — CEFEPIME 2000 MG: 2 INJECTION, POWDER, FOR SOLUTION INTRAVENOUS at 03:05

## 2024-06-04 RX ADMIN — CEFEPIME 2000 MG: 2 INJECTION, POWDER, FOR SOLUTION INTRAVENOUS at 15:35

## 2024-06-04 RX ADMIN — SODIUM CHLORIDE TAB 1 GM 1 G: 1 TAB at 08:15

## 2024-06-04 RX ADMIN — MIDODRINE HYDROCHLORIDE 10 MG: 5 TABLET ORAL at 17:05

## 2024-06-04 RX ADMIN — HYDROCORTISONE: 25 CREAM TOPICAL at 08:15

## 2024-06-04 NOTE — PLAN OF CARE
Goal Outcome Evaluation:  Plan of Care Reviewed With: patient           Outcome Evaluation: Pt participated with PT this PM. Pt agreeable to mobilize and sit up in chair. Pt required min A for supine>sit, STS and ambulate 5' total during session using walker. Continues to be limited by significant endurance deficits. Encouraged pt to sit up in chair for all meals. Will continue to follow and progress as able. Plans SNF at d/c.      Anticipated Discharge Disposition (PT): skilled nursing facility

## 2024-06-04 NOTE — PROGRESS NOTES
.            [facility]iative [facility] Services    Palliative Care Daily Progress Note   Chief complaint-follow up goals of care/advanced care planning and support for patient/family    Code Status:   Code Status and Medical Interventions:   Ordered at: 05/29/24 1443     Medical Intervention Limits:    NO intubation (DNI)    NO cardioversion    NO artificial nutrition     Level Of Support Discussed With:    Patient    Health Care Surrogate     Code Status (Patient has no pulse and is not breathing):    No CPR (Do Not Attempt to Resuscitate)     Medical Interventions (Patient has pulse or is breathing):    Limited Support     Comments:    spoke with patient and her son/Helder KAUR      Advanced Directives: Advance Directive Status: Patient has advance directive, copy in chart   Goals of Care: Ongoing.     S: Medical record reviewed. Events noted.  Patient resting in bed. She tells me she feels pretty good and wanting to work with PT today. She didn't breakfast. No family present. I reviewed labs, medical notes, therapy notes, MAR, and I/Os. I spoke with Dr Smith and ALLAN Viera.              Review of Systems   Constitutional: Positive for decreased appetite and malaise/fatigue.   Cardiovascular:  Negative for chest pain.   Respiratory:  Negative for shortness of breath.    Gastrointestinal:  Negative for abdominal pain and nausea.   Neurological:  Positive for weakness.     Pain Assessment  Preferred Pain Scale: number (Numeric Rating Pain Scale)  Nonverbal Indicators of Pain: nonverbal indicators absent  Pain Location: back, neck  Pain Description: aching    O:     Intake/Output Summary (Last 24 hours) at 6/4/2024 1314  Last data filed at 6/4/2024 0815  Gross per 24 hour   Intake 880 ml   Output 650 ml   Net 230 ml       Diagnostics and current medications: Reviewed.    Current Facility-Administered Medications   Medication Dose Route Frequency Provider Last Rate Last Admin    acetaminophen (TYLENOL)  tablet 650 mg  650 mg Oral Q4H PRN Abel Pelaez MD   650 mg at 06/03/24 2116    Or    acetaminophen (TYLENOL) 160 MG/5ML oral solution 650 mg  650 mg Oral Q4H PRN Abel Pelaez MD        Or    acetaminophen (TYLENOL) suppository 650 mg  650 mg Rectal Q4H PRN Abel Pelaez MD        albuterol (PROVENTIL) nebulizer solution 0.083% 2.5 mg/3mL  2.5 mg Nebulization Q4H PRN Abel Pelaez MD        amiodarone (PACERONE) tablet 200 mg  200 mg Oral Q24H Abel Pelaez MD   200 mg at 06/04/24 0815    apixaban (ELIQUIS) tablet 2.5 mg  2.5 mg Oral Q12H Mallory Del Castillo MD   2.5 mg at 06/04/24 0815    sennosides-docusate (PERICOLACE) 8.6-50 MG per tablet 2 tablet  2 tablet Oral BID PRN Abel Pelaez MD        And    polyethylene glycol (MIRALAX) packet 17 g  17 g Oral Daily PRN Abel Pelaez MD        And    bisacodyl (DULCOLAX) EC tablet 5 mg  5 mg Oral Daily PRN Abel Pelaez MD        And    bisacodyl (DULCOLAX) suppository 10 mg  10 mg Rectal Daily PRN Abel Pelaez MD        cefepime 2000 mg IVPB in 100 mL NS (MBP)  2,000 mg Intravenous Q12H Mick Brown MD   2,000 mg at 06/04/24 0305    cetirizine (zyrTEC) tablet 10 mg  10 mg Oral Daily Abel Pelaez MD   10 mg at 06/04/24 0815    ferrous sulfate tablet 325 mg  325 mg Oral Every Other Day Abel Pelaez MD   325 mg at 06/03/24 0931    Hydrocortisone (Perianal) (ANUSOL-HC) 2.5 % rectal cream   Rectal Daily Abel Pelaez MD   Given at 06/04/24 0815    midodrine (PROAMATINE) tablet 10 mg  10 mg Oral TID AC Morales Hardwick MD   10 mg at 06/04/24 1227    multivitamin with minerals 1 tablet  1 tablet Oral Daily Abel Pelaez MD   1 tablet at 06/04/24 0815    nitroglycerin (NITROSTAT) SL tablet 0.4 mg  0.4 mg Sublingual Q5 Min PRN Abel Pelaez MD        ondansetron (ZOFRAN) injection 4 mg  4 mg Intravenous Q6H PRN Abel Pelaez MD        Potassium Replacement - Follow Nurse / BPA Driven Protocol   Does not apply PRN Morales Farrell MD        sodium  "chloride 0.9 % flush 10 mL  10 mL Intravenous Q12H Abel Pelaez MD   10 mL at 06/04/24 0816    sodium chloride 0.9 % flush 10 mL  10 mL Intravenous PRN Abel Pelaez MD        sodium chloride 0.9 % infusion 40 mL  40 mL Intravenous PRN Abel Pelaez MD        sodium chloride tablet 1 g  1 g Oral BID With Meals Aarti Reynolds MD   1 g at 06/04/24 0815          acetaminophen **OR** acetaminophen **OR** acetaminophen    albuterol    senna-docusate sodium **AND** polyethylene glycol **AND** bisacodyl **AND** bisacodyl    nitroglycerin    ondansetron    Potassium Replacement - Follow Nurse / BPA Driven Protocol    sodium chloride    sodium chloride  Attest that current medications reviewed including but not limited to prescriptions, over-the counter, herbals and vitamin/mineral/dietary (nutritional) supplements for name, route of administration, type, dose and frequency and are current using all immediate resources available at time of dictation.    A:    /72   Pulse 61   Temp 97.5 °F (36.4 °C) (Oral)   Resp 16   Ht 152.4 cm (60\")   Wt 54 kg (119 lb)   SpO2 98%   BMI 23.24 kg/m²     Constitutional:       Appearance: Not in distress. Frail. Chronically ill-appearing.   Pulmonary:      Effort: Pulmonary effort is normal.   Cardiovascular:      PMI at left midclavicular line.   Neurological:      Mental Status: Alert and oriented to person, place, and time.   Psychiatric:         Mood and Affect: Mood and affect normal.         Speech: Speech normal.         Behavior: Behavior is cooperative.         Thought Content: Thought content normal.         Cognition and Memory: Cognition normal.         Judgment: Judgment normal.         Patient status: Disease state: Controlled with current treatments.  Functional status: Palliative Performance Scale Score: Performance 50% based on the following measures: Ambulation: Mainly sit or lie down, Activity and Evidence of Disease: Unable to do any work, extensive evidence " "of disease, Self-Care: Considerable assistance required,  Intake: Normal or reduced, LOC: Full or confusion   ECOG Status(2) Ambulatory and capable of self care, unable to carry out work activity, up and about > 50% or waking hours.  Nutritional status: Albumin 2.5 on 5/25/2024 Body mass index is 23.24 kg/m².  Family support: The patient receives support from her children..  Advance Directives: Advance Directive Status: Patient has advance directive, copy in chart   POA/Healthcare surrogate-son, Helder.     Impression/Problem List:     Leukopenia   Diffuse large b cell lymphoma   Chronic diastolic heart failure   Paroxysmal atrial fibrillation   Hyponatremia     Malnutrition            Recommendations/Plan:  1. Provide support with goals of care           2.  Palliative care encounter  5/29/2024- I spoke with patient at bedside regarding goals of care. She has a very good understanding of her medical conditions, which we reviewed in detail. She has the support of three children. She resided alone prior to admission. At this time, she recognizes and/or feels she is in a bad situation and uncertain if she will bounce back from everything. She relies heavily on Dr Galo and his recommendations. She gomez tell me if she is informed her cancer is worst she will not resume treatment and will just have to \"throw in the towel.\" She would only consider treatment if it would give her more years of life that are enjoyable and good quality. She does have a living will on file that designates her son, Helder. We reviewed her wishes regarding CPR and medical interventions and she wishes for the following:  DNR/ DNI, no cardioversion, no artifical nutrition. She tells me her children know her wishes and I asked to contact Helder to confirm and she was agreeable. At this time, her goal is to continue with current level of treatment and consider bone marrow biopsy to determine disease progression to determine what is next for her. She " would like to discuss with [facility] as she trust him tremendously and he has been taking care of her the last 2-3 years. She is [employer] and attends [employer] in [place]. She does acknowledge general fatigue, weakness, and poor appetite. No pain or shortness of breath. I did provide her with information regarding palliative care and will plan to discuss further. Of note, I did attempt to discuss discharge options (home, rehab etc) and she tells me she is ready to discuss that just yet. I called her son/HCS, Helder at 1427 to discuss goals of care. I provided him with an update of my conversation I had with his mother. We discussed her wishes to be DNR/DNI, NO cardioversion and NO artifical nutrition and he is agreeable and supports his mother decision. I will update her chart to reflect their wishes.  He feels she is very knowledgeable of her situation and he will support her. I did give him information regarding palliative care. I will plan to support the patient and family for the next couple days. I spoke with ALLAN Vasquez.      5/30/2024-  The patient is off unit for bone marrow biopsy. I called her son, Helder at 1440. He visited with his mother last night, along with his brother. He reports she was really tired and not much conversation. He is aware of her poor nutrition intake. He was made aware that patient went for bone marrow biopsy today due to lab findings and further evaluation of lymphoma. He too wants to have results of bone marrow biopsy to help guide decision making and also discuss with Dr Galo.  I will plan to follow up with patient tomorrow to have further discussion with goals. I spoke with ALLAN Vasquez.      5/31/2024- I attempted to have further conversation with patient regarding goals of care and palliative care and hospice care and she is not wanting to engage in further discussion. She tells me she wants to discuss things further once her results have come back from her bone  "marrow biopsy. I did leave some brochures regarding [facility] and [facility] to provide additional information for the weekend and will inform her son, Helder, when I call him later this afternoon. I will plan to follow up Monday. I spoke with ALLAN Wang.      I called her son, Helder at 1515. I did provide him with information regarding [facility] vs [facility]. He is aware of written information left at bedside for him and the family to review. I will plan to follow up Monday with hopes that they have received results of bone marrow biopsy to support them with further decision making.     6/4/2024- I had a very brief conversation with patient. She remains consistent that she will NOT make any decision regarding her care until she has spoken with Dr Galo, her primary oncologist.  She is aware bone marrow biopsy is pending and medical team \"meeting to discuss further.\"  I did reach out to Dr Claire to see if he could reach out to Dr Galo to support patient and family with decision making. I also called patient son, Helder at 1535 and no answer. I was able to leave a generic voicemail for him to return call.  I spoke with Dr Hardwick as well this morning and ALLAN Viera.          Thank you for this consult and allowing us to participate in patient's plan of care. Palliative Care Team will continue to follow patient.            MAGGI Segura  6/4/2024  13:14 EDT   "

## 2024-06-04 NOTE — PROGRESS NOTES
REASON FOR FOLLOWUP/CHIEF COMPLAINT:  DLBCL, anemia, neutropenia    HISTORY OF PRESENT ILLNESS:   No new issues.  No bleeding problems.    Past Medical History, Past Surgical History, Social History, Family History have been reviewed and are without significant changes except as mentioned.    Review of Systems   Review of Systems   Constitutional:  Negative for activity change.   HENT:  Negative for nosebleeds and trouble swallowing.    Respiratory:  Negative for shortness of breath and wheezing.    Cardiovascular:  Negative for chest pain and palpitations.   Gastrointestinal:  Negative for constipation, diarrhea and nausea.   Genitourinary:  Negative for dysuria and hematuria.   Musculoskeletal:  Negative for arthralgias and myalgias.   Skin:  Negative for rash and wound.   Neurological:  Negative for seizures and syncope.   Hematological:  Negative for adenopathy. Does not bruise/bleed easily.   Psychiatric/Behavioral:  Negative for confusion.        Medications:  The current medication list was reviewed in the EMR    ALLERGIES:    Allergies   Allergen Reactions    Factive [Gemifloxacin] Rash              Vitals:    06/03/24 2023 06/03/24 2330 06/03/24 2348 06/04/24 0734   BP: 108/64 97/84  97/58  Comment: taken 3x attempted calling nurse   BP Location: Left arm Left arm  Right arm   Patient Position: Lying Lying  Lying   Pulse: 68 111 67 61   Resp: 16 16  16   Temp: 99.7 °F (37.6 °C) 97.3 °F (36.3 °C)  97.5 °F (36.4 °C)   TempSrc: Oral Oral  Oral   SpO2: 99% 92% 96% 98%   Weight:       Height:         Physical Exam    CONSTITUTIONAL:  Vital signs reviewed.  No distress, looks comfortable.  EYES:  Conjunctivae and lids unremarkable.  PERRLA  EARS, NOSE, MOUTH, THROAT:  Ears and nose appear unremarkable.  Lips, teeth, gums appear unremarkable.  RESPIRATORY:  Normal respiratory effort.  Lungs clear to auscultation bilaterally.  CARDIOVASCULAR:  Normal S1, S2.  No murmurs, rubs or gallops.  No significant  lower extremity edema.  GASTROINTESTINAL: Abdomen appears unremarkable.  Nontender.  No hepatomegaly.  No splenomegaly.  NEURO: Cranial nerves 2-12 grossly intact.  No focal deficits.  Appears to have equal strength all 4 extremities.  MUSCULOSKELETAL:  Unremarkable digits/nails.  No cyanosis or clubbing.  SKIN:  Warm.  No rashes.  PSYCHIATRIC:  Normal judgment and insight.  Normal mood and affect.      RECENT LABS:  WBC   Date Value Ref Range Status   06/04/2024 0.83 (C) 3.40 - 10.80 10*3/mm3 Final   06/03/2024 0.86 (C) 3.40 - 10.80 10*3/mm3 Final   06/02/2024 0.99 (C) 3.40 - 10.80 10*3/mm3 Final     Hemoglobin   Date Value Ref Range Status   06/04/2024 9.5 (L) 12.0 - 15.9 g/dL Final   06/03/2024 9.6 (L) 12.0 - 15.9 g/dL Final   06/02/2024 9.5 (L) 12.0 - 15.9 g/dL Final     Platelets   Date Value Ref Range Status   06/04/2024 139 (L) 140 - 450 10*3/mm3 Final   06/03/2024 139 (L) 140 - 450 10*3/mm3 Final   06/02/2024 147 140 - 450 10*3/mm3 Final       ASSESSMENT/PLAN:  Michelle Car [id number]     *Relapsed diffuse large B-cell lymphoma.  Patient was found to have bilateral pleural effusions.    She underwent right thoracentesis on 2/21/2022 and the left thoracentesis on 2/22/2022.    Analysis of the fluid revealed involvement with diffuse large B-cell lymphoma.    FISH analysis for BCL6 rearrangement was positive but was negative for BCL-2 and MYC rearrangement.    PET scan on 3/10/2022 revealed significant hypermetabolism in the left adrenal gland and the surrounding soft tissue with SUV up to 34.5. Hypermetabolism in the left pleural thickening with SUV of 7.1. there was less hypermetabolism in the right adrenal gland and in the right pleura.  She was considered to have stage III non-bulky disease.  R-IPI score of 3 associated with poor risk - associated with overall survival of 55%.   R-CHOP with Neulasta support was started on 3/28/2022.  Due to development of neutropenic fever following cycles 1 and 2,  treatment was changed to mini-RCHOP starting cycle #3 on 5/16/2022.  Patient received cycle #6 on 7/25/2022.  PET scan on 8/15/2022 revealed complete metabolic response.  Patient was found to have recurrence with development of a left mandibular mass in October/November 2023.  Biopsy of left mandible mass on 11/28/2023 showed recurrence of the lymphoma, diffuse large B cell, non-GCB phenotype.  Ki-67 was 90%.    It was positive for Bcl-6 but negative for Bcl-2 and MYC rearrangement.  PET scan on 12/11/2023 revealed multiple areas of involvement -prevascular space, left epicardial fat pad, pleural density posterior to the left fourth rib anterior aspect, lesion along the pericardium versus intramuscular with SUV of 9.8.  Hypermetabolic activity at both adrenal glands, uniformly increased in size and appeared hyperplastic.  There was a 2.7 x 1.1 cm soft tissue nodule lateral to the right erector spinae muscle posterior to the right posterior 11th rib with an SUV of 20.5 and there was a 1.3 cm nodule lateral to the posterior margin of the left psoas muscle with an SUV of 24.9.  Rituxan Polivy and Treanda with Treanda every 3 weeks for 6 cycles was started on 12/12/2023.  The jaw mass started to decrease in size after starting treatment.  CT scan on 2/8/2024 showed evidence of response to treatment.  Cycle #6 was given on 4/1/2024.  PET scan on 4/22/2024 revealed decrease in the hypermetabolism in the mediastinal and pericardial lymph nodes/lesions and in the lesions above the left kidney and adrenal gland.  The left mandibular lesion was no longer hypermetabolic.   She is at increased risk for relapse of the lymphoma.  The plan was to start treatment with Revlimid 10 mg daily.  However, the patient did not start the treatment yet.  LDH was previously elevated but improved to 190 on 5/25/2024.  Bone marrow 5/30/2024: Pending morphology review.  However, flow cytometry was negative for lymphoma.  The 7.8% increased gamma  delta T-cell population of uncertain significance was found.  Pathologist noted that this can occur with reactive processes.  With the negative flow cytometry, it is unlikely she has recurrent lymphoma in her marrow but again, await final report.     *Neutropenia  5/20/2024: WBC decreased to 1750.  Neutrophils decreased to 940.  5/24/2024: WBC decreased to 1290.  Neutrophils decreased to 680.  5/25/2024: WBC decreased to 860.  Neutrophils decreased to 530.  Worsening neutropenia is also concerning for relapse of the lymphoma with involvement of the bone marrow.   5/26/2024: Neutrophil count 610.  5/27/2024-total WBC count has increased to 1.46.  Absolute neutrophil count pending   5/28/2024-WBC count lower at 1.05 with an absolute neutrophil count of 0.57  Increase in metamyelocytes as well as basophil percentage.  Flow cytometry 5/28/2024-negative  ANC lower today at 0.41  Copper level normal at 145  MMA normal at 245  5/30/24 - bone marrow biopsy, results pending   6/1/2024-WBC count 0.82, absolute neutrophil count 0.23.  WBC was okay through 5/10/2024.  Await bone marrow biopsy final results.  6/4/2024: WBC 0.83.  Discussed with oncology pharmacist, Alexandra Lopez.  She notes mirtazapine was started on 5/27/2024 for appetite.  This can cause neutropenia.  Therefore, stop this on 6/4/ 24.        *Fever  Afebrile, Tmax of 99.5  Blood cultures negative  Patient at high risk for infections given neutropenia  Continue cefepime through 6/4/ 24 per ID recommendations.  After discontinuation of cefepime if patient remains neutropenic then they are recommending cefdinir  No recent fever     *Macrocytic anemia.  Patient also developed anemia secondary to lymphoma and secondary to chemotherapy.    Hgb decreased to 7.9 on 4/25/2022 and she was transfused.   Hemoglobin decreased to 6.3 on 4/27/2022. She was transfused.  Hemoglobin level improved subsequently.  Patient was placed ferrous sulfate 325 mg 3 days a week.  5/3/2024:  Hemoglobin increased to 10.2.  5/25/2024: Hemoglobin decreased to 7.8.  No evidence of vitamin B12 or folate deficiency.    Ferritin 1953.  Transferrin saturation 11%.  Patient decided to hold off on PRBC transfusion.  5/26/2024: Hemoglobin 8.2.  Due to her reporting chills when she received PRBC Tx in the past, we will give Tylenol, Benadryl and Solumedrol prior to the transfusion.   526 2024-1 unit of PRBC administered.  Patient tolerated the transfusion well.  Hemoglobin 9.5 on 6/2/2024  Copper and MMA levels normal  Hb 9.5     *Hyponatremia.  Patient had problem with hyponatremia in the past.  Sodium is 127 on 3/14/2023.  She was advised to increase salt intake.  Sodium improved to 133 on repeat lab on 3/21/2023.  However, sodium decreased to 126 on 6/7/2023.  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  She was started on sodium chloride 1 g daily.  4/1/2024: Sodium decreased to 132.  Leg swelling improved with Lasix.  5/3/2024: Sodium 130.  5/24/2024: Sodium decreased to 126.    Patient was having significant weakness.  5/25/2024: Sodium 128.  Nephrology service was consulted.  5/28/2024-sodium stable at 132  5/30/2024-sodium 133  5/31/24 - sodium 134  6/1/2024-sodium 133  6/2/2024-sodium stable at 135  Continues on fluid restriction  Sodium 133     *Hyperkalemia.  5/24/2024: Potassium increased to 5.6.  Potassium 3.9     *Chronic anticoagulation.  Patient is on Eliquis 5 mg twice daily.  No excessive bleeding  Platelet count remains normal  Eliquis resumed      *Prophylaxis.  Patient had shingles in April 2023.  She is at risk of developing shingles while on treatment.  She was placed on acyclovir 400 mg twice daily.  She was on Bactrim DS 3 days a week.  Bactrim was discontinued due to the electrolyte abnormalities.     *Low-density lesion in the uterus.    The radiologist recommended pelvic ultrasound.    The patient is asymptomatic.    PET scan on 4/22/2024 reported fluid in the  lower abdomen.  Patient reported intermittent vaginal discharge.     *Hypertension  Patient started on midodrine  Blood pressure has improved with midodrine.  On 10 mg 3 times a day     PLAN:  Stop mirtazapine because it can cause neutropenia (it was started on 5/27/2024).  She follows with Dr. Galo in our office.  Await recovery of ANC.  I reviewed with oncology pharmacist.  No obvious medication causes of neutropenia.  Cefepime can do this but cefepime was started after the neutropenia.  Also, it looks like cefepime completes today  Cefepime completes 6/4/ 24.  Await bone marrow final results (flow is unremarkable)  Patient previously stated if lymphoma is found in the marrow she would choose comfort measures/hospice.  (Generally flow is positive if lymphoma is in the marrow.  Her marrow flow is negative for lymphoma.).  She states she is not going to make any final decisions on her care until she talks to Dr. Galo     Chart reviewed and summarized include hospitalist note and nephrology note

## 2024-06-04 NOTE — PROGRESS NOTES
Nephrology Follow Up Note    Patient Identification:  Name: Michelle Car MRN: [id number]  Age: 83 y.o. : 1940  Sex: female  Date:2024    Requesting Physician: As per consult order.  Reason for Consultation: hyponatremia   Information from:patient/ family/ chart      History of Present Illness: This is a 83 y.o. year old female  with lymphoma admitted for weakness, ,hyponatremia and edema.   She completed chemo in 2024.  She has had ongoing issues with hyponatremia.  She was on salt tabs at home Upon arrival her Na was 126 with K at 5.6.   She was given IV fluids of NS as bp was low upon arrival.       feels better, Na now at 132, bp near goal      : Complaining of weakness.  Wants to look into nursing/rehab placement  Otherwise no new complaints    6/3: Interval events reviewed, no new complaints  Awaiting final pathology    : Resting today denies complaints, no new labs    The following medical history and medications personally reviewed by me:        Current Meds:   Current Facility-Administered Medications   Medication Dose Route Frequency Provider Last Rate Last Admin    acetaminophen (TYLENOL) tablet 650 mg  650 mg Oral Q4H PRN Abel Pelaez MD   650 mg at 24    Or    acetaminophen (TYLENOL) 160 MG/5ML oral solution 650 mg  650 mg Oral Q4H PRN Abel Pelaez MD        Or    acetaminophen (TYLENOL) suppository 650 mg  650 mg Rectal Q4H PRN Abel Pelaez MD        albuterol (PROVENTIL) nebulizer solution 0.083% 2.5 mg/3mL  2.5 mg Nebulization Q4H PRN Abel Pelaez MD        amiodarone (PACERONE) tablet 200 mg  200 mg Oral Q24H Abel Pelaez MD   200 mg at 24 0815    apixaban (ELIQUIS) tablet 2.5 mg  2.5 mg Oral Q12H Mallory Del Castillo MD   2.5 mg at 24 0815    sennosides-docusate (PERICOLACE) 8.6-50 MG per tablet 2 tablet  2 tablet Oral BID PRN Abel Pelaez MD        And    polyethylene glycol (MIRALAX) packet 17 g  17 g Oral Daily PRN Abel Pelaez MD    "     And    bisacodyl (DULCOLAX) EC tablet 5 mg  5 mg Oral Daily PRN Abel Pelaez MD        And    bisacodyl (DULCOLAX) suppository 10 mg  10 mg Rectal Daily PRN Abel Pelaez MD        cefepime 2000 mg IVPB in 100 mL NS (MBP)  2,000 mg Intravenous Q12H Mick Brown MD   2,000 mg at 24 0305    cetirizine (zyrTEC) tablet 10 mg  10 mg Oral Daily Abel Pelaez MD   10 mg at 24 0815    ferrous sulfate tablet 325 mg  325 mg Oral Every Other Day Abel Pelaez MD   325 mg at 24 0931    Hydrocortisone (Perianal) (ANUSOL-HC) 2.5 % rectal cream   Rectal Daily Abel Pelaez MD   Given at 24 0815    midodrine (PROAMATINE) tablet 10 mg  10 mg Oral TID AC Morales Hardwick MD   10 mg at 24 1227    multivitamin with minerals 1 tablet  1 tablet Oral Daily Abel Pelaez MD   1 tablet at 24 0815    nitroglycerin (NITROSTAT) SL tablet 0.4 mg  0.4 mg Sublingual Q5 Min PRN Abel Pelaez MD        ondansetron (ZOFRAN) injection 4 mg  4 mg Intravenous Q6H PRN Abel Pelaez MD        Potassium Replacement - Follow Nurse / BPA Driven Protocol   Does not apply PRN Morales Farrell MD        sodium chloride 0.9 % flush 10 mL  10 mL Intravenous Q12H Abel Pelaez MD   10 mL at 24 0816    sodium chloride 0.9 % flush 10 mL  10 mL Intravenous PRN Abel Pelaez MD        sodium chloride 0.9 % infusion 40 mL  40 mL Intravenous PRN Abel Pelaez MD        sodium chloride tablet 1 g  1 g Oral BID With Meals Aarti Reynolds MD   1 g at 24 0815         Physical Exam:  Vitals:   Temp (24hrs), Av.2 °F (36.8 °C), Min:97.3 °F (36.3 °C), Max:99.7 °F (37.6 °C)    /72   Pulse 61   Temp 97.5 °F (36.4 °C) (Oral)   Resp 16   Ht 152.4 cm (60\")   Wt 54 kg (119 lb)   SpO2 98%   BMI 23.24 kg/m²   Intake/Output:     Intake/Output Summary (Last 24 hours) at 2024 1405  Last data filed at 2024 0815  Gross per 24 hour   Intake 760 ml   Output 650 ml   Net 110 ml        Wt " Readings from Last 1 Encounters:   05/27/24 1433 54 kg (119 lb)   05/24/24 1849 54.2 kg (119 lb 7.8 oz)   05/24/24 1701 51.7 kg (114 lb)       Exam:  Alert and oriented NAD   eomi no icterus   Moist mucus membranes   No jvd reg s1s2 no murmur   Clear bilaterally no rales/rhonchi/wheeze   Soft NTND + bs   5/5 strength bilaterally  No edema   Warm dry no rash   Normal mood and judgement         DATA:  Radiology and Labs:  The following labs and radiology results independently reviewed by me    Labs:   Recent Results (from the past 24 hour(s))   CBC Auto Differential    Collection Time: 06/04/24  4:59 AM    Specimen: Blood   Result Value Ref Range    WBC 0.83 (C) 3.40 - 10.80 10*3/mm3    RBC 2.89 (L) 3.77 - 5.28 10*6/mm3    Hemoglobin 9.5 (L) 12.0 - 15.9 g/dL    Hematocrit 28.0 (L) 34.0 - 46.6 %    MCV 96.9 79.0 - 97.0 fL    MCH 32.9 26.6 - 33.0 pg    MCHC 33.9 31.5 - 35.7 g/dL    RDW 13.6 12.3 - 15.4 %    RDW-SD 48.0 37.0 - 54.0 fl    MPV 10.1 6.0 - 12.0 fL    Platelets 139 (L) 140 - 450 10*3/mm3    Neutrophil % 43.4 42.7 - 76.0 %    Lymphocyte % 34.9 19.6 - 45.3 %    Monocyte % 18.1 (H) 5.0 - 12.0 %    Eosinophil % 1.2 0.3 - 6.2 %    Basophil % 1.2 0.0 - 1.5 %    Immature Grans % 1.2 (H) 0.0 - 0.5 %    Neutrophils, Absolute 0.36 (L) 1.70 - 7.00 10*3/mm3    Lymphocytes, Absolute 0.29 (L) 0.70 - 3.10 10*3/mm3    Monocytes, Absolute 0.15 0.10 - 0.90 10*3/mm3    Eosinophils, Absolute 0.01 0.00 - 0.40 10*3/mm3    Basophils, Absolute 0.01 0.00 - 0.20 10*3/mm3    Immature Grans, Absolute 0.01 0.00 - 0.05 10*3/mm3       Radiology:  Imaging Results (Last 24 Hours)       ** No results found for the last 24 hours. **             ASSESSMENT:   Lymphoma   Hypotension   Hyponatremia   Hyperkalemia   Chronic diastolic heart failure   Relapsing diffuse large B-cell lymphoma  Adrenal insufficiency - per chart, but doesn't follow with endo   Hypoalbuminemia   Edema   Parox afib       PLAN:   No new chemistries today but her sodium has  been fairly stable in the low to mid 130s so no need for daily chemistries at this time  Will continue salt tabs, Midodrine  IV antibiotics, standard dosing     regular diet but her oral intake is very poor.  No need for any fluid restriction  Off IV fluids   Will follow peripherally and plan to see her on days that she has chemistries done      Jr Le M.D  [facility]  Office phone number: [phone]  Answering service phone number: [phone]

## 2024-06-04 NOTE — THERAPY TREATMENT NOTE
Patient Name: Michelle Car  : 1940    MRN: [id number]                              Today's Date: 2024       Admit Date: 2024    Visit Dx: No diagnosis found.  Patient Active Problem List   Diagnosis    Hypertension    Hyperlipidemia    Abdominal aortic atherosclerosis    Generalized edema    Fatigue due to excessive exertion    Seasonal allergic rhinitis due to pollen    Dyspnea    Acute on chronic diastolic heart failure    Hypoxia    Paroxysmal atrial fibrillation with rapid ventricular response    Adrenal nodule    Edema of lower extremity    Hyponatremia    CRI (chronic renal insufficiency), stage 2 (mild)    Diffuse large B-cell lymphoma of lymph nodes of multiple regions    Vascular catheter fitting or adjustment    Chronic diastolic CHF (congestive heart failure)    Sepsis due to Escherichia coli (E. coli)    Bacteremia due to Escherichia coli    Calculus of bile duct without cholecystitis and without obstruction    Diverticulosis    Iron deficiency anemia    Leukopenia    Anemia    Adrenal insufficiency    Weight loss    Severe malnutrition     Past Medical History:   Diagnosis Date    Abdominal aortic atherosclerosis     Adrenal nodule 2022    Allergic rhinitis     Anemia due to chemotherapy     Bacteremia due to Escherichia coli 2022    ADMITTED TO [facility]    Bug bite 10/24/2015    WITH CELLULITIS, LEFT LEG    CAD (coronary artery disease)     Calculus of bile duct without cholangitis or cholecystitis 2022    Chemotherapy induced neutropenia     Chronic anticoagulation     Chronic diastolic (congestive) heart failure     Colon polyps     CRI (chronic renal insufficiency), stage 2 (mild)     Diffuse large B cell lymphoma 2022    MULTIPLE LYMPH NODE INVOLVEMENT, FOLLOWED BY DR. JEN PATTERSON    Drug induced constipation     Hearing loss     Hemorrhoids     History of blood transfusion 2022    History of chemotherapy     FOLLOWED BY DR. JEN PATTERSON     Hypercalcemia 2016    resolved as of 2019    Hypercholesterolemia     Hyperkalemia 03/2022    Hypertension     Hyponatremia 02/17/2022    Leg swelling 10/2021    Mixed hyperlipidemia     Neutropenic fever 05/2022    Nonrheumatic mitral valve regurgitation 03/2022    PAF (paroxysmal atrial fibrillation)     FOLLOWED BY DR. YOSI LANGLEY    Pancytopenia     Pleural effusion, bilateral 06/2022    Pulmonary nodule     Seasonal allergies     Thrombocytopenia 08/2022    Venous insufficiency     Vitamin D deficiency      Past Surgical History:   Procedure Laterality Date    CHOLECYSTECTOMY N/A 10/12/2011    LAPAROSCOPIC, DR. CHANCE KLINE AT [facility]    COLONOSCOPY N/A 2000    1 or 2 polyps, otherwise normal per patient    COLONOSCOPY W/ POLYPECTOMY N/A 01/24/2012    3 MM TUBULAR ADENOMA POLYP IN CECUM, DIVERTICULOSIS IN RECTO-SIGMOID, RESCOPE IN 3 YRS, DR. CHANCE KLINE AT [facility]    CYST REMOVAL Left 10/12/2011    LEFT SCALP, PATH: BENIGN ADNEXAL NEOPLASM FAVORING ECCRINE SPIRADENOMA, DR. CHANCE KLINE AT [facility]    ERCP N/A 10/21/2022    Procedure: ENDOSCOPIC RETROGRADE CHOLANGIOPANCREATOGRAPHY with sphincterotomy and balloon sweep;  Surgeon: Peyman Ortega MD;  Location: [facility] ENDOSCOPY;  Service: Gastroenterology;  Laterality: N/A;  PRE: Common Duct Stones  POST: Common Duct Stones    VENOUS ACCESS DEVICE (PORT) INSERTION Left 03/23/2022    Procedure: INSERTION VENOUS ACCESS DEVICE;  Surgeon: Alin Delgado MD;  [place] [facility] [place] [facility] [place]  Service: General;  Laterality: Left;      General Information       Row Name 06/04/24 1417          Physical Therapy Time and Intention    Document Type therapy note (daily note)  -CW     Mode of Treatment physical therapy;individual therapy  -CW       Row Name 06/04/24 1417          General Information    Patient Profile Reviewed yes  -CW     Existing Precautions/Restrictions fall  -CW       Row Name 06/04/24 1417          Cognition    Orientation Status (Cognition) oriented x 3  -CW       Row  Name 06/04/24 1417          Safety Issues, Functional Mobility    Impairments Affecting Function (Mobility) balance;endurance/activity tolerance;strength  -CW               User Key  (r) = Recorded By, (t) = Taken By, (c) = Cosigned By      Initials Name Provider Type    Zarina Varela PT Physical Therapist                   Mobility       Row Name 06/04/24 1418          Bed Mobility    Bed Mobility supine-sit  -CW     Supine-Sit [place] (Bed Mobility) verbal cues;contact guard  -CW     Assistive Device (Bed Mobility) bed rails;head of bed elevated  -CW     Comment, (Bed Mobility) Increased time to complete  -CW       Row Name 06/04/24 1418          Sit-Stand Transfer    Sit-Stand [place] (Transfers) minimum assist (75% patient effort);verbal cues  -CW     Assistive Device (Sit-Stand Transfers) walker, front-wheeled  -CW       Row Name 06/04/24 1418          Gait/Stairs (Locomotion)    [place] Level (Gait) verbal cues;minimum assist (75% patient effort);contact guard  -CW     Assistive Device (Gait) walker, front-wheeled  -CW     Distance in Feet (Gait) 5  -CW     Deviations/Abnormal Patterns (Gait) edgard decreased;gait speed decreased;stride length decreased  -CW     Bilateral Gait Deviations forward flexed posture  -CW     Comment, (Gait/Stairs) Distance limited by weakness  -CW               User Key  (r) = Recorded By, (t) = Taken By, (c) = Cosigned By      Initials Name Provider Type    Zarina Varela PT Physical Therapist                   Obj/Interventions       Row Name 06/04/24 1419          Balance    Balance Assessment standing static balance;standing dynamic balance  -CW     Static Standing Balance contact guard  -CW     Dynamic Standing Balance minimal assist  -CW     Position/Device Used, Standing Balance supported;walker, front-wheeled  -CW               User Key  (r) = Recorded By, (t) = Taken By, (c) = Cosigned By      Initials Name Provider Type    SHANNAN Blackwell  BLAKE Srinivasan Physical Therapist                   Goals/Plan       Row Name 06/04/24 1524          Bed Mobility Goal 1 (PT)    Activity/Assistive Device (Bed Mobility Goal 1, PT) bed mobility activities, all  -CW     [place] Level/Cues Needed (Bed Mobility Goal 1, PT) independent  -CW     Time Frame (Bed Mobility Goal 1, PT) 1 week  -CW     Progress/Outcomes (Bed Mobility Goal 1, PT) goal ongoing  -CW       Row Name 06/04/24 1524          Transfer Goal 1 (PT)    Activity/Assistive Device (Transfer Goal 1, PT) transfers, all  -CW     [place] Level/Cues Needed (Transfer Goal 1, PT) standby assist  -CW     Time Frame (Transfer Goal 1, PT) 1 week  -CW     Progress/Outcome (Transfer Goal 1, PT) goal ongoing  -CW       Row Name 06/04/24 1524          Gait Training Goal 1 (PT)    Activity/Assistive Device (Gait Training Goal 1, PT) gait (walking locomotion);assistive device use;improve balance and speed;increase endurance/gait distance;decrease fall risk;walker, rolling  -CW     [place] Level (Gait Training Goal 1, PT) standby assist  -CW     Distance (Gait Training Goal 1, PT) 150 ft  -CW     Progress/Outcome (Gait Training Goal 1, PT) progress slower than expected;goal ongoing  -CW               User Key  (r) = Recorded By, (t) = Taken By, (c) = Cosigned By      Initials Name Provider Type    CW Zarina Blackwell, PT Physical Therapist                   Clinical Impression       Row Name 06/04/24 1419          Pain    Pretreatment Pain Rating 0/10 - no pain  -CW     Posttreatment Pain Rating 0/10 - no pain  -CW       Row Name 06/04/24 1419          Plan of Care Review    Plan of Care Reviewed With patient  -CW     Outcome Evaluation Pt participated with PT this PM. Pt agreeable to mobilize and sit up in chair. Pt required min A for supine>sit, STS and ambulate 5' total during session using walker. Continues to be limited by significant endurance deficits. Encouraged pt to sit up in chair for all meals.  Will continue to follow and progress as able. Plans SNF at d/c.  -CW       Row Name 06/04/24 1419          Vital Signs    O2 Delivery Pre Treatment room air  -CW       Row Name 06/04/24 1419          Positioning and Restraints    Pre-Treatment Position in bed  -CW     Post Treatment Position chair  -CW     In Chair reclined;call light within reach;encouraged to call for assist;exit alarm on;notified nsg;legs elevated  -CW               User Key  (r) = Recorded By, (t) = Taken By, (c) = Cosigned By      Initials Name Provider Type    Zarina Varela PT Physical Therapist                   Outcome Measures       Row Name 06/04/24 1421          How much help from another person do you currently need...    Turning from your back to your side while in flat bed without using bedrails? 3  -CW     Moving from lying on back to sitting on the side of a flat bed without bedrails? 3  -CW     Moving to and from a bed to a chair (including a wheelchair)? 3  -CW     Standing up from a chair using your arms (e.g., wheelchair, bedside chair)? 3  -CW     Climbing 3-5 steps with a railing? 2  -CW     To walk in hospital room? 2  -CW     AM-PAC 6 Clicks Score (PT) 16  -CW     Highest Level of Mobility Goal 5 --> Static standing  -CW       Row Name 06/04/24 1421          Functional Assessment    Outcome Measure Options AM-PAC 6 Clicks Basic Mobility (PT)  -CW               User Key  (r) = Recorded By, (t) = Taken By, (c) = Cosigned By      Initials Name Provider Type    Zarina Varela PT Physical Therapist                                 Physical Therapy Education       Title: PT OT SLP Therapies (Done)       Topic: Physical Therapy (Done)       Point: Mobility training (Done)       Learning Progress Summary             Patient Acceptance, E, VU by AB at 5/30/2024 1653    Acceptance, E, VU by AB at 5/29/2024 1544    Acceptance, E,TB, VU by ALEE at 5/29/2024 0301    Acceptance, E, VU by AB at 5/28/2024 1710    Acceptance, E,D,  VU,NR by MG at 5/28/2024 1138    Acceptance, E, VU by KA at 5/26/2024 1253                         Point: Home exercise program (Done)       Learning Progress Summary             Patient Acceptance, E, VU by AB at 5/30/2024 1653    Acceptance, E, VU by AB at 5/29/2024 1544    Acceptance, E,TB, VU by ZL at 5/29/2024 0301    Acceptance, E, VU by AB at 5/28/2024 1710    Acceptance, E,D, VU,NR by MG at 5/28/2024 1138                         Point: Body mechanics (Done)       Learning Progress Summary             Patient Acceptance, E, VU by AB at 5/30/2024 1653    Acceptance, E, VU by AB at 5/29/2024 1544    Acceptance, E,TB, VU by ZL at 5/29/2024 0301    Acceptance, E, VU by AB at 5/28/2024 1710    Acceptance, E,D, VU,NR by MG at 5/28/2024 1138                         Point: Precautions (Done)       Learning Progress Summary             Patient Acceptance, E, VU by AB at 5/30/2024 1653    Acceptance, E, VU by AB at 5/29/2024 1544    Acceptance, E,TB, VU by ZL at 5/29/2024 0301    Acceptance, E, VU by AB at 5/28/2024 1710    Acceptance, E,D, VU,NR by MG at 5/28/2024 1138                                         User Key       Initials Effective Dates Name Provider Type Discipline     05/24/22 -  Connie Phan, PT Physical Therapist PT    ZL 06/16/21 -  Ronnell Mcihaels, RN Registered Nurse Nurse     08/24/21 -  Anastacia Portillo, BLAKE Physical Therapist PT    AB 12/15/23 -  Gavi Gerardo, RN Registered Nurse Nurse                  PT Recommendation and Plan     Plan of Care Reviewed With: patient  Outcome Evaluation: Pt participated with PT this PM. Pt agreeable to mobilize and sit up in chair. Pt required min A for supine>sit, STS and ambulate 5' total during session using walker. Continues to be limited by significant endurance deficits. Encouraged pt to sit up in chair for all meals. Will continue to follow and progress as able. Plans SNF at d/c.     Time Calculation:         PT Charges       Row Name 06/04/24 2731              Time Calculation    Start Time 1342  -CW      Stop Time 1358  -CW      Time Calculation (min) 16 min  -CW      PT Received On 06/04/24  -CW      PT - Next Appointment 06/05/24  -CW      PT Goal Re-Cert Due Date 06/11/24  -CW                User Key  (r) = Recorded By, (t) = Taken By, (c) = Cosigned By      Initials Name Provider Type    CW Zarina Blackwell, BLAKE Physical Therapist                  Therapy Charges for Today       Code Description Service Date Service Provider Modifiers Qty    [id number]  PT THERAPEUTIC ACT EA 15 MIN 6/4/2024 Zarina Blackwell, PT GP 1            PT G-Codes  Outcome Measure Options: AM-PAC 6 Clicks Basic Mobility (PT)  AM-PAC 6 Clicks Score (PT): 16  AM-PAC 6 Clicks Score (OT): 15  PT Discharge Summary  Anticipated Discharge Disposition (PT): skilled nursing facility    Zarina Blackwell PT  6/4/2024

## 2024-06-04 NOTE — PLAN OF CARE
Goal Outcome Evaluation:              Outcome Evaluation: alert & oriented x4, BP soft but vitals stable, room air, purewick in place per patient's request for urgency and frequency, poor appetite, denies pain, palliative following, waiting bone marrow biopsy results.

## 2024-06-04 NOTE — PROGRESS NOTES
Name: Michelle Car ADMIT: 2024   : 1940  PCP: Olayinka Pimentel MD    MRN: [id number] LOS: 11 days   AGE/SEX: 83 y.o. female  ROOM: [facility]     Subjective   Subjective   No new complaints       Objective   Objective   Vital Signs  Temp:  [97.3 °F (36.3 °C)-99.7 °F (37.6 °C)] 97.5 °F (36.4 °C)  Heart Rate:  [] 61  Resp:  [16] 16  BP: ()/(58-84) 107/72  SpO2:  [92 %-99 %] 98 %  on   ;   Device (Oxygen Therapy): room air  Body mass index is 23.24 kg/m².  Physical Exam  Vitals reviewed.   Constitutional:       Comments: Very frail appearing   Cardiovascular:      Rate and Rhythm: Normal rate and regular rhythm.   Pulmonary:      Effort: No respiratory distress.      Breath sounds: Normal breath sounds.   Abdominal:      General: There is no distension.      Palpations: Abdomen is soft.      Tenderness: There is no abdominal tenderness.   Musculoskeletal:      Right lower leg: No edema.      Left lower leg: No edema.   Skin:     Coloration: Skin is pale.   Neurological:      Mental Status: She is alert and oriented to person, place, and time.       Results Review     I reviewed the patient's new clinical results.  Results from last 7 days   Lab Units 24  0459 24  03524  0343 24  0439   WBC 10*3/mm3 0.83* 0.86* 0.99* 0.82*   HEMOGLOBIN g/dL 9.5* 9.6* 9.5* 10.7*   PLATELETS 10*3/mm3 139* 139* 147 157     Results from last 7 days   Lab Units 24  0351 24  0343 24  0438 24  0315   SODIUM mmol/L 133* 135* 133* 134*   POTASSIUM mmol/L 3.9 4.0 3.5 4.0   CHLORIDE mmol/L 100 102 103 104   CO2 mmol/L 20.0* 22.0 19.5* 24.0   BUN mg/dL 25* 22 18 18   CREATININE mg/dL 0.60 0.67 0.57 0.64   GLUCOSE mg/dL 76 91 95 87   EGFR mL/min/1.73 89.7 87.4 90.9 88.4     Results from last 7 days   Lab Units 24  0315   ALBUMIN g/dL 2.5*   BILIRUBIN mg/dL 0.4   ALK PHOS U/L 63   AST (SGOT) U/L 18   ALT (SGPT) U/L 22     Results from last 7 days   Lab Units  "06/03/24  0351 06/02/24  0343 06/01/24  0438 05/31/24  0315   CALCIUM mg/dL 8.7 8.5* 8.4* 8.4*   ALBUMIN g/dL  --   --   --  2.5*       No results found for: \"HGBA1C\", \"POCGLU\"    No radiology results for the last day    I have personally reviewed all medications:  Scheduled Medications  amiodarone, 200 mg, Oral, Q24H  apixaban, 2.5 mg, Oral, Q12H  cefepime, 2,000 mg, Intravenous, Q12H  cetirizine, 10 mg, Oral, Daily  ferrous sulfate, 325 mg, Oral, Every Other Day  Hydrocortisone (Perianal), , Rectal, Daily  midodrine, 10 mg, Oral, TID AC  multivitamin with minerals, 1 tablet, Oral, Daily  sodium chloride, 10 mL, Intravenous, Q12H  sodium chloride, 1 g, Oral, BID With Meals    Infusions   Diet  Diet: Regular/House, Fluid Restriction (240 mL/tray); 1000 mL/day; Neutropenic/Low Microbial; Fluid Consistency: Thin (IDDSI 0)    I have personally reviewed:  [x]  Laboratory   [x]  Microbiology   [x]  Radiology   []  EKG/Telemetry  []  Cardiology/Vascular   []  Pathology    []  Records       Assessment/Plan     Active Hospital Problems    Diagnosis  POA    **Leukopenia [D72.819]  Unknown    Severe malnutrition [E43]  Yes    Anemia [D64.9]  Unknown    Weight loss [R63.4]  Unknown    Chronic diastolic CHF (congestive heart failure) [I50.32]  Yes    Paroxysmal atrial fibrillation with rapid ventricular response [I48.0]  Yes    Hyponatremia [E87.1]  Yes    Diffuse large B-cell lymphoma of lymph nodes of multiple regions [C83.38]  Yes    CRI (chronic renal insufficiency), stage 2 (mild) [N18.2]  Yes      Resolved Hospital Problems    Diagnosis Date Resolved POA    Hyperkalemia [E87.5] 06/01/2024 Unknown       83 y.o. female with history of diffuse large B cell lymphoma admitted with Leukopenia.    Basically no change today.  She remains neutropenic.  Bone marrow flow cytometry negative for lymphoma but final is pending.  Reviewed hematology note, basically waiting on final results as well.  Patient would like to speak with her " primary oncologist before making any changes to treatment plans.  I defer to them regarding what her overall prognosis is but seems to me to be fairly poor.      Finishing cefepime today per ID recommendations.      Hypotension resolved with higher dose midodrine.    Hyponatremia stable.  Remains on salt tabs with nephrology following.  Fluid restriction in place.  Encourage po intake    A-fib: Remains on amiodarone and apixaban.  Appears relatively stable      Dispo TBD pending above  Discussed with palliative APRN who is following as well  No need for continued telemetry      Morales Hardwick MD  [facility]  06/04/24  13:04 EDT

## 2024-06-05 ENCOUNTER — SPECIALTY PHARMACY (OUTPATIENT)
Dept: PHARMACY | Facility: HOSPITAL | Age: 84
End: 2024-06-05
Payer: MEDICARE

## 2024-06-05 LAB
BASOPHILS # BLD AUTO: 0.01 10*3/MM3 (ref 0–0.2)
BASOPHILS NFR BLD AUTO: 1.3 % (ref 0–1.5)
DEPRECATED RDW RBC AUTO: 47.4 FL (ref 37–54)
EOSINOPHIL # BLD AUTO: 0.01 10*3/MM3 (ref 0–0.4)
EOSINOPHIL NFR BLD AUTO: 1.3 % (ref 0.3–6.2)
ERYTHROCYTE [DISTWIDTH] IN BLOOD BY AUTOMATED COUNT: 13.7 % (ref 12.3–15.4)
HCT VFR BLD AUTO: 26.4 % (ref 34–46.6)
HGB BLD-MCNC: 8.9 G/DL (ref 12–15.9)
IMM GRANULOCYTES # BLD AUTO: 0 10*3/MM3 (ref 0–0.05)
IMM GRANULOCYTES NFR BLD AUTO: 0 % (ref 0–0.5)
LYMPHOCYTES # BLD AUTO: 0.29 10*3/MM3 (ref 0.7–3.1)
LYMPHOCYTES NFR BLD AUTO: 36.3 % (ref 19.6–45.3)
MCH RBC QN AUTO: 32.2 PG (ref 26.6–33)
MCHC RBC AUTO-ENTMCNC: 33.7 G/DL (ref 31.5–35.7)
MCV RBC AUTO: 95.7 FL (ref 79–97)
MONOCYTES # BLD AUTO: 0.11 10*3/MM3 (ref 0.1–0.9)
MONOCYTES NFR BLD AUTO: 13.8 % (ref 5–12)
NEUTROPHILS NFR BLD AUTO: 0.38 10*3/MM3 (ref 1.7–7)
NEUTROPHILS NFR BLD AUTO: 47.3 % (ref 42.7–76)
PLATELET # BLD AUTO: 132 10*3/MM3 (ref 140–450)
PMV BLD AUTO: 9.6 FL (ref 6–12)
RBC # BLD AUTO: 2.76 10*6/MM3 (ref 3.77–5.28)
WBC NRBC COR # BLD AUTO: 0.8 10*3/MM3 (ref 3.4–10.8)

## 2024-06-05 PROCEDURE — 85025 COMPLETE CBC W/AUTO DIFF WBC: CPT | Performed by: HOSPITALIST

## 2024-06-05 PROCEDURE — 97530 THERAPEUTIC ACTIVITIES: CPT

## 2024-06-05 PROCEDURE — 97110 THERAPEUTIC EXERCISES: CPT

## 2024-06-05 PROCEDURE — 99232 SBSQ HOSP IP/OBS MODERATE 35: CPT | Performed by: INTERNAL MEDICINE

## 2024-06-05 PROCEDURE — 99232 SBSQ HOSP IP/OBS MODERATE 35: CPT | Performed by: NURSE PRACTITIONER

## 2024-06-05 RX ADMIN — AMIODARONE HYDROCHLORIDE 200 MG: 200 TABLET ORAL at 08:51

## 2024-06-05 RX ADMIN — MIDODRINE HYDROCHLORIDE 10 MG: 5 TABLET ORAL at 11:41

## 2024-06-05 RX ADMIN — FERROUS SULFATE TAB 325 MG (65 MG ELEMENTAL FE) 325 MG: 325 (65 FE) TAB at 08:51

## 2024-06-05 RX ADMIN — MIDODRINE HYDROCHLORIDE 10 MG: 5 TABLET ORAL at 08:51

## 2024-06-05 RX ADMIN — SODIUM CHLORIDE TAB 1 GM 1 G: 1 TAB at 17:32

## 2024-06-05 RX ADMIN — ACETAMINOPHEN 325MG 650 MG: 325 TABLET ORAL at 23:41

## 2024-06-05 RX ADMIN — APIXABAN 2.5 MG: 2.5 TABLET, FILM COATED ORAL at 20:58

## 2024-06-05 RX ADMIN — Medication 1 TABLET: at 08:51

## 2024-06-05 RX ADMIN — CETIRIZINE HYDROCHLORIDE 10 MG: 10 TABLET ORAL at 08:51

## 2024-06-05 RX ADMIN — SODIUM CHLORIDE TAB 1 GM 1 G: 1 TAB at 08:51

## 2024-06-05 RX ADMIN — Medication 10 ML: at 20:58

## 2024-06-05 RX ADMIN — APIXABAN 2.5 MG: 2.5 TABLET, FILM COATED ORAL at 08:51

## 2024-06-05 RX ADMIN — MIDODRINE HYDROCHLORIDE 10 MG: 5 TABLET ORAL at 17:32

## 2024-06-05 NOTE — PROGRESS NOTES
REASON FOR FOLLOWUP/CHIEF COMPLAINT:  DLBCL, anemia, neutropenia    HISTORY OF PRESENT ILLNESS:   No new issues.  No bleeding issues.    Past Medical History, Past Surgical History, Social History, Family History have been reviewed and are without significant changes except as mentioned.    Review of Systems   Review of Systems   Constitutional:  Negative for activity change.   HENT:  Negative for nosebleeds and trouble swallowing.    Respiratory:  Negative for shortness of breath and wheezing.    Cardiovascular:  Negative for chest pain and palpitations.   Gastrointestinal:  Negative for constipation, diarrhea and nausea.   Genitourinary:  Negative for dysuria and hematuria.   Musculoskeletal:  Negative for arthralgias and myalgias.   Skin:  Negative for rash and wound.   Neurological:  Negative for seizures and syncope.   Hematological:  Negative for adenopathy. Does not bruise/bleed easily.   Psychiatric/Behavioral:  Negative for confusion.        Medications:  The current medication list was reviewed in the EMR    ALLERGIES:    Allergies   Allergen Reactions    Factive [Gemifloxacin] Rash              Vitals:    06/04/24 2055 06/05/24 0327 06/05/24 0805 06/05/24 1141   BP: 114/66 98/61 96/58 97/62   BP Location: Right arm Right arm Right arm    Patient Position: Lying Lying Lying    Pulse: 63 59 56 65   Resp: 16 16 16    Temp: 98.1 °F (36.7 °C) 98.4 °F (36.9 °C)     TempSrc: Oral Oral     SpO2: 98% 98% 97%    Weight:       Height:         Physical Exam    CONSTITUTIONAL:  Vital signs reviewed.  No distress, looks comfortable.  EYES:  Conjunctivae and lids unremarkable.  PERRLA  EARS, NOSE, MOUTH, THROAT:  Ears and nose appear unremarkable.  Lips, teeth, gums appear unremarkable.  RESPIRATORY:  Normal respiratory effort.  Lungs clear to auscultation bilaterally.  CARDIOVASCULAR:  Normal S1, S2.  No murmurs, rubs or gallops.  No significant lower extremity edema.  GASTROINTESTINAL: Abdomen appears  unremarkable.  Nontender.  No hepatomegaly.  No splenomegaly.  NEURO: Cranial nerves 2-12 grossly intact.  No focal deficits.  Appears to have equal strength all 4 extremities.  MUSCULOSKELETAL:  Unremarkable digits/nails.  No cyanosis or clubbing.  SKIN:  Warm.  No rashes.  PSYCHIATRIC:  Normal judgment and insight.  Normal mood and affect.      RECENT LABS:  WBC   Date Value Ref Range Status   06/05/2024 0.80 (C) 3.40 - 10.80 10*3/mm3 Final   06/04/2024 0.83 (C) 3.40 - 10.80 10*3/mm3 Final   06/03/2024 0.86 (C) 3.40 - 10.80 10*3/mm3 Final     Hemoglobin   Date Value Ref Range Status   06/05/2024 8.9 (L) 12.0 - 15.9 g/dL Final   06/04/2024 9.5 (L) 12.0 - 15.9 g/dL Final   06/03/2024 9.6 (L) 12.0 - 15.9 g/dL Final     Platelets   Date Value Ref Range Status   06/05/2024 132 (L) 140 - 450 10*3/mm3 Final   06/04/2024 139 (L) 140 - 450 10*3/mm3 Final   06/03/2024 139 (L) 140 - 450 10*3/mm3 Final       ASSESSMENT/PLAN:  Michelle Car P382/1     *Relapsed diffuse large B-cell lymphoma.  Patient was found to have bilateral pleural effusions.    She underwent right thoracentesis on 2/21/2022 and the left thoracentesis on 2/22/2022.    Analysis of the fluid revealed involvement with diffuse large B-cell lymphoma.    FISH analysis for BCL6 rearrangement was positive but was negative for BCL-2 and MYC rearrangement.    PET scan on 3/10/2022 revealed significant hypermetabolism in the left adrenal gland and the surrounding soft tissue with SUV up to 34.5. Hypermetabolism in the left pleural thickening with SUV of 7.1. there was less hypermetabolism in the right adrenal gland and in the right pleura.  She was considered to have stage III non-bulky disease.  R-IPI score of 3 associated with poor risk - associated with overall survival of 55%.   R-CHOP with Neulasta support was started on 3/28/2022.  Due to development of neutropenic fever following cycles 1 and 2, treatment was changed to mini-RCHOP starting cycle #3 on  5/16/2022.  Patient received cycle #6 on 7/25/2022.  PET scan on 8/15/2022 revealed complete metabolic response.  Patient was found to have recurrence with development of a left mandibular mass in October/November 2023.  Biopsy of left mandible mass on 11/28/2023 showed recurrence of the lymphoma, diffuse large B cell, non-GCB phenotype.  Ki-67 was 90%.    It was positive for Bcl-6 but negative for Bcl-2 and MYC rearrangement.  PET scan on 12/11/2023 revealed multiple areas of involvement -prevascular space, left epicardial fat pad, pleural density posterior to the left fourth rib anterior aspect, lesion along the pericardium versus intramuscular with SUV of 9.8.  Hypermetabolic activity at both adrenal glands, uniformly increased in size and appeared hyperplastic.  There was a 2.7 x 1.1 cm soft tissue nodule lateral to the right erector spinae muscle posterior to the right posterior 11th rib with an SUV of 20.5 and there was a 1.3 cm nodule lateral to the posterior margin of the left psoas muscle with an SUV of 24.9.  Rituxan Polivy and Treanda with Treanda every 3 weeks for 6 cycles was started on 12/12/2023.  The jaw mass started to decrease in size after starting treatment.  CT scan on 2/8/2024 showed evidence of response to treatment.  Cycle #6 was given on 4/1/2024.  PET scan on 4/22/2024 revealed decrease in the hypermetabolism in the mediastinal and pericardial lymph nodes/lesions and in the lesions above the left kidney and adrenal gland.  The left mandibular lesion was no longer hypermetabolic.   She is at increased risk for relapse of the lymphoma.  The plan was to start treatment with Revlimid 10 mg daily.  However, the patient did not start the treatment yet.  LDH was previously elevated but improved to 190 on 5/25/2024.  Bone marrow 5/30/2024: Pending morphology review.  However, flow cytometry was negative for lymphoma.  The 7.8% increased gamma delta T-cell population of uncertain significance was  found.  Pathologist noted that this can occur with reactive processes.  With the negative flow cytometry, it is unlikely she has recurrent lymphoma in her marrow but again, await final report.  Still awaiting final bone marrow results    *Neutropenia  5/20/2024: WBC decreased to 1750.  Neutrophils decreased to 940.  5/24/2024: WBC decreased to 1290.  Neutrophils decreased to 680.  5/25/2024: WBC decreased to 860.  Neutrophils decreased to 530.  Worsening neutropenia is also concerning for relapse of the lymphoma with involvement of the bone marrow.   5/26/2024: Neutrophil count 610.  5/27/2024-total WBC count has increased to 1.46.  Absolute neutrophil count pending   5/28/2024-WBC count lower at 1.05 with an absolute neutrophil count of 0.57  Increase in metamyelocytes as well as basophil percentage.  Flow cytometry 5/28/2024-negative  ANC lower today at 0.41  Copper level normal at 145  MMA normal at 245  5/30/24 - bone marrow biopsy, results pending   6/1/2024-WBC count 0.82, absolute neutrophil count 0.23.  WBC was okay through 5/10/2024.  Await bone marrow biopsy final results.  6/4/2024: WBC 0.83.  Discussed with oncology pharmacist, Alexandra Lopez.  She notes mirtazapine was started on 5/27/2024 for appetite.  This can cause neutropenia.  Therefore, stop this on 6/4/ 24.  WBC 0.8     *Fever  Afebrile, Tmax of 99.5  Blood cultures negative  Patient at high risk for infections given neutropenia  Continue cefepime through 6/4/ 24 per ID recommendations.  After discontinuation of cefepime if patient remains neutropenic then they are recommending cefdinir  No recent fevers     *Macrocytic anemia.  Patient also developed anemia secondary to lymphoma and secondary to chemotherapy.    Hgb decreased to 7.9 on 4/25/2022 and she was transfused.   Hemoglobin decreased to 6.3 on 4/27/2022. She was transfused.  Hemoglobin level improved subsequently.  Patient was placed ferrous sulfate 325 mg 3 days a week.  5/3/2024:  Hemoglobin increased to 10.2.  5/25/2024: Hemoglobin decreased to 7.8.  No evidence of vitamin B12 or folate deficiency.    Ferritin 1953.  Transferrin saturation 11%.  Patient decided to hold off on PRBC transfusion.  5/26/2024: Hemoglobin 8.2.  Due to her reporting chills when she received PRBC Tx in the past, we will give Tylenol, Benadryl and Solumedrol prior to the transfusion.   526 2024-1 unit of PRBC administered.  Patient tolerated the transfusion well.  Hemoglobin 9.5 on 6/2/2024  Copper and MMA levels normal  Hb      *Hyponatremia.  Patient had problem with hyponatremia in the past.  Sodium is 127 on 3/14/2023.  She was advised to increase salt intake.  Sodium improved to 133 on repeat lab on 3/21/2023.  However, sodium decreased to 126 on 6/7/2023.  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  She was started on sodium chloride 1 g daily.  4/1/2024: Sodium decreased to 132.  Leg swelling improved with Lasix.  5/3/2024: Sodium 130.  5/24/2024: Sodium decreased to 126.    Patient was having significant weakness.  5/25/2024: Sodium 128.  Nephrology service was consulted.  5/28/2024-sodium stable at 132  5/30/2024-sodium 133  5/31/24 - sodium 134  6/1/2024-sodium 133  6/2/2024-sodium stable at 135  Continues on fluid restriction  Sodium 133     *Hyperkalemia.  5/24/2024: Potassium increased to 5.6.  Potassium 3.9     *Chronic anticoagulation.  Patient is on Eliquis 5 mg twice daily.  No excessive bleeding  Platelet count remains normal  Eliquis resumed      *Prophylaxis.  Patient had shingles in April 2023.  She is at risk of developing shingles while on treatment.  She was placed on acyclovir 400 mg twice daily.  She was on Bactrim DS 3 days a week.  Bactrim was discontinued due to the electrolyte abnormalities.     *Low-density lesion in the uterus.    The radiologist recommended pelvic ultrasound.    The patient is asymptomatic.    PET scan on 4/22/2024 reported fluid in the lower  abdomen.  Patient reported intermittent vaginal discharge.     *Hypertension  Patient started on midodrine  Blood pressure has improved with midodrine.  On 10 mg 3 times a day     PLAN:  6/4/2024: Stopped mirtazapine because it can cause neutropenia (it was started on 5/27/2024).  She follows with Dr. Galo in our office.  Await bone marrow final results (flow is unremarkable)  Patient previously stated if lymphoma is found in the marrow she would choose comfort measures/hospice.  (Generally flow is positive if lymphoma is in the marrow.  Her marrow flow is negative for lymphoma.).  Dr. Galo discussed with her on 6/4/ 24.  She is hoping to improve her strength.     Chart reviewed and summarized include nephrology and hospitalist note

## 2024-06-05 NOTE — PROGRESS NOTES
Sent Blane at [employer] an email to put Revlimid and financial investigation on hold.  Received message from Dr Galo that patient will not need medication until she improves.  Suellen suggested that we hold off on financial investigation until she improves.     Received message from [facility]ane- prescription has been put on hold.  Patient has been approved for funding through [employer] through June 2025.      Carol Gill  Specialty Pharmacy Technician

## 2024-06-05 NOTE — PLAN OF CARE
Goal Outcome Evaluation:  Plan of Care Reviewed With: patient   A&Ox4, VSS, RA, purewic in place, denies pain, encouraging oral intake & to work w/ PT, awaiting bone biopsy results, no major issues this shift & pt resting comfortably at this time, [facility]      Progress: no change

## 2024-06-05 NOTE — PROGRESS NOTES
Name: Michelle Car ADMIT: 2024   : 1940  PCP: Olayinka Pimentel MD    MRN: [id number] LOS: 12 days   AGE/SEX: 83 y.o. female  [facility] [place]     Subjective   Subjective   No new complaints       Objective   Objective   Vital Signs  Temp:  [98.1 °F (36.7 °C)-98.4 °F (36.9 °C)] 98.4 °F (36.9 °C)  Heart Rate:  [56-65] 65  Resp:  [16] 16  BP: ()/(58-70) 97/62  SpO2:  [97 %-98 %] 97 %  on   ;   Device (Oxygen Therapy): room air  Body mass index is 20.24 kg/m².  Physical Exam  Vitals reviewed.   Constitutional:       Comments: Very frail appearing   Cardiovascular:      Rate and Rhythm: Normal rate and regular rhythm.   Pulmonary:      Effort: No respiratory distress.      Breath sounds: Normal breath sounds.   Abdominal:      General: There is no distension.      Palpations: Abdomen is soft.      Tenderness: There is no abdominal tenderness.   Musculoskeletal:      Right lower leg: No edema.      Left lower leg: No edema.   Skin:     Coloration: Skin is pale.   Neurological:      Mental Status: She is alert and oriented to person, place, and time.       Results Review     I reviewed the patient's new clinical results.  Results from last 7 days   Lab Units 24  0635 24  0459 24  0351 24  0343   WBC 10*3/mm3 0.80* 0.83* 0.86* 0.99*   HEMOGLOBIN g/dL 8.9* 9.5* 9.6* 9.5*   PLATELETS 10*3/mm3 132* 139* 139* 147     Results from last 7 days   Lab Units 24  0351 24  0343 24  0438 24  0315   SODIUM mmol/L 133* 135* 133* 134*   POTASSIUM mmol/L 3.9 4.0 3.5 4.0   CHLORIDE mmol/L 100 102 103 104   CO2 mmol/L 20.0* 22.0 19.5* 24.0   BUN mg/dL 25* 22 18 18   CREATININE mg/dL 0.60 0.67 0.57 0.64   GLUCOSE mg/dL 76 91 95 87   EGFR mL/min/1.73 89.7 87.4 90.9 88.4     Results from last 7 days   Lab Units 24  0315   ALBUMIN g/dL 2.5*   BILIRUBIN mg/dL 0.4   ALK PHOS U/L 63   AST (SGOT) U/L 18   ALT (SGPT) U/L 22     Results from last 7 days   Lab Units  "06/03/24  0351 06/02/24  0343 06/01/24  0438 05/31/24  0315   CALCIUM mg/dL 8.7 8.5* 8.4* 8.4*   ALBUMIN g/dL  --   --   --  2.5*       No results found for: \"HGBA1C\", \"POCGLU\"    No radiology results for the last day    I have personally reviewed all medications:  Scheduled Medications  amiodarone, 200 mg, Oral, Q24H  apixaban, 2.5 mg, Oral, Q12H  cetirizine, 10 mg, Oral, Daily  ferrous sulfate, 325 mg, Oral, Every Other Day  Hydrocortisone (Perianal), , Rectal, Daily  midodrine, 10 mg, Oral, TID AC  multivitamin with minerals, 1 tablet, Oral, Daily  sodium chloride, 10 mL, Intravenous, Q12H  sodium chloride, 1 g, Oral, BID With Meals    Infusions   Diet  Diet: Regular/House; Neutropenic/Low Microbial; Fluid Consistency: Thin (IDDSI 0)    I have personally reviewed:  [x]  Laboratory   [x]  Microbiology   [x]  Radiology   []  EKG/Telemetry  []  Cardiology/Vascular   []  Pathology    []  Records       Assessment/Plan     Active Hospital Problems    Diagnosis  POA    **Leukopenia [D72.819]  Unknown    Severe malnutrition [E43]  Yes    Anemia [D64.9]  Unknown    Weight loss [R63.4]  Unknown    Chronic diastolic CHF (congestive heart failure) [I50.32]  Yes    Paroxysmal atrial fibrillation with rapid ventricular response [I48.0]  Yes    Hyponatremia [E87.1]  Yes    Diffuse large B-cell lymphoma of lymph nodes of multiple regions [C83.38]  Yes    CRI (chronic renal insufficiency), stage 2 (mild) [N18.2]  Yes      Resolved Hospital Problems    Diagnosis Date Resolved POA    Hyperkalemia [E87.5] 06/01/2024 Unknown       83 y.o. female with history of diffuse large B cell lymphoma admitted with Leukopenia.    No change again today.  She remains neutropenic.  Bone marrow flow cytometry negative for lymphoma but final is pending.  Reviewed hematology note, basically waiting on final results as well.  Dr. Galo apparently talked with the patient last night.  Patient defers to him as far as any goals of care discussion.  "     S/p cefepime per ID recommendations.      Hypotension resolved with higher dose midodrine.    Hyponatremia not checked today..  Remains on salt tabs with nephrology following.  Fluid restriction in place.  Encourage po intake.  Recheck in a.m.    A-fib: Remains on amiodarone and apixaban.  Appears relatively stable      Dispo TBD pending above      Morales Hardwick MD  [facility]  06/05/24  17:00 EDT

## 2024-06-05 NOTE — CASE MANAGEMENT/SOCIAL WORK
Continued Stay Note  [facility]     Patient Name: Michelle Car  MRN: [id number]  Today's Date: 6/5/2024    Admit Date: 5/24/2024    Plan: Awaiting SNF choices for referral   Discharge Plan       Row Name 06/05/24 1432       Plan    Plan Awaiting SNF choices for referral    Plan Comments Spoke with patient and daughter at bedside. Introduced self. [facility] not in [facility]. CCP provided patient and family with Medicare compare list for further SNF choices. CCP to follow for discharge planning.                   Discharge Codes    No documentation.                 Expected Discharge Date and Time       Expected Discharge Date Expected Discharge Time    Jun 6, 2024               Elidia Walter RN

## 2024-06-05 NOTE — PLAN OF CARE
Goal Outcome Evaluation:  Plan of Care Reviewed With: patient           Outcome Evaluation: Pt agreeable skilled PT, reports she is awaiting marrow results. Pt performed bed mobility CGA, sit<->standing CGA/min A rwx, ambulated short distance bedside 5ft x 2 CGA/min A rwx. will continue to progress and prepare for DC as evie pending GOC.      Anticipated Discharge Disposition (PT): skilled nursing facility

## 2024-06-05 NOTE — DISCHARGE PLACEMENT REQUEST
"Brian Hayward (83 y.o. Female)       Date of Birth   1940    Social Security Number       Address   [address] [place], [place] [address]    Home Phone   [phone]    MRN   [id number]       [employer]   Patient Refused    Marital Status                               Admission Date   5/24/24    Admission Type   Urgent    Admitting Provider   Abel Pelaez MD    Attending Provider   Morales Hardwick MD    Department, Room/Bed   [address], P382/1       Discharge Date       Discharge Disposition       Discharge Destination                                 Attending Provider: Morales Hardwick MD    Allergies: Factive [Gemifloxacin]    Isolation: None   Infection: None   Code Status: No CPR    Ht: 157.5 cm (62\")   Wt: 50.2 kg (110 lb 10.7 oz)    Admission Cmt: None   Principal Problem: Leukopenia [D72.819]                   Active Insurance as of 5/24/2024       Primary Coverage       Payor Plan Insurance Group Employer/Plan Group    ANTHEM MEDICARE REPLACEMENT ANTH MEDICARE ADVANTAGE [id number]       Payor Plan Address Payor Plan Phone Number Payor Plan Fax Number Effective Dates    [address] [phone]  1/1/2016 - None Entered    [facility] [address]         Subscriber Name Subscriber Birth Date Member ID       BRIAN HAYWARD 1940 [id number]                     Emergency Contacts        (Rel.) Home Phone Work Phone Mobile Phone    Helder Hayward (HCS #1) (Son) [phone] --    ERLINDA HAYWARD (HCS #2) (Daughter) [phone] -- --    anderson hayward (HCS #3) (Son) [phone] -- [phone]    Keely Lozano (Sister) -- -- [phone]    ZBIGNIEW PÉREZ (Brother) [phone] -- --                "

## 2024-06-05 NOTE — PROGRESS NOTES
.            [facility]    Palliative Care Daily Progress Note   Chief complaint-follow up goals of care/advanced care planning, support for patient/family, and hospice referral/discussion    Code Status:   Code Status and Medical Interventions:   Ordered at: 05/29/24 1443     Medical Intervention Limits:    NO intubation (DNI)    NO cardioversion    NO artificial nutrition     Level Of Support Discussed With:    Patient    Health Care Surrogate     Code Status (Patient has no pulse and is not breathing):    No CPR (Do Not Attempt to Resuscitate)     Medical Interventions (Patient has pulse or is breathing):    Limited Support     Comments:    spoke with patient and her son/HCSHelder      Advanced Directives: Advance Directive Status: Patient has advance directive, copy in chart   Goals of Care: Ongoing.     S: Medical record reviewed. Events noted.  Patient resting in bed, family at bedside. Reports being tired.              Review of Systems   Constitutional: Positive for decreased appetite and malaise/fatigue.   Cardiovascular:  Negative for chest pain.   Respiratory:  Negative for shortness of breath.    Gastrointestinal:  Negative for nausea.   Neurological:  Positive for weakness.   Psychiatric/Behavioral:  Negative for depression. The patient is not nervous/anxious.      Pain Assessment  Preferred Pain Scale: number (Numeric Rating Pain Scale)  Nonverbal Indicators of Pain: nonverbal indicators absent  Pain Location: back, neck  Pain Description: aching    O:     Intake/Output Summary (Last 24 hours) at 6/5/2024 1406  Last data filed at 6/5/2024 0327  Gross per 24 hour   Intake 500 ml   Output 300 ml   Net 200 ml       Diagnostics and current medications: Reviewed.    Current Facility-Administered Medications   Medication Dose Route Frequency Provider Last Rate Last Admin    acetaminophen (TYLENOL) tablet 650 mg  650 mg Oral Q4H PRN Abel Pelaez MD   650 mg at 06/03/24  2116    Or    acetaminophen (TYLENOL) 160 MG/5ML oral solution 650 mg  650 mg Oral Q4H PRN Abel Pelaez MD        Or    acetaminophen (TYLENOL) suppository 650 mg  650 mg Rectal Q4H PRN Abel Pelaez MD        albuterol (PROVENTIL) nebulizer solution 0.083% 2.5 mg/3mL  2.5 mg Nebulization Q4H PRN Abel Pelaez MD        amiodarone (PACERONE) tablet 200 mg  200 mg Oral Q24H Abel Pelaez MD   200 mg at 06/05/24 0851    apixaban (ELIQUIS) tablet 2.5 mg  2.5 mg Oral Q12H Mallory Del Castillo MD   2.5 mg at 06/05/24 0851    sennosides-docusate (PERICOLACE) 8.6-50 MG per tablet 2 tablet  2 tablet Oral BID PRN Abel Pelaez MD        And    polyethylene glycol (MIRALAX) packet 17 g  17 g Oral Daily PRN Abel Pelaez MD        And    bisacodyl (DULCOLAX) EC tablet 5 mg  5 mg Oral Daily PRN Abel Pelaez MD        And    bisacodyl (DULCOLAX) suppository 10 mg  10 mg Rectal Daily PRN Abel Pelaez MD        cetirizine (zyrTEC) tablet 10 mg  10 mg Oral Daily Abel Pelaez MD   10 mg at 06/05/24 0851    ferrous sulfate tablet 325 mg  325 mg Oral Every Other Day Abel Pelaez MD   325 mg at 06/05/24 0851    Hydrocortisone (Perianal) (ANUSOL-HC) 2.5 % rectal cream   Rectal Daily Abel Pelaez MD   Given at 06/04/24 0815    midodrine (PROAMATINE) tablet 10 mg  10 mg Oral TID AC Morales Hardwick MD   10 mg at 06/05/24 1141    multivitamin with minerals 1 tablet  1 tablet Oral Daily Abel Pelaez MD   1 tablet at 06/05/24 0851    nitroglycerin (NITROSTAT) SL tablet 0.4 mg  0.4 mg Sublingual Q5 Min PRN Abel Pelaez MD        ondansetron (ZOFRAN) injection 4 mg  4 mg Intravenous Q6H PRN Abel Pelaez MD        Potassium Replacement - Follow Nurse / BPA Driven Protocol   Does not apply PRN Morales Farrell MD        sodium chloride 0.9 % flush 10 mL  10 mL Intravenous Q12H Abel Pelaez MD   10 mL at 06/04/24 2107    sodium chloride 0.9 % flush 10 mL  10 mL Intravenous PRN Abel Pelaez MD        sodium chloride 0.9 %  "infusion 40 mL  40 mL Intravenous PRN Abel Pelaez MD        sodium chloride tablet 1 g  1 g Oral BID With Meals Aarti Reynolds MD   1 g at 06/05/24 0851          acetaminophen **OR** acetaminophen **OR** acetaminophen    albuterol    senna-docusate sodium **AND** polyethylene glycol **AND** bisacodyl **AND** bisacodyl    nitroglycerin    ondansetron    Potassium Replacement - Follow Nurse / BPA Driven Protocol    sodium chloride    sodium chloride  Attest that current medications reviewed including but not limited to prescriptions, over-the counter, herbals and vitamin/mineral/dietary (nutritional) supplements for name, route of administration, type, dose and frequency and are current using all immediate resources available at time of dictation.    A:    BP 97/62   Pulse 65   Temp 98.4 °F (36.9 °C) (Oral)   Resp 16   Ht 157.5 cm (62\")   Wt 50.2 kg (110 lb 10.7 oz)   SpO2 97%   BMI 20.24 kg/m²     Constitutional:       Appearance: Not in distress. Chronically ill-appearing.      Comments: Elderly    Pulmonary:      Effort: Pulmonary effort is normal.   Neurological:      Mental Status: Alert and oriented to person, place, and time.   Psychiatric:         Mood and Affect: Mood and affect normal.         Speech: Speech normal.         Behavior: Behavior is cooperative.         Thought Content: Thought content normal.         Cognition and Memory: Cognition normal.         Judgment: Judgment normal.         Patient status: Disease state: Controlled with current treatments.  Functional status: Palliative Performance Scale Score: Performance 50% based on the following measures: Ambulation: Mainly sit or lie down, Activity and Evidence of Disease: Unable to do any work, extensive evidence of disease, Self-Care: Considerable assistance required,  Intake: Normal or reduced, LOC: Full or confusion   ECOG Status(2) Ambulatory and capable of self care, unable to carry out work activity, up and about > 50% or waking " "hours.  Nutritional status: Albumin 2.5 on 5/25/2024 Body mass index is 23.24 kg/m².  Family support: The patient receives support from her children..  Advance Directives: Advance Directive Status: Patient has advance directive, copy in chart   POA/Healthcare surrogate-son, Helder.     Impression/Problem List:     Leukopenia   Diffuse large b cell lymphoma   Chronic diastolic heart failure   Paroxysmal atrial fibrillation   Hyponatremia     Malnutrition            Recommendations/Plan:  1. Provide support with goals of care           2.  Palliative care encounter  5/29/2024- I spoke with patient at bedside regarding goals of care. She has a very good understanding of her medical conditions, which we reviewed in detail. She has the support of three children. She resided alone prior to admission. At this time, she recognizes and/or feels she is in a bad situation and uncertain if she will bounce back from everything. She relies heavily on Dr Galo and his recommendations. She gomez tell me if she is informed her cancer is worst she will not resume treatment and will just have to \"throw in the towel.\" She would only consider treatment if it would give her more years of life that are enjoyable and good quality. She does have a living will on file that designates her son, Helder. We reviewed her wishes regarding CPR and medical interventions and she wishes for the following:  DNR/ DNI, no cardioversion, no artifical nutrition. She tells me her children know her wishes and I asked to contact Helder to confirm and she was agreeable. At this time, her goal is to continue with current level of treatment and consider bone marrow biopsy to determine disease progression to determine what is next for her. She would like to discuss with [facility] as she trust him tremendously and he has been taking care of her the last 2-3 years. She is [place] and att[facility] in [place]. She does acknowledge general fatigue, " weakness, and poor appetite. No pain or shortness of breath. I did provide her with information regarding palliative care and will plan to discuss further. Of note, I did attempt to discuss discharge options (home, rehab etc) and she tells me she is ready to discuss that just yet. I called her son/HCS, Helder at 1427 to discuss goals of care. I provided him with an update of my conversation I had with his mother. We discussed her wishes to be DNR/DNI, NO cardioversion and NO artifical nutrition and he is agreeable and supports his mother decision. I will update her chart to reflect their wishes.  He feels she is very knowledgeable of her situation and he will support her. I did give him information regarding palliative care. I will plan to support the patient and family for the next couple days. I spoke with ALLAN Vasquez.      5/30/2024-  The patient is off unit for bone marrow biopsy. I called her son, Helder at 1440. He visited with his mother last night, along with his brother. He reports she was really tired and not much conversation. He is aware of her poor nutrition intake. He was made aware that patient went for bone marrow biopsy today due to lab findings and further evaluation of lymphoma. He too wants to have results of bone marrow biopsy to help guide decision making and also discuss with Dr Galo.  I will plan to follow up with patient tomorrow to have further discussion with goals. I spoke with ALLAN Vasquez.      5/31/2024- I attempted to have further conversation with patient regarding goals of care and palliative care and hospice care and she is not wanting to engage in further discussion. She tells me she wants to discuss things further once her results have come back from her bone marrow biopsy. I did leave some brochures regarding [facility] to provide additional information for the weekend and will inform her son, Helder, when I call him later this afternoon. I will plan to follow up  "Monday. I spoke with ALLAN Wang.      I called her son, Helder at 1515. I did provide him with information regarding [facility] vs [facility]. He is aware of written information left at bedside for him and the family to review. I will plan to follow up Monday with hopes that they have received results of bone marrow biopsy to support them with further decision making.      6/4/2024- I had a very brief conversation with patient. She remains consistent that she will NOT make any decision regarding her care until she has spoken with Dr Galo, her primary oncologist.  She is aware bone marrow biopsy is pending and medical team \"meeting to discuss further.\"  I did reach out to Dr Claire to see if he could reach out to Dr Galo to support patient and family with decision making. I also called patient son, Helder at 1535 and no answer. I was able to leave a generic voicemail for him to return call.  I spoke with Dr Hardwick as well this morning and ALLAN Viera.     6/5/2024- I had follow up conversation with patient. Her daughter, Liza was present. Dr Galo met with patient and her two son, Helder and Ba last evening. At this time, the patient plans to go to rehab to improve functional mobility. We discussed again importance of nutrition as well. She is considering resuming treatment for lymphoma as well. She states Dr Galo was hopeful and she will take all direction from him. I provided her with information regarding [facility] and she will defer to Helder. She does tell me that she recently talked with [facility] and was given a list of facilities to choose from from rehab. She is requesting [facility] and I have informed MONSERRAT Cunningham and she will update patient.  I called patient son/Helder AVERY at 1445 and no answer. I was able leave a generic voicemail. I will update chart once he has returned call. I will plan to be available PRN. I spoke with Candace Anderson, RN, Alexandra, pharmacist and Marisa. MONSERRAT.         1454: Helder " returned call to me. His understanding is that treatment is on hold at this time due to her current performance status and neutropenia. I did provide him with information regarding [facility] and [facility]. At this time, he will wait on [facility] support.  The plan is rehab for now and they will have to determine how she does and determine if she will be able to return to home setting independently. They will plan to follow up with oncology outpatient to determine if further treatment is an option.       Thank you for allowing us to participate in patient's plan of care. Palliative Care Team will sign off and see PRN.    Time spent:30 minutes spent reviewing medical and medication records, assessing and examining patient, discussing with family, answering questions, providing some guidance about a plan and documentation of care, and coordinating care with other healthcare members, with > 50% time spent face to face.        Heather Martell, MAGGI  6/5/2024  14:06 EDT

## 2024-06-05 NOTE — THERAPY TREATMENT NOTE
Acute Care - Physical Therapy Treatment Note  [facility]     Patient Name: Michelle Car  : 1940  MRN: [id number]  Today's Date: 2024      Visit Dx:   No diagnosis found.  Patient Active Problem List   Diagnosis    Hypertension    Hyperlipidemia    Abdominal aortic atherosclerosis    Generalized edema    Fatigue due to excessive exertion    Seasonal allergic rhinitis due to pollen    Dyspnea    Acute on chronic diastolic heart failure    Hypoxia    Paroxysmal atrial fibrillation with rapid ventricular response    Adrenal nodule    Edema of lower extremity    Hyponatremia    CRI (chronic renal insufficiency), stage 2 (mild)    Diffuse large B-cell lymphoma of lymph nodes of multiple regions    Vascular catheter fitting or adjustment    Chronic diastolic CHF (congestive heart failure)    Sepsis due to Escherichia coli (E. coli)    Bacteremia due to Escherichia coli    Calculus of bile duct without cholecystitis and without obstruction    Diverticulosis    Iron deficiency anemia    Leukopenia    Anemia    Adrenal insufficiency    Weight loss    Severe malnutrition     Past Medical History:   Diagnosis Date    Abdominal aortic atherosclerosis     Adrenal nodule 2022    Allergic rhinitis     Anemia due to chemotherapy     Bacteremia due to Escherichia coli 2022    ADMITTED TO [facility]    Bug bite 10/24/2015    WITH CELLULITIS, LEFT LEG    CAD (coronary artery disease)     Calculus of bile duct without cholangitis or cholecystitis 2022    Chemotherapy induced neutropenia     Chronic anticoagulation     Chronic diastolic (congestive) heart failure     Colon polyps     CRI (chronic renal insufficiency), stage 2 (mild)     Diffuse large B cell lymphoma 2022    MULTIPLE LYMPH NODE INVOLVEMENT, FOLLOWED BY DR. JEN PATTERSON    Drug induced constipation     Hearing loss     Hemorrhoids     History of blood transfusion 2022    History of chemotherapy     FOLLOWED BY DR. JEN PATTERSON     Hypercalcemia 2016    resolved as of 2019    Hypercholesterolemia     Hyperkalemia 03/2022    Hypertension     Hyponatremia 02/17/2022    Leg swelling 10/2021    Mixed hyperlipidemia     Neutropenic fever 05/2022    Nonrheumatic mitral valve regurgitation 03/2022    PAF (paroxysmal atrial fibrillation)     FOLLOWED BY DR. YOSI LANGLEY    Pancytopenia     Pleural effusion, bilateral 06/2022    Pulmonary nodule     Seasonal allergies     Thrombocytopenia 08/2022    Venous insufficiency     Vitamin D deficiency      Past Surgical History:   Procedure Laterality Date    CHOLECYSTECTOMY N/A 10/12/2011    LAPAROSCOPIC, DR. CHANCE KLINE AT [facility]    COLONOSCOPY N/A 2000    1 or 2 polyps, otherwise normal per patient    COLONOSCOPY W/ POLYPECTOMY N/A 01/24/2012    3 MM TUBULAR ADENOMA POLYP IN CECUM, DIVERTICULOSIS IN RECTO-SIGMOID, RESCOPE IN 3 YRS, DR. CHANCE KLINE AT [facility]    CYST REMOVAL Left 10/12/2011    LEFT SCALP, PATH: BENIGN ADNEXAL NEOPLASM FAVORING ECCRINE SPIRADENOMA, DR. CHANCE KLINE AT [facility]    ERCP N/A 10/21/2022    Procedure: ENDOSCOPIC RETROGRADE CHOLANGIOPANCREATOGRAPHY with sphincterotomy and balloon sweep;  Surgeon: Peyman Ortega MD;  Location: [facility] ENDOSCOPY;  Service: Gastroenterology;  Laterality: N/A;  PRE: Common Duct Stones  POST: Common Duct Stones    VENOUS ACCESS DEVICE (PORT) INSERTION Left 03/23/2022    Procedure: INSERTION VENOUS ACCESS DEVICE;  Surgeon: Alin Delgado MD;  Location: [facility] OR [facility];  Service: General;  Laterality: Left;     PT Assessment (Last 12 Hours)       PT Evaluation and Treatment       Row Name 06/05/24 1500          Physical Therapy Time and Intention    Subjective Information no complaints  -     Document Type therapy note (daily note)  -     Mode of Treatment individual therapy;physical therapy  -     Patient Effort good  -       Row Name 06/05/24 1500          General Information    Patient Profile Reviewed yes  -     Existing Precautions/Restrictions  fall  -     Barriers to Rehab medically complex  -[facility] Name 06/05/24 1500          Living Environment    Current Living Arrangements home  -[facility] Name 06/05/24 1500          Pain    Pretreatment Pain Rating 0/10 - no pain  -     Posttreatment Pain Rating 0/10 - no pain  -[facility] Name 06/05/24 1500          Cognition    Affect/Mental Status (Cognition) flat/blunted affect  -     Orientation Status (Cognition) oriented x 3  -LH       Row Name 06/05/24 1500          Bed Mobility    Supine-Sit [place] (Bed Mobility) contact guard  -     Sit-Supine [place] (Bed Mobility) contact guard  -     Assistive Device (Bed Mobility) bed rails  -[facility] Name 06/05/24 1500          Transfers    Transfers stand-sit transfer;sit-stand transfer  -LH       Row Name 06/05/24 1500          Sit-Stand Transfer    Sit-Stand [place] (Transfers) minimum assist (75% patient effort);contact guard;set up;verbal cues  -     Assistive Device (Sit-Stand Transfers) walker, front-wheeled  -LH       Row Name 06/05/24 1500          Stand-Sit Transfer    Stand-Sit [place] (Transfers) minimum assist (75% patient effort);contact guard;set up;verbal cues  -     Assistive Device (Stand-Sit Transfers) walker, front-wheeled  -LH       Row Name 06/05/24 1500          Gait/Stairs (Locomotion)    [place] Level (Gait) contact guard;minimum assist (75% patient effort);set up;verbal cues  -     Assistive Device (Gait) walker, front-wheeled  [facility]     Distance in Feet (Gait) 5  ft lateral side stepping x 2  -     Pattern (Gait) step-to  -     Deviations/Abnormal Patterns (Gait) bilateral deviations;edgard decreased;weight shifting decreased  -     Bilateral Gait Deviations forward flexed posture;heel strike decreased  -LH       Row Name 06/05/24 1500          Balance    Static Sitting Balance supervision  -     Position, Sitting Balance unsupported;sitting edge of bed  -     Static Standing  Balance minimal assist;contact guard  -     Position/Device Used, Standing Balance supported;walker, front-wheeled  -       Row Name 06/05/24 1500          Motor Skills    Therapeutic Exercise --  APs, LAQs, MIP x 20  -       Row Name             Wound 05/24/24 1730 Other (See comments) medial perineum Skin Tear    Wound - Properties Group Placement Date: 05/24/24  - Placement Time: 1730  -EH Present on Original Admission: Y  -EH Side: Other (See comments)  -EH, middle of gluteal fold  Orientation: medial  -EH Location: perineum  -EH Primary Wound Type: Skin tear  -EH    Retired Wound - Properties Group Placement Date: 05/24/24  - Placement Time: 1730  -EH Present on Original Admission: Y  -EH Side: Other (See comments)  -EH, middle of gluteal fold  Orientation: medial  -EH Location: perineum  -EH Primary Wound Type: Skin tear  -EH    Retired Wound - Properties Group Date first assessed: 05/24/24  - Time first assessed: 1730  -EH Present on Original Admission: Y  -EH Side: Other (See comments)  -EH, middle of gluteal fold  Location: perineum  -EH Primary Wound Type: Skin tear  -EH      Row Name 06/05/24 1500          Plan of Care Review    Plan of Care Reviewed With patient  -     Outcome Evaluation Pt agreeable skilled PT, reports she is awaiting marrow results. Pt performed bed mobility CGA, sit<->standing CGA/min A rwx, ambulated short distance bedside 5ft x 2 CGA/min A rwx. will continue to progress and prepare for DC as evie pending GOC.  -       Row Name 06/05/24 1500          Positioning and Restraints    Pre-Treatment Position in bed  -     Post Treatment Position bed  -     In Bed supine;exit alarm on;encouraged to call for assist;call light within reach  -               User Key  (r) = Recorded By, (t) = Taken By, (c) = Cosigned By      Initials Name Provider Type     Arlene Rivera, PT Physical Therapist    Claudia Barr, RN Registered Nurse                    Physical Therapy  Education       Title: PT OT SLP Therapies (In Progress)       Topic: Physical Therapy (In Progress)       Point: Mobility training (In Progress)       Learning Progress Summary             Patient Acceptance, E, NR by  at 6/5/2024 1527    Acceptance, E, VU by AB at 5/30/2024 1653    Acceptance, E, VU by AB at 5/29/2024 1544    Acceptance, E,TB, VU by ZL at 5/29/2024 0301    Acceptance, E, VU by AB at 5/28/2024 1710    Acceptance, E,D, VU,NR by MG at 5/28/2024 1138    Acceptance, E, VU by KA at 5/26/2024 1253                         Point: Home exercise program (In Progress)       Learning Progress Summary             Patient Acceptance, E, NR by  at 6/5/2024 1527    Acceptance, E, VU by AB at 5/30/2024 1653    Acceptance, E, VU by AB at 5/29/2024 1544    Acceptance, E,TB, VU by ZL at 5/29/2024 0301    Acceptance, E, VU by AB at 5/28/2024 1710    Acceptance, E,D, VU,NR by MG at 5/28/2024 1138                         Point: Body mechanics (In Progress)       Learning Progress Summary             Patient Acceptance, E, NR by  at 6/5/2024 1527    Acceptance, E, VU by AB at 5/30/2024 1653    Acceptance, E, VU by AB at 5/29/2024 1544    Acceptance, E,TB, VU by ZL at 5/29/2024 0301    Acceptance, E, VU by AB at 5/28/2024 1710    Acceptance, E,D, VU,NR by MG at 5/28/2024 1138                         Point: Precautions (In Progress)       Learning Progress Summary             Patient Acceptance, E, NR by  at 6/5/2024 1527    Acceptance, E, VU by AB at 5/30/2024 1653    Acceptance, E, VU by AB at 5/29/2024 1544    Acceptance, E,TB, VU by ZL at 5/29/2024 0301    Acceptance, E, VU by AB at 5/28/2024 1710    Acceptance, E,D, VU,NR by MG at 5/28/2024 1138                                         User Key       Initials Effective Dates Name Provider Type Discipline     06/16/21 -  Arlene Rivera, PT Physical Therapist PT    MG 05/24/22 -  Connie Phan, PT Physical Therapist PT    ZL 06/16/21 -  Ronnell Michaels, RN Registered  Nurse Nurse    BRIANDA 08/24/21 -  Anastacia Portillo, PT Physical Therapist PT    AB 12/15/23 -  Gavi Gerardo, RN Registered Nurse Nurse                  PT Recommendation and Plan  Anticipated Discharge Disposition (PT): skilled nursing facility  Plan of Care Reviewed With: patient  Outcome Evaluation: Pt agreeable skilled PT, reports she is awaiting marrow results. Pt performed bed mobility CGA, sit<->standing CGA/min A rwx, ambulated short distance bedside 5ft x 2 CGA/min A rwx. will continue to progress and prepare for DC as evie pending GOC.   Outcome Measures       Row Name 06/05/24 1500             How much help from another person do you currently need...    Turning from your back to your side while in flat bed without using bedrails? 3  -LH      Moving from lying on back to sitting on the side of a flat bed without bedrails? 3  -LH      Moving to and from a bed to a chair (including a wheelchair)? 3  -LH      Standing up from a chair using your arms (e.g., wheelchair, bedside chair)? 3  -LH      Climbing 3-5 steps with a railing? 2  -LH      To walk in hospital room? 3  -LH      AM-PAC 6 Clicks Score (PT) 17  -      Highest Level of Mobility Goal 5 --> Static standing  -         Functional Assessment    Outcome Measure Options AM-PAC 6 Clicks Basic Mobility (PT)  -                User Key  (r) = Recorded By, (t) = Taken By, (c) = Cosigned By      Initials Name Provider Type     Arlene Rivera, PT Physical Therapist                     Time Calculation:    PT Charges       Row Name 06/05/24 1528             Time Calculation    Start Time 1436  -      Stop Time 1459  -      Time Calculation (min) 23 min  -      PT Received On 06/05/24  -      PT - Next Appointment 06/06/24  -                User Key  (r) = Recorded By, (t) = Taken By, (c) = Cosigned By      Initials Name Provider Type     Arlene Rivera, PT Physical Therapist                  Therapy Charges for Today       Code Description  Service Date Service Provider Modifiers Qty    [id number]  PT THER PROC EA 15 MIN 6/5/2024 Arlene Rivera, PT GP 1    80270085406 HC PT THERAPEUTIC ACT EA 15 MIN 6/5/2024 Arlene Rivera, PT GP 1            PT G-Codes  Outcome Measure Options: AM-PAC 6 Clicks Basic Mobility (PT)  AM-PAC 6 Clicks Score (PT): 17  AM-PAC 6 Clicks Score (OT): 15    Arlene Rivera, PT  6/5/2024

## 2024-06-05 NOTE — PLAN OF CARE
Goal Outcome Evaluation:              Outcome Evaluation: VSS. Patient alert and disoriented to place and situation this shift. Disorientation improved as the day went on. Mepilex on coccyx replaced with incontinence care. Patient was encouraged to sit in the chair and use the bedside commode this shift to prepare for possible rehab placement. Patient had a poor appetite this shift but was able to eat small portions of all of her meals. Spoke with the family regarding meal supplementation, such as boost shakes. Patient takes pills whole with water. Plan of care ongoing.

## 2024-06-05 NOTE — SIGNIFICANT NOTE
06/05/24 1552   OTHER   Discipline occupational therapist   Rehab Time/Intention   Session Not Performed patient/family declined treatment  (Pt just finished with PT, tired, request OT to check back later.)   Recommendation   OT - Next Appointment 06/06/24

## 2024-06-06 LAB
ANION GAP SERPL CALCULATED.3IONS-SCNC: 7 MMOL/L (ref 5–15)
BASOPHILS # BLD MANUAL: 0.01 10*3/MM3 (ref 0–0.2)
BASOPHILS NFR BLD MANUAL: 1 % (ref 0–1.5)
BUN SERPL-MCNC: 17 MG/DL (ref 8–23)
BUN/CREAT SERPL: 35.4 (ref 7–25)
CALCIUM SPEC-SCNC: 8.3 MG/DL (ref 8.6–10.5)
CHLORIDE SERPL-SCNC: 104 MMOL/L (ref 98–107)
CO2 SERPL-SCNC: 23 MMOL/L (ref 22–29)
CREAT SERPL-MCNC: 0.48 MG/DL (ref 0.57–1)
DEPRECATED RDW RBC AUTO: 48 FL (ref 37–54)
EGFRCR SERPLBLD CKD-EPI 2021: 94.7 ML/MIN/1.73
EOSINOPHIL # BLD MANUAL: 0 10*3/MM3 (ref 0–0.4)
EOSINOPHIL NFR BLD MANUAL: 0 % (ref 0.3–6.2)
ERYTHROCYTE [DISTWIDTH] IN BLOOD BY AUTOMATED COUNT: 13.6 % (ref 12.3–15.4)
GIANT PLATELETS: ABNORMAL
GLUCOSE SERPL-MCNC: 87 MG/DL (ref 65–99)
HCT VFR BLD AUTO: 27.1 % (ref 34–46.6)
HGB BLD-MCNC: 9 G/DL (ref 12–15.9)
LYMPHOCYTES # BLD MANUAL: 0.38 10*3/MM3 (ref 0.7–3.1)
LYMPHOCYTES NFR BLD MANUAL: 14.6 % (ref 5–12)
MCH RBC QN AUTO: 32.4 PG (ref 26.6–33)
MCHC RBC AUTO-ENTMCNC: 33.2 G/DL (ref 31.5–35.7)
MCV RBC AUTO: 97.5 FL (ref 79–97)
MONOCYTES # BLD: 0.15 10*3/MM3 (ref 0.1–0.9)
NEUTROPHILS # BLD AUTO: 0.5 10*3/MM3 (ref 1.7–7)
NEUTROPHILS NFR BLD MANUAL: 47.9 % (ref 42.7–76)
PLATELET # BLD AUTO: 141 10*3/MM3 (ref 140–450)
PMV BLD AUTO: 9.9 FL (ref 6–12)
POTASSIUM SERPL-SCNC: 3.2 MMOL/L (ref 3.5–5.2)
POTASSIUM SERPL-SCNC: 3.8 MMOL/L (ref 3.5–5.2)
RBC # BLD AUTO: 2.78 10*6/MM3 (ref 3.77–5.28)
RBC MORPH BLD: NORMAL
SMUDGE CELLS BLD QL SMEAR: ABNORMAL
SODIUM SERPL-SCNC: 134 MMOL/L (ref 136–145)
VARIANT LYMPHS NFR BLD MANUAL: 36.5 % (ref 19.6–45.3)
WBC NRBC COR # BLD AUTO: 1.05 10*3/MM3 (ref 3.4–10.8)

## 2024-06-06 PROCEDURE — 85025 COMPLETE CBC W/AUTO DIFF WBC: CPT | Performed by: HOSPITALIST

## 2024-06-06 PROCEDURE — 85007 BL SMEAR W/DIFF WBC COUNT: CPT | Performed by: HOSPITALIST

## 2024-06-06 PROCEDURE — 97535 SELF CARE MNGMENT TRAINING: CPT

## 2024-06-06 PROCEDURE — 97530 THERAPEUTIC ACTIVITIES: CPT

## 2024-06-06 PROCEDURE — 84132 ASSAY OF SERUM POTASSIUM: CPT | Performed by: HOSPITALIST

## 2024-06-06 PROCEDURE — 80048 BASIC METABOLIC PNL TOTAL CA: CPT | Performed by: HOSPITALIST

## 2024-06-06 PROCEDURE — 97110 THERAPEUTIC EXERCISES: CPT

## 2024-06-06 PROCEDURE — 99232 SBSQ HOSP IP/OBS MODERATE 35: CPT | Performed by: INTERNAL MEDICINE

## 2024-06-06 RX ORDER — POTASSIUM CHLORIDE 750 MG/1
40 TABLET, FILM COATED, EXTENDED RELEASE ORAL EVERY 4 HOURS
Status: COMPLETED | OUTPATIENT
Start: 2024-06-06 | End: 2024-06-06

## 2024-06-06 RX ADMIN — MIDODRINE HYDROCHLORIDE 10 MG: 5 TABLET ORAL at 09:04

## 2024-06-06 RX ADMIN — MIDODRINE HYDROCHLORIDE 10 MG: 5 TABLET ORAL at 17:52

## 2024-06-06 RX ADMIN — SODIUM CHLORIDE TAB 1 GM 1 G: 1 TAB at 17:52

## 2024-06-06 RX ADMIN — APIXABAN 2.5 MG: 2.5 TABLET, FILM COATED ORAL at 09:04

## 2024-06-06 RX ADMIN — Medication 10 ML: at 09:04

## 2024-06-06 RX ADMIN — POTASSIUM CHLORIDE 40 MEQ: 750 TABLET, EXTENDED RELEASE ORAL at 11:51

## 2024-06-06 RX ADMIN — SODIUM CHLORIDE TAB 1 GM 1 G: 1 TAB at 09:04

## 2024-06-06 RX ADMIN — CETIRIZINE HYDROCHLORIDE 10 MG: 10 TABLET ORAL at 09:04

## 2024-06-06 RX ADMIN — MIDODRINE HYDROCHLORIDE 10 MG: 5 TABLET ORAL at 11:51

## 2024-06-06 RX ADMIN — Medication 10 ML: at 22:04

## 2024-06-06 RX ADMIN — Medication 1 TABLET: at 09:05

## 2024-06-06 RX ADMIN — AMIODARONE HYDROCHLORIDE 200 MG: 200 TABLET ORAL at 09:04

## 2024-06-06 RX ADMIN — POTASSIUM CHLORIDE 40 MEQ: 750 TABLET, EXTENDED RELEASE ORAL at 09:04

## 2024-06-06 NOTE — PLAN OF CARE
Goal Outcome Evaluation:  Plan of Care Reviewed With: patient        Progress: improving  Outcome Evaluation: Pt tolerated treatment fair this date. Pt had just gotten back to chair from [facility] w/ nsg. Required min A to stand from chair again to finish donning brief, then pt agreed to exercises. Completed several seated LE exercises, encouraging pt to attempt on own during the day.      Anticipated Discharge Disposition (PT): skilled nursing facility

## 2024-06-06 NOTE — DISCHARGE PLACEMENT REQUEST
"Brian Hayward (83 y.o. Female)       Date of Birth   1940    Social Security Number       Address   [address] [place], [place] [address]    Home Phone   [phone]    MRN   [id number]       Oriental orthodox   Patient Refused    Marital Status                               Admission Date   5/24/24    Admission Type   Urgent    Admitting Provider   Abel Pelaez MD    Attending Provider   Morales Hardwick MD    Department, Room/Bed   [address], P382/1       Discharge Date       Discharge Disposition       Discharge Destination                                 Attending Provider: Morales Hardwick MD    Allergies: Factive [Gemifloxacin]    Isolation: None   Infection: None   Code Status: No CPR    Ht: 157.5 cm (62\")   Wt: 50.2 kg (110 lb 10.7 oz)    Admission Cmt: None   Principal Problem: Leukopenia [D72.819]                   Active Insurance as of 5/24/2024       Primary Coverage       Payor Plan Insurance Group Employer/Plan Group    ANTHEM MEDICARE REPLACEMENT ANTH MEDICARE ADVANTAGE [id number]       Payor Plan Address Payor Plan Phone Number Payor Plan Fax Number Effective Dates    [address] [phone]  1/1/2016 - None Entered    [facility] [address]         Subscriber Name Subscriber Birth Date Member ID       BRIAN HAYWARD 1940 [id number]                     Emergency Contacts        (Rel.) Home Phone Work Phone Mobile Phone    Helder Hayward (HCS #1) (Son) [phone] --    ERLINDA HAYWARD (HCS #2) (Daughter) [phone] -- --    anderson hayward (HCS #3) (Son) [phone] -- [phone]    Keely Lozano (Sister) -- -- [phone]    ZBIGNIEW PÉREZ (Brother) [phone] -- --                "

## 2024-06-06 NOTE — PROGRESS NOTES
REASON FOR FOLLOWUP/CHIEF COMPLAINT:  DLBCL, anemia, neutropenia    HISTORY OF PRESENT ILLNESS:   No new problems.  No bleeding.  She states she is glad to be sitting in the chair.    Past Medical History, Past Surgical History, Social History, Family History have been reviewed and are without significant changes except as mentioned.    Review of Systems   Review of Systems   Constitutional:  Negative for activity change.   HENT:  Negative for nosebleeds and trouble swallowing.    Respiratory:  Negative for shortness of breath and wheezing.    Cardiovascular:  Negative for chest pain and palpitations.   Gastrointestinal:  Negative for constipation, diarrhea and nausea.   Genitourinary:  Negative for dysuria and hematuria.   Musculoskeletal:  Negative for arthralgias and myalgias.   Skin:  Negative for rash and wound.   Neurological:  Negative for seizures and syncope.   Hematological:  Negative for adenopathy. Does not bruise/bleed easily.   Psychiatric/Behavioral:  Negative for confusion.        Medications:  The current medication list was reviewed in the EMR    ALLERGIES:    Allergies   Allergen Reactions    Factive [Gemifloxacin] Rash              Vitals:    06/05/24 1955 06/06/24 0311 06/06/24 0720 06/06/24 1100   BP: 96/74 104/56 110/71 97/54   BP Location: Right arm Right arm Right arm Right arm   Patient Position: Sitting Lying Lying Sitting   Pulse: 88 56 66 87   Resp: 16 17 16 16   Temp: 98.4 °F (36.9 °C) 98.1 °F (36.7 °C) 97.5 °F (36.4 °C) 98.1 °F (36.7 °C)   TempSrc: Oral Oral Oral Oral   SpO2: 98% 98% 98% 92%   Weight:       Height:         Physical Exam    CONSTITUTIONAL:  Vital signs reviewed.  No distress, looks comfortable.  EYES:  Conjunctivae and lids unremarkable.  PERRLA  EARS, NOSE, MOUTH, THROAT:  Ears and nose appear unremarkable.  Lips, teeth, gums appear unremarkable.  RESPIRATORY:  Normal respiratory effort.  Lungs clear to auscultation bilaterally.  CARDIOVASCULAR:  Normal S1, S2.   No murmurs, rubs or gallops.  No significant lower extremity edema.  GASTROINTESTINAL: Abdomen appears unremarkable.  Nontender.  No hepatomegaly.  No splenomegaly.  NEURO: Cranial nerves 2-12 grossly intact.  No focal deficits.  Appears to have equal strength all 4 extremities.  MUSCULOSKELETAL:  Unremarkable digits/nails.  No cyanosis or clubbing.  SKIN:  Warm.  No rashes.  PSYCHIATRIC:  Normal judgment and insight.  Normal mood and affect.        RECENT LABS:  WBC   Date Value Ref Range Status   06/06/2024 1.05 (C) 3.40 - 10.80 10*3/mm3 Final   06/05/2024 0.80 (C) 3.40 - 10.80 10*3/mm3 Final   06/04/2024 0.83 (C) 3.40 - 10.80 10*3/mm3 Final     Hemoglobin   Date Value Ref Range Status   06/06/2024 9.0 (L) 12.0 - 15.9 g/dL Final   06/05/2024 8.9 (L) 12.0 - 15.9 g/dL Final   06/04/2024 9.5 (L) 12.0 - 15.9 g/dL Final     Platelets   Date Value Ref Range Status   06/06/2024 141 140 - 450 10*3/mm3 Final   06/05/2024 132 (L) 140 - 450 10*3/mm3 Final   06/04/2024 139 (L) 140 - 450 10*3/mm3 Final       ASSESSMENT/PLAN:  Michelle Car [id number]     *Relapsed diffuse large B-cell lymphoma.  Patient was found to have bilateral pleural effusions.    She underwent right thoracentesis on 2/21/2022 and the left thoracentesis on 2/22/2022.    Analysis of the fluid revealed involvement with diffuse large B-cell lymphoma.    FISH analysis for BCL6 rearrangement was positive but was negative for BCL-2 and MYC rearrangement.    PET scan on 3/10/2022 revealed significant hypermetabolism in the left adrenal gland and the surrounding soft tissue with SUV up to 34.5. Hypermetabolism in the left pleural thickening with SUV of 7.1. there was less hypermetabolism in the right adrenal gland and in the right pleura.  She was considered to have stage III non-bulky disease.  R-IPI score of 3 associated with poor risk - associated with overall survival of 55%.   R-CHOP with Neulasta support was started on 3/28/2022.  Due to development of  neutropenic fever following cycles 1 and 2, treatment was changed to mini-RCHOP starting cycle #3 on 5/16/2022.  Patient received cycle #6 on 7/25/2022.  PET scan on 8/15/2022 revealed complete metabolic response.  Patient was found to have recurrence with development of a left mandibular mass in October/November 2023.  Biopsy of left mandible mass on 11/28/2023 showed recurrence of the lymphoma, diffuse large B cell, non-GCB phenotype.  Ki-67 was 90%.    It was positive for Bcl-6 but negative for Bcl-2 and MYC rearrangement.  PET scan on 12/11/2023 revealed multiple areas of involvement -prevascular space, left epicardial fat pad, pleural density posterior to the left fourth rib anterior aspect, lesion along the pericardium versus intramuscular with SUV of 9.8.  Hypermetabolic activity at both adrenal glands, uniformly increased in size and appeared hyperplastic.  There was a 2.7 x 1.1 cm soft tissue nodule lateral to the right erector spinae muscle posterior to the right posterior 11th rib with an SUV of 20.5 and there was a 1.3 cm nodule lateral to the posterior margin of the left psoas muscle with an SUV of 24.9.  Rituxan Polivy and Treanda with Treanda every 3 weeks for 6 cycles was started on 12/12/2023.  The jaw mass started to decrease in size after starting treatment.  CT scan on 2/8/2024 showed evidence of response to treatment.  Cycle #6 was given on 4/1/2024.  PET scan on 4/22/2024 revealed decrease in the hypermetabolism in the mediastinal and pericardial lymph nodes/lesions and in the lesions above the left kidney and adrenal gland.  The left mandibular lesion was no longer hypermetabolic.   She is at increased risk for relapse of the lymphoma.  The plan was to start treatment with Revlimid 10 mg daily.  However, the patient did not start the treatment yet.  LDH was previously elevated but improved to 190 on 5/25/2024.  Bone marrow 5/30/2024:   10 to 20% cellularity.  No evidence of lymphoma.   Trilineage hematopoiesis with decreased, left shifted granulopoiesis.  Pathologist noted the significance of the gamma delta T-cell population identified by flow cytometry is uncertain and can be seen in reactive processes.  The pathologist noted of a neoplastic process is considered clinically T-cell gene rearrangement studies could be performed.  I sent a message to the patient's outpatient oncologist, Dr. Galo, to ask him if he wants us to do a peripheral blood flow cytometry to check for blood contamination or a T-cell gene rearrangement on the marrow specimen, both of which were offered by the pathologist for further information if we thought it would be clinically helpful  Added T-cell gene rearrangement per Dr. Galo request    *Neutropenia  5/20/2024: WBC decreased to 1750.  Neutrophils decreased to 940.  5/24/2024: WBC decreased to 1290.  Neutrophils decreased to 680.  5/25/2024: WBC decreased to 860.  Neutrophils decreased to 530.  Worsening neutropenia is also concerning for relapse of the lymphoma with involvement of the bone marrow.   5/26/2024: Neutrophil count 610.  5/27/2024-total WBC count has increased to 1.46.  Absolute neutrophil count pending   5/28/2024-WBC count lower at 1.05 with an absolute neutrophil count of 0.57  Increase in metamyelocytes as well as basophil percentage.  Flow cytometry 5/28/2024-negative  ANC lower today at 0.41  Copper level normal at 145  MMA normal at 245  5/30/24 - bone marrow biopsy, results pending   6/1/2024-WBC count 0.82, absolute neutrophil count 0.23.  WBC was okay through 5/10/2024.  Await bone marrow biopsy final results.  6/4/2024: WBC 0.83.  Discussed with oncology pharmacist, Alexandra Lopez.  She notes mirtazapine was started on 5/27/2024 for appetite.  This can cause neutropenia.  Therefore, stop this on 6/4/ 24.  WBC 1.1, from 0.8     *Fever  Afebrile, Tmax of 99.5  Blood cultures negative  Patient at high risk for infections given  neutropenia  Continue cefepime through 6/4/ 24 per ID recommendations.  After discontinuation of cefepime if patient remains neutropenic then they are recommending cefdinir  No recent fevers     *Macrocytic anemia.  Patient also developed anemia secondary to lymphoma and secondary to chemotherapy.    Hgb decreased to 7.9 on 4/25/2022 and she was transfused.   Hemoglobin decreased to 6.3 on 4/27/2022. She was transfused.  Hemoglobin level improved subsequently.  Patient was placed ferrous sulfate 325 mg 3 days a week.  5/3/2024: Hemoglobin increased to 10.2.  5/25/2024: Hemoglobin decreased to 7.8.  No evidence of vitamin B12 or folate deficiency.    Ferritin 1953.  Transferrin saturation 11%.  Patient decided to hold off on PRBC transfusion.  5/26/2024: Hemoglobin 8.2.  Due to her reporting chills when she received PRBC Tx in the past, we will give Tylenol, Benadryl and Solumedrol prior to the transfusion.   526 2024-1 unit of PRBC administered.  Patient tolerated the transfusion well.  Hemoglobin 9.5 on 6/2/2024  Copper and MMA levels normal  Hb 9     *Hyponatremia.  Patient had problem with hyponatremia in the past.  Sodium is 127 on 3/14/2023.  She was advised to increase salt intake.  Sodium improved to 133 on repeat lab on 3/21/2023.  However, sodium decreased to 126 on 6/7/2023.  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  She was started on sodium chloride 1 g daily.  4/1/2024: Sodium decreased to 132.  Leg swelling improved with Lasix.  5/3/2024: Sodium 130.  5/24/2024: Sodium decreased to 126.    Patient was having significant weakness.  5/25/2024: Sodium 128.  Nephrology service was consulted.  5/28/2024-sodium stable at 132  5/30/2024-sodium 133  5/31/24 - sodium 134  6/1/2024-sodium 133  6/2/2024-sodium stable at 135  Continues on fluid restriction  Sodium 134, from 133     *Hyperkalemia previously, now hypokalemia.  5/24/2024: Potassium increased to 5.6.  Potassium 3.2, from  3.9     *Chronic anticoagulation.  Patient is on Eliquis 5 mg twice daily.  No excessive bleeding  Platelet count remains normal  Eliquis resumed      *Prophylaxis.  Patient had shingles in April 2023.  She is at risk of developing shingles while on treatment.  She was placed on acyclovir 400 mg twice daily.  She was on Bactrim DS 3 days a week.  Bactrim was discontinued due to the electrolyte abnormalities.     *Low-density lesion in the uterus.    The radiologist recommended pelvic ultrasound.    The patient is asymptomatic.    PET scan on 4/22/2024 reported fluid in the lower abdomen.  Patient reported intermittent vaginal discharge.     *Hypertension  Patient started on midodrine  Blood pressure has improved with midodrine.  On 10 mg 3 times a day     PLAN:  6/4/2024: Stopped mirtazapine because it can cause neutropenia (it was started on 5/27/2024).  She follows with Dr. Galo in our office.  Bone marrow was negative for lymphoma     Chart reviewed and summarized including hospital close note and palliative care note

## 2024-06-06 NOTE — PROGRESS NOTES
Nephrology Follow Up Note    Patient Identification:  Name: Michelle Car MRN: [id number]  Age: 83 y.o. : 1940  Sex: female  Date:2024    Requesting Physician: As per consult order.  Reason for Consultation: hyponatremia   Information from:patient/ family/ chart      History of Present Illness: This is a 83 y.o. year old female  with lymphoma admitted for weakness, ,hyponatremia and edema.   She completed chemo in 2024.  She has had ongoing issues with hyponatremia.  She was on salt tabs at home Upon arrival her Na was 126 with K at 5.6.   She was given IV fluids of NS as bp was low upon arrival.       feels better, Na now at 132, bp near goal      : Complaining of weakness.  Wants to look into nursing/rehab placement  Otherwise no new complaints    6/3: Interval events reviewed, no new complaints  Awaiting final pathology    : Resting today denies complaints, no new labs    : Resting comfortably in bed denies new problems or complaints BP remains soft  But asymptomatic.  Sodium 134 today    The following medical history and medications personally reviewed by me:        Current Meds:   Current Facility-Administered Medications   Medication Dose Route Frequency Provider Last Rate Last Admin    acetaminophen (TYLENOL) tablet 650 mg  650 mg Oral Q4H PRN Abel Pelaez MD   650 mg at 241    Or    acetaminophen (TYLENOL) 160 MG/5ML oral solution 650 mg  650 mg Oral Q4H PRN Abel Pelaez MD        Or    acetaminophen (TYLENOL) suppository 650 mg  650 mg Rectal Q4H PRN Abel Pelaez MD        albuterol (PROVENTIL) nebulizer solution 0.083% 2.5 mg/3mL  2.5 mg Nebulization Q4H PRN Abel Pelaez MD        amiodarone (PACERONE) tablet 200 mg  200 mg Oral Q24H Abel Pelaez MD   200 mg at 24 09    apixaban (ELIQUIS) tablet 2.5 mg  2.5 mg Oral Q12H Mallory Del Castillo MD   2.5 mg at 24 0904    sennosides-docusate (PERICOLACE) 8.6-50 MG per tablet 2 tablet  2 tablet  "Oral BID PRN Abel Pelaez MD        And    polyethylene glycol (MIRALAX) packet 17 g  17 g Oral Daily PRN Abel Pelaez MD        And    bisacodyl (DULCOLAX) EC tablet 5 mg  5 mg Oral Daily PRN Abel Pelaez MD        And    bisacodyl (DULCOLAX) suppository 10 mg  10 mg Rectal Daily PRN Abel Pelaez MD        cetirizine (zyrTEC) tablet 10 mg  10 mg Oral Daily Abel Pelaez MD   10 mg at 24 0904    ferrous sulfate tablet 325 mg  325 mg Oral Every Other Day Abel Pelaez MD   325 mg at 24 0851    Hydrocortisone (Perianal) (ANUSOL-HC) 2.5 % rectal cream   Rectal Daily Abel Pelaez MD   Given at 24 0815    midodrine (PROAMATINE) tablet 10 mg  10 mg Oral TID AC Morales Hardwick MD   10 mg at 24 1151    multivitamin with minerals 1 tablet  1 tablet Oral Daily Abel Pelaez MD   1 tablet at 24 0905    nitroglycerin (NITROSTAT) SL tablet 0.4 mg  0.4 mg Sublingual Q5 Min PRN Abel Pelaez MD        ondansetron (ZOFRAN) injection 4 mg  4 mg Intravenous Q6H PRN Abel Pelaez MD        Potassium Replacement - Follow Nurse / BPA Driven Protocol   Does not apply PRN Morales Farrell MD        sodium chloride 0.9 % flush 10 mL  10 mL Intravenous Q12H Abel Pelaez MD   10 mL at 24 0904    sodium chloride 0.9 % flush 10 mL  10 mL Intravenous PRN Abel Pelaez MD        sodium chloride 0.9 % infusion 40 mL  40 mL Intravenous PRN Abel Pelaez MD        sodium chloride tablet 1 g  1 g Oral BID With Meals Aarti Reynolds MD   1 g at 24 0904         Physical Exam:  Vitals:   Temp (24hrs), Av °F (36.7 °C), Min:97.5 °F (36.4 °C), Max:98.4 °F (36.9 °C)    BP 97/54 (BP Location: Right arm, Patient Position: Sitting)   Pulse 87   Temp 98.1 °F (36.7 °C) (Oral)   Resp 16   Ht 157.5 cm (62\")   Wt 50.2 kg (110 lb 10.7 oz)   SpO2 92%   BMI 20.24 kg/m²   Intake/Output:     Intake/Output Summary (Last 24 hours) at 2024 1604  Last data filed at 2024 0800  Gross per 24 hour "   Intake 638 ml   Output 625 ml   Net 13 ml        Wt Readings from Last 1 Encounters:   06/04/24 1704 50.2 kg (110 lb 10.7 oz)   05/27/24 1433 54 kg (119 lb)   05/24/24 1849 54.2 kg (119 lb 7.8 oz)   05/24/24 1701 51.7 kg (114 lb)       Exam:  Alert and oriented NAD   eomi no icterus   Moist mucus membranes   No jvd reg s1s2 no murmur   Clear bilaterally no rales/rhonchi/wheeze   Soft NTND + bs   5/5 strength bilaterally  No edema   Warm dry no rash   Normal mood and judgement         DATA:  Radiology and Labs:  The following labs and radiology results independently reviewed by me    Labs:   Recent Results (from the past 24 hour(s))   Basic Metabolic Panel    Collection Time: 06/06/24  5:10 AM    Specimen: Blood   Result Value Ref Range    Glucose 87 65 - 99 mg/dL    BUN 17 8 - 23 mg/dL    Creatinine 0.48 (L) 0.57 - 1.00 mg/dL    Sodium 134 (L) 136 - 145 mmol/L    Potassium 3.2 (L) 3.5 - 5.2 mmol/L    Chloride 104 98 - 107 mmol/L    CO2 23.0 22.0 - 29.0 mmol/L    Calcium 8.3 (L) 8.6 - 10.5 mg/dL    BUN/Creatinine Ratio 35.4 (H) 7.0 - 25.0    Anion Gap 7.0 5.0 - 15.0 mmol/L    eGFR 94.7 >60.0 mL/min/1.73   CBC Auto Differential    Collection Time: 06/06/24  5:10 AM    Specimen: Blood   Result Value Ref Range    WBC 1.05 (C) 3.40 - 10.80 10*3/mm3    RBC 2.78 (L) 3.77 - 5.28 10*6/mm3    Hemoglobin 9.0 (L) 12.0 - 15.9 g/dL    Hematocrit 27.1 (L) 34.0 - 46.6 %    MCV 97.5 (H) 79.0 - 97.0 fL    MCH 32.4 26.6 - 33.0 pg    MCHC 33.2 31.5 - 35.7 g/dL    RDW 13.6 12.3 - 15.4 %    RDW-SD 48.0 37.0 - 54.0 fl    MPV 9.9 6.0 - 12.0 fL    Platelets 141 140 - 450 10*3/mm3   Manual Differential    Collection Time: 06/06/24  5:10 AM    Specimen: Blood   Result Value Ref Range    Neutrophil % 47.9 42.7 - 76.0 %    Lymphocyte % 36.5 19.6 - 45.3 %    Monocyte % 14.6 (H) 5.0 - 12.0 %    Eosinophil % 0.0 (L) 0.3 - 6.2 %    Basophil % 1.0 0.0 - 1.5 %    Neutrophils Absolute 0.50 (L) 1.70 - 7.00 10*3/mm3    Lymphocytes Absolute 0.38 (L)  0.70 - 3.10 10*3/mm3    Monocytes Absolute 0.15 0.10 - 0.90 10*3/mm3    Eosinophils Absolute 0.00 0.00 - 0.40 10*3/mm3    Basophils Absolute 0.01 0.00 - 0.20 10*3/mm3    RBC Morphology Normal Normal    Smudge Cells Slight/1+ None Seen    Giant Platelets Large/3+ None Seen   Potassium    Collection Time: 06/06/24  3:29 PM    Specimen: Blood   Result Value Ref Range    Potassium 3.8 3.5 - 5.2 mmol/L       Radiology:  Imaging Results (Last 24 Hours)       ** No results found for the last 24 hours. **             ASSESSMENT:   Lymphoma   Hypotension   Hyponatremia   Hyperkalemia   Chronic diastolic heart failure   Relapsing diffuse large B-cell lymphoma  Adrenal insufficiency - per chart, but doesn't follow with endo   Hypoalbuminemia   Edema   Parox afib       PLAN:   Sodium remains very stable in the mid 130s.  134 today  Will continue salt tabs, Midodrine at current dosage  IV antibiotics, standard dosing    Maintain regular diet but her oral intake is very poor.  No need for any fluid restriction  Off IV fluids   Will follow      Jr Le M.D  [facility]  Office phone number: [phone]  Answering service phone number: [phone]

## 2024-06-06 NOTE — PLAN OF CARE
Goal Outcome Evaluation:  Plan of Care Reviewed With: patient   A&Ox4, VSS, RA, PRN tylenol given for generalized body aches, encouraging oral intake & to work w/ PT, awaiting bone biopsy results, no major issues this shift & pt resting comfortably at this time, WCTM      Progress: no change

## 2024-06-06 NOTE — PLAN OF CARE
"Goal Outcome Evaluation:  Plan of Care Reviewed With: patient           Outcome Evaluation: Pt tolerates a brief OT session, he remains limited by endurance. She needs some encouragement to attempt getting to BSC for toileting and up to chair to eat her breakfast. She has 2 episodes of posterior LOB where she reports \"I'm falling\". Walker and gait belt used. She requires min A for balance.      Anticipated Discharge Disposition (OT): skilled nursing facility                        "

## 2024-06-06 NOTE — PROGRESS NOTES
Name: Michelle Car ADMIT: 2024   : 1940  PCP: Olayinka Pimentel MD    MRN: [id number] LOS: 13 days   AGE/SEX: 83 y.o. female  ROOM: [facility]     Subjective   Subjective   No new complaints       Objective   Objective   Vital Signs  Temp:  [97.5 °F (36.4 °C)-98.4 °F (36.9 °C)] 98.1 °F (36.7 °C)  Heart Rate:  [56-88] 87  Resp:  [16-17] 16  BP: ()/(54-74) 97/54  SpO2:  [92 %-98 %] 92 %  on   ;   Device (Oxygen Therapy): room air  Body mass index is 20.24 kg/m².  Physical Exam  Vitals reviewed.   Cardiovascular:      Rate and Rhythm: Normal rate and regular rhythm.   Pulmonary:      Effort: No respiratory distress.      Breath sounds: Normal breath sounds.   Abdominal:      General: There is no distension.      Palpations: Abdomen is soft.      Tenderness: There is no abdominal tenderness.   Musculoskeletal:      Right lower leg: No edema.      Left lower leg: No edema.   Skin:     Coloration: Skin is pale.   Neurological:      Mental Status: She is alert and oriented to person, place, and time.       Results Review     I reviewed the patient's new clinical results.  Results from last 7 days   Lab Units 24  0510 24  0635 24  0459 24  0351   WBC 10*3/mm3 1.05* 0.80* 0.83* 0.86*   HEMOGLOBIN g/dL 9.0* 8.9* 9.5* 9.6*   PLATELETS 10*3/mm3 141 132* 139* 139*     Results from last 7 days   Lab Units 24  1529 24  0510 24  0351 24  0343 24  0438   SODIUM mmol/L  --  134* 133* 135* 133*   POTASSIUM mmol/L 3.8 3.2* 3.9 4.0 3.5   CHLORIDE mmol/L  --  104 100 102 103   CO2 mmol/L  --  23.0 20.0* 22.0 19.5*   BUN mg/dL  --  17 25* 22 18   CREATININE mg/dL  --  0.48* 0.60 0.67 0.57   GLUCOSE mg/dL  --  87 76 91 95   EGFR mL/min/1.73  --  94.7 89.7 87.4 90.9     Results from last 7 days   Lab Units 24  0315   ALBUMIN g/dL 2.5*   BILIRUBIN mg/dL 0.4   ALK PHOS U/L 63   AST (SGOT) U/L 18   ALT (SGPT) U/L 22     Results from last 7 days   Lab Units  "06/06/24  0510 06/03/24  0351 06/02/24  0343 06/01/24  0438 05/31/24  0315   CALCIUM mg/dL 8.3* 8.7 8.5* 8.4* 8.4*   ALBUMIN g/dL  --   --   --   --  2.5*       No results found for: \"HGBA1C\", \"POCGLU\"    No radiology results for the last day    I have personally reviewed all medications:  Scheduled Medications  amiodarone, 200 mg, Oral, Q24H  apixaban, 2.5 mg, Oral, Q12H  cetirizine, 10 mg, Oral, Daily  ferrous sulfate, 325 mg, Oral, Every Other Day  Hydrocortisone (Perianal), , Rectal, Daily  midodrine, 10 mg, Oral, TID AC  multivitamin with minerals, 1 tablet, Oral, Daily  sodium chloride, 10 mL, Intravenous, Q12H  sodium chloride, 1 g, Oral, BID With Meals    Infusions   Diet  Diet: Regular/House; Neutropenic/Low Microbial; Fluid Consistency: Thin (IDDSI 0)    I have personally reviewed:  [x]  Laboratory   []  Microbiology   []  Radiology   []  EKG/Telemetry  []  Cardiology/Vascular   []  Pathology    []  Records       Assessment/Plan     Active Hospital Problems    Diagnosis  POA   • **Leukopenia [D72.819]  Unknown   • Severe malnutrition [E43]  Yes   • Anemia [D64.9]  Unknown   • Weight loss [R63.4]  Unknown   • Chronic diastolic CHF (congestive heart failure) [I50.32]  Yes   • Paroxysmal atrial fibrillation with rapid ventricular response [I48.0]  Yes   • Hyponatremia [E87.1]  Yes   • Diffuse large B-cell lymphoma of lymph nodes of multiple regions [C83.38]  Yes   • CRI (chronic renal insufficiency), stage 2 (mild) [N18.2]  Yes      Resolved Hospital Problems    Diagnosis Date Resolved POA   • Hyperkalemia [E87.5] 06/01/2024 Unknown       83 y.o. female with history of diffuse large B cell lymphoma admitted with Leukopenia.    Patient remained stable, white count slightly improving today.  Discussed with Dr. Claire.  Bone marrow result reviewed, no evidence for lymphoma but there were some abnormal T cells.  Defer to oncology regarding any additional workup for this.    Overall patient defers to Dr. Galo " regarding goals of care.  She is pretty realistic about her situation.  She was prepared for palliative care if bone marrow had showed malignancy.  As it stands she is willing to give rehab a try but agrees that if she is not getting better she will move more in a palliative direction/home hospice etc.    S/p cefepime per ID recommendations.      Hypotension resolved with higher dose midodrine.    Hyponatremia stable.  Continue current diet orders    A-fib: Remains on amiodarone and apixaban.  Appears relatively stable      Dispo: Medically stable.  CCP working on skilled placement, potentially ready tomorrow if available      Morales Hardwick MD  [facility]  06/06/24  16:15 EDT

## 2024-06-06 NOTE — PLAN OF CARE
Goal Outcome Evaluation:              Outcome Evaluation: Pt sitting in chair for the majority of the shift. Encouraging adequate nutrition with boost shakes. Pure wick to remain off due to labial irritation and bleeding this evening. Plan for rehab placement at discharge.

## 2024-06-06 NOTE — THERAPY TREATMENT NOTE
Patient Name: Michelle Car  : 1940    MRN: [id number]                              Today's Date: 2024       Admit Date: 2024    Visit Dx: No diagnosis found.  Patient Active Problem List   Diagnosis    Hypertension    Hyperlipidemia    Abdominal aortic atherosclerosis    Generalized edema    Fatigue due to excessive exertion    Seasonal allergic rhinitis due to pollen    Dyspnea    Acute on chronic diastolic heart failure    Hypoxia    Paroxysmal atrial fibrillation with rapid ventricular response    Adrenal nodule    Edema of lower extremity    Hyponatremia    CRI (chronic renal insufficiency), stage 2 (mild)    Diffuse large B-cell lymphoma of lymph nodes of multiple regions    Vascular catheter fitting or adjustment    Chronic diastolic CHF (congestive heart failure)    Sepsis due to Escherichia coli (E. coli)    Bacteremia due to Escherichia coli    Calculus of bile duct without cholecystitis and without obstruction    Diverticulosis    Iron deficiency anemia    Leukopenia    Anemia    Adrenal insufficiency    Weight loss    Severe malnutrition     Past Medical History:   Diagnosis Date    Abdominal aortic atherosclerosis     Adrenal nodule 2022    Allergic rhinitis     Anemia due to chemotherapy     Bacteremia due to Escherichia coli 2022    ADMITTED TO [facility]    Bug bite 10/24/2015    WITH CELLULITIS, LEFT LEG    CAD (coronary artery disease)     Calculus of bile duct without cholangitis or cholecystitis 2022    Chemotherapy induced neutropenia     Chronic anticoagulation     Chronic diastolic (congestive) heart failure     Colon polyps     CRI (chronic renal insufficiency), stage 2 (mild)     Diffuse large B cell lymphoma 2022    MULTIPLE LYMPH NODE INVOLVEMENT, FOLLOWED BY DR. JEN PATTERSON    Drug induced constipation     Hearing loss     Hemorrhoids     History of blood transfusion 2022    History of chemotherapy     FOLLOWED BY DR. JEN PATTERSON     Hypercalcemia 2016    resolved as of 2019    Hypercholesterolemia     Hyperkalemia 03/2022    Hypertension     Hyponatremia 02/17/2022    Leg swelling 10/2021    Mixed hyperlipidemia     Neutropenic fever 05/2022    Nonrheumatic mitral valve regurgitation 03/2022    PAF (paroxysmal atrial fibrillation)     FOLLOWED BY DR. YOSI LANGLEY    Pancytopenia     Pleural effusion, bilateral 06/2022    Pulmonary nodule     Seasonal allergies     Thrombocytopenia 08/2022    Venous insufficiency     Vitamin D deficiency      Past Surgical History:   Procedure Laterality Date    CHOLECYSTECTOMY N/A 10/12/2011    LAPAROSCOPIC, DR. CHANCE KLINE AT [facility]    COLONOSCOPY N/A 2000    1 or 2 polyps, otherwise normal per patient    COLONOSCOPY W/ POLYPECTOMY N/A 01/24/2012    3 MM TUBULAR ADENOMA POLYP IN CECUM, DIVERTICULOSIS IN RECTO-SIGMOID, RESCOPE IN 3 YRS, DR. CHANCE KLINE AT [facility]    CYST REMOVAL Left 10/12/2011    LEFT SCALP, PATH: BENIGN ADNEXAL NEOPLASM FAVORING ECCRINE SPIRADENOMA, DR. CHANCE KLINE AT [facility]    ERCP N/A 10/21/2022    Procedure: ENDOSCOPIC RETROGRADE CHOLANGIOPANCREATOGRAPHY with sphincterotomy and balloon sweep;  Surgeon: Peyman Ortega MD;  Location: [facility] ENDOSCOPY;  Service: Gastroenterology;  Laterality: N/A;  PRE: Common Duct Stones  POST: Common Duct Stones    VENOUS ACCESS DEVICE (PORT) INSERTION Left 03/23/2022    Procedure: INSERTION VENOUS ACCESS DEVICE;  Surgeon: Alin Delgado MD;  [place] [facility] [place] [facility]  Service: General;  Laterality: Left;      General Information       Row Name 06/06/24 1653          Physical Therapy Time and Intention    Document Type therapy note (daily note)  -     Mode of Treatment physical therapy  -       Row Name 06/06/24 1653          General Information    Existing Precautions/Restrictions fall  -       Row Name 06/06/24 1653          Cognition    Orientation Status (Cognition) oriented x 3  -               User Key  (r) = Recorded By, (t) = Taken By, (c) =  Cosigned By      Initials Name Provider Type     Lucy Contreras PTA Physical Therapist Assistant                   Mobility       Row Name 06/06/24 1658          Bed Mobility    Comment, (Bed Mobility) up in chair  -       Row Name 06/06/24 1658          Sit-Stand Transfer    Sit-Stand Drake (Transfers) minimum assist (75% patient effort)  -     Assistive Device (Sit-Stand Transfers) walker, front-wheeled  -       Row Name 06/06/24 1658          Gait/Stairs (Locomotion)    Comment, (Gait/Stairs) pt declined, had just gotten up and back from [facility] and was also just bandaged by ns for a spot that was actively bleeding  -               User Key  (r) = Recorded By, (t) = Taken By, (c) = Cosigned By      Initials Name Provider Type    Lucy Godinez PTA Physical Therapist Assistant                   Obj/Interventions       Row Name 06/06/24 1700          Motor Skills    Therapeutic Exercise --  seated AP, LAQ, marches, pillow squeeze, heel slides, hip abd/add, SAQ x10 reps  -               User Key  (r) = Recorded By, (t) = Taken By, (c) = Cosigned By      Initials Name Provider Type    Lucy Godinez PTA Physical Therapist Assistant                   Goals/Plan    No documentation.                  Clinical Impression       Row Name 06/06/24 1700          Pain    Pretreatment Pain Rating 0/10 - no pain  -     Posttreatment Pain Rating 0/10 - no pain  -[facility] Name 06/06/24 1700          Positioning and Restraints    Pre-Treatment Position sitting in chair/recliner  -     Post Treatment Position chair  -     In Chair reclined;call light within reach;encouraged to call for assist;exit alarm on  -               User Key  (r) = Recorded By, (t) = Taken By, (c) = Cosigned By      Initials Name Provider Type    Lucy Godinez PTA Physical Therapist Assistant                   Outcome Measures       Row Name 06/06/24 1702 06/06/24 0904       How much help from  another person do you currently need...    Turning from your back to your side while in flat bed without using bedrails? 3  -SM 3  -MB    Moving from lying on back to sitting on the side of a flat bed without bedrails? 3  -SM 3  -MB    Moving to and from a bed to a chair (including a wheelchair)? 3  -SM 3  -MB    Standing up from a chair using your arms (e.g., wheelchair, bedside chair)? 3  -SM 3  -MB    Climbing 3-5 steps with a railing? 1  -SM 2  -MB    To walk in hospital room? 2  -SM 3  -MB    AM-PAC 6 Clicks Score (PT) 15  -SM 17  -MB    Highest Level of Mobility Goal 4 --> Transfer to chair/commode  - 5 --> Static standing  -MB      Row Name 06/06/24 1702          Functional Assessment    Outcome Measure Options AM-PAC 6 Clicks Basic Mobility (PT)  -               User Key  (r) = Recorded By, (t) = Taken By, (c) = Cosigned By      Initials Name Provider Type    Lucy Godinez PTA Physical Therapist Assistant    Candace Abreu RN Registered Nurse                                 Physical Therapy Education       Title: PT OT SLP Therapies (Done)       Topic: Physical Therapy (Done)       Point: Mobility training (Done)       Learning Progress Summary             Patient Acceptance, E,TB,D, VU,NR by  at 6/6/2024 1703    Acceptance, E, NR by  at 6/5/2024 1527    Acceptance, E, VU by AB at 5/30/2024 1653    Acceptance, E, VU by AB at 5/29/2024 1544    Acceptance, E,TB, VU by ZL at 5/29/2024 0301    Acceptance, E, VU by AB at 5/28/2024 1710    Acceptance, E,D, VU,NR by MG at 5/28/2024 1138    Acceptance, E, VU by KA at 5/26/2024 1253                         Point: Home exercise program (Done)       Learning Progress Summary             Patient Acceptance, E,TB,D, VU,NR by  at 6/6/2024 1703    Acceptance, E, NR by LH at 6/5/2024 1527    Acceptance, E, VU by AB at 5/30/2024 1653    Acceptance, E, VU by AB at 5/29/2024 1544    Acceptance, E,TB, VU by ALEE at 5/29/2024 0301    Acceptance, E, VU by AB  at 5/28/2024 1710    Acceptance, E,D, VU,NR by MG at 5/28/2024 1138                         Point: Body mechanics (Done)       Learning Progress Summary             Patient Acceptance, E,TB,D, VU,NR by  at 6/6/2024 1703    Acceptance, E, NR by  at 6/5/2024 1527    Acceptance, E, VU by AB at 5/30/2024 1653    Acceptance, E, VU by AB at 5/29/2024 1544    Acceptance, E,TB, VU by ZL at 5/29/2024 0301    Acceptance, E, VU by AB at 5/28/2024 1710    Acceptance, E,D, VU,NR by MG at 5/28/2024 1138                         Point: Precautions (Done)       Learning Progress Summary             Patient Acceptance, E,TB,D, VU,NR by  at 6/6/2024 1703    Acceptance, E, NR by  at 6/5/2024 1527    Acceptance, E, VU by AB at 5/30/2024 1653    Acceptance, E, VU by AB at 5/29/2024 1544    Acceptance, E,TB, VU by ZL at 5/29/2024 0301    Acceptance, E, VU by AB at 5/28/2024 1710    Acceptance, E,D, VU,NR by MG at 5/28/2024 1138                                         User Key       Initials Effective Dates Name Provider Type Discipline     06/16/21 -  Arlene Rivera, PT Physical Therapist PT     03/07/18 -  Lucy Contreras, SHELLY Physical Therapist Assistant PT    MG 05/24/22 -  Connie Phan, PT Physical Therapist PT    Z 06/16/21 -  Ronnell Michaels, RN Registered Nurse Nurse     08/24/21 -  Anastacia Portillo, PT Physical Therapist PT    AB 12/15/23 -  Gavi Gerardo, RN Registered Nurse Nurse                  PT Recommendation and Plan     Plan of Care Reviewed With: patient  Progress: improving  Outcome Evaluation: Pt tolerated treatment fair this date. Pt had just gotten back to chair from [facility] w/ nsg. Required min A to stand from chair again to finish donning brief, then pt agreed to exercises. Completed several seated LE exercises, encouraging pt to attempt on own during the day.     Time Calculation:         PT Charges       Row Name 06/06/24 1704             Time Calculation    Start Time 1622  -      Stop Time 7699   -      Time Calculation (min) 26 min  -      PT Received On 06/06/24  -      PT - Next Appointment 06/07/24  -                User Key  (r) = Recorded By, (t) = Taken By, (c) = Cosigned By      Initials Name Provider Type    Lucy Godinez PTA Physical Therapist Assistant                  Therapy Charges for Today       Code Description Service Date Service Provider Modifiers Qty    59197424219 HC PT THERAPEUTIC ACT EA 15 MIN 6/6/2024 Lucy Contreras PTA GP 1    28867159821 HC PT THER PROC EA 15 MIN 6/6/2024 Lucy Contreras PTA GP 1            PT G-Codes  Outcome Measure Options: AM-PAC 6 Clicks Basic Mobility (PT)  AM-PAC 6 Clicks Score (PT): 15  AM-PAC 6 Clicks Score (OT): 15  PT Discharge Summary  Anticipated Discharge Disposition (PT): skilled nursing facility    Lucy Contreras PTA  6/6/2024

## 2024-06-06 NOTE — CASE MANAGEMENT/SOCIAL WORK
Continued Stay Note  [facility]     Patient Name: Michelle Car  MRN: [id number]  Today's Date: 6/6/2024    Admit Date: 5/24/2024    Plan: SNF pending acceptance and precert   Discharge Plan       Row Name 06/06/24 5270       Plan    Plan SNF pending acceptance and precert    Patient/Family in Agreement with Plan yes    Plan Comments Spoke with pt son Helder to discuss SNF options, all he could remember from what her list was [facility] and [facility], presented option of [facility], he wants to discuss with her before further referrals are made, pt will need pre-cert, explained need for expediting choice and discussions. Discussed transportation, Helder isn't sure he can transport, discussed possibility of using  w/c van and if not would use [facility], gave estimated cost of  dollars, he prefers to wait until we have accepting facility before deciding. Referrals sent to pt choice facilities, CCP will follow - [facility]                   Discharge Codes    No documentation.                 Expected Discharge Date and Time       Expected Discharge Date Expected Discharge Time    Jun 8, 2024               Lucy Maurice RN

## 2024-06-06 NOTE — THERAPY TREATMENT NOTE
Patient Name: Michelle Car  : 1940    MRN: [id number]                              Today's Date: 2024       Admit Date: 2024    Visit Dx: No diagnosis found.  Patient Active Problem List   Diagnosis    Hypertension    Hyperlipidemia    Abdominal aortic atherosclerosis    Generalized edema    Fatigue due to excessive exertion    Seasonal allergic rhinitis due to pollen    Dyspnea    Acute on chronic diastolic heart failure    Hypoxia    Paroxysmal atrial fibrillation with rapid ventricular response    Adrenal nodule    Edema of lower extremity    Hyponatremia    CRI (chronic renal insufficiency), stage 2 (mild)    Diffuse large B-cell lymphoma of lymph nodes of multiple regions    Vascular catheter fitting or adjustment    Chronic diastolic CHF (congestive heart failure)    Sepsis due to Escherichia coli (E. coli)    Bacteremia due to Escherichia coli    Calculus of bile duct without cholecystitis and without obstruction    Diverticulosis    Iron deficiency anemia    Leukopenia    Anemia    Adrenal insufficiency    Weight loss    Severe malnutrition     Past Medical History:   Diagnosis Date    Abdominal aortic atherosclerosis     Adrenal nodule 2022    Allergic rhinitis     Anemia due to chemotherapy     Bacteremia due to Escherichia coli 2022    ADMITTED TO [facility]    Bug bite 10/24/2015    WITH CELLULITIS, LEFT LEG    CAD (coronary artery disease)     Calculus of bile duct without cholangitis or cholecystitis 2022    Chemotherapy induced neutropenia     Chronic anticoagulation     Chronic diastolic (congestive) heart failure     Colon polyps     CRI (chronic renal insufficiency), stage 2 (mild)     Diffuse large B cell lymphoma 2022    MULTIPLE LYMPH NODE INVOLVEMENT, FOLLOWED BY DR. JEN PATTERSON    Drug induced constipation     Hearing loss     Hemorrhoids     History of blood transfusion 2022    History of chemotherapy     FOLLOWED BY DR. JEN PATTERSON     Hypercalcemia 2016    resolved as of 2019    Hypercholesterolemia     Hyperkalemia 03/2022    Hypertension     Hyponatremia 02/17/2022    Leg swelling 10/2021    Mixed hyperlipidemia     Neutropenic fever 05/2022    Nonrheumatic mitral valve regurgitation 03/2022    PAF (paroxysmal atrial fibrillation)     FOLLOWED BY DR. YOSI LANGLEY    Pancytopenia     Pleural effusion, bilateral 06/2022    Pulmonary nodule     Seasonal allergies     Thrombocytopenia 08/2022    Venous insufficiency     Vitamin D deficiency      Past Surgical History:   Procedure Laterality Date    CHOLECYSTECTOMY N/A 10/12/2011    LAPAROSCOPIC, DR. CHANCE KLINE AT [facility]    COLONOSCOPY N/A 2000    1 or 2 polyps, otherwise normal per patient    COLONOSCOPY W/ POLYPECTOMY N/A 01/24/2012    3 MM TUBULAR ADENOMA POLYP IN CECUM, DIVERTICULOSIS IN RECTO-SIGMOID, RESCOPE IN 3 YRS, DR. CHANCE KLINE AT [facility]    CYST REMOVAL Left 10/12/2011    LEFT SCALP, PATH: BENIGN ADNEXAL NEOPLASM FAVORING ECCRINE SPIRADENOMA, DR. CAHNCE KLINE AT [facility]    ERCP N/A 10/21/2022    Procedure: ENDOSCOPIC RETROGRADE CHOLANGIOPANCREATOGRAPHY with sphincterotomy and balloon sweep;  Surgeon: Peyman Ortega MD;  Location: [facility] ENDOSCOPY;  Service: Gastroenterology;  Laterality: N/A;  PRE: Common Duct Stones  POST: Common Duct Stones    VENOUS ACCESS DEVICE (PORT) INSERTION Left 03/23/2022    Procedure: INSERTION VENOUS ACCESS DEVICE;  Surgeon: Alin Delgado MD;  Location: [facility] OR [facility];  Service: General;  Laterality: Left;      General Information       Row Name 06/06/24 0930          OT Time and Intention    Document Type therapy note (daily note)  -SM     Mode of Treatment occupational therapy;individual therapy  -SM       Row Name 06/06/24 0930          General Information    Patient Profile Reviewed yes  -SM     Existing Precautions/Restrictions fall  -SM       Row Name 06/06/24 0930          Cognition    Orientation Status (Cognition) oriented x 3  -SM       Row Name  06/06/24 0930          Safety Issues, Functional Mobility    Safety Issues Affecting Function (Mobility) awareness of need for assistance  -     Impairments Affecting Function (Mobility) balance;endurance/activity tolerance;strength  -               User Key  (r) = Recorded By, (t) = Taken By, (c) = Cosigned By      Initials Name Provider Type     Lucy Posada OT Occupational Therapist                     Mobility/ADL's       Row Name 06/06/24 0931          Bed Mobility    Supine-Sit [place] (Bed Mobility) contact guard  -       Row Name 06/06/24 0931          Bed-Chair Transfer    Bed-Chair [place] (Transfers) minimum assist (75% patient effort);verbal cues  -     Assistive Device (Bed-Chair Transfers) walker, front-wheeled  -       Row Name 06/06/24 0931          Sit-Stand Transfer    Sit-Stand [place] (Transfers) moderate assist (50% patient effort);minimum assist (75% patient effort)  -     Assistive Device (Sit-Stand Transfers) walker, front-wheeled  -     Comment, (Sit-Stand Transfer) mod A on first attempt  -[facility] Name 06/06/24 0931          Toilet Transfer    [place] Level (Toilet Transfer) minimum assist (75% patient effort);verbal cues  -     Assistive Device (Toilet Transfer) walker, front-wheeled;commode, bedside with drop arms  -       Row Name 06/06/24 0931          Functional Mobility    Functional Mobility- Ind. Level minimum assist (75% patient effort)  -     Functional Mobility- Device walker, front-wheeled  -     Functional Mobility-Distance (Feet) --  3+3  -       Row Name 06/06/24 0931          Self-Feeding Assessment/Training    [place] Level (Feeding) set up  -     Position (Self-Feeding) supported sitting  -[facility] Name 06/06/24 0931          Toileting Assessment/Training    [place] Level (Toileting) maximum assist (25% patient effort);verbal cues  -               User Key  (r) = Recorded By, (t) = Taken By, (c) =  "Cosigned By      Initials Name Provider Type     Lucy Posada OT Occupational Therapist                   Obj/Interventions       Row Name 06/06/24 0932          Motor Skills    Functional Endurance poor  -       Row Name 06/06/24 0932          Balance    Static Sitting Balance supervision  -     Position, Sitting Balance sitting edge of bed  -     Static Standing Balance minimal assist;contact guard  -     Dynamic Standing Balance minimal assist  -     Position/Device Used, Standing Balance supported;walker, rolling  -               User Key  (r) = Recorded By, (t) = Taken By, (c) = Cosigned By      Initials Name Provider Type     Lucy Posada OT Occupational Therapist                   Goals/Plan    No documentation.                  Clinical Impression       Row Name 06/06/24 0933          Pain Assessment    Pretreatment Pain Rating 0/10 - no pain  -     Posttreatment Pain Rating 0/10 - no pain  -       Row Name 06/06/24 0933          Plan of Care Review    Plan of Care Reviewed With patient  -     Outcome Evaluation Pt tolerates a brief OT session, he remains limited by endurance. She needs some encouragement to attempt getting to [facility] for toileting and up to chair to eat her breakfast. She has 2 episodes of posterior LOB where she reports \"I'm falling\". Walker and gait belt used. She requires min A for balance.  -       Row Name 06/06/24 0933          Therapy Plan Review/Discharge Plan (OT)    Anticipated Discharge Disposition (OT) skilled nursing facility  -       Row Name 06/06/24 0933          Positioning and Restraints    Pre-Treatment Position in bed  -     Post Treatment Position chair  -     In Chair reclined;call light within reach;encouraged to call for assist;exit alarm on;notified nsg  -               User Key  (r) = Recorded By, (t) = Taken By, (c) = Cosigned By      Initials Name Provider Type     Lucy Posada OT Occupational Therapist              "      Outcome Measures       Row Name 06/06/24 1327          How much help from another is currently needed...    Putting on and taking off regular lower body clothing? 2  -SM     Bathing (including washing, rinsing, and drying) 2  -SM     Toileting (which includes using toilet bed pan or urinal) 2  -SM     Putting on and taking off regular upper body clothing 3  -SM     Taking care of personal grooming (such as brushing teeth) 3  -SM     Eating meals 3  -SM     AM-PAC 6 Clicks Score (OT) 15  -SM       Row Name 06/06/24 0904          How much help from another person do you currently need...    Turning from your back to your side while in flat bed without using bedrails? 3  -MB     Moving from lying on back to sitting on the side of a flat bed without bedrails? 3  -MB     Moving to and from a bed to a chair (including a wheelchair)? 3  -MB     Standing up from a chair using your arms (e.g., wheelchair, bedside chair)? 3  -MB     Climbing 3-5 steps with a railing? 2  -MB     To walk in hospital room? 3  -MB     AM-PAC 6 Clicks Score (PT) 17  -MB     Highest Level of Mobility Goal 5 --> Static standing  -MB               User Key  (r) = Recorded By, (t) = Taken By, (c) = Cosigned By      Initials Name Provider Type    Candace Abreu, RN Registered Nurse    Lucy Rose, OT Occupational Therapist                    Occupational Therapy Education       Title: PT OT SLP Therapies (In Progress)       Topic: Occupational Therapy (Done)       Point: ADL training (Done)       Description:   Instruct learner(s) on proper safety adaptation and remediation techniques during self care or transfers.   Instruct in proper use of assistive devices.                  Learning Progress Summary             Patient Acceptance, E, VU by AB at 5/30/2024 1653    Acceptance, E, VU by AB at 5/29/2024 1544    Acceptance, E,TB, VU by ZL at 5/29/2024 0301    Acceptance, E, VU by AB at 5/28/2024 1710    Acceptance, E,TB, VU by AG at  5/27/2024 1307                         Point: Home exercise program (Done)       Description:   Instruct learner(s) on appropriate technique for monitoring, assisting and/or progressing therapeutic exercises/activities.                  Learning Progress Summary             Patient Acceptance, E, VU by AB at 5/30/2024 1653    Acceptance, E, VU by AB at 5/29/2024 1544    Acceptance, E,TB, VU by ZL at 5/29/2024 0301    Acceptance, E, VU by AB at 5/28/2024 1710    Acceptance, E,TB, VU by AG at 5/27/2024 1307                         Point: Precautions (Done)       Description:   Instruct learner(s) on prescribed precautions during self-care and functional transfers.                  Learning Progress Summary             Patient Acceptance, E, VU by AB at 5/30/2024 1653    Acceptance, E, VU by AB at 5/29/2024 1544    Acceptance, E,TB, VU by ZL at 5/29/2024 0301    Acceptance, E, VU by AB at 5/28/2024 1710    Acceptance, E,TB, VU by AG at 5/27/2024 1307                         Point: Body mechanics (Done)       Description:   Instruct learner(s) on proper positioning and spine alignment during self-care, functional mobility activities and/or exercises.                  Learning Progress Summary             Patient Acceptance, E, VU by AB at 5/30/2024 1653    Acceptance, E, VU by AB at 5/29/2024 1544    Acceptance, E,TB, VU by ZL at 5/29/2024 0301    Acceptance, E, VU by AB at 5/28/2024 1710    Acceptance, E,TB, VU by AG at 5/27/2024 1307                                         User Key       Initials Effective Dates Name Provider Type Discipline     06/16/21 -  Ronnell Michaels, RN Registered Nurse Nurse    AB 12/15/23 -  Gavi Gerardo, RN Registered Nurse Nurse     01/23/24 -  Josue Villanueva OT Occupational Therapist OT                  OT Recommendation and Plan     Plan of Care Review  Plan of Care Reviewed With: patient  Outcome Evaluation: Pt tolerates a brief OT session, he remains limited by endurance. She  "needs some encouragement to attempt getting to [facility] for toileting and up to chair to eat her breakfast. She has 2 episodes of posterior LOB where she reports \"I'm falling\". Walker and gait belt used. She requires min A for balance.     Time Calculation:         Time Calculation- OT       Row Name 06/06/24 1328             Time Calculation- OT    OT Start Time 0813  -      OT Stop Time 0828  -      OT Time Calculation (min) 15 min  -SM      OT Received On 06/06/24  -      OT - Next Appointment 06/07/24  -         Timed Charges    78540 - OT Self Care/Mgmt Minutes 15  -SM         Total Minutes    Timed Charges Total Minutes 15  -SM       Total Minutes 15  -SM                User Key  (r) = Recorded By, (t) = Taken By, (c) = Cosigned By      Initials Name Provider Type    Lucy Rose OT Occupational Therapist                  Therapy Charges for Today       Code Description Service Date Service Provider Modifiers Qty    [id number] HC OT SELF CARE/MGMT/TRAIN EA 15 MIN 6/6/2024 Lucy Posada OT GO 1                 Lucy Posada OT  6/6/2024  "

## 2024-06-07 LAB
CMV DNA SERPL NAA+PROBE-ACNC: NORMAL IU/ML
CMV DNA SERPL NAA+PROBE-LOG IU: 4.15 LOG10 IU/ML
DEPRECATED RDW RBC AUTO: 48.1 FL (ref 37–54)
ERYTHROCYTE [DISTWIDTH] IN BLOOD BY AUTOMATED COUNT: 13.7 % (ref 12.3–15.4)
HCT VFR BLD AUTO: 26.6 % (ref 34–46.6)
HGB BLD-MCNC: 8.8 G/DL (ref 12–15.9)
MCH RBC QN AUTO: 32.1 PG (ref 26.6–33)
MCHC RBC AUTO-ENTMCNC: 33.1 G/DL (ref 31.5–35.7)
MCV RBC AUTO: 97.1 FL (ref 79–97)
PLATELET # BLD AUTO: 132 10*3/MM3 (ref 140–450)
PMV BLD AUTO: 9.7 FL (ref 6–12)
RBC # BLD AUTO: 2.74 10*6/MM3 (ref 3.77–5.28)
WBC NRBC COR # BLD AUTO: 1.09 10*3/MM3 (ref 3.4–10.8)

## 2024-06-07 PROCEDURE — 97110 THERAPEUTIC EXERCISES: CPT

## 2024-06-07 PROCEDURE — 97530 THERAPEUTIC ACTIVITIES: CPT

## 2024-06-07 PROCEDURE — 85025 COMPLETE CBC W/AUTO DIFF WBC: CPT | Performed by: HOSPITALIST

## 2024-06-07 PROCEDURE — 25010000002 FILGRASTIM 480 MCG/0.8ML SOLUTION PREFILLED SYRINGE: Performed by: INTERNAL MEDICINE

## 2024-06-07 PROCEDURE — 99232 SBSQ HOSP IP/OBS MODERATE 35: CPT | Performed by: INTERNAL MEDICINE

## 2024-06-07 RX ADMIN — Medication 1 TABLET: at 08:55

## 2024-06-07 RX ADMIN — AMIODARONE HYDROCHLORIDE 200 MG: 200 TABLET ORAL at 08:55

## 2024-06-07 RX ADMIN — FERROUS SULFATE TAB 325 MG (65 MG ELEMENTAL FE) 325 MG: 325 (65 FE) TAB at 08:56

## 2024-06-07 RX ADMIN — APIXABAN 2.5 MG: 2.5 TABLET, FILM COATED ORAL at 20:36

## 2024-06-07 RX ADMIN — SODIUM CHLORIDE TAB 1 GM 1 G: 1 TAB at 18:16

## 2024-06-07 RX ADMIN — POLYETHYLENE GLYCOL 3350 17 G: 17 POWDER, FOR SOLUTION ORAL at 10:20

## 2024-06-07 RX ADMIN — CETIRIZINE HYDROCHLORIDE 10 MG: 10 TABLET ORAL at 08:55

## 2024-06-07 RX ADMIN — APIXABAN 2.5 MG: 2.5 TABLET, FILM COATED ORAL at 08:55

## 2024-06-07 RX ADMIN — FILGRASTIM 300 MCG: 300 INJECTION, SOLUTION INTRAVENOUS; SUBCUTANEOUS at 18:16

## 2024-06-07 RX ADMIN — SENNOSIDES AND DOCUSATE SODIUM 2 TABLET: 50; 8.6 TABLET ORAL at 10:19

## 2024-06-07 RX ADMIN — Medication 10 ML: at 08:56

## 2024-06-07 RX ADMIN — ACETAMINOPHEN 325MG 650 MG: 325 TABLET ORAL at 00:50

## 2024-06-07 RX ADMIN — MIDODRINE HYDROCHLORIDE 10 MG: 5 TABLET ORAL at 12:03

## 2024-06-07 RX ADMIN — MIDODRINE HYDROCHLORIDE 10 MG: 5 TABLET ORAL at 18:16

## 2024-06-07 RX ADMIN — HYDROCORTISONE: 25 CREAM TOPICAL at 08:56

## 2024-06-07 RX ADMIN — MIDODRINE HYDROCHLORIDE 10 MG: 5 TABLET ORAL at 08:55

## 2024-06-07 RX ADMIN — SODIUM CHLORIDE TAB 1 GM 1 G: 1 TAB at 08:55

## 2024-06-07 NOTE — PLAN OF CARE
The pt reported high fatigue but was agreeable to standing with OT/PT. CGA/Min A x2 for STS and taking steps to the end of her bed and back to her bedside recliner chair. The pt is very fearful of falling and demonstrates a posterior lean with static standing. BUE/BLE exercises as tolerated in her bedside chair. SNF recommended

## 2024-06-07 NOTE — PLAN OF CARE
Goal Outcome Evaluation:      Up in chair. No c/o pain. VSS.PRN Miralax and Taylor colace given this am for constipation.IV removed per manager request due to leukopenia. Will continue to monitor.

## 2024-06-07 NOTE — THERAPY TREATMENT NOTE
Patient Name: Michelle Car  : 1940    MRN: [id number]                              Today's Date: 2024       Admit Date: 2024    Visit Dx: No diagnosis found.  Patient Active Problem List   Diagnosis    Hypertension    Hyperlipidemia    Abdominal aortic atherosclerosis    Generalized edema    Fatigue due to excessive exertion    Seasonal allergic rhinitis due to pollen    Dyspnea    Acute on chronic diastolic heart failure    Hypoxia    Paroxysmal atrial fibrillation with rapid ventricular response    Adrenal nodule    Edema of lower extremity    Hyponatremia    CRI (chronic renal insufficiency), stage 2 (mild)    Diffuse large B-cell lymphoma of lymph nodes of multiple regions    Vascular catheter fitting or adjustment    Chronic diastolic CHF (congestive heart failure)    Sepsis due to Escherichia coli (E. coli)    Bacteremia due to Escherichia coli    Calculus of bile duct without cholecystitis and without obstruction    Diverticulosis    Iron deficiency anemia    Leukopenia    Anemia    Adrenal insufficiency    Weight loss    Severe malnutrition     Past Medical History:   Diagnosis Date    Abdominal aortic atherosclerosis     Adrenal nodule 2022    Allergic rhinitis     Anemia due to chemotherapy     Bacteremia due to Escherichia coli 2022    ADMITTED TO [facility]    Bug bite 10/24/2015    WITH CELLULITIS, LEFT LEG    CAD (coronary artery disease)     Calculus of bile duct without cholangitis or cholecystitis 2022    Chemotherapy induced neutropenia     Chronic anticoagulation     Chronic diastolic (congestive) heart failure     Colon polyps     CRI (chronic renal insufficiency), stage 2 (mild)     Diffuse large B cell lymphoma 2022    MULTIPLE LYMPH NODE INVOLVEMENT, FOLLOWED BY DR. JEN PATTERSON    Drug induced constipation     Hearing loss     Hemorrhoids     History of blood transfusion 2022    History of chemotherapy     FOLLOWED BY DR. JEN PATTERSON     Hypercalcemia 2016    resolved as of 2019    Hypercholesterolemia     Hyperkalemia 03/2022    Hypertension     Hyponatremia 02/17/2022    Leg swelling 10/2021    Mixed hyperlipidemia     Neutropenic fever 05/2022    Nonrheumatic mitral valve regurgitation 03/2022    PAF (paroxysmal atrial fibrillation)     FOLLOWED BY DR. YOSI LANGLEY    Pancytopenia     Pleural effusion, bilateral 06/2022    Pulmonary nodule     Seasonal allergies     Thrombocytopenia 08/2022    Venous insufficiency     Vitamin D deficiency      Past Surgical History:   Procedure Laterality Date    CHOLECYSTECTOMY N/A 10/12/2011    LAPAROSCOPIC, DR. CHANCE KLINE AT [facility]    COLONOSCOPY N/A 2000    1 or 2 polyps, otherwise normal per patient    COLONOSCOPY W/ POLYPECTOMY N/A 01/24/2012    3 MM TUBULAR ADENOMA POLYP IN CECUM, DIVERTICULOSIS IN RECTO-SIGMOID, RESCOPE IN 3 YRS, DR. CHANCE KLINE AT [facility]    CYST REMOVAL Left 10/12/2011    LEFT SCALP, PATH: BENIGN ADNEXAL NEOPLASM FAVORING ECCRINE SPIRADENOMA, DR. CHANCE KLINE AT [facility]    ERCP N/A 10/21/2022    Procedure: ENDOSCOPIC RETROGRADE CHOLANGIOPANCREATOGRAPHY with sphincterotomy and balloon sweep;  Surgeon: Peyman Ortega MD;  Location: [facility] ENDOSCOPY;  Service: Gastroenterology;  Laterality: N/A;  PRE: Common Duct Stones  POST: Common Duct Stones    VENOUS ACCESS DEVICE (PORT) INSERTION Left 03/23/2022    Procedure: INSERTION VENOUS ACCESS DEVICE;  Surgeon: Alin Delgado MD;  Location: [facility] OR [facility];  Service: General;  Laterality: Left;      General Information       Row Name 06/07/24 1518          OT Time and Intention    Document Type therapy note (daily note)  -RB     Mode of Treatment occupational therapy  -RB       Row Name 06/07/24 1518          General Information    Patient Profile Reviewed yes  -RB       Row Name 06/07/24 1518          Cognition    Orientation Status (Cognition) oriented x 3  -RB               User Key  (r) = Recorded By, (t) = Taken By, (c) = Cosigned By       Initials Name Provider Type    RB Kerry Mendoza OT Occupational Therapist                     Mobility/ADL's       Row Name 06/07/24 1517          Transfers    Transfers sit-stand transfer;stand-sit transfer  -RB       Row Name 06/07/24 1517          Sit-Stand Transfer    Sit-Stand [place] (Transfers) minimum assist (75% patient effort);2 person assist;verbal cues;contact guard  -RB     Assistive Device (Sit-Stand Transfers) walker, front-wheeled  -RB       Row Name 06/07/24 1517          Stand-Sit Transfer    Stand-Sit [place] (Transfers) minimum assist (75% patient effort);contact guard;verbal cues;2 person assist  -RB     Assistive Device (Stand-Sit Transfers) walker, front-wheeled  -RB       Row Name 06/07/24 1517          Functional Mobility    Functional Mobility- Comment CGA/Min A x2 to stand and take steps to the end of the bed and back to her bedside chair  -RB               User Key  (r) = Recorded By, (t) = Taken By, (c) = Cosigned By      Initials Name Provider Type    RB Kerry Mendoza OT Occupational Therapist                   Obj/Interventions       Row Name 06/07/24 1516          Shoulder (Therapeutic Exercise)    Shoulder AROM (Therapeutic Exercise) bilateral;flexion;extension;aBduction;aDduction;external rotation;internal rotation;10 repetitions;5 repetitions;sitting  -       Row Name 06/07/24 1516          Elbow/Forearm (Therapeutic Exercise)    Elbow/Forearm (Therapeutic Exercise) AROM (active range of motion)  -RB     Elbow/Forearm AROM (Therapeutic Exercise) bilateral;flexion;extension;10 repetitions;sitting  -       Row Name 06/07/24 1516          Motor Skills    Therapeutic Exercise shoulder;elbow/forearm  -       Row Name 06/07/24 1516          Balance    Comment, Balance CGA/Min A x2 for standing balance + walker. posterior lean and high anxiety over falling  -RB               User Key  (r) = Recorded By, (t) = Taken By, (c) = Cosigned By      Initials Name  Provider Type    Kerry Hand OT Occupational Therapist                   Goals/Plan    No documentation.                  Clinical Impression       Row Name 06/07/24 1516          Pain Assessment    Pretreatment Pain Rating 0/10 - no pain  -RB     Posttreatment Pain Rating 0/10 - no pain  -RB       Row Name 06/07/24 1516          Plan of Care Review    Plan of Care Reviewed With patient  -RB     Progress improving  -RB       Row Name 06/07/24 1516          Therapy Assessment/Plan (OT)    Rehab Potential (OT) good, to achieve stated therapy goals  -RB     Criteria for Skilled Therapeutic Interventions Met (OT) yes;skilled treatment is necessary  -RB     Therapy Frequency (OT) 5 times/wk  -RB       Row Name 06/07/24 1516          Therapy Plan Review/Discharge Plan (OT)    Anticipated Discharge Disposition (OT) skilled nursing facility  -RB               User Key  (r) = Recorded By, (t) = Taken By, (c) = Cosigned By      Initials Name Provider Type    Kerry Hand OT Occupational Therapist                   Outcome Measures       Row Name 06/07/24 0900          How much help from another person do you currently need...    Turning from your back to your side while in flat bed without using bedrails? 3  -EV     Moving from lying on back to sitting on the side of a flat bed without bedrails? 3  -EV     Moving to and from a bed to a chair (including a wheelchair)? 3  -EV     Standing up from a chair using your arms (e.g., wheelchair, bedside chair)? 3  -EV     Climbing 3-5 steps with a railing? 2  -EV     To walk in hospital room? 2  -EV     AM-PAC 6 Clicks Score (PT) 16  -EV     Highest Level of Mobility Goal 5 --> Static standing  -EV               User Key  (r) = Recorded By, (t) = Taken By, (c) = Cosigned By      Initials Name Provider Type    Joshua Rowan RN Registered Nurse                    Occupational Therapy Education       Title: PT OT SLP Therapies (Done)       Topic: Occupational  Therapy (Done)       Point: ADL training (Done)       Description:   Instruct learner(s) on proper safety adaptation and remediation techniques during self care or transfers.   Instruct in proper use of assistive devices.                  Learning Progress Summary             Patient Acceptance, E, VU by AB at 5/30/2024 1653    Acceptance, E, VU by AB at 5/29/2024 1544    Acceptance, E,TB, VU by ZL at 5/29/2024 0301    Acceptance, E, VU by AB at 5/28/2024 1710    Acceptance, E,TB, VU by AG at 5/27/2024 1307                         Point: Home exercise program (Done)       Description:   Instruct learner(s) on appropriate technique for monitoring, assisting and/or progressing therapeutic exercises/activities.                  Learning Progress Summary             Patient Acceptance, E, VU by AB at 5/30/2024 1653    Acceptance, E, VU by AB at 5/29/2024 1544    Acceptance, E,TB, VU by ZL at 5/29/2024 0301    Acceptance, E, VU by AB at 5/28/2024 1710    Acceptance, E,TB, VU by AG at 5/27/2024 1307                         Point: Precautions (Done)       Description:   Instruct learner(s) on prescribed precautions during self-care and functional transfers.                  Learning Progress Summary             Patient Acceptance, E, VU by AB at 5/30/2024 1653    Acceptance, E, VU by AB at 5/29/2024 1544    Acceptance, E,TB, VU by ZL at 5/29/2024 0301    Acceptance, E, VU by AB at 5/28/2024 1710    Acceptance, E,TB, VU by AG at 5/27/2024 1307                         Point: Body mechanics (Done)       Description:   Instruct learner(s) on proper positioning and spine alignment during self-care, functional mobility activities and/or exercises.                  Learning Progress Summary             Patient Acceptance, E, VU by AB at 5/30/2024 1653    Acceptance, E, VU by AB at 5/29/2024 1544    Acceptance, E,TB, VU by ZL at 5/29/2024 0301    Acceptance, E, VU by AB at 5/28/2024 1710    Acceptance, E,TB, VU by AG at 5/27/2024  1307                                         User Key       Initials Effective Dates Name Provider Type Discipline    ZL 06/16/21 -  Ronnell Michaels, RN Registered Nurse Nurse    AB 12/15/23 -  Gavi Gerardo, RN Registered Nurse Nurse    AG 01/23/24 -  Josue Villanueva OT Occupational Therapist OT                  OT Recommendation and Plan  Therapy Frequency (OT): 5 times/wk  Plan of Care Review  Plan of Care Reviewed With: patient  Progress: improving     Time Calculation:         Time Calculation- OT       Row Name 06/07/24 1515             Time Calculation- OT    OT Start Time 1456  -RB      OT Stop Time 1511  -RB      OT Time Calculation (min) 15 min  -RB      Total Timed Code Minutes- OT 15 minute(s)  -RB      OT Received On 06/07/24  -RB      OT - Next Appointment 06/10/24  -RB         Timed Charges    63010 - OT Therapeutic Activity Minutes 15  -RB         Total Minutes    Timed Charges Total Minutes 15  -RB       Total Minutes 15  -RB                User Key  (r) = Recorded By, (t) = Taken By, (c) = Cosigned By      Initials Name Provider Type    RB Kerry Mendoza OT Occupational Therapist                  Therapy Charges for Today       Code Description Service Date Service Provider Modifiers Qty    40878569151  OT THERAPEUTIC ACT EA 15 MIN 6/7/2024 Kerry Mendoza OT GO 1                 Kerry Mendoza OT  6/7/2024

## 2024-06-07 NOTE — THERAPY TREATMENT NOTE
Patient Name: Michelle Car  : 1940    MRN: [id number]                              Today's Date: 2024       Admit Date: 2024    Visit Dx: No diagnosis found.  Patient Active Problem List   Diagnosis    Hypertension    Hyperlipidemia    Abdominal aortic atherosclerosis    Generalized edema    Fatigue due to excessive exertion    Seasonal allergic rhinitis due to pollen    Dyspnea    Acute on chronic diastolic heart failure    Hypoxia    Paroxysmal atrial fibrillation with rapid ventricular response    Adrenal nodule    Edema of lower extremity    Hyponatremia    CRI (chronic renal insufficiency), stage 2 (mild)    Diffuse large B-cell lymphoma of lymph nodes of multiple regions    Vascular catheter fitting or adjustment    Chronic diastolic CHF (congestive heart failure)    Sepsis due to Escherichia coli (E. coli)    Bacteremia due to Escherichia coli    Calculus of bile duct without cholecystitis and without obstruction    Diverticulosis    Iron deficiency anemia    Leukopenia    Anemia    Adrenal insufficiency    Weight loss    Severe malnutrition     Past Medical History:   Diagnosis Date    Abdominal aortic atherosclerosis     Adrenal nodule 2022    Allergic rhinitis     Anemia due to chemotherapy     Bacteremia due to Escherichia coli 2022    ADMITTED TO [facility]    Bug bite 10/24/2015    WITH CELLULITIS, LEFT LEG    CAD (coronary artery disease)     Calculus of bile duct without cholangitis or cholecystitis 2022    Chemotherapy induced neutropenia     Chronic anticoagulation     Chronic diastolic (congestive) heart failure     Colon polyps     CRI (chronic renal insufficiency), stage 2 (mild)     Diffuse large B cell lymphoma 2022    MULTIPLE LYMPH NODE INVOLVEMENT, FOLLOWED BY DR. JEN PATTERSON    Drug induced constipation     Hearing loss     Hemorrhoids     History of blood transfusion 2022    History of chemotherapy     FOLLOWED BY DR. JEN PATTERSON     Hypercalcemia 2016    resolved as of 2019    Hypercholesterolemia     Hyperkalemia 03/2022    Hypertension     Hyponatremia 02/17/2022    Leg swelling 10/2021    Mixed hyperlipidemia     Neutropenic fever 05/2022    Nonrheumatic mitral valve regurgitation 03/2022    PAF (paroxysmal atrial fibrillation)     FOLLOWED BY DR. YOSI LANGLEY    Pancytopenia     Pleural effusion, bilateral 06/2022    Pulmonary nodule     Seasonal allergies     Thrombocytopenia 08/2022    Venous insufficiency     Vitamin D deficiency      Past Surgical History:   Procedure Laterality Date    CHOLECYSTECTOMY N/A 10/12/2011    LAPAROSCOPIC, DR. CHANCE KLINE AT [facility]    COLONOSCOPY N/A 2000    1 or 2 polyps, otherwise normal per patient    COLONOSCOPY W/ POLYPECTOMY N/A 01/24/2012    3 MM TUBULAR ADENOMA POLYP IN CECUM, DIVERTICULOSIS IN RECTO-SIGMOID, RESCOPE IN 3 YRS, DR. CHANCE KLINE AT [facility]    CYST REMOVAL Left 10/12/2011    LEFT SCALP, PATH: BENIGN ADNEXAL NEOPLASM FAVORING ECCRINE SPIRADENOMA, DR. CHANCE KLINE AT [facility]    ERCP N/A 10/21/2022    Procedure: ENDOSCOPIC RETROGRADE CHOLANGIOPANCREATOGRAPHY with sphincterotomy and balloon sweep;  Surgeon: Peyman Ortgea MD;  Location: [facility] ENDOSCOPY;  Service: Gastroenterology;  Laterality: N/A;  PRE: Common Duct Stones  POST: Common Duct Stones    VENOUS ACCESS DEVICE (PORT) INSERTION Left 03/23/2022    Procedure: INSERTION VENOUS ACCESS DEVICE;  Surgeon: Alin Delgado MD;  [place] [facility] [place] [facility]  Service: General;  Laterality: Left;      General Information       Row Name 06/07/24 1515          Physical Therapy Time and Intention    Document Type therapy note (daily note)  -     Mode of Treatment physical therapy  -       Row Name 06/07/24 1515          General Information    Existing Precautions/Restrictions fall  -       Row Name 06/07/24 1515          Cognition    Orientation Status (Cognition) oriented x 3  -               User Key  (r) = Recorded By, (t) = Taken By, (c) =  "Cosigned By      Initials Name Provider Type     Lucy Contreras PTA Physical Therapist Assistant                   Mobility       Row Name 06/07/24 1516          Bed Mobility    Comment, (Bed Mobility) up in chair  -[facility] Name 06/07/24 1516          Sit-Stand Transfer    Sit-Stand [place] (Transfers) contact guard;minimum assist (75% patient effort)  -     Assistive Device (Sit-Stand Transfers) walker, front-wheeled  -[facility] Name 06/07/24 1516          Gait/Stairs (Locomotion)    [place] Level (Gait) contact guard;minimum assist (75% patient effort)  -     Assistive Device (Gait) walker, front-wheeled  -     Patient was able to Ambulate yes  -     Distance in Feet (Gait) 12  -     Deviations/Abnormal Patterns (Gait) edgard decreased;stride length decreased  -     Bilateral Gait Deviations forward flexed posture  -     Comment, (Gait/Stairs) occasional fear of falling backwards  -               User Key  (r) = Recorded By, (t) = Taken By, (c) = Cosigned By      Initials Name Provider Type    Lucy Godinez PTA Physical Therapist Assistant                   Obj/Interventions       [facility] Name 06/07/24 1524          Motor Skills    Therapeutic Exercise --  seated AP, heel slides, hip abd/add, SAQ x10 reps  -               User Key  (r) = Recorded By, (t) = Taken By, (c) = Cosigned By      Initials Name Provider Type    Lucy Godinez PTA Physical Therapist Assistant                   Goals/Plan    No documentation.                  Clinical Impression       [facility] Name 06/07/24 1525          Pain    Pretreatment Pain Rating 2/10  -     Posttreatment Pain Rating 2/10  -     Pre/Posttreatment Pain Comment \"tailbone\"  -     Pain Intervention(s) Repositioned;Ambulation/increased activity;Rest  -[facility] Name 06/07/24 1525          Positioning and Restraints    Pre-Treatment Position sitting in chair/recliner  -     Post Treatment Position chair  -  "    In Chair reclined;call light within reach;encouraged to call for assist;exit alarm on  -               User Key  (r) = Recorded By, (t) = Taken By, (c) = Cosigned By      Initials Name Provider Type    Lucy Godinez PTA Physical Therapist Assistant                   Outcome Measures       Row Name 06/07/24 1525 06/07/24 0900       How much help from another person do you currently need...    Turning from your back to your side while in flat bed without using bedrails? 3  -SM 3  -EV    Moving from lying on back to sitting on the side of a flat bed without bedrails? 3  -SM 3  -EV    Moving to and from a bed to a chair (including a wheelchair)? 3  -SM 3  -EV    Standing up from a chair using your arms (e.g., wheelchair, bedside chair)? 3  -SM 3  -EV    Climbing 3-5 steps with a railing? 1  -SM 2  -EV    To walk in hospital room? 3  -SM 2  -EV    AM-PAC 6 Clicks Score (PT) 16  -SM 16  -EV    Highest Level of Mobility Goal 5 --> Static standing  -SM 5 --> Static standing  -EV      Row Name 06/07/24 1525          Functional Assessment    Outcome Measure Options AM-PAC 6 Clicks Basic Mobility (PT)  -               User Key  (r) = Recorded By, (t) = Taken By, (c) = Cosigned By      Initials Name Provider Type    Lucy Godinez PTA Physical Therapist Assistant    Joshua Rowan RN Registered Nurse                                 Physical Therapy Education       Title: PT OT SLP Therapies (Done)       Topic: Physical Therapy (Done)       Point: Mobility training (Done)       Learning Progress Summary             Patient Acceptance, E,TB,D, VU,NR by  at 6/7/2024 1526    Acceptance, E,TB,D, VU,NR by SM at 6/6/2024 1703    Acceptance, E, NR by  at 6/5/2024 1527    Acceptance, E, VU by AB at 5/30/2024 1653    Acceptance, E, VU by AB at 5/29/2024 1544    Acceptance, E,TB, VU by ZL at 5/29/2024 0301    Acceptance, E, VU by AB at 5/28/2024 1710    Acceptance, E,D, VU,NR by MG at 5/28/2024 113     Acceptance, E, VU by KA at 5/26/2024 1253                         Point: Home exercise program (Done)       Learning Progress Summary             Patient Acceptance, E,TB,D, VU,NR by  at 6/7/2024 1526    Acceptance, E,TB,D, VU,NR by  at 6/6/2024 1703    Acceptance, E, NR by  at 6/5/2024 1527    Acceptance, E, VU by AB at 5/30/2024 1653    Acceptance, E, VU by AB at 5/29/2024 1544    Acceptance, E,TB, VU by ZL at 5/29/2024 0301    Acceptance, E, VU by AB at 5/28/2024 1710    Acceptance, E,D, VU,NR by MG at 5/28/2024 1138                         Point: Body mechanics (Done)       Learning Progress Summary             Patient Acceptance, E,TB,D, VU,NR by  at 6/7/2024 1526    Acceptance, E,TB,D, VU,NR by  at 6/6/2024 1703    Acceptance, E, NR by  at 6/5/2024 1527    Acceptance, E, VU by AB at 5/30/2024 1653    Acceptance, E, VU by AB at 5/29/2024 1544    Acceptance, E,TB, VU by ZL at 5/29/2024 0301    Acceptance, E, VU by AB at 5/28/2024 1710    Acceptance, E,D, VU,NR by MG at 5/28/2024 1138                         Point: Precautions (Done)       Learning Progress Summary             Patient Acceptance, E,TB,D, VU,NR by  at 6/7/2024 1526    Acceptance, E,TB,D, VU,NR by  at 6/6/2024 1703    Acceptance, E, NR by  at 6/5/2024 1527    Acceptance, E, VU by AB at 5/30/2024 1653    Acceptance, E, VU by AB at 5/29/2024 1544    Acceptance, E,TB, VU by ZL at 5/29/2024 0301    Acceptance, E, VU by AB at 5/28/2024 1710    Acceptance, E,D, VU,NR by MG at 5/28/2024 1138                                         User Key       Initials Effective Dates Name Provider Type Discipline     06/16/21 -  Arlene Rivera, PT Physical Therapist PT    SM 03/07/18 -  Lucy Contreras, PTA Physical Therapist Assistant PT    MG 05/24/22 -  Connie Phan, PT Physical Therapist PT    ZL 06/16/21 -  Ronnell Michaels, RN Registered Nurse Nurse    KA 08/24/21 -  Anastacia Portillo, PT Physical Therapist PT    AB 12/15/23 -  Gavi Gerardo,  RN Registered Nurse Nurse                  PT Recommendation and Plan     Plan of Care Reviewed With: patient  Progress: improving  Outcome Evaluation: Pt tolerated treatment fair this date. Limited overall d/t fatigue. Pt completed seated LE exercises, then agreed to ambulate when OT arrived. Pt stood w/ CGA-min A, then ambulated 12ft w/ Rw and CGA-min, extra person for safety. Pt w/ occasional fear of falling backwards, cues to keep space between her and the front of walker.     Time Calculation:         PT Charges       Row Name 06/07/24 1528             Time Calculation    Start Time 1443  -      Stop Time 1501  -SM      Time Calculation (min) 18 min  -SM      PT Received On 06/07/24  -      PT - Next Appointment 06/10/24  -                User Key  (r) = Recorded By, (t) = Taken By, (c) = Cosigned By      Initials Name Provider Type    Lucy Godinez PTA Physical Therapist Assistant                  Therapy Charges for Today       Code Description Service Date Service Provider Modifiers Qty    32441359036 HC PT THERAPEUTIC ACT EA 15 MIN 6/6/2024 Lucy Contreras PTA GP 1    01760602487 HC PT THER PROC EA 15 MIN 6/6/2024 Lucy Contreras PTA GP 1    78680260411 HC PT THER PROC EA 15 MIN 6/7/2024 Lucy Contreras PTA GP 1            PT G-Codes  Outcome Measure Options: AM-PAC 6 Clicks Basic Mobility (PT)  AM-PAC 6 Clicks Score (PT): 16  AM-PAC 6 Clicks Score (OT): 15  PT Discharge Summary  Anticipated Discharge Disposition (PT): skilled nursing facility    Lucy Contreras PTA  6/7/2024

## 2024-06-07 NOTE — PLAN OF CARE
Goal Outcome Evaluation:  Plan of Care Reviewed With: patient        Progress: improving  Outcome Evaluation: Pt tolerated treatment fair this date. Limited overall d/t fatigue. Pt completed seated LE exercises, then agreed to ambulate when OT arrived. Pt stood w/ CGA-min A, then ambulated 12ft w/ Rw and CGA-min, extra person for safety. Pt w/ occasional fear of falling backwards, cues to keep space between her and the front of walker.      Anticipated Discharge Disposition (PT): skilled nursing facility

## 2024-06-07 NOTE — PROGRESS NOTES
"    Name: Michelle Car ADMIT: 2024   : 1940  PCP: Olayinka Pimentel MD    MRN: [id number] LOS: 14 days   AGE/SEX: 83 y.o. female  ROOM: [facility]     Subjective   Subjective   No new complaints       Objective   Objective   Vital Signs  Temp:  [97 °F (36.1 °C)-98.8 °F (37.1 °C)] 98.4 °F (36.9 °C)  Heart Rate:  [61-71] 71  Resp:  [16] 16  BP: ()/(54-67) 104/58  SpO2:  [96 %-100 %] 100 %  on   ;   Device (Oxygen Therapy): room air  Body mass index is 20.24 kg/m².  Physical Exam  Vitals reviewed.   Cardiovascular:      Rate and Rhythm: Normal rate and regular rhythm.   Pulmonary:      Effort: No respiratory distress.      Breath sounds: Normal breath sounds.   Abdominal:      General: There is no distension.      Palpations: Abdomen is soft.      Tenderness: There is no abdominal tenderness.   Musculoskeletal:      Right lower leg: No edema.      Left lower leg: No edema.   Skin:     Coloration: Skin is pale.   Neurological:      Mental Status: She is alert and oriented to person, place, and time.       Results Review     I reviewed the patient's new clinical results.  Results from last 7 days   Lab Units 24  0721 24  0510 24  0635 24  0459   WBC 10*3/mm3 1.09* 1.05* 0.80* 0.83*   HEMOGLOBIN g/dL 8.8* 9.0* 8.9* 9.5*   PLATELETS 10*3/mm3 132* 141 132* 139*     Results from last 7 days   Lab Units 24  1529 24  0510 24  0351 24  0343 24  0438   SODIUM mmol/L  --  134* 133* 135* 133*   POTASSIUM mmol/L 3.8 3.2* 3.9 4.0 3.5   CHLORIDE mmol/L  --  104 100 102 103   CO2 mmol/L  --  23.0 20.0* 22.0 19.5*   BUN mg/dL  --  17 25* 22 18   CREATININE mg/dL  --  0.48* 0.60 0.67 0.57   GLUCOSE mg/dL  --  87 76 91 95   EGFR mL/min/1.73  --  94.7 89.7 87.4 90.9           Results from last 7 days   Lab Units 24  0510 24  0351 24  0343 24  0438   CALCIUM mg/dL 8.3* 8.7 8.5* 8.4*       No results found for: \"HGBA1C\", \"POCGLU\"    No " radiology results for the last day    I have personally reviewed all medications:  Scheduled Medications  amiodarone, 200 mg, Oral, Q24H  apixaban, 2.5 mg, Oral, Q12H  cetirizine, 10 mg, Oral, Daily  ferrous sulfate, 325 mg, Oral, Every Other Day  Hydrocortisone (Perianal), , Rectal, Daily  midodrine, 10 mg, Oral, TID AC  multivitamin with minerals, 1 tablet, Oral, Daily  sodium chloride, 10 mL, Intravenous, Q12H  sodium chloride, 1 g, Oral, BID With Meals    Infusions   Diet  Diet: Regular/House; Neutropenic/Low Microbial; Fluid Consistency: Thin (IDDSI 0)    I have personally reviewed:  [x]  Laboratory   []  Microbiology   []  Radiology   []  EKG/Telemetry  []  Cardiology/Vascular   []  Pathology    []  Records       Assessment/Plan     Active Hospital Problems    Diagnosis  POA   • **Leukopenia [D72.819]  Unknown   • Severe malnutrition [E43]  Yes   • Anemia [D64.9]  Unknown   • Weight loss [R63.4]  Unknown   • Chronic diastolic CHF (congestive heart failure) [I50.32]  Yes   • Paroxysmal atrial fibrillation with rapid ventricular response [I48.0]  Yes   • Hyponatremia [E87.1]  Yes   • Diffuse large B-cell lymphoma of lymph nodes of multiple regions [C83.38]  Yes   • CRI (chronic renal insufficiency), stage 2 (mild) [N18.2]  Yes      Resolved Hospital Problems    Diagnosis Date Resolved POA   • Hyperkalemia [E87.5] 06/01/2024 Unknown       83 y.o. female with history of diffuse large B cell lymphoma admitted with Leukopenia.    Patient remained stable, white count cont to slightly improving.  Bone marrow result without evidence for lymphoma but there were some abnormal T cells.  Defer to oncology regarding any additional workup for this.    Overall patient defers to Dr. Galo regarding goals of care.  She is pretty realistic about her situation.  She was prepared for palliative care if bone marrow had showed malignancy.  As it stands she is willing to give rehab a try but agrees that if she is not getting better  she will move more in a palliative direction/home hospice etc.    S/p cefepime per ID recommendations.      Hypotension resolved with higher dose midodrine.    Hyponatremia stable.  Continue current diet orders    A-fib: Remains on amiodarone and apixaban.  Appears relatively stable      Dispo: Medically stable.  CCP working on skilled placement, ready anytime arrangements made      Morales Hardwick MD  [facility]  06/07/24  16:38 EDT

## 2024-06-07 NOTE — CASE MANAGEMENT/SOCIAL WORK
Continued Stay Note  [facility]     Patient Name: Michelle Car  MRN: [id number]  Today's Date: 6/7/2024    Admit Date: 5/24/2024    Plan: [facility] pending bed availability and pre-cert.   Discharge Plan       Row Name 06/07/24 0842       Plan    Plan [facility] pending bed availability and pre-cert.    Patient/Family in Agreement with Plan unable to assess    Plan Comments Left message for patient's son/ Helder to update with acceptance at [facility] for SNF. Left message for Dana/ Hannah to confirm bed available today. Will start pre-cert once CCP receives confirmation. Pharmacy updated. Patient may need transport.                   Discharge Codes    No documentation.                 Expected Discharge Date and Time       Expected Discharge Date Expected Discharge Time    Jun 8, 2024               Elidia Walter RN

## 2024-06-07 NOTE — PLAN OF CARE
Goal Outcome Evaluation:  Plan of Care Reviewed With: patient   A&Ox4, fatigued, VSS, RA, PRN tylenol given for generalized body aches, encouraging oral intake & to work w/ PT, up w/2 this shift to get from the chair back to bed, discharge planning for placement is in the works, avoiding purewic placement- incontinent w/ brief on, no major issues this shift & pt resting comfortably at this time, WCTM      Progress: declining

## 2024-06-07 NOTE — CASE MANAGEMENT/SOCIAL WORK
Post-Acute Authorization Submission      Post Acute Pre-Cert Documentation  Request Submitted by Facility - Type:: Hospital  Post-Acute Authorization Type Submitted:: SNF  Date Post Acute Pre-Cert Inititated per Facility: 06/07/24  Verification from Payer: Yes  Date Post Acute Pre-Cert Completed: 06/07/24  Accepting Facility: [facility]  Hospital Discharge Date Requested: 06/08/24  All Clinicals Submitted?: Yes  Had Accepting Facility at Time of Submission: Yes  Response Received from Insurance?: Approval  Response Communicated to:: , Accepting Facility Liaison  Authorization Number:: [id number]  Post Acute Pre-Cert Initiated Comment: Renay Tarango RN

## 2024-06-07 NOTE — PROGRESS NOTES
REASON FOR FOLLOWUP/CHIEF COMPLAINT:  DLBCL, anemia, neutropenia    HISTORY OF PRESENT ILLNESS:   No new issues.  She is glad there is no evidence of lymphoma in her marrow    Past Medical History, Past Surgical History, Social History, Family History have been reviewed and are without significant changes except as mentioned.    Review of Systems   Review of Systems   Constitutional:  Negative for activity change.   HENT:  Negative for nosebleeds and trouble swallowing.    Respiratory:  Negative for shortness of breath and wheezing.    Cardiovascular:  Negative for chest pain and palpitations.   Gastrointestinal:  Negative for constipation, diarrhea and nausea.   Genitourinary:  Negative for dysuria and hematuria.   Musculoskeletal:  Negative for arthralgias and myalgias.   Skin:  Negative for rash and wound.   Neurological:  Negative for seizures and syncope.   Hematological:  Negative for adenopathy. Does not bruise/bleed easily.   Psychiatric/Behavioral:  Negative for confusion.        Medications:  The current medication list was reviewed in the EMR    ALLERGIES:    Allergies   Allergen Reactions    Factive [Gemifloxacin] Rash              Vitals:    06/07/24 0540 06/07/24 0745 06/07/24 0854 06/07/24 1221   BP: 91/54 94/55 97/67 104/58   BP Location: Right arm Right arm  Right arm   Patient Position: Lying Lying  Sitting   Pulse: 61 61 63 71   Resp: 16 16  16   Temp: 97 °F (36.1 °C) 97.5 °F (36.4 °C)  98.4 °F (36.9 °C)   TempSrc: Oral Oral     SpO2: 98% 97%  100%   Weight:       Height:         Physical Exam    CONSTITUTIONAL:  Vital signs reviewed.  No distress, looks comfortable.  EYES:  Conjunctivae and lids unremarkable.  PERRLA  EARS, NOSE, MOUTH, THROAT:  Ears and nose appear unremarkable.  Lips, teeth, gums appear unremarkable.  RESPIRATORY:  Normal respiratory effort.  Lungs clear to auscultation bilaterally.  CARDIOVASCULAR:  Normal S1, S2.  No murmurs, rubs or gallops.  No significant lower  extremity edema.  GASTROINTESTINAL: Abdomen appears unremarkable.  Nontender.  No hepatomegaly.  No splenomegaly.  NEURO: Cranial nerves 2-12 grossly intact.  No focal deficits.  Appears to have equal strength all 4 extremities.  MUSCULOSKELETAL:  Unremarkable digits/nails.  No cyanosis or clubbing.  SKIN:  Warm.  No rashes.  PSYCHIATRIC:  Normal judgment and insight.  Normal mood and affect.        RECENT LABS:  WBC   Date Value Ref Range Status   06/07/2024 1.09 (C) 3.40 - 10.80 10*3/mm3 Final   06/06/2024 1.05 (C) 3.40 - 10.80 10*3/mm3 Final   06/05/2024 0.80 (C) 3.40 - 10.80 10*3/mm3 Final     Hemoglobin   Date Value Ref Range Status   06/07/2024 8.8 (L) 12.0 - 15.9 g/dL Final   06/06/2024 9.0 (L) 12.0 - 15.9 g/dL Final   06/05/2024 8.9 (L) 12.0 - 15.9 g/dL Final     Platelets   Date Value Ref Range Status   06/07/2024 132 (L) 140 - 450 10*3/mm3 Final   06/06/2024 141 140 - 450 10*3/mm3 Final   06/05/2024 132 (L) 140 - 450 10*3/mm3 Final       ASSESSMENT/PLAN:  Michelle Car P382/[id number]     *Relapsed diffuse large B-cell lymphoma.  Patient was found to have bilateral pleural effusions.    She underwent right thoracentesis on 2/21/2022 and the left thoracentesis on 2/22/2022.    Analysis of the fluid revealed involvement with diffuse large B-cell lymphoma.    FISH analysis for BCL6 rearrangement was positive but was negative for BCL-2 and MYC rearrangement.    PET scan on 3/10/2022 revealed significant hypermetabolism in the left adrenal gland and the surrounding soft tissue with SUV up to 34.5. Hypermetabolism in the left pleural thickening with SUV of 7.1. there was less hypermetabolism in the right adrenal gland and in the right pleura.  She was considered to have stage III non-bulky disease.  R-IPI score of 3 associated with poor risk - associated with overall survival of 55%.   R-CHOP with Neulasta support was started on 3/28/2022.  Due to development of neutropenic fever following cycles 1 and 2, treatment  was changed to mini-RCHOP starting cycle #3 on 5/16/2022.  Patient received cycle #6 on 7/25/2022.  PET scan on 8/15/2022 revealed complete metabolic response.  Patient was found to have recurrence with development of a left mandibular mass in October/November 2023.  Biopsy of left mandible mass on 11/28/2023 showed recurrence of the lymphoma, diffuse large B cell, non-GCB phenotype.  Ki-67 was 90%.    It was positive for Bcl-6 but negative for Bcl-2 and MYC rearrangement.  PET scan on 12/11/2023 revealed multiple areas of involvement -prevascular space, left epicardial fat pad, pleural density posterior to the left fourth rib anterior aspect, lesion along the pericardium versus intramuscular with SUV of 9.8.  Hypermetabolic activity at both adrenal glands, uniformly increased in size and appeared hyperplastic.  There was a 2.7 x 1.1 cm soft tissue nodule lateral to the right erector spinae muscle posterior to the right posterior 11th rib with an SUV of 20.5 and there was a 1.3 cm nodule lateral to the posterior margin of the left psoas muscle with an SUV of 24.9.  Rituxan Polivy and Treanda with Treanda every 3 weeks for 6 cycles was started on 12/12/2023.  The jaw mass started to decrease in size after starting treatment.  CT scan on 2/8/2024 showed evidence of response to treatment.  Cycle #6 was given on 4/1/2024.  PET scan on 4/22/2024 revealed decrease in the hypermetabolism in the mediastinal and pericardial lymph nodes/lesions and in the lesions above the left kidney and adrenal gland.  The left mandibular lesion was no longer hypermetabolic.   She is at increased risk for relapse of the lymphoma.  The plan was to start treatment with Revlimid 10 mg daily.  However, the patient did not start the treatment yet.  LDH was previously elevated but improved to 190 on 5/25/2024.  Bone marrow 5/30/2024:   10 to 20% cellularity.  No evidence of lymphoma.  Trilineage hematopoiesis with decreased, left shifted  granulopoiesis.  Pathologist noted the significance of the gamma delta T-cell population identified by flow cytometry is uncertain and can be seen in reactive processes.  The pathologist noted of a neoplastic process is considered clinically T-cell gene rearrangement studies could be performed.  I sent a message to the patient's outpatient oncologist, Dr. Galo, to ask him if he wants us to do a peripheral blood flow cytometry to check for blood contamination or a T-cell gene rearrangement on the marrow specimen, both of which were offered by the pathologist for further information if we thought it would be clinically helpful  Added T-cell gene rearrangement per Dr. Galo request    *Neutropenia  5/20/2024: WBC decreased to 1750.  Neutrophils decreased to 940.  5/24/2024: WBC decreased to 1290.  Neutrophils decreased to 680.  5/25/2024: WBC decreased to 860.  Neutrophils decreased to 530.  Worsening neutropenia is also concerning for relapse of the lymphoma with involvement of the bone marrow.   5/26/2024: Neutrophil count 610.  5/27/2024-total WBC count has increased to 1.46.  Absolute neutrophil count pending   5/28/2024-WBC count lower at 1.05 with an absolute neutrophil count of 0.57  Increase in metamyelocytes as well as basophil percentage.  Flow cytometry 5/28/2024-negative  ANC lower today at 0.41  Copper level normal at 145  MMA normal at 245  5/30/24 - bone marrow biopsy, results pending   6/1/2024-WBC count 0.82, absolute neutrophil count 0.23.  WBC was okay through 5/10/2024.  Await bone marrow biopsy final results.  6/4/2024: WBC 0.83.  Discussed with oncology pharmacist, Alexandra Lopez.  She notes mirtazapine was started on 5/27/2024 for appetite.  This can cause neutropenia.  Therefore, stop this on 6/4/ 24.  WBC 1.1, from 1.1, from 0.8      *CMV quantitative PCR positive on 6/5/2024, 14,200  Dr. Galo, her usual outpatient oncologist checked this because he was concerned she may have developed CMV  due to her severe cytopenias and CMV may be causing her ongoing prolonged cytopenias.  6/7/2024: I discussed with him.  He has requested an ID consult to get their thoughts if she needs treatment for CMV and also requests some Neupogen to try to improve the WBC/ANC     *Fever  Afebrile, Tmax of 99.5  Blood cultures negative  Patient at high risk for infections given neutropenia  Continue cefepime through 6/4/ 24 per ID recommendations.  After discontinuation of cefepime if patient remains neutropenic then they are recommending cefdinir  No recent fevers     *Macrocytic anemia.  Patient also developed anemia secondary to lymphoma and secondary to chemotherapy.    Hgb decreased to 7.9 on 4/25/2022 and she was transfused.   Hemoglobin decreased to 6.3 on 4/27/2022. She was transfused.  Hemoglobin level improved subsequently.  Patient was placed ferrous sulfate 325 mg 3 days a week.  5/3/2024: Hemoglobin increased to 10.2.  5/25/2024: Hemoglobin decreased to 7.8.  No evidence of vitamin B12 or folate deficiency.    Ferritin 1953.  Transferrin saturation 11%.  Patient decided to hold off on PRBC transfusion.  5/26/2024: Hemoglobin 8.2.  Due to her reporting chills when she received PRBC Tx in the past, we will give Tylenol, Benadryl and Solumedrol prior to the transfusion.   526 2024-1 unit of PRBC administered.  Patient tolerated the transfusion well.  Hemoglobin 9.5 on 6/2/2024  Copper and MMA levels normal  Hb 8.8, from 9     *Hyponatremia.  Patient had problem with hyponatremia in the past.  Sodium is 127 on 3/14/2023.  She was advised to increase salt intake.  Sodium improved to 133 on repeat lab on 3/21/2023.  However, sodium decreased to 126 on 6/7/2023.  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  She was started on sodium chloride 1 g daily.  4/1/2024: Sodium decreased to 132.  Leg swelling improved with Lasix.  5/3/2024: Sodium 130.  5/24/2024: Sodium decreased to 126.    Patient was  having significant weakness.  5/25/2024: Sodium 128.  Nephrology service was consulted.  5/28/2024-sodium stable at 132  5/30/2024-sodium 133  5/31/24 - sodium 134  6/1/2024-sodium 133  6/2/2024-sodium stable at 135  Continues on fluid restriction  Sodium 134, from 133     *Hyperkalemia previously, now hypokalemia.  5/24/2024: Potassium increased to 5.6.  Potassium 3.2, from 3.9     *Chronic anticoagulation.  Patient is on Eliquis 5 mg twice daily.  No excessive bleeding  Platelet count remains normal  Eliquis resumed      *Prophylaxis.  Patient had shingles in April 2023.  She is at risk of developing shingles while on treatment.  She was placed on acyclovir 400 mg twice daily.  She was on Bactrim DS 3 days a week.  Bactrim was discontinued due to the electrolyte abnormalities.     *Low-density lesion in the uterus.    The radiologist recommended pelvic ultrasound.    The patient is asymptomatic.    PET scan on 4/22/2024 reported fluid in the lower abdomen.  Patient reported intermittent vaginal discharge.     *Hypertension  Patient started on midodrine  Blood pressure has improved with midodrine.  On 10 mg 3 times a day     PLAN:  6/4/2024: Stopped mirtazapine because it can cause neutropenia (it was started on 5/27/2024).  She follows with Dr. Galo in our office.  Bone marrow was negative for lymphoma and MDS  Patient would like to go to rehab in an effort to improve performance status.  We are not planning any systemic therapy such as chemotherapy at this time, or while she is in rehab.  We are hoping she gets stronger in rehab  Consult ID for an opinion on if CMV should be treated  6/7/2024: Begin Neupogen 300 mcg daily     Chart reviewed and summarized including hospital's note and nephrology note.  She would like to go to rehab.  Case discussed with Dr. Galo, her regular outpatient oncologist

## 2024-06-08 LAB
ANION GAP SERPL CALCULATED.3IONS-SCNC: 8 MMOL/L (ref 5–15)
BASOPHILS # BLD MANUAL: 0.02 10*3/MM3 (ref 0–0.2)
BASOPHILS NFR BLD MANUAL: 1 % (ref 0–1.5)
BUN SERPL-MCNC: 13 MG/DL (ref 8–23)
BUN/CREAT SERPL: 26 (ref 7–25)
CALCIUM SPEC-SCNC: 8.2 MG/DL (ref 8.6–10.5)
CHLORIDE SERPL-SCNC: 103 MMOL/L (ref 98–107)
CO2 SERPL-SCNC: 25 MMOL/L (ref 22–29)
CREAT SERPL-MCNC: 0.5 MG/DL (ref 0.57–1)
DEPRECATED RDW RBC AUTO: 47.7 FL (ref 37–54)
EGFRCR SERPLBLD CKD-EPI 2021: 93.8 ML/MIN/1.73
EOSINOPHIL # BLD MANUAL: 0 10*3/MM3 (ref 0–0.4)
EOSINOPHIL NFR BLD MANUAL: 0 % (ref 0.3–6.2)
ERYTHROCYTE [DISTWIDTH] IN BLOOD BY AUTOMATED COUNT: 13.4 % (ref 12.3–15.4)
GIANT PLATELETS: ABNORMAL
GLUCOSE SERPL-MCNC: 80 MG/DL (ref 65–99)
HCT VFR BLD AUTO: 26.1 % (ref 34–46.6)
HGB BLD-MCNC: 8.7 G/DL (ref 12–15.9)
LYMPHOCYTES # BLD MANUAL: 0.29 10*3/MM3 (ref 0.7–3.1)
LYMPHOCYTES NFR BLD MANUAL: 18.2 % (ref 5–12)
MCH RBC QN AUTO: 32.2 PG (ref 26.6–33)
MCHC RBC AUTO-ENTMCNC: 33.3 G/DL (ref 31.5–35.7)
MCV RBC AUTO: 96.7 FL (ref 79–97)
METAMYELOCYTES NFR BLD MANUAL: 6.1 % (ref 0–0)
MONOCYTES # BLD: 0.37 10*3/MM3 (ref 0.1–0.9)
MYELOCYTES NFR BLD MANUAL: 1 % (ref 0–0)
NEUTROPHILS # BLD AUTO: 1.22 10*3/MM3 (ref 1.7–7)
NEUTROPHILS NFR BLD MANUAL: 59.6 % (ref 42.7–76)
PLATELET # BLD AUTO: 136 10*3/MM3 (ref 140–450)
PMV BLD AUTO: 9.8 FL (ref 6–12)
POTASSIUM SERPL-SCNC: 3.3 MMOL/L (ref 3.5–5.2)
POTASSIUM SERPL-SCNC: 4.3 MMOL/L (ref 3.5–5.2)
RBC # BLD AUTO: 2.7 10*6/MM3 (ref 3.77–5.28)
RBC MORPH BLD: NORMAL
SMUDGE CELLS BLD QL SMEAR: ABNORMAL
SODIUM SERPL-SCNC: 136 MMOL/L (ref 136–145)
VARIANT LYMPHS NFR BLD MANUAL: 14.1 % (ref 19.6–45.3)
WBC NRBC COR # BLD AUTO: 2.05 10*3/MM3 (ref 3.4–10.8)

## 2024-06-08 PROCEDURE — 80048 BASIC METABOLIC PNL TOTAL CA: CPT | Performed by: INTERNAL MEDICINE

## 2024-06-08 PROCEDURE — 99232 SBSQ HOSP IP/OBS MODERATE 35: CPT | Performed by: INTERNAL MEDICINE

## 2024-06-08 PROCEDURE — 85025 COMPLETE CBC W/AUTO DIFF WBC: CPT | Performed by: HOSPITALIST

## 2024-06-08 PROCEDURE — 99233 SBSQ HOSP IP/OBS HIGH 50: CPT | Performed by: STUDENT IN AN ORGANIZED HEALTH CARE EDUCATION/TRAINING PROGRAM

## 2024-06-08 PROCEDURE — 85007 BL SMEAR W/DIFF WBC COUNT: CPT | Performed by: HOSPITALIST

## 2024-06-08 PROCEDURE — 25010000002 FILGRASTIM 300 MCG/0.5ML SOLUTION PREFILLED SYRINGE: Performed by: INTERNAL MEDICINE

## 2024-06-08 PROCEDURE — 84132 ASSAY OF SERUM POTASSIUM: CPT | Performed by: STUDENT IN AN ORGANIZED HEALTH CARE EDUCATION/TRAINING PROGRAM

## 2024-06-08 RX ORDER — POTASSIUM CHLORIDE 750 MG/1
40 TABLET, FILM COATED, EXTENDED RELEASE ORAL EVERY 4 HOURS
Status: COMPLETED | OUTPATIENT
Start: 2024-06-08 | End: 2024-06-08

## 2024-06-08 RX ADMIN — APIXABAN 2.5 MG: 2.5 TABLET, FILM COATED ORAL at 10:05

## 2024-06-08 RX ADMIN — Medication 1 TABLET: at 10:05

## 2024-06-08 RX ADMIN — POTASSIUM CHLORIDE 40 MEQ: 750 TABLET, EXTENDED RELEASE ORAL at 15:36

## 2024-06-08 RX ADMIN — AMIODARONE HYDROCHLORIDE 200 MG: 200 TABLET ORAL at 10:05

## 2024-06-08 RX ADMIN — APIXABAN 2.5 MG: 2.5 TABLET, FILM COATED ORAL at 20:48

## 2024-06-08 RX ADMIN — ACETAMINOPHEN 325MG 650 MG: 325 TABLET ORAL at 00:10

## 2024-06-08 RX ADMIN — SODIUM CHLORIDE TAB 1 GM 1 G: 1 TAB at 17:51

## 2024-06-08 RX ADMIN — SODIUM CHLORIDE TAB 1 GM 1 G: 1 TAB at 15:36

## 2024-06-08 RX ADMIN — POTASSIUM CHLORIDE 40 MEQ: 750 TABLET, EXTENDED RELEASE ORAL at 10:05

## 2024-06-08 RX ADMIN — CETIRIZINE HYDROCHLORIDE 10 MG: 10 TABLET ORAL at 10:05

## 2024-06-08 RX ADMIN — MIDODRINE HYDROCHLORIDE 10 MG: 5 TABLET ORAL at 12:18

## 2024-06-08 RX ADMIN — MIDODRINE HYDROCHLORIDE 10 MG: 5 TABLET ORAL at 17:51

## 2024-06-08 RX ADMIN — MIDODRINE HYDROCHLORIDE 10 MG: 5 TABLET ORAL at 10:05

## 2024-06-08 RX ADMIN — HYDROCORTISONE: 25 CREAM TOPICAL at 10:05

## 2024-06-08 RX ADMIN — FILGRASTIM 300 MCG: 300 INJECTION, SOLUTION INTRAVENOUS; SUBCUTANEOUS at 17:51

## 2024-06-08 NOTE — CONSULTS
LOS: 15 days     Chief Complaint:      Prolonged cytopenias.  Positive CMV PCR.  Should the CMV be treated?       Interval History: ID reconsulted in the setting of positive CMV PCR.  Patient seen earlier on in the admission in the setting of neutropenic fever.  CMV PCR from 6/5 elevated at 14,200.    Today patient denies any acute complaints.  She remains afebrile.  Continues to have ongoing cytopenias.    Vital Signs  Temp:  [98.1 °F (36.7 °C)-99.3 °F (37.4 °C)] 98.1 °F (36.7 °C)  Heart Rate:  [51-74] 51  Resp:  [16] 16  BP: ()/(45-58) 85/47    Physical Exam:  General: In no acute distress.  Frail and ill-appearing.  HEENT: Oropharynx clear,  Respiratory: Normal work of breathing  GI: Soft, NT/ND  Extremities: No edema, cyanosis      Antibiotics:  Anti-Infectives (From admission, onward)      Ordered     Dose/Rate Route Frequency Start Stop    05/31/24 0830  cefepime 2000 mg IVPB in 100 mL NS (MBP)        Ordering Provider: Mick Brown MD    2,000 mg  over 4 Hours Intravenous Every 12 Hours 05/31/24 1515 06/04/24 1935    05/24/24 1712  cefepime 2000 mg IVPB in 100 mL NS (MBP)        Ordering Provider: Abel Pelaez MD    2,000 mg  over 30 Minutes Intravenous Once 05/24/24 1800 05/24/24 2205             Results Review:     I reviewed the patient's new clinical results.    Lab Results   Component Value Date    WBC 2.05 (L) 06/08/2024    HGB 8.7 (L) 06/08/2024    HCT 26.1 (L) 06/08/2024    MCV 96.7 06/08/2024     (L) 06/08/2024     Lab Results   Component Value Date    GLUCOSE 80 06/08/2024    BUN 13 06/08/2024    CREATININE 0.50 (L) 06/08/2024    EGFRIFNONA 62 02/23/2022    BCR 26.0 (H) 06/08/2024    CO2 25.0 06/08/2024    CALCIUM 8.2 (L) 06/08/2024    PROTENTOTREF 6.2 11/21/2023    ALBUMIN 2.5 (L) 05/31/2024    LABIL2 1.3 11/21/2023    AST 18 05/31/2024    ALT 22 05/31/2024       Microbiology:  6/5/2024 CMV PCR 14,200    Assessment    #CMV viremia  #Diffuse large B-cell  lymphoma  #Pancytopenia    Elevated CMV PCR in the setting of large B-cell lymphoma is not uncommon.  Difficult to always tease out the significance however.  Patient has had ongoing cytopenias which could be due to multiple causes however CMV would be in the differential.    Patient appears to be asymptomatic and this is quite typical even and in true infection.  Currently her neutropenia has improved with platelets remaining borderline.    I have discussed the treatment options with the patient which for her would likely be valganciclovir.  I discussed empiric treatment versus continued monitoring.  At this point with her counts remaining stable I think it would be reasonable to repeat a CMV PCR at oncology follow-up and reassess the trend.  If continues to remain stable or trend up then ID would be happy to evaluate outpatient for therapy.      Thank you for allowing me to be involved in the care of this patient. Infectious diseases will sign off at this time with antibiotics plan in place, but please call me at [phone] if any further ID questions or new ID concerns.

## 2024-06-08 NOTE — PROGRESS NOTES
Nephrology Follow Up Note    Patient Identification:  Name: Michelle Car MRN: [id number]  Age: 83 y.o. : 1940  Sex: female  Date:2024    Requesting Physician: As per consult order.  Reason for Consultation: hyponatremia   Information from:patient/ family/ chart      History of Present Illness: This is a 83 y.o. year old female  with lymphoma admitted for weakness, ,hyponatremia and edema.   She completed chemo in 2024.  She has had ongoing issues with hyponatremia.  She was on salt tabs at home Upon arrival her Na was 126 with K at 5.6.   She was given IV fluids of NS as bp was low upon arrival.       feels better, Na now at 132, bp near goal      : Complaining of weakness.  Wants to look into nursing/rehab placement  Otherwise no new complaints    6/3: Interval events reviewed, no new complaints  Awaiting final pathology    : Resting today denies complaints, no new labs    : Resting comfortably in bed denies new problems or complaints BP remains soft  But asymptomatic.  Sodium 134 today    : Resting comfortably with no new complaints or events    The following medical history and medications personally reviewed by me:        Current Meds:   Current Facility-Administered Medications   Medication Dose Route Frequency Provider Last Rate Last Admin    acetaminophen (TYLENOL) tablet 650 mg  650 mg Oral Q4H PRN Abel Pelaez MD   650 mg at 24 0010    Or    acetaminophen (TYLENOL) 160 MG/5ML oral solution 650 mg  650 mg Oral Q4H PRN Abel Pelaez MD        Or    acetaminophen (TYLENOL) suppository 650 mg  650 mg Rectal Q4H PRN Abel Pelaez MD        albuterol (PROVENTIL) nebulizer solution 0.083% 2.5 mg/3mL  2.5 mg Nebulization Q4H PRN Abel Pelaez MD        amiodarone (PACERONE) tablet 200 mg  200 mg Oral Q24H Abel Pelaez MD   200 mg at 24    apixaban (ELIQUIS) tablet 2.5 mg  2.5 mg Oral Q12H Mallory Del Castillo MD   2.5 mg at 24     sennosides-docusate (PERICOLACE) 8.6-50 MG per tablet 2 tablet  2 tablet Oral BID PRN Abel Pelaez MD   2 tablet at 24 1019    And    polyethylene glycol (MIRALAX) packet 17 g  17 g Oral Daily PRN Abel Pelaez MD   17 g at 24 1020    And    bisacodyl (DULCOLAX) EC tablet 5 mg  5 mg Oral Daily PRN Abel Pelaez MD        And    bisacodyl (DULCOLAX) suppository 10 mg  10 mg Rectal Daily PRN Abel Pelaez MD        cetirizine (zyrTEC) tablet 10 mg  10 mg Oral Daily Abel Pelaez MD   10 mg at 24 0855    ferrous sulfate tablet 325 mg  325 mg Oral Every Other Day Abel Pelaez MD   325 mg at 24 0856    Filgrastim (NEUPOGEN) injection 300 mcg  300 mcg Subcutaneous Q PM Victorino Claire II, MD   300 mcg at 24 181    Hydrocortisone (Perianal) (ANUSOL-HC) 2.5 % rectal cream   Rectal Daily Abel Pelaez MD   Given at 24 0856    midodrine (PROAMATINE) tablet 10 mg  10 mg Oral TID AC Morales Hardwick MD   10 mg at 24 181    multivitamin with minerals 1 tablet  1 tablet Oral Daily Abel Pelaez MD   1 tablet at 24 0855    nitroglycerin (NITROSTAT) SL tablet 0.4 mg  0.4 mg Sublingual Q5 Min PRN Abel Pelaez MD        ondansetron (ZOFRAN) injection 4 mg  4 mg Intravenous Q6H PRN Abel Pelaez MD        potassium chloride (K-DUR,KLOR-CON) ER tablet 40 mEq  40 mEq Oral Q4H Hansel Garcias DO        Potassium Replacement - Follow Nurse / BPA Driven Protocol   Does not apply PRN Morales Farrell MD        sodium chloride 0.9 % flush 10 mL  10 mL Intravenous Q12H Abel Pelaez MD   10 mL at 24 0856    sodium chloride 0.9 % flush 10 mL  10 mL Intravenous PRN Abel Pelaez MD        sodium chloride 0.9 % infusion 40 mL  40 mL Intravenous PRN Abel Pelaez MD        sodium chloride tablet 1 g  1 g Oral BID With Meals Aarti Reynolds MD   1 g at 24         Physical Exam:  Vitals:   Temp (24hrs), Av.6 °F (37 °C), Min:98.1 °F (36.7 °C), Max:99.3 °F (37.4  "°C)    BP (!) 85/47 (BP Location: Right arm, Patient Position: Lying)   Pulse 51   Temp 98.1 °F (36.7 °C) (Oral)   Resp 16   Ht 157.5 cm (62\")   Wt 50.2 kg (110 lb 10.7 oz)   SpO2 96%   BMI 20.24 kg/m²   Intake/Output:     Intake/Output Summary (Last 24 hours) at 6/8/2024 0842  Last data filed at 6/8/2024 0616  Gross per 24 hour   Intake 110 ml   Output --   Net 110 ml        Wt Readings from Last 1 Encounters:   06/04/24 1704 50.2 kg (110 lb 10.7 oz)   05/27/24 1433 54 kg (119 lb)   05/24/24 1849 54.2 kg (119 lb 7.8 oz)   05/24/24 1701 51.7 kg (114 lb)       Exam:  Alert and oriented NAD   eomi no icterus   Moist mucus membranes   No jvd reg s1s2 no murmur   Clear bilaterally no rales/rhonchi/wheeze   Soft NTND + bs   5/5 strength bilaterally  No edema   Warm dry no rash   Normal mood and judgement         DATA:  Radiology and Labs:  The following labs and radiology results independently reviewed by me    Labs:   Recent Results (from the past 24 hour(s))   Basic Metabolic Panel    Collection Time: 06/08/24  6:58 AM    Specimen: Blood   Result Value Ref Range    Glucose 80 65 - 99 mg/dL    BUN 13 8 - 23 mg/dL    Creatinine 0.50 (L) 0.57 - 1.00 mg/dL    Sodium 136 136 - 145 mmol/L    Potassium 3.3 (L) 3.5 - 5.2 mmol/L    Chloride 103 98 - 107 mmol/L    CO2 25.0 22.0 - 29.0 mmol/L    Calcium 8.2 (L) 8.6 - 10.5 mg/dL    BUN/Creatinine Ratio 26.0 (H) 7.0 - 25.0    Anion Gap 8.0 5.0 - 15.0 mmol/L    eGFR 93.8 >60.0 mL/min/1.73   CBC Auto Differential    Collection Time: 06/08/24  6:58 AM    Specimen: Blood   Result Value Ref Range    WBC 2.05 (L) 3.40 - 10.80 10*3/mm3    RBC 2.70 (L) 3.77 - 5.28 10*6/mm3    Hemoglobin 8.7 (L) 12.0 - 15.9 g/dL    Hematocrit 26.1 (L) 34.0 - 46.6 %    MCV 96.7 79.0 - 97.0 fL    MCH 32.2 26.6 - 33.0 pg    MCHC 33.3 31.5 - 35.7 g/dL    RDW 13.4 12.3 - 15.4 %    RDW-SD 47.7 37.0 - 54.0 fl    MPV 9.8 6.0 - 12.0 fL    Platelets 136 (L) 140 - 450 10*3/mm3   Manual Differential    " Collection Time: 06/08/24  6:58 AM    Specimen: Blood   Result Value Ref Range    Neutrophil % 59.6 42.7 - 76.0 %    Lymphocyte % 14.1 (L) 19.6 - 45.3 %    Monocyte % 18.2 (H) 5.0 - 12.0 %    Eosinophil % 0.0 (L) 0.3 - 6.2 %    Basophil % 1.0 0.0 - 1.5 %    Metamyelocyte % 6.1 (H) 0.0 - 0.0 %    Myelocyte % 1.0 (H) 0.0 - 0.0 %    Neutrophils Absolute 1.22 (L) 1.70 - 7.00 10*3/mm3    Lymphocytes Absolute 0.29 (L) 0.70 - 3.10 10*3/mm3    Monocytes Absolute 0.37 0.10 - 0.90 10*3/mm3    Eosinophils Absolute 0.00 0.00 - 0.40 10*3/mm3    Basophils Absolute 0.02 0.00 - 0.20 10*3/mm3    RBC Morphology Normal Normal    Smudge Cells Slight/1+ None Seen    Giant Platelets Slight/1+ None Seen       Radiology:  Imaging Results (Last 24 Hours)       ** No results found for the last 24 hours. **             ASSESSMENT:   Lymphoma   Hypotension   Hyponatremia   Hyperkalemia   Chronic diastolic heart failure   Relapsing diffuse large B-cell lymphoma  Adrenal insufficiency - per chart, but doesn't follow with endo   Hypoalbuminemia   Edema   Parox afib       PLAN:   Sodium improved, 136  Renal function at baseline  Oral potassium replacement has been ordered  Continue sodium chloride tablets  Midodrine for hypotension  No need for daily chemistries.  Will follow-up again on Monday        Jr Le M.D  [facility]  Office phone number: [phone]  Answering service phone number: [phone]

## 2024-06-08 NOTE — PROGRESS NOTES
REASON FOR FOLLOWUP/CHIEF COMPLAINT:  DLBCL, anemia, neutropenia    HISTORY OF PRESENT ILLNESS:   No new problems overnight.  ID did evaluate and after discussion with the patient the decision was observation of CMV status.    Past Medical History, Past Surgical History, Social History, Family History have been reviewed and are without significant changes except as mentioned.    Review of Systems   Review of Systems   Constitutional:  Negative for activity change.   HENT:  Negative for nosebleeds and trouble swallowing.    Respiratory:  Negative for shortness of breath and wheezing.    Cardiovascular:  Negative for chest pain and palpitations.   Gastrointestinal:  Negative for constipation, diarrhea and nausea.   Genitourinary:  Negative for dysuria and hematuria.   Musculoskeletal:  Negative for arthralgias and myalgias.   Skin:  Negative for rash and wound.   Neurological:  Negative for seizures and syncope.   Hematological:  Negative for adenopathy. Does not bruise/bleed easily.   Psychiatric/Behavioral:  Negative for confusion.        Medications:  The current medication list was reviewed in the EMR    ALLERGIES:    Allergies   Allergen Reactions    Factive [Gemifloxacin] Rash              Vitals:    06/07/24 1915 06/08/24 0355 06/08/24 0458 06/08/24 0745   BP: 99/56 (!) 79/49 (!) 88/45 (!) 85/47   BP Location: Right arm Left arm Left arm Right arm   Patient Position: Lying Lying  Lying   Pulse: 74 57  51   Resp: 16 16  16   Temp: 99.3 °F (37.4 °C) 98.1 °F (36.7 °C)     TempSrc: Oral Oral     SpO2: 99% 96%  96%   Weight:       Height:         Physical Exam    CONSTITUTIONAL:  Vital signs reviewed.  No distress, looks comfortable.  EYES:  Conjunctivae and lids unremarkable.  PERRLA  EARS, NOSE, MOUTH, THROAT:  Ears and nose appear unremarkable.  Lips, teeth, gums appear unremarkable.  RESPIRATORY:  Normal respiratory effort.  Lungs clear to auscultation bilaterally.  CARDIOVASCULAR:  Normal S1, S2.  No  murmurs, rubs or gallops.  No significant lower extremity edema.  GASTROINTESTINAL: Abdomen appears unremarkable.  Nontender.  No hepatomegaly.  No splenomegaly.  NEURO: Cranial nerves 2-12 grossly intact.  No focal deficits.  Appears to have equal strength all 4 extremities.  MUSCULOSKELETAL:  Unremarkable digits/nails.  No cyanosis or clubbing.  SKIN:  Warm.  No rashes.  PSYCHIATRIC:  Normal judgment and insight.  Normal mood and affect.        RECENT LABS:  WBC   Date Value Ref Range Status   06/08/2024 2.05 (L) 3.40 - 10.80 10*3/mm3 Final   06/07/2024 1.09 (C) 3.40 - 10.80 10*3/mm3 Final   06/06/2024 1.05 (C) 3.40 - 10.80 10*3/mm3 Final     Hemoglobin   Date Value Ref Range Status   06/08/2024 8.7 (L) 12.0 - 15.9 g/dL Final   06/07/2024 8.8 (L) 12.0 - 15.9 g/dL Final   06/06/2024 9.0 (L) 12.0 - 15.9 g/dL Final     Platelets   Date Value Ref Range Status   06/08/2024 136 (L) 140 - 450 10*3/mm3 Final   06/07/2024 132 (L) 140 - 450 10*3/mm3 Final   06/06/2024 141 140 - 450 10*3/mm3 Final       ASSESSMENT/PLAN:  Michelle Car P382/[id number]     *Relapsed diffuse large B-cell lymphoma.  Patient was found to have bilateral pleural effusions.    She underwent right thoracentesis on 2/21/2022 and the left thoracentesis on 2/22/2022.    Analysis of the fluid revealed involvement with diffuse large B-cell lymphoma.    FISH analysis for BCL6 rearrangement was positive but was negative for BCL-2 and MYC rearrangement.    PET scan on 3/10/2022 revealed significant hypermetabolism in the left adrenal gland and the surrounding soft tissue with SUV up to 34.5. Hypermetabolism in the left pleural thickening with SUV of 7.1. there was less hypermetabolism in the right adrenal gland and in the right pleura.  She was considered to have stage III non-bulky disease.  R-IPI score of 3 associated with poor risk - associated with overall survival of 55%.   R-CHOP with Neulasta support was started on 3/28/2022.  Due to development of  neutropenic fever following cycles 1 and 2, treatment was changed to mini-RCHOP starting cycle #3 on 5/16/2022.  Patient received cycle #6 on 7/25/2022.  PET scan on 8/15/2022 revealed complete metabolic response.  Patient was found to have recurrence with development of a left mandibular mass in October/November 2023.  Biopsy of left mandible mass on 11/28/2023 showed recurrence of the lymphoma, diffuse large B cell, non-GCB phenotype.  Ki-67 was 90%.    It was positive for Bcl-6 but negative for Bcl-2 and MYC rearrangement.  PET scan on 12/11/2023 revealed multiple areas of involvement -prevascular space, left epicardial fat pad, pleural density posterior to the left fourth rib anterior aspect, lesion along the pericardium versus intramuscular with SUV of 9.8.  Hypermetabolic activity at both adrenal glands, uniformly increased in size and appeared hyperplastic.  There was a 2.7 x 1.1 cm soft tissue nodule lateral to the right erector spinae muscle posterior to the right posterior 11th rib with an SUV of 20.5 and there was a 1.3 cm nodule lateral to the posterior margin of the left psoas muscle with an SUV of 24.9.  Rituxan Polivy and Treanda with Treanda every 3 weeks for 6 cycles was started on 12/12/2023.  The jaw mass started to decrease in size after starting treatment.  CT scan on 2/8/2024 showed evidence of response to treatment.  Cycle #6 was given on 4/1/2024.  PET scan on 4/22/2024 revealed decrease in the hypermetabolism in the mediastinal and pericardial lymph nodes/lesions and in the lesions above the left kidney and adrenal gland.  The left mandibular lesion was no longer hypermetabolic.   She is at increased risk for relapse of the lymphoma.  The plan was to start treatment with Revlimid 10 mg daily.  However, the patient did not start the treatment yet.  LDH was previously elevated but improved to 190 on 5/25/2024.  Bone marrow 5/30/2024:   10 to 20% cellularity.  No evidence of lymphoma.   Trilineage hematopoiesis with decreased, left shifted granulopoiesis.  Pathologist noted the significance of the gamma delta T-cell population identified by flow cytometry is uncertain and can be seen in reactive processes.  The pathologist noted of a neoplastic process is considered clinically T-cell gene rearrangement studies could be performed.  I sent a message to the patient's outpatient oncologist, Dr. Galo, to ask him if he wants us to do a peripheral blood flow cytometry to check for blood contamination or a T-cell gene rearrangement on the marrow specimen, both of which were offered by the pathologist for further information if we thought it would be clinically helpful  Added T-cell gene rearrangement per Dr. Galo request    *Neutropenia  5/20/2024: WBC decreased to 1750.  Neutrophils decreased to 940.  5/24/2024: WBC decreased to 1290.  Neutrophils decreased to 680.  5/25/2024: WBC decreased to 860.  Neutrophils decreased to 530.  Worsening neutropenia is also concerning for relapse of the lymphoma with involvement of the bone marrow.   5/26/2024: Neutrophil count 610.  5/27/2024-total WBC count has increased to 1.46.  Absolute neutrophil count pending   5/28/2024-WBC count lower at 1.05 with an absolute neutrophil count of 0.57  Increase in metamyelocytes as well as basophil percentage.  Flow cytometry 5/28/2024-negative  ANC lower today at 0.41  Copper level normal at 145  MMA normal at 245  5/30/24 - bone marrow biopsy, results pending   6/1/2024-WBC count 0.82, absolute neutrophil count 0.23.  WBC was okay through 5/10/2024.  Await bone marrow biopsy final results.  6/4/2024: WBC 0.83.  Discussed with oncology pharmacist, Alexandra Lopez.  She notes mirtazapine was started on 5/27/2024 for appetite.  This can cause neutropenia.  Therefore, stop this on 6/4/ 24.  6/7/2024: Daily Granix added as per Dr. Galo, her outpatient hematologist recommendation.  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 Galo, her usual outpatient oncologist checked this because he was concerned she may have developed CMV due to her severe cytopenias and CMV may be causing her ongoing prolonged cytopenias.  6/7/2024: I discussed with him.  He has requested an ID consult to get their thoughts if she needs treatment for CMV and also requests some Neupogen to try to improve the WBC/ANC  Dr. Jens Amin, ID, evaluated 6/8/2024.  He stated elevated CMV PCR in the setting of DLBCL is not uncommon.  He stated is difficult to know the significance.  He stated CMV could be a cause of her prolonged cytopenias.  He noted she is asymptomatic but stated patients are often asymptomatic with true infection.  He stated the treatment would likely be valganciclovir.  He discussed empiric treatment versus continued monitoring.  He felt since her counts were stable it would be reasonable to repeat CMV PCR at oncology follow-up to reassess the trend.  He noted if CMV PCR remains stable or trends up then ID would be happy to reevaluate in the outpatient setting.  He signed off the case.     *Fever  Afebrile, Tmax of 99.5  Blood cultures negative  Patient at high risk for infections given neutropenia  Continue cefepime through 6/4/ 24 per ID recommendations.  After discontinuation of cefepime if patient remains neutropenic then they are recommending cefdinir  No recent fever     *Macrocytic anemia.  Patient also developed anemia secondary to lymphoma and secondary to chemotherapy.    Hgb decreased to 7.9 on 4/25/2022 and she was transfused.   Hemoglobin decreased to 6.3 on 4/27/2022. She was transfused.  Hemoglobin level improved subsequently.  Patient was placed ferrous sulfate 325 mg 3 days a week.  5/3/2024: Hemoglobin increased to 10.2.  5/25/2024: Hemoglobin decreased to 7.8.  No evidence of vitamin B12 or folate deficiency.    Ferritin 1953.  Transferrin  saturation 11%.  Patient decided to hold off on PRBC transfusion.  5/26/2024: Hemoglobin 8.2.  Due to her reporting chills when she received PRBC Tx in the past, we will give Tylenol, Benadryl and Solumedrol prior to the transfusion.   526 2024-1 unit of PRBC administered.  Patient tolerated the transfusion well.  Hemoglobin 9.5 on 6/2/2024  Copper and MMA levels normal  Hb 8.7, from 8.8, from 9     *Hyponatremia.  Patient had problem with hyponatremia in the past.  Sodium is 127 on 3/14/2023.  She was advised to increase salt intake.  Sodium improved to 133 on repeat lab on 3/21/2023.  However, sodium decreased to 126 on 6/7/2023.  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  She was started on sodium chloride 1 g daily.  4/1/2024: Sodium decreased to 132.  Leg swelling improved with Lasix.  5/3/2024: Sodium 130.  5/24/2024: Sodium decreased to 126.    Patient was having significant weakness.  5/25/2024: Sodium 128.  Nephrology service was consulted.  5/28/2024-sodium stable at 132  5/30/2024-sodium 133  5/31/24 - sodium 134  6/1/2024-sodium 133  6/2/2024-sodium stable at 135  Continues on fluid restriction  Sodium 136, from 134, from 133     *Hyperkalemia previously, now hypokalemia.  5/24/2024: Potassium increased to 5.6.  Potassium 3.3, from 3.2, from 3.9     *Chronic anticoagulation.  Patient is on Eliquis 5 mg twice daily.  No excessive bleeding  Platelet count remains normal  Eliquis resumed      *Prophylaxis.  Patient had shingles in April 2023.  She is at risk of developing shingles while on treatment.  She was placed on acyclovir 400 mg twice daily.  She was on Bactrim DS 3 days a week.  Bactrim was discontinued due to the electrolyte abnormalities.     *Low-density lesion in the uterus.    The radiologist recommended pelvic ultrasound.    The patient is asymptomatic.    PET scan on 4/22/2024 reported fluid in the lower abdomen.  Patient reported intermittent vaginal discharge.      *Hypertension  Patient started on midodrine  Blood pressure has improved with midodrine.  On 10 mg 3 times a day     PLAN:  6/4/2024: Stopped mirtazapine because it can cause neutropenia (it was started on 5/27/2024).  She follows with Dr. Galo in our office.  Bone marrow was negative for lymphoma and MDS  Patient would like to go to rehab in an effort to improve performance status.  We are not planning any systemic therapy such as chemotherapy at this time, or while she is in rehab.  We are hoping she gets stronger in rehab  ID recommends Dr. Galo check another CMV PCR when patient follows up in our office and states she could be sent back to the ID clinic if CMV PCR is stable or higher.  He recommended watching WBC as it appeared she was slowly trending up.  Neupogen was added 6/7/2024 so it may be difficult to know if the WBC is truly improving.  I am not sure how daily Neupogen will affect her ability to go to rehab.  It may be that the Neupogen needs to be stopped in order to allow her to go to rehab but we can work this out on Monday when CCP is available       Chart reviewed and summarized including ID note and nephrology note

## 2024-06-08 NOTE — PLAN OF CARE
Goal Outcome Evaluation:         Patient alert and oriented x 4. VSS. See MAR for a complete listing of medications administered this shift. No distress noted. Plan of care is ongoing.

## 2024-06-08 NOTE — PROGRESS NOTES
Name: Michelle Car ADMIT: 2024   : 1940  PCP: Olayinka Pimentel MD    MRN: [id number] LOS: 15 days   AGE/SEX: 83 y.o. female  ROOM: [facility]     Subjective   Subjective   Patient seen and examined this morning.  Hospital day 15.  She is awake and resting comfortably, she has no acute complaints at present.  No acute events overnight.       Objective   Objective   Vital Signs  Temp:  [98.1 °F (36.7 °C)-99.3 °F (37.4 °C)] 98.1 °F (36.7 °C)  Heart Rate:  [51-74] 73  Resp:  [16] 16  BP: ()/(45-58) 92/51  SpO2:  [94 %-100 %] 94 %  on   ;   Device (Oxygen Therapy): room air  Body mass index is 20.24 kg/m².  Physical Exam  Vitals and nursing note reviewed.   Constitutional:       General: She is not in acute distress.     Comments: Elderly, thin/frail, chronically ill-appearing   Cardiovascular:      Rate and Rhythm: Normal rate and regular rhythm.      Pulses: Normal pulses.      Heart sounds: Normal heart sounds.   Pulmonary:      Effort: Pulmonary effort is normal. No respiratory distress.      Breath sounds: Normal breath sounds.   Abdominal:      Palpations: Abdomen is soft.      Tenderness: There is no abdominal tenderness.   Skin:     General: Skin is warm and dry.      Comments: Scattered bruises   Neurological:      Mental Status: She is alert.       Results Review     I reviewed the patient's new clinical results.  Results from last 7 days   Lab Units 24  0658 24  0721 24  0510 24  0635   WBC 10*3/mm3 2.05* 1.09* 1.05* 0.80*   HEMOGLOBIN g/dL 8.7* 8.8* 9.0* 8.9*   PLATELETS 10*3/mm3 136* 132* 141 132*     Results from last 7 days   Lab Units 24  0658 24  1529 24  0510 24  0351 24  0343   SODIUM mmol/L 136  --  134* 133* 135*   POTASSIUM mmol/L 3.3* 3.8 3.2* 3.9 4.0   CHLORIDE mmol/L 103  --  104 100 102   CO2 mmol/L 25.0  --  23.0 20.0* 22.0   BUN mg/dL 13  --  17 25* 22   CREATININE mg/dL 0.50*  --  0.48* 0.60 0.67   GLUCOSE mg/dL  "80  --  87 76 91   EGFR mL/min/1.73 93.8  --  94.7 89.7 87.4       Results from last 7 days   Lab Units 06/08/24  0658 06/06/24  0510 06/03/24  0351 06/02/24  0343   CALCIUM mg/dL 8.2* 8.3* 8.7 8.5*       No results found for: \"HGBA1C\", \"POCGLU\"    No radiology results for the last day    I have personally reviewed all medications:  Scheduled Medications  amiodarone, 200 mg, Oral, Q24H  apixaban, 2.5 mg, Oral, Q12H  cetirizine, 10 mg, Oral, Daily  ferrous sulfate, 325 mg, Oral, Every Other Day  filgrastim (NEUPOGEN) injection, 300 mcg, Subcutaneous, Q PM  Hydrocortisone (Perianal), , Rectal, Daily  midodrine, 10 mg, Oral, TID AC  multivitamin with minerals, 1 tablet, Oral, Daily  potassium chloride ER, 40 mEq, Oral, Q4H  sodium chloride, 10 mL, Intravenous, Q12H  sodium chloride, 1 g, Oral, BID With Meals    Infusions   Diet  Diet: Regular/House; Neutropenic/Low Microbial; Fluid Consistency: Thin (IDDSI 0)    I have personally reviewed:  [x]  Laboratory   [x]  Microbiology   [x]  Radiology   [x]  EKG/Telemetry  [x]  Cardiology/Vascular   []  Pathology    []  Records       Assessment/Plan     Active Hospital Problems    Diagnosis  POA    **Leukopenia [D72.819]  Unknown    Severe malnutrition [E43]  Yes    Anemia [D64.9]  Unknown    Weight loss [R63.4]  Unknown    Chronic diastolic CHF (congestive heart failure) [I50.32]  Yes    Paroxysmal atrial fibrillation with rapid ventricular response [I48.0]  Yes    Hyponatremia [E87.1]  Yes    Diffuse large B-cell lymphoma of lymph nodes of multiple regions [C83.38]  Yes    CRI (chronic renal insufficiency), stage 2 (mild) [N18.2]  Yes      Resolved Hospital Problems    Diagnosis Date Resolved POA    Hyperkalemia [E87.5] 06/01/2024 Unknown       83 y.o. female admitted with Leukopenia.    Diffuse large B cell lymphoma  Neutropenia/Pancytopenia  - Oncology following, patient started on Granix daily. WBC remains low but improved from prior. Hemoglobin and platelets remain low on " most recent labs. Will continue to follow their plans/recommendations. Greatly appreciate their help.  - Closely monitor blood counts with daily CBC.    CMV quantitative PCR positive (06/05/24)  - Noted on labs, ID evaluated, planning to monitor, repeat CMV PCR with Oncology at follow up to assess trend, to determine if ID needs to reassess and if treatment should be started.    Fever  - Noted previously, patient treated with Cefepime per ID, which has been completed, remains afebrile now.    Hyponatremia  - Noted on labs, now improved on most recent testing. Continue monitoring for now. - Order repeat BMP in AM for reassessment.    Hypokalemia  - K low on most recent labs; Will replete via electrolyte protocol. Follow up repeat labs to guide ongoing management decisions. Replete PRN per protocol. Continue to monitor.    Hypotension  - Noted previously, Midodrine adjusted, values now improved. Continue current dosing and continue with close monitoring to guide management decisions.    Paroxysmal Atrial fibrillation on long term anticoagulation  - [id number] score: 5. Appears stable at present, on Amiodarone and Eliquis. Continue current treatment as prescribed.     Chronic HFpEF  - No evidence of exacerbation. Continue to monitor.    Generalized weakness  - Likely multifactorial due to issues as above. PT following, continue to monitor. Treat for contributing etiologies.    All workup, problems and plans new to me today. First day taking care of patient.    Eliquis (home med) for DVT prophylaxis.  Limited code (no CPR, no intubation).  Discussed with patient and nursing staff.  Anticipate discharge to SNU facility timing yet to be determined.  Expected Discharge Date: 6/8/2024; Expected Discharge Time:       Hansel Garcias DO  [facility]  06/08/24

## 2024-06-09 LAB
ANION GAP SERPL CALCULATED.3IONS-SCNC: 7 MMOL/L (ref 5–15)
BUN SERPL-MCNC: 13 MG/DL (ref 8–23)
BUN/CREAT SERPL: 26 (ref 7–25)
C3 FRG RBC-MCNC: ABNORMAL
CALCIUM SPEC-SCNC: 8.4 MG/DL (ref 8.6–10.5)
CHLORIDE SERPL-SCNC: 103 MMOL/L (ref 98–107)
CO2 SERPL-SCNC: 25 MMOL/L (ref 22–29)
CREAT SERPL-MCNC: 0.5 MG/DL (ref 0.57–1)
DEPRECATED RDW RBC AUTO: 47.5 FL (ref 37–54)
EGFRCR SERPLBLD CKD-EPI 2021: 93.8 ML/MIN/1.73
ERYTHROCYTE [DISTWIDTH] IN BLOOD BY AUTOMATED COUNT: 13.5 % (ref 12.3–15.4)
GLUCOSE SERPL-MCNC: 83 MG/DL (ref 65–99)
HCT VFR BLD AUTO: 27.1 % (ref 34–46.6)
HGB BLD-MCNC: 9 G/DL (ref 12–15.9)
LYMPHOCYTES # BLD MANUAL: 0.2 10*3/MM3 (ref 0.7–3.1)
LYMPHOCYTES NFR BLD MANUAL: 16 % (ref 5–12)
MCH RBC QN AUTO: 31.8 PG (ref 26.6–33)
MCHC RBC AUTO-ENTMCNC: 33.2 G/DL (ref 31.5–35.7)
MCV RBC AUTO: 95.8 FL (ref 79–97)
METAMYELOCYTES NFR BLD MANUAL: 11 % (ref 0–0)
MONOCYTES # BLD: 0.4 10*3/MM3 (ref 0.1–0.9)
MYELOCYTES NFR BLD MANUAL: 5 % (ref 0–0)
NEUTROPHILS # BLD AUTO: 1.52 10*3/MM3 (ref 1.7–7)
NEUTROPHILS NFR BLD MANUAL: 60 % (ref 42.7–76)
PLAT MORPH BLD: NORMAL
PLATELET # BLD AUTO: 152 10*3/MM3 (ref 140–450)
PMV BLD AUTO: 10 FL (ref 6–12)
POTASSIUM SERPL-SCNC: 4.3 MMOL/L (ref 3.5–5.2)
RBC # BLD AUTO: 2.83 10*6/MM3 (ref 3.77–5.28)
SODIUM SERPL-SCNC: 135 MMOL/L (ref 136–145)
VARIANT LYMPHS NFR BLD MANUAL: 8 % (ref 19.6–45.3)
WBC MORPH BLD: NORMAL
WBC NRBC COR # BLD AUTO: 2.53 10*3/MM3 (ref 3.4–10.8)

## 2024-06-09 PROCEDURE — 80048 BASIC METABOLIC PNL TOTAL CA: CPT | Performed by: STUDENT IN AN ORGANIZED HEALTH CARE EDUCATION/TRAINING PROGRAM

## 2024-06-09 PROCEDURE — 99232 SBSQ HOSP IP/OBS MODERATE 35: CPT | Performed by: INTERNAL MEDICINE

## 2024-06-09 PROCEDURE — 85025 COMPLETE CBC W/AUTO DIFF WBC: CPT | Performed by: HOSPITALIST

## 2024-06-09 PROCEDURE — 85007 BL SMEAR W/DIFF WBC COUNT: CPT | Performed by: HOSPITALIST

## 2024-06-09 PROCEDURE — 25010000002 FILGRASTIM 300 MCG/0.5ML SOLUTION PREFILLED SYRINGE: Performed by: INTERNAL MEDICINE

## 2024-06-09 RX ADMIN — FILGRASTIM 300 MCG: 300 INJECTION, SOLUTION INTRAVENOUS; SUBCUTANEOUS at 17:13

## 2024-06-09 RX ADMIN — FERROUS SULFATE TAB 325 MG (65 MG ELEMENTAL FE) 325 MG: 325 (65 FE) TAB at 09:59

## 2024-06-09 RX ADMIN — CETIRIZINE HYDROCHLORIDE 10 MG: 10 TABLET ORAL at 09:59

## 2024-06-09 RX ADMIN — APIXABAN 2.5 MG: 2.5 TABLET, FILM COATED ORAL at 21:02

## 2024-06-09 RX ADMIN — MIDODRINE HYDROCHLORIDE 5 MG: 5 TABLET ORAL at 06:26

## 2024-06-09 RX ADMIN — SODIUM CHLORIDE TAB 1 GM 1 G: 1 TAB at 09:59

## 2024-06-09 RX ADMIN — APIXABAN 2.5 MG: 2.5 TABLET, FILM COATED ORAL at 10:02

## 2024-06-09 RX ADMIN — SODIUM CHLORIDE TAB 1 GM 1 G: 1 TAB at 17:13

## 2024-06-09 RX ADMIN — Medication 1 TABLET: at 09:59

## 2024-06-09 RX ADMIN — MIDODRINE HYDROCHLORIDE 10 MG: 5 TABLET ORAL at 17:13

## 2024-06-09 RX ADMIN — AMIODARONE HYDROCHLORIDE 200 MG: 200 TABLET ORAL at 09:58

## 2024-06-09 RX ADMIN — HYDROCORTISONE: 25 CREAM TOPICAL at 10:03

## 2024-06-09 RX ADMIN — MIDODRINE HYDROCHLORIDE 10 MG: 5 TABLET ORAL at 11:29

## 2024-06-09 NOTE — PROGRESS NOTES
Name: Michelle Car ADMIT: 2024   : 1940  PCP: Olayinka Pimentel MD    MRN: [id number] LOS: 16 days   AGE/SEX: 83 y.o. female  ROOM: [facility]     Subjective   Subjective   Patient seen and examined this morning.  Hospital day 16, she is resting comfortably, states she feels okay this morning, no acute complaints.     Objective   Objective   Vital Signs  Temp:  [97.3 °F (36.3 °C)-98.8 °F (37.1 °C)] 97.3 °F (36.3 °C)  Heart Rate:  [65-67] 67  Resp:  [16] 16  BP: ()/(53-59) 102/59  SpO2:  [95 %-96 %] 95 %  on   ;   Device (Oxygen Therapy): room air  Body mass index is 20.24 kg/m².  Physical Exam  Vitals and nursing note reviewed.   Constitutional:       General: She is not in acute distress.     Comments: Elderly, thin/frail, chronically ill-appearing   Cardiovascular:      Rate and Rhythm: Normal rate and regular rhythm.      Pulses: Normal pulses.      Heart sounds: Normal heart sounds.   Pulmonary:      Effort: Pulmonary effort is normal. No respiratory distress.      Breath sounds: Normal breath sounds.   Abdominal:      Palpations: Abdomen is soft.      Tenderness: There is no abdominal tenderness.   Skin:     General: Skin is warm and dry.      Coloration: Skin is pale.      Comments: Scattered bruises   Neurological:      Mental Status: She is alert.       Results Review     I reviewed the patient's new clinical results.  Results from last 7 days   Lab Units 24  0613 24  0658 24  0721 24  0510   WBC 10*3/mm3 2.53* 2.05* 1.09* 1.05*   HEMOGLOBIN g/dL 9.0* 8.7* 8.8* 9.0*   PLATELETS 10*3/mm3 152 136* 132* 141     Results from last 7 days   Lab Units 24  0613 24  1721 24  0658 24  1529 24  0510 24  0351   SODIUM mmol/L 135*  --  136  --  134* 133*   POTASSIUM mmol/L 4.3 4.3 3.3* 3.8 3.2* 3.9   CHLORIDE mmol/L 103  --  103  --  104 100   CO2 mmol/L 25.0  --  25.0  --  23.0 20.0*   BUN mg/dL 13  --  13  --  17 25*   CREATININE mg/dL  "0.50*  --  0.50*  --  0.48* 0.60   GLUCOSE mg/dL 83  --  80  --  87 76   EGFR mL/min/1.73 93.8  --  93.8  --  94.7 89.7       Results from last 7 days   Lab Units 06/09/24  0613 06/08/24  0658 06/06/24  0510 06/03/24  0351   CALCIUM mg/dL 8.4* 8.2* 8.3* 8.7       No results found for: \"HGBA1C\", \"POCGLU\"    No radiology results for the last day    I have personally reviewed all medications:  Scheduled Medications  amiodarone, 200 mg, Oral, Q24H  apixaban, 2.5 mg, Oral, Q12H  cetirizine, 10 mg, Oral, Daily  ferrous sulfate, 325 mg, Oral, Every Other Day  filgrastim (NEUPOGEN) injection, 300 mcg, Subcutaneous, Q PM  Hydrocortisone (Perianal), , Rectal, Daily  midodrine, 10 mg, Oral, TID AC  multivitamin with minerals, 1 tablet, Oral, Daily  sodium chloride, 10 mL, Intravenous, Q12H  sodium chloride, 1 g, Oral, BID With Meals    Infusions   Diet  Diet: Regular/House; Neutropenic/Low Microbial; Fluid Consistency: Thin (IDDSI 0)    I have personally reviewed:  [x]  Laboratory   [x]  Microbiology   [x]  Radiology   [x]  EKG/Telemetry  [x]  Cardiology/Vascular   []  Pathology    []  Records       Assessment/Plan     Active Hospital Problems    Diagnosis  POA    **Leukopenia [D72.819]  Unknown    Severe malnutrition [E43]  Yes    Anemia [D64.9]  Unknown    Weight loss [R63.4]  Unknown    Chronic diastolic CHF (congestive heart failure) [I50.32]  Yes    Paroxysmal atrial fibrillation with rapid ventricular response [I48.0]  Yes    Hyponatremia [E87.1]  Yes    Diffuse large B-cell lymphoma of lymph nodes of multiple regions [C83.38]  Yes    CRI (chronic renal insufficiency), stage 2 (mild) [N18.2]  Yes      Resolved Hospital Problems    Diagnosis Date Resolved POA    Hyperkalemia [E87.5] 06/01/2024 Unknown       83 y.o. female admitted with Leukopenia.    Diffuse large B cell lymphoma  Neutropenia/Pancytopenia  - Oncology following. She was treated with Granix. She remains on Neupogen. WBC remains low but improving. Hemoglobin " remains low, but relatively stable. Platelet counts have improved. Will continue to follow their plans/recommendations. Greatly appreciate their help.  - Closely monitor blood counts with daily CBC.    CMV quantitative PCR positive (06/05/24)  - Noted on labs, ID evaluated, planning to monitor, repeat CMV PCR with Oncology at follow up to assess trend, to determine if ID needs to reassess and if treatment should be started.    Fever  - Noted previously, patient treated with Cefepime per ID, which has been completed, remains afebrile now.    Hyponatremia  - Noted on labs, relatively stable, she is on salt tabs at present. Nephrology consulted and following. Will continue to follow their plans/recommendations. Greatly appreciate their help.  Continue treatments as prescribed and continue monitoring.   - Order repeat BMP in AM for reassessment.    Hypokalemia  - K low on prior labs, repleted via electrolyte protocol. Follow up repeat labs to guide ongoing management decisions. Replete PRN per protocol. Continue to monitor.    Hypotension  - Noted previously, Midodrine adjusted. She is currently on 10 mg TID, did have some transient episodes of hypotension in AM on 06/08. At present, blood pressures remain soft, but acceptable. Continue current dosing and continue with close monitoring to guide management decisions.    Paroxysmal Atrial fibrillation on long term anticoagulation  - [id number] score: 5. Appears stable at present, on Amiodarone and Eliquis. Continue current treatment as prescribed.     Chronic HFpEF  - No evidence of exacerbation. Continue to monitor.    Generalized weakness  - Likely multifactorial due to issues as above. PT following, continue to monitor. Treat for contributing etiologies.      Eliquis (home med) for DVT prophylaxis.  Limited code (no CPR, no intubation).  Discussed with patient and nursing staff.  Anticipate discharge to SNU facility timing yet to be determined.  Expected Discharge  Date: 6/8/2024; Expected Discharge Time:       Hansel Garcias DO  [facility]  06/09/24

## 2024-06-09 NOTE — PLAN OF CARE
Goal Outcome Evaluation:         Patient alert and oriented x 4. No distress noted. VSS. Denies pain. See MAR for a  complete listing of medication administered this shift. BM noted this shift. Plan of care is ongoing.

## 2024-06-09 NOTE — PROGRESS NOTES
REASON FOR FOLLOWUP/CHIEF COMPLAINT:  DLBCL, anemia, neutropenia    HISTORY OF PRESENT ILLNESS:   WBC is a little better.  Again, on Neupogen.  No new issues overnight    Past Medical History, Past Surgical History, Social History, Family History have been reviewed and are without significant changes except as mentioned.    Review of Systems   Review of Systems   Constitutional:  Negative for activity change.   HENT:  Negative for nosebleeds and trouble swallowing.    Respiratory:  Negative for shortness of breath and wheezing.    Cardiovascular:  Negative for chest pain and palpitations.   Gastrointestinal:  Negative for constipation, diarrhea and nausea.   Genitourinary:  Negative for dysuria and hematuria.   Musculoskeletal:  Negative for arthralgias and myalgias.   Skin:  Negative for rash and wound.   Neurological:  Negative for seizures and syncope.   Hematological:  Negative for adenopathy. Does not bruise/bleed easily.   Psychiatric/Behavioral:  Negative for confusion.        Medications:  The current medication list was reviewed in the EMR    ALLERGIES:    Allergies   Allergen Reactions    Factive [Gemifloxacin] Rash              Vitals:    06/08/24 1129 06/08/24 2045 06/09/24 0230 06/09/24 0750   BP: 92/51 93/53 93/53 102/59   BP Location: Right arm Right arm Right arm Right arm   Patient Position: Sitting Lying Lying Lying   Pulse: 73 66 65 67   Resp: 16 16 16 16   Temp: 98.1 °F (36.7 °C) 98.8 °F (37.1 °C) 97.9 °F (36.6 °C) 97.3 °F (36.3 °C)   TempSrc: Oral Oral Oral Oral   SpO2: 94% 96% 96% 95%   Weight:       Height:         Physical Exam    CONSTITUTIONAL:  Vital signs reviewed.  No distress, looks comfortable.  EYES:  Conjunctivae and lids unremarkable.  PERRLA  EARS, NOSE, MOUTH, THROAT:  Ears and nose appear unremarkable.  Lips, teeth, gums appear unremarkable.  RESPIRATORY:  Normal respiratory effort.  Lungs clear to auscultation bilaterally.  CARDIOVASCULAR:  Normal S1, S2.  No murmurs,  rubs or gallops.  No significant lower extremity edema.  GASTROINTESTINAL: Abdomen appears unremarkable.  Nontender.  No hepatomegaly.  No splenomegaly.  NEURO: Cranial nerves 2-12 grossly intact.  No focal deficits.  Appears to have equal strength all 4 extremities.  MUSCULOSKELETAL:  Unremarkable digits/nails.  No cyanosis or clubbing.  SKIN:  Warm.  No rashes.  PSYCHIATRIC:  Normal judgment and insight.  Normal mood and affect.      RECENT LABS:  WBC   Date Value Ref Range Status   06/09/2024 2.53 (L) 3.40 - 10.80 10*3/mm3 Final   06/08/2024 2.05 (L) 3.40 - 10.80 10*3/mm3 Final   06/07/2024 1.09 (C) 3.40 - 10.80 10*3/mm3 Final     Hemoglobin   Date Value Ref Range Status   06/09/2024 9.0 (L) 12.0 - 15.9 g/dL Final   06/08/2024 8.7 (L) 12.0 - 15.9 g/dL Final   06/07/2024 8.8 (L) 12.0 - 15.9 g/dL Final     Platelets   Date Value Ref Range Status   06/09/2024 152 140 - 450 10*3/mm3 Final   06/08/2024 136 (L) 140 - 450 10*3/mm3 Final   06/07/2024 132 (L) 140 - 450 10*3/mm3 Final       ASSESSMENT/PLAN:  Michelle Car [id number]     *Relapsed diffuse large B-cell lymphoma.  Patient was found to have bilateral pleural effusions.    She underwent right thoracentesis on 2/21/2022 and the left thoracentesis on 2/22/2022.    Analysis of the fluid revealed involvement with diffuse large B-cell lymphoma.    FISH analysis for BCL6 rearrangement was positive but was negative for BCL-2 and MYC rearrangement.    PET scan on 3/10/2022 revealed significant hypermetabolism in the left adrenal gland and the surrounding soft tissue with SUV up to 34.5. Hypermetabolism in the left pleural thickening with SUV of 7.1. there was less hypermetabolism in the right adrenal gland and in the right pleura.  She was considered to have stage III non-bulky disease.  R-IPI score of 3 associated with poor risk - associated with overall survival of 55%.   R-CHOP with Neulasta support was started on 3/28/2022.  Due to development of neutropenic fever  following cycles 1 and 2, treatment was changed to mini-RCHOP starting cycle #3 on 5/16/2022.  Patient received cycle #6 on 7/25/2022.  PET scan on 8/15/2022 revealed complete metabolic response.  Patient was found to have recurrence with development of a left mandibular mass in October/November 2023.  Biopsy of left mandible mass on 11/28/2023 showed recurrence of the lymphoma, diffuse large B cell, non-GCB phenotype.  Ki-67 was 90%.    It was positive for Bcl-6 but negative for Bcl-2 and MYC rearrangement.  PET scan on 12/11/2023 revealed multiple areas of involvement -prevascular space, left epicardial fat pad, pleural density posterior to the left fourth rib anterior aspect, lesion along the pericardium versus intramuscular with SUV of 9.8.  Hypermetabolic activity at both adrenal glands, uniformly increased in size and appeared hyperplastic.  There was a 2.7 x 1.1 cm soft tissue nodule lateral to the right erector spinae muscle posterior to the right posterior 11th rib with an SUV of 20.5 and there was a 1.3 cm nodule lateral to the posterior margin of the left psoas muscle with an SUV of 24.9.  Rituxan Polivy and Treanda with Treanda every 3 weeks for 6 cycles was started on 12/12/2023.  The jaw mass started to decrease in size after starting treatment.  CT scan on 2/8/2024 showed evidence of response to treatment.  Cycle #6 was given on 4/1/2024.  PET scan on 4/22/2024 revealed decrease in the hypermetabolism in the mediastinal and pericardial lymph nodes/lesions and in the lesions above the left kidney and adrenal gland.  The left mandibular lesion was no longer hypermetabolic.   She is at increased risk for relapse of the lymphoma.  The plan was to start treatment with Revlimid 10 mg daily.  However, the patient did not start the treatment yet.  LDH was previously elevated but improved to 190 on 5/25/2024.  Bone marrow 5/30/2024:   10 to 20% cellularity.  No evidence of lymphoma.  Trilineage hematopoiesis  with decreased, left shifted granulopoiesis.  Pathologist noted the significance of the gamma delta T-cell population identified by flow cytometry is uncertain and can be seen in reactive processes.  The pathologist noted of a neoplastic process is considered clinically T-cell gene rearrangement studies could be performed.  I sent a message to the patient's outpatient oncologist, Dr. Galo, to ask him if he wants us to do a peripheral blood flow cytometry to check for blood contamination or a T-cell gene rearrangement on the marrow specimen, both of which were offered by the pathologist for further information if we thought it would be clinically helpful  Added T-cell gene rearrangement per Dr. Galo request    *Neutropenia  5/20/2024: WBC decreased to 1750.  Neutrophils decreased to 940.  5/24/2024: WBC decreased to 1290.  Neutrophils decreased to 680.  5/25/2024: WBC decreased to 860.  Neutrophils decreased to 530.  Worsening neutropenia is also concerning for relapse of the lymphoma with involvement of the bone marrow.   5/26/2024: Neutrophil count 610.  5/27/2024-total WBC count has increased to 1.46.  Absolute neutrophil count pending   5/28/2024-WBC count lower at 1.05 with an absolute neutrophil count of 0.57  Increase in metamyelocytes as well as basophil percentage.  Flow cytometry 5/28/2024-negative  ANC lower today at 0.41  Copper level normal at 145  MMA normal at 245  5/30/24 - bone marrow biopsy, results pending   6/1/2024-WBC count 0.82, absolute neutrophil count 0.23.  WBC was okay through 5/10/2024.  Await bone marrow biopsy final results.  6/4/2024: WBC 0.83.  Discussed with oncology pharmacist, Alexandra Lopez.  She notes mirtazapine was started on 5/27/2024 for appetite.  This can cause neutropenia.  Therefore, stop this on 6/4/ 24.  6/7/2024: Daily Granix added as per Dr. Galo, her outpatient hematologist recommendation.  6/8/2024: After a dose of Granix on 6/7/2024, WBC improved from 1.09 up  to 2.05  6/9/2024: WBC up to 2.5.  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 Galo, her usual outpatient oncologist checked this because he was concerned she may have developed CMV due to her severe cytopenias and CMV may be causing her ongoing prolonged cytopenias.  6/7/2024: I discussed with him.  He has requested an ID consult to get their thoughts if she needs treatment for CMV and also requests some Neupogen to try to improve the WBC/ANC  Dr. Jens Amin, ID, evaluated 6/8/2024.  He stated elevated CMV PCR in the setting of DLBCL is not uncommon.  He stated is difficult to know the significance.  He stated CMV could be a cause of her prolonged cytopenias.  He noted she is asymptomatic but stated patients are often asymptomatic with true infection.  He stated the treatment would likely be valganciclovir.  He discussed empiric treatment versus continued monitoring.  He felt since her counts were stable it would be reasonable to repeat CMV PCR at oncology follow-up to reassess the trend.  He noted if CMV PCR remains stable or trends up then ID would be happy to reevaluate in the outpatient setting.  He signed off the case.     *Fever  Afebrile, Tmax of 99.5  Blood cultures negative  Patient at high risk for infections given neutropenia  Continue cefepime through 6/4/ 24 per ID recommendations.  After discontinuation of cefepime if patient remains neutropenic then they are recommending cefdinir  No recent fever     *Macrocytic anemia.  Patient also developed anemia secondary to lymphoma and secondary to chemotherapy.    Hgb decreased to 7.9 on 4/25/2022 and she was transfused.   Hemoglobin decreased to 6.3 on 4/27/2022. She was transfused.  Hemoglobin level improved subsequently.  Patient was placed ferrous sulfate 325 mg 3 days a week.  5/3/2024: Hemoglobin increased to 10.2.  5/25/2024: Hemoglobin decreased to 7.8.  No evidence of vitamin B12 or  folate deficiency.    Ferritin 1953.  Transferrin saturation 11%.  Patient decided to hold off on PRBC transfusion.  5/26/2024: Hemoglobin 8.2.  Due to her reporting chills when she received PRBC Tx in the past, we will give Tylenol, Benadryl and Solumedrol prior to the transfusion.   526 2024-1 unit of PRBC administered.  Patient tolerated the transfusion well.  Hemoglobin 9.5 on 6/2/2024  Copper and MMA levels normal  Hb 9, from 8.7, from 8.8, from 9     *Hyponatremia.  Patient had problem with hyponatremia in the past.  Sodium is 127 on 3/14/2023.  She was advised to increase salt intake.  Sodium improved to 133 on repeat lab on 3/21/2023.  However, sodium decreased to 126 on 6/7/2023.  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  She was started on sodium chloride 1 g daily.  4/1/2024: Sodium decreased to 132.  Leg swelling improved with Lasix.  5/3/2024: Sodium 130.  5/24/2024: Sodium decreased to 126.    Patient was having significant weakness.  5/25/2024: Sodium 128.  Nephrology service was consulted.  5/28/2024-sodium stable at 132  5/30/2024-sodium 133  5/31/24 - sodium 134  6/1/2024-sodium 133  6/2/2024-sodium stable at 135  Continues on fluid restriction  Sodium 135, from 136, from 134, from 133     *Hyperkalemia previously, now hypokalemia.  5/24/2024: Potassium increased to 5.6.  Potassium 4.3, from 3.3, from 3.2, from 3.9     *Chronic anticoagulation.  Patient is on Eliquis 5 mg twice daily.  No excessive bleeding  Platelet count remains normal  Eliquis resumed      *Prophylaxis.  Patient had shingles in April 2023.  She is at risk of developing shingles while on treatment.  She was placed on acyclovir 400 mg twice daily.  She was on Bactrim DS 3 days a week.  Bactrim was discontinued due to the electrolyte abnormalities.     *Low-density lesion in the uterus.    The radiologist recommended pelvic ultrasound.    The patient is asymptomatic.    PET scan on 4/22/2024 reported  fluid in the lower abdomen.  Patient reported intermittent vaginal discharge.     *Hypertension  Patient started on midodrine  Blood pressure has improved with midodrine.  On 10 mg 3 times a day     PLAN:  6/4/2024: Stopped mirtazapine because it can cause neutropenia (it was started on 5/27/2024).  She follows with Dr. Galo in our office.  Bone marrow was negative for lymphoma and MDS  Patient would like to go to rehab in an effort to improve performance status.  We are not planning any systemic therapy such as chemotherapy at this time, or while she is in rehab.  We are hoping she gets stronger in rehab  ID recommends Dr. Galo check another CMV PCR when patient follows up in our office and states she could be sent back to the ID clinic if CMV PCR is stable or higher.  He recommended watching WBC as it appeared she was slowly trending up.  Neupogen was added 6/7/2024 so it may be difficult to know if the WBC is truly improving.  I am not sure how daily Neupogen will affect her ability to go to rehab.  It may be that the Neupogen needs to be stopped in order to allow her to go to rehab but we can work this out on Monday when CCP is available    Chart reviewed and summarized including hospitalist note and last ID note

## 2024-06-10 LAB
ANION GAP SERPL CALCULATED.3IONS-SCNC: 9.6 MMOL/L (ref 5–15)
BUN SERPL-MCNC: 11 MG/DL (ref 8–23)
BUN/CREAT SERPL: 18 (ref 7–25)
CALCIUM SPEC-SCNC: 8.7 MG/DL (ref 8.6–10.5)
CHLORIDE SERPL-SCNC: 101 MMOL/L (ref 98–107)
CK SERPL-CCNC: 9 U/L (ref 20–180)
CO2 SERPL-SCNC: 25.4 MMOL/L (ref 22–29)
CREAT SERPL-MCNC: 0.61 MG/DL (ref 0.57–1)
DEPRECATED RDW RBC AUTO: 49.2 FL (ref 37–54)
EGFRCR SERPLBLD CKD-EPI 2021: 89.4 ML/MIN/1.73
ERYTHROCYTE [DISTWIDTH] IN BLOOD BY AUTOMATED COUNT: 13.9 % (ref 12.3–15.4)
GLUCOSE SERPL-MCNC: 76 MG/DL (ref 65–99)
HCT VFR BLD AUTO: 26.4 % (ref 34–46.6)
HGB BLD-MCNC: 8.8 G/DL (ref 12–15.9)
LYMPHOCYTES # BLD MANUAL: 0.35 10*3/MM3 (ref 0.7–3.1)
LYMPHOCYTES NFR BLD MANUAL: 8 % (ref 5–12)
MAGNESIUM SERPL-MCNC: 1.6 MG/DL (ref 1.6–2.4)
MCH RBC QN AUTO: 32.5 PG (ref 26.6–33)
MCHC RBC AUTO-ENTMCNC: 33.3 G/DL (ref 31.5–35.7)
MCV RBC AUTO: 97.4 FL (ref 79–97)
METAMYELOCYTES NFR BLD MANUAL: 4 % (ref 0–0)
MONOCYTES # BLD: 0.28 10*3/MM3 (ref 0.1–0.9)
NEUTROPHILS # BLD AUTO: 2.7 10*3/MM3 (ref 1.7–7)
NEUTROPHILS NFR BLD MANUAL: 78 % (ref 42.7–76)
PLAT MORPH BLD: NORMAL
PLATELET # BLD AUTO: 158 10*3/MM3 (ref 140–450)
PMV BLD AUTO: 10 FL (ref 6–12)
POTASSIUM SERPL-SCNC: 4.1 MMOL/L (ref 3.5–5.2)
RBC # BLD AUTO: 2.71 10*6/MM3 (ref 3.77–5.28)
RBC MORPH BLD: NORMAL
SODIUM SERPL-SCNC: 136 MMOL/L (ref 136–145)
TSH SERPL DL<=0.05 MIU/L-ACNC: 4.31 UIU/ML (ref 0.27–4.2)
VARIANT LYMPHS NFR BLD MANUAL: 10 % (ref 19.6–45.3)
WBC MORPH BLD: NORMAL
WBC NRBC COR # BLD AUTO: 3.46 10*3/MM3 (ref 3.4–10.8)

## 2024-06-10 PROCEDURE — 99232 SBSQ HOSP IP/OBS MODERATE 35: CPT | Performed by: NURSE PRACTITIONER

## 2024-06-10 PROCEDURE — 80048 BASIC METABOLIC PNL TOTAL CA: CPT | Performed by: STUDENT IN AN ORGANIZED HEALTH CARE EDUCATION/TRAINING PROGRAM

## 2024-06-10 PROCEDURE — 99233 SBSQ HOSP IP/OBS HIGH 50: CPT | Performed by: NURSE PRACTITIONER

## 2024-06-10 PROCEDURE — 25010000002 FILGRASTIM 300 MCG/0.5ML SOLUTION PREFILLED SYRINGE: Performed by: INTERNAL MEDICINE

## 2024-06-10 PROCEDURE — 82550 ASSAY OF CK (CPK): CPT | Performed by: INTERNAL MEDICINE

## 2024-06-10 PROCEDURE — 84443 ASSAY THYROID STIM HORMONE: CPT | Performed by: INTERNAL MEDICINE

## 2024-06-10 PROCEDURE — 85007 BL SMEAR W/DIFF WBC COUNT: CPT | Performed by: HOSPITALIST

## 2024-06-10 PROCEDURE — 83735 ASSAY OF MAGNESIUM: CPT | Performed by: INTERNAL MEDICINE

## 2024-06-10 PROCEDURE — 85025 COMPLETE CBC W/AUTO DIFF WBC: CPT | Performed by: HOSPITALIST

## 2024-06-10 PROCEDURE — 99233 SBSQ HOSP IP/OBS HIGH 50: CPT | Performed by: INTERNAL MEDICINE

## 2024-06-10 RX ADMIN — APIXABAN 2.5 MG: 2.5 TABLET, FILM COATED ORAL at 20:19

## 2024-06-10 RX ADMIN — MIDODRINE HYDROCHLORIDE 10 MG: 5 TABLET ORAL at 11:12

## 2024-06-10 RX ADMIN — CETIRIZINE HYDROCHLORIDE 10 MG: 10 TABLET ORAL at 08:20

## 2024-06-10 RX ADMIN — SODIUM CHLORIDE TAB 1 GM 1 G: 1 TAB at 08:20

## 2024-06-10 RX ADMIN — APIXABAN 2.5 MG: 2.5 TABLET, FILM COATED ORAL at 08:20

## 2024-06-10 RX ADMIN — AMIODARONE HYDROCHLORIDE 200 MG: 200 TABLET ORAL at 08:20

## 2024-06-10 RX ADMIN — FILGRASTIM 300 MCG: 300 INJECTION, SOLUTION INTRAVENOUS; SUBCUTANEOUS at 18:27

## 2024-06-10 RX ADMIN — SODIUM CHLORIDE TAB 1 GM 1 G: 1 TAB at 18:27

## 2024-06-10 RX ADMIN — MIDODRINE HYDROCHLORIDE 10 MG: 5 TABLET ORAL at 18:27

## 2024-06-10 RX ADMIN — ACETAMINOPHEN 325MG 650 MG: 325 TABLET ORAL at 20:19

## 2024-06-10 RX ADMIN — MIDODRINE HYDROCHLORIDE 10 MG: 5 TABLET ORAL at 04:27

## 2024-06-10 RX ADMIN — HYDROCORTISONE: 25 CREAM TOPICAL at 10:13

## 2024-06-10 RX ADMIN — Medication 1 TABLET: at 08:19

## 2024-06-10 NOTE — CASE MANAGEMENT/SOCIAL WORK
Continued Stay Note  [facility]     Patient Name: Michelle Car  MRN: [id number]  Today's Date: 6/10/2024    Admit Date: 5/24/2024    Plan: [facility] via w/c van.   Discharge Plan       Row Name 06/10/24 1338       Plan    Plan [facility] via w/c van.    Patient/Family in Agreement with Plan yes    Plan Comments Spoke with patient at bedside to update with discharge information. Introduced self. Patient pre-cert approved until 6/14. Patient requesting meeting with palliative care today at 1330. Spoke with [facility]/ [facility] patient can discharge to facility tomorrow 6/11. Patient will need w/c van for transport.                   Discharge Codes    No documentation.                 Expected Discharge Date and Time       Expected Discharge Date Expected Discharge Time    Jun 8, 2024               Elidia Walter RN

## 2024-06-10 NOTE — CONSULTS
Nutrition Services    Patient Name:  Michelle Car  YOB: 1940  MRN: [id number]  Admit Date:  5/24/2024    Assessment Date:  06/10/24    Summary: Follow Up  Pt sitting in chair when I visited. Pt states she feels she ate better this morning for breakfast. She is also trying to drink a Boost breeze and V8 juice daily along with her diet coke that she loves.  Fluid restriction was d/c'ed  Labs Na 136  BM 6/9  Po encouraged.      Plan/Recommendations:  Continue Boost Breeze TID  Encourage po intake    Will follow clinical course, nutrition needs.    CLINICAL NUTRITION ASSESSMENT      Reason for Assessment Follow-up Protocol     Diagnosis/Problem   Leukopenia. Anemia, large cell lymphoma, hyponatremia, CHF.   Medical/Surgical History Past Medical History:   Diagnosis Date    Abdominal aortic atherosclerosis     Adrenal nodule 03/2022    Allergic rhinitis     Anemia due to chemotherapy     Bacteremia due to Escherichia coli 04/26/2022    ADMITTED TO [facility]    Bug bite 10/24/2015    WITH CELLULITIS, LEFT LEG    CAD (coronary artery disease)     Calculus of bile duct without cholangitis or cholecystitis 09/2022    Chemotherapy induced neutropenia     Chronic anticoagulation     Chronic diastolic (congestive) heart failure     Colon polyps     CRI (chronic renal insufficiency), stage 2 (mild) 2021    Diffuse large B cell lymphoma 02/2022    MULTIPLE LYMPH NODE INVOLVEMENT, FOLLOWED BY DR. JEN PATTERSON    Drug induced constipation     Hearing loss     Hemorrhoids     History of blood transfusion 04/2022    History of chemotherapy 2022    FOLLOWED BY DR. JEN PATTERSON    Hypercalcemia 2016    resolved as of 2019    Hypercholesterolemia     Hyperkalemia 03/2022    Hypertension     Hyponatremia 02/17/2022    Leg swelling 10/2021    Mixed hyperlipidemia     Neutropenic fever 05/2022    Nonrheumatic mitral valve regurgitation 03/2022    PAF (paroxysmal atrial fibrillation)     FOLLOWED BY DR. YOSI LANGLEY     "Pancytopenia     Pleural effusion, bilateral 06/2022    Pulmonary nodule     Seasonal allergies     Thrombocytopenia 08/2022    Venous insufficiency     Vitamin D deficiency        Past Surgical History:   Procedure Laterality Date    CHOLECYSTECTOMY N/A 10/12/2011    LAPAROSCOPIC, DR. CHANCE KLINE AT [facility]    COLONOSCOPY N/A 2000    1 or 2 polyps, otherwise normal per patient    COLONOSCOPY W/ POLYPECTOMY N/A 01/24/2012    3 MM TUBULAR ADENOMA POLYP IN CECUM, DIVERTICULOSIS IN RECTO-SIGMOID, RESCOPE IN 3 YRS, DR. CHANCE KLINE AT [facility]    CYST REMOVAL Left 10/12/2011    LEFT SCALP, PATH: BENIGN ADNEXAL NEOPLASM FAVORING ECCRINE SPIRADENOMA, DR. CHANCE KLINE AT [facility]    ERCP N/A 10/21/2022    Procedure: ENDOSCOPIC RETROGRADE CHOLANGIOPANCREATOGRAPHY with sphincterotomy and balloon sweep;  Surgeon: Peyman Ortega MD;  Location: [facility] END[facility];  Service: Gastroenterology;  Laterality: N/A;  PRE: Common Duct Stones  POST: Common Duct Stones    VENOUS ACCESS DEVICE (PORT) INSERTION Left 03/23/2022    Procedure: INSERTION VENOUS ACCESS DEVICE;  Surgeon: Alin Delgado MD;  Location: [facility] OR [facility];  Service: General;  Laterality: Left;        Anthropometrics        Current Height  Current Weight  BMI kg/m2 Height: 157.5 cm (62\")  Weight: 50.2 kg (110 lb 10.7 oz) (06/04/24 1704)  Body mass index is 20.24 kg/m².   Adjusted BMI (if applicable)    BMI Category Normal/Healthy (18.4 - 24.9)   Ideal Body Weight (IBW) 100lb   Usual Body Weight (UBW) 150lb   Weight Trend Loss    Weight History Wt Readings from Last 30 Encounters:   06/04/24 1704 50.2 kg (110 lb 10.7 oz)   05/27/24 1433 54 kg (119 lb)   05/24/24 1849 54.2 kg (119 lb 7.8 oz)   05/24/24 1701 51.7 kg (114 lb)   05/24/24 1200 52.1 kg (114 lb 14.4 oz)   05/13/24 1353 53.9 kg (118 lb 12.8 oz)   05/10/24 1258 54.5 kg (120 lb 1.6 oz)   05/03/24 1037 54.6 kg (120 lb 4.8 oz)   04/02/24 1318 62.1 kg (136 lb 12.8 oz)   04/01/24 1027 60.8 kg (134 lb)   03/06/24 1341 62.2 " kg (137 lb 3.2 oz)   03/05/24 0840 60.1 kg (132 lb 9.6 oz)   02/14/24 1300 64 kg (141 lb)   02/13/24 0833 62.3 kg (137 lb 6.4 oz)   01/24/24 1309 64.7 kg (142 lb 9.6 oz)   01/23/24 0903 62.7 kg (138 lb 4.8 oz)   01/03/24 1309 65.2 kg (143 lb 12.8 oz)   01/02/24 0846 63.9 kg (140 lb 14.4 oz)   12/27/23 1103 63.4 kg (139 lb 11.2 oz)   12/13/23 1431 67.6 kg (149 lb)   12/12/23 0841 67.1 kg (147 lb 14.4 oz)   12/11/23 1149 67.3 kg (148 lb 4.8 oz)   12/06/23 1152 67.2 kg (148 lb 1.6 oz)   11/21/23 1514 68.9 kg (152 lb)   11/10/23 1520 68.9 kg (152 lb)   10/23/23 1301 68.9 kg (152 lb)   08/29/23 1608 69.4 kg (153 lb)   08/07/23 1513 68.9 kg (152 lb)   07/06/23 1143 68.7 kg (151 lb 6.4 oz)   06/06/23 1612 68 kg (150 lb)   05/15/23 1001 68.9 kg (152 lb)   03/21/23 1348 69.1 kg (152 lb 6.4 oz)   03/14/23 1529 67.1 kg (148 lb)        Estimated/Assessed Needs        Current Weight  Weight: 50.2 kg (110 lb 10.7 oz) (06/04/24 1704)       Energy Requirements    Weight for Calculation 54.2 kg   Method for Estimation  30 kcal/kg   EST Needs (kcal/day) 1626       Protein Requirements    Weight for Calculation 54.2 kg   EST Protein Needs (g/kg) 1.2 gm/kg   EST Daily Needs (g/day) 65       Fluid Requirements     Method for Estimation Defer to physician    EST Needs (mL/day)      Labs       Pertinent Labs    Results from last 7 days   Lab Units 06/10/24  0510 06/09/24  0613 06/08/24  1721 06/08/24  0658   SODIUM mmol/L 136 135*  --  136   POTASSIUM mmol/L 4.1 4.3 4.3 3.3*   CHLORIDE mmol/L 101 103  --  103   CO2 mmol/L 25.4 25.0  --  25.0   BUN mg/dL 11 13  --  13   CREATININE mg/dL 0.61 0.50*  --  0.50*   CALCIUM mg/dL 8.7 8.4*  --  8.2*   GLUCOSE mg/dL 76 83  --  80     Results from last 7 days   Lab Units 06/10/24  0510   HEMOGLOBIN g/dL 8.8*   HEMATOCRIT % 26.4*   WBC 10*3/mm3 3.46     Results from last 7 days   Lab Units 06/10/24  0510 06/09/24  0613 06/08/24  0658 06/07/24  0721 06/06/24  0510   PLATELETS 10*3/mm3 158 152 136*  "132* 141     COVID19   Date Value Ref Range Status   04/26/2022 Not Detected Not Detected - Ref. Range Final     No results found for: \"HGBA1C\"       Medications           Scheduled Medications amiodarone, 200 mg, Oral, Q24H  apixaban, 2.5 mg, Oral, Q12H  cetirizine, 10 mg, Oral, Daily  ferrous sulfate, 325 mg, Oral, Every Other Day  filgrastim (NEUPOGEN) injection, 300 mcg, Subcutaneous, Q PM  Hydrocortisone (Perianal), , Rectal, Daily  midodrine, 10 mg, Oral, TID AC  multivitamin with minerals, 1 tablet, Oral, Daily  sodium chloride, 10 mL, Intravenous, Q12H  sodium chloride, 1 g, Oral, BID With Meals       Infusions     PRN Medications   acetaminophen **OR** acetaminophen **OR** acetaminophen    albuterol    senna-docusate sodium **AND** polyethylene glycol **AND** bisacodyl **AND** bisacodyl    nitroglycerin    ondansetron    Potassium Replacement - Follow Nurse / BPA Driven Protocol    sodium chloride    sodium chloride     Physical Findings          General Findings alert, hard of hearing, loss of muscle mass, loss of subcutaneous fat, oriented   Oral/Mouth Cavity dentures - ill fitting   Edema  lower extremity , 1+ (trace), 2+ (mild)   Gastrointestinal WDL, last bowel movement: 5/29   Skin  skin tear   Tubes/Drains/Lines none   NFPE See Malnutrition Severity Assessment, Date Completed: 5/25     Malnutrition Severity Assessment      Patient meets criteria for : Severe Malnutrition           Current Nutrition Orders & Evaluation of Intake       Oral Nutrition     Food Allergies NKFA   Current PO Diet Diet: Regular/House; Neutropenic/Low Microbial; Fluid Consistency: Thin (IDDSI 0)   Supplement Boost Breeze, BID   PO Evaluation     % PO Intake 25-50%    Factors Affecting Intake: chewing difficulty, decreased appetite, dislikes hospital food   --  PES STATEMENT / NUTRITION DIAGNOSIS      Nutrition Dx Problem  Problem: Malnutrition (severe)  Etiology: Medical Diagnosis - Leukopenia. Anemia, large cell lymphoma, " hyponatremia, CHF.    Signs/Symptoms: Report of Minimal PO Intake, NFPE Results, and Unintended Weight Change     NUTRITION INTERVENTION / PLAN OF CARE      Intervention Goal(s) Maintain nutrition status, Reduce/improve symptoms, Meet estimated needs, Disease management/therapy, Tolerate PO , Increase intake, and No significant weight loss         RD Intervention/Action Interview for preferences, Advise alternative selection, Advise available snack, Encourage intake, Continue to monitor, and Care plan reviewed   --      Prescription/Orders:       PO Diet       Supplements       Enteral Nutrition       Parenteral Nutrition    New Prescription Ordered? Continue same per protocol, No changes at this time   --      Monitor/Evaluation Per protocol   Discharge Plan/Needs Pending clinical course   --    RD to follow per protocol.      Electronically signed by:  Brina Oliver RD  06/10/24 11:48 EDT

## 2024-06-10 NOTE — CASE MANAGEMENT/SOCIAL WORK
Post-Acute Authorization Submission      Post Acute Pre-Cert Documentation  Request Submitted by Facility - Type:: Hospital  Post-Acute Authorization Type Submitted:: SNF  Date Post Acute Pre-Cert Inititated per Facility: 06/07/24  Verification from Payer: Yes  Date Post Acute Pre-Cert Completed: 06/07/24  Accepting Facility: [facility]  Hospital Discharge Date Requested: 06/08/24  All Clinicals Submitted?: Yes  Had Accepting Facility at Time of Submission: Yes  Response Received from Insurance?: Approval  Response Communicated to:: , Accepting Facility Liaison  Authorization Number:: [id number]  Post Acute Pre-Cert Initiated Comment: VALID TO ADMIT UPTO 11:59PM ON 6/14/24.              Servando Starr, PCT

## 2024-06-10 NOTE — PROGRESS NOTES
REASON FOR FOLLOWUP/CHIEF COMPLAINT:  DLBCL, anemia, neutropenia    Interval history:  6/9/2024  WBC is a little better.  Again, on Neupogen.  No new issues overnight    6/10/2024  T90.2, pulse 66, respirations 16, , SpO2 96%  Patient is record reviewed and physical exam performed, patient remains quite frail.  Is unlikely that radiation therapy at this point would be tolerated well particular if she is in a nursing facility.  This discussed with Dr. Galo and radiation therapy will be held as she proceeds to her rehab and then be seen back in office.  BUN/creatinine of 11 and 0.61, H&H of 8.8 and 26.4, white count 3460 and platelet count 158,000    Past Medical History, Past Surgical History, Social History, Family History have been reviewed and are without significant changes except as mentioned.    Review of Systems   Review of Systems   Constitutional:  Negative for activity change.   HENT:  Negative for nosebleeds and trouble swallowing.    Respiratory:  Negative for shortness of breath and wheezing.    Cardiovascular:  Negative for chest pain and palpitations.   Gastrointestinal:  Negative for constipation, diarrhea and nausea.   Genitourinary:  Negative for dysuria and hematuria.   Musculoskeletal:  Negative for arthralgias and myalgias.   Skin:  Negative for rash and wound.   Neurological:  Negative for seizures and syncope.   Hematological:  Negative for adenopathy. Does not bruise/bleed easily.   Psychiatric/Behavioral:  Negative for confusion.        Medications:  The current medication list was reviewed in the EMR    ALLERGIES:    Allergies   Allergen Reactions    Factive [Gemifloxacin] Rash              Vitals:    06/09/24 0750 06/09/24 1622 06/09/24 1930 06/10/24 0309   BP: 102/59 102/66 117/75 100/58   BP Location: Right arm Right arm Right arm Right arm   Patient Position: Lying Lying  Lying   Pulse: 67 79 71 66   Resp: 16 16 16 16   Temp: 97.3 °F (36.3 °C) 99.1 °F (37.3 °C) 98.4 °F  (36.9 °C) 98.2 °F (36.8 °C)   TempSrc: Oral Oral Oral Oral   SpO2: 95% 96% 96% 96%   Weight:       Height:         Physical Exam    CONSTITUTIONAL:  Vital signs reviewed.  No distress, looks comfortable at is clearly quite frail  EYES:  Conjunctivae and lids unremarkable.  PERRLA  EARS, NOSE, MOUTH, THROAT:  Ears and nose appear unremarkable.  Lips, teeth, gums appear unremarkable.  RESPIRATORY:  Normal respiratory effort.  Lungs clear to auscultation bilaterally.  CARDIOVASCULAR:  Normal S1, S2.  No murmurs, rubs or gallops.  No significant lower extremity edema.  GASTROINTESTINAL: Abdomen appears unremarkable.  Nontender.  No hepatomegaly.  No splenomegaly.  NEURO: Cranial nerves 2-12 grossly intact.  No focal deficits.  Appears to have equal strength all 4 extremities.  MUSCULOSKELETAL:  Unremarkable digits/nails.  No cyanosis or clubbing.  SKIN:  Warm.  No rashes.  PSYCHIATRIC:  Normal judgment and insight.  Normal mood and affect.      RECENT LABS:  WBC   Date Value Ref Range Status   06/09/2024 2.53 (L) 3.40 - 10.80 10*3/mm3 Final   06/08/2024 2.05 (L) 3.40 - 10.80 10*3/mm3 Final     Hemoglobin   Date Value Ref Range Status   06/09/2024 9.0 (L) 12.0 - 15.9 g/dL Final   06/08/2024 8.7 (L) 12.0 - 15.9 g/dL Final     Platelets   Date Value Ref Range Status   06/09/2024 152 140 - 450 10*3/mm3 Final   06/08/2024 136 (L) 140 - 450 10*3/mm3 Final       ASSESSMENT/PLAN:  Michelle Car P382/1     *Relapsed diffuse large B-cell lymphoma.  Patient was found to have bilateral pleural effusions.    She underwent right thoracentesis on 2/21/2022 and the left thoracentesis on 2/22/2022.    Analysis of the fluid revealed involvement with diffuse large B-cell lymphoma.    FISH analysis for BCL6 rearrangement was positive but was negative for BCL-2 and MYC rearrangement.    PET scan on 3/10/2022 revealed significant hypermetabolism in the left adrenal gland and the surrounding soft tissue with SUV up to 34.5.  Hypermetabolism in the left pleural thickening with SUV of 7.1. there was less hypermetabolism in the right adrenal gland and in the right pleura.  She was considered to have stage III non-bulky disease.  R-IPI score of 3 associated with poor risk - associated with overall survival of 55%.   R-CHOP with Neulasta support was started on 3/28/2022.  Due to development of neutropenic fever following cycles 1 and 2, treatment was changed to mini-RCHOP starting cycle #3 on 5/16/2022.  Patient received cycle #6 on 7/25/2022.  PET scan on 8/15/2022 revealed complete metabolic response.  Patient was found to have recurrence with development of a left mandibular mass in October/November 2023.  Biopsy of left mandible mass on 11/28/2023 showed recurrence of the lymphoma, diffuse large B cell, non-GCB phenotype.  Ki-67 was 90%.    It was positive for Bcl-6 but negative for Bcl-2 and MYC rearrangement.  PET scan on 12/11/2023 revealed multiple areas of involvement -prevascular space, left epicardial fat pad, pleural density posterior to the left fourth rib anterior aspect, lesion along the pericardium versus intramuscular with SUV of 9.8.  Hypermetabolic activity at both adrenal glands, uniformly increased in size and appeared hyperplastic.  There was a 2.7 x 1.1 cm soft tissue nodule lateral to the right erector spinae muscle posterior to the right posterior 11th rib with an SUV of 20.5 and there was a 1.3 cm nodule lateral to the posterior margin of the left psoas muscle with an SUV of 24.9.  Rituxan Polivy and Treanda with Treanda every 3 weeks for 6 cycles was started on 12/12/2023.  The jaw mass started to decrease in size after starting treatment.  CT scan on 2/8/2024 showed evidence of response to treatment.  Cycle #6 was given on 4/1/2024.  PET scan on 4/22/2024 revealed decrease in the hypermetabolism in the mediastinal and pericardial lymph nodes/lesions and in the lesions above the left kidney and adrenal gland.  The  left mandibular lesion was no longer hypermetabolic.   She is at increased risk for relapse of the lymphoma.  The plan was to start treatment with Revlimid 10 mg daily-  However, the patient did not start the treatment yet.  LDH was previously elevated but improved to 190 on 5/25/2024.  Bone marrow 5/30/2024:   10 to 20% cellularity.  No evidence of lymphoma.  Trilineage hematopoiesis with decreased, left shifted granulopoiesis.  Pathologist noted the significance of the gamma delta T-cell population identified by flow cytometry is uncertain and can be seen in reactive processes.  The pathologist noted of a neoplastic process is considered clinically T-cell gene rearrangement studies could be performed.  I sent a message to the patient's outpatient oncologist, Dr. Galo, to ask him if he wants us to do a peripheral blood flow cytometry to check for blood contamination or a T-cell gene rearrangement on the marrow specimen, both of which were offered by the pathologist for further information if we thought it would be clinically helpful  Added T-cell gene rearrangement per Dr. Galo request, pending  Patient's status discussed with Dr. Galo 6/10/2024.  She had been planned for radiation therapy again this afternoon but she overall is quite frail and is unlikely that she would tolerate the transport from a rehab center over the many weeks of radiation therapy.  I have contacted radiation therapy holding treatment at this point  She will likely transfer to rehab center 6/10 or 6/11/2024.  We reschedule her office follow-up in approximately 2 weeks at which time a decision can be made about radiation therapy plus the use of Revlimid.    *Neutropenia  5/20/2024: WBC decreased to 1750.  Neutrophils decreased to 940.  5/24/2024: WBC decreased to 1290.  Neutrophils decreased to 680.  5/25/2024: WBC decreased to 860.  Neutrophils decreased to 530.  Worsening neutropenia is also concerning for relapse of the lymphoma  with involvement of the bone marrow.   5/26/2024: Neutrophil count 610.  5/27/2024-total WBC count has increased to 1.46.  Absolute neutrophil count pending   5/28/2024-WBC count lower at 1.05 with an absolute neutrophil count of 0.57  Increase in metamyelocytes as well as basophil percentage.  Flow cytometry 5/28/2024-negative  ANC lower today at 0.41  Copper level normal at 145  MMA normal at 245  5/30/24 - bone marrow biopsy, results pending   6/1/2024-WBC count 0.82, absolute neutrophil count 0.23.  WBC was okay through 5/10/2024.  Await bone marrow biopsy final results.  6/4/2024: WBC 0.83.  Discussed with oncology pharmacist, Alexandra Lopez.  She notes mirtazapine was started on 5/27/2024 for appetite.  This can cause neutropenia.  Therefore, stop this on 6/4/ 24.  6/7/2024: Daily Granix added as per Dr. Galo, her outpatient hematologist recommendation.  6/8/2024: After a dose of Granix on 6/7/2024, WBC improved from 1.09 up to 2.05  6/9/2024: WBC up to 2.5.  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 Galo, her usual outpatient oncologist checked this because he was concerned she may have developed CMV due to her severe cytopenias and CMV may be causing her ongoing prolonged cytopenias.  6/7/2024: I discussed with him.  He has requested an ID consult to get their thoughts if she needs treatment for CMV and also requests some Neupogen to try to improve the WBC/ANC  Dr. Jens Amin, ID, evaluated 6/8/2024.  He stated elevated CMV PCR in the setting of DLBCL is not uncommon.  He stated is difficult to know the significance.  He stated CMV could be a cause of her prolonged cytopenias.  He noted she is asymptomatic but stated patients are often asymptomatic with true infection.  He stated the treatment would  likely be valganciclovir.  He discussed empiric treatment versus continued monitoring.  He felt since her counts were stable it would be reasonable to repeat CMV PCR at oncology follow-up to reassess the trend.  He noted if CMV PCR remains stable or trends up then ID would be happy to reevaluate in the outpatient setting.  He signed off the case.     *Fever  Afebrile, Tmax of 99.5  Blood cultures negative  Patient at high risk for infections given neutropenia  Continue cefepime through 6/4/ 24 per ID recommendations.  After discontinuation of cefepime if patient remains neutropenic then they are recommending cefdinir  No recent fever     *Macrocytic anemia.  Patient also developed anemia secondary to lymphoma and secondary to chemotherapy.    Hgb decreased to 7.9 on 4/25/2022 and she was transfused.   Hemoglobin decreased to 6.3 on 4/27/2022. She was transfused.  Hemoglobin level improved subsequently.  Patient was placed ferrous sulfate 325 mg 3 days a week.  5/3/2024: Hemoglobin increased to 10.2.  5/25/2024: Hemoglobin decreased to 7.8.  No evidence of vitamin B12 or folate deficiency.    Ferritin 1953.  Transferrin saturation 11%.  Patient decided to hold off on PRBC transfusion.  5/26/2024: Hemoglobin 8.2.  Due to her reporting chills when she received PRBC Tx in the past, we will give Tylenol, Benadryl and Solumedrol prior to the transfusion.   526 2024-1 unit of PRBC administered.  Patient tolerated the transfusion well.  Hemoglobin 9.5 on 6/2/2024  Copper and MMA levels normal  Hb 8.8 from 9, from 8.7, from 8.8, from 9     *Hyponatremia.  Patient had problem with hyponatremia in the past.  Sodium is 127 on 3/14/2023.  She was advised to increase salt intake.  Sodium improved to 133 on repeat lab on 3/21/2023.  However, sodium decreased to 126 on 6/7/2023.  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  She was started on sodium chloride 1 g daily.  4/1/2024: Sodium decreased to  132.  Leg swelling improved with Lasix.  5/3/2024: Sodium 130.  5/24/2024: Sodium decreased to 126.    Patient was having significant weakness.  5/25/2024: Sodium 128.  Nephrology service was consulted.  5/28/2024-sodium stable at 132  5/30/2024-sodium 133  5/31/24 - sodium 134  6/1/2024-sodium 133  6/2/2024-sodium stable at 135  Continues on fluid restriction  Sodium 135, from 136, from 134, from 133  Reassessment 6/10/2024 with  serum sodium of 136     *Hyperkalemia previously, now hypokalemia.  5/24/2024: Potassium increased to 5.6.  Potassium 4.1 from 4.3, from 3.3, from 3.2, from 3.9     *Chronic anticoagulation.  Patient is on Eliquis 5 mg twice daily.  No excessive bleeding  Platelet count remains normal  Eliquis resumed      *Prophylaxis.  Patient had shingles in April 2023.  She is at risk of developing shingles while on treatment.  She was placed on acyclovir 400 mg twice daily.  She was on Bactrim DS 3 days a week.  Bactrim was discontinued due to the electrolyte abnormalities.     *Low-density lesion in the uterus.    The radiologist recommended pelvic ultrasound.    The patient is asymptomatic.    PET scan on 4/22/2024 reported fluid in the lower abdomen.  Patient reported intermittent vaginal discharge.     *Hypertension  Patient started on midodrine  Blood pressure has improved with midodrine.  On 10 mg 3 times a day     PLAN:  6/4/2024: Stopped mirtazapine because it can cause neutropenia (it was started on 5/27/2024).  She follows with Dr. Galo in our office and a repeat visit will be rescheduled the next 2 weeks  Bone marrow was negative for lymphoma and MDS  Patient would like to go to rehab in an effort to improve performance status.  We are not planning any systemic therapy such as chemotherapy at this time, or while she is in rehab.  We are hoping she gets stronger in rehab and we will hold radiation therapy during this time  ID recommends Dr. Galo check another CMV PCR when patient  follows up in our office and states she could be sent back to the ID clinic if CMV PCR is stable or higher. Assessed 6/10/2024 with Neupogen to be discontinued after today's dose.  Anticipating discharge 6/10/2024 or 6/11/2024, rescheduled office follow-up  Palliative care has seen patient but this would likely only be appropriate when she is seen back in office and decision made about goals of care.  At this point she does not need transfer to palliative care unit.    Chart reviewed and summarized including multiple inpatient notes, hospitalist note and last ID note

## 2024-06-10 NOTE — CONSULTS
.            [facility]    Palliative Care Daily Progress Note   Chief complaint-follow up goals of care/advanced care planning and support for patient/family    Code Status:   Code Status and Medical Interventions:   Ordered at: 05/29/24 1443     Medical Intervention Limits:    NO intubation (DNI)    NO cardioversion    NO artificial nutrition     Level Of Support Discussed With:    Patient    Health Care Surrogate     Code Status (Patient has no pulse and is not breathing):    No CPR (Do Not Attempt to Resuscitate)     Medical Interventions (Patient has pulse or is breathing):    Limited Support     Comments:    spoke with patient and her son/HCSHelder      Advanced Directives: Advance Directive Status: Patient has advance directive, copy in chart   Goals of Care: Ongoing.     S: Medical record reviewed. Events noted.  I was reconsulted to see at the request of the family. I last spoke with patient/family on 6/5/2024     Patient up in chair. Ate some breakfast. Worked with PT. No family present. I reviewed labs, medical notes, therapy notes, and MAR.              Review of Systems   Constitutional: Positive for decreased appetite and malaise/fatigue.   Cardiovascular:  Negative for chest pain.   Respiratory:  Negative for shortness of breath.    Gastrointestinal:  Negative for abdominal pain and nausea.   Neurological:  Positive for weakness.   Psychiatric/Behavioral:  Negative for depression. The patient is not nervous/anxious.      Pain Assessment  Preferred Pain Scale: number (Numeric Rating Pain Scale)  Nonverbal Indicators of Pain: nonverbal indicators absent  Pain Location: back, neck  Pain Description: aching    O:   No intake or output data in the 24 hours ending 06/10/24 1047    Diagnostics and current medications: Reviewed.    Current Facility-Administered Medications   Medication Dose Route Frequency Provider Last Rate Last Admin    acetaminophen (TYLENOL) tablet  650 mg  650 mg Oral Q4H PRN Abel Pelaez MD   650 mg at 06/08/24 0010    Or    acetaminophen (TYLENOL) 160 MG/5ML oral solution 650 mg  650 mg Oral Q4H PRN Abel Pelaez MD        Or    acetaminophen (TYLENOL) suppository 650 mg  650 mg Rectal Q4H PRN Abel Pelaez MD        albuterol (PROVENTIL) nebulizer solution 0.083% 2.5 mg/3mL  2.5 mg Nebulization Q4H PRN Abel Pelaez MD        amiodarone (PACERONE) tablet 200 mg  200 mg Oral Q24H Abel Pelaez MD   200 mg at 06/10/24 0820    apixaban (ELIQUIS) tablet 2.5 mg  2.5 mg Oral Q12H Mallory Del Castillo MD   2.5 mg at 06/10/24 0820    sennosides-docusate (PERICOLACE) 8.6-50 MG per tablet 2 tablet  2 tablet Oral BID PRN Abel Pelaez MD   2 tablet at 06/07/24 1019    And    polyethylene glycol (MIRALAX) packet 17 g  17 g Oral Daily PRN Abel Pelaez MD   17 g at 06/07/24 1020    And    bisacodyl (DULCOLAX) EC tablet 5 mg  5 mg Oral Daily PRN Abel Pelaez MD        And    bisacodyl (DULCOLAX) suppository 10 mg  10 mg Rectal Daily PRN Abel Pelaez MD        cetirizine (zyrTEC) tablet 10 mg  10 mg Oral Daily Abel Pelaez MD   10 mg at 06/10/24 0820    ferrous sulfate tablet 325 mg  325 mg Oral Every Other Day Abel Pelaez MD   325 mg at 06/09/24 0959    Filgrastim (NEUPOGEN) injection 300 mcg  300 mcg Subcutaneous Q PM Erick Mckinnon MD   300 mcg at 06/09/24 1713    Hydrocortisone (Perianal) (ANUSOL-HC) 2.5 % rectal cream   Rectal Daily Abel Pelaez MD   Given at 06/10/24 1013    midodrine (PROAMATINE) tablet 10 mg  10 mg Oral TID AC Morales [place] MD   10 mg at 06/10/24 0427    multivitamin with minerals 1 tablet  1 tablet Oral Daily Abel Pelaez MD   1 tablet at 06/10/24 0819    nitroglycerin (NITROSTAT) SL tablet 0.4 mg  0.4 mg Sublingual Q5 Min PRN Abel Pelaez MD        ondansetron (ZOFRAN) injection 4 mg  4 mg Intravenous Q6H PRN Abel Pelaez MD        Potassium Replacement - Follow Nurse / BPA Driven Protocol   Does not apply PRN Bud  "Morales GORDON MD        sodium chloride 0.9 % flush 10 mL  10 mL Intravenous Q12H Abel Pelaez MD   10 mL at 06/07/24 0856    sodium chloride 0.9 % flush 10 mL  10 mL Intravenous PRN Abel Pelaez MD        sodium chloride 0.9 % infusion 40 mL  40 mL Intravenous PRN Abel Pelaez MD        sodium chloride tablet 1 g  1 g Oral BID With Meals Aarti Reynolds MD   1 g at 06/10/24 0820          acetaminophen **OR** acetaminophen **OR** acetaminophen    albuterol    senna-docusate sodium **AND** polyethylene glycol **AND** bisacodyl **AND** bisacodyl    nitroglycerin    ondansetron    Potassium Replacement - Follow Nurse / BPA Driven Protocol    sodium chloride    sodium chloride  Attest that current medications reviewed including but not limited to prescriptions, over-the counter, herbals and vitamin/mineral/dietary (nutritional) supplements for name, route of administration, type, dose and frequency and are current using all immediate resources available at time of dictation.    A:    /67 (BP Location: Right arm, Patient Position: Sitting)   Pulse 78   Temp 97.7 °F (36.5 °C) (Oral)   Resp 16   Ht 157.5 cm (62\")   Wt 50.2 kg (110 lb 10.7 oz)   SpO2 96%   BMI 20.24 kg/m²     Constitutional:       Appearance: Not in distress. Chronically ill-appearing.   Pulmonary:      Effort: Pulmonary effort is normal.   Cardiovascular:      PMI at left midclavicular line.   Edema:     Peripheral edema absent.   Neurological:      Mental Status: Alert and oriented to person, place, and time.   Psychiatric:         Mood and Affect: Mood and affect normal.         Speech: Speech normal.         Behavior: Behavior is cooperative.         Thought Content: Thought content normal.      Comments: Forgetful          Patient status: Disease state: Controlled with current treatments.  Functional status: Palliative Performance Scale Score: Performance 50% based on the following measures: Ambulation: Mainly sit or lie down, Activity " "and Evidence of Disease: Unable to do any work, extensive evidence of disease, Self-Care: Considerable assistance required,  Intake: Normal or reduced, LOC: Full or confusion   ECOG Status(3) Capable of limited self-care, confined to bed or chair > 50% of waking hours.  Nutritional status:  moderate . Body mass index is 20.24 kg/m².  Screening Status/Interventions Data  Psychosocial Needs: neg  Spiritual Needs: neg  Goals of Care/ACP: pos  Goals of Care/ACP Intervened: yes   Palliative Care Acuity Data  Psychosocial Acuity: normal complexity  Spiritual Acuity: normal complexity    Impression/Problem List:     Leukopenia   Diffuse large b cell lymphoma   Chronic diastolic heart failure   Paroxysmal atrial fibrillation   Hyponatremia     Malnutrition            Recommendations/Plan:  1. Provide support with goals of care  2. Family meeting at 1330   3. [facility] consult to obtain information only        2.  Palliative care encounter  5/29/2024- I spoke with patient at bedside regarding goals of care. She has a very good understanding of her medical conditions, which we reviewed in detail. She has the support of three children. She resided alone prior to admission. At this time, she recognizes and/or feels she is in a bad situation and uncertain if she will bounce back from everything. She relies heavily on Dr Galo and his recommendations. She gomez tell me if she is informed her cancer is worst she will not resume treatment and will just have to \"throw in the towel.\" She would only consider treatment if it would give her more years of life that are enjoyable and good quality. She does have a living will on file that designates her son, Helder. We reviewed her wishes regarding CPR and medical interventions and she wishes for the following:  DNR/ DNI, no cardioversion, no artifical nutrition. She tells me her children know her wishes and I asked to contact Helder to confirm and she was agreeable. At this time, her goal is " to continue with current level of treatment and consider bone marrow biopsy to determine disease progression to determine what is next for her. She would like to discuss with [facility] as she trust him tremendously and he has been taking care of her the last 2-3 years. She is [employer] and attends [facility] [facility] [employer] in [place]. She does acknowledge general fatigue, weakness, and poor appetite. No pain or shortness of breath. I did provide her with information regarding palliative care and will plan to discuss further. Of note, I did attempt to discuss discharge options (home, rehab etc) and she tells me she is ready to discuss that just yet. I called her son/HCS, Helder at 1427 to discuss goals of care. I provided him with an update of my conversation I had with his mother. We discussed her wishes to be DNR/DNI, NO cardioversion and NO artifical nutrition and he is agreeable and supports his mother decision. I will update her chart to reflect their wishes.  He feels she is very knowledgeable of her situation and he will support her. I did give him information regarding palliative care. I will plan to support the patient and family for the next couple days. I spoke with ALLAN Vasquez.      5/30/2024-  The patient is off unit for bone marrow biopsy. I called her son, Helder at 1440. He visited with his mother last night, along with his brother. He reports she was really tired and not much conversation. He is aware of her poor nutrition intake. He was made aware that patient went for bone marrow biopsy today due to lab findings and further evaluation of lymphoma. He too wants to have results of bone marrow biopsy to help guide decision making and also discuss with Dr Galo.  I will plan to follow up with patient tomorrow to have further discussion with goals. I spoke with ALLAN Vasquez.      5/31/2024- I attempted to have further conversation with patient regarding goals of care and palliative care and hospice care  "and she is not wanting to engage in further discussion. She tells me she wants to discuss things further once her results have come back from her bone marrow biopsy. I did leave some brochures regarding [facility] and [facility] to provide additional information for the weekend and will inform her son, Helder, when I call him later this afternoon. I will plan to follow up Monday. I spoke with ALLAN Wang.      I called her son, Helder at 1515. I did provide him with information regarding [facility] vs [facility]. He is aware of written information left at bedside for him and the family to review. I will plan to follow up Monday with hopes that they have received results of bone marrow biopsy to support them with further decision making.      6/4/2024- I had a very brief conversation with patient. She remains consistent that she will NOT make any decision regarding her care until she has spoken with Dr Galo, her primary oncologist.  She is aware bone marrow biopsy is pending and medical team \"meeting to discuss further.\"  I did reach out to Dr Claire to see if he could reach out to Dr Galo to support patient and family with decision making. I also called patient son, Helder at 1535 and no answer. I was able to leave a generic voicemail for him to return call.  I spoke with Dr [place] as well this morning and ALLAN Viera.      6/5/2024- I had follow up conversation with patient. Her daughter, Liza was present. Dr Galo met with patient and her two son, Helder and Ba last evening. At this time, the patient plans to go to rehab to improve functional mobility. We discussed again importance of nutrition as well. She is considering resuming treatment for lymphoma as well. She states Dr Galo was hopeful and she will take all direction from him. I provided her with information regarding [facility] and [facility] and she will defer to Helder. She does tell me that she recently talked with [facility] and was given a list of facilities to " choose from from rehab. She is requesting [facility] and I have informed MONSERRAT Cunningham and she will update patient.  I called patient son/Helder AVERY at 1445 and no answer. I was able leave a generic voicemail. I will update chart once he has returned call. I will plan to be available PRN. I spoke with Candace Anderson, RN, Alexandra, pharmacist and Marisa. CCP.          8994: Helder returned call to me. His understanding is that treatment is on hold at this time due to her current performance status and neutropenia. I did provide him with information regarding [facility] and [facility]. At this time, he will wait on [facility] support.  The plan is rehab for now and they will have to determine how she does and determine if she will be able to return to home setting independently. They will plan to follow up with oncology outpatient to determine if further treatment is an option.     6/10/2024- I met with patient and she tells she panicked yesterday and asked to go to palliative care unit and to stop treatment. She is now wanting to continue with original plan and go to rehab and then follow up with Dr Galo to determine what he recommends regarding her lymphoma. There was discussion regarding starting radiation today of her jaw and Dr Mckinnon doesn't recommend that and she is agreeable to hold off. He wants her to focus on her performance status if that is her goal. I spoke with her son, Helder at 1125 and informed him of Dr Mckinnon recommendations and he agrees .He  did  explain to me  that yesterday his mother had wished to focus on comfort only and go to inpatient palliative care unit. We discussed unit structure.   He is confused on what direction to take. After much discussion we decided to have family meeting at 1330 to include patient and her three children. I spoke with Alaina Escobedo RN and MONSERRAT Cunningham.       1330: I met with patient and her three children to discuss goals of care. They all have fair understanding  of patient conditions, which we reviewed in detail. We met to get consistency of patient goals. She remains inconsistent. We have reviewed patient barriers-social and financial. After much discussion, will plan for rehab and then reevaluate what's the next plan. The patient does express her concerns on how well she will do with her age and current functional state I did provide information regarding palliative care, [facility], inpatient palliative care unit and [facility]. She is not requiring symptom management at this time. They would like to discuss with [facility] to obtain information for the future as well. I did place a consult. Of note, going home with hospice would NOT be an option and patient can't afford private caregivers. I did have CCP answer questions of the family as well regarding rehab/insurance etc. I spoke with Dr Smith, Alaina Escobedo, RN and Marisa GERMAN.       Thank you for allowing us to participate in patient's plan of care. Palliative Care Team will continue to follow patient.     Time spent:60 minutes spent reviewing medical and medication records, assessing and examining patient, discussing with family, answering questions, providing some guidance about a plan and documentation of care, and coordinating care with other healthcare members, with > 50% time spent face to face.        Heather Martell, MAGGI  6/10/2024  10:47 EDT

## 2024-06-10 NOTE — SIGNIFICANT NOTE
06/10/24 1515   OTHER   Discipline occupational therapist   Rehab Time/Intention   Session Not Performed   (Family meeting this afternoon on arrival. Will f/u tomorrow.)   Recommendation   OT - Next Appointment 06/11/24

## 2024-06-10 NOTE — PLAN OF CARE
Goal Outcome Evaluation:            Pt alert and oriented x 4. VSS w/Midodrine for hypotension.See MAR for a complete listing of medications administered this shift. No distress noted. Denies pain. Request to go Palliative and be comfort care, family aware. DNR in place. Plan of care is ongoing.

## 2024-06-10 NOTE — PROGRESS NOTES
Name: Michelle Car ADMIT: 2024   : 1940  PCP: Olayinka Pimentel MD    MRN: [id number] LOS: 17 days   AGE/SEX: 83 y.o. female  ROOM: \[facility] [place] [facility]\""/     Subjective   Subjective   Still complaining of weakness and fatigue.  Still with poor appetite.  No fever or chills.  No bleeding diathesis.    Review of Systems  Cardiovascular/respiratory.  No chest pain/no shortness of breath/no cough/no wheeze  GI.  No abdominal pain or nausea or vomiting.  .  No dysuria or hematuria.     Objective   Objective   Vital Signs  Temp:  [97.7 °F (36.5 °C)-99.1 °F (37.3 °C)] 97.9 °F (36.6 °C)  Heart Rate:  [66-79] 77  Resp:  [16] 16  BP: ()/(58-75) 97/60  SpO2:  [96 %-97 %] 97 %  on   ;   Device (Oxygen Therapy): room air    Intake/Output Summary (Last 24 hours) at 6/10/2024 1426  Last data filed at 6/10/2024 0810  Gross per 24 hour   Intake 120 ml   Output --   Net 120 ml     Body mass index is 20.24 kg/m².      24  1849 24  1433 24  1704   Weight: 54.2 kg (119 lb 7.8 oz) 54 kg (119 lb) 50.2 kg (110 lb 10.7 oz)     Physical Exam  General.  Elderly frail female.  She is alert and oriented x 4.  No apparent pain/distress/diaphoresis.  Appears chronically ill.  Eyes.  Pupils equal round and reactive.  Intact extraocular musculature.  No pallor or jaundice  Oral cavity.  Moist mucous membrane.  Neck.  Supple.  No JVD.  No lymphadenopathy or thyromegaly.  Cardiovascular.  Regular rate and rhythm with no gallops or murmurs.  Chest.  Clear to auscultation bilaterally with no added sounds.  Abdomen.  Soft lax.  No tenderness.  No organomegaly.  No guarding or rebound.    Extremities.  No clubbing/cyanosis/edema.  CNS.  No acute focal neurological deficits      Results Review:      Results from last 7 days   Lab Units 06/10/24  0510 24  0613 24  1721 24  0658 24  1529 24  0510   SODIUM mmol/L 136 135*  --  136  --  134*   POTASSIUM mmol/L 4.1 4.3 4.3 3.3* 3.8 3.2*   CHLORIDE  "mmol/L 101 103  --  103  --  104   CO2 mmol/L 25.4 25.0  --  25.0  --  23.0   BUN mg/dL 11 13  --  13  --  17   CREATININE mg/dL 0.61 0.50*  --  0.50*  --  0.48*   GLUCOSE mg/dL 76 83  --  80  --  87   CALCIUM mg/dL 8.7 8.4*  --  8.2*  --  8.3*     Estimated Creatinine Clearance: 56.3 mL/min (by C-G formula based on SCr of 0.61 mg/dL).                                Invalid input(s): \"LDLCALC\"  Results from last 7 days   Lab Units 06/10/24  0510 06/09/24  0613 06/08/24  0658 06/07/24  0721 06/06/24  0510 06/05/24  0635 06/05/24  0635 06/04/24  0459   WBC 10*3/mm3 3.46 2.53* 2.05* 1.09* 1.05*  --  0.80* 0.83*   HEMOGLOBIN g/dL 8.8* 9.0* 8.7* 8.8* 9.0*  --  8.9* 9.5*   HEMATOCRIT % 26.4* 27.1* 26.1* 26.6* 27.1*  --  26.4* 28.0*   PLATELETS 10*3/mm3 158 152 136* 132* 141  --  132* 139*   MCV fL 97.4* 95.8 96.7 97.1* 97.5*  --  95.7 96.9   MCH pg 32.5 31.8 32.2 32.1 32.4  --  32.2 32.9   MCHC g/dL 33.3 33.2 33.3 33.1 33.2  --  33.7 33.9   RDW % 13.9 13.5 13.4 13.7 13.6  --  13.7 13.6   RDW-SD fl 49.2 47.5 47.7 48.1 48.0  --  47.4 48.0   MPV fL 10.0 10.0 9.8 9.7 9.9  --  9.6 10.1   NEUTROPHIL % %  --   --   --   --   --   --  47.3 43.4   LYMPHOCYTE % %  --   --   --   --   --   --  36.3 34.9   MONOCYTES % %  --   --   --   --   --   --  13.8* 18.1*   EOSINOPHIL % %  --   --   --   --   --   --  1.3 1.2   BASOPHIL % %  --   --   --   --   --   --  1.3 1.2   IMM GRAN % %  --   --   --   --   --   --  0.0 1.2*   NEUTROS ABS 10*3/mm3 2.70 1.52* 1.22*  --  0.50*   < > 0.38* 0.36*   LYMPHS ABS 10*3/mm3  --   --   --   --   --   --  0.29* 0.29*   MONOS ABS 10*3/mm3  --   --   --   --   --   --  0.11 0.15   EOS ABS 10*3/mm3  --   --  0.00  --  0.00   < > 0.01 0.01   BASOS ABS 10*3/mm3  --   --  0.02  --  0.01   < > 0.01 0.01   IMMATURE GRANS (ABS) 10*3/mm3  --   --   --   --   --   --  0.00 0.01    < > = values in this interval not displayed.                                           Imaging:  Imaging Results (Last 24 Hours)  "      ** No results found for the last 24 hours. **               I reviewed the patient's new clinical results / labs / tests / procedures      Assessment/Plan     Active Hospital Problems    Diagnosis  POA    **Leukopenia [D72.819]  Unknown    Severe malnutrition [E43]  Yes    Anemia [D64.9]  Unknown    Weight loss [R63.4]  Unknown    Chronic diastolic CHF (congestive heart failure) [I50.32]  Yes    Paroxysmal atrial fibrillation with rapid ventricular response [I48.0]  Yes    Hyponatremia [E87.1]  Yes    Diffuse large B-cell lymphoma of lymph nodes of multiple regions [C83.38]  Yes    CRI (chronic renal insufficiency), stage 2 (mild) [N18.2]  Yes      Resolved Hospital Problems    Diagnosis Date Resolved POA    Hyperkalemia [E87.5] 06/01/2024 Unknown           Relapse of diffuse large B-cell lymphoma associated with pancytopenia.  S/p chemotherapy.  Resolved leukopenia on new OB/GYN.  Resolved thrombocytopenia.  Hemoglobin is stable.  Bone marrow biopsy was negative for MDS or lymphoma.  At this time patient was deemed not a candidate for radiation therapy because of her weakness and no more chemotherapy is planned.  Cytomegaly virus positive PCR/fever.  Status post ID consultation.  Status post cefepime treatment.  Blood cultures are negative.  MRSA screen is negative.  Resolved fever.  ID recommends follow-up on CMV virus as an outpatient and no antiviral treatment at this time  Hyponatremia/hypokalemia.  Resolved hypokalemia with substitution.  Resolved hyponatremia with salt tablets.  Appreciate nephrology help.  Hypotension/history of A-fib/history of chronic diastolic congestive heart failure.  Good rate controlled and in normal sinus rhythm clinically.  Blood pressure acceptable.  Continue Eliquis/amiodarone/midodrine.  Weakness.  Normal potassium.  Will check magnesium/CK/TSH.  Continue PT.  DNR status and goals of treatment.  Patient is currently a DO NOT RESUSCITATE.  Palliative nurse is talking to the  patient and the family about goals of treatment.      Discussed my findings and plan of treatment with the patient/family/palliative nurse/discharge planner.  Disposition.  To SNF tomorrow        Saulo Smith MD  [facility]  06/10/24  14:26 EDT

## 2024-06-10 NOTE — PROGRESS NOTES
Nephrology Follow Up Note    Patient Identification:  Name: Michelle Car MRN: [id number]  Age: 83 y.o. : 1940  Sex: female  Date:6/10/2024    Requesting Physician: As per consult order.  Reason for Consultation: hyponatremia   Information from:patient/ family/ chart      History of Present Illness: This is a 83 y.o. year old female  with lymphoma admitted for weakness, ,hyponatremia and edema.   She completed chemo in 2024.  She has had ongoing issues with hyponatremia.  She was on salt tabs at home Upon arrival her Na was 126 with K at 5.6.   She was given IV fluids of NS as bp was low upon arrival.       feels better, Na now at 132, bp near goal      : Complaining of weakness.  Wants to look into nursing/rehab placement  Otherwise no new complaints    6/3: Interval events reviewed, no new complaints  Awaiting final pathology    : Resting today denies complaints, no new labs    : Resting comfortably in bed denies new problems or complaints BP remains soft  But asymptomatic.  Sodium 134 today    : Resting comfortably with no new complaints or events    6/10: Clinically well denies complaints, continues to discuss goals of care    The following medical history and medications personally reviewed by me:        Current Meds:   Current Facility-Administered Medications   Medication Dose Route Frequency Provider Last Rate Last Admin    acetaminophen (TYLENOL) tablet 650 mg  650 mg Oral Q4H PRN Abel Pelaez MD   650 mg at 24 0010    Or    acetaminophen (TYLENOL) 160 MG/5ML oral solution 650 mg  650 mg Oral Q4H PRN Abel Pelaez MD        Or    acetaminophen (TYLENOL) suppository 650 mg  650 mg Rectal Q4H PRN Abel Pelaez MD        albuterol (PROVENTIL) nebulizer solution 0.083% 2.5 mg/3mL  2.5 mg Nebulization Q4H PRN Abel Pelaez MD        amiodarone (PACERONE) tablet 200 mg  200 mg Oral Q24H Abel Pelaez MD   200 mg at 06/10/24 0820    apixaban (ELIQUIS) tablet 2.5 mg   2.5 mg Oral Q12H Mallory Del Castillo MD   2.5 mg at 06/10/24 0820    sennosides-docusate (PERICOLACE) 8.6-50 MG per tablet 2 tablet  2 tablet Oral BID PRN Abel Pelaez MD   2 tablet at 24 1019    And    polyethylene glycol (MIRALAX) packet 17 g  17 g Oral Daily PRN Abel Pelaez MD   17 g at 24 1020    And    bisacodyl (DULCOLAX) EC tablet 5 mg  5 mg Oral Daily PRN Abel Pelaez MD        And    bisacodyl (DULCOLAX) suppository 10 mg  10 mg Rectal Daily PRN Abel Pelaez MD        cetirizine (zyrTEC) tablet 10 mg  10 mg Oral Daily Abel Pelaez MD   10 mg at 06/10/24 0820    ferrous sulfate tablet 325 mg  325 mg Oral Every Other Day Abel Pelaez MD   325 mg at 24 0959    Filgrastim (NEUPOGEN) injection 300 mcg  300 mcg Subcutaneous Q PM Erick Mckinnon MD   300 mcg at 24 1713    Hydrocortisone (Perianal) (ANUSOL-HC) 2.5 % rectal cream   Rectal Daily Abel Pelaez MD   Given at 06/10/24 1013    midodrine (PROAMATINE) tablet 10 mg  10 mg Oral TID AC Morales Hardwick MD   10 mg at 06/10/24 1112    multivitamin with minerals 1 tablet  1 tablet Oral Daily Abel Pelaez MD   1 tablet at 06/10/24 0819    nitroglycerin (NITROSTAT) SL tablet 0.4 mg  0.4 mg Sublingual Q5 Min PRN Abel Pelaez MD        ondansetron (ZOFRAN) injection 4 mg  4 mg Intravenous Q6H PRN Abel Pelaez MD        Potassium Replacement - Follow Nurse / BPA Driven Protocol   Does not apply PRN Morales Farrell MD        sodium chloride 0.9 % flush 10 mL  10 mL Intravenous Q12H Abel Pealez MD   10 mL at 24 0856    sodium chloride 0.9 % flush 10 mL  10 mL Intravenous PRN Abel Pelaez MD        sodium chloride 0.9 % infusion 40 mL  40 mL Intravenous PRN Abel Pelaez MD        sodium chloride tablet 1 g  1 g Oral BID With Meals Aarti Reynolds MD   1 g at 06/10/24 0820         Physical Exam:  Vitals:   Temp (24hrs), Av.4 °F (36.9 °C), Min:97.7 °F (36.5 °C), Max:99.1 °F (37.3 °C)    /67 (BP  "Location: Right arm, Patient Position: Sitting)   Pulse 78   Temp 97.7 °F (36.5 °C) (Oral)   Resp 16   Ht 157.5 cm (62\")   Wt 50.2 kg (110 lb 10.7 oz)   SpO2 96%   BMI 20.24 kg/m²   Intake/Output:     Intake/Output Summary (Last 24 hours) at 6/10/2024 1150  Last data filed at 6/10/2024 0810  Gross per 24 hour   Intake 120 ml   Output --   Net 120 ml        Wt Readings from Last 1 Encounters:   06/04/24 1704 50.2 kg (110 lb 10.7 oz)   05/27/24 1433 54 kg (119 lb)   05/24/24 1849 54.2 kg (119 lb 7.8 oz)   05/24/24 1701 51.7 kg (114 lb)       Exam:  Alert and oriented NAD   eomi no icterus   Moist mucus membranes   No jvd reg s1s2 no murmur   Clear bilaterally no rales/rhonchi/wheeze   Soft NTND + bs   5/5 strength bilaterally  No edema   Warm dry no rash   Normal mood and judgement         DATA:  Radiology and Labs:  The following labs and radiology results independently reviewed by me    Labs:   Recent Results (from the past 24 hour(s))   Basic Metabolic Panel    Collection Time: 06/10/24  5:10 AM    Specimen: Blood   Result Value Ref Range    Glucose 76 65 - 99 mg/dL    BUN 11 8 - 23 mg/dL    Creatinine 0.61 0.57 - 1.00 mg/dL    Sodium 136 136 - 145 mmol/L    Potassium 4.1 3.5 - 5.2 mmol/L    Chloride 101 98 - 107 mmol/L    CO2 25.4 22.0 - 29.0 mmol/L    Calcium 8.7 8.6 - 10.5 mg/dL    BUN/Creatinine Ratio 18.0 7.0 - 25.0    Anion Gap 9.6 5.0 - 15.0 mmol/L    eGFR 89.4 >60.0 mL/min/1.73   CBC Auto Differential    Collection Time: 06/10/24  5:10 AM    Specimen: Blood   Result Value Ref Range    WBC 3.46 3.40 - 10.80 10*3/mm3    RBC 2.71 (L) 3.77 - 5.28 10*6/mm3    Hemoglobin 8.8 (L) 12.0 - 15.9 g/dL    Hematocrit 26.4 (L) 34.0 - 46.6 %    MCV 97.4 (H) 79.0 - 97.0 fL    MCH 32.5 26.6 - 33.0 pg    MCHC 33.3 31.5 - 35.7 g/dL    RDW 13.9 12.3 - 15.4 %    RDW-SD 49.2 37.0 - 54.0 fl    MPV 10.0 6.0 - 12.0 fL    Platelets 158 140 - 450 10*3/mm3   Manual Differential    Collection Time: 06/10/24  5:10 AM    Specimen: " Blood   Result Value Ref Range    Neutrophil % 78.0 (H) 42.7 - 76.0 %    Lymphocyte % 10.0 (L) 19.6 - 45.3 %    Monocyte % 8.0 5.0 - 12.0 %    Metamyelocyte % 4.0 (H) 0.0 - 0.0 %    Neutrophils Absolute 2.70 1.70 - 7.00 10*3/mm3    Lymphocytes Absolute 0.35 (L) 0.70 - 3.10 10*3/mm3    Monocytes Absolute 0.28 0.10 - 0.90 10*3/mm3    RBC Morphology Normal Normal    WBC Morphology Normal Normal    Platelet Morphology Normal Normal       Radiology:  Imaging Results (Last 24 Hours)       ** No results found for the last 24 hours. **             ASSESSMENT:   Lymphoma   Hypotension   Hyponatremia   Hyperkalemia   Chronic diastolic heart failure   Relapsing diffuse large B-cell lymphoma  Adrenal insufficiency - per chart, but doesn't follow with endo   Hypoalbuminemia   Edema   Parox afib       PLAN:   Sodium stable/improved, 136  Renal function at baseline  Potassium improved after replacement  Continue sodium chloride tablets at present dose, now and at discharge  Midodrine for hypotension  No need for daily chemistries.  Will see as needed        Jr Le M.D  [facility]  Office phone number: [phone]  Answering service phone number: [phone]

## 2024-06-11 ENCOUNTER — TELEPHONE (OUTPATIENT)
Dept: ONCOLOGY | Facility: CLINIC | Age: 84
End: 2024-06-11

## 2024-06-11 ENCOUNTER — SPECIALTY PHARMACY (OUTPATIENT)
Dept: PHARMACY | Facility: HOSPITAL | Age: 84
End: 2024-06-11
Payer: MEDICARE

## 2024-06-11 VITALS
DIASTOLIC BLOOD PRESSURE: 62 MMHG | RESPIRATION RATE: 16 BRPM | HEART RATE: 70 BPM | TEMPERATURE: 99.3 F | WEIGHT: 110.67 LBS | HEIGHT: 62 IN | SYSTOLIC BLOOD PRESSURE: 93 MMHG | OXYGEN SATURATION: 95 % | BODY MASS INDEX: 20.37 KG/M2

## 2024-06-11 PROBLEM — I50.32 CHRONIC HEART FAILURE WITH PRESERVED EJECTION FRACTION (HFPEF): Status: ACTIVE | Noted: 2024-06-11

## 2024-06-11 LAB
ALBUMIN SERPL-MCNC: 2.4 G/DL (ref 3.5–5.2)
ALP SERPL-CCNC: 78 U/L (ref 39–117)
ALT SERPL W P-5'-P-CCNC: 13 U/L (ref 1–33)
ANION GAP SERPL CALCULATED.3IONS-SCNC: 7 MMOL/L (ref 5–15)
AST SERPL-CCNC: 21 U/L (ref 1–32)
BASOPHILS # BLD MANUAL: 0 10*3/MM3 (ref 0–0.2)
BASOPHILS NFR BLD MANUAL: 0 % (ref 0–1.5)
BILIRUB CONJ SERPL-MCNC: <0.2 MG/DL (ref 0–0.3)
BILIRUB INDIRECT SERPL-MCNC: ABNORMAL MG/DL
BILIRUB SERPL-MCNC: 0.3 MG/DL (ref 0–1.2)
BUN SERPL-MCNC: 12 MG/DL (ref 8–23)
BUN/CREAT SERPL: 21.8 (ref 7–25)
BURR CELLS BLD QL SMEAR: ABNORMAL
CALCIUM SPEC-SCNC: 8.4 MG/DL (ref 8.6–10.5)
CHLORIDE SERPL-SCNC: 104 MMOL/L (ref 98–107)
CO2 SERPL-SCNC: 26 MMOL/L (ref 22–29)
CREAT SERPL-MCNC: 0.55 MG/DL (ref 0.57–1)
CYTO UR: NORMAL
DEPRECATED RDW RBC AUTO: 45.6 FL (ref 37–54)
DX PRELIMINARY: NORMAL
EGFRCR SERPLBLD CKD-EPI 2021: 91.6 ML/MIN/1.73
ELLIPTOCYTES BLD QL SMEAR: ABNORMAL
EOSINOPHIL # BLD MANUAL: 0.04 10*3/MM3 (ref 0–0.4)
EOSINOPHIL NFR BLD MANUAL: 1 % (ref 0.3–6.2)
ERYTHROCYTE [DISTWIDTH] IN BLOOD BY AUTOMATED COUNT: 13.6 % (ref 12.3–15.4)
GLUCOSE SERPL-MCNC: 81 MG/DL (ref 65–99)
HCT VFR BLD AUTO: 25.8 % (ref 34–46.6)
HGB BLD-MCNC: 8.9 G/DL (ref 12–15.9)
HYPOCHROMIA BLD QL: ABNORMAL
LAB AP CASE REPORT: NORMAL
LAB AP CLINICAL INFORMATION: NORMAL
LAB AP FLOW CYTOMETRY SUMMARY: NORMAL
LAB AP SPECIAL STAINS: NORMAL
LYMPHOCYTES # BLD MANUAL: 0.07 10*3/MM3 (ref 0.7–3.1)
LYMPHOCYTES NFR BLD MANUAL: 12.1 % (ref 5–12)
MCH RBC QN AUTO: 32.4 PG (ref 26.6–33)
MCHC RBC AUTO-ENTMCNC: 34.5 G/DL (ref 31.5–35.7)
MCV RBC AUTO: 93.8 FL (ref 79–97)
METAMYELOCYTES NFR BLD MANUAL: 1 % (ref 0–0)
MONOCYTES # BLD: 0.45 10*3/MM3 (ref 0.1–0.9)
NEUTROPHILS # BLD AUTO: 3.09 10*3/MM3 (ref 1.7–7)
NEUTROPHILS NFR BLD MANUAL: 83.8 % (ref 42.7–76)
NRBC BLD AUTO-RTO: 0 /100 WBC (ref 0–0.2)
OVALOCYTES BLD QL SMEAR: ABNORMAL
PATH REPORT.ADDENDUM SPEC: NORMAL
PATH REPORT.FINAL DX SPEC: NORMAL
PATH REPORT.GROSS SPEC: NORMAL
PLAT MORPH BLD: NORMAL
PLATELET # BLD AUTO: 148 10*3/MM3 (ref 140–450)
PMV BLD AUTO: 9.8 FL (ref 6–12)
POIKILOCYTOSIS BLD QL SMEAR: ABNORMAL
POLYCHROMASIA BLD QL SMEAR: ABNORMAL
POTASSIUM SERPL-SCNC: 3.6 MMOL/L (ref 3.5–5.2)
PROT SERPL-MCNC: 4.4 G/DL (ref 6–8.5)
RBC # BLD AUTO: 2.75 10*6/MM3 (ref 3.77–5.28)
SODIUM SERPL-SCNC: 137 MMOL/L (ref 136–145)
VARIANT LYMPHS NFR BLD MANUAL: 2 % (ref 19.6–45.3)
WBC MORPH BLD: NORMAL
WBC NRBC COR # BLD AUTO: 3.69 10*3/MM3 (ref 3.4–10.8)

## 2024-06-11 PROCEDURE — 99232 SBSQ HOSP IP/OBS MODERATE 35: CPT | Performed by: INTERNAL MEDICINE

## 2024-06-11 PROCEDURE — 80048 BASIC METABOLIC PNL TOTAL CA: CPT | Performed by: STUDENT IN AN ORGANIZED HEALTH CARE EDUCATION/TRAINING PROGRAM

## 2024-06-11 PROCEDURE — 80076 HEPATIC FUNCTION PANEL: CPT | Performed by: INTERNAL MEDICINE

## 2024-06-11 PROCEDURE — 85007 BL SMEAR W/DIFF WBC COUNT: CPT | Performed by: HOSPITALIST

## 2024-06-11 PROCEDURE — 85025 COMPLETE CBC W/AUTO DIFF WBC: CPT | Performed by: HOSPITALIST

## 2024-06-11 RX ORDER — MIDODRINE HYDROCHLORIDE 10 MG/1
10 TABLET ORAL
Qty: 90 TABLET | Refills: 0 | Status: SHIPPED | OUTPATIENT
Start: 2024-06-11

## 2024-06-11 RX ORDER — SODIUM CHLORIDE 1 G/1
1 TABLET ORAL 2 TIMES DAILY WITH MEALS
Qty: 60 TABLET | Refills: 0 | Status: SHIPPED | OUTPATIENT
Start: 2024-06-11

## 2024-06-11 RX ADMIN — APIXABAN 2.5 MG: 2.5 TABLET, FILM COATED ORAL at 09:03

## 2024-06-11 RX ADMIN — MIDODRINE HYDROCHLORIDE 10 MG: 5 TABLET ORAL at 11:47

## 2024-06-11 RX ADMIN — CETIRIZINE HYDROCHLORIDE 10 MG: 10 TABLET ORAL at 09:03

## 2024-06-11 RX ADMIN — HYDROCORTISONE: 25 CREAM TOPICAL at 09:06

## 2024-06-11 RX ADMIN — MIDODRINE HYDROCHLORIDE 10 MG: 5 TABLET ORAL at 04:43

## 2024-06-11 RX ADMIN — FERROUS SULFATE TAB 325 MG (65 MG ELEMENTAL FE) 325 MG: 325 (65 FE) TAB at 09:03

## 2024-06-11 RX ADMIN — SODIUM CHLORIDE TAB 1 GM 1 G: 1 TAB at 09:04

## 2024-06-11 RX ADMIN — AMIODARONE HYDROCHLORIDE 200 MG: 200 TABLET ORAL at 09:04

## 2024-06-11 RX ADMIN — Medication 1 TABLET: at 09:03

## 2024-06-11 NOTE — NURSING NOTE
Report called to Hannah.   · He was wearing 2 L nasal cannula at home      · Previously when seen by Pulmonary in November he was noted to be on 4 L nasal cannula but he states his O2 needs have always been at 2L NC at rest since discharge  · Now stable on 2 L NC  · Increased to 3L last 3 weeks for symptom control - not for hypoxia

## 2024-06-11 NOTE — PROGRESS NOTES
REASON FOR FOLLOWUP/CHIEF COMPLAINT:  DLBCL, anemia, neutropenia    Interval history:  6/9/2024  WBC is a little better.  Again, on Neupogen.  No new issues overnight    6/10/2024  T90.2, pulse 66, respirations 16, , SpO2 96%  Patient is record reviewed and physical exam performed, patient remains quite frail.  Is unlikely that radiation therapy at this point would be tolerated well particular if she is in a nursing facility.  This discussed with Dr. Galo and radiation therapy will be held as she proceeds to her rehab and then be seen back in office.  BUN/creatinine of 11 and 0.61, H&H of 8.8 and 26.4, white count 3460 and platelet count 158,000    6/11/2024  T97.9, pulse 60, respirate 16, BP 98/60, SpO2 96%  Case discussed with additional staff is particular palliative care as goals of care have been reviewed.  It was ultimately decided that plans for rehab and then reevaluation would be most appropriate.  Plans for follow-up are  H&H 8.9 and 25.8, white count 3690, platelet count of 1 48,000, being creatinine 12 and 0.55, albumin 2.4, normal transaminases  Patient to be transferred today to rehab center    Past Medical History, Past Surgical History, Social History, Family History have been reviewed and are without significant changes except as mentioned.    Review of Systems   Review of Systems   Constitutional:  Negative for activity change.   HENT:  Negative for nosebleeds and trouble swallowing.    Respiratory:  Negative for shortness of breath and wheezing.    Cardiovascular:  Negative for chest pain and palpitations.   Gastrointestinal:  Negative for constipation, diarrhea and nausea.   Genitourinary:  Negative for dysuria and hematuria.   Musculoskeletal:  Negative for arthralgias and myalgias.   Skin:  Negative for rash and wound.   Neurological:  Negative for seizures and syncope.   Hematological:  Negative for adenopathy. Does not bruise/bleed easily.   Psychiatric/Behavioral:  Negative  for confusion.        Medications:  The current medication list was reviewed in the EMR    ALLERGIES:    Allergies   Allergen Reactions    Factive [Gemifloxacin] Rash              Vitals:    06/10/24 1125 06/10/24 1705 06/10/24 1915 06/11/24 0240   BP: 97/60 106/66 101/56 98/60   BP Location: Right arm Right arm Right arm Right arm   Patient Position: Lying Lying Lying Lying   Pulse: 77 72 73 61   Resp: 16 16 16 16   Temp: 97.9 °F (36.6 °C) 97.9 °F (36.6 °C) 97.9 °F (36.6 °C) 97.9 °F (36.6 °C)   TempSrc: Oral Oral Oral Oral   SpO2: 97% 97% 96% 96%   Weight:       Height:         Physical Exam    CONSTITUTIONAL:  Vital signs reviewed.  No distress, looks comfortable at is clearly quite frail  EYES:  Conjunctivae and lids unremarkable.  PERRLA  EARS, NOSE, MOUTH, THROAT:  Ears and nose appear unremarkable.  Lips, teeth, gums appear unremarkable.  RESPIRATORY:  Normal respiratory effort.  Lungs clear to auscultation bilaterally.  CARDIOVASCULAR:  Normal S1, S2.  No murmurs, rubs or gallops.  No significant lower extremity edema.  GASTROINTESTINAL: Abdomen appears unremarkable.  Nontender.  No hepatomegaly.  No splenomegaly.  NEURO: Cranial nerves 2-12 grossly intact.  No focal deficits.  Appears to have equal strength all 4 extremities.  MUSCULOSKELETAL:  Unremarkable digits/nails.  No cyanosis or clubbing.  SKIN:  Warm.  No rashes.  PSYCHIATRIC:  Normal judgment and insight.  Normal mood and affect.      RECENT LABS:  WBC   Date Value Ref Range Status   06/11/2024 3.69 3.40 - 10.80 10*3/mm3 Final   06/10/2024 3.46 3.40 - 10.80 10*3/mm3 Final   06/09/2024 2.53 (L) 3.40 - 10.80 10*3/mm3 Final     Hemoglobin   Date Value Ref Range Status   06/11/2024 8.9 (L) 12.0 - 15.9 g/dL Final   06/10/2024 8.8 (L) 12.0 - 15.9 g/dL Final   06/09/2024 9.0 (L) 12.0 - 15.9 g/dL Final     Platelets   Date Value Ref Range Status   06/11/2024 148 140 - 450 10*3/mm3 Final   06/10/2024 158 140 - 450 10*3/mm3 Final   06/09/2024 152 140 - 450  10*3/mm3 Final       ASSESSMENT/PLAN:  Michelle Car P382/1     *Relapsed diffuse large B-cell lymphoma.  Patient was found to have bilateral pleural effusions.    She underwent right thoracentesis on 2/21/2022 and the left thoracentesis on 2/22/2022.    Analysis of the fluid revealed involvement with diffuse large B-cell lymphoma.    FISH analysis for BCL6 rearrangement was positive but was negative for BCL-2 and MYC rearrangement.    PET scan on 3/10/2022 revealed significant hypermetabolism in the left adrenal gland and the surrounding soft tissue with SUV up to 34.5. Hypermetabolism in the left pleural thickening with SUV of 7.1. there was less hypermetabolism in the right adrenal gland and in the right pleura.  She was considered to have stage III non-bulky disease.  R-IPI score of 3 associated with poor risk - associated with overall survival of 55%.   R-CHOP with Neulasta support was started on 3/28/2022.  Due to development of neutropenic fever following cycles 1 and 2, treatment was changed to mini-RCHOP starting cycle #3 on 5/16/2022.  Patient received cycle #6 on 7/25/2022.  PET scan on 8/15/2022 revealed complete metabolic response.  Patient was found to have recurrence with development of a left mandibular mass in October/November 2023.  Biopsy of left mandible mass on 11/28/2023 showed recurrence of the lymphoma, diffuse large B cell, non-GCB phenotype.  Ki-67 was 90%.    It was positive for Bcl-6 but negative for Bcl-2 and MYC rearrangement.  PET scan on 12/11/2023 revealed multiple areas of involvement -prevascular space, left epicardial fat pad, pleural density posterior to the left fourth rib anterior aspect, lesion along the pericardium versus intramuscular with SUV of 9.8.  Hypermetabolic activity at both adrenal glands, uniformly increased in size and appeared hyperplastic.  There was a 2.7 x 1.1 cm soft tissue nodule lateral to the right erector spinae muscle posterior to the right  posterior 11th rib with an SUV of 20.5 and there was a 1.3 cm nodule lateral to the posterior margin of the left psoas muscle with an SUV of 24.9.  Rituxan Polivy and Treanda with Treanda every 3 weeks for 6 cycles was started on 12/12/2023.  The jaw mass started to decrease in size after starting treatment.  CT scan on 2/8/2024 showed evidence of response to treatment.  Cycle #6 was given on 4/1/2024.  PET scan on 4/22/2024 revealed decrease in the hypermetabolism in the mediastinal and pericardial lymph nodes/lesions and in the lesions above the left kidney and adrenal gland.  The left mandibular lesion was no longer hypermetabolic.   She is at increased risk for relapse of the lymphoma.  The plan was to start treatment with Revlimid 10 mg daily-  However, the patient did not start the treatment yet.  LDH was previously elevated but improved to 190 on 5/25/2024.  Bone marrow 5/30/2024:   10 to 20% cellularity.  No evidence of lymphoma.  Trilineage hematopoiesis with decreased, left shifted granulopoiesis.  Pathologist noted the significance of the gamma delta T-cell population identified by flow cytometry is uncertain and can be seen in reactive processes.  The pathologist noted of a neoplastic process is considered clinically T-cell gene rearrangement studies could be performed.  I sent a message to the patient's outpatient oncologist, Dr. Galo, to ask him if he wants us to do a peripheral blood flow cytometry to check for blood contamination or a T-cell gene rearrangement on the marrow specimen, both of which were offered by the pathologist for further information if we thought it would be clinically helpful  Added T-cell gene rearrangement per Dr. Galo request, pending  Patient's status discussed with Dr. Galo 6/10/2024.  She had been planned for radiation therapy again this afternoon but she overall is quite frail and is unlikely that she would tolerate the transport from a rehab center over the many  weeks of radiation therapy.  I have contacted radiation therapy holding treatment at this point  She will likely transfer to rehab center 6/10 or 6/11/2024.  We reschedule her office follow-up in approximately 2 weeks at which time a decision can be made about radiation therapy plus the use of Revlimid.    *Neutropenia  5/20/2024: WBC decreased to 1750.  Neutrophils decreased to 940.  5/24/2024: WBC decreased to 1290.  Neutrophils decreased to 680.  5/25/2024: WBC decreased to 860.  Neutrophils decreased to 530.  Worsening neutropenia is also concerning for relapse of the lymphoma with involvement of the bone marrow.   5/26/2024: Neutrophil count 610.  5/27/2024-total WBC count has increased to 1.46.  Absolute neutrophil count pending   5/28/2024-WBC count lower at 1.05 with an absolute neutrophil count of 0.57  Increase in metamyelocytes as well as basophil percentage.  Flow cytometry 5/28/2024-negative  ANC lower today at 0.41  Copper level normal at 145  MMA normal at 245  5/30/24 - bone marrow biopsy, results pending   6/1/2024-WBC count 0.82, absolute neutrophil count 0.23.  WBC was okay through 5/10/2024.  Await bone marrow biopsy final results.  6/4/2024: WBC 0.83.  Discussed with oncology pharmacist, Alexandra Lopez.  She notes mirtazapine was started on 5/27/2024 for appetite.  This can cause neutropenia.  Therefore, stop this on 6/4/ 24.  6/7/2024: Daily Granix added as per Dr. Galo, her outpatient hematologist recommendation.  6/8/2024: After a dose of Granix on 6/7/2024, WBC improved from 1.09 up to 2.05  6/9/2024: WBC up to 2.5.  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 Ghosheh, her usual outpatient oncologist checked this because he was concerned she may have developed CMV due to her severe cytopenias  and CMV may be causing her ongoing prolonged cytopenias.  6/7/2024: I discussed with him.  He has requested an ID consult to get their thoughts if she needs treatment for CMV and also requests some Neupogen to try to improve the WBC/ANC  Dr. Jens Amin, ID, evaluated 6/8/2024.  He stated elevated CMV PCR in the setting of DLBCL is not uncommon.  He stated is difficult to know the significance.  He stated CMV could be a cause of her prolonged cytopenias.  He noted she is asymptomatic but stated patients are often asymptomatic with true infection.  He stated the treatment would likely be valganciclovir.  He discussed empiric treatment versus continued monitoring.  He felt since her counts were stable it would be reasonable to repeat CMV PCR at oncology follow-up to reassess the trend.  He noted if CMV PCR remains stable or trends up then ID would be happy to reevaluate in the outpatient setting.  He signed off the case.     *Fever  Afebrile, Tmax of 99.5  Blood cultures negative  Patient at high risk for infections given neutropenia  Continue cefepime through 6/4/ 24 per ID recommendations.  After discontinuation of cefepime if patient remains neutropenic then they are recommending cefdinir  No recent fever     *Macrocytic anemia.  Patient also developed anemia secondary to lymphoma and secondary to chemotherapy.    Hgb decreased to 7.9 on 4/25/2022 and she was transfused.   Hemoglobin decreased to 6.3 on 4/27/2022. She was transfused.  Hemoglobin level improved subsequently.  Patient was placed ferrous sulfate 325 mg 3 days a week.  5/3/2024: Hemoglobin increased to 10.2.  5/25/2024: Hemoglobin decreased to 7.8.  No evidence of vitamin B12 or folate deficiency.    Ferritin 1953.  Transferrin saturation 11%.  Patient decided to hold off on PRBC transfusion.  5/26/2024: Hemoglobin 8.2.  Due to her reporting chills when she received PRBC Tx in the past, we will give Tylenol, Benadryl and Solumedrol prior to the  transfusion.   526 2024-1 unit of PRBC administered.  Patient tolerated the transfusion well.  Hemoglobin 9.5 on 6/2/2024  Copper and MMA levels normal  Hb 8.8 from 9, from 8.7, from 8.8, from 9     *Hyponatremia.  Patient had problem with hyponatremia in the past.  Sodium is 127 on 3/14/2023.  She was advised to increase salt intake.  Sodium improved to 133 on repeat lab on 3/21/2023.  However, sodium decreased to 126 on 6/7/2023.  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  She was started on sodium chloride 1 g daily.  4/1/2024: Sodium decreased to 132.  Leg swelling improved with Lasix.  5/3/2024: Sodium 130.  5/24/2024: Sodium decreased to 126.    Patient was having significant weakness.  5/25/2024: Sodium 128.  Nephrology service was consulted.  5/28/2024-sodium stable at 132  5/30/2024-sodium 133  5/31/24 - sodium 134  6/1/2024-sodium 133  6/2/2024-sodium stable at 135  Continues on fluid restriction  Sodium 135, from 136, from 134, from 133  Reassessment 6/10/2024 with  serum sodium of 136     *Hyperkalemia previously, now hypokalemia.  5/24/2024: Potassium increased to 5.6.  Potassium 4.1 from 4.3, from 3.3, from 3.2, from 3.9     *Chronic anticoagulation.  Patient is on Eliquis 5 mg twice daily.  No excessive bleeding  Platelet count remains normal  Eliquis resumed      *Prophylaxis.  Patient had shingles in April 2023.  She is at risk of developing shingles while on treatment.  She was placed on acyclovir 400 mg twice daily.  She was on Bactrim DS 3 days a week.  Bactrim was discontinued due to the electrolyte abnormalities.     *Low-density lesion in the uterus.    The radiologist recommended pelvic ultrasound.    The patient is asymptomatic.    PET scan on 4/22/2024 reported fluid in the lower abdomen.  Patient reported intermittent vaginal discharge.     *Hypertension  Patient started on midodrine  Blood pressure has improved with midodrine.  On 10 mg 3 times a day      PLAN:  6/4/2024: Stopped mirtazapine because it can cause neutropenia (it was started on 5/27/2024).  She follows with Dr. Galo in our office and a repeat visit will be rescheduled the next 2 weeks  Bone marrow was negative for lymphoma and MDS  Patient would like to go to rehab in an effort to improve performance status.  We are not planning any systemic therapy such as chemotherapy at this time, or while she is in rehab.  We are hoping she gets stronger in rehab and we will hold radiation therapy during this time  ID recommends Dr. Galo check another CMV PCR when patient follows up in our office and states she could be sent back to the ID clinic if CMV PCR is stable or higher. Assessed 6/10/2024 with Neupogen to be discontinued after today's dose.  Anticipating discharge  6/11/2024, rescheduled office follow-up-NP 3 weeks, Dr. Galo in 6 weeks  Palliative care has seen patient but this would likely only be appropriate when she is seen back in office and decision made about goals of care.  At this point she does not need transfer to palliative care unit.    Chart reviewed and summarized including multiple inpatient notes, hospitalist note and last ID note

## 2024-06-11 NOTE — TELEPHONE ENCOUNTER
Caller: GANGA    Relationship: Other    Best call back number: [phone]    What is the best time to reach you: ANYTIME    Who are you requesting to speak with (clinical staff, provider,  specific staff member): CLINICAL    What was the call regarding: [facility] STATED THEY CANNOT DISPENSE PATIENT'S LENALIDOMIDE WHILE SHE IS INPATIENT PER MEDICARE GUIDELINES. JUST MAKING SURE OFFICE IS AWARE THAT PATIENT IS IN THE HOSPITAL. AUTH IS SET TO  SOON.

## 2024-06-11 NOTE — CASE MANAGEMENT/SOCIAL WORK
Continued Stay Note  [facility]     Patient Name: Michelle Car  MRN: [id number]  Today's Date: 6/11/2024    Admit Date: 5/24/2024    Plan: [facility] via [facility] w/TRADE TO REBATE van @1600   Discharge Plan       Row Name 06/11/24 1257       Plan    Plan [facility] via [facility]/TRADE TO REBATE van @1600    Patient/Family in Agreement with Plan yes    Plan Comments Spoke with patient at bedside to update with discharge plan. Patient plan is to [facility] for rehab via [facility] w/TRADE TO REBATE van today at 1600. Left message for son/ Helder to update with discharge. Spoke with Dana/ [facility] to admit today. [facility] follow [facility] on Thursday [facility]13 via call, can be done at SNF. Spoke with Nieves/ Pastoral care; will have notary come fill out EMS DNR.                   Discharge Codes    No documentation.                 Expected Discharge Date and Time       Expected Discharge Date Expected Discharge Time    Jun 11, 2024               Elidia Walter RN

## 2024-06-11 NOTE — PROGRESS NOTES
I called [facility] and spoke with Clyde [facility].  Marylin from [facility] had called earlier and said they can't fill the Revlimid because the patient is in the hospital per Medicare guidelines. We had put the Revlimid on hold last week.  Clyde will deactivate the prescription.  We will need to send in a new script when she needs to start it.        Carol Gill  Specialty Pharmacy Technician

## 2024-06-11 NOTE — DISCHARGE SUMMARY
Patient Name: Michelle Car  : 1940  MRN: [id number]    Date of Admission: 2024  Date of Discharge:  2024  Primary Care Physician: Olayinka Pimentel MD      Chief Complaint:   No chief complaint on file.      Discharge Diagnoses     Active Hospital Problems    Diagnosis  POA    **Leukopenia [D72.819]  Yes    Chronic heart failure with preserved ejection fraction (HFpEF) [I50.32]  Yes    Severe malnutrition [E43]  Yes    Anemia [D64.9]  Yes    Weight loss [R63.4]  Yes    Chronic diastolic CHF (congestive heart failure) [I50.32]  Yes    Paroxysmal atrial fibrillation with rapid ventricular response [I48.0]  Yes    Hyponatremia [E87.1]  Yes    Diffuse large B-cell lymphoma of lymph nodes of multiple regions [C83.38]  Yes    CRI (chronic renal insufficiency), stage 2 (mild) [N18.2]  Yes      Resolved Hospital Problems    Diagnosis Date Resolved POA    Hyperkalemia [E87.5] 2024 Unknown        Hospital Course     Ms. Car is a 83 y.o. female with a history of Lymphoma, CHF, PAF, hyponatremia, and chronic renal insufficiency  who presented to [facility] initially complaining of weakness, and fatigue on .  Please see the admitting history and physical for further details.  She was found to have hyponatremia, hyperkalemia, anemia hypotension, and chronic B cell lymphoma, atrial fibrillation, and CHF.  and was admitted to the hospital for further evaluation and treatment.  She was followed by hematology, cardiology and nephrology. She was neutropenic, felt to be side effect of chemotherapy and placed on Neupogen with some improvement in her WBC but they do remain low, but improving with her Neupogen being  discontinued today, She was noted to be hypotensive, she was started on Midodrine with some improvement, She was evaluated by ID who recommended repeating CMV PCR outpatient to guide management, She was scheduled to start radiation therapy today but that was  placed on hold due to her profound weakness, She underwent bone marrow biopsy that showed no recurrence of her lymphoma. CMV culture was positive she was evaluated by ID she completed a dose of cefepime, Her fever resolved, her blood cultures remained negative, MRSA PCR was negative. NO antivirals were initiated recommends following up for a recheck in 2-3 weels. Her hyponatremia was managed by Nephrology, with fluid restrictions and oral 1gm sodium chloride supplement.  She remains very weak and frail. She has spoke with pallaitive care and has considered transitioning to hospice care but Hematology has advised she is responding to therapy. She has agreed she would like to try rehab to get stronger. She is patient of Dr Galo outpatient and will follow up with him outpatient as scheduled in 6 weeks, for repeat CMR in 2-3 weeks with NP.     Day of Discharge     Subjective:  She remains frail, but willing to try and regain her strength at rehab, offers no complaints and is stable for discharge     Physical Exam:  Temp:  [97.9 °F (36.6 °C)-99.3 °F (37.4 °C)] 99.3 °F (37.4 °C)  Heart Rate:  [61-73] 70  Resp:  [16] 16  BP: ()/(56-66) 93/62  Body mass index is 20.24 kg/m².  Physical Exam  Vitals and nursing note reviewed.   Constitutional:       General: She is awake. She is not in acute distress.     Appearance: Normal appearance. She is cachectic. She is ill-appearing.   HENT:      Head: Normocephalic and atraumatic.      Mouth/Throat:      Mouth: Mucous membranes are dry.      Pharynx: No posterior oropharyngeal erythema.   Eyes:      General: No scleral icterus.  Neck:      Vascular: No JVD.   Cardiovascular:      Rate and Rhythm: Normal rate and regular rhythm.      Pulses: Normal pulses.      Heart sounds: Normal heart sounds. No murmur heard.  Pulmonary:      Effort: Pulmonary effort is normal. No respiratory distress.      Breath sounds: Normal breath sounds.   Abdominal:      General: Bowel sounds are  normal. There is no distension.      Palpations: Abdomen is soft.      Tenderness: There is no abdominal tenderness.   Musculoskeletal:         General: No swelling or tenderness.      Cervical back: Neck supple.      Right lower leg: Edema present.      Left lower leg: Edema present.   Skin:     General: Skin is warm and dry.      Capillary Refill: Capillary refill takes less than 2 seconds.      Coloration: Skin is not jaundiced.      Findings: No rash.   Neurological:      General: No focal deficit present.      Mental Status: She is alert and oriented to person, place, and time. Mental status is at baseline.   Psychiatric:         Mood and Affect: Mood normal.         Behavior: Behavior normal. Behavior is cooperative.         Consultants     Consult Orders (all) (From admission, onward)       Start     Ordered    06/10/24 1551  Inpatient Hospice / [facility] Consult  Once        Comments: Please call patient sonHelder to arrange meeting   Specialty:  Hospice and Palliative Medicine  Provider:  (Not yet assigned)    06/10/24 1550    06/09/24 1519  Inpatient Palliative Care Team Consult  Once        Provider:  (Not yet assigned)    06/09/24 1518    06/08/24 0700  Inpatient Infectious Diseases Consult  Once        Specialty:  Infectious Diseases  Provider:  Mick Brown MD    06/07/24 1725    05/29/24 1038  Inpatient Infectious Diseases Consult  Once        Specialty:  Infectious Diseases  Provider:  Mick Brown MD    05/29/24 1041    05/28/24 1050  Inpatient Palliative Care Team Consult  Once        Provider:  (Not yet assigned)    05/28/24 1049    05/27/24 1043  Inpatient Cardiology Consult  Once        Specialty:  Cardiology  Provider:  Nasrin Nichole MD    05/27/24 1052    05/25/24 0525  Inpatient Nephrology Consult  Once        Specialty:  Nephrology  Provider:  Jr Le MD    05/25/24 0524    05/24/24 1855  Inpatient Case Management  Consult  Once         Provider:  (Not yet assigned)    05/24/24 1854 05/24/24 1855  Inpatient Nutrition Consult  Once        Provider:  (Not yet assigned)    05/24/24 1855    05/24/24 1708  Inpatient Hematology & Oncology Consult  Once        Specialty:  Hematology and Oncology  Provider:  Renate Galo MD    05/24/24 1710                  Procedures     * Surgery not found *    Imaging Results (All)       Procedure Component Value Units Date/Time    CT Bone marrow biopsy and aspiration [[id number]] Collected: 05/30/24 1452     Updated: 05/30/24 1455    Narrative:      PROCEDURE: CT guided bone marrow biopsy     HISTORY: neutropenia, h/o lymphoma     TECHNIQUE:  Radiation dose reduction techniques were utilized, including automated  exposure control and exposure modulation based on body size.     Informed consent was obtained. In the procedure room a timeout was  performed confirming correct patient and procedure.     The patient was placed in the prone position on the CT scanner. CT scan  was performed to localize the posterior right iliac crest. The overlying  skin was prepped and draped in the usual sterile fashion with 2%  chlorhexidine. 1% lidocaine was used for local anesthesia.     Next, an 11 gauge bone access needle was advanced into the posterior  right iliac crest under CT guidance. Bone marrow aspirate followed by  core biopsy was then obtained and sent to pathology. The needle was  removed and sterile dressing applied. No immediate complications.       Impression:      Technically successful CT guided bone marrow biopsy.     Moderate sedation was provided under my direct supervision using 0.5 mg  IV Versed and 25 mcg IV Fentanyl. The patient was independently  monitored by a trained Department of Radiology RN using automated blood  pressure, EKG, and pulse oximetry. My total intra-service time was 4  minutes.                    Radiation dose reduction techniques were utilized, including automated  exposure control  and exposure modulation based on body size.        This report was finalized on 5/30/2024 2:52 PM by Dr. Jr Deshpande M.D on Workstation: [id number]       XR Chest 1 View [[id number]] Collected: 05/25/24 0926     Updated: 05/25/24 0931    Narrative:      XR CHEST 1 VW-        INDICATION: Fluid overload, left,     COMPARISON: Chest radiograph April 26, 2022     TECHNIQUE: 1 view chest     FINDINGS:      No focal opacity. No effusions. Normal mediastinal contour. Mitral  annulus calcifications. Cholecystectomy clips.       Impression:      No acute cardiopulmonary process     This report was finalized on 5/25/2024 9:28 AM by Dr. Erick Maurer M.D on Workstation: [id number]             Results for orders placed during the hospital encounter of 04/02/24    Duplex Venous Lower Extremity - Bilateral CAR    Interpretation Summary    Normal bilateral lower extremity venous duplex scan.    Results for orders placed during the hospital encounter of 05/24/24    Adult Transthoracic Echo Complete W/ Cont if Necessary Per Protocol    Interpretation Summary    Left ventricular systolic function is normal. Left ventricular ejection fraction appears to be 66 - 70%.    Left ventricular wall thickness is consistent with mild septal asymmetric hypertrophy.    Left ventricular diastolic function was normal.    The right ventricular cavity is mild to moderately dilated.    There is moderate calcification of the aortic valve with no significant stenosis or regurgitation.    There is bileaflet mitral valve prolapse present.  Eccentric jet with large proximal convergence consistent with severe mitral regurgitation.  No significant stenosis noted.    Estimated right ventricular systolic pressure from tricuspid regurgitation is normal (<35 mmHg).    Pertinent Labs     Results from last 7 days   Lab Units 06/11/24  0529 06/10/24  0510 06/09/24  0613 06/08/24  0658   WBC 10*3/mm3 3.69 3.46 2.53* 2.05*   HEMOGLOBIN g/dL 8.9* 8.8* 9.0*  "8.7*   PLATELETS 10*3/mm3 148 158 152 136*     Results from last 7 days   Lab Units 06/11/24  0529 06/10/24  0510 06/09/24  0613 06/08/24  1721 06/08/24  0658   SODIUM mmol/L 137 136 135*  --  136   POTASSIUM mmol/L 3.6 4.1 4.3 4.3 3.3*   CHLORIDE mmol/L 104 101 103  --  103   CO2 mmol/L 26.0 25.4 25.0  --  25.0   BUN mg/dL 12 11 13  --  13   CREATININE mg/dL 0.55* 0.61 0.50*  --  0.50*   GLUCOSE mg/dL 81 76 83  --  80   EGFR mL/min/1.73 91.6 89.4 93.8  --  93.8     Results from last 7 days   Lab Units 06/11/24  0529   ALBUMIN g/dL 2.4*   BILIRUBIN mg/dL 0.3   ALK PHOS U/L 78   AST (SGOT) U/L 21   ALT (SGPT) U/L 13     Results from last 7 days   Lab Units 06/11/24  0529 06/10/24  1805 06/10/24  0510 06/09/24  0613 06/08/24  0658   CALCIUM mg/dL 8.4*  --  8.7 8.4* 8.2*   ALBUMIN g/dL 2.4*  --   --   --   --    MAGNESIUM mg/dL  --  1.6  --   --   --        Results from last 7 days   Lab Units 06/10/24  1805   CK TOTAL U/L 9*           Invalid input(s): \"LDLCALC\"          Test Results Pending at Discharge     Pending Labs       Order Current Status    Bone Marrow Exam Collected (05/30/24 1424)    Bone Marrow Exam Collected (05/30/24 1424)            Discharge Details        Discharge Medications        New Medications        Instructions Start Date   empagliflozin 10 MG tablet tablet  Commonly known as: JARDIANCE   10 mg, Oral, Daily      midodrine 10 MG tablet  Commonly known as: PROAMATINE   10 mg, Oral, 3 Times Daily Before Meals             Changes to Medications        Instructions Start Date   apixaban 2.5 MG tablet tablet  Commonly known as: ELIQUIS  What changed:   medication strength  See the new instructions.   2.5 mg, Oral, Every 12 Hours Scheduled      sodium chloride 1 g tablet  What changed: when to take this   1 g, Oral, 2 Times Daily With Meals             Continue These Medications        Instructions Start Date   acetaminophen 500 MG tablet  Commonly known as: TYLENOL   500 mg, Oral, Every 6 Hours " PRN      acyclovir 400 MG tablet  Commonly known as: Zovirax   400 mg, Oral, 2 Times Daily      amiodarone 200 MG tablet  Commonly known as: PACERONE   200 mg, Oral, Every 24 Hours Scheduled      CALTRATE 600 PO   1 tablet, Oral, Daily      carvedilol 3.125 MG tablet  Commonly known as: COREG   TAKE 1 TABLET BY MOUTH TWICE DAILY WITH MEALS      Centrum Silver tablet  Generic drug: multivitamin with minerals   1 tablet, Oral, Daily      Claritin 10 MG tablet  Generic drug: loratadine   10 mg, Oral, Daily      ferrous sulfate 325 (65 FE) MG tablet  Commonly known as: FeroSul   325 mg, Oral, Every Other Day      furosemide 40 MG tablet  Commonly known as: LASIX   40 mg, Oral, Daily PRN      guaiFENesin 600 MG 12 hr tablet  Commonly known as: MUCINEX   1,200 mg, Oral, 2 Times Daily      Hydrocortisone (Perianal) 2.5 % rectal cream  Commonly known as: Anusol-HC   Apply to hemorrhoids 3 times daily for 7 days during hemorrhoid flare. Include applicator.      lenalidomide 10 MG capsule  Commonly known as: REVLIMID   10 mg, Oral, Daily, Take daily on Days 1-21, and off 7 days. Do not crush, break or chew.      polyethylene glycol 17 GM/SCOOP powder  Commonly known as: MIRALAX   17 g, Oral, As Needed      prochlorperazine 5 MG tablet  Commonly known as: COMPAZINE   5-10 mg, Oral, Every 6 Hours PRN      spironolactone 25 MG tablet  Commonly known as: ALDACTONE   25 mg, Oral, Daily               Allergies   Allergen Reactions    Factive [Gemifloxacin] Rash       Discharge Disposition:  Skilled Nursing Facility (DC - External)      Discharge Diet:  Diet Order   Procedures    Diet: Regular/House; Neutropenic/Low Microbial; Fluid Consistency: Thin (IDDSI 0)       Discharge Activity: as tolerated       CODE STATUS:    Code Status and Medical Interventions:   Ordered at: 05/29/24 1448     Medical Intervention Limits:    NO intubation (DNI)    NO cardioversion    NO artificial nutrition     Level Of Support Discussed With:     Patient    Health Care Surrogate     Code Status (Patient has no pulse and is not breathing):    No CPR (Do Not Attempt to Resuscitate)     Medical Interventions (Patient has pulse or is breathing):    Limited Support     Comments:    spoke with patient and her son/HCS, Helder       Future Appointments   Date Time Provider Department Center   6/11/2024  4:00 PM EMS W/C [facility]   6/28/2024  2:15 PM Jm Monae MD [facility] None   7/2/2024  2:30 PM LAB CHAIR 5 [facility] [place]   7/2/2024  3:00 PM Alisson Foster APRN [facility] [place]   7/29/2024  8:40 AM LAB CHAIR 3 [facility] [place]   7/29/2024  9:00 AM Renate Galo MD [facility] [place]      Contact information for follow-up providers       Olayinka Pimentel MD .    Specialty: Internal Medicine  Contact information:  [address]  [facility] [address] [phone]                       Contact information for after-discharge care       Destination       [facility] .    Service: Skilled Nursing  Contact information:  [address]   [facility] [place] [address] [phone]                                   Time Spent on Discharge:  Greater than 30 minutes      MAGGI Riggins  [facility]  06/11/24  14:31 EDT

## 2024-06-11 NOTE — PLAN OF CARE
Goal Outcome Evaluation:         Pt alert and oriented x 3-4. Denies pain, no distress noted, see MAR for a complete listing of medications administered this shift. Pt noted as possible d/c today. Plan of care is ongoing.

## 2024-06-12 NOTE — CASE MANAGEMENT/SOCIAL WORK
Case Management Discharge Note      Final Note: Imelda Brownlee w/[facility]         Selected Continued Care - Discharged on 6/11/2024 Admission date: 5/24/2024 - Discharge disposition: Skilled Nursing Facility (DC - External)      Destination Coordination complete.      Service Provider Selected Services Address Phone Fax Patient Preferred    [facility] [address] [facility] [address] [phone] --              Durable Medical Equipment    No services have been selected for the patient.                Dialysis/Infusion    No services have been selected for the patient.                Home Medical Care    No services have been selected for the patient.                Therapy    No services have been selected for the patient.                Community Resources    No services have been selected for the patient.                Community & DME    No services have been selected for the patient.                    Selected Continued Care - Episodes Includes continued care and service providers with selected services from the active episodes listed below      Oncology- External Fill Episode start date: 5/3/2024   There are no active outsourced providers for this episode.                      Final Discharge Disposition Code: 03 - skilled nursing facility (SNF)

## 2024-06-20 ENCOUNTER — TELEPHONE (OUTPATIENT)
Dept: RADIATION ONCOLOGY | Facility: HOSPITAL | Age: 84
End: 2024-06-20
Payer: MEDICARE

## 2024-06-20 NOTE — TELEPHONE ENCOUNTER
Spoke with patients matt Pendleton about getting on the schedule to follow up with Dr. Jm Monae and proceeding with radiation. Son would like to wait to schedule anything until they see [facility] on 7/2. He said he will have their office reach out at that time if they want to proceed.

## 2024-06-26 LAB
CYTO UR: NORMAL
DX PRELIMINARY: NORMAL
LAB AP CASE REPORT: NORMAL
LAB AP CLINICAL INFORMATION: NORMAL
LAB AP FLOW CYTOMETRY SUMMARY: NORMAL
LAB AP SPECIAL STAINS: NORMAL
Lab: NORMAL
PATH REPORT.ADDENDUM SPEC: NORMAL
PATH REPORT.FINAL DX SPEC: NORMAL
PATH REPORT.GROSS SPEC: NORMAL

## 2024-07-02 ENCOUNTER — LAB (OUTPATIENT)
Dept: LAB | Facility: HOSPITAL | Age: 84
End: 2024-07-02
Payer: MEDICARE

## 2024-07-02 ENCOUNTER — OFFICE VISIT (OUTPATIENT)
Dept: ONCOLOGY | Facility: CLINIC | Age: 84
End: 2024-07-02
Payer: MEDICARE

## 2024-07-02 VITALS
SYSTOLIC BLOOD PRESSURE: 98 MMHG | RESPIRATION RATE: 18 BRPM | HEART RATE: 97 BPM | BODY MASS INDEX: 20.24 KG/M2 | DIASTOLIC BLOOD PRESSURE: 63 MMHG | HEIGHT: 62 IN | TEMPERATURE: 98.2 F | OXYGEN SATURATION: 96 %

## 2024-07-02 DIAGNOSIS — B25.8 OTHER CYTOMEGALOVIRAL DISEASES: Primary | ICD-10-CM

## 2024-07-02 DIAGNOSIS — C83.38 DIFFUSE LARGE B-CELL LYMPHOMA OF LYMPH NODES OF MULTIPLE REGIONS: Primary | ICD-10-CM

## 2024-07-02 DIAGNOSIS — C83.38 DIFFUSE LARGE B-CELL LYMPHOMA OF LYMPH NODES OF MULTIPLE REGIONS: ICD-10-CM

## 2024-07-02 DIAGNOSIS — D61.818 PANCYTOPENIA: ICD-10-CM

## 2024-07-02 LAB
ALBUMIN SERPL-MCNC: 2.4 G/DL (ref 3.5–5.2)
ALBUMIN/GLOB SERPL: 1.1 G/DL
ALP SERPL-CCNC: 105 U/L (ref 39–117)
ALT SERPL W P-5'-P-CCNC: 23 U/L (ref 1–33)
ANION GAP SERPL CALCULATED.3IONS-SCNC: 9 MMOL/L (ref 5–15)
AST SERPL-CCNC: 39 U/L (ref 1–32)
BASOPHILS # BLD AUTO: 0.01 10*3/MM3 (ref 0–0.2)
BASOPHILS NFR BLD AUTO: 1.1 % (ref 0–1.5)
BILIRUB SERPL-MCNC: 0.7 MG/DL (ref 0–1.2)
BUN SERPL-MCNC: 17 MG/DL (ref 8–23)
BUN/CREAT SERPL: 28.3 (ref 7–25)
CALCIUM SPEC-SCNC: 8.1 MG/DL (ref 8.6–10.5)
CHLORIDE SERPL-SCNC: 102 MMOL/L (ref 98–107)
CO2 SERPL-SCNC: 27 MMOL/L (ref 22–29)
CREAT SERPL-MCNC: 0.6 MG/DL (ref 0.57–1)
DEPRECATED RDW RBC AUTO: 56.1 FL (ref 37–54)
EGFRCR SERPLBLD CKD-EPI 2021: 89.7 ML/MIN/1.73
EOSINOPHIL # BLD AUTO: 0 10*3/MM3 (ref 0–0.4)
EOSINOPHIL NFR BLD AUTO: 0 % (ref 0.3–6.2)
ERYTHROCYTE [DISTWIDTH] IN BLOOD BY AUTOMATED COUNT: 16.1 % (ref 12.3–15.4)
FOLATE SERPL-MCNC: 15.6 NG/ML (ref 4.78–24.2)
GLOBULIN UR ELPH-MCNC: 2.2 GM/DL
GLUCOSE SERPL-MCNC: 83 MG/DL (ref 65–99)
HCT VFR BLD AUTO: 26.9 % (ref 34–46.6)
HGB BLD-MCNC: 8.6 G/DL (ref 12–15.9)
IMM GRANULOCYTES # BLD AUTO: 0.03 10*3/MM3 (ref 0–0.05)
IMM GRANULOCYTES NFR BLD AUTO: 3.2 % (ref 0–0.5)
LDH SERPL-CCNC: 231 U/L (ref 135–214)
LYMPHOCYTES # BLD AUTO: 0.25 10*3/MM3 (ref 0.7–3.1)
LYMPHOCYTES NFR BLD AUTO: 26.9 % (ref 19.6–45.3)
MCH RBC QN AUTO: 30.7 PG (ref 26.6–33)
MCHC RBC AUTO-ENTMCNC: 32 G/DL (ref 31.5–35.7)
MCV RBC AUTO: 96.1 FL (ref 79–97)
MONOCYTES # BLD AUTO: 0.06 10*3/MM3 (ref 0.1–0.9)
MONOCYTES NFR BLD AUTO: 6.5 % (ref 5–12)
NEUTROPHILS NFR BLD AUTO: 0.58 10*3/MM3 (ref 1.7–7)
NEUTROPHILS NFR BLD AUTO: 62.3 % (ref 42.7–76)
NRBC BLD AUTO-RTO: 0 /100 WBC (ref 0–0.2)
PLATELET # BLD AUTO: 201 10*3/MM3 (ref 140–450)
PMV BLD AUTO: 9.6 FL (ref 6–12)
POTASSIUM SERPL-SCNC: 4.2 MMOL/L (ref 3.5–5.2)
PROT SERPL-MCNC: 4.6 G/DL (ref 6–8.5)
RBC # BLD AUTO: 2.8 10*6/MM3 (ref 3.77–5.28)
SODIUM SERPL-SCNC: 138 MMOL/L (ref 136–145)
URATE SERPL-MCNC: 1.7 MG/DL (ref 2.4–5.7)
VIT B12 BLD-MCNC: >2000 PG/ML (ref 211–946)
WBC NRBC COR # BLD AUTO: 0.93 10*3/MM3 (ref 3.4–10.8)

## 2024-07-02 PROCEDURE — 82746 ASSAY OF FOLIC ACID SERUM: CPT | Performed by: NURSE PRACTITIONER

## 2024-07-02 PROCEDURE — 80053 COMPREHEN METABOLIC PANEL: CPT

## 2024-07-02 PROCEDURE — 85025 COMPLETE CBC W/AUTO DIFF WBC: CPT

## 2024-07-02 PROCEDURE — 36415 COLL VENOUS BLD VENIPUNCTURE: CPT

## 2024-07-02 PROCEDURE — 83615 LACTATE (LD) (LDH) ENZYME: CPT

## 2024-07-02 PROCEDURE — 84550 ASSAY OF BLOOD/URIC ACID: CPT

## 2024-07-02 PROCEDURE — 82607 VITAMIN B-12: CPT | Performed by: NURSE PRACTITIONER

## 2024-07-02 RX ORDER — DIMETHICONE 1.5 G/100ML
CREAM TOPICAL
COMMUNITY

## 2024-07-02 NOTE — PROGRESS NOTES
"        REASON FOR FOLLOWUP/CHIEF COMPLAINT:    DLBCL, anemia, neutropenia    HISTORY OF PRESENT ILLNESS  Ms. Car is an 83-year-old female with the above-mentioned history is seen today for hospital follow-up.  Patient was admitted 5/24/2024 - 6/11/2024.  She was initially admitted on 5/24/2024 due to worsening issues with hyponatremia, hyperkalemia, neutropenia, and elevated liver function studies.    We had initially planned for the patient to start on Revlimid following 6 cycles of Bendamustine, rituximab, and polatuzumab however due to profound weakness patient has never been able to begin this.  We also plan for the patient to start radiation therapy however this has not ever been able to be started due to issues with weakness as well.    Patient was discharged to [facility] facility.  She states that she has ultimately been too weak to even participate in rehab.  Her weakness has gotten worse.  Patient repeatedly states during the office visit today that she wants to go home and \"use her living well\".  She has not chills at times, but not sure if she has been febrile.  She states that she does have an appetite but finds it difficult to eat as she is not easily able to sit upright due to problems with a bedsore on her bottom.  Her only complaint of pain is from the bedsore on her bottom.    Patient's son is with her today and assist with history.  Currently patient has no one at home that can help care for her 24 hours a day.  She is currently unable to care for herself.  She is very much interested in discontinuing therapy and transitioning more to a hospice/palliative care.        Past Medical History, Past Surgical History, Social History, Family History have been reviewed and are without significant changes except as mentioned.    Review of Systems   Review of Systems   Constitutional:  Negative for activity change.   HENT:  Negative for nosebleeds and trouble swallowing.    Respiratory:  Negative " "for shortness of breath and wheezing.    Cardiovascular:  Negative for chest pain and palpitations.   Gastrointestinal:  Negative for constipation, diarrhea and nausea.   Genitourinary:  Negative for dysuria and hematuria.   Musculoskeletal:  Negative for arthralgias and myalgias.   Skin:  Negative for rash and wound.   Neurological:  Negative for seizures and syncope.   Hematological:  Negative for adenopathy. Does not bruise/bleed easily.   Psychiatric/Behavioral:  Negative for confusion.        Medications:  The current medication list was reviewed in the EMR    ALLERGIES:    Allergies   Allergen Reactions    Factive [Gemifloxacin] Rash              Vitals:    07/02/24 1443   BP: 98/63   Pulse: 97   Resp: 18   Temp: 98.2 °F (36.8 °C)   TempSrc: Oral   SpO2: 96%   Weight: Comment: unable to stand on scale   Height: 157.5 cm (62.01\")   PainSc: 0-No pain     Physical Exam  Constitutional:       General: She is not in acute distress.     Appearance: She is well-developed. She is ill-appearing.      Comments: Seated in a wheelchair   Pulmonary:      Effort: Pulmonary effort is normal. No respiratory distress.   Musculoskeletal:         General: Swelling (1+ bilateral lower extremity edema) present.   Skin:     General: Skin is warm and dry.      Coloration: Skin is pale.   Neurological:      Mental Status: She is alert and oriented to person, place, and time.             RECENT LABS:  WBC   Date Value Ref Range Status   07/02/2024 0.93 (C) 3.40 - 10.80 10*3/mm3 Final     Hemoglobin   Date Value Ref Range Status   07/02/2024 8.6 (L) 12.0 - 15.9 g/dL Final     Platelets   Date Value Ref Range Status   07/02/2024 201 140 - 450 10*3/mm3 Final       ASSESSMENT/PLAN:  Michelle Car Room/bed info not found     *Relapsed diffuse large B-cell lymphoma.  Patient was found to have bilateral pleural effusions.    She underwent right thoracentesis on 2/21/2022 and the left thoracentesis on 2/22/2022.    Analysis of the fluid " revealed involvement with diffuse large B-cell lymphoma.    FISH analysis for BCL6 rearrangement was positive but was negative for BCL-2 and MYC rearrangement.    PET scan on 3/10/2022 revealed significant hypermetabolism in the left adrenal gland and the surrounding soft tissue with SUV up to 34.5. Hypermetabolism in the left pleural thickening with SUV of 7.1. there was less hypermetabolism in the right adrenal gland and in the right pleura.  She was considered to have stage III non-bulky disease.  R-IPI score of 3 associated with poor risk - associated with overall survival of 55%.   R-CHOP with Neulasta support was started on 3/28/2022.  Due to development of neutropenic fever following cycles 1 and 2, treatment was changed to mini-RCHOP starting cycle #3 on 5/16/2022.  Patient received cycle #6 on 7/25/2022.  PET scan on 8/15/2022 revealed complete metabolic response.  Patient was found to have recurrence with development of a left mandibular mass in October/November 2023.  Biopsy of left mandible mass on 11/28/2023 showed recurrence of the lymphoma, diffuse large B cell, non-GCB phenotype.  Ki-67 was 90%.    It was positive for Bcl-6 but negative for Bcl-2 and MYC rearrangement.  PET scan on 12/11/2023 revealed multiple areas of involvement -prevascular space, left epicardial fat pad, pleural density posterior to the left fourth rib anterior aspect, lesion along the pericardium versus intramuscular with SUV of 9.8.  Hypermetabolic activity at both adrenal glands, uniformly increased in size and appeared hyperplastic.  There was a 2.7 x 1.1 cm soft tissue nodule lateral to the right erector spinae muscle posterior to the right posterior 11th rib with an SUV of 20.5 and there was a 1.3 cm nodule lateral to the posterior margin of the left psoas muscle with an SUV of 24.9.  Rituxan Polivy and Treanda with Treanda every 3 weeks for 6 cycles was started on 12/12/2023.  The jaw mass started to decrease in size after  starting treatment.  CT scan on 2/8/2024 showed evidence of response to treatment.  Cycle #6 was given on 4/1/2024.  PET scan on 4/22/2024 revealed decrease in the hypermetabolism in the mediastinal and pericardial lymph nodes/lesions and in the lesions above the left kidney and adrenal gland.  The left mandibular lesion was no longer hypermetabolic.   She is at increased risk for relapse of the lymphoma.  The plan was to start treatment with Revlimid 10 mg daily-  However, the patient did not start the treatment yet.  LDH was previously elevated but improved to 190 on 5/25/2024.  Bone marrow 5/30/2024:   10 to 20% cellularity.  No evidence of lymphoma.  Trilineage hematopoiesis with decreased, left shifted granulopoiesis.  Pathologist noted the significance of the gamma delta T-cell population identified by flow cytometry is uncertain and can be seen in reactive processes.  The pathologist noted of a neoplastic process is considered clinically T-cell gene rearrangement studies could be performed.  I sent a message to the patient's outpatient oncologist, Dr. Galo, to ask him if he wants us to do a peripheral blood flow cytometry to check for blood contamination or a T-cell gene rearrangement on the marrow specimen, both of which were offered by the pathologist for further information if we thought it would be clinically helpful  Added T-cell gene rearrangement per Dr. Galo request, pending  Patient's status discussed with Dr. Galo 6/10/2024.  She had been planned for radiation therapy again this afternoon but she overall is quite frail and is unlikely that she would tolerate the transport from a rehab center over the many weeks of radiation therapy.  I have contacted radiation therapy holding treatment at this point  She will likely transfer to rehab center 6/10 or 6/11/2024.  We reschedule her office follow-up in approximately 2 weeks at which time a decision can be made about radiation therapy plus the  use of Revlimid.  7/2/2024  seen in follow-up in the office complaining of worsening weakness.  Patient has not made any progress in regards to her strength since hospital discharge, and actually gotten weaker.  Patient is adamant that she would like to go home, discontinue treatment.  Today WBC 0.93, ANC 0.58, hemoglobin 8.6, platelet count 201,000.  Reviewed patient's labs and status with Dr. Galo.  Given that the patient is having progressing weakness and decline recommend discontinuing treatment.  Recommend that the patient transition to hospice/palliative care.  She is currently residing at [facility].  I discussed with the son that they will need to discuss with the care team there what options are available at that facility.  If we need to make an outpatient referral to hospice we can do that.  From our point of view the patient will follow-up with us only on an as-needed basis.        *Neutropenia  5/20/2024: WBC decreased to 1750.  Neutrophils decreased to 940.  5/24/2024: WBC decreased to 1290.  Neutrophils decreased to 680.  5/25/2024: WBC decreased to 860.  Neutrophils decreased to 530.  Worsening neutropenia is also concerning for relapse of the lymphoma with involvement of the bone marrow.   5/26/2024: Neutrophil count 610.  5/27/2024-total WBC count has increased to 1.46.  Absolute neutrophil count pending   5/28/2024-WBC count lower at 1.05 with an absolute neutrophil count of 0.57  Increase in metamyelocytes as well as basophil percentage.  Flow cytometry 5/28/2024-negative  ANC lower today at 0.41  Copper level normal at 145  MMA normal at 245  5/30/24 - bone marrow biopsy, results pending   6/1/2024-WBC count 0.82, absolute neutrophil count 0.23.  WBC was okay through 5/10/2024.  Await bone marrow biopsy final results.  6/4/2024: WBC 0.83.  Discussed with oncology pharmacist, Alexandra Lopez.  She notes mirtazapine was started on 5/27/2024 for appetite.  This can cause neutropenia.  Therefore, stop  this on 6/4/ 24.  6/7/2024: Daily Granix added as per Dr. Galo, her outpatient hematologist recommendation.  6/8/2024: After a dose of Granix on 6/7/2024, WBC improved from 1.09 up to 2.05  6/9/2024: WBC up to 2.5.  ANC up to 1520 (improved, but again, on Neupogen)  6/10/2024 with H&H of 8.8 and 26.4, count 3460, platelet count of 58,000, ANC of 2700-1 additional dose of Neupogen-completed-6/10/2024  7/2/2024 WBC 0.93, ANC 0.58.    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 Galo, her usual outpatient oncologist checked this because he was concerned she may have developed CMV due to her severe cytopenias and CMV may be causing her ongoing prolonged cytopenias.  6/7/2024: I discussed with him.  He has requested an ID consult to get their thoughts if she needs treatment for CMV and also requests some Neupogen to try to improve the WBC/ANC  Dr. Jens Amin, ID, evaluated 6/8/2024.  He stated elevated CMV PCR in the setting of DLBCL is not uncommon.  He stated is difficult to know the significance.  He stated CMV could be a cause of her prolonged cytopenias.  He noted she is asymptomatic but stated patients are often asymptomatic with true infection.  He stated the treatment would likely be valganciclovir.  He discussed empiric treatment versus continued monitoring.  He felt since her counts were stable it would be reasonable to repeat CMV PCR at oncology follow-up to reassess the trend.  He noted if CMV PCR remains stable or trends up then ID would be happy to reevaluate in the outpatient setting.  He signed off the case.     *Fever  Afebrile, Tmax of 99.5  Blood cultures negative  Patient at high risk for infections given neutropenia  Continue cefepime through 6/4/ 24 per ID recommendations.  After discontinuation of cefepime if patient remains neutropenic then they are recommending cefdinir  No recent fever     *Macrocytic anemia.  Patient also developed anemia secondary to lymphoma and secondary to  chemotherapy.    Hgb decreased to 7.9 on 4/25/2022 and she was transfused.   Hemoglobin decreased to 6.3 on 4/27/2022. She was transfused.  Hemoglobin level improved subsequently.  Patient was placed ferrous sulfate 325 mg 3 days a week.  5/3/2024: Hemoglobin increased to 10.2.  5/25/2024: Hemoglobin decreased to 7.8.  No evidence of vitamin B12 or folate deficiency.    Ferritin 1953.  Transferrin saturation 11%.  Patient decided to hold off on PRBC transfusion.  5/26/2024: Hemoglobin 8.2.  Due to her reporting chills when she received PRBC Tx in the past, we will give Tylenol, Benadryl and Solumedrol prior to the transfusion.   526 2024-1 unit of PRBC administered.  Patient tolerated the transfusion well.  Hemoglobin 9.5 on 6/2/2024  Copper and MMA levels normal  Hb 8.6, from 8.8 from 9, from 8.7, from 8.8, from 9     *Hyponatremia.  Patient had problem with hyponatremia in the past.  Sodium is 127 on 3/14/2023.  She was advised to increase salt intake.  Sodium improved to 133 on repeat lab on 3/21/2023.  However, sodium decreased to 126 on 6/7/2023.  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  She was started on sodium chloride 1 g daily.  4/1/2024: Sodium decreased to 132.  Leg swelling improved with Lasix.  5/3/2024: Sodium 130.  5/24/2024: Sodium decreased to 126.    Patient was having significant weakness.  5/25/2024: Sodium 128.  Nephrology service was consulted.  5/28/2024-sodium stable at 132  5/30/2024-sodium 133  5/31/24 - sodium 134  6/1/2024-sodium 133  6/2/2024-sodium stable at 135  Continues on fluid restriction  Sodium 135, from 136, from 134, from 133  Reassessment 6/10/2024 with  serum sodium of 136  7/2/2024 sodium 138     *Hyperkalemia previously, now hypokalemia.  5/24/2024: Potassium increased to 5.6.  Potassium 4.2, from 4.1 from 4.3, from 3.3, from 3.2, from 3.9     *Chronic anticoagulation.  Patient is on Eliquis 5 mg twice daily.  No excessive bleeding  Platelet  count remains normal  Eliquis resumed      *Prophylaxis.  Patient had shingles in April 2023.  She is at risk of developing shingles while on treatment.  She was placed on acyclovir 400 mg twice daily.  She was on Bactrim DS 3 days a week.  Bactrim was discontinued due to the electrolyte abnormalities.     *Low-density lesion in the uterus.    The radiologist recommended pelvic ultrasound.    The patient is asymptomatic.    PET scan on 4/22/2024 reported fluid in the lower abdomen.  Patient reported intermittent vaginal discharge.     *Hypertension  Patient started on midodrine  Blood pressure has improved with midodrine.  On 10 mg 3 times a day     PLAN:  No further treatment planned.  Recommend patient transition to hospice/palliative care.  Patient will only follow-up in our office on an as-needed basis.        I spent 40 minutes caring for Michelle on this date of service. This time includes time spent by me in the following activities: preparing for the visit, reviewing tests, performing a medically appropriate examination and/or evaluation, counseling and educating the patient/family/caregiver, referring and communicating with other health care professionals, documenting information in the medical record, independently interpreting results and communicating that information with the patient/family/caregiver, care coordination, ordering medications, ordering test(s), ordering procedure(s), obtaining a separately obtained history, and reviewing a separately obtained history

## 2024-07-03 ENCOUNTER — SPECIALTY PHARMACY (OUTPATIENT)
Dept: PHARMACY | Facility: HOSPITAL | Age: 84
End: 2024-07-03
Payer: MEDICARE

## 2024-07-04 LAB
CMV DNA SERPL NAA+PROBE-ACNC: NORMAL IU/ML
CMV DNA SERPL NAA+PROBE-LOG IU: 4.59 LOG10 IU/ML

## 2024-07-10 ENCOUNTER — TELEPHONE (OUTPATIENT)
Dept: ONCOLOGY | Facility: CLINIC | Age: 84
End: 2024-07-10
Payer: MEDICARE

## 2024-07-10 NOTE — TELEPHONE ENCOUNTER
Reviewed Dr. Galo's note with the patients son, he v/u to all. The patient is currently admitted to [facility] and the son will relay the message to the pts  so they can arrange transportation for her ID appt. Our office number was given to the son for the  to call if she has any questions.

## 2024-07-10 NOTE — TELEPHONE ENCOUNTER
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 This is possibly contributing to the low blood counts.  I recommend that she sees ID - Dr. Amin for follow up on this (she was seen by Dr. Amin at [facility] in June 2024).    Thank you

## 2024-07-11 ENCOUNTER — TELEPHONE (OUTPATIENT)
Dept: ONCOLOGY | Facility: CLINIC | Age: 84
End: 2024-07-11
Payer: MEDICARE

## 2024-07-11 NOTE — TELEPHONE ENCOUNTER
Caller: HUSSEIN    Relationship: [facility]    Best call back number: [phone]    What is the best time to reach you: ANYTIME TODAY UNTIL 3:30PM TODAY     Who are you requesting to speak with (clinical staff, provider,  specific staff member): PAVAN /CLINICAL STAFF        What was the call regarding: ASK TO CALL BACK TODAY  REGARDING INFECTIOUS DISEASE PROVIDER AND WHO TO SCHEDULE WITH   HAD GOTTEN A CALL ABOUT DR PATTERSON WANTING TO GET THIS PATIENT SET UP WITH THEM .

## 2024-07-11 NOTE — TELEPHONE ENCOUNTER
Attempted to call Vero with [facility]. No answer. LVM asking them to return call.       HUB TO READ:    If [facility] calls back, please let them know pt has an apt with Dr. Amin with ID on 7/15 at 10:50am. Thank you!

## 2024-07-30 ENCOUNTER — SPECIALTY PHARMACY (OUTPATIENT)
Dept: PHARMACY | Facility: HOSPITAL | Age: 84
End: 2024-07-30
Payer: MEDICARE

## 2024-07-30 NOTE — PROGRESS NOTES
Patient's son Helder called the [facility] office asking to speak with Ellen Avery.     I told him that I could give her a message and ask that she call him back, he said that he was calling to report that his mother passed away yesterday.    I sent both Ellen and Shara Tabares a staff message informing them of all of the above.     Brenda Hinds - Care Coordinator   7/30/2024  11:51 EDT

## (undated) DEVICE — LN SMPL CO2 SHTRM SD STREAM W/M LUER

## (undated) DEVICE — RETRIEVAL BALLOON CATHETER: Brand: EXTRACTOR™ PRO RX

## (undated) DEVICE — ANTIBACTERIAL UNDYED BRAIDED (POLYGLACTIN 910), SYNTHETIC ABSORBABLE SUTURE: Brand: COATED VICRYL

## (undated) DEVICE — DRSNG TELFA PAD NONADH STR 1S 3X4IN

## (undated) DEVICE — ERBE NESSY®PLATE 170 SPLIT; 168CM²; CABLE 3M: Brand: ERBE

## (undated) DEVICE — SPHINCTEROTOME: Brand: HYDRATOME RX 44

## (undated) DEVICE — TRAP FLD MINIVAC MEGADYNE 100ML

## (undated) DEVICE — DECANT BG O JET

## (undated) DEVICE — ADAPT CLN BIOGUARD AIR/H2O DISP

## (undated) DEVICE — CANN O2 ETCO2 FITS ALL CONN CO2 SMPL A/ 7IN DISP LF

## (undated) DEVICE — ADHS SKIN SURG TISS VISC PREMIERPRO EXOFIN HI/VISC FAST/DRY

## (undated) DEVICE — TUBING, SUCTION, 1/4" X 10', STRAIGHT: Brand: MEDLINE

## (undated) DEVICE — DEV LK WIREGUIDE FUSN OLYMP SCP

## (undated) DEVICE — TBG PENCL TELESCP MEGADYNE SMOKE EVAC 10FT

## (undated) DEVICE — SENSR O2 OXIMAX FNGR A/ 18IN NONSTR

## (undated) DEVICE — APPL CHLORAPREP HI/LITE 26ML ORNG

## (undated) DEVICE — DRSNG SURESITE WNDW 4X4.5

## (undated) DEVICE — NDL HYPO PRECISIONGLIDE REG 25G 1 1/2

## (undated) DEVICE — SUT PROLN 3/0 SH D/A 36IN 8522H

## (undated) DEVICE — GLV SURG BIOGEL LTX PF 8 1/2

## (undated) DEVICE — INTENDED FOR TISSUE SEPARATION, AND OTHER PROCEDURES THAT REQUIRE A SHARP SURGICAL BLADE TO PUNCTURE OR CUT.: Brand: BARD-PARKER ® CARBON RIB-BACK BLADES

## (undated) DEVICE — PATIENT RETURN ELECTRODE, SINGLE-USE, CONTACT QUALITY MONITORING, ADULT, WITH 9FT CORD, FOR PATIENTS WEIGING OVER 33LBS. (15KG): Brand: MEGADYNE

## (undated) DEVICE — KT ORCA ORCAPOD DISP STRL

## (undated) DEVICE — SOL NS 500ML

## (undated) DEVICE — BITEBLOCK OMNI BLOC

## (undated) DEVICE — SYR LL TP 10ML STRL

## (undated) DEVICE — PK PROC MINOR TOWER 40

## (undated) DEVICE — DRP C/ARM 41X74IN